# Patient Record
Sex: FEMALE | Race: WHITE | NOT HISPANIC OR LATINO | Employment: UNEMPLOYED | URBAN - METROPOLITAN AREA
[De-identification: names, ages, dates, MRNs, and addresses within clinical notes are randomized per-mention and may not be internally consistent; named-entity substitution may affect disease eponyms.]

---

## 2018-07-03 DIAGNOSIS — Z12.2 SCREENING FOR MALIGNANT NEOPLASM OF RESPIRATORY ORGAN: Primary | ICD-10-CM

## 2021-04-27 ENCOUNTER — TELEPHONE (OUTPATIENT)
Dept: PLASTIC SURGERY | Facility: CLINIC | Age: 56
End: 2021-04-27

## 2021-04-27 DIAGNOSIS — S01.00XD OPEN WOUND OF SCALP, UNSPECIFIED OPEN WOUND TYPE, SUBSEQUENT ENCOUNTER: Primary | ICD-10-CM

## 2021-04-27 DIAGNOSIS — S01.00XS OPEN WOUND OF SCALP, UNSPECIFIED OPEN WOUND TYPE, SEQUELA: ICD-10-CM

## 2021-04-27 PROBLEM — S01.00XA OPEN SCALP WOUND: Status: ACTIVE | Noted: 2021-04-27

## 2021-11-22 ENCOUNTER — TELEPHONE (OUTPATIENT)
Dept: NEUROLOGY | Facility: CLINIC | Age: 56
End: 2021-11-22

## 2021-11-22 NOTE — TELEPHONE ENCOUNTER
----- Message from Ranjit Welch sent at 11/22/2021 10:22 AM CST -----  Regarding: call back  Contact: pt  Pt requesting a call back in regards to referral being sent over.      Pt @[phone]

## 2021-11-22 NOTE — TELEPHONE ENCOUNTER
Called and spoke with pt. Instructed was able to pull her records up under care everywhere. Sent message to [facility] dept and someone should call and schedule. Pt verbalized understanding.

## 2021-12-21 ENCOUNTER — PATIENT MESSAGE (OUTPATIENT)
Dept: NEUROLOGY | Facility: CLINIC | Age: 56
End: 2021-12-21
Payer: MEDICAID

## 2022-03-11 ENCOUNTER — HOSPITAL ENCOUNTER (INPATIENT)
Facility: HOSPITAL | Age: 57
LOS: 3 days | Discharge: HOME HEALTH | DRG: 280 | End: 2022-03-14
Attending: EMERGENCY MEDICINE | Admitting: INTERNAL MEDICINE
Payer: MEDICAID

## 2022-03-11 DIAGNOSIS — I21.3 ST ELEVATION MYOCARDIAL INFARCTION (STEMI), UNSPECIFIED ARTERY: Primary | ICD-10-CM

## 2022-03-11 DIAGNOSIS — R41.82 AMS (ALTERED MENTAL STATUS): ICD-10-CM

## 2022-03-11 DIAGNOSIS — R20.2 PARESTHESIA OF BOTH LOWER EXTREMITIES: Chronic | ICD-10-CM

## 2022-03-11 DIAGNOSIS — I50.21 ACUTE SYSTOLIC HEART FAILURE: ICD-10-CM

## 2022-03-11 DIAGNOSIS — I50.9 CHF (CONGESTIVE HEART FAILURE): ICD-10-CM

## 2022-03-11 DIAGNOSIS — G35 MULTIPLE SCLEROSIS: ICD-10-CM

## 2022-03-11 DIAGNOSIS — I21.4 NSTEMI (NON-ST ELEVATED MYOCARDIAL INFARCTION): ICD-10-CM

## 2022-03-11 PROBLEM — R41.0 CONFUSION: Status: ACTIVE | Noted: 2022-03-11

## 2022-03-11 PROBLEM — I24.89 DEMAND ISCHEMIA: Status: ACTIVE | Noted: 2022-03-11

## 2022-03-11 LAB
ALBUMIN SERPL BCP-MCNC: 3.7 G/DL (ref 3.5–5.2)
ALLENS TEST: ABNORMAL
ALP SERPL-CCNC: 73 U/L (ref 55–135)
ALT SERPL W/O P-5'-P-CCNC: 29 U/L (ref 10–44)
AMPHET+METHAMPHET UR QL: NEGATIVE
ANION GAP SERPL CALC-SCNC: 15 MMOL/L (ref 8–16)
APTT BLDCRRT: 23.2 SEC (ref 21–32)
AST SERPL-CCNC: 59 U/L (ref 10–40)
BACTERIA #/AREA URNS AUTO: ABNORMAL /HPF
BARBITURATES UR QL SCN>200 NG/ML: NEGATIVE
BASOPHILS # BLD AUTO: 0.02 K/UL (ref 0–0.2)
BASOPHILS # BLD AUTO: 0.02 K/UL (ref 0–0.2)
BASOPHILS NFR BLD: 0.1 % (ref 0–1.9)
BASOPHILS NFR BLD: 0.1 % (ref 0–1.9)
BENZODIAZ UR QL SCN>200 NG/ML: NEGATIVE
BILIRUB SERPL-MCNC: 1.6 MG/DL (ref 0.1–1)
BILIRUB UR QL STRIP: NEGATIVE
BNP SERPL-MCNC: 2638 PG/ML (ref 0–99)
BUN SERPL-MCNC: 36 MG/DL (ref 6–20)
BZE UR QL SCN: NEGATIVE
CALCIUM SERPL-MCNC: 9.4 MG/DL (ref 8.7–10.5)
CANNABINOIDS UR QL SCN: ABNORMAL
CHLORIDE SERPL-SCNC: 103 MMOL/L (ref 95–110)
CLARITY UR REFRACT.AUTO: ABNORMAL
CO2 SERPL-SCNC: 24 MMOL/L (ref 23–29)
COLOR UR AUTO: ABNORMAL
CREAT SERPL-MCNC: 0.8 MG/DL (ref 0.5–1.4)
CREAT UR-MCNC: 334 MG/DL (ref 15–325)
CTP QC/QA: YES
DIFFERENTIAL METHOD: ABNORMAL
DIFFERENTIAL METHOD: ABNORMAL
EOSINOPHIL # BLD AUTO: 0 K/UL (ref 0–0.5)
EOSINOPHIL # BLD AUTO: 0 K/UL (ref 0–0.5)
EOSINOPHIL NFR BLD: 0 % (ref 0–8)
EOSINOPHIL NFR BLD: 0 % (ref 0–8)
ERYTHROCYTE [DISTWIDTH] IN BLOOD BY AUTOMATED COUNT: 13.2 % (ref 11.5–14.5)
ERYTHROCYTE [DISTWIDTH] IN BLOOD BY AUTOMATED COUNT: 13.6 % (ref 11.5–14.5)
EST. GFR  (AFRICAN AMERICAN): >60 ML/MIN/1.73 M^2
EST. GFR  (NON AFRICAN AMERICAN): >60 ML/MIN/1.73 M^2
GLUCOSE SERPL-MCNC: 135 MG/DL (ref 70–110)
GLUCOSE UR QL STRIP: ABNORMAL
HCO3 UR-SCNC: 28.6 MMOL/L (ref 24–28)
HCT VFR BLD AUTO: 43 % (ref 37–48.5)
HCT VFR BLD AUTO: 50.5 % (ref 37–48.5)
HGB BLD-MCNC: 15.1 G/DL (ref 12–16)
HGB BLD-MCNC: 17.4 G/DL (ref 12–16)
HGB UR QL STRIP: ABNORMAL
HYALINE CASTS UR QL AUTO: 0 /LPF
IMM GRANULOCYTES # BLD AUTO: 0.07 K/UL (ref 0–0.04)
IMM GRANULOCYTES # BLD AUTO: 0.09 K/UL (ref 0–0.04)
IMM GRANULOCYTES NFR BLD AUTO: 0.5 % (ref 0–0.5)
IMM GRANULOCYTES NFR BLD AUTO: 0.6 % (ref 0–0.5)
INR PPP: 1.1 (ref 0.8–1.2)
KETONES UR QL STRIP: ABNORMAL
LACTATE SERPL-SCNC: 3.5 MMOL/L (ref 0.5–2.2)
LEUKOCYTE ESTERASE UR QL STRIP: NEGATIVE
LYMPHOCYTES # BLD AUTO: 1.2 K/UL (ref 1–4.8)
LYMPHOCYTES # BLD AUTO: 1.4 K/UL (ref 1–4.8)
LYMPHOCYTES NFR BLD: 8 % (ref 18–48)
LYMPHOCYTES NFR BLD: 8.7 % (ref 18–48)
MAGNESIUM SERPL-MCNC: 1.7 MG/DL (ref 1.6–2.6)
MCH RBC QN AUTO: 30.6 PG (ref 27–31)
MCH RBC QN AUTO: 30.7 PG (ref 27–31)
MCHC RBC AUTO-ENTMCNC: 34.5 G/DL (ref 32–36)
MCHC RBC AUTO-ENTMCNC: 35.1 G/DL (ref 32–36)
MCV RBC AUTO: 87 FL (ref 82–98)
MCV RBC AUTO: 89 FL (ref 82–98)
METHADONE UR QL SCN>300 NG/ML: NEGATIVE
MICROSCOPIC COMMENT: ABNORMAL
MONOCYTES # BLD AUTO: 1.1 K/UL (ref 0.3–1)
MONOCYTES # BLD AUTO: 1.3 K/UL (ref 0.3–1)
MONOCYTES NFR BLD: 6.5 % (ref 4–15)
MONOCYTES NFR BLD: 8.8 % (ref 4–15)
NEUTROPHILS # BLD AUTO: 12.4 K/UL (ref 1.8–7.7)
NEUTROPHILS # BLD AUTO: 13.7 K/UL (ref 1.8–7.7)
NEUTROPHILS NFR BLD: 82.6 % (ref 38–73)
NEUTROPHILS NFR BLD: 84.1 % (ref 38–73)
NITRITE UR QL STRIP: NEGATIVE
NRBC BLD-RTO: 0 /100 WBC
NRBC BLD-RTO: 0 /100 WBC
OPIATES UR QL SCN: NEGATIVE
PCO2 BLDA: 32.3 MMHG (ref 35–45)
PCP UR QL SCN>25 NG/ML: NEGATIVE
PH SMN: 7.56 [PH] (ref 7.35–7.45)
PH UR STRIP: 5 [PH] (ref 5–8)
PLATELET # BLD AUTO: 295 K/UL (ref 150–450)
PLATELET # BLD AUTO: 301 K/UL (ref 150–450)
PMV BLD AUTO: 11.5 FL (ref 9.2–12.9)
PMV BLD AUTO: 11.6 FL (ref 9.2–12.9)
PO2 BLDA: 18 MMHG (ref 40–60)
POC BE: 6 MMOL/L
POC SATURATED O2: 34 % (ref 95–100)
POC TCO2: 30 MMOL/L (ref 24–29)
POTASSIUM SERPL-SCNC: 3.4 MMOL/L (ref 3.5–5.1)
PROCALCITONIN SERPL IA-MCNC: 0.03 NG/ML
PROT SERPL-MCNC: 7.8 G/DL (ref 6–8.4)
PROT UR QL STRIP: ABNORMAL
PROTHROMBIN TIME: 11.4 SEC (ref 9–12.5)
RBC # BLD AUTO: 4.94 M/UL (ref 4–5.4)
RBC # BLD AUTO: 5.66 M/UL (ref 4–5.4)
RBC #/AREA URNS AUTO: 7 /HPF (ref 0–4)
SAMPLE: ABNORMAL
SARS-COV-2 RDRP RESP QL NAA+PROBE: NEGATIVE
SITE: ABNORMAL
SODIUM SERPL-SCNC: 142 MMOL/L (ref 136–145)
SP GR UR STRIP: >=1.03 (ref 1–1.03)
SQUAMOUS #/AREA URNS AUTO: 4 /HPF
TOXICOLOGY INFORMATION: ABNORMAL
TROPONIN I SERPL DL<=0.01 NG/ML-MCNC: 4.16 NG/ML (ref 0–0.03)
URN SPEC COLLECT METH UR: ABNORMAL
WBC # BLD AUTO: 14.96 K/UL (ref 3.9–12.7)
WBC # BLD AUTO: 16.27 K/UL (ref 3.9–12.7)
WBC #/AREA URNS AUTO: 5 /HPF (ref 0–5)

## 2022-03-11 PROCEDURE — 84145 PROCALCITONIN (PCT): CPT | Performed by: EMERGENCY MEDICINE

## 2022-03-11 PROCEDURE — 85730 THROMBOPLASTIN TIME PARTIAL: CPT | Performed by: EMERGENCY MEDICINE

## 2022-03-11 PROCEDURE — 87389 HIV-1 AG W/HIV-1&-2 AB AG IA: CPT | Performed by: EMERGENCY MEDICINE

## 2022-03-11 PROCEDURE — 99292 PR CRITICAL CARE, ADDL 30 MIN: ICD-10-PCS | Mod: ,,, | Performed by: EMERGENCY MEDICINE

## 2022-03-11 PROCEDURE — 63600175 PHARM REV CODE 636 W HCPCS: Performed by: EMERGENCY MEDICINE

## 2022-03-11 PROCEDURE — 87040 BLOOD CULTURE FOR BACTERIA: CPT | Performed by: EMERGENCY MEDICINE

## 2022-03-11 PROCEDURE — 93010 EKG 12-LEAD: ICD-10-PCS | Mod: ,,, | Performed by: INTERNAL MEDICINE

## 2022-03-11 PROCEDURE — 99223 1ST HOSP IP/OBS HIGH 75: CPT | Mod: 25,,, | Performed by: INTERNAL MEDICINE

## 2022-03-11 PROCEDURE — 93458 PR CATH PLACE/CORON ANGIO, IMG SUPER/INTERP,W LEFT HEART VENTRICULOGRAPHY: ICD-10-PCS | Mod: 26,,, | Performed by: INTERNAL MEDICINE

## 2022-03-11 PROCEDURE — 20000000 HC ICU ROOM

## 2022-03-11 PROCEDURE — 94761 N-INVAS EAR/PLS OXIMETRY MLT: CPT

## 2022-03-11 PROCEDURE — 86803 HEPATITIS C AB TEST: CPT | Performed by: EMERGENCY MEDICINE

## 2022-03-11 PROCEDURE — U0002 COVID-19 LAB TEST NON-CDC: HCPCS | Performed by: EMERGENCY MEDICINE

## 2022-03-11 PROCEDURE — 63600175 PHARM REV CODE 636 W HCPCS: Performed by: INTERNAL MEDICINE

## 2022-03-11 PROCEDURE — 25000003 PHARM REV CODE 250: Performed by: INTERNAL MEDICINE

## 2022-03-11 PROCEDURE — 93005 ELECTROCARDIOGRAM TRACING: CPT

## 2022-03-11 PROCEDURE — 83605 ASSAY OF LACTIC ACID: CPT | Performed by: EMERGENCY MEDICINE

## 2022-03-11 PROCEDURE — C1769 GUIDE WIRE: HCPCS | Performed by: INTERNAL MEDICINE

## 2022-03-11 PROCEDURE — 84484 ASSAY OF TROPONIN QUANT: CPT | Performed by: EMERGENCY MEDICINE

## 2022-03-11 PROCEDURE — C1894 INTRO/SHEATH, NON-LASER: HCPCS | Performed by: INTERNAL MEDICINE

## 2022-03-11 PROCEDURE — 93458 L HRT ARTERY/VENTRICLE ANGIO: CPT | Mod: GC | Performed by: INTERNAL MEDICINE

## 2022-03-11 PROCEDURE — 81001 URINALYSIS AUTO W/SCOPE: CPT | Performed by: EMERGENCY MEDICINE

## 2022-03-11 PROCEDURE — 25000003 PHARM REV CODE 250: Performed by: EMERGENCY MEDICINE

## 2022-03-11 PROCEDURE — 82803 BLOOD GASES ANY COMBINATION: CPT

## 2022-03-11 PROCEDURE — 85610 PROTHROMBIN TIME: CPT | Performed by: EMERGENCY MEDICINE

## 2022-03-11 PROCEDURE — 96365 THER/PROPH/DIAG IV INF INIT: CPT

## 2022-03-11 PROCEDURE — 99292 CRITICAL CARE ADDL 30 MIN: CPT | Mod: ,,, | Performed by: EMERGENCY MEDICINE

## 2022-03-11 PROCEDURE — 93458 L HRT ARTERY/VENTRICLE ANGIO: CPT | Mod: 26,,, | Performed by: INTERNAL MEDICINE

## 2022-03-11 PROCEDURE — 99152 MOD SED SAME PHYS/QHP 5/>YRS: CPT | Mod: ,,, | Performed by: INTERNAL MEDICINE

## 2022-03-11 PROCEDURE — 25500020 PHARM REV CODE 255: Performed by: INTERNAL MEDICINE

## 2022-03-11 PROCEDURE — 80053 COMPREHEN METABOLIC PANEL: CPT | Performed by: EMERGENCY MEDICINE

## 2022-03-11 PROCEDURE — 83880 ASSAY OF NATRIURETIC PEPTIDE: CPT | Performed by: EMERGENCY MEDICINE

## 2022-03-11 PROCEDURE — 99152 PR MOD CONSCIOUS SEDATION, SAME PHYS, 5+ YRS, FIRST 15 MIN: ICD-10-PCS | Mod: ,,, | Performed by: INTERNAL MEDICINE

## 2022-03-11 PROCEDURE — 93010 ELECTROCARDIOGRAM REPORT: CPT | Mod: ,,, | Performed by: INTERNAL MEDICINE

## 2022-03-11 PROCEDURE — 99152 MOD SED SAME PHYS/QHP 5/>YRS: CPT | Performed by: INTERNAL MEDICINE

## 2022-03-11 PROCEDURE — 99900035 HC TECH TIME PER 15 MIN (STAT)

## 2022-03-11 PROCEDURE — 83735 ASSAY OF MAGNESIUM: CPT | Performed by: EMERGENCY MEDICINE

## 2022-03-11 PROCEDURE — 85025 COMPLETE CBC W/AUTO DIFF WBC: CPT | Mod: 91 | Performed by: EMERGENCY MEDICINE

## 2022-03-11 PROCEDURE — 96361 HYDRATE IV INFUSION ADD-ON: CPT

## 2022-03-11 PROCEDURE — C1887 CATHETER, GUIDING: HCPCS | Performed by: INTERNAL MEDICINE

## 2022-03-11 PROCEDURE — 99291 PR CRITICAL CARE, E/M 30-74 MINUTES: ICD-10-PCS | Mod: CS,,, | Performed by: EMERGENCY MEDICINE

## 2022-03-11 PROCEDURE — S0030 INJECTION, METRONIDAZOLE: HCPCS | Performed by: EMERGENCY MEDICINE

## 2022-03-11 PROCEDURE — 99223 PR INITIAL HOSPITAL CARE,LEVL III: ICD-10-PCS | Mod: 25,,, | Performed by: INTERNAL MEDICINE

## 2022-03-11 PROCEDURE — 80307 DRUG TEST PRSMV CHEM ANLYZR: CPT | Performed by: EMERGENCY MEDICINE

## 2022-03-11 PROCEDURE — 99291 CRITICAL CARE FIRST HOUR: CPT | Mod: CS,,, | Performed by: EMERGENCY MEDICINE

## 2022-03-11 PROCEDURE — 25000242 PHARM REV CODE 250 ALT 637 W/ HCPCS: Performed by: EMERGENCY MEDICINE

## 2022-03-11 PROCEDURE — 99291 CRITICAL CARE FIRST HOUR: CPT | Mod: 25

## 2022-03-11 RX ORDER — ASPIRIN 325 MG
325 TABLET ORAL
Status: COMPLETED | OUTPATIENT
Start: 2022-03-11 | End: 2022-03-11

## 2022-03-11 RX ORDER — FUROSEMIDE 10 MG/ML
40 INJECTION INTRAMUSCULAR; INTRAVENOUS
Status: DISCONTINUED | OUTPATIENT
Start: 2022-03-12 | End: 2022-03-14

## 2022-03-11 RX ORDER — HEPARIN SODIUM 1000 [USP'U]/ML
INJECTION, SOLUTION INTRAVENOUS; SUBCUTANEOUS
Status: DISCONTINUED | OUTPATIENT
Start: 2022-03-11 | End: 2022-03-11

## 2022-03-11 RX ORDER — METRONIDAZOLE 500 MG/100ML
500 INJECTION, SOLUTION INTRAVENOUS
Status: DISCONTINUED | OUTPATIENT
Start: 2022-03-11 | End: 2022-03-12

## 2022-03-11 RX ORDER — CLOPIDOGREL 300 MG/1
300 TABLET, FILM COATED ORAL
Status: DISCONTINUED | OUTPATIENT
Start: 2022-03-11 | End: 2022-03-11

## 2022-03-11 RX ORDER — CLOPIDOGREL 300 MG/1
600 TABLET, FILM COATED ORAL
Status: COMPLETED | OUTPATIENT
Start: 2022-03-11 | End: 2022-03-11

## 2022-03-11 RX ORDER — NITROGLYCERIN 0.4 MG/1
0.4 TABLET SUBLINGUAL
Status: COMPLETED | OUTPATIENT
Start: 2022-03-11 | End: 2022-03-11

## 2022-03-11 RX ORDER — ONDANSETRON 4 MG/1
8 TABLET, ORALLY DISINTEGRATING ORAL EVERY 8 HOURS PRN
Status: DISCONTINUED | OUTPATIENT
Start: 2022-03-12 | End: 2022-03-14 | Stop reason: HOSPADM

## 2022-03-11 RX ORDER — NITROGLYCERIN 0.4 MG/1
0.4 TABLET SUBLINGUAL EVERY 5 MIN PRN
Status: DISCONTINUED | OUTPATIENT
Start: 2022-03-11 | End: 2022-03-14 | Stop reason: HOSPADM

## 2022-03-11 RX ORDER — LIDOCAINE HYDROCHLORIDE 20 MG/ML
INJECTION, SOLUTION INFILTRATION; PERINEURAL
Status: DISCONTINUED | OUTPATIENT
Start: 2022-03-11 | End: 2022-03-11 | Stop reason: HOSPADM

## 2022-03-11 RX ORDER — HEPARIN SOD,PORCINE/0.9 % NACL 1000/500ML
INTRAVENOUS SOLUTION INTRAVENOUS
Status: DISCONTINUED | OUTPATIENT
Start: 2022-03-11 | End: 2022-03-11

## 2022-03-11 RX ORDER — HEPARIN SODIUM,PORCINE/D5W 25000/250
0-40 INTRAVENOUS SOLUTION INTRAVENOUS CONTINUOUS
Status: DISCONTINUED | OUTPATIENT
Start: 2022-03-11 | End: 2022-03-11

## 2022-03-11 RX ORDER — ACETAMINOPHEN 325 MG/1
650 TABLET ORAL EVERY 4 HOURS PRN
Status: DISCONTINUED | OUTPATIENT
Start: 2022-03-12 | End: 2022-03-14 | Stop reason: HOSPADM

## 2022-03-11 RX ADMIN — HEPARIN SODIUM 12 UNITS/KG/HR: 5000 INJECTION INTRAVENOUS; SUBCUTANEOUS at 10:03

## 2022-03-11 RX ADMIN — SODIUM CHLORIDE, SODIUM LACTATE, POTASSIUM CHLORIDE, AND CALCIUM CHLORIDE 2109 ML: .6; .31; .03; .02 INJECTION, SOLUTION INTRAVENOUS at 08:03

## 2022-03-11 RX ADMIN — CLOPIDOGREL BISULFATE 600 MG: 300 TABLET, FILM COATED ORAL at 09:03

## 2022-03-11 RX ADMIN — METRONIDAZOLE 500 MG: 500 INJECTION, SOLUTION INTRAVENOUS at 09:03

## 2022-03-11 RX ADMIN — NITROGLYCERIN 0.4 MG: 0.4 TABLET, ORALLY DISINTEGRATING SUBLINGUAL at 10:03

## 2022-03-11 RX ADMIN — ASPIRIN 325 MG ORAL TABLET 325 MG: 325 PILL ORAL at 09:03

## 2022-03-11 NOTE — Clinical Note
The catheter was inserted into the ostium   left main. An angiography was performed of the left coronary arteries. Multiple views were taken. The angiography was performed via hand injection with .   Catheter removed

## 2022-03-11 NOTE — Clinical Note
The site was marked. The groin and right radial was prepped. The site was prepped with ChloraPrep. The site was clipped. The patient was draped. The patient was positioned supine.

## 2022-03-11 NOTE — Clinical Note
40 ml of contrast were injected throughout the case. 160 mL of contrast was the total wasted during the case. 200 mL was the total amount used during the case.

## 2022-03-11 NOTE — Clinical Note
The catheter was repositioned into the ostium   right coronary artery. An angiography was performed of the right coronary arteries. Multiple views were taken. The angiography was performed via hand injection with .   Catheter removed

## 2022-03-11 NOTE — Clinical Note
The groin and right radial was prepped. The site was prepped with ChloraPrep. The patient was draped. The patient was positioned supine.

## 2022-03-12 LAB
ASCENDING AORTA: 2.65 CM
AV INDEX (PROSTH): 0.92
AV MEAN GRADIENT: 3 MMHG
AV PEAK GRADIENT: 4 MMHG
AV VALVE AREA: 2.86 CM2
AV VELOCITY RATIO: 1.01
BASOPHILS # BLD AUTO: 0.03 K/UL (ref 0–0.2)
BASOPHILS NFR BLD: 0.2 % (ref 0–1.9)
BSA FOR ECHO PROCEDURE: 1.72 M2
CV ECHO LV RWT: 0.29 CM
DIFFERENTIAL METHOD: ABNORMAL
DOP CALC AO PEAK VEL: 0.95 M/S
DOP CALC AO VTI: 13.02 CM
DOP CALC LVOT AREA: 3.1 CM2
DOP CALC LVOT DIAMETER: 1.99 CM
DOP CALC LVOT PEAK VEL: 0.96 M/S
DOP CALC LVOT STROKE VOLUME: 37.27 CM3
DOP CALCLVOT PEAK VEL VTI: 11.99 CM
ECHO LV POSTERIOR WALL: 0.75 CM (ref 0.6–1.1)
EJECTION FRACTION: 25 %
EOSINOPHIL # BLD AUTO: 0 K/UL (ref 0–0.5)
EOSINOPHIL NFR BLD: 0 % (ref 0–8)
ERYTHROCYTE [DISTWIDTH] IN BLOOD BY AUTOMATED COUNT: 13.4 % (ref 11.5–14.5)
FRACTIONAL SHORTENING: 13 % (ref 28–44)
HCT VFR BLD AUTO: 45.2 % (ref 37–48.5)
HGB BLD-MCNC: 15.6 G/DL (ref 12–16)
IMM GRANULOCYTES # BLD AUTO: 0.08 K/UL (ref 0–0.04)
IMM GRANULOCYTES NFR BLD AUTO: 0.5 % (ref 0–0.5)
INTERVENTRICULAR SEPTUM: 0.67 CM (ref 0.6–1.1)
LA MAJOR: 5.1 CM
LA MINOR: 4.3 CM
LA WIDTH: 3.49 CM
LACTATE SERPL-SCNC: 1.8 MMOL/L (ref 0.5–2.2)
LEFT ATRIUM SIZE: 2.98 CM
LEFT ATRIUM VOLUME INDEX: 24 ML/M2
LEFT ATRIUM VOLUME: 41.25 CM3
LEFT INTERNAL DIMENSION IN SYSTOLE: 4.44 CM (ref 2.1–4)
LEFT VENTRICLE DIASTOLIC VOLUME INDEX: 72.37 ML/M2
LEFT VENTRICLE DIASTOLIC VOLUME: 124.47 ML
LEFT VENTRICLE MASS INDEX: 70 G/M2
LEFT VENTRICLE SYSTOLIC VOLUME INDEX: 52.2 ML/M2
LEFT VENTRICLE SYSTOLIC VOLUME: 89.75 ML
LEFT VENTRICULAR INTERNAL DIMENSION IN DIASTOLE: 5.11 CM (ref 3.5–6)
LEFT VENTRICULAR MASS: 121.25 G
LYMPHOCYTES # BLD AUTO: 1.8 K/UL (ref 1–4.8)
LYMPHOCYTES NFR BLD: 10.9 % (ref 18–48)
MCH RBC QN AUTO: 30.2 PG (ref 27–31)
MCHC RBC AUTO-ENTMCNC: 34.5 G/DL (ref 32–36)
MCV RBC AUTO: 88 FL (ref 82–98)
MONOCYTES # BLD AUTO: 1.4 K/UL (ref 0.3–1)
MONOCYTES NFR BLD: 8.6 % (ref 4–15)
NEUTROPHILS # BLD AUTO: 12.8 K/UL (ref 1.8–7.7)
NEUTROPHILS NFR BLD: 79.8 % (ref 38–73)
NRBC BLD-RTO: 0 /100 WBC
PLATELET # BLD AUTO: 301 K/UL (ref 150–450)
PMV BLD AUTO: 11.2 FL (ref 9.2–12.9)
POCT GLUCOSE: 118 MG/DL (ref 70–110)
POCT GLUCOSE: 140 MG/DL (ref 70–110)
QEF: 29 %
RA MAJOR: 3.6 CM
RA PRESSURE: 3 MMHG
RA WIDTH: 3.09 CM
RBC # BLD AUTO: 5.16 M/UL (ref 4–5.4)
RIGHT VENTRICULAR END-DIASTOLIC DIMENSION: 3.05 CM
SINUS: 2.4 CM
STJ: 2.59 CM
TDI LATERAL: 0.06 M/S
TDI SEPTAL: 0.06 M/S
TDI: 0.06 M/S
TRICUSPID ANNULAR PLANE SYSTOLIC EXCURSION: 1.97 CM
TROPONIN I SERPL DL<=0.01 NG/ML-MCNC: 2.98 NG/ML (ref 0–0.03)
WBC # BLD AUTO: 16.08 K/UL (ref 3.9–12.7)

## 2022-03-12 PROCEDURE — 83605 ASSAY OF LACTIC ACID: CPT | Performed by: HOSPITALIST

## 2022-03-12 PROCEDURE — 63600175 PHARM REV CODE 636 W HCPCS: Performed by: HOSPITALIST

## 2022-03-12 PROCEDURE — 99233 PR SUBSEQUENT HOSPITAL CARE,LEVL III: ICD-10-PCS | Mod: 25,,, | Performed by: INTERNAL MEDICINE

## 2022-03-12 PROCEDURE — 25000003 PHARM REV CODE 250: Performed by: EMERGENCY MEDICINE

## 2022-03-12 PROCEDURE — 25000003 PHARM REV CODE 250: Performed by: HOSPITALIST

## 2022-03-12 PROCEDURE — 84484 ASSAY OF TROPONIN QUANT: CPT

## 2022-03-12 PROCEDURE — 94761 N-INVAS EAR/PLS OXIMETRY MLT: CPT

## 2022-03-12 PROCEDURE — 25000003 PHARM REV CODE 250: Performed by: INTERNAL MEDICINE

## 2022-03-12 PROCEDURE — 99233 SBSQ HOSP IP/OBS HIGH 50: CPT | Mod: 25,,, | Performed by: INTERNAL MEDICINE

## 2022-03-12 PROCEDURE — 85025 COMPLETE CBC W/AUTO DIFF WBC: CPT | Performed by: HOSPITALIST

## 2022-03-12 PROCEDURE — 25000003 PHARM REV CODE 250

## 2022-03-12 PROCEDURE — S0030 INJECTION, METRONIDAZOLE: HCPCS | Performed by: EMERGENCY MEDICINE

## 2022-03-12 PROCEDURE — 63600175 PHARM REV CODE 636 W HCPCS: Performed by: INTERNAL MEDICINE

## 2022-03-12 PROCEDURE — 99900035 HC TECH TIME PER 15 MIN (STAT)

## 2022-03-12 PROCEDURE — 20600001 HC STEP DOWN PRIVATE ROOM

## 2022-03-12 RX ORDER — METOPROLOL SUCCINATE 25 MG/1
25 TABLET, EXTENDED RELEASE ORAL DAILY
Status: DISCONTINUED | OUTPATIENT
Start: 2022-03-12 | End: 2022-03-14 | Stop reason: HOSPADM

## 2022-03-12 RX ORDER — VANCOMYCIN HCL IN 5 % DEXTROSE 1G/250ML
1000 PLASTIC BAG, INJECTION (ML) INTRAVENOUS
Status: DISCONTINUED | OUTPATIENT
Start: 2022-03-13 | End: 2022-03-13

## 2022-03-12 RX ORDER — TALC
6 POWDER (GRAM) TOPICAL NIGHTLY PRN
Status: DISCONTINUED | OUTPATIENT
Start: 2022-03-12 | End: 2022-03-14 | Stop reason: HOSPADM

## 2022-03-12 RX ORDER — SODIUM CHLORIDE 9 MG/ML
INJECTION, SOLUTION INTRAVENOUS CONTINUOUS
Status: ACTIVE | OUTPATIENT
Start: 2022-03-12 | End: 2022-03-12

## 2022-03-12 RX ORDER — DIPHENHYDRAMINE HCL 50 MG
50 CAPSULE ORAL ONCE
Status: DISCONTINUED | OUTPATIENT
Start: 2022-03-12 | End: 2022-03-14 | Stop reason: HOSPADM

## 2022-03-12 RX ORDER — METRONIDAZOLE 500 MG/1
500 TABLET ORAL EVERY 8 HOURS
Status: DISCONTINUED | OUTPATIENT
Start: 2022-03-12 | End: 2022-03-13

## 2022-03-12 RX ADMIN — METRONIDAZOLE 500 MG: 500 INJECTION, SOLUTION INTRAVENOUS at 04:03

## 2022-03-12 RX ADMIN — FUROSEMIDE 40 MG: 10 INJECTION, SOLUTION INTRAMUSCULAR; INTRAVENOUS at 02:03

## 2022-03-12 RX ADMIN — FUROSEMIDE 40 MG: 10 INJECTION, SOLUTION INTRAMUSCULAR; INTRAVENOUS at 01:03

## 2022-03-12 RX ADMIN — METRONIDAZOLE 500 MG: 500 TABLET ORAL at 09:03

## 2022-03-12 RX ADMIN — METRONIDAZOLE 500 MG: 500 INJECTION, SOLUTION INTRAVENOUS at 02:03

## 2022-03-12 RX ADMIN — Medication 6 MG: at 10:03

## 2022-03-12 RX ADMIN — ONDANSETRON 8 MG: 8 TABLET, ORALLY DISINTEGRATING ORAL at 09:03

## 2022-03-12 RX ADMIN — VANCOMYCIN HYDROCHLORIDE 1250 MG: 1.25 INJECTION, POWDER, LYOPHILIZED, FOR SOLUTION INTRAVENOUS at 03:03

## 2022-03-12 RX ADMIN — ONDANSETRON 8 MG: 8 TABLET, ORALLY DISINTEGRATING ORAL at 01:03

## 2022-03-12 RX ADMIN — METOPROLOL SUCCINATE 25 MG: 25 TABLET, EXTENDED RELEASE ORAL at 01:03

## 2022-03-12 NOTE — ED NOTES
Received verbal notice that pt will be going to cath lab. Pt ready to go, awaiting notice that cath lab is ready.

## 2022-03-12 NOTE — ED NOTES
Cardiology and ED attending at bedside reviewing pt's multiple recent EKGs. Cath lab activated, still awaiting final decision that pt will be going to cath lab, also awaiting cath lab ready.

## 2022-03-12 NOTE — CONSULTS
Consult received. Neurology to follow up in the morning.    Arianna Aldridge MD  Neurology resident, PGY-4  Department of Neurology  [facility]

## 2022-03-12 NOTE — HPI
56-year-old female with past medical history of also multiple sclerosis presents with confusion for the last 3 days.  The emergency room EKG was done that showed ST segment elevation 1 and aVL but not more than 1 mm. She denied having any chest pain in the emergency room.  However her troponins went up to 4.  She also has elevated BNP up to 2000..  Repeat EKGs were done which showed a similar appearance.  Bedside echo showed wall motion abnormalities involving the anterior and anterior lateral walls.  Chest x-ray did not show any pulmonary edema.  Interventional  staff notified  and plan was made to take patient to the cath lab.

## 2022-03-12 NOTE — ASSESSMENT & PLAN NOTE
Patient presented with altered mental status.  On my 1st interaction she denied having chest pain shortness of breath but complained having chest pain when I went to do a bedside echo.  She is on multiple anxiety/depression medications  She also has elevated white blood cell count.  CT head negative for any acute events.  She does not have neck rigidity  UA negative, chest x-ray negative for any acute infectious process  Urine drug screen positive for marijuana   She has history of opioid/benzodiazepine dependence.    Will check blood cultures and keep her on empiric antibiotics for now.

## 2022-03-12 NOTE — PROGRESS NOTES
Pharmacokinetic Initial Assessment: IV Vancomycin    Assessment/Plan:    Initiate intravenous vancomycin with loading dose of 1250 mg once followed by a maintenance dose of vancomycin 1000mg IV every 12 hours  Desired empiric serum trough concentration is 15 to 20 mcg/mL  Draw vancomycin trough level 6 min prior to fourth dose on 3/14 at approximately 1530  Pharmacy will continue to follow and monitor vancomycin.      Please contact pharmacy at extension [phone] with any questions regarding this assessment.     Thank you for the consult,   Antoni Burnett       Patient brief summary:  Reza Morrow is a 56 y.o. female initiated on antimicrobial therapy with IV Vancomycin for treatment of suspected sepsis    Drug Allergies:   Review of patient's allergies indicates:   Allergen Reactions    Adhesive Hives    Neosporin [neomycin-bacitracin-polymyxin] Hives    Penicillins Other (See Comments)     Unknown  reaction       Actual Body Weight:   64.9 kg    Renal Function:   Estimated Creatinine Clearance: 71.5 mL/min (based on SCr of 0.8 mg/dL).,     Dialysis Method (if applicable):  N/A    CBC (last 72 hours):  Recent Labs   Lab Result Units 03/11/22 2013 03/11/22 2153 03/12/22  0024   WBC K/uL 16.27* 14.96* 16.08*   Hemoglobin g/dL 17.4* 15.1 15.6   Hematocrit % 50.5* 43.0 45.2   Platelets K/uL 295 301 301   Gran % % 84.1* 82.6* 79.8*   Lymph % % 8.7* 8.0* 10.9*   Mono % % 6.5 8.8 8.6   Eosinophil % % 0.0 0.0 0.0   Basophil % % 0.1 0.1 0.2   Differential Method  Automated Automated Automated       Metabolic Panel (last 72 hours):  Recent Labs   Lab Result Units 03/11/22 2048 03/11/22 2049 03/11/22 2153   Sodium mmol/L  --   --  142   Potassium mmol/L  --   --  3.4*   Chloride mmol/L  --   --  103   CO2 mmol/L  --   --  24   Glucose mg/dL  --   --  135*   Glucose, UA   --  1+*  --    BUN mg/dL  --   --  36*   Creatinine mg/dL  --   --  0.8   Creatinine, Urine mg/dL 334.0*  --   --    Albumin g/dL  --   --  3.7    Total Bilirubin mg/dL  --   --  1.6*   Alkaline Phosphatase U/L  --   --  73   AST U/L  --   --  59*   ALT U/L  --   --  29   Magnesium mg/dL  --   --  1.7       Drug levels (last 3 results):  No results for input(s): VANCOMYCINRA, VANCOMYCINPE, VANCOMYCINTR in the last 72 hours.    Microbiologic Results:  Microbiology Results (last 7 days)     Procedure Component Value Units Date/Time    Blood culture x two cultures. Draw prior to antibiotics. [[id number]] Collected: 03/11/22 2058    Order Status: Completed Specimen: Blood from Peripheral, Antecubital, Left Updated: 03/12/22 0345     Blood Culture, Routine No Growth to date    Narrative:      Aerobic and anaerobic    Blood culture x two cultures. Draw prior to antibiotics. [[id number]] Collected: 03/11/22 2014    Order Status: Completed Specimen: Blood from Peripheral, Forearm, Right Updated: 03/12/22 0315     Blood Culture, Routine No Growth to date    Narrative:      Aerobic and anaerobic

## 2022-03-12 NOTE — SUBJECTIVE & OBJECTIVE
Past Medical History:   Diagnosis Date    Behavioral problem     Bulging lumbar disc     Bursitis     bilateral hip    Degenerative cervical disc     Hx of psychiatric care     Hypercholesteremia     Labral tear of hip, degenerative bilateral    MS (multiple sclerosis)     Pneumonia     Spinal cord compression        Past Surgical History:   Procedure Laterality Date    BREAST SURGERY      augmentation     SECTION, CLASSIC      HAND SURGERY      HYSTEROSCOPY      NECK SURGERY      cervical disc removeal    TUBAL LIGATION      X-STOP IMPLANTATION         Review of patient's allergies indicates:   Allergen Reactions    Adhesive Hives    Neosporin [neomycin-bacitracin-polymyxin] Hives    Penicillins Other (See Comments)     Unknown  reaction       No current facility-administered medications on file prior to encounter.     Current Outpatient Medications on File Prior to Encounter   Medication Sig    doxepin (SINEQUAN) 25 MG capsule Take 25 mg by mouth nightly as needed.    gabapentin (NEURONTIN) 300 MG capsule Take 300 mg by mouth 3 (three) times daily as needed.    hydrocodone-acetaminophen 10-325mg (NORCO)  mg Tab Take 1 tablet by mouth every 4 (four) hours as needed.    hydrOXYzine pamoate (VISTARIL) 25 MG Cap Take 25 mg by mouth 3 (three) times daily as needed.    metronidazole 1% (METROGEL) 1 % Gel Apply topically daily as needed.    oxymorphone (OPANA ER) 20 MG 12 hr tablet Take 20 mg by mouth 2 (two) times daily.     TAPENTADOL HCL (NUCYNTA ORAL) Take 1 tablet by mouth 4 (four) times daily.    triamcinolone acetonide 0.1% (KENALOG) 0.1 % cream Apply topically daily as needed.    VOLTAREN 1 % Gel Apply topically once daily.    [DISCONTINUED] atorvastatin (LIPITOR) 20 MG tablet Take 20 mg by mouth once daily.     Family History       Problem Relation (Age of Onset)    Bipolar disorder Brother    COPD Mother    Cancer Father    Diabetes Father, Sister    Drug abuse Mother    Heart disease Maternal  Uncle    Hypothyroidism Maternal Grandmother    Lung cancer Father          Tobacco Use    Smoking status: Former Smoker     Quit date: 2013     Years since quittin.6    Smokeless tobacco: Never Used   Substance and Sexual Activity    Alcohol use: No    Drug use: Yes     Types: Benzodiazepines, Marijuana    Sexual activity: Never     Review of Systems   Constitutional: Negative.   HENT: Negative.     Eyes: Negative.    Cardiovascular:  Positive for chest pain.   Respiratory: Negative.     Endocrine: Negative.    Hematologic/Lymphatic: Negative.    Skin: Negative.    Musculoskeletal: Negative.    Gastrointestinal: Negative.    Genitourinary: Negative.    Neurological: Negative.    Psychiatric/Behavioral: Negative.     Allergic/Immunologic: Negative.    Objective:     Vital Signs (Most Recent):  Temp: 97.9 °F (36.6 °C) (22)  Pulse: (!) 122 (22)  Resp: 20 (22)  BP: 115/72 (22)  SpO2: 97 % (22)   Vital Signs (24h Range):  Temp:  [97.9 °F (36.6 °C)] 97.9 °F (36.6 °C)  Pulse:  [115-138] 122  Resp:  [20] 20  SpO2:  [96 %-100 %] 97 %  BP: (106-178)/() 115/72     Weight: 70.3 kg (155 lb)  Body mass index is 25.79 kg/m².    SpO2: 97 %         Intake/Output Summary (Last 24 hours) at 3/11/2022 2224  Last data filed at 3/11/2022 2208  Gross per 24 hour   Intake 1694.45 ml   Output --   Net 1694.45 ml       Lines/Drains/Airways       Peripheral Intravenous Line  Duration                  Peripheral IV - Single Lumen 16 Right Antecubital 2088 days         Peripheral IV - Single Lumen 22 2058 20 G Left Antecubital <1 day                    Physical Exam  Constitutional:       Appearance: Normal appearance.   HENT:      Head: Normocephalic.   Eyes:      Extraocular Movements: Extraocular movements intact.      Pupils: Pupils are equal, round, and reactive to light.   Cardiovascular:      Rate and Rhythm: Normal rate and regular rhythm.    Pulmonary:      Effort: Pulmonary effort is normal.      Breath sounds: Normal breath sounds.   Abdominal:      General: Abdomen is flat. Bowel sounds are normal.      Palpations: Abdomen is soft.   Musculoskeletal:      Cervical back: Normal range of motion.   Skin:     General: Skin is warm.      Capillary Refill: Capillary refill takes less than 2 seconds.   Neurological:      General: No focal deficit present.      Mental Status: She is alert and oriented to person, place, and time.       Significant Labs: All pertinent lab results from the last 24 hours have been reviewed.    Significant Imaging: EKG:  ST segment elevation in lead 1 and aVL with ST segment depressionin lead 2

## 2022-03-12 NOTE — ASSESSMENT & PLAN NOTE
She has no history of prior heart failure.  He she has depressed EF on echo.  She has elevated LV end-diastolic pressures.  Her BNP on presentation is 2000.  Will get a formal echo in a.m. tomorrow morning.  Will start her on IV Lasix 40 mg b.i.d.

## 2022-03-12 NOTE — OP NOTE
Brief Operative Note:    : Elie Matamoros MD     Referring Physician: Self,Aaareferral     All Operators: Surgeon(s):  MD Mansoor Matos MD Stephanie Maria Madonis, MD     Preoperative Diagnosis: ST elevation myocardial infarction (STEMI), unspecified artery [I21.3]     Postop Diagnosis: ST elevation myocardial infarction (STEMI), unspecified artery [I21.3]    Treatments/Procedures: Procedure(s) (LRB):  Angiogram, Coronary, with Left Heart Cath (Left)    Access: Right radial artery    Findings:Normal Coronaries      See catheterization report for full details.    Intervention: None    See catheterization report for full details.    Closure device: TR band        Plan:  - Post cath protocol   - Bed rest x 2 hours   -  Recommend to do Sepsis work up   - Elevated LVEDP to 30   - Recommend diuresis     Estimated Blood loss: 20 cc    Specimens removed: No    Mansoor Gomez MD  [facility]  Cardiovascular Disease, PGY-IV

## 2022-03-12 NOTE — ASSESSMENT & PLAN NOTE
Patient initially presented for confusion but when she is awake and alert denied having any chest pain.  Her EKG was initially concerning for ST segment elevation in 1 and aVL but not more than 1 mm.  However when I was doing her bedside echo she complained of having chest pain and she did had wall motion abnormalities involving the anterior and anterolateral walls.  Case was discussed with interventional staff and decided to take patient to the cath lab.  Patient is loaded with aspirin and Plavix.  On heparin.  Her chest pain improved after getting the 2nd dose of nitro.  Consent signed for left heart catheterization    --LHC +/- PCI, patient is a MIKIE candidate  - Anti-platelet Therapy: ASA / Ticagrelor  - Access: Right radial  - Catheters: Arnav  - Creatinine/CrCl: 1.2  - Allergies: No shellfish / Iodine allergy  - Pre-Hydration: NS  - Pre-Op Med: Bendaryl 50mg pO   - All patient's questions were answered.  -The risks, benefits and alternatives of the procedure were explained to the patient.   -The risks of coronary angiography include but are not limited to: bleeding, infection, heart rhythm abnormalities, allergic reactions, kidney injury and potential need for dialysis, stroke and death.   - Should stenting be indicated, the patient has agreed to dual anti-platelet therapy for 1-consecutive year with a drug-eluting stent and a minimum of 1-month with the use of a bare metal stent  - Additionally, pt is aware that non-compliance is likely to result in stent clotting with heart attack, heart failure, and/or death  -The risks of moderate sedation include hypotension, respiratory depression, arrhythmias, bronchospasm, and death.   - Informed consent was obtained and the  patient is agreeable to proceed with the procedure.

## 2022-03-12 NOTE — ED PROVIDER NOTES
"Encounter Date: 3/11/2022       History     Chief Complaint   Patient presents with    Altered Mental Status     Per Pt.'s son Pt has been "increasing weak and confused" over the last two days. PMH of MS. States Pt has had "a couple of falls" over the last couple of weeks. Pt is oriented to self in triage.      HPI   Reza Morrow is a 56-year-old female with a history of bursitis, hyperlipidemia, history of multiple sclerosis brought in by her son for increased weakness and confusion over the last 2 days.  Per her history and patient's son does report, she has had a couple falls over the last several weeks.  Patient is oriented to self in triage, and reports generalized weakness, confusion for the last 2 days.  No reported fevers, chills, nausea, vomiting, chest pain, back pain, abdominal pain, leg pain or lightheadedness per the brother.  Patient reports occasional body aches.     Review of patient's allergies indicates:   Allergen Reactions    Adhesive Hives    Neosporin [neomycin-bacitracin-polymyxin] Hives    Penicillins Other (See Comments)     Unknown  reaction     Past Medical History:   Diagnosis Date    Behavioral problem     Bulging lumbar disc     Bursitis     bilateral hip    Degenerative cervical disc     Hx of psychiatric care     Hypercholesteremia     Labral tear of hip, degenerative bilateral    MS (multiple sclerosis)     Pneumonia     Spinal cord compression      Past Surgical History:   Procedure Laterality Date    BREAST SURGERY      augmentation     SECTION, CLASSIC      HAND SURGERY      HYSTEROSCOPY      NECK SURGERY      cervical disc removeal    TUBAL LIGATION      X-STOP IMPLANTATION       Family History   Problem Relation Age of Onset    Cancer Father     Diabetes Father     Lung cancer Father     Diabetes Sister     COPD Mother     Drug abuse Mother     Bipolar disorder Brother     Heart disease Maternal Uncle     Hypothyroidism Maternal " Grandmother      Social History     Tobacco Use    Smoking status: Former Smoker     Quit date: 2013     Years since quittin.6    Smokeless tobacco: Never Used   Substance Use Topics    Alcohol use: No    Drug use: Yes     Types: Benzodiazepines, Marijuana     Review of Systems    Physical Exam     Initial Vitals [22 1901]   BP Pulse Resp Temp SpO2   (!) 178/129 (!) 138 20 97.9 °F (36.6 °C) 100 %      MAP       --         Physical Exam    ED Course   Critical Care    Date/Time: 3/11/2022 11:51 PM  Performed by: Asad Christine DO  Authorized by: Asad Christine DO   Direct patient critical care time: 25 minutes  Additional history critical care time: 30 minutes  Ordering / reviewing critical care time: 20 minutes  Documentation critical care time: 10 minutes  Consulting other physicians critical care time: 15 minutes  Consult with family critical care time: 15 minutes  Total critical care time (exclusive of procedural time) : 115 minutes  Critical care was necessary to treat or prevent imminent or life-threatening deterioration of the following conditions: cardiac failure and CNS failure or compromise.  Critical care was time spent personally by me on the following activities: blood draw for specimens, development of treatment plan with patient or surrogate, discussions with consultants, evaluation of patient's response to treatment, examination of patient, obtaining history from patient or surrogate, ordering and performing treatments and interventions, ordering and review of laboratory studies, ordering and review of radiographic studies, pulse oximetry, re-evaluation of patient's condition and review of old charts.        Labs Reviewed   CBC W/ AUTO DIFFERENTIAL - Abnormal; Notable for the following components:       Result Value    WBC 16.27 (*)     RBC 5.66 (*)     Hemoglobin 17.4 (*)     Hematocrit 50.5 (*)     Immature Granulocytes 0.6 (*)     Gran # (ANC) 13.7 (*)     Immature Grans (Abs)  0.09 (*)     Mono # 1.1 (*)     Gran % 84.1 (*)     Lymph % 8.7 (*)     All other components within normal limits   LACTIC ACID, PLASMA - Abnormal; Notable for the following components:    Lactate (Lactic Acid) 3.5 (*)     All other components within normal limits   URINALYSIS, REFLEX TO URINE CULTURE - Abnormal; Notable for the following components:    Appearance, UA Hazy (*)     Specific Gravity, UA >=1.030 (*)     Protein, UA 3+ (*)     Glucose, UA 1+ (*)     Ketones, UA 1+ (*)     Occult Blood UA 3+ (*)     All other components within normal limits    Narrative:     Specimen Source->Urine   TROPONIN I - Abnormal; Notable for the following components:    Troponin I 4.161 (*)     All other components within normal limits   B-TYPE NATRIURETIC PEPTIDE - Abnormal; Notable for the following components:    BNP 2,638 (*)     All other components within normal limits   DRUG SCREEN PANEL, URINE EMERGENCY - Abnormal; Notable for the following components:    THC Presumptive Positive (*)     Creatinine, Urine 334.0 (*)     All other components within normal limits    Narrative:     Specimen Source->Urine   URINALYSIS MICROSCOPIC - Abnormal; Notable for the following components:    RBC, UA 7 (*)     Bacteria Moderate (*)     All other components within normal limits    Narrative:     Specimen Source->Urine   CBC W/ AUTO DIFFERENTIAL - Abnormal; Notable for the following components:    WBC 14.96 (*)     Gran # (ANC) 12.4 (*)     Immature Grans (Abs) 0.07 (*)     Mono # 1.3 (*)     Gran % 82.6 (*)     Lymph % 8.0 (*)     All other components within normal limits    Narrative:     (if patient is on warfarin prior to heparin therapy)   COMPREHENSIVE METABOLIC PANEL - Abnormal; Notable for the following components:    Potassium 3.4 (*)     Glucose 135 (*)     BUN 36 (*)     Total Bilirubin 1.6 (*)     AST 59 (*)     All other components within normal limits   ISTAT PROCEDURE - Abnormal; Notable for the following components:    POC  PH 7.556 (*)     POC PCO2 32.3 (*)     POC PO2 18 (*)     POC HCO3 28.6 (*)     POC SATURATED O2 34 (*)     POC TCO2 30 (*)     All other components within normal limits   SARS-COV-2 RDRP GENE - Normal   CULTURE, BLOOD   CULTURE, BLOOD   PROCALCITONIN   APTT    Narrative:     (if patient is on warfarin prior to heparin therapy)   PROTIME-INR    Narrative:     (if patient is on warfarin prior to heparin therapy)   MAGNESIUM   HIV 1 / 2 ANTIBODY   HEPATITIS C ANTIBODY   PROTIME-INR   APTT   TROPONIN I   TYPE & SCREEN     EKG Readings: (Independently Interpreted)   Initial Reading: Possible STEMI. Heart Rate: 120.   Elevations in lead V1, aVL with reciprocal changes in lead 3 and AVF.  Concern for lateral wall STEMI.   Other EKG Interpretations: Repeat EC:  Continues to show elevations in lead 1 and aVL but no worsening and no criteria greater than 1 mm to suggest acute STEMI.  Patient without active chest pain.    Repeat EC:  Continues to show elevations in lead 1 and aVL with reciprocal changes.  Some mild improvement and amplitude with no criteria for acute STEMI.  Patient without active chest pain    :  Repeat ECG shows sinus tachycardia with a rate of 125, concern for lateral infarct with elevations in lead 1 and aVL with continued reciprocating shin depressions in lead 3 and mildly in AVF.  Patient reports positive chest pain.       Imaging Results          CT Head Without Contrast (Final result)  Result time 22 21:17:43    Final result by Cheo Weems MD (22 21:17:43)                 Impression:      No acute intracranial process.      Electronically signed by: Cheo Weems  Date:    2022  Time:    21:17             Narrative:    EXAMINATION:  CT HEAD WITHOUT CONTRAST    CLINICAL HISTORY:  Mental status change, unknown cause;    TECHNIQUE:  Low dose axial CT images obtained throughout the head without intravenous contrast. Sagittal and coronal reconstructions were  performed.    COMPARISON:  None.    FINDINGS:  Intracranial compartment:    Ventricles and sulci are normal in size for age without evidence of hydrocephalus. No extra-axial blood or fluid collections.    Mild probable chronic microvascular ischemic changes in the periventricular white matter.  No parenchymal mass, hemorrhage, edema or major vascular distribution infarct.    Skull/extracranial contents (limited evaluation): No fracture. Mastoid air cells and paranasal sinuses are essentially clear.                               X-Ray Chest AP Portable (Final result)  Result time 03/11/22 20:32:24    Final result by Satya Summers MD (03/11/22 20:32:24)                 Impression:      No radiographic acute intrathoracic process seen on this single view.  Specifically, no consolidation.      Electronically signed by: Satya Summers MD  Date:    03/11/2022  Time:    20:32             Narrative:    EXAMINATION:  XR CHEST AP PORTABLE    CLINICAL HISTORY:  Sepsis;    TECHNIQUE:  Single frontal view of the chest was performed.    COMPARISON:  None    FINDINGS:  Patient is slightly rotated.  Resolution is somewhat limited by body habitus with underpenetration.The lungs are well expanded and grossly clear, with normal appearance of pulmonary vasculature and no pleural effusion or pneumothorax.    The cardiac silhouette is normal in size. The hilar and mediastinal contours are within normal limits.    Lower cervical ACDF hardware noted.  No acute osseous process seen.  PA and lateral views can be obtained.                              X-Rays:   Independently Interpreted Readings:   Chest X-Ray: Normal heart size.  No infiltrates.  No acute abnormalities. No pneumothorax or free air   Head CT: No hemorrhage.  No skull fracture.  No acute stroke.     Medications   aztreonam 2 g in dextrose 5 % 100 mL IVPB (ready to mix system) ( Intravenous Stopped 3/11/22 2059)   metronidazole IVPB 500 mg ( Intravenous Stopped 3/11/22 2159)  "  vancomycin 1.75 g in 5 % dextrose 500 mL IVPB (has no administration in time range)   nitroGLYCERIN SL tablet 0.4 mg (has no administration in time range)   acetaminophen tablet 650 mg (has no administration in time range)   ondansetron disintegrating tablet 8 mg (has no administration in time range)   lactated ringers bolus 2,109 mL (0 mL/kg × 70.3 kg Intravenous Stopped 3/11/22 2151)   aspirin tablet 325 mg (325 mg Oral Given 3/11/22 2145)   clopidogreL tablet 600 mg (600 mg Oral Given 3/11/22 2145)   heparin 25,000 units in dextrose 5% (100 units/ml) IV bolus from bag INITIAL BOLUS (max bolus 4000 units) (3,740 Units Intravenous Bolus from Bag 3/11/22 2210)   nitroGLYCERIN SL tablet 0.4 mg (0.4 mg Sublingual Given 3/11/22 2209)     Medical Decision Making:   History:   I obtained history from: someone other than patient.       <> Summary of History: Her son provided much of the history initially however patient was able to provide history.  Old Medical Records: I decided to obtain old medical records.  Initial Assessment:   Reza Morrow is a 56-year-old female with a history of bursitis, hyperlipidemia, history of multiple sclerosis brought in by her son for increased weakness and confusion over the last 2 days.  Differential Diagnosis:   Altered mental status, multiple sclerosis, sepsis, bacteremia, ACS, PE, rhabdomyolysis, acute kidney injury, dehydration  Independently Interpreted Test(s):   I have ordered and independently interpreted X-rays - see prior notes.  I have ordered and independently interpreted EKG Reading(s) - see prior notes  Clinical Tests:   Lab Tests: Ordered and Reviewed  Radiological Study: Ordered and Reviewed  Medical Tests: Ordered and Reviewed  Sepsis Perfusion Assessment: "I attest a sepsis perfusion exam was performed within 6 hours of sepsis, severe sepsis, or septic shock presentation, following fluid resuscitation."  Other:   I have discussed this case with another health care " provider.       <> Summary of the Discussion: Cardiology    Initially afebrile, tachycardic and normotensive.  Physical exam findings initially with mild confusion however back to baseline per her son.  The remainder of physical exam without any focal neurologic deficits, lung sounds clear, cardiac exam unremarkable.  She did have tachycardia at 138 initially.  ECG obtained which showed elevations in lead 1 and aVL with some reciprocal depressions.  Multiple repeat ECG is obtained.  Initially a code STEMI was called however given absence of chest pain, this was discontinued.  Patient was placed on ACS protocol and given heparin drip, loaded with Plavix and full-dose aspirin.  Her initial labs show a troponin of 4 with an elevated BNP.  Chest x-ray with no acute findings on my read.  Remainder lab show dehydration with elevated leukocytosis to 16,000 with an elevated lactate of 3.5.  Blood cultures are obtained, sepsis order was continued with initial antibiotics given confusion and altered mental status over the last 2 days.  She also appears dehydrated with hemoglobin to 17,000/hematocrit greater than 50. Discussed case with Cardiology initially when code STEMI was initiated.  They planned on doing a bedside echocardiogram to evaluate for ejection fraction.  During her repeat evaluation, she began having chest pain.  She was given 2 doses of nitroglycerin with slight improvement.  Bedside echocardiogram shows wall motion abnormalities.  At this time the decision was made by cardiology to take the patient to cardiac cath lab for intervention.  Cath lab was notified.  Patient was consented for cardiac catheterization procedure.  She was loaded with aspirin, Plavix and heparin was initiated.  Please see critical care note for critical care time.    Complexity:  Critical care                     Clinical Impression:   Final diagnoses:  [R41.82] AMS (altered mental status)  [I21.4] NSTEMI (non-ST elevated myocardial  infarction)          ED Disposition Condition    Admit             Asad Christine DO, FAAEM  Emergency Staff Physician   Dept of Emergency Medicine   [facility]  Spectralink: [id number]        Disclaimer: This note has been generated using voice-recognition software. There may be typographical errors that have been missed during proof-reading.       Asad Christine DO  03/11/22 0004

## 2022-03-12 NOTE — ED TRIAGE NOTES
Pt presents to the ED via self due to AMS. Pt son states she has been increasingly more weak and confused for the past couple weeks and sustained a few falls over this time. Is unaware if she hit her head. HE also states she has had fevers and chills. She is oriented to herself, and where she is at, but is confused on the situation and time as of now which is not her baseline.

## 2022-03-12 NOTE — CONSULTS
[facility] - Emergency Dept  Cardiology  Consult Note    Patient Name: Reza Morrow  MRN: [id number]  Admission Date: 3/11/2022  Hospital Length of Stay: 0 days  Code Status: Prior   Attending Provider: Asad Christine DO   Consulting Provider: Mansoor Gomez MD  Primary Care Physician: Kip Jimenez MD  Principal Problem:<principal problem not specified>    Patient information was obtained from patient and ER records.     Inpatient consult to Cardiology  Consult performed by: Mansoor Gomez MD  Consult ordered by: Asad Christine DO        Subjective:     Chief Complaint:  Chest pain     HPI:   56-year-old female with past medical history of also multiple sclerosis presents with confusion for the last 3 days.  The emergency room EKG was done that showed ST segment elevation 1 and aVL but not more than 1 mm. She denied having any chest pain in the emergency room.  However her troponins went up to 4. Chest x-ray did not show any pulmonary edema.  She also has elevated BNP up to 2000. Repeat EKGs were done which showed a similar appearance. After talking to the interventions staff plan was made to treat her with ACS protocol  however When I came to do her bedside echo she started complaining of chest pain and  Bedside echo showed wall motion abnormalities involving the anterior and anterior lateral walls.  nterventional  staff notified  and plan was made to take patient to the cath lab.         Past Medical History:   Diagnosis Date    Behavioral problem     Bulging lumbar disc     Bursitis     bilateral hip    Degenerative cervical disc     Hx of psychiatric care     Hypercholesteremia     Labral tear of hip, degenerative bilateral    MS (multiple sclerosis)     Pneumonia     Spinal cord compression        Past Surgical History:   Procedure Laterality Date    BREAST SURGERY      augmentation     SECTION, CLASSIC      HAND SURGERY      HYSTEROSCOPY      NECK SURGERY      cervical disc  removeal    TUBAL LIGATION      X-STOP IMPLANTATION         Review of patient's allergies indicates:   Allergen Reactions    Adhesive Hives    Neosporin [neomycin-bacitracin-polymyxin] Hives    Penicillins Other (See Comments)     Unknown  reaction       No current facility-administered medications on file prior to encounter.     Current Outpatient Medications on File Prior to Encounter   Medication Sig    doxepin (SINEQUAN) 25 MG capsule Take 25 mg by mouth nightly as needed.    gabapentin (NEURONTIN) 300 MG capsule Take 300 mg by mouth 3 (three) times daily as needed.    hydrocodone-acetaminophen 10-325mg (NORCO)  mg Tab Take 1 tablet by mouth every 4 (four) hours as needed.    hydrOXYzine pamoate (VISTARIL) 25 MG Cap Take 25 mg by mouth 3 (three) times daily as needed.    metronidazole 1% (METROGEL) 1 % Gel Apply topically daily as needed.    oxymorphone (OPANA ER) 20 MG 12 hr tablet Take 20 mg by mouth 2 (two) times daily.     TAPENTADOL HCL (NUCYNTA ORAL) Take 1 tablet by mouth 4 (four) times daily.    triamcinolone acetonide 0.1% (KENALOG) 0.1 % cream Apply topically daily as needed.    VOLTAREN 1 % Gel Apply topically once daily.    [DISCONTINUED] atorvastatin (LIPITOR) 20 MG tablet Take 20 mg by mouth once daily.     Family History       Problem Relation (Age of Onset)    Bipolar disorder Brother    COPD Mother    Cancer Father    Diabetes Father, Sister    Drug abuse Mother    Heart disease Maternal Uncle    Hypothyroidism Maternal Grandmother    Lung cancer Father          Tobacco Use    Smoking status: Former Smoker     Quit date: 2013     Years since quittin.6    Smokeless tobacco: Never Used   Substance and Sexual Activity    Alcohol use: No    Drug use: Yes     Types: Benzodiazepines, Marijuana    Sexual activity: Never     Review of Systems   Constitutional: Negative.   HENT: Negative.     Eyes: Negative.    Cardiovascular:  Positive for chest pain.   Respiratory:  Negative.     Endocrine: Negative.    Hematologic/Lymphatic: Negative.    Skin: Negative.    Musculoskeletal: Negative.    Gastrointestinal: Negative.    Genitourinary: Negative.    Neurological: Negative.    Psychiatric/Behavioral: Negative.     Allergic/Immunologic: Negative.    Objective:     Vital Signs (Most Recent):  Temp: 97.9 °F (36.6 °C) (03/11/22 1901)  Pulse: (!) 122 (03/11/22 2220)  Resp: 20 (03/11/22 1901)  BP: 115/72 (03/11/22 2220)  SpO2: 97 % (03/11/22 2220)   Vital Signs (24h Range):  Temp:  [97.9 °F (36.6 °C)] 97.9 °F (36.6 °C)  Pulse:  [115-138] 122  Resp:  [20] 20  SpO2:  [96 %-100 %] 97 %  BP: (106-178)/() 115/72     Weight: 70.3 kg (155 lb)  Body mass index is 25.79 kg/m².    SpO2: 97 %         Intake/Output Summary (Last 24 hours) at 3/11/2022 2224  Last data filed at 3/11/2022 2208  Gross per 24 hour   Intake 1694.45 ml   Output --   Net 1694.45 ml       Lines/Drains/Airways       Peripheral Intravenous Line  Duration                  Peripheral IV - Single Lumen 06/22/16 2129 Right Antecubital 2088 days         Peripheral IV - Single Lumen 03/11/22 2058 20 G Left Antecubital <1 day                    Physical Exam  Constitutional:       Appearance: Normal appearance.   HENT:      Head: Normocephalic.   Eyes:      Extraocular Movements: Extraocular movements intact.      Pupils: Pupils are equal, round, and reactive to light.   Cardiovascular:      Rate and Rhythm: Normal rate and regular rhythm.   Pulmonary:      Effort: Pulmonary effort is normal.      Breath sounds: Normal breath sounds.   Abdominal:      General: Abdomen is flat. Bowel sounds are normal.      Palpations: Abdomen is soft.   Musculoskeletal:      Cervical back: Normal range of motion.   Skin:     General: Skin is warm.      Capillary Refill: Capillary refill takes less than 2 seconds.   Neurological:      General: No focal deficit present.      Mental Status: She is alert and oriented to person, place, and time.        Significant Labs: All pertinent lab results from the last 24 hours have been reviewed.    Significant Imaging: EKG:  ST segment elevation in lead 1 and aVL with ST segment depressionin lead 2     Assessment and Plan:     NSTEMI (non-ST elevated myocardial infarction)  Patient initially presented for confusion but when she is awake and alert denied having any chest pain.  Her EKG was initially concerning for ST segment elevation in 1 and aVL but not more than 1 mm.  However when I was doing her bedside echo she complained of having chest pain and she did had wall motion abnormalities involving the anterior and anterolateral walls.  Case was discussed with interventional staff and decided to take patient to the cath lab.  Patient is loaded with aspirin and Plavix.  On heparin.  Her chest pain improved after getting the 2nd dose of nitro.  Consent signed for left heart catheterization    --LHC +/- PCI, patient is a MIKIE candidate  - Anti-platelet Therapy: ASA / Ticagrelor  - Access: Right radial  - Catheters: Arnav  - Creatinine/CrCl: 1.2  - Allergies: No shellfish / Iodine allergy  - Pre-Hydration: NS  - Pre-Op Med: Bendaryl 50mg pO   - All patient's questions were answered.  -The risks, benefits and alternatives of the procedure were explained to the patient.   -The risks of coronary angiography include but are not limited to: bleeding, infection, heart rhythm abnormalities, allergic reactions, kidney injury and potential need for dialysis, stroke and death.   - Should stenting be indicated, the patient has agreed to dual anti-platelet therapy for 1-consecutive year with a drug-eluting stent and a minimum of 1-month with the use of a bare metal stent  - Additionally, pt is aware that non-compliance is likely to result in stent clotting with heart attack, heart failure, and/or death  -The risks of moderate sedation include hypotension, respiratory depression, arrhythmias, bronchospasm, and death.   - Informed  consent was obtained and the  patient is agreeable to proceed with the procedure.            VTE Risk Mitigation (From admission, onward)         Ordered     heparin 25,000 units in dextrose 5% (100 units/ml) IV bolus from bag - ADDITIONAL PRN BOLUS - 60 units/kg (max bolus 4000 units)  As needed (PRN)        Question:  Heparin Infusion Adjustment (DO NOT MODIFY ANSWER)  Answer:  [email or web address]\[email or web address]\Images\Pharmacy\HeparinInfusions\heparin LOW INTENSITY nomogram for [facility] [id number]    03/11/22 2146     heparin 25,000 units in dextrose 5% (100 units/ml) IV bolus from bag - ADDITIONAL PRN BOLUS - 30 units/kg (max bolus 4000 units)  As needed (PRN)        Question:  Heparin Infusion Adjustment (DO NOT MODIFY ANSWER)  Answer:  [email or web address]\Images\Pharmacy\HeparinInfusions\heparin LOW INTENSITY nomogram for [facility] [id number]    03/11/22 2146     heparin 25,000 units in dextrose 5% 250 mL (100 units/mL) infusion LOW INTENSITY nomogram - [facility]  Continuous        Question Answer Comment   Heparin Infusion Adjustment (DO NOT MODIFY ANSWER) [email or web address]\epic\Images\Pharmacy\HeparinInfusions\heparin LOW INTENSITY nomogram for [facility] [id number]    Begin at (in units/kg/hr) 12        03/11/22 2138                Thank you for your consult. I will sign off. Please contact us if you have any additional questions.    Mansoor Gomez MD  Cardiology   [facility] - Emergency Dept

## 2022-03-12 NOTE — ASSESSMENT & PLAN NOTE
OUTPATIENT PROGRESS NOTE    CHIEF COMPLAINT  Medication Refill and Back Pain (lumbar with left hip)      SUBJECTIVE    Scheduled pain medications are helping to reduce pain and improve function without side effects.Patient reports compliance with current regimen of medications.  Postnasal drip for a few months.  Patient appeals disability decision.  MEDICATIONS  Medications were reviewed and updated today.    HISTORIES  I have personally reviewed and updated the following EMR sections    REVIEW OF SYSTEMS    HENT: Positive for sinus pressure and sinus pain.    Musculoskeletal: Positive for back pain and myalgias.   All other systems reviewed and are negative.    PHYSICAL EXAM  Visit Vitals  /84   Pulse 80   Ht 5' 4\" (1.626 m)   Wt 99.8 kg (220 lb)   BMI 37.76 kg/m²      Well nourished and well developed in no acute distress. Affect is normal and appropriate.Alert and oriented.  Sclerae is anecteric.Gait is antalgic .  Patient exhibits no signs of opioid intoxication:pupils are not miotic, no drowsiness, no slurred speech.  Patient does not display signs of opioid withdrawal:not tremor, irritabillity, pilloerection/midriasis   Pulmonary:inlabored and quiet breathing.CV:RRR.  There is limted ROM at the lumbar spine on flexion, extension, lateral bending and rotation due to the pain and discomfort.  Pain  on palpation over the left hip and parspinal muscles.Patient uses arms support to rise from the chair. Positive pain with compression over left SI joint.  Sitting in slumped forward position aggravated the pain on the affected side.  Straight leg raising caused aggravation of the pain.    ASSESSMENT/PLAN  I reinforced proper spine mechanics,posture and pacing of physical activities with patient.  RTC 4 weeks.Pt will continue physical activity as tolerates.  Pt will continue scheduled medications as prescribed.  /Pres[place] for [place] /was reviewed prior to the medications  Patient has elevated troponins with ST segment elevations and lateral leads with ST depression.  Was taken to cath lab immediately after found to have wall motion abnormalities on bedside echo.  However angiogram did not show any coronary artery disease.  Bedside echo shows severe drop in her EF.  She has nonischemic cardiomyopathy.  Patient's LVEDP is elevated up to 30.  Will start on Lasix 40 mg b.i.d.    prescription.  ORT<3: low  Medications prescribed above are opioids,must be locked up, may cause dizziness, nausea,no alcohol consumption.Drive with caution.  Compliance discussed at length with patient.  Patient will make an improved effort to keep me informed regarding other medical treatment he is undergoing.  Instructions were given to the patient regarding opioids use, including the risk for addiction , side effects, combination with other medications including but not limited to stimulant, anxiolytics, antidepressants, benzodiazepines, sleeping aids.   Patient voiced understanding.

## 2022-03-12 NOTE — H&P
[facility]  Cardiology  History and Physical     Patient Name: Reza Morrow  MRN: [id number]  Admission Date: 3/11/2022  Code Status: Prior   Attending Provider: Rob Gates MD   Primary Care Physician: Kip Jimenez MD  Principal Problem:<principal problem not specified>    Patient information was obtained from patient and ER records.     Subjective:     Chief Complaint:  Confusion/chest pain      HPI:   56-year-old female with past medical history of also multiple sclerosis presents with confusion for the last 3 days.  Initial EKG in emergency room showed ST segment elevation 1 and aVL but not more than 1 mm. She denied having any chest pain in the emergency room.  However her troponins went up to 4. Chest x-ray did not show any pulmonary edema.  She also has elevated BNP up to 2000. Repeat EKGs were done which showed a similar appearance. After talking to the interventions staff plan was made to treat her with ACS protocol  however When I went to do her bedside echo she started complaining of chest pain and echo showed wall motion abnormalities involving the anterior and anterior lateral walls with depressed EF.   Interventional  staff notified  and plan was made to take patient to the cath lab.   Her angiogram showed normal coronary arteries with increased LVEDP.  She also has elevated white blood cell count up to 16k on presentation with increased Hematocrit                Past Medical History:   Diagnosis Date    Behavioral problem      Bulging lumbar disc      Bursitis       bilateral hip    Degenerative cervical disc      Hx of psychiatric care      Hypercholesteremia      Labral tear of hip, degenerative bilateral    MS (multiple sclerosis)      Pneumonia      Spinal cord compression                 Past Surgical History:   Procedure Laterality Date    BREAST SURGERY         augmentation     SECTION, CLASSIC        HAND SURGERY        HYSTEROSCOPY         NECK SURGERY         cervical disc removeal    TUBAL LIGATION        X-STOP IMPLANTATION                   Review of patient's allergies indicates:   Allergen Reactions    Adhesive Hives    Neosporin [neomycin-bacitracin-polymyxin] Hives    Penicillins Other (See Comments)       Unknown  reaction         No current facility-administered medications on file prior to encounter.           Current Outpatient Medications on File Prior to Encounter   Medication Sig    doxepin (SINEQUAN) 25 MG capsule Take 25 mg by mouth nightly as needed.    gabapentin (NEURONTIN) 300 MG capsule Take 300 mg by mouth 3 (three) times daily as needed.    hydrocodone-acetaminophen 10-325mg (NORCO)  mg Tab Take 1 tablet by mouth every 4 (four) hours as needed.    hydrOXYzine pamoate (VISTARIL) 25 MG Cap Take 25 mg by mouth 3 (three) times daily as needed.    metronidazole 1% (METROGEL) 1 % Gel Apply topically daily as needed.    oxymorphone (OPANA ER) 20 MG 12 hr tablet Take 20 mg by mouth 2 (two) times daily.     TAPENTADOL HCL (NUCYNTA ORAL) Take 1 tablet by mouth 4 (four) times daily.    triamcinolone acetonide 0.1% (KENALOG) 0.1 % cream Apply topically daily as needed.    VOLTAREN 1 % Gel Apply topically once daily.    [DISCONTINUED] atorvastatin (LIPITOR) 20 MG tablet Take 20 mg by mouth once daily.      Family History         Problem Relation (Age of Onset)     Bipolar disorder Brother     COPD Mother     Cancer Father     Diabetes Father, Sister     Drug abuse Mother     Heart disease Maternal Uncle     Hypothyroidism Maternal Grandmother     Lung cancer Father                   Tobacco Use    Smoking status: Former Smoker       Quit date: 2013       Years since quittin.6    Smokeless tobacco: Never Used   Substance and Sexual Activity    Alcohol use: No    Drug use: Yes       Types: Benzodiazepines, Marijuana    Sexual activity: Never      Review of Systems   Constitutional: Negative.   HENT: Negative.      Eyes: Negative.    Cardiovascular:  Positive for chest pain.   Respiratory: Negative.     Endocrine: Negative.    Hematologic/Lymphatic: Negative.    Skin: Negative.    Musculoskeletal: Negative.    Gastrointestinal: Negative.    Genitourinary: Negative.    Neurological: Negative.    Psychiatric/Behavioral: Negative.     Allergic/Immunologic: Negative.    Objective:      Vital Signs (Most Recent):  Temp: 97.9 °F (36.6 °C) (03/11/22 1901)  Pulse: (!) 122 (03/11/22 2220)  Resp: 20 (03/11/22 1901)  BP: 115/72 (03/11/22 2220)  SpO2: 97 % (03/11/22 2220)    Vital Signs (24h Range):  Temp:  [97.9 °F (36.6 °C)] 97.9 °F (36.6 °C)  Pulse:  [115-138] 122  Resp:  [20] 20  SpO2:  [96 %-100 %] 97 %  BP: (106-178)/() 115/72      Weight: 70.3 kg (155 lb)  Body mass index is 25.79 kg/m².     SpO2: 97 %           Intake/Output Summary (Last 24 hours) at 3/11/2022 2224  Last data filed at 3/11/2022 2208      Gross per 24 hour   Intake 1694.45 ml   Output --   Net 1694.45 ml         Lines/Drains/Airways         Peripheral Intravenous Line  Duration                     Peripheral IV - Single Lumen 06/22/16 2129 Right Antecubital 2088 days          Peripheral IV - Single Lumen 03/11/22 2058 20 G Left Antecubital <1 day                          Physical Exam  Constitutional:       Appearance: Normal appearance.   HENT:      Head: Normocephalic.   Eyes:      Extraocular Movements: Extraocular movements intact.      Pupils: Pupils are equal, round, and reactive to light.   Cardiovascular:      Rate and Rhythm: Normal rate and regular rhythm.   Pulmonary:      Effort: Pulmonary effort is normal.      Breath sounds: Normal breath sounds.   Abdominal:      General: Abdomen is flat. Bowel sounds are normal.      Palpations: Abdomen is soft.   Musculoskeletal:      Cervical back: Normal range of motion.   Skin:     General: Skin is warm.      Capillary Refill: Capillary refill takes less than 2 seconds.   Neurological:      General:  No focal deficit present.      Mental Status: She is alert and oriented to person, place, and time.         Significant Labs: All pertinent lab results from the last 24 hours have been reviewed.     Significant Imaging: EKG:  ST segment elevation in lead 1 and aVL with ST segment depressionin lead 2            Assessment and Plan:     Acute systolic heart failure  She has no history of prior heart failure.  He she has depressed EF on echo.  She has elevated LV end-diastolic pressures.  Her BNP on presentation is 2000.  Will get a formal echo in a.m. tomorrow morning.  Will start her on IV Lasix 40 mg b.i.d.      Confusion  Patient presented with altered mental status.  On my 1st interaction she denied having chest pain shortness of breath but complained having chest pain when I went to do a bedside echo.  She is on multiple opiod/depression medications at home. Also has  marijuana  on Urine drug screen  She also has elevated white blood cell count.  CT head negative for any acute events.  She does not have neck rigidity  UA negative, chest x-ray negative for any acute infectious process   She has history of opioid/benzodiazepine dependence.  Patient has elevated lactic acid on presentation and received IV fluids in ER .  DD- Infectious vs drug withdrawal vs Heart failure     Plan     Will check blood cultures and keep her on empiric antibiotics for now.    Demand ischemia  Patient has elevated troponins with ST segment elevations in  lateral leads with ST depression in inferior leads.  Was taken to cath lab immediately after found to have wall motion abnormalities on bedside echo with depressed EF.  However angiogram did not show any coronary artery disease.  She has nonischemic cardiomyopathy and has  LVEDP is elevated up to 30.  Will start on Lasix 40 mg b.i.d. Will hold on starting  GDMT due to suboptimal blood pressures                VTE Risk Mitigation (From admission, onward)    Ivone Max  MD Patricia  Cardiology   [facility]

## 2022-03-13 PROBLEM — R20.2 PARESTHESIA OF BOTH LOWER EXTREMITIES: Chronic | Status: ACTIVE | Noted: 2022-03-13

## 2022-03-13 LAB
ALBUMIN SERPL BCP-MCNC: 3.8 G/DL (ref 3.5–5.2)
ALP SERPL-CCNC: 73 U/L (ref 55–135)
ALT SERPL W/O P-5'-P-CCNC: 31 U/L (ref 10–44)
ANION GAP SERPL CALC-SCNC: 14 MMOL/L (ref 8–16)
AST SERPL-CCNC: 36 U/L (ref 10–40)
BASOPHILS # BLD AUTO: 0.05 K/UL (ref 0–0.2)
BASOPHILS NFR BLD: 0.6 % (ref 0–1.9)
BILIRUB SERPL-MCNC: 1.8 MG/DL (ref 0.1–1)
BUN SERPL-MCNC: 41 MG/DL (ref 6–20)
CALCIUM SERPL-MCNC: 9.6 MG/DL (ref 8.7–10.5)
CHLORIDE SERPL-SCNC: 95 MMOL/L (ref 95–110)
CO2 SERPL-SCNC: 28 MMOL/L (ref 23–29)
CREAT SERPL-MCNC: 1.1 MG/DL (ref 0.5–1.4)
CRP SERPL-MCNC: 5.4 MG/L (ref 0–8.2)
DIFFERENTIAL METHOD: ABNORMAL
EOSINOPHIL # BLD AUTO: 0 K/UL (ref 0–0.5)
EOSINOPHIL NFR BLD: 0.1 % (ref 0–8)
ERYTHROCYTE [DISTWIDTH] IN BLOOD BY AUTOMATED COUNT: 13.3 % (ref 11.5–14.5)
ERYTHROCYTE [SEDIMENTATION RATE] IN BLOOD BY WESTERGREN METHOD: 53 MM/HR (ref 0–36)
EST. GFR  (AFRICAN AMERICAN): >60 ML/MIN/1.73 M^2
EST. GFR  (NON AFRICAN AMERICAN): 56.7 ML/MIN/1.73 M^2
GLUCOSE SERPL-MCNC: 129 MG/DL (ref 70–110)
HCT VFR BLD AUTO: 47.3 % (ref 37–48.5)
HGB BLD-MCNC: 16 G/DL (ref 12–16)
IMM GRANULOCYTES # BLD AUTO: 0.03 K/UL (ref 0–0.04)
IMM GRANULOCYTES NFR BLD AUTO: 0.3 % (ref 0–0.5)
LYMPHOCYTES # BLD AUTO: 1.3 K/UL (ref 1–4.8)
LYMPHOCYTES NFR BLD: 14.2 % (ref 18–48)
MAGNESIUM SERPL-MCNC: 1.7 MG/DL (ref 1.6–2.6)
MCH RBC QN AUTO: 30 PG (ref 27–31)
MCHC RBC AUTO-ENTMCNC: 33.8 G/DL (ref 32–36)
MCV RBC AUTO: 89 FL (ref 82–98)
MONOCYTES # BLD AUTO: 0.7 K/UL (ref 0.3–1)
MONOCYTES NFR BLD: 8.3 % (ref 4–15)
NEUTROPHILS # BLD AUTO: 6.8 K/UL (ref 1.8–7.7)
NEUTROPHILS NFR BLD: 76.5 % (ref 38–73)
NRBC BLD-RTO: 0 /100 WBC
PHOSPHATE SERPL-MCNC: 3.9 MG/DL (ref 2.7–4.5)
PLATELET # BLD AUTO: 300 K/UL (ref 150–450)
PMV BLD AUTO: 10.9 FL (ref 9.2–12.9)
POCT GLUCOSE: 106 MG/DL (ref 70–110)
POCT GLUCOSE: 120 MG/DL (ref 70–110)
POCT GLUCOSE: 135 MG/DL (ref 70–110)
POCT GLUCOSE: 186 MG/DL (ref 70–110)
POTASSIUM SERPL-SCNC: 3 MMOL/L (ref 3.5–5.1)
PROT SERPL-MCNC: 8.3 G/DL (ref 6–8.4)
RBC # BLD AUTO: 5.33 M/UL (ref 4–5.4)
SODIUM SERPL-SCNC: 137 MMOL/L (ref 136–145)
WBC # BLD AUTO: 8.92 K/UL (ref 3.9–12.7)

## 2022-03-13 PROCEDURE — 80053 COMPREHEN METABOLIC PANEL: CPT

## 2022-03-13 PROCEDURE — 25000003 PHARM REV CODE 250: Performed by: STUDENT IN AN ORGANIZED HEALTH CARE EDUCATION/TRAINING PROGRAM

## 2022-03-13 PROCEDURE — 63600175 PHARM REV CODE 636 W HCPCS: Performed by: INTERNAL MEDICINE

## 2022-03-13 PROCEDURE — 99233 SBSQ HOSP IP/OBS HIGH 50: CPT | Mod: ,,, | Performed by: PSYCHIATRY & NEUROLOGY

## 2022-03-13 PROCEDURE — 83735 ASSAY OF MAGNESIUM: CPT

## 2022-03-13 PROCEDURE — 94761 N-INVAS EAR/PLS OXIMETRY MLT: CPT

## 2022-03-13 PROCEDURE — 85025 COMPLETE CBC W/AUTO DIFF WBC: CPT

## 2022-03-13 PROCEDURE — 36415 COLL VENOUS BLD VENIPUNCTURE: CPT | Performed by: STUDENT IN AN ORGANIZED HEALTH CARE EDUCATION/TRAINING PROGRAM

## 2022-03-13 PROCEDURE — 86140 C-REACTIVE PROTEIN: CPT | Performed by: STUDENT IN AN ORGANIZED HEALTH CARE EDUCATION/TRAINING PROGRAM

## 2022-03-13 PROCEDURE — 85652 RBC SED RATE AUTOMATED: CPT | Performed by: STUDENT IN AN ORGANIZED HEALTH CARE EDUCATION/TRAINING PROGRAM

## 2022-03-13 PROCEDURE — 25000003 PHARM REV CODE 250

## 2022-03-13 PROCEDURE — 84100 ASSAY OF PHOSPHORUS: CPT

## 2022-03-13 PROCEDURE — 99233 SBSQ HOSP IP/OBS HIGH 50: CPT | Mod: ,,, | Performed by: INTERNAL MEDICINE

## 2022-03-13 PROCEDURE — 99233 PR SUBSEQUENT HOSPITAL CARE,LEVL III: ICD-10-PCS | Mod: ,,, | Performed by: PSYCHIATRY & NEUROLOGY

## 2022-03-13 PROCEDURE — 63600175 PHARM REV CODE 636 W HCPCS: Performed by: HOSPITALIST

## 2022-03-13 PROCEDURE — 20600001 HC STEP DOWN PRIVATE ROOM

## 2022-03-13 PROCEDURE — A9585 GADOBUTROL INJECTION: HCPCS | Performed by: INTERNAL MEDICINE

## 2022-03-13 PROCEDURE — 25500020 PHARM REV CODE 255: Performed by: INTERNAL MEDICINE

## 2022-03-13 PROCEDURE — 99233 PR SUBSEQUENT HOSPITAL CARE,LEVL III: ICD-10-PCS | Mod: ,,, | Performed by: INTERNAL MEDICINE

## 2022-03-13 RX ORDER — OXYMORPHONE HYDROCHLORIDE 20 MG/1
20 TABLET, FILM COATED, EXTENDED RELEASE ORAL 2 TIMES DAILY
Status: DISCONTINUED | OUTPATIENT
Start: 2022-03-13 | End: 2022-03-13

## 2022-03-13 RX ORDER — HYDROXYZINE PAMOATE 25 MG/1
25 CAPSULE ORAL 3 TIMES DAILY PRN
Status: DISCONTINUED | OUTPATIENT
Start: 2022-03-13 | End: 2022-03-14 | Stop reason: HOSPADM

## 2022-03-13 RX ORDER — GABAPENTIN 300 MG/1
300 CAPSULE ORAL 3 TIMES DAILY PRN
Status: DISCONTINUED | OUTPATIENT
Start: 2022-03-13 | End: 2022-03-14 | Stop reason: HOSPADM

## 2022-03-13 RX ORDER — GADOBUTROL 604.72 MG/ML
7 INJECTION INTRAVENOUS
Status: COMPLETED | OUTPATIENT
Start: 2022-03-13 | End: 2022-03-13

## 2022-03-13 RX ORDER — OXYCODONE HCL 10 MG/1
10 TABLET, FILM COATED, EXTENDED RELEASE ORAL EVERY 12 HOURS
Status: DISCONTINUED | OUTPATIENT
Start: 2022-03-13 | End: 2022-03-14 | Stop reason: HOSPADM

## 2022-03-13 RX ADMIN — GADOBUTROL 7 ML: 604.72 INJECTION INTRAVENOUS at 07:03

## 2022-03-13 RX ADMIN — OXYCODONE HYDROCHLORIDE 10 MG: 10 TABLET, FILM COATED, EXTENDED RELEASE ORAL at 09:03

## 2022-03-13 RX ADMIN — FUROSEMIDE 40 MG: 10 INJECTION, SOLUTION INTRAMUSCULAR; INTRAVENOUS at 02:03

## 2022-03-13 RX ADMIN — HYDROXYZINE PAMOATE 25 MG: 25 CAPSULE ORAL at 09:03

## 2022-03-13 RX ADMIN — METOPROLOL SUCCINATE 25 MG: 25 TABLET, EXTENDED RELEASE ORAL at 08:03

## 2022-03-13 RX ADMIN — FUROSEMIDE 40 MG: 10 INJECTION, SOLUTION INTRAMUSCULAR; INTRAVENOUS at 03:03

## 2022-03-13 RX ADMIN — METRONIDAZOLE 500 MG: 500 TABLET ORAL at 06:03

## 2022-03-13 RX ADMIN — GABAPENTIN 300 MG: 300 CAPSULE ORAL at 09:03

## 2022-03-13 RX ADMIN — HUMAN ALBUMIN MICROSPHERES AND PERFLUTREN 0.66 MG: 10; .22 INJECTION, SOLUTION INTRAVENOUS at 09:03

## 2022-03-13 RX ADMIN — VANCOMYCIN HYDROCHLORIDE 1000 MG: 1 INJECTION, POWDER, LYOPHILIZED, FOR SOLUTION INTRAVENOUS at 04:03

## 2022-03-13 NOTE — ASSESSMENT & PLAN NOTE
56 year old female with a history of RRMS on copaxone following with Dr Vincent in [place], who is admitted for acute onset confusion and weakness started on 3/8.  Neurology is consulted for possible MS flare.     Son found her in bed with residual emesis on and was not able to wake her up. Upon arrival of EMS, patient was awake but unable to ambulate without assistance. She later reported paresthesias in BLEs. She reports having a red eye 2 days ago.   In initial evaluation patient reports medication compliance although per family she has not been taking her Copaxone since few weeks ago due to making her nauseated.  Patient is AAOx4 and able to follow 2 steps commands. Denies confusion or hallucination.  Exam remarkable for sensory change sin BLE below knees as paresthesias. No overt weakness was noted except HF on left side which is chronic and secondary to hip ligament ear.   Reviewing care everywhere, patient had MRI brain and C-T spine in 8/2021 which only showed lesions consistent with MS in brain no cord lesions were reported. No acute lesion were reported.  Per last note of Dr Vincent's office, plan was to repeat MRIs in April 2022.    Multiple T2/FLAIR hyperintense foci in the bilateral white matter, with numerous juxtacortical, periventricular lesions with associated parenchymal T1 hypointensity. There is no evidence of abnormal focal enhancement.     Recommendations:  - Overall presentation less concerning for acute flare, although given medication noncompliance will obtain MRI brain and spine  - Patient to bring in previous MRIs disc for comparison   - resume home medication  - Referral to MS clinic as per patient request to establish care with [facility]  - neurology will follow up

## 2022-03-13 NOTE — SUBJECTIVE & OBJECTIVE
Past Medical History:   Diagnosis Date    Behavioral problem     Bulging lumbar disc     Bursitis     bilateral hip    Degenerative cervical disc     Hx of psychiatric care     Hypercholesteremia     Labral tear of hip, degenerative bilateral    MS (multiple sclerosis)     Pneumonia     Spinal cord compression        Past Surgical History:   Procedure Laterality Date    BREAST SURGERY      augmentation     SECTION, CLASSIC      HAND SURGERY      HYSTEROSCOPY      NECK SURGERY      cervical disc removeal    TUBAL LIGATION      X-STOP IMPLANTATION         Review of patient's allergies indicates:   Allergen Reactions    Adhesive Hives    Neosporin [neomycin-bacitracin-polymyxin] Hives    Penicillins Other (See Comments)     Unknown  reaction       Current Neurological Medications:     No current facility-administered medications on file prior to encounter.     Current Outpatient Medications on File Prior to Encounter   Medication Sig    doxepin (SINEQUAN) 25 MG capsule Take 25 mg by mouth nightly as needed.    gabapentin (NEURONTIN) 300 MG capsule Take 300 mg by mouth 3 (three) times daily as needed.    hydrocodone-acetaminophen 10-325mg (NORCO)  mg Tab Take 1 tablet by mouth every 4 (four) hours as needed.    hydrOXYzine pamoate (VISTARIL) 25 MG Cap Take 25 mg by mouth 3 (three) times daily as needed.    metronidazole 1% (METROGEL) 1 % Gel Apply topically daily as needed.    oxymorphone (OPANA ER) 20 MG 12 hr tablet Take 20 mg by mouth 2 (two) times daily.     TAPENTADOL HCL (NUCYNTA ORAL) Take 1 tablet by mouth 4 (four) times daily.    triamcinolone acetonide 0.1% (KENALOG) 0.1 % cream Apply topically daily as needed.    VOLTAREN 1 % Gel Apply topically once daily.    [DISCONTINUED] atorvastatin (LIPITOR) 20 MG tablet Take 20 mg by mouth once daily.     Family History       Problem Relation (Age of Onset)    Bipolar disorder Brother    COPD Mother    Cancer Father    Diabetes Father, Sister    Drug  abuse Mother    Heart disease Maternal Uncle    Hypothyroidism Maternal Grandmother    Lung cancer Father          Tobacco Use    Smoking status: Former Smoker     Quit date: 2013     Years since quittin.6    Smokeless tobacco: Never Used   Substance and Sexual Activity    Alcohol use: No    Drug use: Yes     Types: Benzodiazepines, Marijuana    Sexual activity: Never     Review of Systems   Constitutional:  Negative for fever.   HENT:  Negative for trouble swallowing and voice change.    Eyes:  Negative for visual disturbance.   Respiratory:  Negative for cough and shortness of breath.    Gastrointestinal:  Negative for nausea.   Musculoskeletal:  Negative for neck pain and neck stiffness.   Skin:  Negative for rash.   Neurological:  Positive for weakness and numbness. Negative for facial asymmetry, speech difficulty and headaches.   Psychiatric/Behavioral:  Negative for confusion.    Objective:     Vital Signs (Most Recent):  Temp: 98.9 °F (37.2 °C) (22 1534)  Pulse: 99 (22 1534)  Resp: 18 (22 1534)  BP: 127/89 (22 1534)  SpO2: (!) 93 % (22 1534)   Vital Signs (24h Range):  Temp:  [97.5 °F (36.4 °C)-98.9 °F (37.2 °C)] 98.9 °F (37.2 °C)  Pulse:  [80-99] 99  Resp:  [17-19] 18  SpO2:  [93 %-99 %] 93 %  BP: (104-127)/(55-89) 127/89     Weight: 135.7 kg (299 lb 2.6 oz)  Body mass index is 49.78 kg/m².    Physical Exam  HENT:      Head: Normocephalic and atraumatic.   Eyes:      Extraocular Movements: Extraocular movements intact and EOM normal.      Pupils: Pupils are equal, round, and reactive to light.   Cardiovascular:      Rate and Rhythm: Normal rate.   Pulmonary:      Effort: Pulmonary effort is normal.   Musculoskeletal:         General: No swelling. Normal range of motion.      Cervical back: Normal range of motion.   Skin:     General: Skin is warm and dry.   Neurological:      Mental Status: She is alert and oriented to person, place, and time.      Coordination:  Finger-Nose-Finger Test normal.      Deep Tendon Reflexes:      Reflex Scores:       Bicep reflexes are 2+ on the right side and 2+ on the left side.       Patellar reflexes are 2+ on the right side and 2+ on the left side.       Achilles reflexes are 1+ on the right side and 1+ on the left side.  Psychiatric:         Speech: Speech normal.       NEUROLOGICAL EXAMINATION:     MENTAL STATUS   Oriented to person, place, and time.   Speech: speech is normal   Level of consciousness: alert    CRANIAL NERVES     CN II   Visual fields full to confrontation.     CN III, IV, VI   Pupils are equal, round, and reactive to light.  Extraocular motions are normal.   Right pupil: Size: 2 mm. Shape: regular.   Left pupil: Size: 2 mm. Shape: regular.   Nystagmus: none   Diplopia: none  Ophthalmoparesis: none    CN V   Facial sensation intact.     CN VII   Facial expression full, symmetric.     CN VIII   Hearing: intact    MOTOR EXAM   Muscle bulk: normal  Overall muscle tone: normal    Strength   Strength 5/5 except as noted.        HF left side     REFLEXES     Reflexes   Right biceps: 2+  Left biceps: 2+  Right patellar: 2+  Left patellar: 2+  Right achilles: 1+  Left achilles: 1+  Right plantar: normal  Left plantar: normal  Right Dalal: absent  Left Dalal: absent  Right ankle clonus: absent  Left ankle clonus: absent    SENSORY EXAM        Paresthesias and analgesias BLE, below knees     GAIT AND COORDINATION      Coordination   Finger to nose coordination: normal    Tremor   Resting tremor: absent  Action tremor: absent    Significant Labs: All pertinent lab results from the past 24 hours have been reviewed.    Significant Imaging: I have reviewed and interpreted all pertinent imaging results/findings within the past 24 hours.

## 2022-03-13 NOTE — PLAN OF CARE
POC reviewed with patient, no questions at this time. No acute events. VSS on room air, afebrile. Electrolytes repleted as needed. Ambulates ind to the bathroom. Calls appropriately for assistance.Will continue to monitor with frequent rounds and clustered care to promote rest. Pt down for MRI of brain and neck. Plan to discharge home tomorrow depending on neuro reccs.

## 2022-03-13 NOTE — PLAN OF CARE
Problem: Infection  Goal: Absence of Infection Signs and Symptoms  Outcome: Ongoing, Progressing     Problem: Adult Inpatient Plan of Care  Goal: Plan of Care Review  Outcome: Ongoing, Progressing  Goal: Patient-Specific Goal (Individualized)  Outcome: Ongoing, Progressing  Goal: Absence of Hospital-Acquired Illness or Injury  Outcome: Ongoing, Progressing  Goal: Optimal Comfort and Wellbeing  Outcome: Ongoing, Progressing  Goal: Readiness for Transition of Care  Outcome: Ongoing, Progressing   Cardiology Step Down. See progress note and flowsheet. Plan: Continue to monitor patient and report any abnormalities in labs, vitals signs, and body system functions to physician as indicated. Patient was given education on their disease process and medications.

## 2022-03-13 NOTE — CARE UPDATE
Patient arrived to MRI in [facility] and placed on a MRI safe monitor. War room notified and exam began.

## 2022-03-13 NOTE — PROGRESS NOTES
[facility]  Cardiology  Progress Note    Patient Name: Reza Morrow  MRN: [id number]  Admission Date: 3/11/2022  Hospital Length of Stay: 2 days  Code Status: Prior   Attending Physician: Rob Gates MD   Primary Care Physician: Kip Jimenez MD  Expected Discharge Date: 3/14/2022  Principal Problem:Acute systolic heart failure    Subjective:     Hospital Course:   56 year old woman with a history of MS, HLD presented with confusion, weakness for two days prior to admission. EKG on admission showed 1mm elevation of ST segments in leads 1 and avL, and patient had elevated troponins and BNP.  During bedside echo, patient had complaints of chest pain and was subsequently taken to cath lab where there was no evidence of coronary artery disease. Patient had elevated WBC and per family reports history of subjective fever in the past day. CXR normal and UA did not show infection. Formal echo was completed post procedure which showed reduced EF 25%, and mid-ventricular hypokinesis suggestive of takutsubo cardiomyopathy. Patient did not show any cardiogenic shock, decompensation, ectopy and was started on toprol-XL 25 mg. Neurology was consulted for workup of MS in the setting of paresthesias, pain and confusion in the two days leading up to admission. Patient stable from cardiovascular perspective, awaiting imaging for neurologic follow up.       Interval History: Patient had no acute events overnight. Formal echo completed showing mid-ventricular hypokinesis, LVH and reduced EF 25% suggestive of takutsubo cardiomyopathy. Will continue to treat patient symptomatically. Patient started on metoprolol-XL 25 mg. Neurology consulted yesterday regarding altered mental status, confusion, paresthesias leading up to hospitalization. MRI imaging ordered.     Review of Systems   Constitutional: Negative for chills and fever.   HENT:  Negative for congestion and sore throat.    Eyes:  Negative for blurred  vision and pain.   Cardiovascular:  Negative for chest pain, dyspnea on exertion and leg swelling.   Respiratory:  Negative for cough and shortness of breath.    Gastrointestinal:  Negative for abdominal pain, nausea and vomiting.   Genitourinary:  Negative for dysuria and hematuria.   Neurological:  Positive for paresthesias. Negative for dizziness and headaches.   Psychiatric/Behavioral:  Negative for altered mental status. The patient is not nervous/anxious.    Objective:     Vital Signs (Most Recent):  Temp: 98.5 °F (36.9 °C) (03/13/22 0717)  Pulse: 81 (03/13/22 0717)  Resp: 18 (03/13/22 0717)  BP: 105/74 (03/13/22 0717)  SpO2: 99 % (03/13/22 0717) Vital Signs (24h Range):  Temp:  [97.5 °F (36.4 °C)-99.9 °F (37.7 °C)] 98.5 °F (36.9 °C)  Pulse:  [] 81  Resp:  [17-19] 18  SpO2:  [95 %-99 %] 99 %  BP: (104-122)/(63-88) 105/74     Weight: 135.7 kg (299 lb 2.6 oz)  Body mass index is 49.78 kg/m².     SpO2: 99 %  O2 Device (Oxygen Therapy): room air      Intake/Output Summary (Last 24 hours) at 3/13/2022 0939  Last data filed at 3/13/2022 0800  Gross per 24 hour   Intake 500 ml   Output 200 ml   Net 300 ml       Lines/Drains/Airways       Peripheral Intravenous Line  Duration                  Peripheral IV - Single Lumen 03/11/22 2129 20 G Right Forearm 1 day                    Physical Exam  Constitutional:       General: She is not in acute distress.  HENT:      Head: Normocephalic and atraumatic.      Mouth/Throat:      Mouth: Mucous membranes are moist.      Pharynx: Oropharynx is clear.   Eyes:      Extraocular Movements: Extraocular movements intact.      Conjunctiva/sclera: Conjunctivae normal.   Cardiovascular:      Rate and Rhythm: Normal rate and regular rhythm.   Pulmonary:      Effort: Pulmonary effort is normal. No respiratory distress.      Breath sounds: Normal breath sounds. No wheezing.   Abdominal:      General: Bowel sounds are normal. There is no distension.      Palpations: Abdomen is soft.       Tenderness: There is no abdominal tenderness.   Musculoskeletal:         General: Normal range of motion.      Right lower leg: No edema.      Left lower leg: No edema.   Skin:     General: Skin is warm and dry.      Findings: No bruising or erythema.   Neurological:      Mental Status: She is alert and oriented to person, place, and time. Mental status is at baseline.       Significant Labs: All pertinent lab results from the last 24 hours have been reviewed.    Significant Imaging: Echocardiogram: Transthoracic echo (TTE) complete (Cupid Only):   Results for orders placed or performed during the hospital encounter of 03/11/22   Echo   Result Value Ref Range    Ascending aorta 2.65 cm    STJ 2.59 cm    AV mean gradient 3 mmHg    Ao peak neri 0.95 m/s    Ao VTI 13.02 cm    IVS 0.67 0.6 - 1.1 cm    LA size 2.98 cm    Left Atrium Major Axis 5.10 cm    Left Atrium Minor Axis 4.30 cm    LVIDd 5.11 3.5 - 6.0 cm    LVIDs 4.44 (A) 2.1 - 4.0 cm    LVOT diameter 1.99 cm    LVOT peak VTI 11.99 cm    Posterior Wall 0.75 0.6 - 1.1 cm    RA Major Axis 3.60 cm    RA Width 3.09 cm    RVDD 3.05 cm    Sinus 2.40 cm    TAPSE 1.97 cm    TDI LATERAL 0.06 m/s    TDI SEPTAL 0.06 m/s    LA WIDTH 3.49 cm    LV Diastolic Volume 124.47 mL    LV Systolic Volume 89.75 mL    LVOT peak neri 0.96 m/s    FS 13 %    LA volume 41.25 cm3    LV mass 121.25 g    Left Ventricle Relative Wall Thickness 0.29 cm    AV valve area 2.86 cm2    AV Velocity Ratio 1.01     AV index (prosthetic) 0.92     Mean e' 0.06 m/s    LVOT area 3.1 cm2    LVOT stroke volume 37.27 cm3    AV peak gradient 4 mmHg    LV Systolic Volume Index 52.2 mL/m2    LV Diastolic Volume Index 72.37 mL/m2    LA Volume Index 24.0 mL/m2    LV Mass Index 70 g/m2    BSA 1.72 m2    Right Atrial Pressure (from IVC) 3 mmHg    QEF 29 %    EF 25 %    Narrative    · The left ventricle is severely enlarged with severely decreased systolic   function.  · The estimated ejection fraction is 25%.  ·  Left ventricular diastolic dysfunction.  · The quantitatively derived ejection fraction is 29%.  · Normal right ventricular size with normal right ventricular systolic   function.  · Normal central venous pressure (3 mmHg).  · There are segmental left ventricular wall motion abnormalities.  · Non-coronary wall motion abnormalities consistent with possible   diagnosis of atypical mid-ventricular takotsubo cardiomyopathy.        Assessment and Plan:         * Acute systolic heart failure  She has no history of prior heart failure.  He she has depressed EF on echo.  She has elevated LV end-diastolic pressures.  Her BNP on presentation is 2000.  Formal echo ordered - evidence of reduced EF 25%, LV hypertrophy, mid-ventricular hypokinesis concerning for takutsubo cardiomyopathy Differentials include myocarditis vs. Vasospasm vs ACS (ruled out) prior to echo. Will continue symptomatic management with cardiology follow up as troponins down trending, patient is not in cardiogenic shock, no evidence of ectopy or cardiac decompensation.     Will start her on IV Lasix 40 mg b.i.d.  -started on metoprolol 25 mg       Confusion  Patient presented with altered mental status.  On my 1st interaction she denied having chest pain shortness of breath but complained having chest pain when I went to do a bedside echo.  She is on multiple anxiety/depression medications  She also has elevated white blood cell count.  CT head negative for any acute events.  She does not have neck rigidity  UA negative, chest x-ray negative for any acute infectious process  Urine drug screen positive for marijuana   She has history of opioid/benzodiazepine dependence.    Started on empiric antibiotics, discontinued as patient has no evidence of sepsis.   f/u blood cultures  -neurology consulted for further workup of altered mental status given prior MS history    Demand ischemia  Patient has elevated troponins with ST segment elevations and lateral leads  with ST depression.  Was taken to cath lab immediately after found to have wall motion abnormalities on bedside echo.  However angiogram did not show any coronary artery disease.  Bedside echo shows severe drop in her EF.  She has nonischemic cardiomyopathy.  Patient's LVEDP is elevated up to 30.  Will start on Lasix 40 mg b.i.d.     NSTEMI (non-ST elevated myocardial infarction)  Patient initially presented for confusion but when she is awake and alert denied having any chest pain.  Her EKG was initially concerning for ST segment elevation in 1 and aVL but not more than 1 mm.  However when I was doing her bedside echo she complained of having chest pain and she did had wall motion abnormalities involving the anterior and anterolateral walls.  Case was discussed with interventional staff and decided to take patient to the cath lab.  Patient is loaded with aspirin and Plavix.  On heparin.  Her chest pain improved after getting the 2nd dose of nitro.  Consent signed for left heart catheterization    --LHC +/- PCI, patient is a MIKIE candidate  -- clean coronary arteries, no stents placed  - Anti-platelet Therapy: ASA / Ticagrelor  - Access: Right radial  - Catheters: Arnav  - Creatinine/CrCl: 1.2  - Allergies: No shellfish / Iodine allergy  - Pre-Hydration: NS  - Pre-Op Med: Bendaryl 50mg pO   - All patient's questions were answered.        - patient started on GDMT with metoprolol-XL 25 mg due to low normal BP          VTE Risk Mitigation (From admission, onward)    None          Brinda Gant MD  Cardiology  [facility]

## 2022-03-13 NOTE — ASSESSMENT & PLAN NOTE
Patient presented with altered mental status.  On my 1st interaction she denied having chest pain shortness of breath but complained having chest pain when I went to do a bedside echo.  She is on multiple anxiety/depression medications  She also has elevated white blood cell count.  CT head negative for any acute events.  She does not have neck rigidity  UA negative, chest x-ray negative for any acute infectious process  Urine drug screen positive for marijuana   She has history of opioid/benzodiazepine dependence.    Started on empiric antibiotics, discontinued as patient has no evidence of sepsis.   f/u blood cultures  -neurology consulted for further workup of altered mental status given prior MS history

## 2022-03-13 NOTE — ASSESSMENT & PLAN NOTE
She has no history of prior heart failure.  He she has depressed EF on echo.  She has elevated LV end-diastolic pressures.  Her BNP on presentation is 2000.  Formal echo ordered - evidence of reduced EF 25%, LV hypertrophy, mid-ventricular hypokinesis concerning for takutsubo cardiomyopathy Differentials include myocarditis vs. Vasospasm vs ACS (ruled out) prior to echo. Will continue symptomatic management with cardiology follow up as troponins down trending, patient is not in cardiogenic shock, no evidence of ectopy or cardiac decompensation.     Will start her on IV Lasix 40 mg b.i.d.  -started on metoprolol 25 mg

## 2022-03-13 NOTE — HPI
Mrs. 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 Patient had elevated troponinand BNP at arrival and later experience chest pain along with wall motion abnormality on TTE. Patient was taken to cath lab with no acute CAD.   Formal echo completed showing mid-ventricular hypokinesis, LVH and reduced EF 25% suggestive of takutsubo cardiomyopathy.   Neurology is consulted for possible MS flare given weakness and paresthesias in BLEs along with confusion.   Patient She reports taking gabapentin 600 mg daily and Lyrica 150 mg BID and also uses prn muscle relaxants for pain in her legs. On the day of arrival to ED patient did not take her gabapentin in order to be able to mow her yard. She later took multiple muscle relaxants and went to bed as she had pain. Later on son found her in bed with residual emesis on and was not able to wake her up. Upon arrival of EMS, patient was awake but unable to ambulate without assistance.   She reports having a red eye 2 days ago.   In initial evaluation patient reports medication compliance although per family she has not been taking her Copaxone since few weeks ago due to making her nauseated.  Patient is AAOx4 and able to follow 2 steps commands. Denies confusion or hallucination.  Exam remarkable for sensory change sin BLE below knees as paresthesias. No overt weakness was noted except HF on left side which is chronic and secondary to hip ligament ear.   Reviewing care everywhere, patient had MRI brain and C-T spine in 8/2021 which only showed lesions consistent with MS in brain no cord lesions were reported. No acute lesion were reported.  Per last note of Dr Vincent's office, plan was to repeat MRIs in April 2022.    Multiple T2/FLAIR hyperintense foci in the bilateral white matter, with numerous juxtacortical,  periventricular lesions with associated parenchymal T1 hypointensity. There is no evidence of abnormal focal enhancement.

## 2022-03-13 NOTE — ASSESSMENT & PLAN NOTE
Patient initially presented for confusion but when she is awake and alert denied having any chest pain.  Her EKG was initially concerning for ST segment elevation in 1 and aVL but not more than 1 mm.  However when I was doing her bedside echo she complained of having chest pain and she did had wall motion abnormalities involving the anterior and anterolateral walls.  Case was discussed with interventional staff and decided to take patient to the cath lab.  Patient is loaded with aspirin and Plavix.  On heparin.  Her chest pain improved after getting the 2nd dose of nitro.  Consent signed for left heart catheterization    --LHC +/- PCI, patient is a MIKIE candidate  -- clean coronary arteries, no stents placed  - Anti-platelet Therapy: ASA / Ticagrelor  - Access: Right radial  - Catheters: Arnav  - Creatinine/CrCl: 1.2  - Allergies: No shellfish / Iodine allergy  - Pre-Hydration: NS  - Pre-Op Med: Bendaryl 50mg pO   - All patient's questions were answered.        - patient started on GDMT with metoprolol-XL 25 mg due to low normal BP

## 2022-03-13 NOTE — HOSPITAL COURSE
"56 year old woman with a history of MS, HLD presented with confusion, weakness for two days prior to admission. EKG on admission showed 1mm elevation of ST segments in leads 1 and avL, and patient had elevated troponins and BNP.  During bedside echo, patient had complaints of chest pain and was subsequently taken to cath lab where there was no evidence of coronary artery disease. Patient had elevated WBC and per family reports history of subjective fever in the past day. CXR normal and UA did not show infection. Formal echo was completed post procedure which showed reduced EF 25%, and mid-ventricular hypokinesis suggestive of takutsubo cardiomyopathy. Patient did not show any cardiogenic shock, decompensation, ectopy and was started on toprol-XL 25 mg. Neurology was consulted for workup of MS in the setting of paresthesias, pain and confusion in the two days leading up to admission. Patient stable from cardiovascular perspective, awaiting imaging for neurologic follow up. Vitals reassuring. MRI "Brain: Numerous nonspecific predominately subcortical white matter lesions may reflect demyelinating disease in light of the history however are nonspecific and not the classic lesions for multiple sclerosis.  No enhancing lesion or advanced edema.  No prior study for comparison.  Cervical spine: Mild cord signal abnormality at C4-5 is nonspecific but may be due to overlying degenerative changes.  No cord expansion or enhancing lesion is identified.  Degenerative changes result in flattening of the ventral and dorsal cord at C3-4.  Thoracic spine: Mild signal abnormality within the cord at T9-10 likely reflects overlying cord impingement from a rather large central disc herniation with superior extrusion." Neurology recs outpatient follow-up with MS clinic. Pt stable for discharge with outpatient NSGY follow-up and MS clinic follow-up.  "

## 2022-03-13 NOTE — SUBJECTIVE & OBJECTIVE
Interval History: Patient had no acute events overnight. Formal echo completed showing mid-ventricular hypokinesis, LVH and reduced EF 25% suggestive of takutsubo cardiomyopathy. Will continue to treat patient symptomatically. Patient started on metoprolol-XL 25 mg. Neurology consulted yesterday regarding altered mental status, confusion, paresthesias leading up to hospitalization. MRI imaging ordered.     Review of Systems   Constitutional: Negative for chills and fever.   HENT:  Negative for congestion and sore throat.    Eyes:  Negative for blurred vision and pain.   Cardiovascular:  Negative for chest pain, dyspnea on exertion and leg swelling.   Respiratory:  Negative for cough and shortness of breath.    Gastrointestinal:  Negative for abdominal pain, nausea and vomiting.   Genitourinary:  Negative for dysuria and hematuria.   Neurological:  Positive for paresthesias. Negative for dizziness and headaches.   Psychiatric/Behavioral:  Negative for altered mental status. The patient is not nervous/anxious.    Objective:     Vital Signs (Most Recent):  Temp: 98.5 °F (36.9 °C) (03/13/22 0717)  Pulse: 81 (03/13/22 0717)  Resp: 18 (03/13/22 0717)  BP: 105/74 (03/13/22 0717)  SpO2: 99 % (03/13/22 0717) Vital Signs (24h Range):  Temp:  [97.5 °F (36.4 °C)-99.9 °F (37.7 °C)] 98.5 °F (36.9 °C)  Pulse:  [] 81  Resp:  [17-19] 18  SpO2:  [95 %-99 %] 99 %  BP: (104-122)/(63-88) 105/74     Weight: 135.7 kg (299 lb 2.6 oz)  Body mass index is 49.78 kg/m².     SpO2: 99 %  O2 Device (Oxygen Therapy): room air      Intake/Output Summary (Last 24 hours) at 3/13/2022 0939  Last data filed at 3/13/2022 0800  Gross per 24 hour   Intake 500 ml   Output 200 ml   Net 300 ml       Lines/Drains/Airways       Peripheral Intravenous Line  Duration                  Peripheral IV - Single Lumen 03/11/22 2129 20 G Right Forearm 1 day                    Physical Exam  Constitutional:       General: She is not in acute distress.  HENT:       Head: Normocephalic and atraumatic.      Mouth/Throat:      Mouth: Mucous membranes are moist.      Pharynx: Oropharynx is clear.   Eyes:      Extraocular Movements: Extraocular movements intact.      Conjunctiva/sclera: Conjunctivae normal.   Cardiovascular:      Rate and Rhythm: Normal rate and regular rhythm.   Pulmonary:      Effort: Pulmonary effort is normal. No respiratory distress.      Breath sounds: Normal breath sounds. No wheezing.   Abdominal:      General: Bowel sounds are normal. There is no distension.      Palpations: Abdomen is soft.      Tenderness: There is no abdominal tenderness.   Musculoskeletal:         General: Normal range of motion.      Right lower leg: No edema.      Left lower leg: No edema.   Skin:     General: Skin is warm and dry.      Findings: No bruising or erythema.   Neurological:      Mental Status: She is alert and oriented to person, place, and time. Mental status is at baseline.       Significant Labs: All pertinent lab results from the last 24 hours have been reviewed.    Significant Imaging: Echocardiogram: Transthoracic echo (TTE) complete (Cupid Only):   Results for orders placed or performed during the hospital encounter of 03/11/22   Echo   Result Value Ref Range    Ascending aorta 2.65 cm    STJ 2.59 cm    AV mean gradient 3 mmHg    Ao peak neri 0.95 m/s    Ao VTI 13.02 cm    IVS 0.67 0.6 - 1.1 cm    LA size 2.98 cm    Left Atrium Major Axis 5.10 cm    Left Atrium Minor Axis 4.30 cm    LVIDd 5.11 3.5 - 6.0 cm    LVIDs 4.44 (A) 2.1 - 4.0 cm    LVOT diameter 1.99 cm    LVOT peak VTI 11.99 cm    Posterior Wall 0.75 0.6 - 1.1 cm    RA Major Axis 3.60 cm    RA Width 3.09 cm    RVDD 3.05 cm    Sinus 2.40 cm    TAPSE 1.97 cm    TDI LATERAL 0.06 m/s    TDI SEPTAL 0.06 m/s    LA WIDTH 3.49 cm    LV Diastolic Volume 124.47 mL    LV Systolic Volume 89.75 mL    LVOT peak neri 0.96 m/s    FS 13 %    LA volume 41.25 cm3    LV mass 121.25 g    Left Ventricle Relative Wall Thickness  0.29 cm    AV valve area 2.86 cm2    AV Velocity Ratio 1.01     AV index (prosthetic) 0.92     Mean e' 0.06 m/s    LVOT area 3.1 cm2    LVOT stroke volume 37.27 cm3    AV peak gradient 4 mmHg    LV Systolic Volume Index 52.2 mL/m2    LV Diastolic Volume Index 72.37 mL/m2    LA Volume Index 24.0 mL/m2    LV Mass Index 70 g/m2    BSA 1.72 m2    Right Atrial Pressure (from IVC) 3 mmHg    QEF 29 %    EF 25 %    Narrative    · The left ventricle is severely enlarged with severely decreased systolic   function.  · The estimated ejection fraction is 25%.  · Left ventricular diastolic dysfunction.  · The quantitatively derived ejection fraction is 29%.  · Normal right ventricular size with normal right ventricular systolic   function.  · Normal central venous pressure (3 mmHg).  · There are segmental left ventricular wall motion abnormalities.  · Non-coronary wall motion abnormalities consistent with possible   diagnosis of atypical mid-ventricular takotsubo cardiomyopathy.

## 2022-03-13 NOTE — CONSULTS
[facility]  Neurology  Consult Note    Patient Name: Reza Morrow  MRN: [id number]  Admission Date: 3/11/2022  Hospital Length of Stay: 2 days  Code Status: Prior   Attending Provider: Rob Gates MD   Consulting Provider: Arianna Aldridge MD  Primary Care Physician: Kip Jimenez MD  Principal Problem:Acute systolic heart failure    Consults   Subjective:     Chief Complaint:  Paresthesia      HPI:   Mrs. Reza Morrow is a 56 year old female with a history of RRMS on copaxone dollowing with Dr Vincent in [place], who is admitted for acute onset confusion and weakness started  two days prior to admission on 3/10. Patient had elevated troponinand BNP at arrival and later experience chest pain along with wall motion abnormality on TTE. Patient was taken to cath lab with no acute CAD.   Formal echo completed showing mid-ventricular hypokinesis, LVH and reduced EF 25% suggestive of takutsubo cardiomyopathy.   Neurology is consulted for possible MS flare given weakness and paresthesias in BLEs along with confusion.   Patient She reports taking gabapentin 600 mg daily and Lyrica 150 mg BID and also uses prn muscle relaxants for pain in her legs. On the day of arrival to ED patient did not take her gabapentin in order to be able to mow her yard. She later took multiple muscle relaxants and went to bed as she had pain. Later on son found her in bed with residual emesis on and was not able to wake her up. Upon arrival of EMS, patient was awake but unable to ambulate without assistance.   She reports having a red eye 2 days ago.   In initial evaluation patient reports medication compliance although per family she has not been taking her Copaxone since few weeks ago due to making her nauseated.  Patient is AAOx4 and able to follow 2 steps commands. Denies confusion or hallucination.  Exam remarkable for sensory change sin BLE below knees as paresthesias. No overt weakness was noted except HF on left  side which is chronic and secondary to hip ligament ear.   Reviewing care everywhere, patient had MRI brain and C-T spine in 2021 which only showed lesions consistent with MS in brain no cord lesions were reported. No acute lesion were reported.  Per last note of Dr Vincent's office, plan was to repeat MRIs in 2022.    Multiple T2/FLAIR hyperintense foci in the bilateral white matter, with numerous juxtacortical, periventricular lesions with associated parenchymal T1 hypointensity. There is no evidence of abnormal focal enhancement.        Past Medical History:   Diagnosis Date    Behavioral problem     Bulging lumbar disc     Bursitis     bilateral hip    Degenerative cervical disc     Hx of psychiatric care     Hypercholesteremia     Labral tear of hip, degenerative bilateral    MS (multiple sclerosis)     Pneumonia     Spinal cord compression        Past Surgical History:   Procedure Laterality Date    BREAST SURGERY      augmentation     SECTION, CLASSIC      HAND SURGERY      HYSTEROSCOPY      NECK SURGERY      cervical disc removeal    TUBAL LIGATION      X-STOP IMPLANTATION         Review of patient's allergies indicates:   Allergen Reactions    Adhesive Hives    Neosporin [neomycin-bacitracin-polymyxin] Hives    Penicillins Other (See Comments)     Unknown  reaction       Current Neurological Medications:     No current facility-administered medications on file prior to encounter.     Current Outpatient Medications on File Prior to Encounter   Medication Sig    doxepin (SINEQUAN) 25 MG capsule Take 25 mg by mouth nightly as needed.    gabapentin (NEURONTIN) 300 MG capsule Take 300 mg by mouth 3 (three) times daily as needed.    hydrocodone-acetaminophen 10-325mg (NORCO)  mg Tab Take 1 tablet by mouth every 4 (four) hours as needed.    hydrOXYzine pamoate (VISTARIL) 25 MG Cap Take 25 mg by mouth 3 (three) times daily as needed.    metronidazole 1% (METROGEL) 1 %  Gel Apply topically daily as needed.    oxymorphone (OPANA ER) 20 MG 12 hr tablet Take 20 mg by mouth 2 (two) times daily.     TAPENTADOL HCL (NUCYNTA ORAL) Take 1 tablet by mouth 4 (four) times daily.    triamcinolone acetonide 0.1% (KENALOG) 0.1 % cream Apply topically daily as needed.    VOLTAREN 1 % Gel Apply topically once daily.    [DISCONTINUED] atorvastatin (LIPITOR) 20 MG tablet Take 20 mg by mouth once daily.     Family History       Problem Relation (Age of Onset)    Bipolar disorder Brother    COPD Mother    Cancer Father    Diabetes Father, Sister    Drug abuse Mother    Heart disease Maternal Uncle    Hypothyroidism Maternal Grandmother    Lung cancer Father          Tobacco Use    Smoking status: Former Smoker     Quit date: 2013     Years since quittin.6    Smokeless tobacco: Never Used   Substance and Sexual Activity    Alcohol use: No    Drug use: Yes     Types: Benzodiazepines, Marijuana    Sexual activity: Never     Review of Systems   Constitutional:  Negative for fever.   HENT:  Negative for trouble swallowing and voice change.    Eyes:  Negative for visual disturbance.   Respiratory:  Negative for cough and shortness of breath.    Gastrointestinal:  Negative for nausea.   Musculoskeletal:  Negative for neck pain and neck stiffness.   Skin:  Negative for rash.   Neurological:  Positive for weakness and numbness. Negative for facial asymmetry, speech difficulty and headaches.   Psychiatric/Behavioral:  Negative for confusion.    Objective:     Vital Signs (Most Recent):  Temp: 98.9 °F (37.2 °C) (22 1534)  Pulse: 99 (22 1534)  Resp: 18 (22 1534)  BP: 127/89 (22 1534)  SpO2: (!) 93 % (22 1534)   Vital Signs (24h Range):  Temp:  [97.5 °F (36.4 °C)-98.9 °F (37.2 °C)] 98.9 °F (37.2 °C)  Pulse:  [80-99] 99  Resp:  [17-19] 18  SpO2:  [93 %-99 %] 93 %  BP: (104-127)/(55-89) 127/89     Weight: 135.7 kg (299 lb 2.6 oz)  Body mass index is 49.78  kg/m².    Physical Exam  HENT:      Head: Normocephalic and atraumatic.   Eyes:      Extraocular Movements: Extraocular movements intact and EOM normal.      Pupils: Pupils are equal, round, and reactive to light.   Cardiovascular:      Rate and Rhythm: Normal rate.   Pulmonary:      Effort: Pulmonary effort is normal.   Musculoskeletal:         General: No swelling. Normal range of motion.      Cervical back: Normal range of motion.   Skin:     General: Skin is warm and dry.   Neurological:      Mental Status: She is alert and oriented to person, place, and time.      Coordination: Finger-Nose-Finger Test normal.      Deep Tendon Reflexes:      Reflex Scores:       Bicep reflexes are 2+ on the right side and 2+ on the left side.       Patellar reflexes are 2+ on the right side and 2+ on the left side.       Achilles reflexes are 1+ on the right side and 1+ on the left side.  Psychiatric:         Speech: Speech normal.       NEUROLOGICAL EXAMINATION:     MENTAL STATUS   Oriented to person, place, and time.   Speech: speech is normal   Level of consciousness: alert    CRANIAL NERVES     CN II   Visual fields full to confrontation.     CN III, IV, VI   Pupils are equal, round, and reactive to light.  Extraocular motions are normal.   Right pupil: Size: 2 mm. Shape: regular.   Left pupil: Size: 2 mm. Shape: regular.   Nystagmus: none   Diplopia: none  Ophthalmoparesis: none    CN V   Facial sensation intact.     CN VII   Facial expression full, symmetric.     CN VIII   Hearing: intact    MOTOR EXAM   Muscle bulk: normal  Overall muscle tone: normal    Strength   Strength 5/5 except as noted.        HF left side     REFLEXES     Reflexes   Right biceps: 2+  Left biceps: 2+  Right patellar: 2+  Left patellar: 2+  Right achilles: 1+  Left achilles: 1+  Right plantar: normal  Left plantar: normal  Right Dalal: absent  Left Dalal: absent  Right ankle clonus: absent  Left ankle clonus: absent    SENSORY EXAM         Paresthesias and analgesias BLE, below knees     GAIT AND COORDINATION      Coordination   Finger to nose coordination: normal    Tremor   Resting tremor: absent  Action tremor: absent    Significant Labs: All pertinent lab results from the past 24 hours have been reviewed.    Significant Imaging: I have reviewed and interpreted all pertinent imaging results/findings within the past 24 hours.    Assessment and Plan:     Paresthesia of both lower extremities  56 year old female with a history of RRMS on copaxone following with Dr Vincent in [place], who is admitted for acute onset confusion and weakness started on 3/8.  Neurology is consulted for possible MS flare.     Son found her in bed with residual emesis on and was not able to wake her up. Upon arrival of EMS, patient was awake but unable to ambulate without assistance. She later reported paresthesias in BLEs. She reports having a red eye 2 days ago.   In initial evaluation patient reports medication compliance although per family she has not been taking her Copaxone since few weeks ago due to making her nauseated.  Patient is AAOx4 and able to follow 2 steps commands. Denies confusion or hallucination.  Exam remarkable for sensory change sin BLE below knees as paresthesias. No overt weakness was noted except HF on left side which is chronic and secondary to hip ligament ear.   Reviewing care everywhere, patient had MRI brain and C-T spine in 8/2021 which only showed lesions consistent with MS in brain no cord lesions were reported. No acute lesion were reported.  Per last note of Dr Vincent's office, plan was to repeat MRIs in April 2022.    Multiple T2/FLAIR hyperintense foci in the bilateral white matter, with numerous juxtacortical, periventricular lesions with associated parenchymal T1 hypointensity. There is no evidence of abnormal focal enhancement.     Recommendations:  - Overall presentation less concerning for acute flare, although given medication  noncompliance will obtain MRI brain and spine  - Patient to bring in previous MRIs disc for comparison   - resume home medication  - Referral to MS clinic as per patient request to establish care with [facility]  - neurology will follow up         VTE Risk Mitigation (From admission, onward)    None          Thank you for your consult. I will follow-up with patient. Please contact us if you have any additional questions.    Arianna Aldridge MD  Neurology  [facility]

## 2022-03-13 NOTE — NURSING
Pt alert and oriented x4. PERRLA intact. Vital signs stable. No report of pain. Pt sitting up in bed on room air with no difficulty. Bed alarm on. Pt educated to press call light to receive assistance when attempting to ambulate to the bathroom. Pt understood teaching. IVs intact and flushed. Tele box on and telemetry has been called to admit pt. Pt has no questions or concerns at this time.

## 2022-03-14 ENCOUNTER — TELEPHONE (OUTPATIENT)
Dept: NEUROLOGY | Facility: CLINIC | Age: 57
End: 2022-03-14
Payer: MEDICAID

## 2022-03-14 VITALS
HEIGHT: 65 IN | SYSTOLIC BLOOD PRESSURE: 99 MMHG | HEART RATE: 84 BPM | TEMPERATURE: 99 F | OXYGEN SATURATION: 97 % | WEIGHT: 293 LBS | BODY MASS INDEX: 48.82 KG/M2 | RESPIRATION RATE: 18 BRPM | DIASTOLIC BLOOD PRESSURE: 62 MMHG

## 2022-03-14 LAB
ANION GAP SERPL CALC-SCNC: 12 MMOL/L (ref 8–16)
ANION GAP SERPL CALC-SCNC: 13 MMOL/L (ref 8–16)
BUN SERPL-MCNC: 35 MG/DL (ref 6–20)
BUN SERPL-MCNC: 38 MG/DL (ref 6–20)
CALCIUM SERPL-MCNC: 9.4 MG/DL (ref 8.7–10.5)
CALCIUM SERPL-MCNC: 9.8 MG/DL (ref 8.7–10.5)
CHLORIDE SERPL-SCNC: 87 MMOL/L (ref 95–110)
CHLORIDE SERPL-SCNC: 89 MMOL/L (ref 95–110)
CO2 SERPL-SCNC: 31 MMOL/L (ref 23–29)
CO2 SERPL-SCNC: 32 MMOL/L (ref 23–29)
CREAT SERPL-MCNC: 1 MG/DL (ref 0.5–1.4)
CREAT SERPL-MCNC: 1.1 MG/DL (ref 0.5–1.4)
EST. GFR  (AFRICAN AMERICAN): >60 ML/MIN/1.73 M^2
EST. GFR  (AFRICAN AMERICAN): >60 ML/MIN/1.73 M^2
EST. GFR  (NON AFRICAN AMERICAN): 56.7 ML/MIN/1.73 M^2
EST. GFR  (NON AFRICAN AMERICAN): >60 ML/MIN/1.73 M^2
GLUCOSE SERPL-MCNC: 185 MG/DL (ref 70–110)
GLUCOSE SERPL-MCNC: 185 MG/DL (ref 70–110)
POC ACTIVATED CLOTTING TIME K: 202 SEC (ref 74–137)
POCT GLUCOSE: 113 MG/DL (ref 70–110)
POTASSIUM SERPL-SCNC: 3.1 MMOL/L (ref 3.5–5.1)
POTASSIUM SERPL-SCNC: 3.3 MMOL/L (ref 3.5–5.1)
SAMPLE: ABNORMAL
SODIUM SERPL-SCNC: 131 MMOL/L (ref 136–145)
SODIUM SERPL-SCNC: 133 MMOL/L (ref 136–145)

## 2022-03-14 PROCEDURE — 99900035 HC TECH TIME PER 15 MIN (STAT)

## 2022-03-14 PROCEDURE — 80048 BASIC METABOLIC PNL TOTAL CA: CPT | Performed by: STUDENT IN AN ORGANIZED HEALTH CARE EDUCATION/TRAINING PROGRAM

## 2022-03-14 PROCEDURE — 25000003 PHARM REV CODE 250: Performed by: HOSPITALIST

## 2022-03-14 PROCEDURE — 99233 PR SUBSEQUENT HOSPITAL CARE,LEVL III: ICD-10-PCS | Mod: ,,, | Performed by: INTERNAL MEDICINE

## 2022-03-14 PROCEDURE — 80048 BASIC METABOLIC PNL TOTAL CA: CPT | Mod: 91 | Performed by: INTERNAL MEDICINE

## 2022-03-14 PROCEDURE — 63600175 PHARM REV CODE 636 W HCPCS: Performed by: HOSPITALIST

## 2022-03-14 PROCEDURE — 25000003 PHARM REV CODE 250: Performed by: STUDENT IN AN ORGANIZED HEALTH CARE EDUCATION/TRAINING PROGRAM

## 2022-03-14 PROCEDURE — 99233 SBSQ HOSP IP/OBS HIGH 50: CPT | Mod: ,,, | Performed by: INTERNAL MEDICINE

## 2022-03-14 PROCEDURE — 25000003 PHARM REV CODE 250

## 2022-03-14 PROCEDURE — 94761 N-INVAS EAR/PLS OXIMETRY MLT: CPT

## 2022-03-14 RX ORDER — METOPROLOL SUCCINATE 25 MG/1
25 TABLET, EXTENDED RELEASE ORAL DAILY
Qty: 30 TABLET | Refills: 11 | Status: ON HOLD | OUTPATIENT
Start: 2022-03-15 | End: 2023-05-30

## 2022-03-14 RX ORDER — FUROSEMIDE 40 MG/1
TABLET ORAL
Qty: 30 TABLET | Refills: 11 | Status: SHIPPED | OUTPATIENT
Start: 2022-03-14 | End: 2023-05-04

## 2022-03-14 RX ORDER — LISINOPRIL 2.5 MG/1
2.5 TABLET ORAL DAILY
Qty: 90 TABLET | Refills: 3 | Status: SHIPPED | OUTPATIENT
Start: 2022-03-15 | End: 2022-12-06 | Stop reason: SDUPTHER

## 2022-03-14 RX ORDER — POTASSIUM CHLORIDE 20 MEQ/1
40 TABLET, EXTENDED RELEASE ORAL ONCE
Status: COMPLETED | OUTPATIENT
Start: 2022-03-14 | End: 2022-03-14

## 2022-03-14 RX ORDER — NITROFURANTOIN 25; 75 MG/1; MG/1
100 CAPSULE ORAL 2 TIMES DAILY
Qty: 10 CAPSULE | Refills: 0 | Status: ON HOLD | OUTPATIENT
Start: 2022-03-14 | End: 2022-04-11

## 2022-03-14 RX ORDER — LISINOPRIL 2.5 MG/1
2.5 TABLET ORAL DAILY
Status: DISCONTINUED | OUTPATIENT
Start: 2022-03-14 | End: 2022-03-14 | Stop reason: HOSPADM

## 2022-03-14 RX ORDER — FUROSEMIDE 40 MG/1
40 TABLET ORAL DAILY
Status: DISCONTINUED | OUTPATIENT
Start: 2022-03-14 | End: 2022-03-14 | Stop reason: HOSPADM

## 2022-03-14 RX ADMIN — FUROSEMIDE 40 MG: 10 INJECTION, SOLUTION INTRAMUSCULAR; INTRAVENOUS at 02:03

## 2022-03-14 RX ADMIN — ACETAMINOPHEN 650 MG: 325 TABLET ORAL at 04:03

## 2022-03-14 RX ADMIN — GABAPENTIN 300 MG: 300 CAPSULE ORAL at 02:03

## 2022-03-14 RX ADMIN — ONDANSETRON 8 MG: 8 TABLET, ORALLY DISINTEGRATING ORAL at 02:03

## 2022-03-14 RX ADMIN — POTASSIUM CHLORIDE 40 MEQ: 1500 TABLET, EXTENDED RELEASE ORAL at 11:03

## 2022-03-14 RX ADMIN — LISINOPRIL 2.5 MG: 2.5 TABLET ORAL at 09:03

## 2022-03-14 RX ADMIN — OXYCODONE HYDROCHLORIDE 10 MG: 10 TABLET, FILM COATED, EXTENDED RELEASE ORAL at 09:03

## 2022-03-14 RX ADMIN — FUROSEMIDE 40 MG: 40 TABLET ORAL at 11:03

## 2022-03-14 RX ADMIN — METOPROLOL SUCCINATE 25 MG: 25 TABLET, EXTENDED RELEASE ORAL at 09:03

## 2022-03-14 NOTE — PROGRESS NOTES
[facility]  Neurology  Progress Note    Patient Name: Reza Morrow  MRN: [id number]  Admission Date: 3/11/2022  Hospital Length of Stay: 3 days  Code Status: Prior   Attending Provider: Rob Gates MD  Primary Care Physician: Kip Jimenez MD   Principal Problem:Acute systolic heart failure    HPI:   Mrs. Reza Morrow is a 56 year old female with a history of RRMS on copaxone dollowing with Dr Vincent in [place], who is admitted for acute onset confusion and weakness started  two days prior to admission on 3/10. Patient had elevated troponinand BNP at arrival and later experience chest pain along with wall motion abnormality on TTE. Patient was taken to cath lab with no acute CAD.   Formal echo completed showing mid-ventricular hypokinesis, LVH and reduced EF 25% suggestive of takutsubo cardiomyopathy.   Neurology is consulted for possible MS flare given weakness and paresthesias in BLEs along with confusion.   Patient She reports taking gabapentin 600 mg daily and Lyrica 150 mg BID and also uses prn muscle relaxants for pain in her legs. On the day of arrival to ED patient did not take her gabapentin in order to be able to mow her yard. She later took multiple muscle relaxants and went to bed as she had pain. Later on son found her in bed with residual emesis on and was not able to wake her up. Upon arrival of EMS, patient was awake but unable to ambulate without assistance.   She reports having a red eye 2 days ago.   In initial evaluation patient reports medication compliance although per family she has not been taking her Copaxone since few weeks ago due to making her nauseated.  Patient is AAOx4 and able to follow 2 steps commands. Denies confusion or hallucination.  Exam remarkable for sensory change sin BLE below knees as paresthesias. No overt weakness was noted except HF on left side which is chronic and secondary to hip ligament ear.   Reviewing care everywhere, patient had  MRI brain and C-T spine in 2021 which only showed lesions consistent with MS in brain no cord lesions were reported. No acute lesion were reported.  Per last note of Dr Vincent's office, plan was to repeat MRIs in 2022.    Multiple T2/FLAIR hyperintense foci in the bilateral white matter, with numerous juxtacortical, periventricular lesions with associated parenchymal T1 hypointensity. There is no evidence of abnormal focal enhancement.       Overview/Hospital Course:  No notes on file      Past Medical History:   Diagnosis Date    Behavioral problem     Bulging lumbar disc     Bursitis     bilateral hip    Degenerative cervical disc     Hx of psychiatric care     Hypercholesteremia     Labral tear of hip, degenerative bilateral    MS (multiple sclerosis)     Paresthesia of both lower extremities 3/13/2022    Pneumonia     Spinal cord compression        Past Surgical History:   Procedure Laterality Date    ANGIOGRAM, CORONARY, WITH LEFT HEART CATHETERIZATION Left 3/11/2022    Procedure: Angiogram, Coronary, with Left Heart Cath;  Surgeon: Elie Matamoros MD;  Location: [facility] CATH LAB;  Service: Cardiology;  Laterality: Left;    BREAST SURGERY      augmentation     SECTION, CLASSIC      HAND SURGERY      HYSTEROSCOPY      NECK SURGERY      cervical disc removeal    TUBAL LIGATION      X-STOP IMPLANTATION         Review of patient's allergies indicates:   Allergen Reactions    Adhesive Hives    Neosporin [neomycin-bacitracin-polymyxin] Hives    Penicillins Other (See Comments)     Unknown  reaction       Current Neurological Medications:     No current facility-administered medications on file prior to encounter.     Current Outpatient Medications on File Prior to Encounter   Medication Sig    doxepin (SINEQUAN) 25 MG capsule Take 25 mg by mouth nightly as needed.    gabapentin (NEURONTIN) 300 MG capsule Take 300 mg by mouth 3 (three) times daily as needed.     hydrocodone-acetaminophen 10-325mg (NORCO)  mg Tab Take 1 tablet by mouth every 4 (four) hours as needed.    hydrOXYzine pamoate (VISTARIL) 25 MG Cap Take 25 mg by mouth 3 (three) times daily as needed.    metronidazole 1% (METROGEL) 1 % Gel Apply topically daily as needed.    oxymorphone (OPANA ER) 20 MG 12 hr tablet Take 20 mg by mouth 2 (two) times daily.     TAPENTADOL HCL (NUCYNTA ORAL) Take 1 tablet by mouth 4 (four) times daily.    triamcinolone acetonide 0.1% (KENALOG) 0.1 % cream Apply topically daily as needed.    VOLTAREN 1 % Gel Apply topically once daily.    [DISCONTINUED] atorvastatin (LIPITOR) 20 MG tablet Take 20 mg by mouth once daily.     Family History       Problem Relation (Age of Onset)    Bipolar disorder Brother    COPD Mother    Cancer Father    Diabetes Father, Sister    Drug abuse Mother    Heart disease Maternal Uncle    Hypothyroidism Maternal Grandmother    Lung cancer Father          Tobacco Use    Smoking status: Former Smoker     Quit date: 2013     Years since quittin.6    Smokeless tobacco: Never Used   Substance and Sexual Activity    Alcohol use: No    Drug use: Yes     Types: Benzodiazepines, Marijuana    Sexual activity: Never     Review of Systems   Constitutional:  Negative for fever.   HENT:  Negative for trouble swallowing and voice change.    Eyes:  Negative for photophobia and visual disturbance.   Respiratory:  Negative for cough and shortness of breath.    Cardiovascular:  Negative for chest pain.   Gastrointestinal:  Negative for nausea and vomiting.   Musculoskeletal:  Negative for neck pain and neck stiffness.   Skin:  Negative for rash.   Neurological:  Positive for weakness and numbness. Negative for dizziness, tremors, seizures, syncope, facial asymmetry, speech difficulty, light-headedness and headaches.   Psychiatric/Behavioral:  Negative for confusion.    Objective:     Vital Signs (Most Recent):  Temp: 98.8 °F (37.1 °C) (22  0910)  Pulse: 94 (03/14/22 0910)  Resp: 16 (03/14/22 0910)  BP: (!) 123/56 (03/14/22 0910)  SpO2: 97 % (03/14/22 0910)   Vital Signs (24h Range):  Temp:  [97.3 °F (36.3 °C)-98.9 °F (37.2 °C)] 98.8 °F (37.1 °C)  Pulse:  [] 94  Resp:  [16-20] 16  SpO2:  [93 %-97 %] 97 %  BP: (105-127)/(55-89) 123/56     Weight: 135.7 kg (299 lb 2.6 oz)  Body mass index is 49.78 kg/m².    Physical Exam  HENT:      Head: Normocephalic and atraumatic.      Nose: Nose normal.      Mouth/Throat:      Mouth: Mucous membranes are moist.      Pharynx: Oropharynx is clear.   Eyes:      Extraocular Movements: Extraocular movements intact and EOM normal.      Pupils: Pupils are equal, round, and reactive to light.   Cardiovascular:      Rate and Rhythm: Normal rate.   Pulmonary:      Effort: Pulmonary effort is normal.   Musculoskeletal:         General: Normal range of motion.      Cervical back: Normal range of motion.   Skin:     General: Skin is warm and dry.   Neurological:      Mental Status: She is alert and oriented to person, place, and time.      Coordination: Finger-Nose-Finger Test normal.      Deep Tendon Reflexes:      Reflex Scores:       Bicep reflexes are 1+ on the right side and 1+ on the left side.       Patellar reflexes are 1+ on the right side and 1+ on the left side.       Achilles reflexes are 1+ on the right side and 1+ on the left side.  Psychiatric:         Speech: Speech normal.       NEUROLOGICAL EXAMINATION:     MENTAL STATUS   Oriented to person, place, and time.   Oriented to person.   Oriented to place.   Oriented to time.   Attention: normal. Concentration: normal.   Speech: speech is normal   Level of consciousness: alert    CRANIAL NERVES     CN II   Visual fields full to confrontation.     CN III, IV, VI   Pupils are equal, round, and reactive to light.  Extraocular motions are normal.     CN V   Facial sensation intact.     CN VII   Facial expression full, symmetric.     CN VIII   Hearing: intact    CN  IX, X   CN IX normal.   CN X normal.     CN XI   CN XI normal.     CN XII   CN XII normal.     MOTOR EXAM   Muscle bulk: normal  Overall muscle tone: normal  Right arm tone: normal  Left arm tone: normal  Right leg tone: normal  Left leg tone: normal    Strength   Strength 5/5 except as noted.   Right deltoid: 5/5  Left deltoid: 4/5  Right biceps: 5/5  Left biceps: 4/5  Right triceps: 5/5  Left triceps: 4/5  Right interossei: 5/5  Left interossei: 5/5       Lifts bilateral legs against gravity.  5/5 motor strength bilaterally with plantar/dorsi flexion.      REFLEXES     Reflexes   Right biceps: 1+  Left biceps: 1+  Right patellar: 1+  Left patellar: 1+  Right achilles: 1+  Left achilles: 1+  Right plantar: normal  Left plantar: normal  Right Dalal: absent  Left Dalal: absent  Right ankle clonus: absent  Left ankle clonus: absent    SENSORY EXAM   Right leg light touch: numbness to left lateral thigh.  Vibration normal.        Paresthesias and analgesias BLE, below knees     GAIT AND COORDINATION      Coordination   Finger to nose coordination: normal    Tremor   Resting tremor: absent  Action tremor: absent    Significant Labs: All pertinent lab results from the past 24 hours have been reviewed.    Significant Imaging: I have reviewed and interpreted all pertinent imaging results/findings within the past 24 hours.    Assessment and Plan:     Paresthesia of both lower extremities  56 year old female with a history of RRMS on copaxone following with Dr Vincent in [place], who is admitted for acute onset confusion and weakness started on 3/8.  Neurology is consulted for possible MS flare.     Son found her in bed with residual emesis on and was not able to wake her up. Upon arrival of EMS, patient was awake but unable to ambulate without assistance. She later reported paresthesias in BLEs. She reports having a red eye 2 days ago.   In initial evaluation patient reports medication compliance although per family  she has not been taking her Copaxone since few weeks ago due to making her nauseated.  Patient is AAOx4 and able to follow 2 steps commands. Denies confusion or hallucination.  Exam remarkable for sensory change sin BLE below knees as paresthesias. No overt weakness was noted except HF on left side which is chronic and secondary to hip ligament ear.   Reviewing care everywhere, patient had MRI brain and C-T spine in 8/2021 which only showed lesions consistent with MS in brain no cord lesions were reported. No acute lesion were reported.  Per last note of Dr Vincent's office, plan was to repeat MRIs in April 2022.    Multiple T2/FLAIR hyperintense foci in the bilateral white matter, with numerous juxtacortical, periventricular lesions with associated parenchymal T1 hypointensity. There is no evidence of abnormal focal enhancement.     Recommendations:  - Overall presentation less concerning for acute flare, although given medication noncompliance will obtain MRI brain and spine    - Patient to bring in previous MRIs disc for comparison   - No acute changes on new MRI compared to old MRI  - resume home medication  - Referral to MS clinic as per patient request to establish care with [facility]  - neurology will sign off.  Please call for any questions or concerns.         VTE Risk Mitigation (From admission, onward)    None          Romelia Perales MD   [facility] PGY1  Neurology  [facility]

## 2022-03-14 NOTE — DISCHARGE SUMMARY
[facility] - Cardiology Stepdown  Cardiology  Discharge Summary      Patient Name: Reza Morrow  MRN: [id number]  Admission Date: 3/11/2022  Hospital Length of Stay: 3 days  Discharge Date and Time:  03/14/2022 4:02 PM  Attending Physician: Rob Gates MD    Discharging Provider: Dg Garcia MD  Primary Care Physician: Kip Jimenez MD    HPI:   56-year-old female with past medical history of also multiple sclerosis presents with confusion for the last 3 days.  The emergency room EKG was done that showed ST segment elevation 1 and aVL but not more than 1 mm. She denied having any chest pain in the emergency room.  However her troponins went up to 4.  She also has elevated BNP up to 2000..  Repeat EKGs were done which showed a similar appearance.  Bedside echo showed wall motion abnormalities involving the anterior and anterior lateral walls.  Chest x-ray did not show any pulmonary edema.  Interventional  staff notified  and plan was made to take patient to the cath lab.          Procedure(s) (LRB):  Angiogram, Coronary, with Left Heart Cath (Left)     Indwelling Lines/Drains at time of discharge:  Lines/Drains/Airways     None                 Hospital Course:  56 year old woman with a history of MS, HLD presented with confusion, weakness for two days prior to admission. EKG on admission showed 1mm elevation of ST segments in leads 1 and avL, and patient had elevated troponins and BNP.  During bedside echo, patient had complaints of chest pain and was subsequently taken to cath lab where there was no evidence of coronary artery disease. Patient had elevated WBC and per family reports history of subjective fever in the past day. CXR normal and UA did not show infection. Formal echo was completed post procedure which showed reduced EF 25%, and mid-ventricular hypokinesis suggestive of takutsubo cardiomyopathy. Patient did not show any cardiogenic shock, decompensation, ectopy and was started on toprol-XL 25 mg.  "Neurology was consulted for workup of MS in the setting of paresthesias, pain and confusion in the two days leading up to admission. Patient stable from cardiovascular perspective, awaiting imaging for neurologic follow up. Vitals reassuring. MRI "Brain: Numerous nonspecific predominately subcortical white matter lesions may reflect demyelinating disease in light of the history however are nonspecific and not the classic lesions for multiple sclerosis.  No enhancing lesion or advanced edema.  No prior study for comparison.  Cervical spine: Mild cord signal abnormality at C4-5 is nonspecific but may be due to overlying degenerative changes.  No cord expansion or enhancing lesion is identified.  Degenerative changes result in flattening of the ventral and dorsal cord at C3-4.  Thoracic spine: Mild signal abnormality within the cord at T9-10 likely reflects overlying cord impingement from a rather large central disc herniation with superior extrusion." Neurology recs outpatient follow-up with MS clinic. Pt stable for discharge with outpatient NSGY follow-up and MS clinic follow-up.      Goals of Care Treatment Preferences:  Code Status: Full Code      Consults:   Consults (From admission, onward)        Status Ordering Provider     Inpatient consult to Neurology  Once        Provider:  (Not yet assigned)    Completed ANA CARLIN     Inpatient consult to Neurology  Once        Provider:  (Not yet assigned)    Completed ANA CARLIN     Inpatient consult to Neurology  Once        Provider:  (Not yet assigned)    Completed AUSTIN GARCIA     Inpatient consult to Cardiology  Once        Provider:  (Not yet assigned)    Completed NATHALY PARMAR        Review of Systems   Constitutional: Negative for chills and fever.   HENT:  Negative for congestion and sore throat.    Eyes:  Negative for blurred vision and pain.   Cardiovascular:  Negative for chest pain, dyspnea on exertion and leg swelling.   Respiratory: "  Negative for cough and shortness of breath.    Gastrointestinal:  Negative for abdominal pain, nausea and vomiting.   Genitourinary:  Negative for dysuria and hematuria.   Neurological:  Positive for paresthesias. Negative for dizziness and headaches.   Psychiatric/Behavioral:  Negative for altered mental status. The patient is not nervous/anxious.    Objective:      Vital Signs (Most Recent):  Temp: 98.9 °F (37.2 °C) (03/14/22 1133)  Pulse: 95 (03/14/22 1200)  Resp: 17 (03/14/22 1133)  BP: 127/66 (03/14/22 1133)  SpO2: 98 % (03/14/22 1133) Vital Signs (24h Range):  Temp:  [97.3 °F (36.3 °C)-98.9 °F (37.2 °C)] 98.9 °F (37.2 °C)  Pulse:  [] 95  Resp:  [16-20] 17  SpO2:  [93 %-98 %] 98 %  BP: (108-127)/(56-89) 127/66      Weight: 135.7 kg (299 lb 2.6 oz)  Body mass index is 49.78 kg/m².     SpO2: 98 %  O2 Device (Oxygen Therapy): room air        Intake/Output Summary (Last 24 hours) at 3/14/2022 1348  Last data filed at 3/14/2022 1343      Gross per 24 hour   Intake 620 ml   Output 1210 ml   Net -590 ml         Lines/Drains/Airways         Peripheral Intravenous Line  Duration                     Peripheral IV - Single Lumen 03/11/22 2129 20 G Right Forearm 2 days                          Physical Exam  Vitals and nursing note reviewed.   Constitutional:       General: She is not in acute distress.     Appearance: She is obese.   HENT:      Head: Normocephalic and atraumatic.      Mouth/Throat:      Mouth: Mucous membranes are moist.      Pharynx: Oropharynx is clear.   Eyes:      Extraocular Movements: Extraocular movements intact.      Conjunctiva/sclera: Conjunctivae normal.   Cardiovascular:      Rate and Rhythm: Normal rate and regular rhythm.      Pulses: Normal pulses.           Radial pulses are 2+ on the right side and 2+ on the left side.      Heart sounds: Normal heart sounds. No murmur heard.    No friction rub. No gallop.   Pulmonary:      Effort: Pulmonary effort is normal. No respiratory  distress.      Breath sounds: Normal breath sounds. No wheezing.   Abdominal:      General: Bowel sounds are normal. There is no distension.      Palpations: Abdomen is soft.      Tenderness: There is no abdominal tenderness.   Musculoskeletal:         General: Normal range of motion.      Right lower leg: No edema.      Left lower leg: No edema.   Skin:     General: Skin is warm and dry.      Findings: No bruising or erythema.   Neurological:      Mental Status: She is alert and oriented to person, place, and time. Mental status is at baseline.   Psychiatric:         Mood and Affect: Mood normal.         Behavior: Behavior normal.     Significant Diagnostic Studies: Labs:   CMP   Recent Labs   Lab 03/13/22  1127 03/14/22  1006    131*  133*   K 3.0* 3.1*  3.3*   CL 95 87*  89*   CO2 28 32*  31*   * 185*  185*   BUN 41* 35*  38*   CREATININE 1.1 1.0  1.1   CALCIUM 9.6 9.4  9.8   PROT 8.3  --    ALBUMIN 3.8  --    BILITOT 1.8*  --    ALKPHOS 73  --    AST 36  --    ALT 31  --    ANIONGAP 14 12  13   ESTGFRAFRICA >60.0 >60.0  >60.0   EGFRNONAA 56.7* >60.0  56.7*   , CBC   Recent Labs   Lab 03/13/22  1127   WBC 8.92   HGB 16.0   HCT 47.3       and All labs within the past 24 hours have been reviewed  Cardiac Graphics: Echocardiogram:   Transthoracic echo (TTE) complete (Cupid Only):   Results for orders placed or performed during the hospital encounter of 03/11/22   Echo   Result Value Ref Range    Ascending aorta 2.65 cm    STJ 2.59 cm    AV mean gradient 3 mmHg    Ao peak neri 0.95 m/s    Ao VTI 13.02 cm    IVS 0.67 0.6 - 1.1 cm    LA size 2.98 cm    Left Atrium Major Axis 5.10 cm    Left Atrium Minor Axis 4.30 cm    LVIDd 5.11 3.5 - 6.0 cm    LVIDs 4.44 (A) 2.1 - 4.0 cm    LVOT diameter 1.99 cm    LVOT peak VTI 11.99 cm    Posterior Wall 0.75 0.6 - 1.1 cm    RA Major Axis 3.60 cm    RA Width 3.09 cm    RVDD 3.05 cm    Sinus 2.40 cm    TAPSE 1.97 cm    TDI LATERAL 0.06 m/s    TDI SEPTAL  0.06 m/s    LA WIDTH 3.49 cm    LV Diastolic Volume 124.47 mL    LV Systolic Volume 89.75 mL    LVOT peak neri 0.96 m/s    FS 13 %    LA volume 41.25 cm3    LV mass 121.25 g    Left Ventricle Relative Wall Thickness 0.29 cm    AV valve area 2.86 cm2    AV Velocity Ratio 1.01     AV index (prosthetic) 0.92     Mean e' 0.06 m/s    LVOT area 3.1 cm2    LVOT stroke volume 37.27 cm3    AV peak gradient 4 mmHg    LV Systolic Volume Index 52.2 mL/m2    LV Diastolic Volume Index 72.37 mL/m2    LA Volume Index 24.0 mL/m2    LV Mass Index 70 g/m2    BSA 1.72 m2    Right Atrial Pressure (from IVC) 3 mmHg    QEF 29 %    EF 25 %    Narrative    · The left ventricle is severely enlarged with severely decreased systolic   function.  · The estimated ejection fraction is 25%.  · Left ventricular diastolic dysfunction.  · The quantitatively derived ejection fraction is 29%.  · Normal right ventricular size with normal right ventricular systolic   function.  · Normal central venous pressure (3 mmHg).  · There are segmental left ventricular wall motion abnormalities.  · Non-coronary wall motion abnormalities consistent with possible   diagnosis of atypical mid-ventricular takotsubo cardiomyopathy.          Pending Diagnostic Studies:     Procedure Component Value Units Date/Time    APTT [[id number]]     Order Status: Sent Lab Status: No result     Specimen: Blood     HIV 1/2 Ag/Ab (4th Gen) [[id number]] Collected: 03/11/22 2013    Order Status: Sent Lab Status: In process Updated: 03/11/22 2023    Specimen: Blood     Hepatitis C Antibody [[id number]] Collected: 03/11/22 2013    Order Status: Sent Lab Status: In process Updated: 03/11/22 2023    Specimen: Blood     Protime-INR [[id number]]     Order Status: Sent Lab Status: No result     Specimen: Blood     Troponin I [[id number]]     Order Status: Sent Lab Status: No result     Specimen: Blood           Final Active Diagnoses:    Diagnosis Date Noted POA    PRINCIPAL PROBLEM:  Acute  systolic heart failure [I50.21] 03/11/2022 Unknown    Paresthesia of both lower extremities [R20.2] 03/13/2022 Yes     Chronic    NSTEMI (non-ST elevated myocardial infarction) [I21.4] 03/11/2022 Unknown    Demand ischemia [I24.8] 03/11/2022 Unknown    Confusion [R41.0] 03/11/2022 Unknown      Problems Resolved During this Admission:     No new Assessment & Plan notes have been filed under this hospital service since the last note was generated.  Service: Cardiology      Discharged Condition: fair    Disposition: Home or Self Care    Follow Up:   Follow-up Information     Kip Jimenez MD Follow up on 3/23/2022.    Specialty: Family Medicine  Why: hospital follow up 8:45 am 3/23 with Dr Jimenez  Contact information:  [address] [facility] [address]  [phone]                       Patient Instructions:      Ambulatory referral/consult to Neurosurgery   Standing Status: Future   Referral Priority: Routine Referral Type: Consultation   Referral Reason: Specialty Services Required   Requested Specialty: Neurosurgery   Number of Visits Requested: 1     Ambulatory referral/consult to Cardiology   Standing Status: Future   Referral Priority: Routine Referral Type: Consultation   Referral Reason: Specialty Services Required   Requested Specialty: Cardiology   Number of Visits Requested: 1     Ambulatory referral/consult to Neurology   Standing Status: Future   Referral Priority: Routine Referral Type: Consultation   Referral Reason: Specialty Services Required   Requested Specialty: Neurology   Number of Visits Requested: 1     Medications:  Reconciled Home Medications:      Medication List      START taking these medications    furosemide 40 MG tablet  Commonly known as: LASIX  Weigh yourself daily, take 1 tab if weight increases by 3 lbs in 24 hours or if weight increases by 5 lbs in one week.     lisinopriL 2.5 MG tablet  Commonly known as: PRINIVIL,ZESTRIL  Take 1 tablet (2.5 mg total) by mouth  once daily.  Start taking on: March 15, 2022     metoprolol succinate 25 MG 24 hr tablet  Commonly known as: TOPROL-XL  Take 1 tablet (25 mg total) by mouth once daily.  Start taking on: March 15, 2022     nitrofurantoin (macrocrystal-monohydrate) 100 MG capsule  Commonly known as: MACROBID  Take 1 capsule (100 mg total) by mouth 2 (two) times daily.        CONTINUE taking these medications    doxepin 25 MG capsule  Commonly known as: SINEQUAN  Take 25 mg by mouth nightly as needed.     gabapentin 300 MG capsule  Commonly known as: NEURONTIN  Take 300 mg by mouth 3 (three) times daily as needed.     HYDROcodone-acetaminophen  mg per tablet  Commonly known as: NORCO  Take 1 tablet by mouth every 4 (four) hours as needed.     hydrOXYzine pamoate 25 MG Cap  Commonly known as: VISTARIL  Take 25 mg by mouth 3 (three) times daily as needed.     metronidazole 1% 1 % Gel  Commonly known as: METROGEL  Apply topically daily as needed.     NUCYNTA ORAL  Take 1 tablet by mouth 4 (four) times daily.     oxyMORphone 20 MG 12 hr tablet  Commonly known as: OPANA ER  Take 20 mg by mouth 2 (two) times daily.     triamcinolone acetonide 0.1% 0.1 % cream  Commonly known as: KENALOG  Apply topically daily as needed.     VOLTAREN 1 % Gel  Generic drug: diclofenac sodium  Apply topically once daily.            Time spent on the discharge of patient: 40 minutes    Dg Garcia MD  Cardiology  [facility] - Cardiology Stepdown

## 2022-03-14 NOTE — ASSESSMENT & PLAN NOTE
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  Neurology is consulted for possible MS flare.     Son found her in bed with residual emesis on and was not able to wake her up. Upon arrival of EMS, patient was awake but unable to ambulate without assistance. She later reported paresthesias in BLEs. She reports having a red eye 2 days ago.   In initial evaluation patient reports medication compliance although per family she has not been taking her Copaxone since few weeks ago due to making her nauseated.  Patient is AAOx4 and able to follow 2 steps commands. Denies confusion or hallucination.  Exam remarkable for sensory change sin BLE below knees as paresthesias. No overt weakness was noted except HF on left side which is chronic and secondary to hip ligament ear.   Reviewing care everywhere, patient had MRI brain and C-T spine in 8/2021 which only showed lesions consistent with MS in brain no cord lesions were reported. No acute lesion were reported.  Per last note of Dr Vincent's office, plan was to repeat MRIs in April 2022.    Multiple T2/FLAIR hyperintense foci in the bilateral white matter, with numerous juxtacortical, periventricular lesions with associated parenchymal T1 hypointensity. There is no evidence of abnormal focal enhancement.     Recommendations:  - Overall presentation less concerning for acute flare, although given medication noncompliance will obtain MRI brain and spine    - Patient to bring in previous MRIs disc for comparison   - No acute changes on new MRI compared to old MRI  - resume home medication  - Referral to MS clinic as per patient request to establish care with [facility]  - neurology will sign off.  Please call for any questions or concerns.

## 2022-03-14 NOTE — CARE UPDATE
MRI complete and patient placed back on tele. War room notified and patient transported back to room.

## 2022-03-14 NOTE — CARE UPDATE
57 y/o woman with a history of MS, HLD who presented with complaints of confusion, weakness for two days prior to admission. EKG on admission showed 1mm elevation of ST segments in leads 1 and avL, and patient had elevated troponin and BNP.  During bedside echo, patient had complaints of chest pain and was subsequently taken to cath lab where there was no evidence of coronary artery disease. Patient had elevated WBC and per family report had subjective fever in the past day. Septic workup with CXR normal and UA did not show infection. Blood cultures show NGTD. Patient was evaluated for myocarditis vs. vasospasm with formal echo which showed reduced EF 25%, LVH and mid-ventricular hypokinesis suggestive of Takutsubo cardiomyopathy. Patient does not have signs of cardiogenic shock, decompensation, ectopy and was started on toprol-XL 25 mg for GDMT. Neurology was consulted for workup of MS in the setting of paresthesias, pain and confusion in the two days leading up to admission. Patient is stable from cardiovascular perspective.    Update:   MRI revealed:  Cervical spine:  Mild cord signal abnormality at C4-5 is nonspecific but may be due to overlying degenerative changes.  No cord expansion or enhancing lesion is identified.  Degenerative changes result in flattening of the ventral and dorsal cord at C3-4.     Thoracic spine:  Mild signal abnormality within the cord at T9-10 likely reflects overlying cord impingement from a rather large central disc herniation with superior extrusion.    Plan:  ·  pt stable for discharge today  ·  discussed scheduling appointment with neurosurgery as recommended  By neurology to address degenerative changes in cervical spine.   · Follow up with cardiology outpatient. Plans to repeat TTE in 3 months since initiation of GDMT:  · GDMT : Toprol 25 daily, Lisinopril 2.5mg daily (uptitrating limited by low blood pressure). Lasix PRN (weight based)    Erica Beck MD PGY  V  Cardiology  Pager: [phone]  [facility]

## 2022-03-14 NOTE — ASSESSMENT & PLAN NOTE
She has no history of prior heart failure.  He she has depressed EF on echo.  She has elevated LV end-diastolic pressures.  Her BNP on presentation is 2000.  Formal echo ordered - evidence of reduced EF 25%, LV hypertrophy, mid-ventricular hypokinesis concerning for takutsubo cardiomyopathy Differentials include myocarditis vs. Vasospasm vs ACS (ruled out) prior to echo. Will continue symptomatic management with cardiology follow up as troponins down trending, patient is not in cardiogenic shock, no evidence of ectopy or cardiac decompensation.     Plan:  - Lasix 40 PO BID  - Metoprolol 25 daily  - Lisinopril 2.5 daily

## 2022-03-14 NOTE — PROGRESS NOTES
Notified MD Kody and team of pt stating her stomach is hurting and she cant remember when she had a BM last. Last BM recorded in chart is 3/12/22, pt states she doesn't remember. MD ordered for pt to follow up outpatient and take OTC laxative. MD ordered still okay to d/c pt. HENRIETTA

## 2022-03-14 NOTE — TELEPHONE ENCOUNTER
----- Message from Arianna Aldridge MD sent at 3/13/2022  5:21 PM CDT -----  Regarding: Clinic fu  Hello,    This is a MS patient following with Dr Vincent, was admitted to [facility]. She requests to establish care with one of our MS physicians.   Appreciate your help.    Arianna Aldridge MD  Neurology resident, PGY-4  Department of Neurology  [facility]

## 2022-03-14 NOTE — PLAN OF CARE
Problem: Infection  Goal: Absence of Infection Signs and Symptoms  Outcome: Ongoing, Progressing     Problem: Adult Inpatient Plan of Care  Goal: Plan of Care Review  Outcome: Ongoing, Progressing  Goal: Patient-Specific Goal (Individualized)  Outcome: Ongoing, Progressing  Goal: Absence of Hospital-Acquired Illness or Injury  Outcome: Ongoing, Progressing  Goal: Optimal Comfort and Wellbeing  Outcome: Ongoing, Progressing  Goal: Readiness for Transition of Care  Outcome: Ongoing, Progressing     Problem: Fall Injury Risk  Goal: Absence of Fall and Fall-Related Injury  Outcome: Ongoing, Progressing   Cardiology Step Down. See progress note and flowsheet. Plan: Continue to monitor patient and report any abnormalities in labs, vitals signs, and body system functions to physician as indicated. Patient was given education on their disease process and medications.

## 2022-03-14 NOTE — SUBJECTIVE & OBJECTIVE
Past Medical History:   Diagnosis Date    Behavioral problem     Bulging lumbar disc     Bursitis     bilateral hip    Degenerative cervical disc     Hx of psychiatric care     Hypercholesteremia     Labral tear of hip, degenerative bilateral    MS (multiple sclerosis)     Paresthesia of both lower extremities 3/13/2022    Pneumonia     Spinal cord compression        Past Surgical History:   Procedure Laterality Date    ANGIOGRAM, CORONARY, WITH LEFT HEART CATHETERIZATION Left 3/11/2022    Procedure: Angiogram, Coronary, with Left Heart Cath;  Surgeon: Elie Matamoros MD;  Location: [facility] CATH LAB;  Service: Cardiology;  Laterality: Left;    BREAST SURGERY      augmentation     SECTION, CLASSIC      HAND SURGERY      HYSTEROSCOPY      NECK SURGERY      cervical disc removeal    TUBAL LIGATION      X-STOP IMPLANTATION         Review of patient's allergies indicates:   Allergen Reactions    Adhesive Hives    Neosporin [neomycin-bacitracin-polymyxin] Hives    Penicillins Other (See Comments)     Unknown  reaction       Current Neurological Medications:     No current facility-administered medications on file prior to encounter.     Current Outpatient Medications on File Prior to Encounter   Medication Sig    doxepin (SINEQUAN) 25 MG capsule Take 25 mg by mouth nightly as needed.    gabapentin (NEURONTIN) 300 MG capsule Take 300 mg by mouth 3 (three) times daily as needed.    hydrocodone-acetaminophen 10-325mg (NORCO)  mg Tab Take 1 tablet by mouth every 4 (four) hours as needed.    hydrOXYzine pamoate (VISTARIL) 25 MG Cap Take 25 mg by mouth 3 (three) times daily as needed.    metronidazole 1% (METROGEL) 1 % Gel Apply topically daily as needed.    oxymorphone (OPANA ER) 20 MG 12 hr tablet Take 20 mg by mouth 2 (two) times daily.     TAPENTADOL HCL (NUCYNTA ORAL) Take 1 tablet by mouth 4 (four) times daily.    triamcinolone acetonide 0.1% (KENALOG) 0.1 % cream Apply topically daily as needed.    VOLTAREN 1 %  Gel Apply topically once daily.    [DISCONTINUED] atorvastatin (LIPITOR) 20 MG tablet Take 20 mg by mouth once daily.     Family History       Problem Relation (Age of Onset)    Bipolar disorder Brother    COPD Mother    Cancer Father    Diabetes Father, Sister    Drug abuse Mother    Heart disease Maternal Uncle    Hypothyroidism Maternal Grandmother    Lung cancer Father          Tobacco Use    Smoking status: Former Smoker     Quit date: 2013     Years since quittin.6    Smokeless tobacco: Never Used   Substance and Sexual Activity    Alcohol use: No    Drug use: Yes     Types: Benzodiazepines, Marijuana    Sexual activity: Never     Review of Systems   Constitutional:  Negative for fever.   HENT:  Negative for trouble swallowing and voice change.    Eyes:  Negative for photophobia and visual disturbance.   Respiratory:  Negative for cough and shortness of breath.    Cardiovascular:  Negative for chest pain.   Gastrointestinal:  Negative for nausea and vomiting.   Musculoskeletal:  Negative for neck pain and neck stiffness.   Skin:  Negative for rash.   Neurological:  Positive for weakness and numbness. Negative for dizziness, tremors, seizures, syncope, facial asymmetry, speech difficulty, light-headedness and headaches.   Psychiatric/Behavioral:  Negative for confusion.    Objective:     Vital Signs (Most Recent):  Temp: 98.8 °F (37.1 °C) (22 0910)  Pulse: 94 (22 0910)  Resp: 16 (22 0910)  BP: (!) 123/56 (22 0910)  SpO2: 97 % (22)   Vital Signs (24h Range):  Temp:  [97.3 °F (36.3 °C)-98.9 °F (37.2 °C)] 98.8 °F (37.1 °C)  Pulse:  [] 94  Resp:  [16-20] 16  SpO2:  [93 %-97 %] 97 %  BP: (105-127)/(55-89) 123/56     Weight: 135.7 kg (299 lb 2.6 oz)  Body mass index is 49.78 kg/m².    Physical Exam  HENT:      Head: Normocephalic and atraumatic.      Nose: Nose normal.      Mouth/Throat:      Mouth: Mucous membranes are moist.      Pharynx: Oropharynx is clear.   Eyes:       Extraocular Movements: Extraocular movements intact and EOM normal.      Pupils: Pupils are equal, round, and reactive to light.   Cardiovascular:      Rate and Rhythm: Normal rate.   Pulmonary:      Effort: Pulmonary effort is normal.   Musculoskeletal:         General: Normal range of motion.      Cervical back: Normal range of motion.   Skin:     General: Skin is warm and dry.   Neurological:      Mental Status: She is alert and oriented to person, place, and time.      Coordination: Finger-Nose-Finger Test normal.      Deep Tendon Reflexes:      Reflex Scores:       Bicep reflexes are 1+ on the right side and 1+ on the left side.       Patellar reflexes are 1+ on the right side and 1+ on the left side.       Achilles reflexes are 1+ on the right side and 1+ on the left side.  Psychiatric:         Speech: Speech normal.       NEUROLOGICAL EXAMINATION:     MENTAL STATUS   Oriented to person, place, and time.   Oriented to person.   Oriented to place.   Oriented to time.   Attention: normal. Concentration: normal.   Speech: speech is normal   Level of consciousness: alert    CRANIAL NERVES     CN II   Visual fields full to confrontation.     CN III, IV, VI   Pupils are equal, round, and reactive to light.  Extraocular motions are normal.     CN V   Facial sensation intact.     CN VII   Facial expression full, symmetric.     CN VIII   Hearing: intact    CN IX, X   CN IX normal.   CN X normal.     CN XI   CN XI normal.     CN XII   CN XII normal.     MOTOR EXAM   Muscle bulk: normal  Overall muscle tone: normal  Right arm tone: normal  Left arm tone: normal  Right leg tone: normal  Left leg tone: normal    Strength   Strength 5/5 except as noted.   Right deltoid: 5/5  Left deltoid: 4/5  Right biceps: 5/5  Left biceps: 4/5  Right triceps: 5/5  Left triceps: 4/5  Right interossei: 5/5  Left interossei: 5/5       Lifts bilateral legs against gravity.  5/5 motor strength bilaterally with plantar/dorsi flexion.       REFLEXES     Reflexes   Right biceps: 1+  Left biceps: 1+  Right patellar: 1+  Left patellar: 1+  Right achilles: 1+  Left achilles: 1+  Right plantar: normal  Left plantar: normal  Right Dalal: absent  Left Dalal: absent  Right ankle clonus: absent  Left ankle clonus: absent    SENSORY EXAM   Right leg light touch: numbness to left lateral thigh.  Vibration normal.        Paresthesias and analgesias BLE, below knees     GAIT AND COORDINATION      Coordination   Finger to nose coordination: normal    Tremor   Resting tremor: absent  Action tremor: absent    Significant Labs: All pertinent lab results from the past 24 hours have been reviewed.    Significant Imaging: I have reviewed and interpreted all pertinent imaging results/findings within the past 24 hours.

## 2022-03-14 NOTE — PLAN OF CARE
Plan of care discussed with patient. Patient is free of fall/trauma/injury. Denies CP, SOB. Pain addressed with medications. Ambulated in room today. Potassium replaced per orders. All questions addressed. Will continue to monitor

## 2022-03-14 NOTE — PROGRESS NOTES
"[facility]  Cardiology  Progress Note    Patient Name: Reza Morrow  MRN: [id number]  Admission Date: 3/11/2022  Hospital Length of Stay: 3 days  Code Status: Prior   Attending Physician: Rob Gates MD   Primary Care Physician: Kip Jimenez MD  Expected Discharge Date: 3/14/2022  Principal Problem:Acute systolic heart failure    Subjective:     Hospital Course:   56 year old woman with a history of MS, HLD presented with confusion, weakness for two days prior to admission. EKG on admission showed 1mm elevation of ST segments in leads 1 and avL, and patient had elevated troponins and BNP.  During bedside echo, patient had complaints of chest pain and was subsequently taken to cath lab where there was no evidence of coronary artery disease. Patient had elevated WBC and per family reports history of subjective fever in the past day. CXR normal and UA did not show infection. Formal echo was completed post procedure which showed reduced EF 25%, and mid-ventricular hypokinesis suggestive of takutsubo cardiomyopathy. Patient did not show any cardiogenic shock, decompensation, ectopy and was started on toprol-XL 25 mg. Neurology was consulted for workup of MS in the setting of paresthesias, pain and confusion in the two days leading up to admission. Patient stable from cardiovascular perspective, awaiting imaging for neurologic follow up. Vitals reassuring. MRI "Brain: Numerous nonspecific predominately subcortical white matter lesions may reflect demyelinating disease in light of the history however are nonspecific and not the classic lesions for multiple sclerosis.  No enhancing lesion or advanced edema.  No prior study for comparison.  Cervical spine: Mild cord signal abnormality at C4-5 is nonspecific but may be due to overlying degenerative changes.  No cord expansion or enhancing lesion is identified.  Degenerative changes result in flattening of the ventral and dorsal cord at C3-4.  " "Thoracic spine: Mild signal abnormality within the cord at T9-10 likely reflects overlying cord impingement from a rather large central disc herniation with superior extrusion." Neurology recs outpatient follow-up with MS clinic. Pt stable for discharge with outpatient NSGY follow-up and MS clinic follow-up.      Interval History: NAEO. VSS, afebrile. Pt without complaints.    Review of Systems   Constitutional: Negative for chills and fever.   HENT:  Negative for congestion and sore throat.    Eyes:  Negative for blurred vision and pain.   Cardiovascular:  Negative for chest pain, dyspnea on exertion and leg swelling.   Respiratory:  Negative for cough and shortness of breath.    Gastrointestinal:  Negative for abdominal pain, nausea and vomiting.   Genitourinary:  Negative for dysuria and hematuria.   Neurological:  Positive for paresthesias. Negative for dizziness and headaches.   Psychiatric/Behavioral:  Negative for altered mental status. The patient is not nervous/anxious.    Objective:     Vital Signs (Most Recent):  Temp: 98.9 °F (37.2 °C) (03/14/22 1133)  Pulse: 95 (03/14/22 1200)  Resp: 17 (03/14/22 1133)  BP: 127/66 (03/14/22 1133)  SpO2: 98 % (03/14/22 1133) Vital Signs (24h Range):  Temp:  [97.3 °F (36.3 °C)-98.9 °F (37.2 °C)] 98.9 °F (37.2 °C)  Pulse:  [] 95  Resp:  [16-20] 17  SpO2:  [93 %-98 %] 98 %  BP: (108-127)/(56-89) 127/66     Weight: 135.7 kg (299 lb 2.6 oz)  Body mass index is 49.78 kg/m².     SpO2: 98 %  O2 Device (Oxygen Therapy): room air      Intake/Output Summary (Last 24 hours) at 3/14/2022 1348  Last data filed at 3/14/2022 1343  Gross per 24 hour   Intake 620 ml   Output 1210 ml   Net -590 ml       Lines/Drains/Airways       Peripheral Intravenous Line  Duration                  Peripheral IV - Single Lumen 03/11/22 2129 20 G Right Forearm 2 days                    Physical Exam  Vitals and nursing note reviewed.   Constitutional:       General: She is not in acute distress.     " Appearance: She is obese.   HENT:      Head: Normocephalic and atraumatic.      Mouth/Throat:      Mouth: Mucous membranes are moist.      Pharynx: Oropharynx is clear.   Eyes:      Extraocular Movements: Extraocular movements intact.      Conjunctiva/sclera: Conjunctivae normal.   Cardiovascular:      Rate and Rhythm: Normal rate and regular rhythm.      Pulses: Normal pulses.           Radial pulses are 2+ on the right side and 2+ on the left side.      Heart sounds: Normal heart sounds. No murmur heard.    No friction rub. No gallop.   Pulmonary:      Effort: Pulmonary effort is normal. No respiratory distress.      Breath sounds: Normal breath sounds. No wheezing.   Abdominal:      General: Bowel sounds are normal. There is no distension.      Palpations: Abdomen is soft.      Tenderness: There is no abdominal tenderness.   Musculoskeletal:         General: Normal range of motion.      Right lower leg: No edema.      Left lower leg: No edema.   Skin:     General: Skin is warm and dry.      Findings: No bruising or erythema.   Neurological:      Mental Status: She is alert and oriented to person, place, and time. Mental status is at baseline.   Psychiatric:         Mood and Affect: Mood normal.         Behavior: Behavior normal.       Significant Labs: CMP   Recent Labs   Lab 03/13/22  1127 03/14/22  1006    131*  133*   K 3.0* 3.1*  3.3*   CL 95 87*  89*   CO2 28 32*  31*   * 185*  185*   BUN 41* 35*  38*   CREATININE 1.1 1.0  1.1   CALCIUM 9.6 9.4  9.8   PROT 8.3  --    ALBUMIN 3.8  --    BILITOT 1.8*  --    ALKPHOS 73  --    AST 36  --    ALT 31  --    ANIONGAP 14 12  13   ESTGFRAFRICA >60.0 >60.0  >60.0   EGFRNONAA 56.7* >60.0  56.7*   , CBC   Recent Labs   Lab 03/13/22  1127   WBC 8.92   HGB 16.0   HCT 47.3      , and All pertinent lab results from the last 24 hours have been reviewed.    Significant Imaging: Echocardiogram: Transthoracic echo (TTE) complete (Cupid Only):    Results for orders placed or performed during the hospital encounter of 03/11/22   Echo   Result Value Ref Range    Ascending aorta 2.65 cm    STJ 2.59 cm    AV mean gradient 3 mmHg    Ao peak neri 0.95 m/s    Ao VTI 13.02 cm    IVS 0.67 0.6 - 1.1 cm    LA size 2.98 cm    Left Atrium Major Axis 5.10 cm    Left Atrium Minor Axis 4.30 cm    LVIDd 5.11 3.5 - 6.0 cm    LVIDs 4.44 (A) 2.1 - 4.0 cm    LVOT diameter 1.99 cm    LVOT peak VTI 11.99 cm    Posterior Wall 0.75 0.6 - 1.1 cm    RA Major Axis 3.60 cm    RA Width 3.09 cm    RVDD 3.05 cm    Sinus 2.40 cm    TAPSE 1.97 cm    TDI LATERAL 0.06 m/s    TDI SEPTAL 0.06 m/s    LA WIDTH 3.49 cm    LV Diastolic Volume 124.47 mL    LV Systolic Volume 89.75 mL    LVOT peak neri 0.96 m/s    FS 13 %    LA volume 41.25 cm3    LV mass 121.25 g    Left Ventricle Relative Wall Thickness 0.29 cm    AV valve area 2.86 cm2    AV Velocity Ratio 1.01     AV index (prosthetic) 0.92     Mean e' 0.06 m/s    LVOT area 3.1 cm2    LVOT stroke volume 37.27 cm3    AV peak gradient 4 mmHg    LV Systolic Volume Index 52.2 mL/m2    LV Diastolic Volume Index 72.37 mL/m2    LA Volume Index 24.0 mL/m2    LV Mass Index 70 g/m2    BSA 1.72 m2    Right Atrial Pressure (from IVC) 3 mmHg    QEF 29 %    EF 25 %    Narrative    · The left ventricle is severely enlarged with severely decreased systolic   function.  · The estimated ejection fraction is 25%.  · Left ventricular diastolic dysfunction.  · The quantitatively derived ejection fraction is 29%.  · Normal right ventricular size with normal right ventricular systolic   function.  · Normal central venous pressure (3 mmHg).  · There are segmental left ventricular wall motion abnormalities.  · Non-coronary wall motion abnormalities consistent with possible   diagnosis of atypical mid-ventricular takotsubo cardiomyopathy.        Assessment and Plan:       * Acute systolic heart failure  She has no history of prior heart failure.  He she has depressed EF on  echo.  She has elevated LV end-diastolic pressures.  Her BNP on presentation is 2000.  Formal echo ordered - evidence of reduced EF 25%, LV hypertrophy, mid-ventricular hypokinesis concerning for takutsubo cardiomyopathy Differentials include myocarditis vs. Vasospasm vs ACS (ruled out) prior to echo. Will continue symptomatic management with cardiology follow up as troponins down trending, patient is not in cardiogenic shock, no evidence of ectopy or cardiac decompensation.     Plan:  - Lasix 40 PO BID  - Metoprolol 25 daily  - Lisinopril 2.5 daily      Confusion  Patient presented with altered mental status.  On my 1st interaction she denied having chest pain shortness of breath but complained having chest pain when I went to do a bedside echo.  She is on multiple anxiety/depression medications  She also has elevated white blood cell count.  CT head negative for any acute events.  She does not have neck rigidity  UA negative, chest x-ray negative for any acute infectious process  Urine drug screen positive for marijuana   She has history of opioid/benzodiazepine dependence.    Started on empiric antibiotics, discontinued as patient has no evidence of sepsis.   f/u blood cultures  -neurology consulted for further workup of altered mental status given prior MS history    Demand ischemia  Patient has elevated troponins with ST segment elevations and lateral leads with ST depression.  Was taken to cath lab immediately after found to have wall motion abnormalities on bedside echo.  However angiogram did not show any coronary artery disease.  Bedside echo shows severe drop in her EF.  She has nonischemic cardiomyopathy.  Patient's LVEDP is elevated up to 30.  Will start on Lasix 40 mg b.i.d.     NSTEMI (non-ST elevated myocardial infarction)  Patient initially presented for confusion but when she is awake and alert denied having any chest pain.  Her EKG was initially concerning for ST segment elevation in 1 and aVL but  not more than 1 mm.  However when I was doing her bedside echo she complained of having chest pain and she did had wall motion abnormalities involving the anterior and anterolateral walls.  Case was discussed with interventional staff and decided to take patient to the cath lab.  Patient is loaded with aspirin and Plavix.  On heparin.  Her chest pain improved after getting the 2nd dose of nitro.  Consent signed for left heart catheterization    --LHC +/- PCI, patient is a MIKIE candidate  -- clean coronary arteries, no stents placed  - Anti-platelet Therapy: ASA / Ticagrelor  - Access: Right radial  - Catheters: Arnav  - Creatinine/CrCl: 1.2  - Allergies: No shellfish / Iodine allergy  - Pre-Hydration: NS  - Pre-Op Med: Bendaryl 50mg pO   - All patient's questions were answered.    Plan:      - patient started on GDMT with metoprolol-XL 25 mg due to low normal BP          VTE Risk Mitigation (From admission, onward)    None          Dg Garcia MD  Cardiology  [facility]

## 2022-03-14 NOTE — PROGRESS NOTES
Patient is ready for discharge. Patient stable alert and oriented. IVs removed. No complaints of pain. Discussed discharge plan. Reviewed medications and side effects, appointments, and answered questions with patient. AVS given to pt. Cardiac monitoring removed. RX given to pt per bedside pharmacy. Pt took all belongings and left via wheelchair with transport. Educated pt on taking OTC laxative for BM relief.

## 2022-03-14 NOTE — SUBJECTIVE & OBJECTIVE
Interval History: NAEO. VSS, afebrile. Pt without complaints.    Review of Systems   Constitutional: Negative for chills and fever.   HENT:  Negative for congestion and sore throat.    Eyes:  Negative for blurred vision and pain.   Cardiovascular:  Negative for chest pain, dyspnea on exertion and leg swelling.   Respiratory:  Negative for cough and shortness of breath.    Gastrointestinal:  Negative for abdominal pain, nausea and vomiting.   Genitourinary:  Negative for dysuria and hematuria.   Neurological:  Positive for paresthesias. Negative for dizziness and headaches.   Psychiatric/Behavioral:  Negative for altered mental status. The patient is not nervous/anxious.    Objective:     Vital Signs (Most Recent):  Temp: 98.9 °F (37.2 °C) (03/14/22 1133)  Pulse: 95 (03/14/22 1200)  Resp: 17 (03/14/22 1133)  BP: 127/66 (03/14/22 1133)  SpO2: 98 % (03/14/22 1133) Vital Signs (24h Range):  Temp:  [97.3 °F (36.3 °C)-98.9 °F (37.2 °C)] 98.9 °F (37.2 °C)  Pulse:  [] 95  Resp:  [16-20] 17  SpO2:  [93 %-98 %] 98 %  BP: (108-127)/(56-89) 127/66     Weight: 135.7 kg (299 lb 2.6 oz)  Body mass index is 49.78 kg/m².     SpO2: 98 %  O2 Device (Oxygen Therapy): room air      Intake/Output Summary (Last 24 hours) at 3/14/2022 1348  Last data filed at 3/14/2022 1343  Gross per 24 hour   Intake 620 ml   Output 1210 ml   Net -590 ml       Lines/Drains/Airways       Peripheral Intravenous Line  Duration                  Peripheral IV - Single Lumen 03/11/22 2129 20 G Right Forearm 2 days                    Physical Exam  Vitals and nursing note reviewed.   Constitutional:       General: She is not in acute distress.     Appearance: She is obese.   HENT:      Head: Normocephalic and atraumatic.      Mouth/Throat:      Mouth: Mucous membranes are moist.      Pharynx: Oropharynx is clear.   Eyes:      Extraocular Movements: Extraocular movements intact.      Conjunctiva/sclera: Conjunctivae normal.   Cardiovascular:      Rate and  Rhythm: Normal rate and regular rhythm.      Pulses: Normal pulses.           Radial pulses are 2+ on the right side and 2+ on the left side.      Heart sounds: Normal heart sounds. No murmur heard.    No friction rub. No gallop.   Pulmonary:      Effort: Pulmonary effort is normal. No respiratory distress.      Breath sounds: Normal breath sounds. No wheezing.   Abdominal:      General: Bowel sounds are normal. There is no distension.      Palpations: Abdomen is soft.      Tenderness: There is no abdominal tenderness.   Musculoskeletal:         General: Normal range of motion.      Right lower leg: No edema.      Left lower leg: No edema.   Skin:     General: Skin is warm and dry.      Findings: No bruising or erythema.   Neurological:      Mental Status: She is alert and oriented to person, place, and time. Mental status is at baseline.   Psychiatric:         Mood and Affect: Mood normal.         Behavior: Behavior normal.       Significant Labs: CMP   Recent Labs   Lab 03/13/22  1127 03/14/22  1006    131*  133*   K 3.0* 3.1*  3.3*   CL 95 87*  89*   CO2 28 32*  31*   * 185*  185*   BUN 41* 35*  38*   CREATININE 1.1 1.0  1.1   CALCIUM 9.6 9.4  9.8   PROT 8.3  --    ALBUMIN 3.8  --    BILITOT 1.8*  --    ALKPHOS 73  --    AST 36  --    ALT 31  --    ANIONGAP 14 12  13   ESTGFRAFRICA >60.0 >60.0  >60.0   EGFRNONAA 56.7* >60.0  56.7*   , CBC   Recent Labs   Lab 03/13/22  1127   WBC 8.92   HGB 16.0   HCT 47.3      , and All pertinent lab results from the last 24 hours have been reviewed.    Significant Imaging: Echocardiogram: Transthoracic echo (TTE) complete (Cupid Only):   Results for orders placed or performed during the hospital encounter of 03/11/22   Echo   Result Value Ref Range    Ascending aorta 2.65 cm    STJ 2.59 cm    AV mean gradient 3 mmHg    Ao peak neri 0.95 m/s    Ao VTI 13.02 cm    IVS 0.67 0.6 - 1.1 cm    LA size 2.98 cm    Left Atrium Major Axis 5.10 cm    Left  Atrium Minor Axis 4.30 cm    LVIDd 5.11 3.5 - 6.0 cm    LVIDs 4.44 (A) 2.1 - 4.0 cm    LVOT diameter 1.99 cm    LVOT peak VTI 11.99 cm    Posterior Wall 0.75 0.6 - 1.1 cm    RA Major Axis 3.60 cm    RA Width 3.09 cm    RVDD 3.05 cm    Sinus 2.40 cm    TAPSE 1.97 cm    TDI LATERAL 0.06 m/s    TDI SEPTAL 0.06 m/s    LA WIDTH 3.49 cm    LV Diastolic Volume 124.47 mL    LV Systolic Volume 89.75 mL    LVOT peak neri 0.96 m/s    FS 13 %    LA volume 41.25 cm3    LV mass 121.25 g    Left Ventricle Relative Wall Thickness 0.29 cm    AV valve area 2.86 cm2    AV Velocity Ratio 1.01     AV index (prosthetic) 0.92     Mean e' 0.06 m/s    LVOT area 3.1 cm2    LVOT stroke volume 37.27 cm3    AV peak gradient 4 mmHg    LV Systolic Volume Index 52.2 mL/m2    LV Diastolic Volume Index 72.37 mL/m2    LA Volume Index 24.0 mL/m2    LV Mass Index 70 g/m2    BSA 1.72 m2    Right Atrial Pressure (from IVC) 3 mmHg    QEF 29 %    EF 25 %    Narrative    · The left ventricle is severely enlarged with severely decreased systolic   function.  · The estimated ejection fraction is 25%.  · Left ventricular diastolic dysfunction.  · The quantitatively derived ejection fraction is 29%.  · Normal right ventricular size with normal right ventricular systolic   function.  · Normal central venous pressure (3 mmHg).  · There are segmental left ventricular wall motion abnormalities.  · Non-coronary wall motion abnormalities consistent with possible   diagnosis of atypical mid-ventricular takotsubo cardiomyopathy.

## 2022-03-14 NOTE — PLAN OF CARE
[facility] - Cardiology Stepdown  Discharge Final Note    Primary Care Provider: Kip Jimenez MD    Expected Discharge Date: 3/14/2022    Final Discharge Note (most recent)     Final Note - 03/14/22 1254        Final Note    Assessment Type Final Discharge Note     Anticipated Discharge Disposition Home or Self Care     Hospital Resources/Appts/Education Provided Appointments scheduled and added to AVS                 Contact Info     Kip Jimenez MD   Specialty: Family Medicine   Relationship: PCP - General    [address] [place] [address]   Phone: [phone]       Next Steps: Follow up on 3/23/2022    Instructions: hospital follow up 8:45 am 3/23 with Dr Tony Chaudhry, LMSW [facility]  [id number]

## 2022-03-14 NOTE — PROGRESS NOTES
Notified MD team of pt stating her ride wont be here to pick her up until around 7 pm tonight, team aware. HENRIETTA

## 2022-03-14 NOTE — PLAN OF CARE
[facility] - Cardiology Stepdown  Initial Discharge Assessment       Primary Care Provider: Kip Jimenez MD    Admission Diagnosis: NSTEMI (non-ST elevated myocardial infarction) [I21.4]  ST elevation myocardial infarction (STEMI), unspecified artery [I21.3]  AMS (altered mental status) [R41.82]    Admission Date: 3/11/2022  Expected Discharge Date: 3/14/2022    Discharge Barriers Identified: (P) None    Payor: MEDICAID / Plan: [facility] / Product Type: Managed Medicaid /     Extended Emergency Contact Information  Primary Emergency Contact: Celeste Olivarez   [facility]  Home Phone: [phone]  Relation: Sister  Secondary Emergency Contact: Jono Morrow  Mobile Phone: [phone]  Relation: Son   needed? No    Discharge Plan A: (P) Home Health ([facility])  Discharge Plan B: (P) Home      [facility]  [address]  [facility] [address]  Phone: [phone] Fax: [phone]      Initial Assessment (most recent)       Adult Discharge Assessment - 03/14/22 1527          Discharge Assessment    Assessment Type Discharge Planning Assessment (P)      Confirmed/corrected address, phone number and insurance Yes (P)      Confirmed Demographics Correct on Facesheet (P)      Source of Information patient (P)    completed at bs w/ pt    Does patient/caregiver understand observation status Yes (P)      Communicated JOANNE with patient/caregiver Yes (P)      Reason For Admission home (P)      Lives With alone (P)      Facility Arrived From: home (P)      Do you expect to return to your current living situation? Yes (P)      Do you have help at home or someone to help you manage your care at home? Yes (P)      Who are your caregiver(s) and their phone number(s)? Missael Morrow  [phone] (P)      Prior to hospitilization cognitive status: Alert/Oriented (P)      Current cognitive status: Alert/Oriented (P)      Walking or Climbing Stairs Difficulty none (P)       Dressing/Bathing Difficulty none (P)      Home Accessibility wheelchair accessible (P)      Home Layout Able to live on 1st floor (P)      Equipment Currently Used at Home cane, straight (P)      Readmission within 30 days? No (P)      Patient currently being followed by outpatient case management? No (P)      Do you currently have service(s) that help you manage your care at home? No (P)      Do you take prescription medications? Yes (P)      Do you have prescription coverage? Yes (P)      Do you have any problems affording any of your prescribed medications? No (P)      Is the patient taking medications as prescribed? yes (P)      How do you get to doctors appointments? family or friend will provide (P)      Are you on dialysis? No (P)      Do you take coumadin? No (P)      Discharge Plan A Home Health (P)    [facility]    Discharge Plan B Home (P)      DME Needed Upon Discharge  other (see comments) (P)    TBD; pt may need walker, reports instability upon standing    Discharge Plan discussed with: Patient (P)      Discharge Barriers Identified None (P)                         Louise Liao LMSW  Case Management Social Worker   [facility] [address]

## 2022-03-14 NOTE — DISCHARGE INSTRUCTIONS
Please make sure to make an appointment with neurosurgery to address some changes seen in your MRI of the back.   Follow up with cardiology outpatient. Plans to repeat echo  in 3 months  New medications include Toprol 25 daily, Lisinopril 2.5mg daily   Only take Lasix as needed if notice a 3lb increase in weight in 1 day or 5lbs increase over a week.

## 2022-03-15 ENCOUNTER — TELEPHONE (OUTPATIENT)
Dept: NEUROLOGY | Facility: CLINIC | Age: 57
End: 2022-03-15
Payer: MEDICAID

## 2022-03-15 LAB — HIV 1+2 AB+HIV1 P24 AG SERPL QL IA: NEGATIVE

## 2022-03-16 LAB
BACTERIA BLD CULT: NORMAL
BACTERIA BLD CULT: NORMAL
HCV AB SERPL QL IA: NEGATIVE

## 2022-03-16 NOTE — PHYSICIAN QUERY
PT Name: Reza Morrow  MR #: [id number]     DOCUMENTATION CLARIFICATION      CDS/: Kitty Wells RN CDIS               Contact information: [email or web address]    This form is a permanent document in the medical record.    Query Date: March 16, 2022    By submitting this query, we are merely seeking further clarification of documentation to reflect the severity of illness of your patient. Please utilize your independent clinical judgment when addressing the question(s) below.     The Medical Record contains the following:   Indicators   Supporting Clinical Findings Location in Medical Record    Chest Pain, Angina      Coronary Artery Disease      EKG      Troponin      Echo Results      Angiography     x Documentation of acute cardiac condition Demand ischemia  Patient has elevated troponins with ST segment elevations and lateral leads with ST depression.  Was taken to cath lab immediately after found to have wall motion abnormalities on bedside echo.  However angiogram did not show any coronary artery disease.  Bedside echo shows severe drop in her EF.  She has nonischemic cardiomyopathy.  Patient's LVEDP is elevated up to 30.  Will start on Lasix 40 mg b.i.d.     NSTEMI (non-ST elevated myocardial infarction)  Patient initially presented for confusion but when she is awake and alert denied having any chest pain.  Her EKG was initially concerning for ST segment elevation in 1 and aVL but not more than 1 mm.  However when I was doing her bedside echo she complained of having chest pain and she did had wall motion abnormalities involving the anterior and anterolateral walls.  Case was discussed with interventional staff and decided to take patient to the cath lab.  Patient is loaded with aspirin and Plavix.  On heparin.  Her chest pain improved after getting the 2nd dose of nitro.  Consent signed for left heart catheterization     --LHC +/- PCI, patient is a MIKIE candidate  -- clean coronary arteries, no stents  placed  - Anti-platelet Therapy: ASA / Ticagrelor  - Access: Right radial  - Catheters: Arnav  - Creatinine/CrCl: 1.2  - Allergies: No shellfish / Iodine allergy  - Pre-Hydration: NS  - Pre-Op Med: Bendaryl 50mg pO   - All patient's questions were answered.     Plan:      - patient started on GDMT with metoprolol-XL 25 mg due to low normal BP Cards note 3/14                           Cards note 3/14                   Medication/Treatment      Other        Provider, please clarify the diagnosis related to the above documentation:  [   ] NSTEMI   [X] NSTEMI/Myocardial Infarction Type 2 due to (please specify):  Takotsubo Cardiomyopathy/hormonal dysregulation at the cellular level__________   [   ] Demand Ischemia   [   ] Other Cardiac Diagnosis (please specify): _______________   [   ] Clinically Undetermined         Please document in your progress notes daily for the duration of treatment until resolved, and include in your discharge summary.  Form No. [id number]

## 2022-03-23 ENCOUNTER — LAB VISIT (OUTPATIENT)
Dept: LAB | Facility: HOSPITAL | Age: 57
End: 2022-03-23
Attending: STUDENT IN AN ORGANIZED HEALTH CARE EDUCATION/TRAINING PROGRAM
Payer: MEDICAID

## 2022-03-23 ENCOUNTER — TELEPHONE (OUTPATIENT)
Dept: OPHTHALMOLOGY | Facility: CLINIC | Age: 57
End: 2022-03-23
Payer: MEDICAID

## 2022-03-23 ENCOUNTER — OFFICE VISIT (OUTPATIENT)
Dept: NEUROLOGY | Facility: CLINIC | Age: 57
End: 2022-03-23
Payer: MEDICAID

## 2022-03-23 VITALS
HEIGHT: 65 IN | WEIGHT: 138.44 LBS | BODY MASS INDEX: 23.07 KG/M2 | SYSTOLIC BLOOD PRESSURE: 99 MMHG | DIASTOLIC BLOOD PRESSURE: 62 MMHG | HEART RATE: 81 BPM

## 2022-03-23 DIAGNOSIS — R41.0 CONFUSION: Primary | ICD-10-CM

## 2022-03-23 DIAGNOSIS — M25.559 HIP PAIN: ICD-10-CM

## 2022-03-23 DIAGNOSIS — Z86.69: ICD-10-CM

## 2022-03-23 DIAGNOSIS — G62.9 PERIPHERAL POLYNEUROPATHY: ICD-10-CM

## 2022-03-23 DIAGNOSIS — R41.89 COGNITIVE CHANGES: ICD-10-CM

## 2022-03-23 DIAGNOSIS — R41.0 CONFUSION: ICD-10-CM

## 2022-03-23 LAB
CERULOPLASMIN SERPL-MCNC: 35 MG/DL (ref 15–45)
ESTIMATED AVG GLUCOSE: 108 MG/DL (ref 68–131)
FERRITIN SERPL-MCNC: 72 NG/ML (ref 20–300)
FOLATE SERPL-MCNC: 7.6 NG/ML (ref 4–24)
HBA1C MFR BLD: 5.4 % (ref 4–5.6)
IRON SERPL-MCNC: 55 UG/DL (ref 30–160)
SATURATED IRON: 14 % (ref 20–50)
T4 FREE SERPL-MCNC: 1.12 NG/DL (ref 0.71–1.51)
TOTAL IRON BINDING CAPACITY: 395 UG/DL (ref 250–450)
TRANSFERRIN SERPL-MCNC: 267 MG/DL (ref 200–375)
TSH SERPL DL<=0.005 MIU/L-ACNC: 0.21 UIU/ML (ref 0.4–4)

## 2022-03-23 PROCEDURE — 82941 ASSAY OF GASTRIN: CPT | Performed by: STUDENT IN AN ORGANIZED HEALTH CARE EDUCATION/TRAINING PROGRAM

## 2022-03-23 PROCEDURE — 82390 ASSAY OF CERULOPLASMIN: CPT | Performed by: STUDENT IN AN ORGANIZED HEALTH CARE EDUCATION/TRAINING PROGRAM

## 2022-03-23 PROCEDURE — 1159F PR MEDICATION LIST DOCUMENTED IN MEDICAL RECORD: ICD-10-PCS | Mod: CPTII,,, | Performed by: STUDENT IN AN ORGANIZED HEALTH CARE EDUCATION/TRAINING PROGRAM

## 2022-03-23 PROCEDURE — 86255 FLUORESCENT ANTIBODY SCREEN: CPT | Performed by: STUDENT IN AN ORGANIZED HEALTH CARE EDUCATION/TRAINING PROGRAM

## 2022-03-23 PROCEDURE — 4010F PR ACE/ARB THEARPY RXD/TAKEN: ICD-10-PCS | Mod: CPTII,,, | Performed by: STUDENT IN AN ORGANIZED HEALTH CARE EDUCATION/TRAINING PROGRAM

## 2022-03-23 PROCEDURE — 82525 ASSAY OF COPPER: CPT | Performed by: STUDENT IN AN ORGANIZED HEALTH CARE EDUCATION/TRAINING PROGRAM

## 2022-03-23 PROCEDURE — 3044F HG A1C LEVEL LT 7.0%: CPT | Mod: CPTII,,, | Performed by: STUDENT IN AN ORGANIZED HEALTH CARE EDUCATION/TRAINING PROGRAM

## 2022-03-23 PROCEDURE — 84443 ASSAY THYROID STIM HORMONE: CPT | Performed by: STUDENT IN AN ORGANIZED HEALTH CARE EDUCATION/TRAINING PROGRAM

## 2022-03-23 PROCEDURE — 86039 ANTINUCLEAR ANTIBODIES (ANA): CPT | Performed by: STUDENT IN AN ORGANIZED HEALTH CARE EDUCATION/TRAINING PROGRAM

## 2022-03-23 PROCEDURE — 99417 PR PROLONGED SVC, OUTPT, W/WO DIRECT PT CONTACT,  EA ADDTL 15 MIN: ICD-10-PCS | Mod: S$PBB,,, | Performed by: STUDENT IN AN ORGANIZED HEALTH CARE EDUCATION/TRAINING PROGRAM

## 2022-03-23 PROCEDURE — 83036 HEMOGLOBIN GLYCOSYLATED A1C: CPT | Performed by: STUDENT IN AN ORGANIZED HEALTH CARE EDUCATION/TRAINING PROGRAM

## 2022-03-23 PROCEDURE — 3078F DIAST BP <80 MM HG: CPT | Mod: CPTII,,, | Performed by: STUDENT IN AN ORGANIZED HEALTH CARE EDUCATION/TRAINING PROGRAM

## 2022-03-23 PROCEDURE — 3008F BODY MASS INDEX DOCD: CPT | Mod: CPTII,,, | Performed by: STUDENT IN AN ORGANIZED HEALTH CARE EDUCATION/TRAINING PROGRAM

## 2022-03-23 PROCEDURE — 86038 ANTINUCLEAR ANTIBODIES: CPT | Performed by: STUDENT IN AN ORGANIZED HEALTH CARE EDUCATION/TRAINING PROGRAM

## 2022-03-23 PROCEDURE — 3074F SYST BP LT 130 MM HG: CPT | Mod: CPTII,,, | Performed by: STUDENT IN AN ORGANIZED HEALTH CARE EDUCATION/TRAINING PROGRAM

## 2022-03-23 PROCEDURE — 83921 ORGANIC ACID SINGLE QUANT: CPT | Performed by: STUDENT IN AN ORGANIZED HEALTH CARE EDUCATION/TRAINING PROGRAM

## 2022-03-23 PROCEDURE — 99215 OFFICE O/P EST HI 40 MIN: CPT | Mod: S$PBB,,, | Performed by: STUDENT IN AN ORGANIZED HEALTH CARE EDUCATION/TRAINING PROGRAM

## 2022-03-23 PROCEDURE — 1160F RVW MEDS BY RX/DR IN RCRD: CPT | Mod: CPTII,,, | Performed by: STUDENT IN AN ORGANIZED HEALTH CARE EDUCATION/TRAINING PROGRAM

## 2022-03-23 PROCEDURE — 84425 ASSAY OF VITAMIN B-1: CPT | Performed by: STUDENT IN AN ORGANIZED HEALTH CARE EDUCATION/TRAINING PROGRAM

## 2022-03-23 PROCEDURE — 1111F DSCHRG MED/CURRENT MED MERGE: CPT | Mod: CPTII,,, | Performed by: STUDENT IN AN ORGANIZED HEALTH CARE EDUCATION/TRAINING PROGRAM

## 2022-03-23 PROCEDURE — 82746 ASSAY OF FOLIC ACID SERUM: CPT | Performed by: STUDENT IN AN ORGANIZED HEALTH CARE EDUCATION/TRAINING PROGRAM

## 2022-03-23 PROCEDURE — 99999 PR PBB SHADOW E&M-EST. PATIENT-LVL V: ICD-10-PCS | Mod: PBBFAC,,, | Performed by: STUDENT IN AN ORGANIZED HEALTH CARE EDUCATION/TRAINING PROGRAM

## 2022-03-23 PROCEDURE — 84439 ASSAY OF FREE THYROXINE: CPT | Performed by: STUDENT IN AN ORGANIZED HEALTH CARE EDUCATION/TRAINING PROGRAM

## 2022-03-23 PROCEDURE — 82728 ASSAY OF FERRITIN: CPT | Performed by: STUDENT IN AN ORGANIZED HEALTH CARE EDUCATION/TRAINING PROGRAM

## 2022-03-23 PROCEDURE — 84466 ASSAY OF TRANSFERRIN: CPT | Performed by: STUDENT IN AN ORGANIZED HEALTH CARE EDUCATION/TRAINING PROGRAM

## 2022-03-23 PROCEDURE — 1111F PR DISCHARGE MEDS RECONCILED W/ CURRENT OUTPATIENT MED LIST: ICD-10-PCS | Mod: CPTII,,, | Performed by: STUDENT IN AN ORGANIZED HEALTH CARE EDUCATION/TRAINING PROGRAM

## 2022-03-23 PROCEDURE — 99417 PROLNG OP E/M EACH 15 MIN: CPT | Mod: S$PBB,,, | Performed by: STUDENT IN AN ORGANIZED HEALTH CARE EDUCATION/TRAINING PROGRAM

## 2022-03-23 PROCEDURE — 82300 ASSAY OF CADMIUM: CPT | Performed by: STUDENT IN AN ORGANIZED HEALTH CARE EDUCATION/TRAINING PROGRAM

## 2022-03-23 PROCEDURE — 36415 COLL VENOUS BLD VENIPUNCTURE: CPT | Performed by: STUDENT IN AN ORGANIZED HEALTH CARE EDUCATION/TRAINING PROGRAM

## 2022-03-23 PROCEDURE — 99215 OFFICE O/P EST HI 40 MIN: CPT | Mod: PBBFAC | Performed by: STUDENT IN AN ORGANIZED HEALTH CARE EDUCATION/TRAINING PROGRAM

## 2022-03-23 PROCEDURE — 3074F PR MOST RECENT SYSTOLIC BLOOD PRESSURE < 130 MM HG: ICD-10-PCS | Mod: CPTII,,, | Performed by: STUDENT IN AN ORGANIZED HEALTH CARE EDUCATION/TRAINING PROGRAM

## 2022-03-23 PROCEDURE — 82607 VITAMIN B-12: CPT | Performed by: STUDENT IN AN ORGANIZED HEALTH CARE EDUCATION/TRAINING PROGRAM

## 2022-03-23 PROCEDURE — 1159F MED LIST DOCD IN RCRD: CPT | Mod: CPTII,,, | Performed by: STUDENT IN AN ORGANIZED HEALTH CARE EDUCATION/TRAINING PROGRAM

## 2022-03-23 PROCEDURE — 86340 INTRINSIC FACTOR ANTIBODY: CPT | Performed by: STUDENT IN AN ORGANIZED HEALTH CARE EDUCATION/TRAINING PROGRAM

## 2022-03-23 PROCEDURE — 3078F PR MOST RECENT DIASTOLIC BLOOD PRESSURE < 80 MM HG: ICD-10-PCS | Mod: CPTII,,, | Performed by: STUDENT IN AN ORGANIZED HEALTH CARE EDUCATION/TRAINING PROGRAM

## 2022-03-23 PROCEDURE — 99999 PR PBB SHADOW E&M-EST. PATIENT-LVL V: CPT | Mod: PBBFAC,,, | Performed by: STUDENT IN AN ORGANIZED HEALTH CARE EDUCATION/TRAINING PROGRAM

## 2022-03-23 PROCEDURE — 3044F PR MOST RECENT HEMOGLOBIN A1C LEVEL <7.0%: ICD-10-PCS | Mod: CPTII,,, | Performed by: STUDENT IN AN ORGANIZED HEALTH CARE EDUCATION/TRAINING PROGRAM

## 2022-03-23 PROCEDURE — 3008F PR BODY MASS INDEX (BMI) DOCUMENTED: ICD-10-PCS | Mod: CPTII,,, | Performed by: STUDENT IN AN ORGANIZED HEALTH CARE EDUCATION/TRAINING PROGRAM

## 2022-03-23 PROCEDURE — 99215 PR OFFICE/OUTPT VISIT, EST, LEVL V, 40-54 MIN: ICD-10-PCS | Mod: S$PBB,,, | Performed by: STUDENT IN AN ORGANIZED HEALTH CARE EDUCATION/TRAINING PROGRAM

## 2022-03-23 PROCEDURE — 4010F ACE/ARB THERAPY RXD/TAKEN: CPT | Mod: CPTII,,, | Performed by: STUDENT IN AN ORGANIZED HEALTH CARE EDUCATION/TRAINING PROGRAM

## 2022-03-23 PROCEDURE — 1160F PR REVIEW ALL MEDS BY PRESCRIBER/CLIN PHARMACIST DOCUMENTED: ICD-10-PCS | Mod: CPTII,,, | Performed by: STUDENT IN AN ORGANIZED HEALTH CARE EDUCATION/TRAINING PROGRAM

## 2022-03-23 PROCEDURE — 84207 ASSAY OF VITAMIN B-6: CPT | Performed by: STUDENT IN AN ORGANIZED HEALTH CARE EDUCATION/TRAINING PROGRAM

## 2022-03-23 NOTE — TELEPHONE ENCOUNTER
----- Message from Krystyna Stearns MA sent at 3/23/2022 12:37 PM CDT -----  Regarding: Scheduling  Please assist patient with scheduling a New Patient appointment. Inform her with the time,date, and place.

## 2022-03-23 NOTE — PROGRESS NOTES
"[facility]  New Patient Visit      MS Disease Summary     Principle neurological diagnosis: Questionable MS    Date of symptom onset: 2  Date of diagnosis: 2015  Disease type at diagnosis: RR   Disease type currently: RR  Previous therapy: Copaxone 5096-1462, Rebif 2016 (for a few doses)  Current therapy: Generic glatiramer acetate 2016 - present   Last MRI Brain: 3/22/2022  Last MRI C-spine: 3/22/2022  LP 10/13/15: W2, R3, G63, P33, IgG index 0.4, 0 OCB  JCV status: NA  Other relevant labs and tests: NA    History:     She had vision symptoms (L eye irritation, blindness, eye redness, eye pain) that started right before Bonnie. She was told that she had possibly optic nerve swelling. It resolved after 2 weeks, after she took Medrol dose pack.   Since then she has had intermittent symptoms: low back pain, falls, L face numbness/tingling, L arm and leg sensory deficits .     It wasn't until many years later when she was formally diagnosed with MS in 2015. Followed with Dr. Vincent.    LP 10/13/15: W2, R3, G63, P33, IgG index 0.4, 0 OCB.  Ophthalmology note from 2015 showed normal VEP, normal Ishihara plates  "-OCT ONH  -(11/2015): 77 with borderline thinning superiorly // 80 with thinning superiorly and borderline thinning temporally  -HVF 24-2  -(11/2015): OD) 1/12 FL, 0% FN, 13% FP. Scattered defects mostly in inferior field. OS) 1/12 FL, 0% FN, 13% FP. Dense infero nasal defect with scattered points.  -BCVA 20/20 OU"     She was initially started on Copaxone in 2015 but discharged due to insurance and tolerance issues. She was then switched to Rebif 10/10/16 briefly, and she was back on generic glatiramer acetate from 2016 to now. She has issues with injections and would like to switch.    She hasn't had a MS flare until recently when she started experiencing whole body burning sensation, confusion, and frequent falls.   She was taken to the ER, but was found to have STEMI instead and " "underwent cardiac cath.      Current symptoms per patient:  "Eyeball shaking" when she's asleep   UE and LE spasms   Bladder incontinence    Fatigue  Confusion    She is on flexeril, baclofen, tizanidine, robaxin, tramadol and she uses them intermittently for body spasms.     She has known degenerative spine disease. She had C6-7 surgery . She also has other orthopedic issues including hip dislocation. She's pending to start PT soon. She also has a history of migraines.       She has a lot of psychosocial stressors (recent family deaths, friend deaths)  Her grandma had RA, her sister has juvenile diabetes.         ROS:     A complete 9 system ROS was reviewed by me and otherwise negative .     Past Medical History:   Diagnosis Date    Behavioral problem     Bulging lumbar disc     Bursitis     bilateral hip    Degenerative cervical disc     Hx of psychiatric care     Hypercholesteremia     Labral tear of hip, degenerative bilateral    MS (multiple sclerosis)     Paresthesia of both lower extremities 3/13/2022    Pneumonia     Spinal cord compression        Past Surgical History:   Procedure Laterality Date    ANGIOGRAM, CORONARY, WITH LEFT HEART CATHETERIZATION Left 3/11/2022    Procedure: Angiogram, Coronary, with Left Heart Cath;  Surgeon: Elie Matamoros MD;  Location: [facility] CATH LAB;  Service: Cardiology;  Laterality: Left;    BREAST SURGERY      augmentation     SECTION, CLASSIC      HAND SURGERY      HYSTEROSCOPY      NECK SURGERY      cervical disc removeal    TUBAL LIGATION      X-STOP IMPLANTATION         Family History   Problem Relation Age of Onset    Cancer Father     Diabetes Father     Lung cancer Father     Diabetes Sister     COPD Mother     Drug abuse Mother     Bipolar disorder Brother     Heart disease Maternal Uncle     Hypothyroidism Maternal Grandmother        Social History     Socioeconomic History    Marital status: Single   Tobacco Use    Smoking " "status: Former Smoker     Quit date: 2013     Years since quittin.6    Smokeless tobacco: Never Used   Substance and Sexual Activity    Alcohol use: No    Drug use: Yes     Types: Benzodiazepines, Marijuana    Sexual activity: Never       Review of patient's allergies indicates:   Allergen Reactions    Adhesive Hives    Neosporin [neomycin-bacitracin-polymyxin] Hives    Penicillins Other (See Comments)     Unknown  reaction       Living arrangements - the patient lives with their family.        Exam:     Vitals:    22 1101   BP: 99/62   Pulse: 81   Weight: 62.8 kg (138 lb 7.2 oz)   Height: 5' 5" (1.651 m)          In general, the patient is well nourished and appears to be in no acute distress.    Fundi are normal bilaterally.    MENTAL STATUS: language is fluent, normal verbal comprehension, short-term and remote memory is intact, attention is normal, patient is alert and oriented x 3, fund of knowlege is appropriate by vocabulary.     CRANIAL NERVE EXAM:  There is no intrernuclear ophthalmoplegia.  Extraocular muscles are intact. Pupils are equal, round, and reactive to light. No facial asymmetry. Decreased LT in L face. There is no dysarthria. Uvula is midline, and palate moves symmetrically. Shoulder shrug intact bilaterlly. Tongue protrusion is midline. Hearing is intact to finger rub bilaterally. Neck with limited ROM.     MOTOR EXAM: Normal bulk and tone throughout UE and LE bilaterally.   No pronator drift; rapid sequential movements are normal; Strength is  5/5 in all groups in the lower extremities and upper extremities except L hip flexor 4/5 due to pain-limiting factors.     REFLEXES: 2+ and symmetric throughout in all four extremeties; toes are down bilaterally;    SENSORY EXAM: Decreased LT, vibration, and PP in entire L side up to cervical regions (described as 2/10 compared to right sensation).    COORDINATION: Normal finger-to-nose exam. Normal heel-to-shin exam.    GAIT:  " Antalgic gait pattern       Imaging (personally reviewed):   MRI Brain, C and T spine 3/13/22        MRI B rain 8/12/21: Numerous juxtacortical, periventricular, T2 FLAIR hyperintense plaques as can be seen in demyelinating disorders, appear stable, with no distinct new plaque or activity.     MRI C and T-spine 8/12/21: 1.   C5-C7 anterior cervical fusion without evidence of hardware failure.   2.   No cord signal abnormality or abnormal contrast enhancement.   3.   Stable multilevel degenerative changes of the cervical spine, unchanged compared to prior examination.   4.   T11-12 Modic type I endplate changes not seen on prior examination, representing marrow edema and inflammation. Otherwise stable appearance of multilevel degenerative changes of the thoracic spine.        MRI B/C/T spine 3/2022:  Brain:  Numerous nonspecific predominately subcortical white matter lesions may reflect demyelinating disease in light of the history however are nonspecific and not the classic lesions for multiple sclerosis.  No enhancing lesion or advanced edema.  No prior study for comparison.     Cervical spine:  Mild cord signal abnormality at C4-5 is nonspecific but may be due to overlying degenerative changes.  No cord expansion or enhancing lesion is identified.  Degenerative changes result in flattening of the ventral and dorsal cord at C3-4.     Thoracic spine:  Mild signal abnormality within the cord at T9-10 likely reflects overlying cord impingement from a rather large central disc herniation with superior extrusion.  Additional degenerative changes as detailed above       Labs:       Lab Results   Component Value Date    WBC 8.92 03/13/2022    RBC 5.33 03/13/2022    HGB 16.0 03/13/2022    HCT 47.3 03/13/2022    MCV 89 03/13/2022    MCH 30.0 03/13/2022    MCHC 33.8 03/13/2022    RDW 13.3 03/13/2022     03/13/2022    MPV 10.9 03/13/2022    GRAN 6.8 03/13/2022    GRAN 76.5 (H) 03/13/2022    LYMPH 1.3 03/13/2022     LYMPH 14.2 (L) 03/13/2022    MONO 0.7 03/13/2022    MONO 8.3 03/13/2022    EOS 0.0 03/13/2022    BASO 0.05 03/13/2022    EOSINOPHIL 0.1 03/13/2022    BASOPHIL 0.6 03/13/2022     Sodium   Date Value Ref Range Status   03/14/2022 131 (L) 136 - 145 mmol/L Final   03/14/2022 133 (L) 136 - 145 mmol/L Final     Potassium   Date Value Ref Range Status   03/14/2022 3.1 (L) 3.5 - 5.1 mmol/L Final   03/14/2022 3.3 (L) 3.5 - 5.1 mmol/L Final     Chloride   Date Value Ref Range Status   03/14/2022 87 (L) 95 - 110 mmol/L Final   03/14/2022 89 (L) 95 - 110 mmol/L Final     CO2   Date Value Ref Range Status   03/14/2022 32 (H) 23 - 29 mmol/L Final   03/14/2022 31 (H) 23 - 29 mmol/L Final     Glucose   Date Value Ref Range Status   03/14/2022 185 (H) 70 - 110 mg/dL Final   03/14/2022 185 (H) 70 - 110 mg/dL Final     BUN   Date Value Ref Range Status   03/14/2022 35 (H) 6 - 20 mg/dL Final   03/14/2022 38 (H) 6 - 20 mg/dL Final     Creatinine   Date Value Ref Range Status   03/14/2022 1.0 0.5 - 1.4 mg/dL Final   03/14/2022 1.1 0.5 - 1.4 mg/dL Final     Calcium   Date Value Ref Range Status   03/14/2022 9.4 8.7 - 10.5 mg/dL Final   03/14/2022 9.8 8.7 - 10.5 mg/dL Final     Total Protein   Date Value Ref Range Status   03/13/2022 8.3 6.0 - 8.4 g/dL Final     Albumin   Date Value Ref Range Status   03/13/2022 3.8 3.5 - 5.2 g/dL Final     Total Bilirubin   Date Value Ref Range Status   03/13/2022 1.8 (H) 0.1 - 1.0 mg/dL Final     Comment:     For infants and newborns, interpretation of results should be based  on gestational age, weight and in agreement with clinical  observations.    Premature Infant recommended reference ranges:  Up to 24 hours.............<8.0 mg/dL  Up to 48 hours............<12.0 mg/dL  3-5 days..................<15.0 mg/dL  6-29 days.................<15.0 mg/dL       Alkaline Phosphatase   Date Value Ref Range Status   03/13/2022 73 55 - 135 U/L Final     AST   Date Value Ref Range Status   03/13/2022 36 10 - 40  U/L Final     ALT   Date Value Ref Range Status   03/13/2022 31 10 - 44 U/L Final     Anion Gap   Date Value Ref Range Status   03/14/2022 12 8 - 16 mmol/L Final   03/14/2022 13 8 - 16 mmol/L Final     eGFR if    Date Value Ref Range Status   03/14/2022 >60.0 >60 mL/min/1.73 m^2 Final   03/14/2022 >60.0 >60 mL/min/1.73 m^2 Final     eGFR if non    Date Value Ref Range Status   03/14/2022 >60.0 >60 mL/min/1.73 m^2 Final     Comment:     Calculation used to obtain the estimated glomerular filtration  rate (eGFR) is the CKD-EPI equation.      03/14/2022 56.7 (A) >60 mL/min/1.73 m^2 Final     Comment:     Calculation used to obtain the estimated glomerular filtration  rate (eGFR) is the CKD-EPI equation.                   Diagnosis/Assessment/Plan:     Despite a previous diagnosis of MS, I do not see any typical demyelinating lesions in her brain, c or t spine. She does have chronic microvascular disease, which isn't unexpected given cerebrovascular risk factors and history of migraines.The lack of CSF OCBs also makes MS diagnosis less likely. There are no UMN signs on exam either. Suspect that she doesn't have MS but rather mimics for MS symptoms, that require further melanie  Management.  Okay to stop glatiramer acetate.   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  Return to clinic in 4-6 weeks to discuss results.            Total time spent with the patient: 75 minutes, 60 minutes of face to face consultation and 15 minutes of chart review and coordination of care, on the day of the visit. This includes face to face time and non-face to face time preparing to see the patient (eg, review of tests), obtaining and/or reviewing separately obtained history, documenting  clinical information in the electronic or other health record, independently interpreting resultsand communicating results to the patient/family/caregiver, or care coordination.         Kayleen Frey MD, MSc  Attending neurologist

## 2022-03-24 LAB
ANA PATTERN 1: NORMAL
ANA SER-ACNC: POSITIVE
ANA TITR SER IF: NORMAL {TITER}
ARSENIC BLD-MCNC: <1 NG/ML
CADMIUM BLD-MCNC: 0.5 NG/ML
CITY: NORMAL
COUNTY: NORMAL
GUARDIAN FIRST NAME: NORMAL
GUARDIAN LAST NAME: NORMAL
HOME PHONE: NORMAL
LEAD BLD-MCNC: 1.1 MCG/DL
MERCURY BLD-MCNC: <1 NG/ML
RACE: NORMAL
STATE: NORMAL
STREET ADDRESS: NORMAL
VENOUS/CAPILLARY: NORMAL
VIT B12 SERPL-MCNC: 191 NG/L (ref 180–914)
ZIP: NORMAL

## 2022-03-27 LAB — METHYLMALONATE SERPL-SCNC: 1.55 NMOL/ML

## 2022-03-28 LAB
ANNOTATION COMMENT IMP: NORMAL
COPPER SERPL-MCNC: 1295 UG/L (ref 810–1990)
IF BLOCK AB SER QL: NEGATIVE

## 2022-03-29 LAB
CNS DEMYELINATING DISEASE EVAL: NORMAL
GASTRIN P FAST SERPL-MCNC: 49 PG/ML
MOG-IGG1: NEGATIVE
NMO/AQP4 FACS,S: NEGATIVE
PYRIDOXAL SERPL-MCNC: 7 UG/L (ref 5–50)

## 2022-03-30 DIAGNOSIS — H53.40 VISUAL FIELD DEFECT: Primary | ICD-10-CM

## 2022-03-30 LAB — VIT B1 BLD-MCNC: 52 UG/L (ref 38–122)

## 2022-03-31 ENCOUNTER — PATIENT MESSAGE (OUTPATIENT)
Dept: NEUROLOGY | Facility: CLINIC | Age: 57
End: 2022-03-31
Payer: MEDICAID

## 2022-04-10 ENCOUNTER — HOSPITAL ENCOUNTER (INPATIENT)
Facility: HOSPITAL | Age: 57
LOS: 3 days | Discharge: PSYCHIATRIC OR SUBSTANCE USE FACILITY | DRG: 896 | End: 2022-04-13
Attending: EMERGENCY MEDICINE | Admitting: HOSPITALIST
Payer: MEDICAID

## 2022-04-10 DIAGNOSIS — R41.82 AMS (ALTERED MENTAL STATUS): ICD-10-CM

## 2022-04-10 DIAGNOSIS — G35 MS (MULTIPLE SCLEROSIS): Chronic | ICD-10-CM

## 2022-04-10 DIAGNOSIS — R41.0 DELIRIUM: ICD-10-CM

## 2022-04-10 DIAGNOSIS — R41.0 CONFUSION: Primary | ICD-10-CM

## 2022-04-10 DIAGNOSIS — R07.9 CHEST PAIN: ICD-10-CM

## 2022-04-10 DIAGNOSIS — R94.31 QT PROLONGATION: ICD-10-CM

## 2022-04-10 DIAGNOSIS — F29 PSYCHOSIS, UNSPECIFIED PSYCHOSIS TYPE: ICD-10-CM

## 2022-04-10 DIAGNOSIS — M46.98 UNSPECIFIED INFLAMMATORY SPONDYLOPATHY, SACRAL AND SACROCOCCYGEAL REGION: ICD-10-CM

## 2022-04-10 DIAGNOSIS — R20.2 PARESTHESIA OF BOTH LOWER EXTREMITIES: Chronic | ICD-10-CM

## 2022-04-10 DIAGNOSIS — R41.82 ALTERED MENTAL STATE: ICD-10-CM

## 2022-04-10 DIAGNOSIS — R44.3 HALLUCINATION: ICD-10-CM

## 2022-04-10 DIAGNOSIS — F06.1 CATATONIA: ICD-10-CM

## 2022-04-10 PROBLEM — I10 HYPERTENSION: Status: ACTIVE | Noted: 2022-04-10

## 2022-04-10 PROBLEM — I50.21 ACUTE SYSTOLIC HEART FAILURE: Chronic | Status: ACTIVE | Noted: 2022-03-11

## 2022-04-10 PROBLEM — I21.4 NSTEMI (NON-ST ELEVATED MYOCARDIAL INFARCTION): Chronic | Status: ACTIVE | Noted: 2022-03-11

## 2022-04-10 LAB
ALBUMIN SERPL BCP-MCNC: 4 G/DL (ref 3.5–5.2)
ALP SERPL-CCNC: 68 U/L (ref 55–135)
ALT SERPL W/O P-5'-P-CCNC: 38 U/L (ref 10–44)
AMPHET+METHAMPHET UR QL: NEGATIVE
ANION GAP SERPL CALC-SCNC: 15 MMOL/L (ref 8–16)
APAP SERPL-MCNC: <3 UG/ML (ref 10–20)
AST SERPL-CCNC: 49 U/L (ref 10–40)
BACTERIA #/AREA URNS AUTO: NORMAL /HPF
BARBITURATES UR QL SCN>200 NG/ML: NEGATIVE
BASOPHILS # BLD AUTO: 0.02 K/UL (ref 0–0.2)
BASOPHILS NFR BLD: 0.3 % (ref 0–1.9)
BENZODIAZ UR QL SCN>200 NG/ML: ABNORMAL
BILIRUB SERPL-MCNC: 1.4 MG/DL (ref 0.1–1)
BILIRUB UR QL STRIP: NEGATIVE
BNP SERPL-MCNC: 379 PG/ML (ref 0–99)
BUN SERPL-MCNC: 16 MG/DL (ref 6–20)
BZE UR QL SCN: NEGATIVE
CALCIUM SERPL-MCNC: 9.5 MG/DL (ref 8.7–10.5)
CANNABINOIDS UR QL SCN: ABNORMAL
CHLORIDE SERPL-SCNC: 106 MMOL/L (ref 95–110)
CK SERPL-CCNC: 1514 U/L (ref 20–180)
CLARITY UR REFRACT.AUTO: CLEAR
CO2 SERPL-SCNC: 21 MMOL/L (ref 23–29)
COLOR UR AUTO: YELLOW
CREAT SERPL-MCNC: 0.8 MG/DL (ref 0.5–1.4)
CREAT UR-MCNC: 267 MG/DL (ref 15–325)
CTP QC/QA: YES
DIFFERENTIAL METHOD: ABNORMAL
EOSINOPHIL # BLD AUTO: 0 K/UL (ref 0–0.5)
EOSINOPHIL NFR BLD: 0.3 % (ref 0–8)
ERYTHROCYTE [DISTWIDTH] IN BLOOD BY AUTOMATED COUNT: 12.9 % (ref 11.5–14.5)
EST. GFR  (AFRICAN AMERICAN): >60 ML/MIN/1.73 M^2
EST. GFR  (NON AFRICAN AMERICAN): >60 ML/MIN/1.73 M^2
ETHANOL SERPL-MCNC: <10 MG/DL
GLUCOSE SERPL-MCNC: 113 MG/DL (ref 70–110)
GLUCOSE UR QL STRIP: NEGATIVE
HCT VFR BLD AUTO: 37.8 % (ref 37–48.5)
HGB BLD-MCNC: 12.7 G/DL (ref 12–16)
HGB UR QL STRIP: NEGATIVE
HYALINE CASTS UR QL AUTO: 0 /LPF
IMM GRANULOCYTES # BLD AUTO: 0.01 K/UL (ref 0–0.04)
IMM GRANULOCYTES NFR BLD AUTO: 0.2 % (ref 0–0.5)
KETONES UR QL STRIP: ABNORMAL
LEUKOCYTE ESTERASE UR QL STRIP: NEGATIVE
LYMPHOCYTES # BLD AUTO: 0.9 K/UL (ref 1–4.8)
LYMPHOCYTES NFR BLD: 15.6 % (ref 18–48)
MAGNESIUM SERPL-MCNC: 1.7 MG/DL (ref 1.6–2.6)
MCH RBC QN AUTO: 30.1 PG (ref 27–31)
MCHC RBC AUTO-ENTMCNC: 33.6 G/DL (ref 32–36)
MCV RBC AUTO: 90 FL (ref 82–98)
METHADONE UR QL SCN>300 NG/ML: NEGATIVE
MICROSCOPIC COMMENT: NORMAL
MONOCYTES # BLD AUTO: 0.5 K/UL (ref 0.3–1)
MONOCYTES NFR BLD: 8.5 % (ref 4–15)
NEUTROPHILS # BLD AUTO: 4.4 K/UL (ref 1.8–7.7)
NEUTROPHILS NFR BLD: 75.1 % (ref 38–73)
NITRITE UR QL STRIP: NEGATIVE
NRBC BLD-RTO: 0 /100 WBC
OPIATES UR QL SCN: NEGATIVE
PCP UR QL SCN>25 NG/ML: NEGATIVE
PH UR STRIP: 5 [PH] (ref 5–8)
PLATELET # BLD AUTO: 232 K/UL (ref 150–450)
PMV BLD AUTO: 10.6 FL (ref 9.2–12.9)
POTASSIUM SERPL-SCNC: 3.6 MMOL/L (ref 3.5–5.1)
PROT SERPL-MCNC: 7.9 G/DL (ref 6–8.4)
PROT UR QL STRIP: ABNORMAL
RBC # BLD AUTO: 4.22 M/UL (ref 4–5.4)
RBC #/AREA URNS AUTO: 3 /HPF (ref 0–4)
SALICYLATES SERPL-MCNC: <5 MG/DL (ref 15–30)
SARS-COV-2 RDRP RESP QL NAA+PROBE: NEGATIVE
SODIUM SERPL-SCNC: 142 MMOL/L (ref 136–145)
SP GR UR STRIP: 1.02 (ref 1–1.03)
T4 FREE SERPL-MCNC: 1.25 NG/DL (ref 0.71–1.51)
TOXICOLOGY INFORMATION: ABNORMAL
TROPONIN I SERPL DL<=0.01 NG/ML-MCNC: 0.01 NG/ML (ref 0–0.03)
TSH SERPL DL<=0.005 MIU/L-ACNC: 0.11 UIU/ML (ref 0.4–4)
URN SPEC COLLECT METH UR: ABNORMAL
WBC # BLD AUTO: 5.91 K/UL (ref 3.9–12.7)
WBC #/AREA URNS AUTO: 2 /HPF (ref 0–5)

## 2022-04-10 PROCEDURE — 99285 EMERGENCY DEPT VISIT HI MDM: CPT | Mod: CS,,, | Performed by: EMERGENCY MEDICINE

## 2022-04-10 PROCEDURE — 99285 PR EMERGENCY DEPT VISIT,LEVEL V: ICD-10-PCS | Mod: CS,,, | Performed by: EMERGENCY MEDICINE

## 2022-04-10 PROCEDURE — G0378 HOSPITAL OBSERVATION PER HR: HCPCS

## 2022-04-10 PROCEDURE — 63600175 PHARM REV CODE 636 W HCPCS

## 2022-04-10 PROCEDURE — 80179 DRUG ASSAY SALICYLATE: CPT | Performed by: EMERGENCY MEDICINE

## 2022-04-10 PROCEDURE — 11000001 HC ACUTE MED/SURG PRIVATE ROOM

## 2022-04-10 PROCEDURE — 93010 ELECTROCARDIOGRAM REPORT: CPT | Mod: ,,, | Performed by: INTERNAL MEDICINE

## 2022-04-10 PROCEDURE — 25000003 PHARM REV CODE 250: Performed by: EMERGENCY MEDICINE

## 2022-04-10 PROCEDURE — 80307 DRUG TEST PRSMV CHEM ANLYZR: CPT | Performed by: EMERGENCY MEDICINE

## 2022-04-10 PROCEDURE — 82550 ASSAY OF CK (CPK): CPT | Performed by: EMERGENCY MEDICINE

## 2022-04-10 PROCEDURE — 87040 BLOOD CULTURE FOR BACTERIA: CPT | Performed by: EMERGENCY MEDICINE

## 2022-04-10 PROCEDURE — 99220 PR INITIAL OBSERVATION CARE,LEVL III: CPT | Mod: ,,, | Performed by: HOSPITALIST

## 2022-04-10 PROCEDURE — 83735 ASSAY OF MAGNESIUM: CPT | Performed by: EMERGENCY MEDICINE

## 2022-04-10 PROCEDURE — 96372 THER/PROPH/DIAG INJ SC/IM: CPT

## 2022-04-10 PROCEDURE — 80053 COMPREHEN METABOLIC PANEL: CPT | Performed by: EMERGENCY MEDICINE

## 2022-04-10 PROCEDURE — 93005 ELECTROCARDIOGRAM TRACING: CPT

## 2022-04-10 PROCEDURE — 99220 PR INITIAL OBSERVATION CARE,LEVL III: ICD-10-PCS | Mod: ,,, | Performed by: HOSPITALIST

## 2022-04-10 PROCEDURE — 96360 HYDRATION IV INFUSION INIT: CPT

## 2022-04-10 PROCEDURE — 82077 ASSAY SPEC XCP UR&BREATH IA: CPT | Performed by: EMERGENCY MEDICINE

## 2022-04-10 PROCEDURE — 99285 EMERGENCY DEPT VISIT HI MDM: CPT | Mod: 25

## 2022-04-10 PROCEDURE — 25000003 PHARM REV CODE 250

## 2022-04-10 PROCEDURE — 81001 URINALYSIS AUTO W/SCOPE: CPT | Performed by: EMERGENCY MEDICINE

## 2022-04-10 PROCEDURE — 93010 EKG 12-LEAD: ICD-10-PCS | Mod: ,,, | Performed by: INTERNAL MEDICINE

## 2022-04-10 PROCEDURE — 84439 ASSAY OF FREE THYROXINE: CPT | Performed by: EMERGENCY MEDICINE

## 2022-04-10 PROCEDURE — 85025 COMPLETE CBC W/AUTO DIFF WBC: CPT | Performed by: EMERGENCY MEDICINE

## 2022-04-10 PROCEDURE — 83880 ASSAY OF NATRIURETIC PEPTIDE: CPT | Performed by: EMERGENCY MEDICINE

## 2022-04-10 PROCEDURE — U0002 COVID-19 LAB TEST NON-CDC: HCPCS | Performed by: EMERGENCY MEDICINE

## 2022-04-10 PROCEDURE — 84443 ASSAY THYROID STIM HORMONE: CPT | Performed by: EMERGENCY MEDICINE

## 2022-04-10 PROCEDURE — 84484 ASSAY OF TROPONIN QUANT: CPT | Performed by: EMERGENCY MEDICINE

## 2022-04-10 PROCEDURE — 80143 DRUG ASSAY ACETAMINOPHEN: CPT | Performed by: EMERGENCY MEDICINE

## 2022-04-10 RX ORDER — BACLOFEN 10 MG/1
20 TABLET ORAL 2 TIMES DAILY PRN
Status: ON HOLD | COMMUNITY
Start: 2021-10-25 | End: 2022-04-11

## 2022-04-10 RX ORDER — LISINOPRIL 10 MG/1
10 TABLET ORAL DAILY
Status: DISCONTINUED | OUTPATIENT
Start: 2022-04-11 | End: 2022-04-10

## 2022-04-10 RX ORDER — GLUCAGON 1 MG
1 KIT INJECTION
Status: DISCONTINUED | OUTPATIENT
Start: 2022-04-10 | End: 2022-04-13 | Stop reason: HOSPADM

## 2022-04-10 RX ORDER — MEDICAL SUPPLY, MISCELLANEOUS
MISCELLANEOUS MISCELLANEOUS
Status: ON HOLD | COMMUNITY
Start: 2021-07-26 | End: 2022-04-13 | Stop reason: HOSPADM

## 2022-04-10 RX ORDER — VALSARTAN 40 MG/1
40 TABLET ORAL 2 TIMES DAILY
Status: DISCONTINUED | OUTPATIENT
Start: 2022-04-10 | End: 2022-04-13 | Stop reason: HOSPADM

## 2022-04-10 RX ORDER — IBUPROFEN 200 MG
16 TABLET ORAL
Status: DISCONTINUED | OUTPATIENT
Start: 2022-04-10 | End: 2022-04-13 | Stop reason: HOSPADM

## 2022-04-10 RX ORDER — ERGOCALCIFEROL 1.25 MG/1
50000 CAPSULE ORAL
COMMUNITY
Start: 2021-11-23

## 2022-04-10 RX ORDER — HYDRALAZINE HYDROCHLORIDE 20 MG/ML
10 INJECTION INTRAMUSCULAR; INTRAVENOUS EVERY 6 HOURS PRN
Status: DISCONTINUED | OUTPATIENT
Start: 2022-04-10 | End: 2022-04-13 | Stop reason: HOSPADM

## 2022-04-10 RX ORDER — BUPROPION HYDROCHLORIDE 150 MG/1
150 TABLET ORAL 2 TIMES DAILY
Status: ON HOLD | COMMUNITY
Start: 2021-10-21 | End: 2022-04-11

## 2022-04-10 RX ORDER — ATORVASTATIN CALCIUM 20 MG/1
40 TABLET, FILM COATED ORAL DAILY
Status: DISCONTINUED | OUTPATIENT
Start: 2022-04-11 | End: 2022-04-13 | Stop reason: HOSPADM

## 2022-04-10 RX ORDER — NALOXONE HCL 0.4 MG/ML
0.02 VIAL (ML) INJECTION
Status: DISCONTINUED | OUTPATIENT
Start: 2022-04-10 | End: 2022-04-13 | Stop reason: HOSPADM

## 2022-04-10 RX ORDER — CYCLOBENZAPRINE HCL 10 MG
10 TABLET ORAL 3 TIMES DAILY PRN
Status: ON HOLD | COMMUNITY
Start: 2022-02-15 | End: 2022-04-13 | Stop reason: HOSPADM

## 2022-04-10 RX ORDER — LISINOPRIL 10 MG/1
10 TABLET ORAL DAILY
Status: DISCONTINUED | OUTPATIENT
Start: 2022-04-10 | End: 2022-04-10

## 2022-04-10 RX ORDER — OMEPRAZOLE 20 MG/1
20 CAPSULE, DELAYED RELEASE ORAL DAILY PRN
COMMUNITY
Start: 2022-02-15

## 2022-04-10 RX ORDER — MELOXICAM 7.5 MG/1
7.5 TABLET ORAL 2 TIMES DAILY
Status: ON HOLD | COMMUNITY
Start: 2022-02-15 | End: 2022-04-11

## 2022-04-10 RX ORDER — ROSUVASTATIN CALCIUM 20 MG/1
20 TABLET, COATED ORAL
Status: ON HOLD | COMMUNITY
End: 2022-04-11

## 2022-04-10 RX ORDER — TALC
6 POWDER (GRAM) TOPICAL NIGHTLY PRN
Status: DISCONTINUED | OUTPATIENT
Start: 2022-04-10 | End: 2022-04-13 | Stop reason: HOSPADM

## 2022-04-10 RX ORDER — METOPROLOL SUCCINATE 25 MG/1
25 TABLET, EXTENDED RELEASE ORAL DAILY
Status: DISCONTINUED | OUTPATIENT
Start: 2022-04-10 | End: 2022-04-13 | Stop reason: HOSPADM

## 2022-04-10 RX ORDER — ENOXAPARIN SODIUM 100 MG/ML
40 INJECTION SUBCUTANEOUS EVERY 24 HOURS
Status: DISCONTINUED | OUTPATIENT
Start: 2022-04-10 | End: 2022-04-13 | Stop reason: HOSPADM

## 2022-04-10 RX ORDER — IBUPROFEN 200 MG
24 TABLET ORAL
Status: DISCONTINUED | OUTPATIENT
Start: 2022-04-10 | End: 2022-04-13 | Stop reason: HOSPADM

## 2022-04-10 RX ORDER — TIZANIDINE 2 MG/1
4 TABLET ORAL 3 TIMES DAILY
Status: ON HOLD | COMMUNITY
Start: 2021-11-23 | End: 2022-04-11

## 2022-04-10 RX ORDER — LISINOPRIL 2.5 MG/1
2.5 TABLET ORAL DAILY
Status: DISCONTINUED | OUTPATIENT
Start: 2022-04-11 | End: 2022-04-10

## 2022-04-10 RX ORDER — METHOCARBAMOL 500 MG/1
1000 TABLET, FILM COATED ORAL 2 TIMES DAILY PRN
Status: ON HOLD | COMMUNITY
Start: 2021-06-14 | End: 2022-04-13 | Stop reason: HOSPADM

## 2022-04-10 RX ORDER — GLATIRAMER 40 MG/ML
40 INJECTION, SOLUTION SUBCUTANEOUS
Status: ON HOLD | COMMUNITY
Start: 2021-08-23 | End: 2022-04-11

## 2022-04-10 RX ORDER — WATER 1 ML/ML
IRRIGANT IRRIGATION
Status: ON HOLD | COMMUNITY
Start: 2022-04-05 | End: 2022-04-13 | Stop reason: HOSPADM

## 2022-04-10 RX ORDER — SODIUM CHLORIDE 0.9 % (FLUSH) 0.9 %
10 SYRINGE (ML) INJECTION EVERY 12 HOURS PRN
Status: DISCONTINUED | OUTPATIENT
Start: 2022-04-10 | End: 2022-04-13 | Stop reason: HOSPADM

## 2022-04-10 RX ORDER — LAMOTRIGINE 200 MG/1
TABLET ORAL
Status: ON HOLD | COMMUNITY
Start: 2022-02-17 | End: 2022-04-11

## 2022-04-10 RX ORDER — ACETAMINOPHEN 325 MG/1
650 TABLET ORAL EVERY 6 HOURS PRN
Status: DISCONTINUED | OUTPATIENT
Start: 2022-04-10 | End: 2022-04-13 | Stop reason: HOSPADM

## 2022-04-10 RX ORDER — PREGABALIN 150 MG/1
150 CAPSULE ORAL 2 TIMES DAILY
COMMUNITY
Start: 2022-02-15 | End: 2022-04-13

## 2022-04-10 RX ORDER — ESCITALOPRAM OXALATE 10 MG/1
10 TABLET ORAL DAILY
COMMUNITY
Start: 2022-02-15 | End: 2022-04-13

## 2022-04-10 RX ORDER — NALOXONE HYDROCHLORIDE 4 MG/.1ML
SPRAY NASAL
Status: ON HOLD | COMMUNITY
Start: 2022-02-15 | End: 2022-04-11

## 2022-04-10 RX ADMIN — METOPROLOL SUCCINATE 25 MG: 25 TABLET, EXTENDED RELEASE ORAL at 09:04

## 2022-04-10 RX ADMIN — ENOXAPARIN SODIUM 40 MG: 100 INJECTION SUBCUTANEOUS at 09:04

## 2022-04-10 RX ADMIN — VALSARTAN 40 MG: 40 TABLET, FILM COATED ORAL at 09:04

## 2022-04-10 RX ADMIN — Medication 6 MG: at 09:04

## 2022-04-10 RX ADMIN — SODIUM CHLORIDE 1000 ML: 0.9 INJECTION, SOLUTION INTRAVENOUS at 04:04

## 2022-04-10 NOTE — ED PROVIDER NOTES
Encounter Date: 4/10/2022       History     Chief Complaint   Patient presents with    Altered Mental Status     Pt's son called EMS for altered mental status since yesterday, no alcohol use, upon arrival patient was naked and confused.      The history is provided by the patient, the EMS personnel and a relative. The history is limited by the condition of the patient.      Reza Morrow is a 56-year-old female with a history of multiple sclerosis presenting with altered mental status, confusion and behavioral disturbance.  She presents via EMS and her son who states that her symptoms began yesterday and have been worsening over the last 24 hours.  She has had paranoid delusional behavior and hallucination that have been persistent and ongoing and have worsened.  I attempted to discuss history and physical with the patient however she is unable to provide any useful information secondary to altered mental status, delirium, hallucinations and confused behavior.  Per son, no evidence of trauma, falls, injury or substance abuse.  However he does not witness her taking her home medications.  She was recently seen and admitted and March 14th for NSTEMI/STEMI and received angiogram.    Review of patient's allergies indicates:   Allergen Reactions    Adhesive Hives    Neosporin [neomycin-bacitracin-polymyxin] Hives    Neomycin-polymyxin Hives    Penicillins Other (See Comments)     Unknown  reaction    Latex Rash     Past Medical History:   Diagnosis Date    Behavioral problem     Bulging lumbar disc     Bursitis     bilateral hip    Degenerative cervical disc     Hx of psychiatric care     Hypercholesteremia     Labral tear of hip, degenerative bilateral    MS (multiple sclerosis)     Paresthesia of both lower extremities 3/13/2022    Pneumonia     Spinal cord compression      Past Surgical History:   Procedure Laterality Date    ANGIOGRAM, CORONARY, WITH LEFT HEART CATHETERIZATION Left 3/11/2022     Procedure: Angiogram, Coronary, with Left Heart Cath;  Surgeon: Elie Matamoros MD;  Location: [facility] CATH LAB;  Service: Cardiology;  Laterality: Left;    BREAST SURGERY      augmentation     SECTION, CLASSIC      HAND SURGERY      HYSTEROSCOPY      NECK SURGERY      cervical disc removeal    TUBAL LIGATION      X-STOP IMPLANTATION       Family History   Problem Relation Age of Onset    Cancer Father     Diabetes Father     Lung cancer Father     Diabetes Sister     COPD Mother     Drug abuse Mother     Bipolar disorder Brother     Heart disease Maternal Uncle     Hypothyroidism Maternal Grandmother      Social History     Tobacco Use    Smoking status: Former Smoker     Quit date: 2013     Years since quittin.6    Smokeless tobacco: Never Used   Substance Use Topics    Alcohol use: No    Drug use: Yes     Types: Benzodiazepines, Marijuana     Review of Systems   Unable to perform ROS: Mental status change       Physical Exam     Initial Vitals [04/10/22 1507]   BP Pulse Resp Temp SpO2   (!) 156/82 (!) 118 16 98.4 °F (36.9 °C) 99 %      MAP       --         Physical Exam    Nursing note and vitals reviewed.      Gen/Constitutional:  Able to converse, speech, nontoxic appearing however she is confused, disoriented and appears altered with hallucinations.  GCS 14.   Head: Normocephalic, Atraumatic  Neck: supple, no masses or LAD, no JVD; no nuchal rigidity, negative Brudzinski  Eyes: PERRLA, conjunctiva clear  Ears, Nose and Throat: No rhinorrhea or stridor.  Cardiac:  Tachycardic heart rate, Reg Rhythm, No murmur  Pulmonary: CTA Bilat, no wheezes, rhonchi, rales.  No increased work of breathing.  GI: Abdomen soft, non-tender, non-distended; no rebound or guarding  : No CVA tenderness.  Musculoskeletal: Extremities warm, well perfused, no erythema, no edema  Skin: No rashes, cyanosis or jaundice.  Neuro:  Alert and conversive, not oriented to person, place or time, appears confused,  disoriented with hallucinations and altered mental status.; No focal motor or sensory deficits.  No truncal ataxia, negative Romberg, no adventitious trunk movement, there is no dysmetria, pupils 3-4 mm briskly reactive; there is no facial droop or slurred speech  Psych:  Confused, disoriented, altered mental status with hallucinations and paranoid behavior      ED Course   Procedures  Labs Reviewed   CBC W/ AUTO DIFFERENTIAL - Abnormal; Notable for the following components:       Result Value    Lymph # 0.9 (*)     Gran % 75.1 (*)     Lymph % 15.6 (*)     All other components within normal limits   COMPREHENSIVE METABOLIC PANEL - Abnormal; Notable for the following components:    CO2 21 (*)     Glucose 113 (*)     Total Bilirubin 1.4 (*)     AST 49 (*)     All other components within normal limits   TSH - Abnormal; Notable for the following components:    TSH 0.109 (*)     All other components within normal limits   URINALYSIS, REFLEX TO URINE CULTURE - Abnormal; Notable for the following components:    Protein, UA 1+ (*)     Ketones, UA 1+ (*)     All other components within normal limits    Narrative:     Specimen Source->Urine   DRUG SCREEN PANEL, URINE EMERGENCY - Abnormal; Notable for the following components:    Benzodiazepines Presumptive Positive (*)     THC Presumptive Positive (*)     All other components within normal limits    Narrative:     Specimen Source->Urine   ACETAMINOPHEN LEVEL - Abnormal; Notable for the following components:    Acetaminophen (Tylenol), Serum <3.0 (*)     All other components within normal limits   SALICYLATE LEVEL - Abnormal; Notable for the following components:    Salicylate Lvl <5.0 (*)     All other components within normal limits   B-TYPE NATRIURETIC PEPTIDE - Abnormal; Notable for the following components:     (*)     All other components within normal limits   CK - Abnormal; Notable for the following components:    CPK 1514 (*)     All other components within  normal limits    Narrative:     ADD ON CPK PER DR NATHALY PARMAR/ORDER# [id number] @ 17:35 4/10/2022   CULTURE, BLOOD   CULTURE, BLOOD   ALCOHOL,MEDICAL (ETHANOL)   MAGNESIUM   TROPONIN I   URINALYSIS MICROSCOPIC    Narrative:     Specimen Source->Urine   T4, FREE    Narrative:     ADD ON CPK PER DR NATHALY PARMAR/ORDER# [id number] @ 17:35 4/10/2022   CK   SARS-COV-2 RDRP GENE     EKG Readings: (Independently Interpreted)   Initial Reading: No STEMI. Previous EKG: Compared with most recent EKG Rhythm: Sinus Tachycardia. Heart Rate: 115. ST Segments: Non-Specific ST Segment Depression.     ECG Results          EKG 12-lead (In process)  Result time 04/10/22 18:53:45    In process by Interface, Lab In [facility] (04/10/22 18:53:45)                 Narrative:    Test Reason : R41.82,    Vent. Rate : 115 BPM     Atrial Rate : 115 BPM     P-R Int : 142 ms          QRS Dur : 076 ms      QT Int : 348 ms       P-R-T Axes : 062 075 049 degrees     QTc Int : 481 ms    Sinus tachycardia  Nonspecific T wave abnormality  Abnormal ECG  When compared with ECG of 11-MAR-2022 22:03,  Criteria for Lateral infarct are no longer Present  Nonspecific T wave abnormality has replaced inverted T waves in Inferior  leads  Inverted T waves have replaced nonspecific T wave abnormality in Anterior  leads  Nonspecific T wave abnormality no longer evident in Lateral leads    Referred By: [email or web address]   SELF           Confirmed By:                             Imaging Results          X-Ray Chest AP Portable (Final result)  Result time 04/10/22 17:46:04    Final result by Satya Summers MD (04/10/22 17:46:04)                 Impression:      No evidence of acute cardiopulmonary disease.    Electronically signed by resident: Fan Hitchcock  Date:    04/10/2022  Time:    17:43    Electronically signed by: Satya Summers MD  Date:    04/10/2022  Time:    17:46             Narrative:    EXAMINATION:  XR CHEST AP PORTABLE    CLINICAL  HISTORY:  ams;    TECHNIQUE:  Single frontal view of the chest was performed.    COMPARISON:  Chest radiograph 03/11/2022    FINDINGS:  Partially visualized cervical spinal hardware.  Overlying monitoring leads.    Lungs are symmetrically expanded.  No focal consolidation.  No pleural effusion or pneumothorax.    Trachea and mediastinal structures are midline.  Cardiomediastinal silhouette is unremarkable.    No acute osseous process.                               CT Head Without Contrast (Final result)  Result time 04/10/22 17:45:37    Final result by Satya Summers MD (04/10/22 17:45:37)                 Impression:      No evidence of acute intracranial pathology.    Scattered foci of supratentorial white matter hypoattenuation in keeping with known history of multiple sclerosis.    Stable small focus of left occipital scalp thickening, may represent a complex sebaceous cyst.    Electronically signed by resident: Ron Mckenzie  Date:    04/10/2022  Time:    17:32    Electronically signed by: Satya Summers MD  Date:    04/10/2022  Time:    17:45             Narrative:    EXAMINATION:  CT HEAD WITHOUT CONTRAST    CLINICAL HISTORY:  Mental status change, unknown cause;    TECHNIQUE:  Low dose axial CT images obtained throughout the head without the use of intravenous contrast.  Axial, sagittal and coronal reconstructions were performed.    COMPARISON:  MRI brain demyelinating 03/13/2022, 06/04/2018; CT head 03/11/2022.    FINDINGS:  Intracranial compartment:    Ventricles and sulci are normal in size for age without evidence of hydrocephalus.    Scattered punctate foci hypoattenuation throughout the supratentorial white matter in keeping with known history of multiple sclerosis, better evaluated on prior brain MRI.  No obvious new focal intraparenchymal lesions.    No parenchymal hemorrhage, edema, mass effect, or major vascular distribution infarct.    No extra-axial blood or fluid collections.    Skull/extracranial  contents (limited evaluation):    Mastoid air cells and visualized paranasal sinuses are essentially clear.  1.0 cm rounded focus of hyperdense scalp thickening overlying the left occipital calvarium.  The bony calvarium is intact without evidence of acute displaced.                              X-Rays:   Independently Interpreted Readings:   Chest X-Ray: Normal heart size.  No infiltrates.  No acute abnormalities. No pneumothorax or free air     Medications   sodium chloride 0.9% flush 10 mL (has no administration in time range)   naloxone 0.4 mg/mL injection 0.02 mg (has no administration in time range)   glucose chewable tablet 16 g (has no administration in time range)   glucose chewable tablet 24 g (has no administration in time range)   glucagon (human recombinant) injection 1 mg (has no administration in time range)   dextrose 10% bolus 125 mL (has no administration in time range)   dextrose 10% bolus 250 mL (has no administration in time range)   enoxaparin injection 40 mg (40 mg Subcutaneous Given 4/10/22 2125)   melatonin tablet 6 mg (6 mg Oral Given 4/10/22 2125)   atorvastatin tablet 40 mg (40 mg Oral Not Given 4/11/22 0853)   metoprolol succinate (TOPROL-XL) 24 hr tablet 25 mg (25 mg Oral Not Given 4/11/22 0853)   valsartan tablet 40 mg (40 mg Oral Not Given 4/11/22 0853)   hydrALAZINE injection 10 mg (has no administration in time range)   acetaminophen tablet 650 mg (has no administration in time range)   sodium chloride 0.9% bolus 1,000 mL (0 mLs Intravenous Stopped 4/10/22 1707)     Medical Decision Making:   History:   I obtained history from: EMS provider.       <> Summary of History: Brought in via EMS with altered mental status, confusion/hallucinations including delusional behavior  Old Medical Records: I decided to obtain old medical records.  Old Records Summarized: records from previous admission(s).       <> Summary of Records: Previously admitted for chest pain with left heart  catheterization obtained  Initial Assessment:   Reza Morrow is a 56-year-old female with a history of multiple sclerosis presenting with altered mental status, confusion and behavioral disturbance.  Differential Diagnosis:   Acute psychosis, delirium, dementia, intracranial hemorrhage, stroke, metabolic encephalopathy, infectious etiology, uremia  Independently Interpreted Test(s):   I have ordered and independently interpreted X-rays - see prior notes.  I have ordered and independently interpreted EKG Reading(s) - see prior notes  Clinical Tests:   Lab Tests: Ordered and Reviewed  Radiological Study: Ordered and Reviewed  Medical Tests: Ordered and Reviewed  Other:   I have discussed this case with another health care provider.       <> Summary of the Discussion: Hospital medicine    Emergent evaluation of patient presenting with altered mental status.  She is currently tachycardic, without fevers, hypoxia or hypotension.  Physical exam findings remarkable for confused, disoriented and unable to follow simple commands.  She has a GCS of 14 and she is responding to internal stimuli and will not cooperate with any exam.  No obvious clonus on my exam.  She is on polypharmacy including multiple psychoactive medications including pregabalin, gabapentin, oxymorphone, doxepin, baclofen, methocarbamol, tizanidine and Flexeril.  She also has a history of MS, but had a recent workup 1 month ago that was negative for acute MS flare.  She was recently discharged for stress cardiomyopathy with a negative left heart catheterization but and EF of 25%.  ECG without ischemia or STEMI on my read.  Placed on cardiac and telemetry monitoring, given IV fluids, broad evaluation with labs and chest x-ray and head CT.  Chest x-ray negative for acute findings, head CT without obvious intracranial findings.  Given altered mental status, confusion with concern for delirium versus polypharmacy discussed case with hospital medicine will  admit for ongoing management evaluation.  Consult ordered with Psychiatry will plan for psychiatric consult and evaluation; although suspect of symptoms may be related to her multiple psychoactive medications.  Given grave disability, will obtain PEC for now with sitter.    Complexity:  High risk-level 5                      Clinical Impression:   Final diagnoses:  [R41.82] AMS (altered mental status)  [R41.0] Confusion (Primary)  [R41.0] Delirium  [R44.3] Hallucination          ED Disposition Condition    Observation             Asad Christine DO, [facility]  Emergency Staff Physician   Dept of Emergency Medicine   [facility]  Spectralink: [id number]        Disclaimer: This note has been generated using voice-recognition software. There may be typographical errors that have been missed during proof-reading.       Asad Christine DO  04/11/22 3067

## 2022-04-10 NOTE — ED NOTES
"Patient arrived to ED RM 7 by EMS, per EMS they were called to the residence by her son who is here with her and he had told them that she has been altered since yesterday.  When they got there she was naked in the house and confused.  She keeps calling me Jennifer, appears to be seeing and hearing things that are not there. She also keeps referring to people as Samuel.  Per the son who was here Samuel is her dead son.  The patient does not appear to be oriented, when asked who she is she states "I don't know".  When I asked her what she had done that got her to be brought to the hospital she stated "everything."  She told me that the metal on my stethoscope was going to hear everything and that she can already hear it.  She states that there are the dots and sounds everywhere.  Asking me what my intentions are and why are we trying to see into her.  She was placed in the paper hospital scrubs with the assistance of Hunter rubi charge RN and patient was strait cathed at this time as well per verbal order of the MD.  Patient tolerated well, but had pulled the catheter out before the bladder was empty.  70 cc CY return.     Patient identifiers verified and correct for this patient per the son and ID bracelet is in place on the R wrist.      LOC: The patient is awake, alert and appears to be having visual and auditory hallucinations.  She is referring to me as Jennifer and at times states that Samuel has my hair and eyes down and she thinks that he is trying to become me.  She does not appear to be oriented at this time and is yelling at the other patients across the [place] as well.  Though she is having flight of ideas and keeps talking about things that are not related to the situation.    APPEARANCE: Patient appears comfortable and in no acute distress, EMS had assisted the patient to get dressed and the clothes that she had on were clean.  She does however state that the bed and the paper scrubs that we had placed " "on her are "all wet."  They are not.    SKIN: The skin is warm and dry, color consistent with ethnicity, patient has normal skin turgor and moist mucus membranes, skin intact, no breakdown or bruising noted.   MUSCULOSKELETAL: Patient moving all extremities spontaneously and strongly, no swelling noted.  RESPIRATORY: Airway is open and patent, respirations are spontaneous, patient has a normal effort and rate, no accessory muscle use noted, pt placed on continuous pulse ox with O2 sats noted at 100% on room air at this time.  CARDIAC: Pt placed on cardiac monitor. Patient has a normal rate and regular rhythm, currently a ST EKG was attempted but there was a lot of artifact because the patient is throwing her arms around and will not hold still.  Will attempt again a little later once the patient is more calm. No edema noted, capillary refill < 3 seconds.   GASTRO: Soft and non tender to palpation, no distention noted, normoactive bowel sounds present in all four quadrants. When asked patient stated that she is "pooping all the time."  : Pt denies any pain or frequency with urination, she states that we are trying to get her pee here and there is "pee everywhere, all over".  NEURO: Pt opens eyes spontaneously, anxious, she intermittently follows commands for brief moments in time, facial expression symmetrical, bilateral hand grasp equal and even, purposeful motor response noted.      "

## 2022-04-10 NOTE — ED NOTES
1600 - 20G HL to L hand and 1 set of cultures and labs were obtained at this time as ordered.  ED Tech had assisted to keep the patient from pulling the catheter out.  NSS then hung as ordered.     1615 - Patient straight stuck with butterfly in the RAC at this time, 2nd set of cultures were obtained as ordered.  Gauze dressing applied.  Patient tolerated well.     1617 - EKG obtained.     1620 - MD shown EKG, and Dr. Christine is aware that the patient has pulled off the pulse ox and will not keep it on.  She states that we are trying to get to her through the light.

## 2022-04-10 NOTE — ED NOTES
CT was completed as well as Xray.  Patient just refused her tray.  And told the dietary worker that she was just there to get the picture.

## 2022-04-10 NOTE — ED NOTES
Patient was stuck in LAC with 20G PIV she had moved as I was attempting to secure it and it came out.  Gauze dressing applied.

## 2022-04-10 NOTE — HPI
57 yo female with a PMHx of mutliple sclerosis, HFrEF (25% on echo from 3/12/22), stress cardiomyopathy, HLD, chronic pain, benzo dependence, opioid dependence, hypertension presenting for acute change in mental status. History obtained from chart review and collateral as patient unable to participate in history taking. She presented via EMS due to her son noticing erratic behavior, worsening since day before admission. Patient had a recent admission for confusion, weakness, chest pain. She was taken to cath lab, found to have no CAD. Echo showed reduced EF of 25%, suggestive of Tak[place]subo cardiomyopathy. Neurology consulted during that admission, set up outpatient follow up with MS clinic. She was DC on Lasix, metoprolol, and lisinopril.     Collateral obtained from son, Jono Ceballos. According to her son, she has not been completely back to the same since being discharged from [facility] on 3/14. She has been more aggravated, angry, and screaming at him since yesterday. She has been especially distressed, perseverating on her neurologist Dr. Frey telling her that she probably does not have MS. Patient saw Dr. Frey on March 23, where it was felt that her lesions and presentation do not resemble typical MS. Reportedly, patient has been distressed with being told this after believing that she has had MS for about 10 years. Her son states that he found her naked in his bed prior to her previous admission, and that presentation today was similar. She had been having visual and auditory hallucinations, talking to people who were not present, including her other son who passed away in 2019. Her significant other passed away in 2017. Son denies any other recent family/friend deaths, but feels she has never fully coped well with her grief. Her denies finding any pill bottles or empty bottles at home. He verifies that she does take Flexeril, baclofen, tizanidine, robaxin, hydroxyzine, gabapentin, oxycontin for her chronic  "pain and anxiety. She has chronic back, hip, hand, foot pain. Son reports he is not aware of any etoh or illicit drug use. She has a prescription for medical marijuana. Former smoker. Son reports that she has had a previous psych inpatient stay, but denies any diagnosed primary psych disorder such as bipolar, schizophrenia.     Per chart review: In June 2016, she had made threats to commit suicide out of anger with not receiving certain medications, and later denied SI at discharge. Per psych notes patient was quoted saying "with my pain I could eat through that steel," "I have pain all day, I hold it in so noone sees it, I let it out at night." Pt then changes subject, reports the "scarlett I was with for 25 yrs just passed away, my kids been in and out of [facility], but I'm fine, I don't want to kill myself." She had consistently asserted since admission that threats of self-harm were made in anger while frustrated by her insurer.  She had been calm and cooperative with no evidence of psychosis or and attempt to harm herself or others and no longer met commitment criteria.    Recent message on 3/31 on [employer] from patient states, "Since leaving the hospital over two weeks ago I have not felt well.  I have electrical shock going up and down both legs.  They are weak, numb, and I have tingling off and on.  I have fallen twice, I am dizzy and my balance is off.  I have restless legs at night and I have been having trouble sleeping.  I am nauseated and not eating well.  My vision is blurry and gets worse at times.  I have numbness and tingling in both arms and hands.  I'm anxious, depressed and I feel like i'm losing my mind.  How can I not have MS after being diagnosed and had MRI's every 6 months since being diagnosed?  I really need to be seen."        In the ED, she continues to have AMS, AH, VH. Afebrile, hypertensive, tachycardic, on room air. Labs significant for , CPK 1514, TSH 0.109, T4 wnl. Electrolytes wnl, " CBC unremarkable. UA 1+ protein, 1+ ketones. Received 1 L NS.

## 2022-04-10 NOTE — ED NOTES
Patient continues to be anxious, continuing to talk about multiple things at once.  She continues to say that we are all watching her and that everyone around her knows what is going on .  She keeps asking me if I see them, and states that they are right there on the wall and on the other side of the door.

## 2022-04-11 LAB
ALBUMIN SERPL BCP-MCNC: 3.6 G/DL (ref 3.5–5.2)
ALP SERPL-CCNC: 67 U/L (ref 55–135)
ALT SERPL W/O P-5'-P-CCNC: 28 U/L (ref 10–44)
ANION GAP SERPL CALC-SCNC: 14 MMOL/L (ref 8–16)
AST SERPL-CCNC: 33 U/L (ref 10–40)
BASOPHILS # BLD AUTO: 0.03 K/UL (ref 0–0.2)
BASOPHILS NFR BLD: 0.4 % (ref 0–1.9)
BILIRUB SERPL-MCNC: 1.4 MG/DL (ref 0.1–1)
BUN SERPL-MCNC: 14 MG/DL (ref 6–20)
CALCIUM SERPL-MCNC: 9.3 MG/DL (ref 8.7–10.5)
CHLORIDE SERPL-SCNC: 107 MMOL/L (ref 95–110)
CO2 SERPL-SCNC: 20 MMOL/L (ref 23–29)
CREAT SERPL-MCNC: 0.7 MG/DL (ref 0.5–1.4)
DIFFERENTIAL METHOD: ABNORMAL
EOSINOPHIL # BLD AUTO: 0 K/UL (ref 0–0.5)
EOSINOPHIL NFR BLD: 0.4 % (ref 0–8)
ERYTHROCYTE [DISTWIDTH] IN BLOOD BY AUTOMATED COUNT: 12.9 % (ref 11.5–14.5)
EST. GFR  (AFRICAN AMERICAN): >60 ML/MIN/1.73 M^2
EST. GFR  (NON AFRICAN AMERICAN): >60 ML/MIN/1.73 M^2
GLUCOSE SERPL-MCNC: 83 MG/DL (ref 70–110)
HCT VFR BLD AUTO: 37.7 % (ref 37–48.5)
HGB BLD-MCNC: 12.6 G/DL (ref 12–16)
IMM GRANULOCYTES # BLD AUTO: 0.02 K/UL (ref 0–0.04)
IMM GRANULOCYTES NFR BLD AUTO: 0.3 % (ref 0–0.5)
LYMPHOCYTES # BLD AUTO: 1.2 K/UL (ref 1–4.8)
LYMPHOCYTES NFR BLD: 17.1 % (ref 18–48)
MAGNESIUM SERPL-MCNC: 1.7 MG/DL (ref 1.6–2.6)
MCH RBC QN AUTO: 29.9 PG (ref 27–31)
MCHC RBC AUTO-ENTMCNC: 33.4 G/DL (ref 32–36)
MCV RBC AUTO: 90 FL (ref 82–98)
MONOCYTES # BLD AUTO: 0.6 K/UL (ref 0.3–1)
MONOCYTES NFR BLD: 8.5 % (ref 4–15)
NEUTROPHILS # BLD AUTO: 5.1 K/UL (ref 1.8–7.7)
NEUTROPHILS NFR BLD: 73.3 % (ref 38–73)
NRBC BLD-RTO: 0 /100 WBC
PHOSPHATE SERPL-MCNC: 2.8 MG/DL (ref 2.7–4.5)
PLATELET # BLD AUTO: 219 K/UL (ref 150–450)
PMV BLD AUTO: 10.5 FL (ref 9.2–12.9)
POTASSIUM SERPL-SCNC: 4.1 MMOL/L (ref 3.5–5.1)
PROT SERPL-MCNC: 7.3 G/DL (ref 6–8.4)
RBC # BLD AUTO: 4.21 M/UL (ref 4–5.4)
SODIUM SERPL-SCNC: 141 MMOL/L (ref 136–145)
WBC # BLD AUTO: 6.91 K/UL (ref 3.9–12.7)

## 2022-04-11 PROCEDURE — 83735 ASSAY OF MAGNESIUM: CPT

## 2022-04-11 PROCEDURE — G0378 HOSPITAL OBSERVATION PER HR: HCPCS

## 2022-04-11 PROCEDURE — 85025 COMPLETE CBC W/AUTO DIFF WBC: CPT

## 2022-04-11 PROCEDURE — 99226 PR SUBSEQUENT OBSERVATION CARE,LEVEL III: CPT | Mod: ,,, | Performed by: HOSPITALIST

## 2022-04-11 PROCEDURE — 99220 PR INITIAL OBSERVATION CARE,LEVL III: ICD-10-PCS | Mod: ,,, | Performed by: PSYCHIATRY & NEUROLOGY

## 2022-04-11 PROCEDURE — 36415 COLL VENOUS BLD VENIPUNCTURE: CPT

## 2022-04-11 PROCEDURE — 63600175 PHARM REV CODE 636 W HCPCS

## 2022-04-11 PROCEDURE — 80053 COMPREHEN METABOLIC PANEL: CPT

## 2022-04-11 PROCEDURE — 11000001 HC ACUTE MED/SURG PRIVATE ROOM

## 2022-04-11 PROCEDURE — 99226 PR SUBSEQUENT OBSERVATION CARE,LEVEL III: ICD-10-PCS | Mod: ,,, | Performed by: HOSPITALIST

## 2022-04-11 PROCEDURE — 99220 PR INITIAL OBSERVATION CARE,LEVL III: CPT | Mod: ,,, | Performed by: PSYCHIATRY & NEUROLOGY

## 2022-04-11 PROCEDURE — 96372 THER/PROPH/DIAG INJ SC/IM: CPT

## 2022-04-11 PROCEDURE — 84100 ASSAY OF PHOSPHORUS: CPT

## 2022-04-11 PROCEDURE — 25000003 PHARM REV CODE 250

## 2022-04-11 RX ORDER — LORAZEPAM 2 MG/ML
2 INJECTION INTRAMUSCULAR EVERY 4 HOURS PRN
Status: DISCONTINUED | OUTPATIENT
Start: 2022-04-11 | End: 2022-04-13

## 2022-04-11 RX ADMIN — VALSARTAN 40 MG: 40 TABLET, FILM COATED ORAL at 08:04

## 2022-04-11 RX ADMIN — ENOXAPARIN SODIUM 40 MG: 100 INJECTION SUBCUTANEOUS at 05:04

## 2022-04-11 NOTE — ASSESSMENT & PLAN NOTE
Long hx of MS for which she has been on Copaxone. Recently  Saw Dr. Frey who was skeptical that MS was her true diagnosis vs MS mimic.     - Consult neuro if suspicion for MS flare

## 2022-04-11 NOTE — ED NOTES
Confirmed with the unit secretary and charge RN at this time that there was a sitter for this patient.  Security called for transport of this patient to [facility] at this time.

## 2022-04-11 NOTE — ASSESSMENT & PLAN NOTE
History of chronic back, hip, hand, foot pain. Oxymorphone, mobic, voltaren, Nucynta on med history. Unclear which/how much she takes regularly. Not c/o of any pain on admission. UA negative for opiates.     - Hold opioids

## 2022-04-11 NOTE — HOSPITAL COURSE
Pt admitted for AMS, possibly due to polypharmacy or overdose from home medications. Pt does have suicidal thoughts and suicide history in the past. Psychiatry service consulted. Neurology consulted, no concern for MS flair, recommended EEG. Pt showing much clinical improvement today with regards to her mental status. More alert and aware of herself and time. Showing signs of catatonia so started on scheduled ativan per psych recs. Pt medically ready for discharge so placement for IP psych facility initiated, SW/CM consulted.

## 2022-04-11 NOTE — H&P
[facility] Medicine  History & Physical    Patient Name: Reza Morrow  MRN: [id number]  Patient Class: OP- Observation  Admission Date: 4/10/2022  Attending Physician: Madison Ring MD   Primary Care Provider: Kip Jimenez MD         Patient information was obtained from patient, relative(s), past medical records and ER records.     Subjective:     Principal Problem:Altered mental state    Chief Complaint:   Chief Complaint   Patient presents with    Altered Mental Status     Pt's son called EMS for altered mental status since yesterday, no alcohol use, upon arrival patient was naked and confused.         HPI: 55 yo female with a PMHx of mutliple sclerosis, HFrEF (25% on echo from 3/12/22), stress cardiomyopathy, HLD, chronic pain, benzo dependence, opioid dependence, hypertension presenting for acute change in mental status. History obtained from chart review and collateral as patient unable to participate in history taking. She presented via EMS due to her son noticing erratic behavior, worsening since day before admission. Patient had a recent admission for confusion, weakness, chest pain. She was taken to cath lab, found to have no CAD. Echo showed reduced EF of 25%, suggestive of Takutsubo cardiomyopathy. Neurology consulted during that admission, set up outpatient follow up with MS clinic. She was DC on Lasix, metoprolol, and lisinopril.     Collateral obtained from son, Jono Ceballos. According to her son, she has not been completely back to the same since being discharged from [facility] on 3/14. She has been especially distressed, perseverating on her neurologist Dr. Frey telling her that she probably does not have MS. Patient saw Dr. Frey on March 23, where it was felt that her lesions and presentation do not resemble typical MS. Reportedly, patient has been distressed with being told this after believing that she has had MS for about 10 years. Her son states that he found her naked in  "his bed prior to her previous admission, and that presentation today was similar. She had been having visual and auditory hallucinations, talking to people who were not present, including her other son who passed away in 2019. Her significant other passed away in 2017. Son denies any other recent family/friend deaths, but feels she has never fully coped well with her grief. Her denies finding any pill bottles or empty bottles at home. He verifies that she does take Flexeril, baclofen, tizanidine, robaxin, hydroxyzine, gabapentin, oxycontin for her chronic pain and anxiety. She has chronic back, hip, hand, foot pain. Son reports he is not aware of any etoh or illicit drug use. She has a prescription for medical marijuana. Former smoker. Son reports that she has had a previous psych inpatient stay, but denies any diagnosed primary psych disorder such as bipolar, schizophrenia.     Per chart review: In June 2016, she had made threats to commit suicide out of anger with not receiving certain medications, and later denied SI at discharge.    Recent message on 3/31 on [employer] from patient states, "Since leaving the hospital over two weeks ago I have not felt well.  I have electrical shock going up and down both legs.  They are weak, numb, and I have tingling off and on.  I have fallen twice, I am dizzy and my balance is off.  I have restless legs at night and I have been having trouble sleeping.  I am nauseated and not eating well.  My vision is blurry and gets worse at times.  I have numbness and tingling in both arms and hands.  I'm anxious, depressed and I feel like i'm losing my mind.  How can I not have MS after being diagnosed and had MRI's every 6 months since being diagnosed?  I really need to be seen."        In the ED, she continues to have AMS, AH, VH. Labs significant for , CPK 1514, TSH 0.109. Electrolytes wnl, CBC unremarkable. UA 1+ protein, 1+ ketones. Received 1 L NS.       Past Medical History: "   Diagnosis Date    Behavioral problem     Bulging lumbar disc     Bursitis     bilateral hip    Degenerative cervical disc     Hx of psychiatric care     Hypercholesteremia     Labral tear of hip, degenerative bilateral    MS (multiple sclerosis)     Paresthesia of both lower extremities 3/13/2022    Pneumonia     Spinal cord compression        Past Surgical History:   Procedure Laterality Date    ANGIOGRAM, CORONARY, WITH LEFT HEART CATHETERIZATION Left 3/11/2022    Procedure: Angiogram, Coronary, with Left Heart Cath;  Surgeon: Elie Matamoros MD;  Location: [facility] CATH LAB;  Service: Cardiology;  Laterality: Left;    BREAST SURGERY      augmentation     SECTION, CLASSIC      HAND SURGERY      HYSTEROSCOPY      NECK SURGERY      cervical disc removeal    TUBAL LIGATION      X-STOP IMPLANTATION         Review of patient's allergies indicates:   Allergen Reactions    Adhesive Hives    Neosporin [neomycin-bacitracin-polymyxin] Hives    Neomycin-polymyxin Hives    Penicillins Other (See Comments)     Unknown  reaction    Latex Rash       No current facility-administered medications on file prior to encounter.     Current Outpatient Medications on File Prior to Encounter   Medication Sig    glatiramer (COPAXONE, GLATOPA) 40 mg/mL injection Inject 40 mg into the skin.    methocarbamoL (ROBAXIN) 500 MG Tab Take 750 mg by mouth.    miscellaneous medical supply (C-TUB) Misc Lidocaine 5%, Prilocaine 2%, Meloxicam 0.9%, Lamotrigine 2.5%, Gabapentin 6% cream apply up 3 gms 5 times a day to affected area    baclofen (LIORESAL) 10 MG tablet Take 20 mg by mouth 2 (two) times daily.    buPROPion (WELLBUTRIN XL) 150 MG TB24 tablet Take 150 mg by mouth 2 (two) times daily.    cyclobenzaprine (FLEXERIL) 10 MG tablet Take 10 mg by mouth 3 (three) times daily as needed.    doxepin (SINEQUAN) 25 MG capsule Take 25 mg by mouth nightly as needed.    ergocalciferol (ERGOCALCIFEROL) 50,000 unit Cap  Take 50,000 Units by mouth every 7 days.    EScitalopram oxalate (LEXAPRO) 10 MG tablet Take 10 mg by mouth once daily.    furosemide (LASIX) 40 MG tablet Weigh yourself daily, take 1 tab if weight increases by 3 lbs in 24 hours or if weight increases by 5 lbs in one week.    gabapentin (NEURONTIN) 300 MG capsule Take 300 mg by mouth 3 (three) times daily as needed.    hydrocodone-acetaminophen 10-325mg (NORCO)  mg Tab Take 1 tablet by mouth every 4 (four) hours as needed.    hydrOXYzine pamoate (VISTARIL) 25 MG Cap Take 25 mg by mouth 3 (three) times daily as needed.    lamoTRIgine (LAMICTAL) 200 MG tablet Take by mouth.    lisinopriL (PRINIVIL,ZESTRIL) 2.5 MG tablet Take 1 tablet (2.5 mg total) by mouth once daily.    meloxicam (MOBIC) 7.5 MG tablet Take 7.5 mg by mouth 2 (two) times daily.    metoprolol succinate (TOPROL-XL) 25 MG 24 hr tablet Take 1 tablet (25 mg total) by mouth once daily.    metronidazole 1% (METROGEL) 1 % Gel Apply topically daily as needed.    NARCAN 4 mg/actuation Spry SMARTSIG:Both Nares    nitrofurantoin, macrocrystal-monohydrate, (MACROBID) 100 MG capsule Take 1 capsule (100 mg total) by mouth 2 (two) times daily.    omeprazole (PRILOSEC) 20 MG capsule Take 20 mg by mouth once daily.    oxymorphone (OPANA ER) 20 MG 12 hr tablet Take 20 mg by mouth 2 (two) times daily.     pregabalin (LYRICA) 150 MG capsule Take 150 mg by mouth 2 (two) times daily.    rosuvastatin (CRESTOR) 20 MG tablet Take 20 mg by mouth.    sterile water, bottle, irrigation     TAPENTADOL HCL (NUCYNTA ORAL) Take 1 tablet by mouth 4 (four) times daily.    tiZANidine (ZANAFLEX) 2 MG tablet Take 4 mg by mouth 3 (three) times daily.    triamcinolone acetonide 0.1% (KENALOG) 0.1 % cream Apply topically daily as needed.    VOLTAREN 1 % Gel Apply topically once daily.    [DISCONTINUED] atorvastatin (LIPITOR) 20 MG tablet Take 20 mg by mouth once daily.     Family History       Problem Relation (Age  of Onset)    Bipolar disorder Brother    COPD Mother    Cancer Father    Diabetes Father, Sister    Drug abuse Mother    Heart disease Maternal Uncle    Hypothyroidism Maternal Grandmother    Lung cancer Father          Tobacco Use    Smoking status: Former Smoker     Quit date: 2013     Years since quittin.6    Smokeless tobacco: Never Used   Substance and Sexual Activity    Alcohol use: No    Drug use: Yes     Types: Benzodiazepines, Marijuana    Sexual activity: Never     Review of Systems   Unable to perform ROS: Mental status change   Objective:     Vital Signs (Most Recent):  Temp: 96.5 °F (35.8 °C) (04/10/22 1946)  Pulse: 107 (04/10/22 1946)  Resp: 17 (04/10/22 1946)  BP: (!) 168/93 (04/10/22 1946)  SpO2: 100 % (04/10/22 1946)   Vital Signs (24h Range):  Temp:  [96.5 °F (35.8 °C)-98.4 °F (36.9 °C)] 96.5 °F (35.8 °C)  Pulse:  [107-120] 107  Resp:  [16-17] 17  SpO2:  [99 %-100 %] 100 %  BP: (156-182)/() 168/93     Weight: 59 kg (130 lb)  Body mass index is 21.63 kg/m².    Physical Exam  Constitutional:       General: She is not in acute distress.     Appearance: She is normal weight. She is not ill-appearing.   HENT:      Head: Normocephalic and atraumatic.      Nose: Nose normal.      Mouth/Throat:      Mouth: Mucous membranes are moist.   Eyes:      General: No scleral icterus.     Conjunctiva/sclera: Conjunctivae normal.   Cardiovascular:      Rate and Rhythm: Normal rate and regular rhythm.      Pulses: Normal pulses.      Heart sounds: No murmur heard.  Pulmonary:      Effort: Pulmonary effort is normal. No respiratory distress.      Breath sounds: Normal breath sounds. No wheezing or rales.   Abdominal:      General: Abdomen is flat. Bowel sounds are normal. There is no distension.      Palpations: Abdomen is soft.      Tenderness: There is no abdominal tenderness. There is no guarding.   Musculoskeletal:         General: No swelling or tenderness. Normal range of motion.      Cervical  "back: Normal range of motion.      Right lower leg: No edema.      Left lower leg: No edema.   Skin:     General: Skin is warm and dry.      Capillary Refill: Capillary refill takes less than 2 seconds.      Coloration: Skin is not jaundiced.   Neurological:      Mental Status: She is alert.   Psychiatric:         Attention and Perception: She perceives auditory and visual hallucinations.         Mood and Affect: Affect is labile.         Speech: Speech is rapid and pressured and tangential.         Behavior: Behavior is agitated.         Cognition and Memory: Cognition is impaired.      Comments: Tangential, word salad. Rarely responds appropriately to questions. Cooperative, but keeps talking to "Samuel" outside the room. Visual hallucinations of hairs and dots.     AAOx0           Significant Labs: All pertinent labs within the past 24 hours have been reviewed.  CBC:   Recent Labs   Lab 04/10/22  1606   WBC 5.91   HGB 12.7   HCT 37.8        CMP:   Recent Labs   Lab 04/10/22  1606      K 3.6      CO2 21*   *   BUN 16   CREATININE 0.8   CALCIUM 9.5   PROT 7.9   ALBUMIN 4.0   BILITOT 1.4*   ALKPHOS 68   AST 49*   ALT 38   ANIONGAP 15   EGFRNONAA >60.0     Cardiac Markers:   Recent Labs   Lab 04/10/22  1606   *     TSH:   Recent Labs   Lab 04/10/22  1606   TSH 0.109*     Urine Studies:   Recent Labs   Lab 04/10/22  1540   COLORU Yellow   APPEARANCEUA Clear   PHUR 5.0   SPECGRAV 1.025   PROTEINUA 1+*   GLUCUA Negative   KETONESU 1+*   BILIRUBINUA Negative   OCCULTUA Negative   NITRITE Negative   LEUKOCYTESUR Negative   RBCUA 3   WBCUA 2   BACTERIA Rare   HYALINECASTS 0       Significant Imaging: I have reviewed all pertinent imaging results/findings within the past 24 hours.    Assessment/Plan:     * Altered mental state  57 yo female with a PMHx of mutliple sclerosis, HFrEF (25% on echo from 3/12/22), stress cardiomyopathy, HLD, chronic pain, benzo dependence, opioid dependence, " hypertension presenting for acute change in mental status. Patient actively hallucinating in ED with VH and AH. Does not respond appropriately, mumbling to herself. DDx includes benzo overuse or withdrawal, marijuana induced psychosis vs polypharmacy vs MS flare. Less likely septic encephalopathy or electrolyte derangements. CT head unremarkable. Labs unremarkable other than elevated BNP (significantly lower than previous during HF exacerbation) and low TSH.     - Hold all opioids and benzos  - psych consulted, appreciate recs  - Consider neuro consult if focal neuro deficits.   - Will await improvement as medications are metabolized  - Ask her son to bring in medications to verify what she has at home.       Psychosis  55 yo F presenting with AH and VH.     See AMS      MS (multiple sclerosis)  Long hx of MS for which she has been on Copaxone. Recently  Saw Dr. Frey who was skeptical that MS was her true diagnosis vs MS mimic.     - Consult neuro if suspicion for MS flare      Chronic pain syndrome  History of chronic back, hip, hand, foot pain. Oxymorphone, mobic, voltaren, Nucynta on med history. Unclear which/how much she takes regularly. Not c/o of any pain on admission. UA negative for opiates.     - Hold opioids      Acute systolic heart failure  Diagnosed with HFrEF of 25% last admission and started on lasix, metoprolol, lisinopril for GDMT.     - Will start valsartan while admitted.   - consider entresto at discharge with f/u at HF transitional clinic      Benzodiazepine dependence  UA positive for benzos.     - Hold while patient is acutely altered/psychotic.       Opioid dependence  Has history of chronic pain, multiple opiates on med rec.     - Hold opiates while she's altered        VTE Risk Mitigation (From admission, onward)         Ordered     enoxaparin injection 40 mg  Daily         04/10/22 2001     IP VTE HIGH RISK PATIENT  Once         04/10/22 2001     Place sequential compression device  Until  discontinued         04/10/22 2001                   Js Reid MD  Department of Hospital Medicine   [facility]

## 2022-04-11 NOTE — CONSULTS
[facility] - [facility]  Psychiatry  Consult Note    Patient Name: Reza Morrow  MRN: [id number]   Code Status: Full Code  Admission Date: 4/10/2022  Hospital Length of Stay: 0 days  Attending Physician: Madison Ring MD  Primary Care Provider: Kip Jimenez MD    Current Legal Status: 's Emergency Certificate (CEC)    Patient information was obtained from patient, relative(s) and ER records.   Inpatient consult to Psychiatry  Consult performed by: Ronda Scott MD  Consult ordered by: Asad Christine DO        Subjective:     Principal Problem:Altered mental state    Chief Complaint:  AMS     HPI:   Consultation-Liaison Psychiatry Consult Note    4/11/2022 10:35 AM  Reza Morrow  MRN: [id number]    Chief Complaint / Reason for Consult: AMS     SUBJECTIVE     History of Present Illness:   Reza Morrow is a 56 y.o. female with a past psychiatric history of unknown, currently presenting with Altered mental state.  Psychiatry was originally consulted to address the patient's symptoms of AMS.    Per Primary MD:  55 yo female with a PMHx of mutliple sclerosis, HFrEF (25% on echo from 3/12/22), stress cardiomyopathy, HLD, chronic pain, benzo dependence, opioid dependence, hypertension presenting for acute change in mental status. History obtained from chart review and collateral as patient unable to participate in history taking. She presented via EMS due to her son noticing erratic behavior, worsening since day before admission. Patient had a recent admission for confusion, weakness, chest pain. She was taken to cath lab, found to have no CAD. Echo showed reduced EF of 25%, suggestive of Takutsubo cardiomyopathy. Neurology consulted during that admission, set up outpatient follow up with MS clinic. She was DC on Lasix, metoprolol, and lisinopril.      Collateral obtained from son, Jono Ceballos. According to her son, she has not been completely back to the same since being discharged from [facility] on 3/14.  "She has been especially distressed, perseverating on her neurologist Dr. Frey telling her that she probably does not have MS. Patient saw Dr. Frey on March 23, where it was felt that her lesions and presentation do not resemble typical MS. Reportedly, patient has been distressed with being told this after believing that she has had MS for about 10 years. Her son states that he found her naked in his bed prior to her previous admission, and that presentation today was similar. She had been having visual and auditory hallucinations, talking to people who were not present, including her other son who passed away in 2019. Her significant other passed away in 2017. Son denies any other recent family/friend deaths, but feels she has never fully coped well with her grief. Her denies finding any pill bottles or empty bottles at home. He verifies that she does take Flexeril, baclofen, tizanidine, robaxin, hydroxyzine, gabapentin, oxycontin for her chronic pain and anxiety. She has chronic back, hip, hand, foot pain. Son reports he is not aware of any etoh or illicit drug use. She has a prescription for medical marijuana. Former smoker. Son reports that she has had a previous psych inpatient stay, but denies any diagnosed primary psych disorder such as bipolar, schizophrenia.      Per chart review: In June 2016, she had made threats to commit suicide out of anger with not receiving certain medications, and later denied SI at discharge.     Recent message on 3/31 on [employer] from patient states, "Since leaving the hospital over two weeks ago I have not felt well.  I have electrical shock going up and down both legs.  They are weak, numb, and I have tingling off and on.  I have fallen twice, I am dizzy and my balance is off.  I have restless legs at night and I have been having trouble sleeping.  I am nauseated and not eating well.  My vision is blurry and gets worse at times.  I have numbness and tingling in both arms and " "hands.  I'm anxious, depressed and I feel like i'm losing my mind.  How can I not have MS after being diagnosed and had MRI's every 6 months since being diagnosed?  I really need to be seen."         In the ED, she continues to have AMS, AH, VH. Labs significant for , CPK 1514, TSH 0.109. Electrolytes wnl, CBC unremarkable. UA 1+ protein, 1+ ketones. Received 1 L NS    Per C-L Psych MD:  Ms. Morrow is sitting in bed, just urinated on the floor. Her speech is nonsensical and illogical. Disoriented to person, place, situation, time. Actively hallucination.    Collateral:  Son: Jono Ceballos   Notices change in behavior over last year. One week ago, working outside for 3 days and when saw her next, she was in room, naked saying that clothes burning her skin. States that she was not taking home medications. Diagnosed with broken heart syndrome. After discharged, pt followed up with neurologist one week ago who told her that she does not have MS, which greatly upset pt. Friday, pt started not feeling well, "lost  on reality on Saturday," stating he was seeing dead people. On Sunday, she was doing worse, disoriented to self and her son.    Psychiatric Review Of Systems - Is patient experiencing or having changes in (obtained by son Mr. Ceballos):  sleep: yes, decrease  appetite: yes, decrease  weight: no  energy/anergy: yes  interest/pleasure/anhedonia: yes  somatic symptoms: yes  guilty/hopelessness: no  concentration: yes  S.I.B.s/risky behavior: no  SI/SA:  no    anxiety/panic: yes  Agoraphobia:  no  Social phobia:  no  Recurrent nightmares:  no  hyper startle response:  no  Avoidance: no  Recurrent thoughts:  yes  Recurrent behaviors:  yes    Irritability: yes  Racing thoughts: yes  Impulsive behaviors: yes  Pressured speech:  no    Paranoia:no  Delusions: yes  AVH:yes    Psychiatric History:  Diagnose(s): {unknown  Previous Medication Trials: Yes - doxipin, possibly others  Previous Psychiatric " Hospitalizations: unknown  Previous Suicide Attempts: unknown  History of Violence: unknown  Outpatient Psychiatrist: No    Social History:  Marital Status: not   Children: son, one son    Employment Status:  not working  Education:  unable to assess  Special Ed: unknown   History: no  Housing Status: alone, son visits almost daily  Developmental History: no  History of Abuse: denies  Access to Gun: denies    Substance Abuse History:  Recreational Drugs: medical THC  Use of Alcohol: occasional, social use  Rehab History: No  Tobacco Use: No  Use of Caffeine: unknown  Use of OTC: No  Legal consequences of chemical use: No  Is the patient aware of the biomedical complications associated with substance abuse and mental illness? No    Legal History:  Past Charges/Incarcerations: No  Pending Charges: No    Family Psychiatric History:   Denies    Psychosocial Factors:  Psychosocial Stressors: family and health.   Functioning Relationships: good support system  Maladaptive or problem behaviors: No  Peer group, social, ethic, cultural, emotional, and health factors: Yes - MS vs other diagnosis  Living situation, family constellation, family circumstances/home: No  Recovery environment: No  Community resources used by patient: No  Treatment acceptance/motivation for change: unable to determine at this time    Medical Review Of Systems:  Complete review of systems performed covering Constitutional, Eyes, ENT/Mouth, Cardiovascular, Respiratory, Gastrointestinal, Genitourinary, Musculoskeletal, Skin, Neurologic, Endocrine, Heme/Lymph, and Allergy/Immune.     Complete review of systems was negative with the exception of the following positive symptoms: pt unable to provide meaningful responses at this time    Scheduled Meds:   atorvastatin  40 mg Oral Daily    enoxaparin  40 mg Subcutaneous Daily    metoprolol succinate  25 mg Oral Daily    valsartan  40 mg Oral BID     acetaminophen, dextrose 10%,  dextrose 10%, glucagon (human recombinant), glucose, glucose, hydrALAZINE, melatonin, naloxone, sodium chloride 0.9%  Psychotherapeutics (From admission, onward)                None          PRN Meds:  acetaminophen, dextrose 10%, dextrose 10%, glucagon (human recombinant), glucose, glucose, hydrALAZINE, melatonin, naloxone, sodium chloride 0.9%  Home Meds:  Prior to Admission medications    Medication Sig Start Date End Date Taking? Authorizing Provider   glatiramer (COPAXONE, GLATOPA) 40 mg/mL injection Inject 40 mg into the skin. 8/23/21  Yes Historical Provider   methocarbamoL (ROBAXIN) 500 MG Tab Take 750 mg by mouth. 6/14/21  Yes Historical Provider   miscellaneous medical supply (C-TUB) Misc Lidocaine 5%, Prilocaine 2%, Meloxicam 0.9%, Lamotrigine 2.5%, Gabapentin 6% cream apply up 3 gms 5 times a day to affected area 7/26/21  Yes Historical Provider   baclofen (LIORESAL) 10 MG tablet Take 20 mg by mouth 2 (two) times daily. 10/25/21   Historical Provider   buPROPion (WELLBUTRIN XL) 150 MG TB24 tablet Take 150 mg by mouth 2 (two) times daily. 10/21/21   Historical Provider   cyclobenzaprine (FLEXERIL) 10 MG tablet Take 10 mg by mouth 3 (three) times daily as needed. 2/15/22   Historical Provider   doxepin (SINEQUAN) 25 MG capsule Take 25 mg by mouth nightly as needed. 7/7/15   Historical Provider   ergocalciferol (ERGOCALCIFEROL) 50,000 unit Cap Take 50,000 Units by mouth every 7 days. 11/23/21   Historical Provider   EScitalopram oxalate (LEXAPRO) 10 MG tablet Take 10 mg by mouth once daily. 2/15/22   Historical Provider   furosemide (LASIX) 40 MG tablet Weigh yourself daily, take 1 tab if weight increases by 3 lbs in 24 hours or if weight increases by 5 lbs in one week. 3/14/22   Rob Gates MD   gabapentin (NEURONTIN) 300 MG capsule Take 300 mg by mouth 3 (three) times daily as needed. 7/24/15   Historical Provider   hydrocodone-acetaminophen 10-325mg (NORCO)  mg Tab Take 1 tablet by mouth  every 4 (four) hours as needed.    Historical Provider   hydrOXYzine pamoate (VISTARIL) 25 MG Cap Take 25 mg by mouth 3 (three) times daily as needed. 7/24/15   Historical Provider   lamoTRIgine (LAMICTAL) 200 MG tablet Take by mouth. 2/17/22   Historical Provider   lisinopriL (PRINIVIL,ZESTRIL) 2.5 MG tablet Take 1 tablet (2.5 mg total) by mouth once daily. 3/15/22 3/15/23  Rob Gates MD   meloxicam (MOBIC) 7.5 MG tablet Take 7.5 mg by mouth 2 (two) times daily. 2/15/22   Historical Provider   metoprolol succinate (TOPROL-XL) 25 MG 24 hr tablet Take 1 tablet (25 mg total) by mouth once daily. 3/15/22 3/15/23  Rob Gates MD   metronidazole 1% (METROGEL) 1 % Gel Apply topically daily as needed. 7/20/15   Historical Provider   NARCAN 4 mg/actuation Spry SMARTSIG:Both Nares 2/15/22   Historical Provider   nitrofurantoin, macrocrystal-monohydrate, (MACROBID) 100 MG capsule Take 1 capsule (100 mg total) by mouth 2 (two) times daily. 3/14/22   Rob Gates MD   omeprazole (PRILOSEC) 20 MG capsule Take 20 mg by mouth once daily. 2/15/22   Historical Provider   oxymorphone (OPANA ER) 20 MG 12 hr tablet Take 20 mg by mouth 2 (two) times daily.     Historical Provider   pregabalin (LYRICA) 150 MG capsule Take 150 mg by mouth 2 (two) times daily. 2/15/22   Historical Provider   rosuvastatin (CRESTOR) 20 MG tablet Take 20 mg by mouth.    Historical Provider   sterile water, bottle, irrigation  4/5/22   Historical Provider   TAPENTADOL HCL (NUCYNTA ORAL) Take 1 tablet by mouth 4 (four) times daily.    Historical Provider   tiZANidine (ZANAFLEX) 2 MG tablet Take 4 mg by mouth 3 (three) times daily. 11/23/21   Historical Provider   triamcinolone acetonide 0.1% (KENALOG) 0.1 % cream Apply topically daily as needed. 7/24/15   Historical Provider   VOLTAREN 1 % Gel Apply topically once daily. 7/24/15   Historical Provider   atorvastatin (LIPITOR) 20 MG tablet Take 20 mg by mouth once daily.  1/15/16   "Historical Provider     Allergies:  Adhesive, Neosporin [neomycin-bacitracin-polymyxin], Neomycin-polymyxin, Penicillins, and Latex  Past Medical/Surgical History:  Past Medical History:   Diagnosis Date    Behavioral problem     Bulging lumbar disc     Bursitis     bilateral hip    Degenerative cervical disc     Hx of psychiatric care     Hypercholesteremia     Labral tear of hip, degenerative bilateral    MS (multiple sclerosis)     Paresthesia of both lower extremities 3/13/2022    Pneumonia     Spinal cord compression      Past Surgical History:   Procedure Laterality Date    ANGIOGRAM, CORONARY, WITH LEFT HEART CATHETERIZATION Left 3/11/2022    Procedure: Angiogram, Coronary, with Left Heart Cath;  Surgeon: Elie Matamoros MD;  Location[facility];  Service: Cardiology;  Laterality: Left;    BREAST SURGERY      augmentation     SECTION, CLASSIC      HAND SURGERY      HYSTEROSCOPY      NECK SURGERY      cervical disc removeal    TUBAL LIGATION      X-STOP IMPLANTATION       OBJECTIVE     Vital Signs:  Temp:  [96.5 °F (35.8 °C)-98.4 °F (36.9 °C)]   Pulse:  [107-120]   Resp:  [16-18]   BP: (156-182)/()   SpO2:  [98 %-100 %]       Mental Status Exam:  Appearance: unremarkable, age appropriate  MSK: no abnormal involuntary movements  Level of Consciousness: awake  Behavior/Cooperation: eye contact minimal  Psychomotor: unremarkable   Speech: normal rate, normal pitch, normal volume  Language: english  Orientation:  disoriented to place, date, situation  Attention Span/Concentration:  impaired  Memory: impaired  Mood: "unable to ilicit"  Affect: labile  Thought Process: illogical  Associations: illogical  Thought Content: hallucinations: (auditory: yes, visual: yes)  Fund of Knowledge: Impaired  Abstraction: impaired  Insight: poor  Judgment: poor    Laboratory Data:  Recent Results (from the past 48 hour(s))   Urinalysis, Reflex to Urine Culture Urine, Clean Catch    Collection " Time: 04/10/22  3:40 PM    Specimen: Urine   Result Value Ref Range    Specimen UA Urine, Catheterized     Color, UA Yellow Yellow, Straw, [place]    Appearance, UA Clear Clear    pH, UA 5.0 5.0 - 8.0    Specific Gravity, UA 1.025 1.005 - 1.030    Protein, UA 1+ (A) Negative    Glucose, UA Negative Negative    Ketones, UA 1+ (A) Negative    Bilirubin (UA) Negative Negative    Occult Blood UA Negative Negative    Nitrite, UA Negative Negative    Leukocytes, UA Negative Negative   Drug screen panel, emergency    Collection Time: 04/10/22  3:40 PM   Result Value Ref Range    Benzodiazepines Presumptive Positive (A) Negative    Methadone metabolites Negative Negative    Cocaine (Metab.) Negative Negative    Opiate Scrn, Ur Negative Negative    Barbiturate Screen, Ur Negative Negative    Amphetamine Screen, Ur Negative Negative    THC Presumptive Positive (A) Negative    Phencyclidine Negative Negative    Creatinine, Urine 267.0 15.0 - 325.0 mg/dL    Toxicology Information SEE COMMENT    Urinalysis Microscopic    Collection Time: 04/10/22  3:40 PM   Result Value Ref Range    RBC, UA 3 0 - 4 /hpf    WBC, UA 2 0 - 5 /hpf    Bacteria Rare None-Occ /hpf    Hyaline Casts, UA 0 0-1/lpf /lpf    Microscopic Comment SEE COMMENT    CBC auto differential    Collection Time: 04/10/22  4:06 PM   Result Value Ref Range    WBC 5.91 3.90 - 12.70 K/uL    RBC 4.22 4.00 - 5.40 M/uL    Hemoglobin 12.7 12.0 - 16.0 g/dL    Hematocrit 37.8 37.0 - 48.5 %    MCV 90 82 - 98 fL    MCH 30.1 27.0 - 31.0 pg    MCHC 33.6 32.0 - 36.0 g/dL    RDW 12.9 11.5 - 14.5 %    Platelets 232 150 - 450 K/uL    MPV 10.6 9.2 - 12.9 fL    Immature Granulocytes 0.2 0.0 - 0.5 %    Gran # (ANC) 4.4 1.8 - 7.7 K/uL    Immature Grans (Abs) 0.01 0.00 - 0.04 K/uL    Lymph # 0.9 (L) 1.0 - 4.8 K/uL    Mono # 0.5 0.3 - 1.0 K/uL    Eos # 0.0 0.0 - 0.5 K/uL    Baso # 0.02 0.00 - 0.20 K/uL    nRBC 0 0 /100 WBC    Gran % 75.1 (H) 38.0 - 73.0 %    Lymph % 15.6 (L) 18.0 - 48.0 %     Mono % 8.5 4.0 - 15.0 %    Eosinophil % 0.3 0.0 - 8.0 %    Basophil % 0.3 0.0 - 1.9 %    Differential Method Automated    Comprehensive metabolic panel    Collection Time: 04/10/22  4:06 PM   Result Value Ref Range    Sodium 142 136 - 145 mmol/L    Potassium 3.6 3.5 - 5.1 mmol/L    Chloride 106 95 - 110 mmol/L    CO2 21 (L) 23 - 29 mmol/L    Glucose 113 (H) 70 - 110 mg/dL    BUN 16 6 - 20 mg/dL    Creatinine 0.8 0.5 - 1.4 mg/dL    Calcium 9.5 8.7 - 10.5 mg/dL    Total Protein 7.9 6.0 - 8.4 g/dL    Albumin 4.0 3.5 - 5.2 g/dL    Total Bilirubin 1.4 (H) 0.1 - 1.0 mg/dL    Alkaline Phosphatase 68 55 - 135 U/L    AST 49 (H) 10 - 40 U/L    ALT 38 10 - 44 U/L    Anion Gap 15 8 - 16 mmol/L    eGFR if African American >60.0 >60 mL/min/1.73 m^2    eGFR if non African American >60.0 >60 mL/min/1.73 m^2   TSH    Collection Time: 04/10/22  4:06 PM   Result Value Ref Range    TSH 0.109 (L) 0.400 - 4.000 uIU/mL   Ethanol    Collection Time: 04/10/22  4:06 PM   Result Value Ref Range    Alcohol, Serum <10 <10 mg/dL   Acetaminophen level    Collection Time: 04/10/22  4:06 PM   Result Value Ref Range    Acetaminophen (Tylenol), Serum <3.0 (L) 10.0 - 20.0 ug/mL   Salicylate level    Collection Time: 04/10/22  4:06 PM   Result Value Ref Range    Salicylate Lvl <5.0 (L) 15.0 - 30.0 mg/dL   Magnesium    Collection Time: 04/10/22  4:06 PM   Result Value Ref Range    Magnesium 1.7 1.6 - 2.6 mg/dL   Troponin I    Collection Time: 04/10/22  4:06 PM   Result Value Ref Range    Troponin I 0.007 0.000 - 0.026 ng/mL   Brain natriuretic peptide    Collection Time: 04/10/22  4:06 PM   Result Value Ref Range     (H) 0 - 99 pg/mL   T4, Free    Collection Time: 04/10/22  4:06 PM   Result Value Ref Range    Free T4 1.25 0.71 - 1.51 ng/dL   CK    Collection Time: 04/10/22  4:06 PM   Result Value Ref Range    CPK 1514 (H) 20 - 180 U/L   POCT COVID-19 Rapid Screening    Collection Time: 04/10/22  5:46 PM   Result Value Ref Range    POC Rapid COVID  Negative Negative     Acceptable Yes    Comprehensive Metabolic Panel (CMP)    Collection Time: 04/11/22  2:26 AM   Result Value Ref Range    Sodium 141 136 - 145 mmol/L    Potassium 4.1 3.5 - 5.1 mmol/L    Chloride 107 95 - 110 mmol/L    CO2 20 (L) 23 - 29 mmol/L    Glucose 83 70 - 110 mg/dL    BUN 14 6 - 20 mg/dL    Creatinine 0.7 0.5 - 1.4 mg/dL    Calcium 9.3 8.7 - 10.5 mg/dL    Total Protein 7.3 6.0 - 8.4 g/dL    Albumin 3.6 3.5 - 5.2 g/dL    Total Bilirubin 1.4 (H) 0.1 - 1.0 mg/dL    Alkaline Phosphatase 67 55 - 135 U/L    AST 33 10 - 40 U/L    ALT 28 10 - 44 U/L    Anion Gap 14 8 - 16 mmol/L    eGFR if African American >60.0 >60 mL/min/1.73 m^2    eGFR if non African American >60.0 >60 mL/min/1.73 m^2   Magnesium    Collection Time: 04/11/22  2:26 AM   Result Value Ref Range    Magnesium 1.7 1.6 - 2.6 mg/dL   Phosphorus    Collection Time: 04/11/22  2:26 AM   Result Value Ref Range    Phosphorus 2.8 2.7 - 4.5 mg/dL   CBC with Automated Differential    Collection Time: 04/11/22  2:26 AM   Result Value Ref Range    WBC 6.91 3.90 - 12.70 K/uL    RBC 4.21 4.00 - 5.40 M/uL    Hemoglobin 12.6 12.0 - 16.0 g/dL    Hematocrit 37.7 37.0 - 48.5 %    MCV 90 82 - 98 fL    MCH 29.9 27.0 - 31.0 pg    MCHC 33.4 32.0 - 36.0 g/dL    RDW 12.9 11.5 - 14.5 %    Platelets 219 150 - 450 K/uL    MPV 10.5 9.2 - 12.9 fL    Immature Granulocytes 0.3 0.0 - 0.5 %    Gran # (ANC) 5.1 1.8 - 7.7 K/uL    Immature Grans (Abs) 0.02 0.00 - 0.04 K/uL    Lymph # 1.2 1.0 - 4.8 K/uL    Mono # 0.6 0.3 - 1.0 K/uL    Eos # 0.0 0.0 - 0.5 K/uL    Baso # 0.03 0.00 - 0.20 K/uL    nRBC 0 0 /100 WBC    Gran % 73.3 (H) 38.0 - 73.0 %    Lymph % 17.1 (L) 18.0 - 48.0 %    Mono % 8.5 4.0 - 15.0 %    Eosinophil % 0.4 0.0 - 8.0 %    Basophil % 0.4 0.0 - 1.9 %    Differential Method Automated       No results found for: PHENYTOIN, PHENOBARB, VALPROATE, CBMZ  Imaging:  Imaging Results              X-Ray Chest AP Portable (Final result)  Result time  04/10/22 17:46:04      Final result by Satya Summers MD (04/10/22 17:46:04)                   Impression:      No evidence of acute cardiopulmonary disease.    Electronically signed by resident: Fan Hitchcock  Date:    04/10/2022  Time:    17:43    Electronically signed by: Satya Summers MD  Date:    04/10/2022  Time:    17:46               Narrative:    EXAMINATION:  XR CHEST AP PORTABLE    CLINICAL HISTORY:  ams;    TECHNIQUE:  Single frontal view of the chest was performed.    COMPARISON:  Chest radiograph 03/11/2022    FINDINGS:  Partially visualized cervical spinal hardware.  Overlying monitoring leads.    Lungs are symmetrically expanded.  No focal consolidation.  No pleural effusion or pneumothorax.    Trachea and mediastinal structures are midline.  Cardiomediastinal silhouette is unremarkable.    No acute osseous process.                                       CT Head Without Contrast (Final result)  Result time 04/10/22 17:45:37      Final result by Satya Summers MD (04/10/22 17:45:37)                   Impression:      No evidence of acute intracranial pathology.    Scattered foci of supratentorial white matter hypoattenuation in keeping with known history of multiple sclerosis.    Stable small focus of left occipital scalp thickening, may represent a complex sebaceous cyst.    Electronically signed by resident: Ron Mckenzie  Date:    04/10/2022  Time:    17:32    Electronically signed by: Satya Summers MD  Date:    04/10/2022  Time:    17:45               Narrative:    EXAMINATION:  CT HEAD WITHOUT CONTRAST    CLINICAL HISTORY:  Mental status change, unknown cause;    TECHNIQUE:  Low dose axial CT images obtained throughout the head without the use of intravenous contrast.  Axial, sagittal and coronal reconstructions were performed.    COMPARISON:  MRI brain demyelinating 03/13/2022, 06/04/2018; CT head 03/11/2022.    FINDINGS:  Intracranial compartment:    Ventricles and sulci are normal in size for  age without evidence of hydrocephalus.    Scattered punctate foci hypoattenuation throughout the supratentorial white matter in keeping with known history of multiple sclerosis, better evaluated on prior brain MRI.  No obvious new focal intraparenchymal lesions.    No parenchymal hemorrhage, edema, mass effect, or major vascular distribution infarct.    No extra-axial blood or fluid collections.    Skull/extracranial contents (limited evaluation):    Mastoid air cells and visualized paranasal sinuses are essentially clear.  1.0 cm rounded focus of hyperdense scalp thickening overlying the left occipital calvarium.  The bony calvarium is intact without evidence of acute displaced.                                         Hospital Course: No notes on file    No new subjective & objective note has been filed under this hospital service since the last note was generated.    Assessment/Plan:     * Altered mental state    ASSESSMENT     Ms. Morrow is a 56 year old female with a past psychiatric history of ?, currently presenting with Altered mental state.  Psychiatry was originally consulted to address the patient's symptoms of altered mental status.    IMPRESSION  Delirium    RECOMMENDATION(S)      1. Scheduled Medication(s):  None at this time.  Please hold psychotropic and sedating medications  Recommend consulting neurology    2. PRN Medication(s):  Haldol 2 mg q8 hrs PO/IM for non redirectable agitation    Monitor for possible benzo/opiate withdrawal     3.  Monitor:  Please obtain daily EKG to monitor QTc    4. Legal Status/Precaution(s):  Continue PEC/CEC at this time.    5. Other:  DELIRIUM BEHAVIOR MANAGEMENT   PLEASE utilize CHEMICAL restraints with PRN meds first for agitation. Minimize use of PHYSICAL restraints   Keep window shades open and room lit during day and room dim at night in order to promote normal sleep-wake cycles   Encourage family at bedside. [place] patient often to situation, location,  date.   Continue to Limit or Discontinue use of Narcotics, Benzos and Anti-cholinergic medications as they may worsen delirium.   Continue medical workup for causative etiology of Delirium.     Psychiatry will continue to follow.             Total Time:  60 minutes      Ronda Scott MD   Psychiatry  [facility]

## 2022-04-11 NOTE — CARE UPDATE
RAPID RESPONSE NURSE ROUND       Rounding completed with charge RN, Flavia. No concerns verbalized at this time. Instructed to call [phone] for further concerns or assistance.

## 2022-04-11 NOTE — PROGRESS NOTES
[facility]  Progress Note    Patient Name: Reza Morrow  MRN: [id number]  Patient Class: OP- Observation   Admission Date: 4/10/2022  Length of Stay: 0 days  Attending Physician: Madison Ring MD  Primary Care Provider: Kip Jimenez MD        Subjective:     Principal Problem:Altered mental state        HPI:  57 yo female with a PMHx of mutliple sclerosis, HFrEF (25% on echo from 3/12/22), stress cardiomyopathy, HLD, chronic pain, benzo dependence, opioid dependence, hypertension presenting for acute change in mental status. History obtained from chart review and collateral as patient unable to participate in history taking. She presented via EMS due to her son noticing erratic behavior, worsening since day before admission. Patient had a recent admission for confusion, weakness, chest pain. She was taken to cath lab, found to have no CAD. Echo showed reduced EF of 25%, suggestive of Takutsubo cardiomyopathy. Neurology consulted during that admission, set up outpatient follow up with MS clinic. She was DC on Lasix, metoprolol, and lisinopril.     Collateral obtained from son, Jono Ceballos. According to her son, she has not been completely back to the same since being discharged from [facility] on 3/14. She has been more aggravated, angry, and screaming at him since yesterday. She has been especially distressed, perseverating on her neurologist Dr. Frey telling her that she probably does not have MS. Patient saw Dr. Frey on March 23, where it was felt that her lesions and presentation do not resemble typical MS. Reportedly, patient has been distressed with being told this after believing that she has had MS for about 10 years. Her son states that he found her naked in his bed prior to her previous admission, and that presentation today was similar. She had been having visual and auditory hallucinations, talking to people who were not present, including her other son who passed away in  "2019. Her significant other passed away in 2017. Son denies any other recent family/friend deaths, but feels she has never fully coped well with her grief. Her denies finding any pill bottles or empty bottles at home. He verifies that she does take Flexeril, baclofen, tizanidine, robaxin, hydroxyzine, gabapentin, oxycontin for her chronic pain and anxiety. She has chronic back, hip, hand, foot pain. Son reports he is not aware of any etoh or illicit drug use. She has a prescription for medical marijuana. Former smoker. Son reports that she has had a previous psych inpatient stay, but denies any diagnosed primary psych disorder such as bipolar, schizophrenia.     Per chart review: In June 2016, she had made threats to commit suicide out of anger with not receiving certain medications, and later denied SI at discharge. Per psych notes patient was quoted saying "with my pain I could eat through that steel," "I have pain all day, I hold it in so noone sees it, I let it out at night." Pt then changes subject, reports the "scarlett I was with for 25 yrs just passed away, my kids been in and out of prison, but I'm fine, I don't want to kill myself." She had consistently asserted since admission that threats of self-harm were made in anger while frustrated by her insurer.  She had been calm and cooperative with no evidence of psychosis or and attempt to harm herself or others and no longer met commitment criteria.    Recent message on 3/31 on [employer] from patient states, "Since leaving the hospital over two weeks ago I have not felt well.  I have electrical shock going up and down both legs.  They are weak, numb, and I have tingling off and on.  I have fallen twice, I am dizzy and my balance is off.  I have restless legs at night and I have been having trouble sleeping.  I am nauseated and not eating well.  My vision is blurry and gets worse at times.  I have numbness and tingling in both arms and hands.  I'm anxious, depressed " "and I feel like i'm losing my mind.  How can I not have MS after being diagnosed and had MRI's every 6 months since being diagnosed?  I really need to be seen."        In the ED, she continues to have AMS, AH, VH. Afebrile, hypertensive, tachycardic, on room air. Labs significant for , CPK 1514, TSH 0.109, T4 wnl. Electrolytes wnl, CBC unremarkable. UA 1+ protein, 1+ ketones. Received 1 L NS.       Overview/Hospital Course:  Pt admitted for AMS, possibly due to polypharmacy or overdose from home medications. Pt does have suicidal thoughts and suicide history in the past. Psychiatry service consulted.       Interval History: Pt refused her medications overnight. Per sitter, pt did not sleep at all and was awake the entire night. Attempting to obtain collateral from patients son today     Review of Systems   Unable to perform ROS: Mental status change   Objective:     Vital Signs (Most Recent):  Temp: 96.5 °F (35.8 °C) (04/10/22 1946)  Pulse: (!) 114 (04/11/22 0746)  Resp: 18 (04/11/22 0746)  BP: (!) 166/98 (04/11/22 0746)  SpO2: 99 % (04/11/22 0746)   Vital Signs (24h Range):  Temp:  [96.5 °F (35.8 °C)-98.4 °F (36.9 °C)] 96.5 °F (35.8 °C)  Pulse:  [107-120] 114  Resp:  [16-18] 18  SpO2:  [98 %-100 %] 99 %  BP: (156-182)/() 166/98     Weight: 59 kg (130 lb)  Body mass index is 21.63 kg/m².  No intake or output data in the 24 hours ending 04/11/22 0858   Physical Exam  Constitutional:       General: She is not in acute distress.     Appearance: She is normal weight. She is not ill-appearing.   HENT:      Head: Normocephalic and atraumatic.      Nose: Nose normal.      Mouth/Throat:      Mouth: Mucous membranes are moist.   Eyes:      General: No scleral icterus.     Conjunctiva/sclera: Conjunctivae normal.   Cardiovascular:      Rate and Rhythm: Normal rate and regular rhythm.      Pulses: Normal pulses.      Heart sounds: No murmur heard.  Pulmonary:      Effort: Pulmonary effort is normal. No respiratory " distress.      Breath sounds: Normal breath sounds. No wheezing or rales.   Abdominal:      General: Abdomen is flat. Bowel sounds are normal. There is no distension.      Palpations: Abdomen is soft.      Tenderness: There is no abdominal tenderness. There is no guarding.   Musculoskeletal:         General: No swelling or tenderness. Normal range of motion.      Cervical back: Normal range of motion.      Right lower leg: No edema.      Left lower leg: No edema.   Skin:     General: Skin is warm and dry.      Capillary Refill: Capillary refill takes less than 2 seconds.      Coloration: Skin is not jaundiced.   Neurological:      Mental Status: She is alert.      Comments: Unable to assess 2/2 to patients AMS   Psychiatric:         Attention and Perception: She perceives auditory and visual hallucinations.         Mood and Affect: Affect is labile.         Speech: Speech is rapid and pressured and tangential.         Behavior: Behavior is agitated.         Cognition and Memory: Cognition is impaired.      Comments: Tangential, word salad. Does not respond appropriately to questions. Cooperative. P staring up into corner of room    AAOx0       Significant Labs: All pertinent labs within the past 24 hours have been reviewed.    Significant Imaging: I have reviewed all pertinent imaging results/findings within the past 24 hours.      Assessment/Plan:      * Altered mental state  55 yo female with a PMHx of mutliple sclerosis, HFrEF (25% on echo from 3/12/22), stress cardiomyopathy, HLD, chronic pain, benzo dependence, opioid dependence, hypertension presenting for acute change in mental status. Patient actively hallucinating in ED with VH and AH. Does not respond appropriately, mumbling to herself. DDx includes benzo overuse or withdrawal, marijuana induced psychosis vs polypharmacy vs depression with psychotic features vs MS flare. Less likely septic encephalopathy or electrolyte derangements. CT head unremarkable.  Labs unremarkable other than elevated BNP (significantly lower than previous during HF exacerbation) and low TSH.     - Hold all opioids and benzos  - psych consulted, appreciate recs  - Consider neuro consult if focal neuro deficits.   - Will await improvement as medications are metabolized  - Ask her son to bring in medications to verify what she has at home, attempting collateral today       Hypertension  Hypertensive to 180s/110 on day of admission. Has been normotensive on previous admission. Home meds: lisinopril 2.5 mg daily, metoprolol 25mg daily.     - Started valsartan 40 BID, metoprolol 25 daily.   - prn IV hydralazine      Psychosis  55 yo F presenting with AH and VH.     See AMS      MS (multiple sclerosis)  Long hx of MS for which she has been on Copaxone. Recently  Saw Dr. Frey who was skeptical that MS was her true diagnosis vs MS mimic.     - Consult neuro if suspicion for MS flare      Chronic pain syndrome  History of chronic back, hip, hand, foot pain. Oxymorphone, mobic, voltaren, Nucynta on med history. Unclear which/how much she takes regularly. Not c/o of any pain on admission. UA negative for opiates.     - Hold opioids      Acute systolic heart failure  Diagnosed with HFrEF of 25% last admission and started on lasix, metoprolol, lisinopril for GDMT.     - Will start valsartan while admitted.   - consider entresto at discharge or start while admitted with f/u at HF transitional clinic      Benzodiazepine dependence  UA positive for benzos.     - Hold while patient is acutely altered/psychotic.   - Will consider restarting if signs of withdrawal become apparent.  - Pt does not have benzos on home med list, but per son she has been dependent of them for many years. Contacting son today for collateral    Opioid dependence  Has history of chronic pain, multiple opiates on med rec.     - Hold opiates while she's altered        VTE Risk Mitigation (From admission, onward)         Ordered      enoxaparin injection 40 mg  Daily         04/10/22 2001     IP VTE HIGH RISK PATIENT  Once         04/10/22 2001     Place sequential compression device  Until discontinued         04/10/22 2001                Discharge Planning   JOANNE: 4/12/2022     Code Status: Full Code   Is the patient medically ready for discharge?: No    Reason for patient still in hospital (select all that apply): Treatment  Discharge Plan A: [facility]            Rika Alfaro MD  Department of Hospital Medicine   [facility]

## 2022-04-11 NOTE — NURSING
Patient refused food/medications this morning. Patient is responding to internal stimuli and hallucinating family and friends in the room. Patient is highly suspicious of staff and believes we are trying to poison her food and steal her body parts when taking vitals. Patient this morning is not aggressive or combative, just simply refusing interventions. MD notified.

## 2022-04-11 NOTE — ASSESSMENT & PLAN NOTE
Diagnosed with HFrEF of 25% last admission and started on lasix, metoprolol, lisinopril for GDMT.     - Will start valsartan while admitted.   - consider entresto at discharge with f/u at HF transitional clinic

## 2022-04-11 NOTE — PROGRESS NOTES
04/10/22 1946   Vital Signs   Temp 96.5 °F (35.8 °C)   Pulse 107   Resp 17   SpO2 100 %   BP (!) 168/93   MAP (mmHg) 125       Pt transferred to unit from ed via stretcher. Pt alert but confused and hallucinating. Pt thinks she is dead . Pt reoriented to hospital. Pt doesn't answer any questions appropriately. Pt rambles on from one thing to next. Pt is mildly agitated and anxious  right now.  Sitter at bedside. I will continue to monitor.

## 2022-04-11 NOTE — NURSING
Pt refused all meds at this time . Pt is hallucinating about dogs being in room and seeing red everywhere in room . Pt is mildly agitated . IM1 will be notified and updated .

## 2022-04-11 NOTE — ASSESSMENT & PLAN NOTE
Hypertensive to 180s/110 on day of admission. Has been normotensive on previous admission. Home meds: lisinopril 2.5 mg daily, metoprolol 25mg daily.     - Started valsartan 40 BID, metoprolol 25 daily.   - prn IV hydralazine

## 2022-04-11 NOTE — ASSESSMENT & PLAN NOTE
ASSESSMENT     Ms. Morrow is a 56 year old female with a past psychiatric history of ?, currently presenting with Altered mental state.  Psychiatry was originally consulted to address the patient's symptoms of altered mental status.    IMPRESSION  Delirium    RECOMMENDATION(S)      1. Scheduled Medication(s):  None at this time.  Please hold psychotropic and sedating medications  Recommend consulting neurology    2. PRN Medication(s):  Haldol 2 mg q8 hrs PO/IM for non redirectable agitation    Monitor for possible benzo/opiate withdrawal     3.  Monitor:  Please obtain daily EKG to monitor QTc    4. Legal Status/Precaution(s):  Continue PEC/CEC at this time.    5. Other:  DELIRIUM BEHAVIOR MANAGEMENT   PLEASE utilize CHEMICAL restraints with PRN meds first for agitation. Minimize use of PHYSICAL restraints   Keep window shades open and room lit during day and room dim at night in order to promote normal sleep-wake cycles   Encourage family at bedside. [place] patient often to situation, location, date.   Continue to Limit or Discontinue use of Narcotics, Benzos and Anti-cholinergic medications as they may worsen delirium.   Continue medical workup for causative etiology of Delirium.     Psychiatry will continue to follow.

## 2022-04-11 NOTE — HPI
Consultation-Liaison Psychiatry Consult Note    4/11/2022 10:35 AM  Reza Morrow  MRN: [id number]    Chief Complaint / Reason for Consult: AMS     SUBJECTIVE     History of Present Illness:   Reza Morrow is a 56 y.o. female with a past psychiatric history of unknown, currently presenting with Altered mental state.  Psychiatry was originally consulted to address the patient's symptoms of AMS.    Per Primary MD:  55 yo female with a PMHx of mutliple sclerosis, HFrEF (25% on echo from 3/12/22), stress cardiomyopathy, HLD, chronic pain, benzo dependence, opioid dependence, hypertension presenting for acute change in mental status. History obtained from chart review and collateral as patient unable to participate in history taking. She presented via EMS due to her son noticing erratic behavior, worsening since day before admission. Patient had a recent admission for confusion, weakness, chest pain. She was taken to cath lab, found to have no CAD. Echo showed reduced EF of 25%, suggestive of Takutsubo cardiomyopathy. Neurology consulted during that admission, set up outpatient follow up with MS clinic. She was DC on Lasix, metoprolol, and lisinopril.      Collateral obtained from son, Jono Ceballos. According to her son, she has not been completely back to the same since being discharged from [facility] on 3/14. She has been especially distressed, perseverating on her neurologist Dr. Frey telling her that she probably does not have MS. Patient saw Dr. Frey on March 23, where it was felt that her lesions and presentation do not resemble typical MS. Reportedly, patient has been distressed with being told this after believing that she has had MS for about 10 years. Her son states that he found her naked in his bed prior to her previous admission, and that presentation today was similar. She had been having visual and auditory hallucinations, talking to people who were not present, including her other son who passed away in  "2019. Her significant other passed away in 2017. Son denies any other recent family/friend deaths, but feels she has never fully coped well with her grief. Her denies finding any pill bottles or empty bottles at home. He verifies that she does take Flexeril, baclofen, tizanidine, robaxin, hydroxyzine, gabapentin, oxycontin for her chronic pain and anxiety. She has chronic back, hip, hand, foot pain. Son reports he is not aware of any etoh or illicit drug use. She has a prescription for medical marijuana. Former smoker. Son reports that she has had a previous psych inpatient stay, but denies any diagnosed primary psych disorder such as bipolar, schizophrenia.      Per chart review: In June 2016, she had made threats to commit suicide out of anger with not receiving certain medications, and later denied SI at discharge.     Recent message on 3/31 on [employer] from patient states, "Since leaving the hospital over two weeks ago I have not felt well.  I have electrical shock going up and down both legs.  They are weak, numb, and I have tingling off and on.  I have fallen twice, I am dizzy and my balance is off.  I have restless legs at night and I have been having trouble sleeping.  I am nauseated and not eating well.  My vision is blurry and gets worse at times.  I have numbness and tingling in both arms and hands.  I'm anxious, depressed and I feel like i'm losing my mind.  How can I not have MS after being diagnosed and had MRI's every 6 months since being diagnosed?  I really need to be seen."         In the ED, she continues to have AMS, AH, VH. Labs significant for , CPK 1514, TSH 0.109. Electrolytes wnl, CBC unremarkable. UA 1+ protein, 1+ ketones. Received 1 L NS    Per C-L Psych MD:  Ms. Morrow is sitting in bed, just urinated on the floor. Her speech is nonsensical and illogical. Disoriented to person, place, situation, time. Actively hallucination.    Collateral:  Son: Jono Ceballos   Notices change in " "behavior over last year. One week ago, working outside for 3 days and when saw her next, she was in room, naked saying that clothes burning her skin. States that she was not taking home medications. Diagnosed with broken heart syndrome. After discharged, pt followed up with neurologist one week ago who told her that she does not have MS, which greatly upset pt. Friday, pt started not feeling well, "lost  on reality on Saturday," stating he was seeing dead people. On , she was doing worse, disoriented to self and her son.    Psychiatric Review Of Systems - Is patient experiencing or having changes in (obtained by son Mr. Ceballos):  sleep: yes, decrease  appetite: yes, decrease  weight: no  energy/anergy: yes  interest/pleasure/anhedonia: yes  somatic symptoms: yes  guilty/hopelessness: no  concentration: yes  S.I.B.s/risky behavior: no  SI/SA:  no    anxiety/panic: yes  Agoraphobia:  no  Social phobia:  no  Recurrent nightmares:  no  hyper startle response:  no  Avoidance: no  Recurrent thoughts:  yes  Recurrent behaviors:  yes    Irritability: yes  Racing thoughts: yes  Impulsive behaviors: yes  Pressured speech:  no    Paranoia:no  Delusions: yes  AVH:yes    Psychiatric History:  Diagnose(s): {unknown  Previous Medication Trials: Yes - doxipin, possibly others  Previous Psychiatric Hospitalizations: unknown  Previous Suicide Attempts: unknown  History of Violence: unknown  Outpatient Psychiatrist: No    Social History:  Marital Status: not   Children: son, one son    Employment Status:  not working  Education:  unable to assess  Special Ed: unknown   History: no  Housing Status: alone, son visits almost daily  Developmental History: no  History of Abuse: denies  Access to Gun: denies    Substance Abuse History:  Recreational Drugs: medical THC  Use of Alcohol: occasional, social use  Rehab History: No  Tobacco Use: No  Use of Caffeine: unknown  Use of OTC: No  Legal consequences " of chemical use: No  Is the patient aware of the biomedical complications associated with substance abuse and mental illness? No    Legal History:  Past Charges/Incarcerations: No  Pending Charges: No    Family Psychiatric History:   Denies    Psychosocial Factors:  Psychosocial Stressors: family and health.   Functioning Relationships: good support system  Maladaptive or problem behaviors: No  Peer group, social, ethic, cultural, emotional, and health factors: Yes - MS vs other diagnosis  Living situation, family constellation, family circumstances/home: No  Recovery environment: No  Community resources used by patient: No  Treatment acceptance/motivation for change: unable to determine at this time    Medical Review Of Systems:  Complete review of systems performed covering Constitutional, Eyes, ENT/Mouth, Cardiovascular, Respiratory, Gastrointestinal, Genitourinary, Musculoskeletal, Skin, Neurologic, Endocrine, Heme/Lymph, and Allergy/Immune.     Complete review of systems was negative with the exception of the following positive symptoms: pt unable to provide meaningful responses at this time    Scheduled Meds:   atorvastatin  40 mg Oral Daily    enoxaparin  40 mg Subcutaneous Daily    metoprolol succinate  25 mg Oral Daily    valsartan  40 mg Oral BID     acetaminophen, dextrose 10%, dextrose 10%, glucagon (human recombinant), glucose, glucose, hydrALAZINE, melatonin, naloxone, sodium chloride 0.9%  Psychotherapeutics (From admission, onward)                None          PRN Meds:  acetaminophen, dextrose 10%, dextrose 10%, glucagon (human recombinant), glucose, glucose, hydrALAZINE, melatonin, naloxone, sodium chloride 0.9%  Home Meds:  Prior to Admission medications    Medication Sig Start Date End Date Taking? Authorizing Provider   glatiramer (COPAXONE, GLATOPA) 40 mg/mL injection Inject 40 mg into the skin. 8/23/21  Yes Historical Provider   methocarbamoL (ROBAXIN) 500 MG Tab Take 750 mg by mouth. 6/14/21   Yes Historical Provider   miscellaneous medical supply (C-TUB) Misc Lidocaine 5%, Prilocaine 2%, Meloxicam 0.9%, Lamotrigine 2.5%, Gabapentin 6% cream apply up 3 gms 5 times a day to affected area 7/26/21  Yes Historical Provider   baclofen (LIORESAL) 10 MG tablet Take 20 mg by mouth 2 (two) times daily. 10/25/21   Historical Provider   buPROPion (WELLBUTRIN XL) 150 MG TB24 tablet Take 150 mg by mouth 2 (two) times daily. 10/21/21   Historical Provider   cyclobenzaprine (FLEXERIL) 10 MG tablet Take 10 mg by mouth 3 (three) times daily as needed. 2/15/22   Historical Provider   doxepin (SINEQUAN) 25 MG capsule Take 25 mg by mouth nightly as needed. 7/7/15   Historical Provider   ergocalciferol (ERGOCALCIFEROL) 50,000 unit Cap Take 50,000 Units by mouth every 7 days. 11/23/21   Historical Provider   EScitalopram oxalate (LEXAPRO) 10 MG tablet Take 10 mg by mouth once daily. 2/15/22   Historical Provider   furosemide (LASIX) 40 MG tablet Weigh yourself daily, take 1 tab if weight increases by 3 lbs in 24 hours or if weight increases by 5 lbs in one week. 3/14/22   Rob Gates MD   gabapentin (NEURONTIN) 300 MG capsule Take 300 mg by mouth 3 (three) times daily as needed. 7/24/15   Historical Provider   hydrocodone-acetaminophen 10-325mg (NORCO)  mg Tab Take 1 tablet by mouth every 4 (four) hours as needed.    Historical Provider   hydrOXYzine pamoate (VISTARIL) 25 MG Cap Take 25 mg by mouth 3 (three) times daily as needed. 7/24/15   Historical Provider   lamoTRIgine (LAMICTAL) 200 MG tablet Take by mouth. 2/17/22   Historical Provider   lisinopriL (PRINIVIL,ZESTRIL) 2.5 MG tablet Take 1 tablet (2.5 mg total) by mouth once daily. 3/15/22 3/15/23  Rob Gates MD   meloxicam (MOBIC) 7.5 MG tablet Take 7.5 mg by mouth 2 (two) times daily. 2/15/22   Historical Provider   metoprolol succinate (TOPROL-XL) 25 MG 24 hr tablet Take 1 tablet (25 mg total) by mouth once daily. 3/15/22 3/15/23  Rob SANTOYO  MD [place]   metronidazole 1% (METROGEL) 1 % Gel Apply topically daily as needed. 7/20/15   Historical Provider   NARCAN 4 mg/actuation Spry SMARTSIG:Both Nares 2/15/22   Historical Provider   nitrofurantoin, macrocrystal-monohydrate, (MACROBID) 100 MG capsule Take 1 capsule (100 mg total) by mouth 2 (two) times daily. 3/14/22   Rob Gates MD   omeprazole (PRILOSEC) 20 MG capsule Take 20 mg by mouth once daily. 2/15/22   Historical Provider   oxymorphone (OPANA ER) 20 MG 12 hr tablet Take 20 mg by mouth 2 (two) times daily.     Historical Provider   pregabalin (LYRICA) 150 MG capsule Take 150 mg by mouth 2 (two) times daily. 2/15/22   Historical Provider   rosuvastatin (CRESTOR) 20 MG tablet Take 20 mg by mouth.    Historical Provider   sterile water, bottle, irrigation  4/5/22   Historical Provider   TAPENTADOL HCL (NUCYNTA ORAL) Take 1 tablet by mouth 4 (four) times daily.    Historical Provider   tiZANidine (ZANAFLEX) 2 MG tablet Take 4 mg by mouth 3 (three) times daily. 11/23/21   Historical Provider   triamcinolone acetonide 0.1% (KENALOG) 0.1 % cream Apply topically daily as needed. 7/24/15   Historical Provider   VOLTAREN 1 % Gel Apply topically once daily. 7/24/15   Historical Provider   atorvastatin (LIPITOR) 20 MG tablet Take 20 mg by mouth once daily.  1/15/16  Historical Provider     Allergies:  Adhesive, Neosporin [neomycin-bacitracin-polymyxin], Neomycin-polymyxin, Penicillins, and Latex  Past Medical/Surgical History:  Past Medical History:   Diagnosis Date    Behavioral problem     Bulging lumbar disc     Bursitis     bilateral hip    Degenerative cervical disc     Hx of psychiatric care     Hypercholesteremia     Labral tear of hip, degenerative bilateral    MS (multiple sclerosis)     Paresthesia of both lower extremities 3/13/2022    Pneumonia     Spinal cord compression      Past Surgical History:   Procedure Laterality Date    ANGIOGRAM, CORONARY, WITH LEFT HEART CATHETERIZATION Left  "3/11/2022    Procedure: Angiogram, Coronary, with Left Heart Cath;  Surgeon: Elie Matamoros MD;  Location: [facility] CATH LAB;  Service: Cardiology;  Laterality: Left;    BREAST SURGERY      augmentation     SECTION, CLASSIC      HAND SURGERY      HYSTEROSCOPY      NECK SURGERY      cervical disc removeal    TUBAL LIGATION      X-STOP IMPLANTATION       OBJECTIVE     Vital Signs:  Temp:  [96.5 °F (35.8 °C)-98.4 °F (36.9 °C)]   Pulse:  [107-120]   Resp:  [16-18]   BP: (156-182)/()   SpO2:  [98 %-100 %]       Mental Status Exam:  Appearance: unremarkable, age appropriate  MSK: no abnormal involuntary movements  Level of Consciousness: awake  Behavior/Cooperation: eye contact minimal  Psychomotor: unremarkable   Speech: normal rate, normal pitch, normal volume  Language: english  Orientation:  disoriented to place, date, situation  Attention Span/Concentration:  impaired  Memory: impaired  Mood: "unable to ilicit"  Affect: labile  Thought Process: illogical  Associations: illogical  Thought Content: hallucinations: (auditory: yes, visual: yes)  Fund of Knowledge: Impaired  Abstraction: impaired  Insight: poor  Judgment: poor    Laboratory Data:  Recent Results (from the past 48 hour(s))   Urinalysis, Reflex to Urine Culture Urine, Clean Catch    Collection Time: 04/10/22  3:40 PM    Specimen: Urine   Result Value Ref Range    Specimen UA Urine, Catheterized     Color, UA Yellow Yellow, Straw, [place]    Appearance, UA Clear Clear    pH, UA 5.0 5.0 - 8.0    Specific Gravity, UA 1.025 1.005 - 1.030    Protein, UA 1+ (A) Negative    Glucose, UA Negative Negative    Ketones, UA 1+ (A) Negative    Bilirubin (UA) Negative Negative    Occult Blood UA Negative Negative    Nitrite, UA Negative Negative    Leukocytes, UA Negative Negative   Drug screen panel, emergency    Collection Time: 04/10/22  3:40 PM   Result Value Ref Range    Benzodiazepines Presumptive Positive (A) Negative    Methadone metabolites Negative " Negative    Cocaine (Metab.) Negative Negative    Opiate Scrn, Ur Negative Negative    Barbiturate Screen, Ur Negative Negative    Amphetamine Screen, Ur Negative Negative    THC Presumptive Positive (A) Negative    Phencyclidine Negative Negative    Creatinine, Urine 267.0 15.0 - 325.0 mg/dL    Toxicology Information SEE COMMENT    Urinalysis Microscopic    Collection Time: 04/10/22  3:40 PM   Result Value Ref Range    RBC, UA 3 0 - 4 /hpf    WBC, UA 2 0 - 5 /hpf    Bacteria Rare None-Occ /hpf    Hyaline Casts, UA 0 0-1/lpf /lpf    Microscopic Comment SEE COMMENT    CBC auto differential    Collection Time: 04/10/22  4:06 PM   Result Value Ref Range    WBC 5.91 3.90 - 12.70 K/uL    RBC 4.22 4.00 - 5.40 M/uL    Hemoglobin 12.7 12.0 - 16.0 g/dL    Hematocrit 37.8 37.0 - 48.5 %    MCV 90 82 - 98 fL    MCH 30.1 27.0 - 31.0 pg    MCHC 33.6 32.0 - 36.0 g/dL    RDW 12.9 11.5 - 14.5 %    Platelets 232 150 - 450 K/uL    MPV 10.6 9.2 - 12.9 fL    Immature Granulocytes 0.2 0.0 - 0.5 %    Gran # (ANC) 4.4 1.8 - 7.7 K/uL    Immature Grans (Abs) 0.01 0.00 - 0.04 K/uL    Lymph # 0.9 (L) 1.0 - 4.8 K/uL    Mono # 0.5 0.3 - 1.0 K/uL    Eos # 0.0 0.0 - 0.5 K/uL    Baso # 0.02 0.00 - 0.20 K/uL    nRBC 0 0 /100 WBC    Gran % 75.1 (H) 38.0 - 73.0 %    Lymph % 15.6 (L) 18.0 - 48.0 %    Mono % 8.5 4.0 - 15.0 %    Eosinophil % 0.3 0.0 - 8.0 %    Basophil % 0.3 0.0 - 1.9 %    Differential Method Automated    Comprehensive metabolic panel    Collection Time: 04/10/22  4:06 PM   Result Value Ref Range    Sodium 142 136 - 145 mmol/L    Potassium 3.6 3.5 - 5.1 mmol/L    Chloride 106 95 - 110 mmol/L    CO2 21 (L) 23 - 29 mmol/L    Glucose 113 (H) 70 - 110 mg/dL    BUN 16 6 - 20 mg/dL    Creatinine 0.8 0.5 - 1.4 mg/dL    Calcium 9.5 8.7 - 10.5 mg/dL    Total Protein 7.9 6.0 - 8.4 g/dL    Albumin 4.0 3.5 - 5.2 g/dL    Total Bilirubin 1.4 (H) 0.1 - 1.0 mg/dL    Alkaline Phosphatase 68 55 - 135 U/L    AST 49 (H) 10 - 40 U/L    ALT 38 10 - 44 U/L     Anion Gap 15 8 - 16 mmol/L    eGFR if African American >60.0 >60 mL/min/1.73 m^2    eGFR if non African American >60.0 >60 mL/min/1.73 m^2   TSH    Collection Time: 04/10/22  4:06 PM   Result Value Ref Range    TSH 0.109 (L) 0.400 - 4.000 uIU/mL   Ethanol    Collection Time: 04/10/22  4:06 PM   Result Value Ref Range    Alcohol, Serum <10 <10 mg/dL   Acetaminophen level    Collection Time: 04/10/22  4:06 PM   Result Value Ref Range    Acetaminophen (Tylenol), Serum <3.0 (L) 10.0 - 20.0 ug/mL   Salicylate level    Collection Time: 04/10/22  4:06 PM   Result Value Ref Range    Salicylate Lvl <5.0 (L) 15.0 - 30.0 mg/dL   Magnesium    Collection Time: 04/10/22  4:06 PM   Result Value Ref Range    Magnesium 1.7 1.6 - 2.6 mg/dL   Troponin I    Collection Time: 04/10/22  4:06 PM   Result Value Ref Range    Troponin I 0.007 0.000 - 0.026 ng/mL   Brain natriuretic peptide    Collection Time: 04/10/22  4:06 PM   Result Value Ref Range     (H) 0 - 99 pg/mL   T4, Free    Collection Time: 04/10/22  4:06 PM   Result Value Ref Range    Free T4 1.25 0.71 - 1.51 ng/dL   CK    Collection Time: 04/10/22  4:06 PM   Result Value Ref Range    CPK 1514 (H) 20 - 180 U/L   POCT COVID-19 Rapid Screening    Collection Time: 04/10/22  5:46 PM   Result Value Ref Range    POC Rapid COVID Negative Negative     Acceptable Yes    Comprehensive Metabolic Panel (CMP)    Collection Time: 04/11/22  2:26 AM   Result Value Ref Range    Sodium 141 136 - 145 mmol/L    Potassium 4.1 3.5 - 5.1 mmol/L    Chloride 107 95 - 110 mmol/L    CO2 20 (L) 23 - 29 mmol/L    Glucose 83 70 - 110 mg/dL    BUN 14 6 - 20 mg/dL    Creatinine 0.7 0.5 - 1.4 mg/dL    Calcium 9.3 8.7 - 10.5 mg/dL    Total Protein 7.3 6.0 - 8.4 g/dL    Albumin 3.6 3.5 - 5.2 g/dL    Total Bilirubin 1.4 (H) 0.1 - 1.0 mg/dL    Alkaline Phosphatase 67 55 - 135 U/L    AST 33 10 - 40 U/L    ALT 28 10 - 44 U/L    Anion Gap 14 8 - 16 mmol/L    eGFR if African American >60.0 >60  mL/min/1.73 m^2    eGFR if non African American >60.0 >60 mL/min/1.73 m^2   Magnesium    Collection Time: 04/11/22  2:26 AM   Result Value Ref Range    Magnesium 1.7 1.6 - 2.6 mg/dL   Phosphorus    Collection Time: 04/11/22  2:26 AM   Result Value Ref Range    Phosphorus 2.8 2.7 - 4.5 mg/dL   CBC with Automated Differential    Collection Time: 04/11/22  2:26 AM   Result Value Ref Range    WBC 6.91 3.90 - 12.70 K/uL    RBC 4.21 4.00 - 5.40 M/uL    Hemoglobin 12.6 12.0 - 16.0 g/dL    Hematocrit 37.7 37.0 - 48.5 %    MCV 90 82 - 98 fL    MCH 29.9 27.0 - 31.0 pg    MCHC 33.4 32.0 - 36.0 g/dL    RDW 12.9 11.5 - 14.5 %    Platelets 219 150 - 450 K/uL    MPV 10.5 9.2 - 12.9 fL    Immature Granulocytes 0.3 0.0 - 0.5 %    Gran # (ANC) 5.1 1.8 - 7.7 K/uL    Immature Grans (Abs) 0.02 0.00 - 0.04 K/uL    Lymph # 1.2 1.0 - 4.8 K/uL    Mono # 0.6 0.3 - 1.0 K/uL    Eos # 0.0 0.0 - 0.5 K/uL    Baso # 0.03 0.00 - 0.20 K/uL    nRBC 0 0 /100 WBC    Gran % 73.3 (H) 38.0 - 73.0 %    Lymph % 17.1 (L) 18.0 - 48.0 %    Mono % 8.5 4.0 - 15.0 %    Eosinophil % 0.4 0.0 - 8.0 %    Basophil % 0.4 0.0 - 1.9 %    Differential Method Automated       No results found for: PHENYTOIN, PHENOBARB, VALPROATE, CBMZ  Imaging:  Imaging Results              X-Ray Chest AP Portable (Final result)  Result time 04/10/22 17:46:04      Final result by Satya Summers MD (04/10/22 17:46:04)                   Impression:      No evidence of acute cardiopulmonary disease.    Electronically signed by resident: Fan Hitchcock  Date:    04/10/2022  Time:    17:43    Electronically signed by: Satya Summers MD  Date:    04/10/2022  Time:    17:46               Narrative:    EXAMINATION:  XR CHEST AP PORTABLE    CLINICAL HISTORY:  ams;    TECHNIQUE:  Single frontal view of the chest was performed.    COMPARISON:  Chest radiograph 03/11/2022    FINDINGS:  Partially visualized cervical spinal hardware.  Overlying monitoring leads.    Lungs are symmetrically expanded.  No  focal consolidation.  No pleural effusion or pneumothorax.    Trachea and mediastinal structures are midline.  Cardiomediastinal silhouette is unremarkable.    No acute osseous process.                                       CT Head Without Contrast (Final result)  Result time 04/10/22 17:45:37      Final result by Satya Summers MD (04/10/22 17:45:37)                   Impression:      No evidence of acute intracranial pathology.    Scattered foci of supratentorial white matter hypoattenuation in keeping with known history of multiple sclerosis.    Stable small focus of left occipital scalp thickening, may represent a complex sebaceous cyst.    Electronically signed by resident: Ron Mckenzie  Date:    04/10/2022  Time:    17:32    Electronically signed by: Satya Summers MD  Date:    04/10/2022  Time:    17:45               Narrative:    EXAMINATION:  CT HEAD WITHOUT CONTRAST    CLINICAL HISTORY:  Mental status change, unknown cause;    TECHNIQUE:  Low dose axial CT images obtained throughout the head without the use of intravenous contrast.  Axial, sagittal and coronal reconstructions were performed.    COMPARISON:  MRI brain demyelinating 03/13/2022, 06/04/2018; CT head 03/11/2022.    FINDINGS:  Intracranial compartment:    Ventricles and sulci are normal in size for age without evidence of hydrocephalus.    Scattered punctate foci hypoattenuation throughout the supratentorial white matter in keeping with known history of multiple sclerosis, better evaluated on prior brain MRI.  No obvious new focal intraparenchymal lesions.    No parenchymal hemorrhage, edema, mass effect, or major vascular distribution infarct.    No extra-axial blood or fluid collections.    Skull/extracranial contents (limited evaluation):    Mastoid air cells and visualized paranasal sinuses are essentially clear.  1.0 cm rounded focus of hyperdense scalp thickening overlying the left occipital calvarium.  The bony calvarium is intact without  evidence of acute displaced.

## 2022-04-11 NOTE — PHARMACY MED REC
"Admission Medication History     The home medication history was taken by Carmenza Salvador.    You may go to "Admission" then "Reconcile Home Medications" tabs to review and/or act upon these items.      The home medication list has been updated by the Pharmacy department.    Please read ALL comments highlighted in yellow.    Please address this information as you see fit.     Feel free to contact us if you have any questions or require assistance.      The medications listed below were removed from the home medication list. Please reorder if appropriate:  Patient reports no longer taking the following medication(s):   ROSUVASTATIN 20 MG   BACLOFEN 10 MG   BUPROPION  MG   DOXEPIN 25 MG   GLATIRAMER 40 MG/ML INJ   METRONIDAZOLE 1 % GEL   NARCAN SPRY   MACROBID 100 MG   TIZANIDINE 2 MG    KENALOG 0.1 % CRM    Medications listed below were obtained from: Family member, [facility] Patient's pharmacy    Current Outpatient Medications on File Prior to Encounter   Medication Sig    cyclobenzaprine (FLEXERIL) 10 MG tablet   Take 10 mg by mouth 3 (three) times daily as needed.    ergocalciferol (ERGOCALCIFEROL) 50,000 unit Cap   Take 50,000 Units by mouth every 7 days.    EScitalopram oxalate (LEXAPRO) 10 MG tablet   Take 10 mg by mouth once daily.    furosemide (LASIX) 40 MG tablet   Weigh yourself daily, take 1 tab if weight increases by 3 lbs in 24 hours or if weight increases by 5 lbs in one week.    lisinopriL (PRINIVIL,ZESTRIL) 2.5 MG tablet   Take 1 tablet (2.5 mg total) by mouth once daily.    methocarbamoL (ROBAXIN) 500 MG Tab   Take 1,000 mg by mouth 2 (two) times daily as needed (Muscle spasms).    metoprolol succinate (TOPROL-XL) 25 MG 24 hr tablet   Take 1 tablet (25 mg total) by mouth once daily.    miscellaneous medical supply (C-TUB) Misc Lidocaine 5%, Prilocaine 2%, Meloxicam 0.9%, Lamotrigine 2.5%, Gabapentin 6% cream apply up 3 gms 5 times a day to affected area      naproxen sodium " (ALEVE ORAL)   Take 1 tablet by mouth daily as needed (Pain).    omeprazole (PRILOSEC) 20 MG capsule   Take 20 mg by mouth daily as needed (Acid reflux).    pregabalin (LYRICA) 150 MG capsule   Take 150 mg by mouth 2 (two) times daily.    hydrOXYzine pamoate (VISTARIL) 25 MG Cap   Take 25 mg by mouth 3 (three) times daily as needed.    sterile water, bottle, irrigation       UNABLE TO FIND Diclofenac Na/Lidocaine HCL 0.925/0.9% Gel (w/w) - Apply 2 gm to the affected area 3 to 4 times daily as needed for pain.      UNABLE TO FIND Gabapentin 4% Lipomax Cream- apply 1 gm to the affected area 3 to 4 times daily as needed for pain.      UNABLE TO FIND Ketamine HCL 10 %/Lipomax Crm - Apply 1 gm to the affected area 3 to 4 times daily for pain.      VOLTAREN 1 % Gel   Apply topically once daily.       Potential issues to be addressed PRIOR TO DISCHARGE  Patient reported not taking the following medications: (OPANA, NORCO). These medications remain on the home medication list. Please address accordingly.     Please discuss with the patient barriers to adherence with medication treatment plans    Patient requires education regarding drug therapies     Carmenza Salvador CPhT  EXT [id number]                      .

## 2022-04-11 NOTE — PLAN OF CARE
[facility] - Med Surg  Initial Discharge Assessment       Primary Care Provider: Kip Jimenez MD    Admission Diagnosis: Confusion [R41.0]  Delirium [R41.0]  Hallucination [R44.3]  AMS (altered mental status) [R41.82]    Admission Date: 4/10/2022  Expected Discharge Date:          Payor: MEDICAID / Plan: [place] [employer] / Product Type: Managed Medicaid /     Extended Emergency Contact Information  Primary Emergency Contact: Jono Ceballos   [place]  Mobile Phone: [phone]  Relation: Son   needed? No  Secondary Emergency Contact: Celeste Olivarez   [facility]  Home Phone: [phone]  Relation: Sister    Discharge Plan A: Psychiatric hospital  Discharge Plan B: Home      [facility] [place] [id number]  [facility] [address]  Phone: [phone] Fax: [phone]      Initial Assessment (most recent)     Adult Discharge Assessment - 04/11/22 1155        Discharge Assessment    Assessment Type Discharge Planning Assessment     Source of Information health record     Reason For Admission AMS     Lives With alone     Do you have help at home or someone to help you manage your care at home? Yes     Who are your caregiver(s) and their phone number(s)? Jono Ceballos (son) [phone]     Prior to hospitilization cognitive status: Alert/Oriented     Current cognitive status: Unable to Assess     Walking or Climbing Stairs Difficulty none     Dressing/Bathing Difficulty none     Equipment Currently Used at Home none     Readmission within 30 days? No     Patient currently being followed by outpatient case management? No     Do you take prescription medications? Yes     Do you have prescription coverage? Yes     Are you on dialysis? No     Do you take coumadin? No     Discharge Plan A Psychiatric hospital     Discharge Plan B Home     DME Needed Upon Discharge  other (see comments)   TBD                 Alecia Drake, LETTY  Ext [phone]

## 2022-04-11 NOTE — ED NOTES
Patient's acknowledgement of rights placed in the scan bin for medical records/registration at this time.  Patient had refused to sign was signed by 2 ED RNs.  Belongings and list of belongings, original PEC and stickers were provided to the ED Tech who will be transporting the patient to [facility] once security arrives for assistance.

## 2022-04-11 NOTE — ASSESSMENT & PLAN NOTE
UA positive for benzos.     - Hold while patient is acutely altered/psychotic.   - Will consider restarting if signs of withdrawal become apparent.  - Pt does not have benzos on home med list, but per son she has been dependent of them for many years. Contacting son today for collateral

## 2022-04-11 NOTE — ED NOTES
Telemetry box applied to this patient at this time and confirmed that the monitoring room was getting a reading.  Patient is  currently.     Patient continues to yell at me and other staff members about being around the dead people and stating that the dots are everywhere, and she sees everything.

## 2022-04-11 NOTE — ASSESSMENT & PLAN NOTE
Has history of chronic pain, multiple opiates on med rec.     - Hold opiates while she's altered

## 2022-04-11 NOTE — SUBJECTIVE & OBJECTIVE
Interval History: Pt refused her medications overnight. Per sitter, pt did not sleep at all and was awake the entire night. Attempting to obtain collateral from patients son today     Review of Systems   Unable to perform ROS: Mental status change   Objective:     Vital Signs (Most Recent):  Temp: 96.5 °F (35.8 °C) (04/10/22 1946)  Pulse: (!) 114 (04/11/22 0746)  Resp: 18 (04/11/22 0746)  BP: (!) 166/98 (04/11/22 0746)  SpO2: 99 % (04/11/22 0746)   Vital Signs (24h Range):  Temp:  [96.5 °F (35.8 °C)-98.4 °F (36.9 °C)] 96.5 °F (35.8 °C)  Pulse:  [107-120] 114  Resp:  [16-18] 18  SpO2:  [98 %-100 %] 99 %  BP: (156-182)/() 166/98     Weight: 59 kg (130 lb)  Body mass index is 21.63 kg/m².  No intake or output data in the 24 hours ending 04/11/22 0858   Physical Exam  Constitutional:       General: She is not in acute distress.     Appearance: She is normal weight. She is not ill-appearing.   HENT:      Head: Normocephalic and atraumatic.      Nose: Nose normal.      Mouth/Throat:      Mouth: Mucous membranes are moist.   Eyes:      General: No scleral icterus.     Conjunctiva/sclera: Conjunctivae normal.   Cardiovascular:      Rate and Rhythm: Normal rate and regular rhythm.      Pulses: Normal pulses.      Heart sounds: No murmur heard.  Pulmonary:      Effort: Pulmonary effort is normal. No respiratory distress.      Breath sounds: Normal breath sounds. No wheezing or rales.   Abdominal:      General: Abdomen is flat. Bowel sounds are normal. There is no distension.      Palpations: Abdomen is soft.      Tenderness: There is no abdominal tenderness. There is no guarding.   Musculoskeletal:         General: No swelling or tenderness. Normal range of motion.      Cervical back: Normal range of motion.      Right lower leg: No edema.      Left lower leg: No edema.   Skin:     General: Skin is warm and dry.      Capillary Refill: Capillary refill takes less than 2 seconds.      Coloration: Skin is not jaundiced.    Neurological:      Mental Status: She is alert.      Comments: Unable to assess 2/2 to patients AMS   Psychiatric:         Attention and Perception: She perceives auditory and visual hallucinations.         Mood and Affect: Affect is labile.         Speech: Speech is rapid and pressured and tangential.         Behavior: Behavior is agitated.         Cognition and Memory: Cognition is impaired.      Comments: Tangential, word salad. Does not respond appropriately to questions. Cooperative. P staring up into corner of room    AAOx0       Significant Labs: All pertinent labs within the past 24 hours have been reviewed.    Significant Imaging: I have reviewed all pertinent imaging results/findings within the past 24 hours.

## 2022-04-11 NOTE — PLAN OF CARE
Problem: Adult Inpatient Plan of Care  Goal: Plan of Care Review  Outcome: Ongoing, Progressing  Goal: Patient-Specific Goal (Individualized)  Outcome: Ongoing, Progressing  Goal: Absence of Hospital-Acquired Illness or Injury  Outcome: Ongoing, Progressing  Goal: Optimal Comfort and Wellbeing  Outcome: Ongoing, Progressing  Goal: Readiness for Transition of Care  Outcome: Ongoing, Progressing      Plan of care reviewed with pt . VSS. Pt been up all night and haven't slept. Pt has been mildly agitated all night . Pt refused pt care and medications. Pt only allowed vs to be taken .  Pt is free of falls and injuries. Sitter remains at bedside. Bed in lowest position and call light within reach. I will continue to monitor.

## 2022-04-11 NOTE — ASSESSMENT & PLAN NOTE
Diagnosed with HFrEF of 25% last admission and started on lasix, metoprolol, lisinopril for GDMT.     - Will start valsartan while admitted.   - consider entresto at discharge or start while admitted with f/u at HF transitional clinic

## 2022-04-11 NOTE — SUBJECTIVE & OBJECTIVE
Past Medical History:   Diagnosis Date    Behavioral problem     Bulging lumbar disc     Bursitis     bilateral hip    Degenerative cervical disc     Hx of psychiatric care     Hypercholesteremia     Labral tear of hip, degenerative bilateral    MS (multiple sclerosis)     Paresthesia of both lower extremities 3/13/2022    Pneumonia     Spinal cord compression        Past Surgical History:   Procedure Laterality Date    ANGIOGRAM, CORONARY, WITH LEFT HEART CATHETERIZATION Left 3/11/2022    Procedure: Angiogram, Coronary, with Left Heart Cath;  Surgeon: Elie Matamoros MD;  Location: [facility] CATH LAB;  Service: Cardiology;  Laterality: Left;    BREAST SURGERY      augmentation     SECTION, CLASSIC      HAND SURGERY      HYSTEROSCOPY      NECK SURGERY      cervical disc removeal    TUBAL LIGATION      X-STOP IMPLANTATION         Review of patient's allergies indicates:   Allergen Reactions    Adhesive Hives    Neosporin [neomycin-bacitracin-polymyxin] Hives    Neomycin-polymyxin Hives    Penicillins Other (See Comments)     Unknown  reaction    Latex Rash       No current facility-administered medications on file prior to encounter.     Current Outpatient Medications on File Prior to Encounter   Medication Sig    glatiramer (COPAXONE, GLATOPA) 40 mg/mL injection Inject 40 mg into the skin.    methocarbamoL (ROBAXIN) 500 MG Tab Take 750 mg by mouth.    miscellaneous medical supply (C-TUB) Misc Lidocaine 5%, Prilocaine 2%, Meloxicam 0.9%, Lamotrigine 2.5%, Gabapentin 6% cream apply up 3 gms 5 times a day to affected area    baclofen (LIORESAL) 10 MG tablet Take 20 mg by mouth 2 (two) times daily.    buPROPion (WELLBUTRIN XL) 150 MG TB24 tablet Take 150 mg by mouth 2 (two) times daily.    cyclobenzaprine (FLEXERIL) 10 MG tablet Take 10 mg by mouth 3 (three) times daily as needed.    doxepin (SINEQUAN) 25 MG capsule Take 25 mg by mouth nightly as needed.    ergocalciferol (ERGOCALCIFEROL) 50,000 unit Cap Take 50,000  Units by mouth every 7 days.    EScitalopram oxalate (LEXAPRO) 10 MG tablet Take 10 mg by mouth once daily.    furosemide (LASIX) 40 MG tablet Weigh yourself daily, take 1 tab if weight increases by 3 lbs in 24 hours or if weight increases by 5 lbs in one week.    gabapentin (NEURONTIN) 300 MG capsule Take 300 mg by mouth 3 (three) times daily as needed.    hydrocodone-acetaminophen 10-325mg (NORCO)  mg Tab Take 1 tablet by mouth every 4 (four) hours as needed.    hydrOXYzine pamoate (VISTARIL) 25 MG Cap Take 25 mg by mouth 3 (three) times daily as needed.    lamoTRIgine (LAMICTAL) 200 MG tablet Take by mouth.    lisinopriL (PRINIVIL,ZESTRIL) 2.5 MG tablet Take 1 tablet (2.5 mg total) by mouth once daily.    meloxicam (MOBIC) 7.5 MG tablet Take 7.5 mg by mouth 2 (two) times daily.    metoprolol succinate (TOPROL-XL) 25 MG 24 hr tablet Take 1 tablet (25 mg total) by mouth once daily.    metronidazole 1% (METROGEL) 1 % Gel Apply topically daily as needed.    NARCAN 4 mg/actuation Spry SMARTSIG:Both Nares    nitrofurantoin, macrocrystal-monohydrate, (MACROBID) 100 MG capsule Take 1 capsule (100 mg total) by mouth 2 (two) times daily.    omeprazole (PRILOSEC) 20 MG capsule Take 20 mg by mouth once daily.    oxymorphone (OPANA ER) 20 MG 12 hr tablet Take 20 mg by mouth 2 (two) times daily.     pregabalin (LYRICA) 150 MG capsule Take 150 mg by mouth 2 (two) times daily.    rosuvastatin (CRESTOR) 20 MG tablet Take 20 mg by mouth.    sterile water, bottle, irrigation     TAPENTADOL HCL (NUCYNTA ORAL) Take 1 tablet by mouth 4 (four) times daily.    tiZANidine (ZANAFLEX) 2 MG tablet Take 4 mg by mouth 3 (three) times daily.    triamcinolone acetonide 0.1% (KENALOG) 0.1 % cream Apply topically daily as needed.    VOLTAREN 1 % Gel Apply topically once daily.    [DISCONTINUED] atorvastatin (LIPITOR) 20 MG tablet Take 20 mg by mouth once daily.     Family History       Problem Relation (Age of Onset)    Bipolar disorder  Brother    COPD Mother    Cancer Father    Diabetes Father, Sister    Drug abuse Mother    Heart disease Maternal Uncle    Hypothyroidism Maternal Grandmother    Lung cancer Father          Tobacco Use    Smoking status: Former Smoker     Quit date: 2013     Years since quittin.6    Smokeless tobacco: Never Used   Substance and Sexual Activity    Alcohol use: No    Drug use: Yes     Types: Benzodiazepines, Marijuana    Sexual activity: Never     Review of Systems   Unable to perform ROS: Mental status change   Objective:     Vital Signs (Most Recent):  Temp: 96.5 °F (35.8 °C) (04/10/22 1946)  Pulse: 107 (04/10/22 1946)  Resp: 17 (04/10/22 1946)  BP: (!) 168/93 (04/10/22 1946)  SpO2: 100 % (04/10/22 1946)   Vital Signs (24h Range):  Temp:  [96.5 °F (35.8 °C)-98.4 °F (36.9 °C)] 96.5 °F (35.8 °C)  Pulse:  [107-120] 107  Resp:  [16-17] 17  SpO2:  [99 %-100 %] 100 %  BP: (156-182)/() 168/93     Weight: 59 kg (130 lb)  Body mass index is 21.63 kg/m².    Physical Exam  Constitutional:       General: She is not in acute distress.     Appearance: She is normal weight. She is not ill-appearing.   HENT:      Head: Normocephalic and atraumatic.      Nose: Nose normal.      Mouth/Throat:      Mouth: Mucous membranes are moist.   Eyes:      General: No scleral icterus.     Conjunctiva/sclera: Conjunctivae normal.   Cardiovascular:      Rate and Rhythm: Normal rate and regular rhythm.      Pulses: Normal pulses.      Heart sounds: No murmur heard.  Pulmonary:      Effort: Pulmonary effort is normal. No respiratory distress.      Breath sounds: Normal breath sounds. No wheezing or rales.   Abdominal:      General: Abdomen is flat. Bowel sounds are normal. There is no distension.      Palpations: Abdomen is soft.      Tenderness: There is no abdominal tenderness. There is no guarding.   Musculoskeletal:         General: No swelling or tenderness. Normal range of motion.      Cervical back: Normal range of motion.       "Right lower leg: No edema.      Left lower leg: No edema.   Skin:     General: Skin is warm and dry.      Capillary Refill: Capillary refill takes less than 2 seconds.      Coloration: Skin is not jaundiced.   Neurological:      Mental Status: She is alert.   Psychiatric:         Attention and Perception: She perceives auditory and visual hallucinations.         Mood and Affect: Affect is labile.         Speech: Speech is rapid and pressured and tangential.         Behavior: Behavior is agitated.         Cognition and Memory: Cognition is impaired.      Comments: Tangential, word salad. Rarely responds appropriately to questions. Cooperative, but keeps talking to "Samuel" outside the room. Visual hallucinations of hairs and dots.     AAOx0           Significant Labs: All pertinent labs within the past 24 hours have been reviewed.  CBC:   Recent Labs   Lab 04/10/22  1606   WBC 5.91   HGB 12.7   HCT 37.8        CMP:   Recent Labs   Lab 04/10/22  1606      K 3.6      CO2 21*   *   BUN 16   CREATININE 0.8   CALCIUM 9.5   PROT 7.9   ALBUMIN 4.0   BILITOT 1.4*   ALKPHOS 68   AST 49*   ALT 38   ANIONGAP 15   EGFRNONAA >60.0     Cardiac Markers:   Recent Labs   Lab 04/10/22  1606   *     TSH:   Recent Labs   Lab 04/10/22  1606   TSH 0.109*     Urine Studies:   Recent Labs   Lab 04/10/22  1540   COLORU Yellow   APPEARANCEUA Clear   PHUR 5.0   SPECGRAV 1.025   PROTEINUA 1+*   GLUCUA Negative   KETONESU 1+*   BILIRUBINUA Negative   OCCULTUA Negative   NITRITE Negative   LEUKOCYTESUR Negative   RBCUA 3   WBCUA 2   BACTERIA Rare   HYALINECASTS 0       Significant Imaging: I have reviewed all pertinent imaging results/findings within the past 24 hours.  "

## 2022-04-11 NOTE — ASSESSMENT & PLAN NOTE
55 yo female with a PMHx of mutliple sclerosis, HFrEF (25% on echo from 3/12/22), stress cardiomyopathy, HLD, chronic pain, benzo dependence, opioid dependence, hypertension presenting for acute change in mental status. Patient actively hallucinating in ED with VH and AH. Does not respond appropriately, mumbling to herself. DDx includes benzo overuse or withdrawal, marijuana induced psychosis vs polypharmacy vs MS flare. Less likely septic encephalopathy or electrolyte derangements. CT head unremarkable. Labs unremarkable other than elevated BNP (significantly lower than previous during HF exacerbation) and low TSH.     - Hold all opioids and benzos  - psych consulted, appreciate recs  - Consider neuro consult if focal neuro deficits.   - Will await improvement as medications are metabolized  - Ask her son to bring in medications to verify what she has at home.

## 2022-04-11 NOTE — ASSESSMENT & PLAN NOTE
History of chronic back, hip, hand, foot pain. Oxymorphone, mobic, voltaren, Nucynta on med history. Unclear which/how much she takes regularly. Not c/o of any pain on admission. UA negative for opiates.     - Hold opioids     PTA: PT     PTA Visit Number: 0     Irlanda Evangelista, PT  11/15/2018

## 2022-04-11 NOTE — ASSESSMENT & PLAN NOTE
57 yo female with a PMHx of mutliple sclerosis, HFrEF (25% on echo from 3/12/22), stress cardiomyopathy, HLD, chronic pain, benzo dependence, opioid dependence, hypertension presenting for acute change in mental status. Patient actively hallucinating in ED with VH and AH. Does not respond appropriately, mumbling to herself. DDx includes benzo overuse or withdrawal, marijuana induced psychosis vs polypharmacy vs depression with psychotic features vs MS flare. Less likely septic encephalopathy or electrolyte derangements. CT head unremarkable. Labs unremarkable other than elevated BNP (significantly lower than previous during HF exacerbation) and low TSH.     - Hold all opioids and benzos  - psych consulted, appreciate recs  - Consider neuro consult if focal neuro deficits.   - Will await improvement as medications are metabolized  - Ask her son to bring in medications to verify what she has at home, attempting collateral today

## 2022-04-12 LAB
ALBUMIN SERPL BCP-MCNC: 3.7 G/DL (ref 3.5–5.2)
ALP SERPL-CCNC: 66 U/L (ref 55–135)
ALT SERPL W/O P-5'-P-CCNC: 25 U/L (ref 10–44)
ANION GAP SERPL CALC-SCNC: 14 MMOL/L (ref 8–16)
AST SERPL-CCNC: 23 U/L (ref 10–40)
BASOPHILS # BLD AUTO: 0.03 K/UL (ref 0–0.2)
BASOPHILS NFR BLD: 0.5 % (ref 0–1.9)
BILIRUB SERPL-MCNC: 1.3 MG/DL (ref 0.1–1)
BUN SERPL-MCNC: 16 MG/DL (ref 6–20)
CALCIUM SERPL-MCNC: 10 MG/DL (ref 8.7–10.5)
CHLORIDE SERPL-SCNC: 106 MMOL/L (ref 95–110)
CO2 SERPL-SCNC: 21 MMOL/L (ref 23–29)
CREAT SERPL-MCNC: 0.7 MG/DL (ref 0.5–1.4)
DIFFERENTIAL METHOD: NORMAL
EOSINOPHIL # BLD AUTO: 0 K/UL (ref 0–0.5)
EOSINOPHIL NFR BLD: 0.7 % (ref 0–8)
ERYTHROCYTE [DISTWIDTH] IN BLOOD BY AUTOMATED COUNT: 12.9 % (ref 11.5–14.5)
EST. GFR  (AFRICAN AMERICAN): >60 ML/MIN/1.73 M^2
EST. GFR  (NON AFRICAN AMERICAN): >60 ML/MIN/1.73 M^2
GLUCOSE SERPL-MCNC: 90 MG/DL (ref 70–110)
HCT VFR BLD AUTO: 41.3 % (ref 37–48.5)
HGB BLD-MCNC: 14 G/DL (ref 12–16)
IMM GRANULOCYTES # BLD AUTO: 0.01 K/UL (ref 0–0.04)
IMM GRANULOCYTES NFR BLD AUTO: 0.2 % (ref 0–0.5)
LYMPHOCYTES # BLD AUTO: 1.1 K/UL (ref 1–4.8)
LYMPHOCYTES NFR BLD: 18.8 % (ref 18–48)
MAGNESIUM SERPL-MCNC: 1.9 MG/DL (ref 1.6–2.6)
MCH RBC QN AUTO: 29.5 PG (ref 27–31)
MCHC RBC AUTO-ENTMCNC: 33.9 G/DL (ref 32–36)
MCV RBC AUTO: 87 FL (ref 82–98)
MONOCYTES # BLD AUTO: 0.6 K/UL (ref 0.3–1)
MONOCYTES NFR BLD: 9.2 % (ref 4–15)
NEUTROPHILS # BLD AUTO: 4.3 K/UL (ref 1.8–7.7)
NEUTROPHILS NFR BLD: 70.6 % (ref 38–73)
NRBC BLD-RTO: 0 /100 WBC
PHOSPHATE SERPL-MCNC: 2.4 MG/DL (ref 2.7–4.5)
PLATELET # BLD AUTO: 254 K/UL (ref 150–450)
PMV BLD AUTO: 10.4 FL (ref 9.2–12.9)
POTASSIUM SERPL-SCNC: 3.7 MMOL/L (ref 3.5–5.1)
PROT SERPL-MCNC: 7.7 G/DL (ref 6–8.4)
RBC # BLD AUTO: 4.74 M/UL (ref 4–5.4)
SODIUM SERPL-SCNC: 141 MMOL/L (ref 136–145)
WBC # BLD AUTO: 6.06 K/UL (ref 3.9–12.7)

## 2022-04-12 PROCEDURE — 11000001 HC ACUTE MED/SURG PRIVATE ROOM

## 2022-04-12 PROCEDURE — 99232 PR SUBSEQUENT HOSPITAL CARE,LEVL II: ICD-10-PCS | Mod: ,,, | Performed by: HOSPITALIST

## 2022-04-12 PROCEDURE — 83735 ASSAY OF MAGNESIUM: CPT

## 2022-04-12 PROCEDURE — 99231 SBSQ HOSP IP/OBS SF/LOW 25: CPT | Mod: ,,, | Performed by: PSYCHIATRY & NEUROLOGY

## 2022-04-12 PROCEDURE — 63600175 PHARM REV CODE 636 W HCPCS

## 2022-04-12 PROCEDURE — 99232 SBSQ HOSP IP/OBS MODERATE 35: CPT | Mod: ,,, | Performed by: HOSPITALIST

## 2022-04-12 PROCEDURE — 36415 COLL VENOUS BLD VENIPUNCTURE: CPT

## 2022-04-12 PROCEDURE — 85025 COMPLETE CBC W/AUTO DIFF WBC: CPT

## 2022-04-12 PROCEDURE — 25000003 PHARM REV CODE 250

## 2022-04-12 PROCEDURE — 80053 COMPREHEN METABOLIC PANEL: CPT

## 2022-04-12 PROCEDURE — 99231 PR SUBSEQUENT HOSPITAL CARE,LEVL I: ICD-10-PCS | Mod: ,,, | Performed by: PSYCHIATRY & NEUROLOGY

## 2022-04-12 PROCEDURE — 84100 ASSAY OF PHOSPHORUS: CPT

## 2022-04-12 PROCEDURE — 95816 EEG AWAKE AND DROWSY: CPT

## 2022-04-12 RX ADMIN — VALSARTAN 40 MG: 40 TABLET, FILM COATED ORAL at 09:04

## 2022-04-12 RX ADMIN — Medication 6 MG: at 09:04

## 2022-04-12 RX ADMIN — METOPROLOL SUCCINATE 25 MG: 25 TABLET, EXTENDED RELEASE ORAL at 09:04

## 2022-04-12 RX ADMIN — LORAZEPAM 2 MG: 2 INJECTION INTRAMUSCULAR; INTRAVENOUS at 03:04

## 2022-04-12 RX ADMIN — ATORVASTATIN CALCIUM 40 MG: 20 TABLET, FILM COATED ORAL at 09:04

## 2022-04-12 RX ADMIN — ENOXAPARIN SODIUM 40 MG: 100 INJECTION SUBCUTANEOUS at 04:04

## 2022-04-12 NOTE — PROGRESS NOTES
[facility]  Progress Note    Patient Name: Reza Morrow  MRN: [id number]  Patient Class: IP- Inpatient   Admission Date: 4/10/2022  Length of Stay: 0 days  Attending Physician: Madisno Ring MD  Primary Care Provider: Kip Jimenez MD        Subjective:     Principal Problem:Altered mental state        HPI:  55 yo female with a PMHx of mutliple sclerosis, HFrEF (25% on echo from 3/12/22), stress cardiomyopathy, HLD, chronic pain, benzo dependence, opioid dependence, hypertension presenting for acute change in mental status. History obtained from chart review and collateral as patient unable to participate in history taking. She presented via EMS due to her son noticing erratic behavior, worsening since day before admission. Patient had a recent admission for confusion, weakness, chest pain. She was taken to cath lab, found to have no CAD. Echo showed reduced EF of 25%, suggestive of Takutsubo cardiomyopathy. Neurology consulted during that admission, set up outpatient follow up with MS clinic. She was DC on Lasix, metoprolol, and lisinopril.     Collateral obtained from son, Jono Ceballos. According to her son, she has not been completely back to the same since being discharged from [facility] on 3/14. She has been more aggravated, angry, and screaming at him since yesterday. She has been especially distressed, perseverating on her neurologist Dr. Frey telling her that she probably does not have MS. Patient saw Dr. Frey on March 23, where it was felt that her lesions and presentation do not resemble typical MS. Reportedly, patient has been distressed with being told this after believing that she has had MS for about 10 years. Her son states that he found her naked in his bed prior to her previous admission, and that presentation today was similar. She had been having visual and auditory hallucinations, talking to people who were not present, including her other son who passed away in  "2019. Her significant other passed away in 2017. Son denies any other recent family/friend deaths, but feels she has never fully coped well with her grief. Her denies finding any pill bottles or empty bottles at home. He verifies that she does take Flexeril, baclofen, tizanidine, robaxin, hydroxyzine, gabapentin, oxycontin for her chronic pain and anxiety. She has chronic back, hip, hand, foot pain. Son reports he is not aware of any etoh or illicit drug use. She has a prescription for medical marijuana. Former smoker. Son reports that she has had a previous psych inpatient stay, but denies any diagnosed primary psych disorder such as bipolar, schizophrenia.     Per chart review: In June 2016, she had made threats to commit suicide out of anger with not receiving certain medications, and later denied SI at discharge. Per psych notes patient was quoted saying "with my pain I could eat through that steel," "I have pain all day, I hold it in so noone sees it, I let it out at night." Pt then changes subject, reports the "scarlett I was with for 25 yrs just passed away, my kids been in and out of snf, but I'm fine, I don't want to kill myself." She had consistently asserted since admission that threats of self-harm were made in anger while frustrated by her insurer.  She had been calm and cooperative with no evidence of psychosis or and attempt to harm herself or others and no longer met commitment criteria.    Recent message on 3/31 on [employer] from patient states, "Since leaving the hospital over two weeks ago I have not felt well.  I have electrical shock going up and down both legs.  They are weak, numb, and I have tingling off and on.  I have fallen twice, I am dizzy and my balance is off.  I have restless legs at night and I have been having trouble sleeping.  I am nauseated and not eating well.  My vision is blurry and gets worse at times.  I have numbness and tingling in both arms and hands.  I'm anxious, depressed " "and I feel like i'm losing my mind.  How can I not have MS after being diagnosed and had MRI's every 6 months since being diagnosed?  I really need to be seen."        In the ED, she continues to have AMS, AH, VH. Afebrile, hypertensive, tachycardic, on room air. Labs significant for , CPK 1514, TSH 0.109, T4 wnl. Electrolytes wnl, CBC unremarkable. UA 1+ protein, 1+ ketones. Received 1 L NS.       Overview/Hospital Course:  Pt admitted for AMS, possibly due to polypharmacy or overdose from home medications. Pt does have suicidal thoughts and suicide history in the past. Psychiatry service consulted. Neurology consulted, no concern for MS flair, recommended EEG      Interval History: Still not following commends , however she is alert and talking.     Review of Systems   Unable to perform ROS: Mental status change   Objective:     Vital Signs (Most Recent):  Temp: 97.4 °F (36.3 °C) (04/12/22 1144)  Pulse: 97 (04/12/22 1144)  Resp: 20 (04/12/22 1144)  BP: (!) 141/88 (04/12/22 1144)  SpO2: 97 % (04/12/22 1144)   Vital Signs (24h Range):  Temp:  [97.3 °F (36.3 °C)-99 °F (37.2 °C)] 97.4 °F (36.3 °C)  Pulse:  [] 97  Resp:  [17-20] 20  SpO2:  [97 %-99 %] 97 %  BP: (141-171)/(88-96) 141/88     Weight: 59 kg (130 lb)  Body mass index is 21.63 kg/m².    Intake/Output Summary (Last 24 hours) at 4/12/2022 1707  Last data filed at 4/11/2022 2100  Gross per 24 hour   Intake --   Output 1 ml   Net -1 ml      Physical Exam  Constitutional:       General: She is not in acute distress.     Appearance: She is normal weight. She is not ill-appearing.   HENT:      Head: Normocephalic and atraumatic.      Nose: Nose normal.      Mouth/Throat:      Mouth: Mucous membranes are moist.   Eyes:      General: No scleral icterus.     Conjunctiva/sclera: Conjunctivae normal.   Cardiovascular:      Rate and Rhythm: Normal rate and regular rhythm.      Pulses: Normal pulses.      Heart sounds: No murmur heard.  Pulmonary:      " Effort: Pulmonary effort is normal. No respiratory distress.      Breath sounds: Normal breath sounds. No wheezing or rales.   Abdominal:      General: Abdomen is flat. Bowel sounds are normal. There is no distension.      Palpations: Abdomen is soft.      Tenderness: There is no abdominal tenderness. There is no guarding.   Musculoskeletal:         General: No swelling or tenderness. Normal range of motion.      Cervical back: Normal range of motion.      Right lower leg: No edema.      Left lower leg: No edema.   Skin:     General: Skin is warm and dry.      Capillary Refill: Capillary refill takes less than 2 seconds.      Coloration: Skin is not jaundiced.   Neurological:      Mental Status: She is alert.      Comments: Unable to assess 2/2 to patients AMS   Psychiatric:         Attention and Perception: She perceives auditory and visual hallucinations.         Mood and Affect: Affect is labile.         Speech: Speech is rapid and pressured and tangential.         Behavior: Behavior is agitated.         Cognition and Memory: Cognition is impaired.      Comments: Tangential, word salad. Does not respond appropriately to questions. Cooperative. P staring up into corner of room    AAOx0       Significant Labs: All pertinent labs within the past 24 hours have been reviewed.    Significant Imaging: I have reviewed all pertinent imaging results/findings within the past 24 hours.      Assessment/Plan:      * Altered mental state  55 yo female with a PMHx of mutliple sclerosis, HFrEF (25% on echo from 3/12/22), stress cardiomyopathy, HLD, chronic pain, benzo dependence, opioid dependence, hypertension presenting for acute change in mental status. Patient actively hallucinating in ED with VH and AH. Does not respond appropriately, mumbling to herself. DDx includes benzo overuse or withdrawal, marijuana induced psychosis vs polypharmacy vs depression with psychotic features vs MS flare. Less likely septic encephalopathy  or electrolyte derangements. CT head unremarkable. Labs unremarkable other than elevated BNP (significantly lower than previous during HF exacerbation) and low TSH.     - Hold all opioids and benzos  - psych consulted, appreciate recs  - Consider neuro consult if focal neuro deficits.   - Will await improvement as medications are metabolized  - Ask her son to bring in medications to verify what she has at home, attempting collateral today       Hypertension  Hypertensive to 180s/110 on day of admission. Has been normotensive on previous admission. Home meds: lisinopril 2.5 mg daily, metoprolol 25mg daily.     - Started valsartan 40 BID, metoprolol 25 daily.   - prn IV hydralazine      Psychosis  55 yo F presenting with AH and VH.     See AMS      MS (multiple sclerosis)  Long hx of MS for which she has been on Copaxone. Recently  Saw Dr. Frey who was skeptical that MS was her true diagnosis vs MS mimic.     - Consult neuro if suspicion for MS flare      Chronic pain syndrome  History of chronic back, hip, hand, foot pain. Oxymorphone, mobic, voltaren, Nucynta on med history. Unclear which/how much she takes regularly. Not c/o of any pain on admission. UA negative for opiates.     - Hold opioids      Acute systolic heart failure  Diagnosed with HFrEF of 25% last admission and started on lasix, metoprolol, lisinopril for GDMT.     - Will start valsartan while admitted.   - consider entresto at discharge or start while admitted with f/u at HF transitional clinic      Benzodiazepine dependence  UA positive for benzos.     - Hold while patient is acutely altered/psychotic.   - Will consider restarting if signs of withdrawal become apparent.  - Pt does not have benzos on home med list, but per son she has been dependent of them for many years. Contacting son today for collateral    Opioid dependence  Has history of chronic pain, multiple opiates on med rec.     - Hold opiates while she's altered        VTE Risk Mitigation  (From admission, onward)         Ordered     enoxaparin injection 40 mg  Daily         04/10/22 2001     IP VTE HIGH RISK PATIENT  Once         04/10/22 2001     Place sequential compression device  Until discontinued         04/10/22 2001                Discharge Planning   JOANNE: 4/12/2022     Code Status: Full Code   Is the patient medically ready for discharge?: No    Reason for patient still in hospital (select all that apply): Treatment  Discharge Plan A: [facility]                  Jacky Vigil MD  Department of Hospital Medicine   [facility]

## 2022-04-12 NOTE — SUBJECTIVE & OBJECTIVE
Interval History: Still not following commends , however she is alert and talking.     Review of Systems   Unable to perform ROS: Mental status change   Objective:     Vital Signs (Most Recent):  Temp: 97.4 °F (36.3 °C) (04/12/22 1144)  Pulse: 97 (04/12/22 1144)  Resp: 20 (04/12/22 1144)  BP: (!) 141/88 (04/12/22 1144)  SpO2: 97 % (04/12/22 1144)   Vital Signs (24h Range):  Temp:  [97.3 °F (36.3 °C)-99 °F (37.2 °C)] 97.4 °F (36.3 °C)  Pulse:  [] 97  Resp:  [17-20] 20  SpO2:  [97 %-99 %] 97 %  BP: (141-171)/(88-96) 141/88     Weight: 59 kg (130 lb)  Body mass index is 21.63 kg/m².    Intake/Output Summary (Last 24 hours) at 4/12/2022 1707  Last data filed at 4/11/2022 2100  Gross per 24 hour   Intake --   Output 1 ml   Net -1 ml      Physical Exam  Constitutional:       General: She is not in acute distress.     Appearance: She is normal weight. She is not ill-appearing.   HENT:      Head: Normocephalic and atraumatic.      Nose: Nose normal.      Mouth/Throat:      Mouth: Mucous membranes are moist.   Eyes:      General: No scleral icterus.     Conjunctiva/sclera: Conjunctivae normal.   Cardiovascular:      Rate and Rhythm: Normal rate and regular rhythm.      Pulses: Normal pulses.      Heart sounds: No murmur heard.  Pulmonary:      Effort: Pulmonary effort is normal. No respiratory distress.      Breath sounds: Normal breath sounds. No wheezing or rales.   Abdominal:      General: Abdomen is flat. Bowel sounds are normal. There is no distension.      Palpations: Abdomen is soft.      Tenderness: There is no abdominal tenderness. There is no guarding.   Musculoskeletal:         General: No swelling or tenderness. Normal range of motion.      Cervical back: Normal range of motion.      Right lower leg: No edema.      Left lower leg: No edema.   Skin:     General: Skin is warm and dry.      Capillary Refill: Capillary refill takes less than 2 seconds.      Coloration: Skin is not jaundiced.   Neurological:       Mental Status: She is alert.      Comments: Unable to assess 2/2 to patients AMS   Psychiatric:         Attention and Perception: She perceives auditory and visual hallucinations.         Mood and Affect: Affect is labile.         Speech: Speech is rapid and pressured and tangential.         Behavior: Behavior is agitated.         Cognition and Memory: Cognition is impaired.      Comments: Tangential, word salad. Does not respond appropriately to questions. Cooperative. P staring up into corner of room    AAOx0       Significant Labs: All pertinent labs within the past 24 hours have been reviewed.    Significant Imaging: I have reviewed all pertinent imaging results/findings within the past 24 hours.

## 2022-04-12 NOTE — HOSPITAL COURSE
"4/12/22  Ms. Morrow is sitting in bed. She does not make eye contact and does not respond to this interviewer. Per carlos, pt has not interacted with any provider at this point today.  On assessment later today, Ms. Morrow is sitting in bed. She repeats the phrase "It's too late" several times, closes her eyes, is tearful, and does not answer other questions at this time.    4/13/22  Ms. Morrow is sitting in bed. She is oriented to person, place, month, year, disoriented to situation, memory impaired.  She is states that she does not recall events leading to this admission nor events over the last several days. She is tangential at times, though is able to explain that Jennifer is a neighbor. With regards to benzo use, she states that she does not have a prescription, used benzos previously, and that neighbor gave her unknown benzo several days ago to help with nausea.  She later states that she hid her home mediation but is unable to elaborate further as to which medications were hidden or why. States that she was a provider via telehealth that told her she had covid, unsure of when this occurred, states that she has not have positive covid test.  "

## 2022-04-12 NOTE — SUBJECTIVE & OBJECTIVE
"    Family History       Problem Relation (Age of Onset)    Bipolar disorder Brother    COPD Mother    Cancer Father    Diabetes Father, Sister    Drug abuse Mother    Heart disease Maternal Uncle    Hypothyroidism Maternal Grandmother    Lung cancer Father          Tobacco Use    Smoking status: Former Smoker     Quit date: 2013     Years since quittin.7    Smokeless tobacco: Never Used   Substance and Sexual Activity    Alcohol use: No    Drug use: Yes     Types: Benzodiazepines, Marijuana    Sexual activity: Never     Psychotherapeutics (From admission, onward)                Start     Stop Route Frequency Ordered    22 1723  lorazepam injection 2 mg         -- IV Every 4 hours PRN 22 1623             Objective:     Vital Signs (Most Recent):  Temp: 98.5 °F (36.9 °C) (22)  Pulse: 106 (22)  Resp: 18 (22)  BP: (!) 155/91 (22)  SpO2: 99 % (22)   Vital Signs (24h Range):  Temp:  [97.3 °F (36.3 °C)-99.1 °F (37.3 °C)] 98.5 °F (36.9 °C)  Pulse:  [100-126] 106  Resp:  [16-18] 18  SpO2:  [97 %-99 %] 99 %  BP: (140-171)/(89-96) 155/91     Height: 5' 5" (165.1 cm)  Weight: 59 kg (130 lb)  Body mass index is 21.63 kg/m².      Intake/Output Summary (Last 24 hours) at 2022 0924  Last data filed at 2022 2100  Gross per 24 hour   Intake --   Output 1 ml   Net -1 ml       Unable to assess mental status exam as pt not participating at this time.     Significant Labs: Last 24 Hours:   Recent Lab Results         22  0408        Albumin 3.7       Alkaline Phosphatase 66       ALT 25       Anion Gap 14       AST 23       Baso # 0.03       Basophil % 0.5       BILIRUBIN TOTAL 1.3  Comment: For infants and newborns, interpretation of results should be based  on gestational age, weight and in agreement with clinical  observations.    Premature Infant recommended reference ranges:  Up to 24 hours.............<8.0 mg/dL  Up to 48 " hours............<12.0 mg/dL  3-5 days..................<15.0 mg/dL  6-29 days.................<15.0 mg/dL         BUN 16       Calcium 10.0       Chloride 106       CO2 21       Creatinine 0.7       Differential Method Automated       eGFR if  >60.0       eGFR if non  >60.0  Comment: Calculation used to obtain the estimated glomerular filtration  rate (eGFR) is the CKD-EPI equation.          Eos # 0.0       Eosinophil % 0.7       Glucose 90       Gran # (ANC) 4.3       Gran % 70.6       Hematocrit 41.3       Hemoglobin 14.0       Immature Grans (Abs) 0.01  Comment: Mild elevation in immature granulocytes is non specific and   can be seen in a variety of conditions including stress response,   acute inflammation, trauma and pregnancy. Correlation with other   laboratory and clinical findings is essential.         Immature Granulocytes 0.2       Lymph # 1.1       Lymph % 18.8       Magnesium 1.9       MCH 29.5       MCHC 33.9       MCV 87       Mono # 0.6       Mono % 9.2       MPV 10.4       nRBC 0       Phosphorus 2.4       Platelets 254       Potassium 3.7       PROTEIN TOTAL 7.7       RBC 4.74       RDW 12.9       Sodium 141       WBC 6.06               Significant Imaging:  no new

## 2022-04-12 NOTE — NURSING
Patient received PRN ativan on 1 occasion for increasing anxiety and restlessness. On reassessment pt mental status improved she became more oriented. She was able to answer questions appropriately, she realized she is in the hospital and was inquiring on how she got here as she did not recall how she got here, how long she has been here, or the situation leading up to her hospitalization. Vital signs remained WDL and she remained free of falls/injury on shift. Sitter is bedside.

## 2022-04-12 NOTE — PLAN OF CARE
"56 year old with a medical history of HFrEF (25% on echo from 3/12/22), stress cardiomyopathy, HLD, chronic pain, benzo dependence, opioid dependence, hypertension presenting for acute change in mental status. Per primary team encephalopathy most likely toxic with potential etiologies: serotonin syndrome, medication use or withdrawal versus primary psychiatric disorder. Psychiatry wanted neurology recommendation for "possible LP for AMS and MS Flare".     MS History  Patient saw Dr. Frey as an outpatient. Despite a previous diagnosis of MS, she not see any typical demyelinating lesions in her brain, c or t spine. She does have chronic microvascular disease, which isn't unexpected given cerebrovascular risk factors and history of migraines.The lack of CSF OCBs also makes MS diagnosis less likely. There are no UMN signs on exam either.     Unlikely her change in mental status is due to a possible "MS flare". Does not need repeat LP as she had one done outpatient. Psychiatry wanted to obtain LP however, change in mental status is most likely due to acute intoxication. Per chart review there is no indication of infection, making a LP not necessary. Primary team can obtain EEG and if there is anything abnormal please consult us.     Vasile Guardado MD  Neurology Resident PGY IV  [facility]     "

## 2022-04-12 NOTE — ASSESSMENT & PLAN NOTE
ASSESSMENT     Ms. Morrow is a 56 year old female with a past psychiatric history of ?, currently presenting with Altered mental state.  Psychiatry was originally consulted to address the patient's symptoms of altered mental status.    IMPRESSION  Delirium likely 2/2 to polypharmacy (medical marijuana, opiates , muscle relaxer's & benzos)   H/o Opiate and benzo dependence in the past    H/o Unspecified mood d/o vs. Personality d/o     RECOMMENDATION(S)      1. Scheduled Medication(s):  None at this time.  Please hold psychotropic and sedating medications  Recommend consulting neurology    2. PRN Medication(s):  Haldol 2 mg q8 hrs PO/IM for non redirectable agitation    Monitor for possible benzo/opiate withdrawal     3.  Monitor:  Please obtain daily EKG to monitor QTc    4. Legal Status/Precaution(s):  Continue PEC/CEC at this time.    5. Other:  DELIRIUM BEHAVIOR MANAGEMENT   PLEASE utilize CHEMICAL restraints with PRN meds first for agitation. Minimize use of PHYSICAL restraints   Keep window shades open and room lit during day and room dim at night in order to promote normal sleep-wake cycles   Encourage family at bedside. [address] patient often to situation, location, date.   Continue to Limit or Discontinue use of Narcotics, Benzos and Anti-cholinergic medications as they may worsen delirium.   Continue medical workup for causative etiology of Delirium.     Psychiatry will continue to follow.

## 2022-04-12 NOTE — PROGRESS NOTES
[facility]equiel - [facility] S[facility]  Psychiatry  Progress Note    Patient Name: Reza Morrow  MRN: [id number]   Code Status: Full Code  Admission Date: 4/10/2022  Hospital Length of Stay: 0 days  Expected Discharge Date: 4/12/2022  Attending Physician: Madison Ring MD  Primary Care Provider: Kip Jimenez MD    Current Legal Status: 's Emergency Certificate (CEC)    Patient information was obtained from patient, relative(s), ER records and primary team.       Subjective:     Patient is a 56 y.o., female, presents with:    Principal Problem:Altered mental state    Chief Complaint: AMS    HPI:   Consultation-Liaison Psychiatry Consult Note    4/11/2022 10:35 AM  Reza Morrow  MRN: [id number]    Chief Complaint / Reason for Consult: AMS     SUBJECTIVE     History of Present Illness:   Reza Morrow is a 56 y.o. female with a past psychiatric history of unknown, currently presenting with Altered mental state.  Psychiatry was originally consulted to address the patient's symptoms of AMS.    Per Primary MD:  57 yo female with a PMHx of mutliple sclerosis, HFrEF (25% on echo from 3/12/22), stress cardiomyopathy, HLD, chronic pain, benzo dependence, opioid dependence, hypertension presenting for acute change in mental status. History obtained from chart review and collateral as patient unable to participate in history taking. She presented via EMS due to her son noticing erratic behavior, worsening since day before admission. Patient had a recent admission for confusion, weakness, chest pain. She was taken to cath lab, found to have no CAD. Echo showed reduced EF of 25%, suggestive of Takutsubo cardiomyopathy. Neurology consulted during that admission, set up outpatient follow up with MS clinic. She was DC on Lasix, metoprolol, and lisinopril.      Collateral obtained from son, Jono Ceballos. According to her son, she has not been completely back to the same since being discharged from [facility] on 3/14. She has been especially  "distressed, perseverating on her neurologist Dr. Frey telling her that she probably does not have MS. Patient saw Dr. Frey on March 23, where it was felt that her lesions and presentation do not resemble typical MS. Reportedly, patient has been distressed with being told this after believing that she has had MS for about 10 years. Her son states that he found her naked in his bed prior to her previous admission, and that presentation today was similar. She had been having visual and auditory hallucinations, talking to people who were not present, including her other son who passed away in 2019. Her significant other passed away in 2017. Son denies any other recent family/friend deaths, but feels she has never fully coped well with her grief. Her denies finding any pill bottles or empty bottles at home. He verifies that she does take Flexeril, baclofen, tizanidine, robaxin, hydroxyzine, gabapentin, oxycontin for her chronic pain and anxiety. She has chronic back, hip, hand, foot pain. Son reports he is not aware of any etoh or illicit drug use. She has a prescription for medical marijuana. Former smoker. Son reports that she has had a previous psych inpatient stay, but denies any diagnosed primary psych disorder such as bipolar, schizophrenia.      Per chart review: In June 2016, she had made threats to commit suicide out of anger with not receiving certain medications, and later denied SI at discharge.     Recent message on 3/31 on [employer] from patient states, "Since leaving the hospital over two weeks ago I have not felt well.  I have electrical shock going up and down both legs.  They are weak, numb, and I have tingling off and on.  I have fallen twice, I am dizzy and my balance is off.  I have restless legs at night and I have been having trouble sleeping.  I am nauseated and not eating well.  My vision is blurry and gets worse at times.  I have numbness and tingling in both arms and hands.  I'm anxious, " "depressed and I feel like i'm losing my mind.  How can I not have MS after being diagnosed and had MRI's every 6 months since being diagnosed?  I really need to be seen."         In the ED, she continues to have AMS, AH, VH. Labs significant for , CPK 1514, TSH 0.109. Electrolytes wnl, CBC unremarkable. UA 1+ protein, 1+ ketones. Received 1 L NS    Per C-L Psych MD:  Ms. Morrow is sitting in bed, just urinated on the floor. Her speech is nonsensical and illogical. Disoriented to person, place, situation, time. Actively hallucination.    Collateral:  Son: Jono Ceballos   Notices change in behavior over last year. One week ago, working outside for 3 days and when saw her next, she was in room, naked saying that clothes burning her skin. States that she was not taking home medications. Diagnosed with broken heart syndrome. After discharged, pt followed up with neurologist one week ago who told her that she does not have MS, which greatly upset pt. Friday, pt started not feeling well, "lost  on reality on Saturday," stating he was seeing dead people. On Sunday, she was doing worse, disoriented to self and her son.    Psychiatric Review Of Systems - Is patient experiencing or having changes in (obtained by son Mr. Ceballos):  sleep: yes, decrease  appetite: yes, decrease  weight: no  energy/anergy: yes  interest/pleasure/anhedonia: yes  somatic symptoms: yes  guilty/hopelessness: no  concentration: yes  S.I.B.s/risky behavior: no  SI/SA:  no    anxiety/panic: yes  Agoraphobia:  no  Social phobia:  no  Recurrent nightmares:  no  hyper startle response:  no  Avoidance: no  Recurrent thoughts:  yes  Recurrent behaviors:  yes    Irritability: yes  Racing thoughts: yes  Impulsive behaviors: yes  Pressured speech:  no    Paranoia:no  Delusions: yes  AVH:yes    Psychiatric History:  Diagnose(s): {unknown  Previous Medication Trials: Yes - doxipin, possibly others  Previous Psychiatric Hospitalizations: " unknown  Previous Suicide Attempts: unknown  History of Violence: unknown  Outpatient Psychiatrist: No    Social History:  Marital Status: not   Children: son, one son    Employment Status:  not working  Education:  unable to assess  Special Ed: unknown   History: no  Housing Status: alone, son visits almost daily  Developmental History: no  History of Abuse: denies  Access to Gun: denies    Substance Abuse History:  Recreational Drugs: medical THC  Use of Alcohol: occasional, social use  Rehab History: No  Tobacco Use: No  Use of Caffeine: unknown  Use of OTC: No  Legal consequences of chemical use: No  Is the patient aware of the biomedical complications associated with substance abuse and mental illness? No    Legal History:  Past Charges/Incarcerations: No  Pending Charges: No    Family Psychiatric History:   Denies    Psychosocial Factors:  Psychosocial Stressors: family and health.   Functioning Relationships: good support system  Maladaptive or problem behaviors: No  Peer group, social, ethic, cultural, emotional, and health factors: Yes - MS vs other diagnosis  Living situation, family constellation, family circumstances/home: No  Recovery environment: No  Community resources used by patient: No  Treatment acceptance/motivation for change: unable to determine at this time    Medical Review Of Systems:  Complete review of systems performed covering Constitutional, Eyes, ENT/Mouth, Cardiovascular, Respiratory, Gastrointestinal, Genitourinary, Musculoskeletal, Skin, Neurologic, Endocrine, Heme/Lymph, and Allergy/Immune.     Complete review of systems was negative with the exception of the following positive symptoms: pt unable to provide meaningful responses at this time    Scheduled Meds:   atorvastatin  40 mg Oral Daily    enoxaparin  40 mg Subcutaneous Daily    metoprolol succinate  25 mg Oral Daily    valsartan  40 mg Oral BID     acetaminophen, dextrose 10%, dextrose 10%,  glucagon (human recombinant), glucose, glucose, hydrALAZINE, melatonin, naloxone, sodium chloride 0.9%  Psychotherapeutics (From admission, onward)                None          PRN Meds:  acetaminophen, dextrose 10%, dextrose 10%, glucagon (human recombinant), glucose, glucose, hydrALAZINE, melatonin, naloxone, sodium chloride 0.9%  Home Meds:  Prior to Admission medications    Medication Sig Start Date End Date Taking? Authorizing Provider   glatiramer (COPAXONE, GLATOPA) 40 mg/mL injection Inject 40 mg into the skin. 8/23/21  Yes Historical Provider   methocarbamoL (ROBAXIN) 500 MG Tab Take 750 mg by mouth. 6/14/21  Yes Historical Provider   miscellaneous medical supply (C-TUB) Misc Lidocaine 5%, Prilocaine 2%, Meloxicam 0.9%, Lamotrigine 2.5%, Gabapentin 6% cream apply up 3 gms 5 times a day to affected area 7/26/21  Yes Historical Provider   baclofen (LIORESAL) 10 MG tablet Take 20 mg by mouth 2 (two) times daily. 10/25/21   Historical Provider   buPROPion (WELLBUTRIN XL) 150 MG TB24 tablet Take 150 mg by mouth 2 (two) times daily. 10/21/21   Historical Provider   cyclobenzaprine (FLEXERIL) 10 MG tablet Take 10 mg by mouth 3 (three) times daily as needed. 2/15/22   Historical Provider   doxepin (SINEQUAN) 25 MG capsule Take 25 mg by mouth nightly as needed. 7/7/15   Historical Provider   ergocalciferol (ERGOCALCIFEROL) 50,000 unit Cap Take 50,000 Units by mouth every 7 days. 11/23/21   Historical Provider   EScitalopram oxalate (LEXAPRO) 10 MG tablet Take 10 mg by mouth once daily. 2/15/22   Historical Provider   furosemide (LASIX) 40 MG tablet Weigh yourself daily, take 1 tab if weight increases by 3 lbs in 24 hours or if weight increases by 5 lbs in one week. 3/14/22   Rob Gates MD   gabapentin (NEURONTIN) 300 MG capsule Take 300 mg by mouth 3 (three) times daily as needed. 7/24/15   Historical Provider   hydrocodone-acetaminophen 10-325mg (NORCO)  mg Tab Take 1 tablet by mouth every 4 (four)  hours as needed.    Historical Provider   hydrOXYzine pamoate (VISTARIL) 25 MG Cap Take 25 mg by mouth 3 (three) times daily as needed. 7/24/15   Historical Provider   lamoTRIgine (LAMICTAL) 200 MG tablet Take by mouth. 2/17/22   Historical Provider   lisinopriL (PRINIVIL,ZESTRIL) 2.5 MG tablet Take 1 tablet (2.5 mg total) by mouth once daily. 3/15/22 3/15/23  Rob Gates MD   meloxicam (MOBIC) 7.5 MG tablet Take 7.5 mg by mouth 2 (two) times daily. 2/15/22   Historical Provider   metoprolol succinate (TOPROL-XL) 25 MG 24 hr tablet Take 1 tablet (25 mg total) by mouth once daily. 3/15/22 3/15/23  Rob Gates MD   metronidazole 1% (METROGEL) 1 % Gel Apply topically daily as needed. 7/20/15   Historical Provider   NARCAN 4 mg/actuation Spry SMARTSIG:Both Nares 2/15/22   Historical Provider   nitrofurantoin, macrocrystal-monohydrate, (MACROBID) 100 MG capsule Take 1 capsule (100 mg total) by mouth 2 (two) times daily. 3/14/22   Rob Gates MD   omeprazole (PRILOSEC) 20 MG capsule Take 20 mg by mouth once daily. 2/15/22   Historical Provider   oxymorphone (OPANA ER) 20 MG 12 hr tablet Take 20 mg by mouth 2 (two) times daily.     Historical Provider   pregabalin (LYRICA) 150 MG capsule Take 150 mg by mouth 2 (two) times daily. 2/15/22   Historical Provider   rosuvastatin (CRESTOR) 20 MG tablet Take 20 mg by mouth.    Historical Provider   sterile water, bottle, irrigation  4/5/22   Historical Provider   TAPENTADOL HCL (NUCYNTA ORAL) Take 1 tablet by mouth 4 (four) times daily.    Historical Provider   tiZANidine (ZANAFLEX) 2 MG tablet Take 4 mg by mouth 3 (three) times daily. 11/23/21   Historical Provider   triamcinolone acetonide 0.1% (KENALOG) 0.1 % cream Apply topically daily as needed. 7/24/15   Historical Provider   VOLTAREN 1 % Gel Apply topically once daily. 7/24/15   Historical Provider   atorvastatin (LIPITOR) 20 MG tablet Take 20 mg by mouth once daily.  1/15/16  Historical Provider  "    Allergies:  Adhesive, Neosporin [neomycin-bacitracin-polymyxin], Neomycin-polymyxin, Penicillins, and Latex  Past Medical/Surgical History:  Past Medical History:   Diagnosis Date    Behavioral problem     Bulging lumbar disc     Bursitis     bilateral hip    Degenerative cervical disc     Hx of psychiatric care     Hypercholesteremia     Labral tear of hip, degenerative bilateral    MS (multiple sclerosis)     Paresthesia of both lower extremities 3/13/2022    Pneumonia     Spinal cord compression      Past Surgical History:   Procedure Laterality Date    ANGIOGRAM, CORONARY, WITH LEFT HEART CATHETERIZATION Left 3/11/2022    Procedure: Angiogram, Coronary, with Left Heart Cath;  Surgeon: Elie Matamoros MD;  Location: [facility] CATH LAB;  Service: Cardiology;  Laterality: Left;    BREAST SURGERY      augmentation     SECTION, CLASSIC      HAND SURGERY      HYSTEROSCOPY      NECK SURGERY      cervical disc removeal    TUBAL LIGATION      X-STOP IMPLANTATION       OBJECTIVE     Vital Signs:  Temp:  [96.5 °F (35.8 °C)-98.4 °F (36.9 °C)]   Pulse:  [107-120]   Resp:  [16-18]   BP: (156-182)/()   SpO2:  [98 %-100 %]       Mental Status Exam:  Appearance: unremarkable, age appropriate  MSK: no abnormal involuntary movements  Level of Consciousness: awake  Behavior/Cooperation: eye contact minimal  Psychomotor: unremarkable   Speech: normal rate, normal pitch, normal volume  Language: english  Orientation:  disoriented to place, date, situation  Attention Span/Concentration:  impaired  Memory: impaired  Mood: "unable to ilicit"  Affect: labile  Thought Process: illogical  Associations: illogical  Thought Content: hallucinations: (auditory: yes, visual: yes)  Fund of Knowledge: Impaired  Abstraction: impaired  Insight: poor  Judgment: poor    Laboratory Data:  Recent Results (from the past 48 hour(s))   Urinalysis, Reflex to Urine Culture Urine, Clean Catch    Collection Time: 04/10/22  3:40 " PM    Specimen: Urine   Result Value Ref Range    Specimen UA Urine, Catheterized     Color, UA Yellow Yellow, Straw, [place]    Appearance, UA Clear Clear    pH, UA 5.0 5.0 - 8.0    Specific Gravity, UA 1.025 1.005 - 1.030    Protein, UA 1+ (A) Negative    Glucose, UA Negative Negative    Ketones, UA 1+ (A) Negative    Bilirubin (UA) Negative Negative    Occult Blood UA Negative Negative    Nitrite, UA Negative Negative    Leukocytes, UA Negative Negative   Drug screen panel, emergency    Collection Time: 04/10/22  3:40 PM   Result Value Ref Range    Benzodiazepines Presumptive Positive (A) Negative    Methadone metabolites Negative Negative    Cocaine (Metab.) Negative Negative    Opiate Scrn, Ur Negative Negative    Barbiturate Screen, Ur Negative Negative    Amphetamine Screen, Ur Negative Negative    THC Presumptive Positive (A) Negative    Phencyclidine Negative Negative    Creatinine, Urine 267.0 15.0 - 325.0 mg/dL    Toxicology Information SEE COMMENT    Urinalysis Microscopic    Collection Time: 04/10/22  3:40 PM   Result Value Ref Range    RBC, UA 3 0 - 4 /hpf    WBC, UA 2 0 - 5 /hpf    Bacteria Rare None-Occ /hpf    Hyaline Casts, UA 0 0-1/lpf /lpf    Microscopic Comment SEE COMMENT    CBC auto differential    Collection Time: 04/10/22  4:06 PM   Result Value Ref Range    WBC 5.91 3.90 - 12.70 K/uL    RBC 4.22 4.00 - 5.40 M/uL    Hemoglobin 12.7 12.0 - 16.0 g/dL    Hematocrit 37.8 37.0 - 48.5 %    MCV 90 82 - 98 fL    MCH 30.1 27.0 - 31.0 pg    MCHC 33.6 32.0 - 36.0 g/dL    RDW 12.9 11.5 - 14.5 %    Platelets 232 150 - 450 K/uL    MPV 10.6 9.2 - 12.9 fL    Immature Granulocytes 0.2 0.0 - 0.5 %    Gran # (ANC) 4.4 1.8 - 7.7 K/uL    Immature Grans (Abs) 0.01 0.00 - 0.04 K/uL    Lymph # 0.9 (L) 1.0 - 4.8 K/uL    Mono # 0.5 0.3 - 1.0 K/uL    Eos # 0.0 0.0 - 0.5 K/uL    Baso # 0.02 0.00 - 0.20 K/uL    nRBC 0 0 /100 WBC    Gran % 75.1 (H) 38.0 - 73.0 %    Lymph % 15.6 (L) 18.0 - 48.0 %    Mono % 8.5 4.0 - 15.0 %     Eosinophil % 0.3 0.0 - 8.0 %    Basophil % 0.3 0.0 - 1.9 %    Differential Method Automated    Comprehensive metabolic panel    Collection Time: 04/10/22  4:06 PM   Result Value Ref Range    Sodium 142 136 - 145 mmol/L    Potassium 3.6 3.5 - 5.1 mmol/L    Chloride 106 95 - 110 mmol/L    CO2 21 (L) 23 - 29 mmol/L    Glucose 113 (H) 70 - 110 mg/dL    BUN 16 6 - 20 mg/dL    Creatinine 0.8 0.5 - 1.4 mg/dL    Calcium 9.5 8.7 - 10.5 mg/dL    Total Protein 7.9 6.0 - 8.4 g/dL    Albumin 4.0 3.5 - 5.2 g/dL    Total Bilirubin 1.4 (H) 0.1 - 1.0 mg/dL    Alkaline Phosphatase 68 55 - 135 U/L    AST 49 (H) 10 - 40 U/L    ALT 38 10 - 44 U/L    Anion Gap 15 8 - 16 mmol/L    eGFR if African American >60.0 >60 mL/min/1.73 m^2    eGFR if non African American >60.0 >60 mL/min/1.73 m^2   TSH    Collection Time: 04/10/22  4:06 PM   Result Value Ref Range    TSH 0.109 (L) 0.400 - 4.000 uIU/mL   Ethanol    Collection Time: 04/10/22  4:06 PM   Result Value Ref Range    Alcohol, Serum <10 <10 mg/dL   Acetaminophen level    Collection Time: 04/10/22  4:06 PM   Result Value Ref Range    Acetaminophen (Tylenol), Serum <3.0 (L) 10.0 - 20.0 ug/mL   Salicylate level    Collection Time: 04/10/22  4:06 PM   Result Value Ref Range    Salicylate Lvl <5.0 (L) 15.0 - 30.0 mg/dL   Magnesium    Collection Time: 04/10/22  4:06 PM   Result Value Ref Range    Magnesium 1.7 1.6 - 2.6 mg/dL   Troponin I    Collection Time: 04/10/22  4:06 PM   Result Value Ref Range    Troponin I 0.007 0.000 - 0.026 ng/mL   Brain natriuretic peptide    Collection Time: 04/10/22  4:06 PM   Result Value Ref Range     (H) 0 - 99 pg/mL   T4, Free    Collection Time: 04/10/22  4:06 PM   Result Value Ref Range    Free T4 1.25 0.71 - 1.51 ng/dL   CK    Collection Time: 04/10/22  4:06 PM   Result Value Ref Range    CPK 1514 (H) 20 - 180 U/L   POCT COVID-19 Rapid Screening    Collection Time: 04/10/22  5:46 PM   Result Value Ref Range    POC Rapid COVID Negative Negative      Acceptable Yes    Comprehensive Metabolic Panel (CMP)    Collection Time: 04/11/22  2:26 AM   Result Value Ref Range    Sodium 141 136 - 145 mmol/L    Potassium 4.1 3.5 - 5.1 mmol/L    Chloride 107 95 - 110 mmol/L    CO2 20 (L) 23 - 29 mmol/L    Glucose 83 70 - 110 mg/dL    BUN 14 6 - 20 mg/dL    Creatinine 0.7 0.5 - 1.4 mg/dL    Calcium 9.3 8.7 - 10.5 mg/dL    Total Protein 7.3 6.0 - 8.4 g/dL    Albumin 3.6 3.5 - 5.2 g/dL    Total Bilirubin 1.4 (H) 0.1 - 1.0 mg/dL    Alkaline Phosphatase 67 55 - 135 U/L    AST 33 10 - 40 U/L    ALT 28 10 - 44 U/L    Anion Gap 14 8 - 16 mmol/L    eGFR if African American >60.0 >60 mL/min/1.73 m^2    eGFR if non African American >60.0 >60 mL/min/1.73 m^2   Magnesium    Collection Time: 04/11/22  2:26 AM   Result Value Ref Range    Magnesium 1.7 1.6 - 2.6 mg/dL   Phosphorus    Collection Time: 04/11/22  2:26 AM   Result Value Ref Range    Phosphorus 2.8 2.7 - 4.5 mg/dL   CBC with Automated Differential    Collection Time: 04/11/22  2:26 AM   Result Value Ref Range    WBC 6.91 3.90 - 12.70 K/uL    RBC 4.21 4.00 - 5.40 M/uL    Hemoglobin 12.6 12.0 - 16.0 g/dL    Hematocrit 37.7 37.0 - 48.5 %    MCV 90 82 - 98 fL    MCH 29.9 27.0 - 31.0 pg    MCHC 33.4 32.0 - 36.0 g/dL    RDW 12.9 11.5 - 14.5 %    Platelets 219 150 - 450 K/uL    MPV 10.5 9.2 - 12.9 fL    Immature Granulocytes 0.3 0.0 - 0.5 %    Gran # (ANC) 5.1 1.8 - 7.7 K/uL    Immature Grans (Abs) 0.02 0.00 - 0.04 K/uL    Lymph # 1.2 1.0 - 4.8 K/uL    Mono # 0.6 0.3 - 1.0 K/uL    Eos # 0.0 0.0 - 0.5 K/uL    Baso # 0.03 0.00 - 0.20 K/uL    nRBC 0 0 /100 WBC    Gran % 73.3 (H) 38.0 - 73.0 %    Lymph % 17.1 (L) 18.0 - 48.0 %    Mono % 8.5 4.0 - 15.0 %    Eosinophil % 0.4 0.0 - 8.0 %    Basophil % 0.4 0.0 - 1.9 %    Differential Method Automated       No results found for: PHENYTOIN, PHENOBARB, VALPROATE, CBMZ  Imaging:  Imaging Results              X-Ray Chest AP Portable (Final result)  Result time 04/10/22 17:46:04       Final result by Satya Summers MD (04/10/22 17:46:04)                   Impression:      No evidence of acute cardiopulmonary disease.    Electronically signed by resident: Fan Hitchcock  Date:    04/10/2022  Time:    17:43    Electronically signed by: Satya Summers MD  Date:    04/10/2022  Time:    17:46               Narrative:    EXAMINATION:  XR CHEST AP PORTABLE    CLINICAL HISTORY:  ams;    TECHNIQUE:  Single frontal view of the chest was performed.    COMPARISON:  Chest radiograph 03/11/2022    FINDINGS:  Partially visualized cervical spinal hardware.  Overlying monitoring leads.    Lungs are symmetrically expanded.  No focal consolidation.  No pleural effusion or pneumothorax.    Trachea and mediastinal structures are midline.  Cardiomediastinal silhouette is unremarkable.    No acute osseous process.                                       CT Head Without Contrast (Final result)  Result time 04/10/22 17:45:37      Final result by Satya Summers MD (04/10/22 17:45:37)                   Impression:      No evidence of acute intracranial pathology.    Scattered foci of supratentorial white matter hypoattenuation in keeping with known history of multiple sclerosis.    Stable small focus of left occipital scalp thickening, may represent a complex sebaceous cyst.    Electronically signed by resident: Ron Mckenzie  Date:    04/10/2022  Time:    17:32    Electronically signed by: Satya Summers MD  Date:    04/10/2022  Time:    17:45               Narrative:    EXAMINATION:  CT HEAD WITHOUT CONTRAST    CLINICAL HISTORY:  Mental status change, unknown cause;    TECHNIQUE:  Low dose axial CT images obtained throughout the head without the use of intravenous contrast.  Axial, sagittal and coronal reconstructions were performed.    COMPARISON:  MRI brain demyelinating 03/13/2022, 06/04/2018; CT head 03/11/2022.    FINDINGS:  Intracranial compartment:    Ventricles and sulci are normal in size for age without evidence of  "hydrocephalus.    Scattered punctate foci hypoattenuation throughout the supratentorial white matter in keeping with known history of multiple sclerosis, better evaluated on prior brain MRI.  No obvious new focal intraparenchymal lesions.    No parenchymal hemorrhage, edema, mass effect, or major vascular distribution infarct.    No extra-axial blood or fluid collections.    Skull/extracranial contents (limited evaluation):    Mastoid air cells and visualized paranasal sinuses are essentially clear.  1.0 cm rounded focus of hyperdense scalp thickening overlying the left occipital calvarium.  The bony calvarium is intact without evidence of acute displaced.                                         Hospital Course: 22  Mr. Morrow is sitting in bed. She does not make eye contact and does not respond to this interviewer. Per sitter, pt has not interacted with any provider at this point today.  On assessment later today, Ms. Morrow is sitting in bed. She repeats the phrase "It's too late" several times, closes her eyes, is tearful, and does not answer other questions at this time.          Family History       Problem Relation (Age of Onset)    Bipolar disorder Brother    COPD Mother    Cancer Father    Diabetes Father, Sister    Drug abuse Mother    Heart disease Maternal Uncle    Hypothyroidism Maternal Grandmother    Lung cancer Father          Tobacco Use    Smoking status: Former Smoker     Quit date: 2013     Years since quittin.7    Smokeless tobacco: Never Used   Substance and Sexual Activity    Alcohol use: No    Drug use: Yes     Types: Benzodiazepines, Marijuana    Sexual activity: Never     Psychotherapeutics (From admission, onward)                Start     Stop Route Frequency Ordered    22 1723  lorazepam injection 2 mg         -- IV Every 4 hours PRN 22 1623             Objective:     Vital Signs (Most Recent):  Temp: 98.5 °F (36.9 °C) (22 0811)  Pulse: 106 (22 " "0811)  Resp: 18 (04/12/22 0811)  BP: (!) 155/91 (04/12/22 0811)  SpO2: 99 % (04/12/22 0811)   Vital Signs (24h Range):  Temp:  [97.3 °F (36.3 °C)-99.1 °F (37.3 °C)] 98.5 °F (36.9 °C)  Pulse:  [100-126] 106  Resp:  [16-18] 18  SpO2:  [97 %-99 %] 99 %  BP: (140-171)/(89-96) 155/91     Height: 5' 5" (165.1 cm)  Weight: 59 kg (130 lb)  Body mass index is 21.63 kg/m².      Intake/Output Summary (Last 24 hours) at 4/12/2022 0924  Last data filed at 4/11/2022 2100  Gross per 24 hour   Intake --   Output 1 ml   Net -1 ml       Unable to assess mental status exam as pt not participating at this time.     Significant Labs: Last 24 Hours:   Recent Lab Results         04/12/22  0408        Albumin 3.7       Alkaline Phosphatase 66       ALT 25       Anion Gap 14       AST 23       Baso # 0.03       Basophil % 0.5       BILIRUBIN TOTAL 1.3  Comment: For infants and newborns, interpretation of results should be based  on gestational age, weight and in agreement with clinical  observations.    Premature Infant recommended reference ranges:  Up to 24 hours.............<8.0 mg/dL  Up to 48 hours............<12.0 mg/dL  3-5 days..................<15.0 mg/dL  6-29 days.................<15.0 mg/dL         BUN 16       Calcium 10.0       Chloride 106       CO2 21       Creatinine 0.7       Differential Method Automated       eGFR if  >60.0       eGFR if non  >60.0  Comment: Calculation used to obtain the estimated glomerular filtration  rate (eGFR) is the CKD-EPI equation.          Eos # 0.0       Eosinophil % 0.7       Glucose 90       Gran # (ANC) 4.3       Gran % 70.6       Hematocrit 41.3       Hemoglobin 14.0       Immature Grans (Abs) 0.01  Comment: Mild elevation in immature granulocytes is non specific and   can be seen in a variety of conditions including stress response,   acute inflammation, trauma and pregnancy. Correlation with other   laboratory and clinical findings is essential.         " Immature Granulocytes 0.2       Lymph # 1.1       Lymph % 18.8       Magnesium 1.9       MCH 29.5       MCHC 33.9       MCV 87       Mono # 0.6       Mono % 9.2       MPV 10.4       nRBC 0       Phosphorus 2.4       Platelets 254       Potassium 3.7       PROTEIN TOTAL 7.7       RBC 4.74       RDW 12.9       Sodium 141       WBC 6.06               Significant Imaging:  no new       Scheduled Medications:   atorvastatin  40 mg Oral Daily    enoxaparin  40 mg Subcutaneous Daily    metoprolol succinate  25 mg Oral Daily    valsartan  40 mg Oral BID       PRN Medications:  acetaminophen, dextrose 10%, dextrose 10%, glucagon (human recombinant), glucose, glucose, hydrALAZINE, lorazepam, melatonin, naloxone, sodium chloride 0.9%    Review of patient's allergies indicates:   Allergen Reactions    Adhesive Hives    Neosporin [neomycin-bacitracin-polymyxin] Hives    Neomycin-polymyxin Hives    Penicillins Other (See Comments)     Unknown  reaction    Latex Rash       Assessment/Plan:     * Altered mental state    ASSESSMENT     Ms. Morrow is a 56 year old female with a past psychiatric history of ?, currently presenting with Altered mental state.  Psychiatry was originally consulted to address the patient's symptoms of altered mental status.    IMPRESSION  Delirium likely 2/2 to polypharmacy (medical marijuana, opiates , muscle relaxer's & benzos)   H/o Opiate and benzo dependence in the past    H/o Unspecified mood d/o vs. Personality d/o     RECOMMENDATION(S)      1. Scheduled Medication(s):  None at this time.  Please hold psychotropic and sedating medications  Recommend consulting neurology    2. PRN Medication(s):  Haldol 2 mg q8 hrs PO/IM for non redirectable agitation    Monitor for possible benzo/opiate withdrawal     3.  Monitor:  Please obtain daily EKG to monitor QTc    4. Legal Status/Precaution(s):  Continue PEC/CEC at this time.    5. Other:  DELIRIUM BEHAVIOR MANAGEMENT   PLEASE utilize CHEMICAL  restraints with PRN meds first for agitation. Minimize use of PHYSICAL restraints   Keep window shades open and room lit during day and room dim at night in order to promote normal sleep-wake cycles   Encourage family at bedside. [place] patient often to situation, location, date.   Continue to Limit or Discontinue use of Narcotics, Benzos and Anti-cholinergic medications as they may worsen delirium.   Continue medical workup for causative etiology of Delirium.     Psychiatry will continue to follow.             Need for Continued Hospitalization:  Per primary    Anticipated Disposition:  Pending    Total time:  25 with greater than 50% of this time spent in counseling and/or coordination of care.       Ronda Scott MD   Psychiatry  [facility]

## 2022-04-12 NOTE — PLAN OF CARE
Pt stable overnight without acute changes. Continues to be refractory to interaction and often ignores care team presence. Unpredictable swings in mood ranging from unexplained uncontrolled laughter to kicking and cursing at staff without apparent reason. Unable to discern orientation accurately due to pt noncompliance with questions. VSS. Sitter at bedside for safety. Pt cooperative with PM med administration.     Problem: Adult Inpatient Plan of Care  Goal: Plan of Care Review  Outcome: Ongoing, Progressing  Goal: Patient-Specific Goal (Individualized)  Outcome: Ongoing, Progressing  Goal: Absence of Hospital-Acquired Illness or Injury  Outcome: Ongoing, Progressing  Goal: Optimal Comfort and Wellbeing  Outcome: Ongoing, Progressing  Goal: Readiness for Transition of Care  Outcome: Ongoing, Progressing

## 2022-04-13 VITALS
HEART RATE: 104 BPM | WEIGHT: 130 LBS | TEMPERATURE: 98 F | OXYGEN SATURATION: 96 % | DIASTOLIC BLOOD PRESSURE: 78 MMHG | RESPIRATION RATE: 18 BRPM | HEIGHT: 65 IN | SYSTOLIC BLOOD PRESSURE: 136 MMHG | BODY MASS INDEX: 21.66 KG/M2

## 2022-04-13 PROBLEM — F06.1 CATATONIA: Status: ACTIVE | Noted: 2022-04-13

## 2022-04-13 LAB
ALBUMIN SERPL BCP-MCNC: 3.3 G/DL (ref 3.5–5.2)
ALP SERPL-CCNC: 59 U/L (ref 55–135)
ALT SERPL W/O P-5'-P-CCNC: 19 U/L (ref 10–44)
ANION GAP SERPL CALC-SCNC: 12 MMOL/L (ref 8–16)
AST SERPL-CCNC: 16 U/L (ref 10–40)
BASOPHILS # BLD AUTO: 0.02 K/UL (ref 0–0.2)
BASOPHILS NFR BLD: 0.5 % (ref 0–1.9)
BILIRUB SERPL-MCNC: 1.4 MG/DL (ref 0.1–1)
BUN SERPL-MCNC: 21 MG/DL (ref 6–20)
CALCIUM SERPL-MCNC: 9.3 MG/DL (ref 8.7–10.5)
CHLORIDE SERPL-SCNC: 104 MMOL/L (ref 95–110)
CO2 SERPL-SCNC: 22 MMOL/L (ref 23–29)
CREAT SERPL-MCNC: 0.7 MG/DL (ref 0.5–1.4)
DIFFERENTIAL METHOD: NORMAL
EOSINOPHIL # BLD AUTO: 0.1 K/UL (ref 0–0.5)
EOSINOPHIL NFR BLD: 1.2 % (ref 0–8)
ERYTHROCYTE [DISTWIDTH] IN BLOOD BY AUTOMATED COUNT: 12.8 % (ref 11.5–14.5)
EST. GFR  (AFRICAN AMERICAN): >60 ML/MIN/1.73 M^2
EST. GFR  (NON AFRICAN AMERICAN): >60 ML/MIN/1.73 M^2
GLUCOSE SERPL-MCNC: 103 MG/DL (ref 70–110)
HCT VFR BLD AUTO: 38.5 % (ref 37–48.5)
HGB BLD-MCNC: 12.8 G/DL (ref 12–16)
IMM GRANULOCYTES # BLD AUTO: 0.01 K/UL (ref 0–0.04)
IMM GRANULOCYTES NFR BLD AUTO: 0.2 % (ref 0–0.5)
LYMPHOCYTES # BLD AUTO: 1.1 K/UL (ref 1–4.8)
LYMPHOCYTES NFR BLD: 27.5 % (ref 18–48)
MAGNESIUM SERPL-MCNC: 1.8 MG/DL (ref 1.6–2.6)
MCH RBC QN AUTO: 30 PG (ref 27–31)
MCHC RBC AUTO-ENTMCNC: 33.2 G/DL (ref 32–36)
MCV RBC AUTO: 90 FL (ref 82–98)
MONOCYTES # BLD AUTO: 0.4 K/UL (ref 0.3–1)
MONOCYTES NFR BLD: 8.7 % (ref 4–15)
NEUTROPHILS # BLD AUTO: 2.6 K/UL (ref 1.8–7.7)
NEUTROPHILS NFR BLD: 61.9 % (ref 38–73)
NRBC BLD-RTO: 0 /100 WBC
PHOSPHATE SERPL-MCNC: 2.7 MG/DL (ref 2.7–4.5)
PLATELET # BLD AUTO: 215 K/UL (ref 150–450)
PMV BLD AUTO: 10.4 FL (ref 9.2–12.9)
POTASSIUM SERPL-SCNC: 3.4 MMOL/L (ref 3.5–5.1)
PROT SERPL-MCNC: 6.9 G/DL (ref 6–8.4)
RBC # BLD AUTO: 4.27 M/UL (ref 4–5.4)
SODIUM SERPL-SCNC: 138 MMOL/L (ref 136–145)
WBC # BLD AUTO: 4.14 K/UL (ref 3.9–12.7)

## 2022-04-13 PROCEDURE — 99232 PR SUBSEQUENT HOSPITAL CARE,LEVL II: ICD-10-PCS | Mod: ,,, | Performed by: PSYCHIATRY & NEUROLOGY

## 2022-04-13 PROCEDURE — 99238 PR HOSPITAL DISCHARGE DAY,<30 MIN: ICD-10-PCS | Mod: ,,, | Performed by: HOSPITALIST

## 2022-04-13 PROCEDURE — 84100 ASSAY OF PHOSPHORUS: CPT

## 2022-04-13 PROCEDURE — 99232 SBSQ HOSP IP/OBS MODERATE 35: CPT | Mod: ,,, | Performed by: PSYCHIATRY & NEUROLOGY

## 2022-04-13 PROCEDURE — 85025 COMPLETE CBC W/AUTO DIFF WBC: CPT

## 2022-04-13 PROCEDURE — 99238 HOSP IP/OBS DSCHRG MGMT 30/<: CPT | Mod: ,,, | Performed by: HOSPITALIST

## 2022-04-13 PROCEDURE — 25000003 PHARM REV CODE 250

## 2022-04-13 PROCEDURE — 36415 COLL VENOUS BLD VENIPUNCTURE: CPT

## 2022-04-13 PROCEDURE — 94761 N-INVAS EAR/PLS OXIMETRY MLT: CPT

## 2022-04-13 PROCEDURE — 83735 ASSAY OF MAGNESIUM: CPT

## 2022-04-13 PROCEDURE — 25000003 PHARM REV CODE 250: Performed by: STUDENT IN AN ORGANIZED HEALTH CARE EDUCATION/TRAINING PROGRAM

## 2022-04-13 PROCEDURE — 80053 COMPREHEN METABOLIC PANEL: CPT

## 2022-04-13 RX ORDER — POTASSIUM CHLORIDE 20 MEQ/1
40 TABLET, EXTENDED RELEASE ORAL ONCE
Status: COMPLETED | OUTPATIENT
Start: 2022-04-13 | End: 2022-04-13

## 2022-04-13 RX ORDER — VALSARTAN 40 MG/1
40 TABLET ORAL 2 TIMES DAILY
Qty: 180 TABLET | Refills: 3 | Status: CANCELLED | OUTPATIENT
Start: 2022-04-13 | End: 2023-04-13

## 2022-04-13 RX ORDER — LORAZEPAM 1 MG/1
2 TABLET ORAL 2 TIMES DAILY
Status: DISCONTINUED | OUTPATIENT
Start: 2022-04-13 | End: 2022-04-13 | Stop reason: HOSPADM

## 2022-04-13 RX ORDER — LORAZEPAM 2 MG/1
2 TABLET ORAL 2 TIMES DAILY
Qty: 60 TABLET | Refills: 0 | Status: ON HOLD | OUTPATIENT
Start: 2022-04-13 | End: 2023-06-13 | Stop reason: HOSPADM

## 2022-04-13 RX ORDER — ROSUVASTATIN CALCIUM 20 MG/1
20 TABLET, COATED ORAL DAILY
Qty: 30 TABLET | Refills: 11 | Status: SHIPPED | OUTPATIENT
Start: 2022-04-13 | End: 2023-06-30 | Stop reason: SDUPTHER

## 2022-04-13 RX ORDER — HALOPERIDOL 0.5 MG/1
2 TABLET ORAL EVERY 8 HOURS PRN
Status: DISCONTINUED | OUTPATIENT
Start: 2022-04-13 | End: 2022-04-13 | Stop reason: HOSPADM

## 2022-04-13 RX ADMIN — VALSARTAN 40 MG: 40 TABLET, FILM COATED ORAL at 08:04

## 2022-04-13 RX ADMIN — ATORVASTATIN CALCIUM 40 MG: 20 TABLET, FILM COATED ORAL at 08:04

## 2022-04-13 RX ADMIN — ACETAMINOPHEN 650 MG: 325 TABLET ORAL at 03:04

## 2022-04-13 RX ADMIN — LORAZEPAM 2 MG: 1 TABLET ORAL at 02:04

## 2022-04-13 RX ADMIN — POTASSIUM CHLORIDE 40 MEQ: 20 TABLET, EXTENDED RELEASE ORAL at 08:04

## 2022-04-13 RX ADMIN — METOPROLOL SUCCINATE 25 MG: 25 TABLET, EXTENDED RELEASE ORAL at 08:04

## 2022-04-13 NOTE — SUBJECTIVE & OBJECTIVE
Interval History: NAEO. Pt much improved this morning. AAOx3 and now aware of her surroundings. Catatonic on exam.     Review of Systems   Constitutional:  Positive for appetite change (decreased). Negative for activity change, chills, fatigue and fever.   HENT: Negative.     Eyes: Negative.    Respiratory: Negative.  Negative for chest tightness and shortness of breath.    Cardiovascular:  Negative for chest pain.   Gastrointestinal: Negative.    Genitourinary: Negative.    Musculoskeletal: Negative.  Negative for arthralgias and joint swelling.   Skin: Negative.    Neurological: Negative.    Psychiatric/Behavioral:  Positive for sleep disturbance. Negative for behavioral problems, hallucinations, self-injury and suicidal ideas.    Objective:     Vital Signs (Most Recent):  Temp: 98.1 °F (36.7 °C) (04/13/22 1127)  Pulse: 90 (04/13/22 1520)  Resp: 18 (04/13/22 1130)  BP: 122/65 (04/13/22 1127)  SpO2: 96 % (04/13/22 1130) Vital Signs (24h Range):  Temp:  [96.7 °F (35.9 °C)-98.1 °F (36.7 °C)] 98.1 °F (36.7 °C)  Pulse:  [75-99] 90  Resp:  [18] 18  SpO2:  [96 %-100 %] 96 %  BP: (109-148)/(65-77) 122/65     Weight: 59 kg (130 lb)  Body mass index is 21.63 kg/m².  No intake or output data in the 24 hours ending 04/13/22 1522   Physical Exam  Constitutional:       General: She is not in acute distress.     Appearance: She is normal weight. She is not ill-appearing.   HENT:      Head: Normocephalic and atraumatic.      Nose: Nose normal.      Mouth/Throat:      Mouth: Mucous membranes are moist.   Eyes:      General: No scleral icterus.     Conjunctiva/sclera: Conjunctivae normal.   Cardiovascular:      Rate and Rhythm: Normal rate and regular rhythm.      Pulses: Normal pulses.      Heart sounds: No murmur heard.  Pulmonary:      Effort: Pulmonary effort is normal. No respiratory distress.      Breath sounds: Normal breath sounds. No wheezing or rales.   Abdominal:      General: Abdomen is flat. Bowel sounds are normal.  There is no distension.      Palpations: Abdomen is soft.      Tenderness: There is no abdominal tenderness. There is no guarding.   Musculoskeletal:         General: No swelling or tenderness. Normal range of motion.      Cervical back: Normal range of motion.      Right lower leg: No edema.      Left lower leg: No edema.   Skin:     General: Skin is warm and dry.      Capillary Refill: Capillary refill takes less than 2 seconds.      Coloration: Skin is not jaundiced.   Neurological:      General: No focal deficit present.      Mental Status: She is alert.      Cranial Nerves: No cranial nerve deficit.      Sensory: No sensory deficit.      Comments: Oriented to person and time   Psychiatric:         Attention and Perception: She does not perceive auditory or visual hallucinations.         Mood and Affect: Affect is flat.         Speech: Speech is delayed. Speech is not rapid and pressured or tangential.         Behavior: Behavior is slowed.         Cognition and Memory: Memory is impaired. She exhibits impaired recent memory.       Significant Labs: All pertinent labs within the past 24 hours have been reviewed.    Significant Imaging: I have reviewed all pertinent imaging results/findings within the past 24 hours.

## 2022-04-13 NOTE — ASSESSMENT & PLAN NOTE
UA positive for benzos.     - Pt does not have benzos on home med list, but per son she has been dependent of them for many years.   - Ativan 2mg PO BID on discharge. Pt being transferred to  psych facility

## 2022-04-13 NOTE — ASSESSMENT & PLAN NOTE
Long hx of MS for which she has been on Copaxone. Recently  Saw Dr. Frey who was skeptical that MS was her true diagnosis vs MS mimic.

## 2022-04-13 NOTE — NURSING
Report given to RN at [facility]. IV removed, catheter tip intact. Patient went down with security guards, Nila (PCT), and transportation.

## 2022-04-13 NOTE — ASSESSMENT & PLAN NOTE
57 yo female with a PMHx of mutliple sclerosis, HFrEF (25% on echo from 3/12/22), stress cardiomyopathy, HLD, chronic pain, benzo dependence, opioid dependence, hypertension presenting for acute change in mental status. Patient actively hallucinating in ED with VH and AH. Does not respond appropriately, mumbling to herself. DDx includes benzo overuse or withdrawal, marijuana induced psychosis vs polypharmacy vs depression with psychotic features vs MS flare. Less likely septic encephalopathy or electrolyte derangements. CT head unremarkable. Labs unremarkable other than elevated BNP (significantly lower than previous during HF exacerbation) and low TSH.     - Hold all opioids and benzos  - psych consulted, appreciate recs. Starting pt on ativan 2mg PO BID  - Will await improvement as medications are metabolized  - Pt medically ready for discharge and psych in agreement with transfer to IP psych facility. Transfer process initiated with CM/CHEN, appreciate assistance.  - Pt accepted to IP psych facility transport being arranged and pt to be discharged today   - remainder of psych medications to be deferred to IP psych doctors, per psych

## 2022-04-13 NOTE — ASSESSMENT & PLAN NOTE
Hypertensive to 180s/110 on day of admission. Has been normotensive on previous admission. Home meds: lisinopril 2.5 mg daily, metoprolol 25mg daily.   Continue home meds on discharge

## 2022-04-13 NOTE — ASSESSMENT & PLAN NOTE
History of chronic back, hip, hand, foot pain. Oxymorphone, mobic, voltaren, Nucynta on med history. Unclear which/how much she takes regularly. Not c/o of any pain on admission. UA negative for opiates.     - Continue to hold opioids

## 2022-04-13 NOTE — ASSESSMENT & PLAN NOTE
ASSESSMENT     Ms. Morrow is a 56 year old female with a past psychiatric history of ?, currently presenting with Altered mental state.  Psychiatry was originally consulted to address the patient's symptoms of altered mental status.    IMPRESSION  Catatonia  Delirium likely 2/2 to polypharmacy (medical marijuana, opiates , muscle relaxer's & benzos)   H/o Opiate and benzo dependence in the past    H/o Unspecified mood d/o vs. Personality d/o     RECOMMENDATION(S)      1. Scheduled Medication(s):  None at this time.  Please hold psychotropic and sedating medications    Ativan 2mg BID for catatonia    2. PRN Medication(s):  Haldol 2 mg q8 hrs PO/IM for non redirectable agitation    Monitor for possible benzo/opiate withdrawal     3.  Monitor:  Please obtain daily EKG to monitor QTc    4. Legal Status/Precaution(s):  Continue PEC/CEC at this time.    5. Other:  DELIRIUM BEHAVIOR MANAGEMENT   PLEASE utilize CHEMICAL restraints with PRN meds first for agitation. Minimize use of PHYSICAL restraints   Keep window shades open and room lit during day and room dim at night in order to promote normal sleep-wake cycles   Encourage family at bedside. [place] patient often to situation, location, date.   Continue to Limit or Discontinue use of Narcotics, Benzos and Anti-cholinergic medications as they may worsen delirium.   Continue medical workup for causative etiology of Delirium.     Psychiatry will continue to follow.

## 2022-04-13 NOTE — PROGRESS NOTES
[facility] Medicine  Progress Note    Patient Name: Reza Morrow  MRN: [id number]  Patient Class: IP- Inpatient   Admission Date: 4/10/2022  Length of Stay: 1 days  Attending Physician: Madison Ring MD  Primary Care Provider: Kip Jimenez MD        Subjective:     Principal Problem:Altered mental state        HPI:  55 yo female with a PMHx of mutliple sclerosis, HFrEF (25% on echo from 3/12/22), stress cardiomyopathy, HLD, chronic pain, benzo dependence, opioid dependence, hypertension presenting for acute change in mental status. History obtained from chart review and collateral as patient unable to participate in history taking. She presented via EMS due to her son noticing erratic behavior, worsening since day before admission. Patient had a recent admission for confusion, weakness, chest pain. She was taken to cath lab, found to have no CAD. Echo showed reduced EF of 25%, suggestive of Takutsubo cardiomyopathy. Neurology consulted during that admission, set up outpatient follow up with MS clinic. She was DC on Lasix, metoprolol, and lisinopril.     Collateral obtained from son, Jono Ceballos. According to her son, she has not been completely back to the same since being discharged from [facility] on 3/14. She has been more aggravated, angry, and screaming at him since yesterday. She has been especially distressed, perseverating on her neurologist Dr. Frey telling her that she probably does not have MS. Patient saw Dr. Frey on March 23, where it was felt that her lesions and presentation do not resemble typical MS. Reportedly, patient has been distressed with being told this after believing that she has had MS for about 10 years. Her son states that he found her naked in his bed prior to her previous admission, and that presentation today was similar. She had been having visual and auditory hallucinations, talking to people who were not present, including her other son who passed away in  "2019. Her significant other passed away in 2017. Son denies any other recent family/friend deaths, but feels she has never fully coped well with her grief. Her denies finding any pill bottles or empty bottles at home. He verifies that she does take Flexeril, baclofen, tizanidine, robaxin, hydroxyzine, gabapentin, oxycontin for her chronic pain and anxiety. She has chronic back, hip, hand, foot pain. Son reports he is not aware of any etoh or illicit drug use. She has a prescription for medical marijuana. Former smoker. Son reports that she has had a previous psych inpatient stay, but denies any diagnosed primary psych disorder such as bipolar, schizophrenia.     Per chart review: In June 2016, she had made threats to commit suicide out of anger with not receiving certain medications, and later denied SI at discharge. Per psych notes patient was quoted saying "with my pain I could eat through that steel," "I have pain all day, I hold it in so noone sees it, I let it out at night." Pt then changes subject, reports the "scarlett I was with for 25 yrs just passed away, my kids been in and out of senior care, but I'm fine, I don't want to kill myself." She had consistently asserted since admission that threats of self-harm were made in anger while frustrated by her insurer.  She had been calm and cooperative with no evidence of psychosis or and attempt to harm herself or others and no longer met commitment criteria.    Recent message on 3/31 on [employer] from patient states, "Since leaving the hospital over two weeks ago I have not felt well.  I have electrical shock going up and down both legs.  They are weak, numb, and I have tingling off and on.  I have fallen twice, I am dizzy and my balance is off.  I have restless legs at night and I have been having trouble sleeping.  I am nauseated and not eating well.  My vision is blurry and gets worse at times.  I have numbness and tingling in both arms and hands.  I'm anxious, depressed " "and I feel like i'm losing my mind.  How can I not have MS after being diagnosed and had MRI's every 6 months since being diagnosed?  I really need to be seen."        In the ED, she continues to have AMS, AH, VH. Afebrile, hypertensive, tachycardic, on room air. Labs significant for , CPK 1514, TSH 0.109, T4 wnl. Electrolytes wnl, CBC unremarkable. UA 1+ protein, 1+ ketones. Received 1 L NS.       Overview/Hospital Course:  Pt admitted for AMS, possibly due to polypharmacy or overdose from home medications. Pt does have suicidal thoughts and suicide history in the past. Psychiatry service consulted. Neurology consulted, no concern for MS flair, recommended EEG. Pt showing much clinical improvement today with regards to her mental status. More alert and aware of herself and time. Showing signs of catatonia so started on scheduled ativan per psych recs. Pt medically ready for discharge so placement for IP psych facility initiated, SW/CM consulted.       Interval History: NAEO. Pt much improved this morning. AAOx3 and now aware of her surroundings. Catatonic on exam.     Review of Systems   Constitutional:  Positive for appetite change (decreased). Negative for activity change, chills, fatigue and fever.   HENT: Negative.     Eyes: Negative.    Respiratory: Negative.  Negative for chest tightness and shortness of breath.    Cardiovascular:  Negative for chest pain.   Gastrointestinal: Negative.    Genitourinary: Negative.    Musculoskeletal: Negative.  Negative for arthralgias and joint swelling.   Skin: Negative.    Neurological: Negative.    Psychiatric/Behavioral:  Positive for sleep disturbance. Negative for behavioral problems, hallucinations, self-injury and suicidal ideas.    Objective:     Vital Signs (Most Recent):  Temp: 98.1 °F (36.7 °C) (04/13/22 1127)  Pulse: 90 (04/13/22 1520)  Resp: 18 (04/13/22 1130)  BP: 122/65 (04/13/22 1127)  SpO2: 96 % (04/13/22 1130) Vital Signs (24h Range):  Temp:  [96.7 " °F (35.9 °C)-98.1 °F (36.7 °C)] 98.1 °F (36.7 °C)  Pulse:  [75-99] 90  Resp:  [18] 18  SpO2:  [96 %-100 %] 96 %  BP: (109-148)/(65-77) 122/65     Weight: 59 kg (130 lb)  Body mass index is 21.63 kg/m².  No intake or output data in the 24 hours ending 04/13/22 1522   Physical Exam  Constitutional:       General: She is not in acute distress.     Appearance: She is normal weight. She is not ill-appearing.   HENT:      Head: Normocephalic and atraumatic.      Nose: Nose normal.      Mouth/Throat:      Mouth: Mucous membranes are moist.   Eyes:      General: No scleral icterus.     Conjunctiva/sclera: Conjunctivae normal.   Cardiovascular:      Rate and Rhythm: Normal rate and regular rhythm.      Pulses: Normal pulses.      Heart sounds: No murmur heard.  Pulmonary:      Effort: Pulmonary effort is normal. No respiratory distress.      Breath sounds: Normal breath sounds. No wheezing or rales.   Abdominal:      General: Abdomen is flat. Bowel sounds are normal. There is no distension.      Palpations: Abdomen is soft.      Tenderness: There is no abdominal tenderness. There is no guarding.   Musculoskeletal:         General: No swelling or tenderness. Normal range of motion.      Cervical back: Normal range of motion.      Right lower leg: No edema.      Left lower leg: No edema.   Skin:     General: Skin is warm and dry.      Capillary Refill: Capillary refill takes less than 2 seconds.      Coloration: Skin is not jaundiced.   Neurological:      General: No focal deficit present.      Mental Status: She is alert.      Cranial Nerves: No cranial nerve deficit.      Sensory: No sensory deficit.      Comments: Oriented to person and time   Psychiatric:         Attention and Perception: She does not perceive auditory or visual hallucinations.         Mood and Affect: Affect is flat.         Speech: Speech is delayed. Speech is not rapid and pressured or tangential.         Behavior: Behavior is slowed.         Cognition  and Memory: Memory is impaired. She exhibits impaired recent memory.       Significant Labs: All pertinent labs within the past 24 hours have been reviewed.    Significant Imaging: I have reviewed all pertinent imaging results/findings within the past 24 hours.      Assessment/Plan:      * Altered mental state  55 yo female with a PMHx of mutliple sclerosis, HFrEF (25% on echo from 3/12/22), stress cardiomyopathy, HLD, chronic pain, benzo dependence, opioid dependence, hypertension presenting for acute change in mental status. Patient actively hallucinating in ED with VH and AH. Does not respond appropriately, mumbling to herself. DDx includes benzo overuse or withdrawal, marijuana induced psychosis vs polypharmacy vs depression with psychotic features vs MS flare. Less likely septic encephalopathy or electrolyte derangements. CT head unremarkable. Labs unremarkable other than elevated BNP (significantly lower than previous during HF exacerbation) and low TSH.     - Hold all opioids and benzos  - psych consulted, appreciate recs. Starting pt on ativan 2mg PO BID  - Will await improvement as medications are metabolized  - Pt medically ready for discharge and psych in agreement with transfer to IP psych facility. Transfer process initiated with CM/SW, appreciate assistance. Pending placement      Catatonia  Pt started on ativan PO 2mg BID per psych recs       Hypertension  Hypertensive to 180s/110 on day of admission. Has been normotensive on previous admission. Home meds: lisinopril 2.5 mg daily, metoprolol 25mg daily.     - Started valsartan 40 BID, metoprolol 25 daily.   - prn IV hydralazine      Psychosis  55 yo F presenting with AH and VH.     See AMS      MS (multiple sclerosis)  Long hx of MS for which she has been on Copaxone. Recently  Saw Dr. Frey who was skeptical that MS was her true diagnosis vs MS mimic.     - Consult neuro if suspicion for MS flare      Chronic pain syndrome  History of chronic back,  hip, hand, foot pain. Oxymorphone, mobic, voltaren, Nucynta on med history. Unclear which/how much she takes regularly. Not c/o of any pain on admission. UA negative for opiates.     - Continue to hold opioids      Acute systolic heart failure  Diagnosed with HFrEF of 25% last admission and started on lasix, metoprolol, lisinopril for GDMT.     - Continue valsartan   - consider entresto at discharge or start while admitted with f/u at HF transitional clinic      Benzodiazepine dependence  UA positive for benzos.     - Hold while patient is acutely altered/psychotic.   - Will consider restarting if signs of withdrawal become apparent.  - Pt does not have benzos on home med list, but per son she has been dependent of them for many years.     Opioid dependence  Has history of chronic pain, multiple opiates on med rec.     - Held opiates while she's altered, will continue to hold per psych recs         VTE Risk Mitigation (From admission, onward)         Ordered     enoxaparin injection 40 mg  Daily         04/10/22 2001     IP VTE HIGH RISK PATIENT  Once         04/10/22 2001     Place sequential compression device  Until discontinued         04/10/22 2001                Discharge Planning   JOANNE: 4/13/2022     Code Status: Full Code   Is the patient medically ready for discharge?: No    Reason for patient still in hospital (select all that apply): Treatment and Pending disposition  Discharge Plan A: [facility]            Rika Alfaro MD  Department of Hospital Medicine   [facility]

## 2022-04-13 NOTE — ASSESSMENT & PLAN NOTE
Diagnosed with HFrEF of 25% last admission and started on lasix, metoprolol, lisinopril for GDMT.     - Continue valsartan   - consider entresto at discharge or start while admitted with f/u at HF transitional clinic

## 2022-04-13 NOTE — SUBJECTIVE & OBJECTIVE
"    Family History       Problem Relation (Age of Onset)    Bipolar disorder Brother    COPD Mother    Cancer Father    Diabetes Father, Sister    Drug abuse Mother    Heart disease Maternal Uncle    Hypothyroidism Maternal Grandmother    Lung cancer Father          Tobacco Use    Smoking status: Former Smoker     Quit date: 2013     Years since quittin.7    Smokeless tobacco: Never Used   Substance and Sexual Activity    Alcohol use: No    Drug use: Yes     Types: Benzodiazepines, Marijuana    Sexual activity: Never     Psychotherapeutics (From admission, onward)                Start     Stop Route Frequency Ordered    22 0744  haloperidoL tablet 2 mg         -- Oral Every 8 hours PRN 22 0644             Objective:     Vital Signs (Most Recent):  Temp: 98.1 °F (36.7 °C) (22 1127)  Pulse: 88 (22 1137)  Resp: 18 (22 1130)  BP: 122/65 (22 1127)  SpO2: 96 % (22 1130) Vital Signs (24h Range):  Temp:  [96.7 °F (35.9 °C)-98.1 °F (36.7 °C)] 98.1 °F (36.7 °C)  Pulse:  [75-99] 88  Resp:  [18] 18  SpO2:  [96 %-100 %] 96 %  BP: (109-148)/(65-77) 122/65     Height: 5' 5" (165.1 cm)  Weight: 59 kg (130 lb)  Body mass index is 21.63 kg/m².    No intake or output data in the 24 hours ending 22 1224    Mental Status Exam:  Appearance: unremarkable, age appropriate  Level of Consciousness:  Awake, alert  Behavior/Cooperation: cooperative  Psychomotor: unremarkable   Speech: normal tone, normal rate, normal pitch, normal volume  Language: english, fluid  Orientation: person, month of year, year  Attention Span/Concentration:  impaired to some degree  Memory: Impaired to some degree  Mood: "ok"  Affect: mood congruent  Thought Process: tangential  Associations: tangential  Thought Content:  no SI/HI/AVH at this time  Fund of Knowledge: Aware of current events  Insight:  improving   Judgment:  improving       Significant Labs: Last 24 Hours:   Recent Lab Results         " 04/13/22  0428        Albumin 3.3       Alkaline Phosphatase 59       ALT 19       Anion Gap 12       AST 16       Baso # 0.02       Basophil % 0.5       BILIRUBIN TOTAL 1.4  Comment: For infants and newborns, interpretation of results should be based  on gestational age, weight and in agreement with clinical  observations.    Premature Infant recommended reference ranges:  Up to 24 hours.............<8.0 mg/dL  Up to 48 hours............<12.0 mg/dL  3-5 days..................<15.0 mg/dL  6-29 days.................<15.0 mg/dL         BUN 21       Calcium 9.3       Chloride 104       CO2 22       Creatinine 0.7       Differential Method Automated       eGFR if  >60.0       eGFR if non  >60.0  Comment: Calculation used to obtain the estimated glomerular filtration  rate (eGFR) is the CKD-EPI equation.          Eos # 0.1       Eosinophil % 1.2       Glucose 103       Gran # (ANC) 2.6       Gran % 61.9       Hematocrit 38.5       Hemoglobin 12.8       Immature Grans (Abs) 0.01  Comment: Mild elevation in immature granulocytes is non specific and   can be seen in a variety of conditions including stress response,   acute inflammation, trauma and pregnancy. Correlation with other   laboratory and clinical findings is essential.         Immature Granulocytes 0.2       Lymph # 1.1       Lymph % 27.5       Magnesium 1.8       MCH 30.0       MCHC 33.2       MCV 90       Mono # 0.4       Mono % 8.7       MPV 10.4       nRBC 0       Phosphorus 2.7       Platelets 215       Potassium 3.4       PROTEIN TOTAL 6.9       RBC 4.27       RDW 12.8       Sodium 138       WBC 4.14               Significant Imaging:  no new

## 2022-04-13 NOTE — PROGRESS NOTES
[facility] - [facility] Surg  Psychiatry  Progress Note    Patient Name: Reza Morrow  MRN: [id number]   Code Status: Full Code  Admission Date: 4/10/2022  Hospital Length of Stay: 1 days  Expected Discharge Date: 4/12/2022  Attending Physician: Madison Ring MD  Primary Care Provider: Kip Jimenez MD    Current Legal Status: 's Emergency Certificate (CEC)    Patient information was obtained from patient, relative(s), ER records and primary team.       Subjective:     Patient is a 56 y.o., female, presents with:    Principal Problem:Altered mental state    Chief Complaint: AMS    HPI:   Consultation-Liaison Psychiatry Consult Note    4/11/2022 10:35 AM  Reza Morrow  MRN: [id number]    Chief Complaint / Reason for Consult: AMS     SUBJECTIVE     History of Present Illness:   Reza Morrow is a 56 y.o. female with a past psychiatric history of unknown, currently presenting with Altered mental state.  Psychiatry was originally consulted to address the patient's symptoms of AMS.    Per Primary MD:  57 yo female with a PMHx of mutliple sclerosis, HFrEF (25% on echo from 3/12/22), stress cardiomyopathy, HLD, chronic pain, benzo dependence, opioid dependence, hypertension presenting for acute change in mental status. History obtained from chart review and collateral as patient unable to participate in history taking. She presented via EMS due to her son noticing erratic behavior, worsening since day before admission. Patient had a recent admission for confusion, weakness, chest pain. She was taken to cath lab, found to have no CAD. Echo showed reduced EF of 25%, suggestive of Takutsubo cardiomyopathy. Neurology consulted during that admission, set up outpatient follow up with MS clinic. She was DC on Lasix, metoprolol, and lisinopril.      Collateral obtained from son, Jono Ceballos. According to her son, she has not been completely back to the same since being discharged from [facility] on 3/14. She has been especially  "distressed, perseverating on her neurologist Dr. Frey telling her that she probably does not have MS. Patient saw Dr. Frey on March 23, where it was felt that her lesions and presentation do not resemble typical MS. Reportedly, patient has been distressed with being told this after believing that she has had MS for about 10 years. Her son states that he found her naked in his bed prior to her previous admission, and that presentation today was similar. She had been having visual and auditory hallucinations, talking to people who were not present, including her other son who passed away in 2019. Her significant other passed away in 2017. Son denies any other recent family/friend deaths, but feels she has never fully coped well with her grief. Her denies finding any pill bottles or empty bottles at home. He verifies that she does take Flexeril, baclofen, tizanidine, robaxin, hydroxyzine, gabapentin, oxycontin for her chronic pain and anxiety. She has chronic back, hip, hand, foot pain. Son reports he is not aware of any etoh or illicit drug use. She has a prescription for medical marijuana. Former smoker. Son reports that she has had a previous psych inpatient stay, but denies any diagnosed primary psych disorder such as bipolar, schizophrenia.      Per chart review: In June 2016, she had made threats to commit suicide out of anger with not receiving certain medications, and later denied SI at discharge.     Recent message on 3/31 on Me[employer] from patient states, "Since leaving the hospital over two weeks ago I have not felt well.  I have electrical shock going up and down both legs.  They are weak, numb, and I have tingling off and on.  I have fallen twice, I am dizzy and my balance is off.  I have restless legs at night and I have been having trouble sleeping.  I am nauseated and not eating well.  My vision is blurry and gets worse at times.  I have numbness and tingling in both arms and hands.  I'm anxious, " "depressed and I feel like i'm losing my mind.  How can I not have MS after being diagnosed and had MRI's every 6 months since being diagnosed?  I really need to be seen."         In the ED, she continues to have AMS, AH, VH. Labs significant for , CPK 1514, TSH 0.109. Electrolytes wnl, CBC unremarkable. UA 1+ protein, 1+ ketones. Received 1 L NS    Per C-L Psych MD:  Ms. Morrow is sitting in bed, just urinated on the floor. Her speech is nonsensical and illogical. Disoriented to person, place, situation, time. Actively hallucination.    Collateral:  Son: Jono Ceballos   Notices change in behavior over last year. One week ago, working outside for 3 days and when saw her next, she was in room, naked saying that clothes burning her skin. States that she was not taking home medications. Diagnosed with broken heart syndrome. After discharged, pt followed up with neurologist one week ago who told her that she does not have MS, which greatly upset pt. Friday, pt started not feeling well, "lost  on reality on Saturday," stating he was seeing dead people. On Sunday, she was doing worse, disoriented to self and her son.    Psychiatric Review Of Systems - Is patient experiencing or having changes in (obtained by son Mr. Ceballos):  sleep: yes, decrease  appetite: yes, decrease  weight: no  energy/anergy: yes  interest/pleasure/anhedonia: yes  somatic symptoms: yes  guilty/hopelessness: no  concentration: yes  S.I.B.s/risky behavior: no  SI/SA:  no    anxiety/panic: yes  Agoraphobia:  no  Social phobia:  no  Recurrent nightmares:  no  hyper startle response:  no  Avoidance: no  Recurrent thoughts:  yes  Recurrent behaviors:  yes    Irritability: yes  Racing thoughts: yes  Impulsive behaviors: yes  Pressured speech:  no    Paranoia:no  Delusions: yes  AVH:yes    Psychiatric History:  Diagnose(s): {unknown  Previous Medication Trials: Yes - doxipin, possibly others  Previous Psychiatric Hospitalizations: " unknown  Previous Suicide Attempts: unknown  History of Violence: unknown  Outpatient Psychiatrist: No    Social History:  Marital Status: not   Children: son, one son    Employment Status:  not working  Education:  unable to assess  Special Ed: unknown   History: no  Housing Status: alone, son visits almost daily  Developmental History: no  History of Abuse: denies  Access to Gun: denies    Substance Abuse History:  Recreational Drugs: medical THC  Use of Alcohol: occasional, social use  Rehab History: No  Tobacco Use: No  Use of Caffeine: unknown  Use of OTC: No  Legal consequences of chemical use: No  Is the patient aware of the biomedical complications associated with substance abuse and mental illness? No    Legal History:  Past Charges/Incarcerations: No  Pending Charges: No    Family Psychiatric History:   Denies    Psychosocial Factors:  Psychosocial Stressors: family and health.   Functioning Relationships: good support system  Maladaptive or problem behaviors: No  Peer group, social, ethic, cultural, emotional, and health factors: Yes - MS vs other diagnosis  Living situation, family constellation, family circumstances/home: No  Recovery environment: No  Community resources used by patient: No  Treatment acceptance/motivation for change: unable to determine at this time    Medical Review Of Systems:  Complete review of systems performed covering Constitutional, Eyes, ENT/Mouth, Cardiovascular, Respiratory, Gastrointestinal, Genitourinary, Musculoskeletal, Skin, Neurologic, Endocrine, Heme/Lymph, and Allergy/Immune.     Complete review of systems was negative with the exception of the following positive symptoms: pt unable to provide meaningful responses at this time    Scheduled Meds:   atorvastatin  40 mg Oral Daily    enoxaparin  40 mg Subcutaneous Daily    metoprolol succinate  25 mg Oral Daily    valsartan  40 mg Oral BID     acetaminophen, dextrose 10%, dextrose 10%,  glucagon (human recombinant), glucose, glucose, hydrALAZINE, melatonin, naloxone, sodium chloride 0.9%  Psychotherapeutics (From admission, onward)                None          PRN Meds:  acetaminophen, dextrose 10%, dextrose 10%, glucagon (human recombinant), glucose, glucose, hydrALAZINE, melatonin, naloxone, sodium chloride 0.9%  Home Meds:  Prior to Admission medications    Medication Sig Start Date End Date Taking? Authorizing Provider   glatiramer (COPAXONE, GLATOPA) 40 mg/mL injection Inject 40 mg into the skin. 8/23/21  Yes Historical Provider   methocarbamoL (ROBAXIN) 500 MG Tab Take 750 mg by mouth. 6/14/21  Yes Historical Provider   miscellaneous medical supply (C-TUB) Misc Lidocaine 5%, Prilocaine 2%, Meloxicam 0.9%, Lamotrigine 2.5%, Gabapentin 6% cream apply up 3 gms 5 times a day to affected area 7/26/21  Yes Historical Provider   baclofen (LIORESAL) 10 MG tablet Take 20 mg by mouth 2 (two) times daily. 10/25/21   Historical Provider   buPROPion (WELLBUTRIN XL) 150 MG TB24 tablet Take 150 mg by mouth 2 (two) times daily. 10/21/21   Historical Provider   cyclobenzaprine (FLEXERIL) 10 MG tablet Take 10 mg by mouth 3 (three) times daily as needed. 2/15/22   Historical Provider   doxepin (SINEQUAN) 25 MG capsule Take 25 mg by mouth nightly as needed. 7/7/15   Historical Provider   ergocalciferol (ERGOCALCIFEROL) 50,000 unit Cap Take 50,000 Units by mouth every 7 days. 11/23/21   Historical Provider   EScitalopram oxalate (LEXAPRO) 10 MG tablet Take 10 mg by mouth once daily. 2/15/22   Historical Provider   furosemide (LASIX) 40 MG tablet Weigh yourself daily, take 1 tab if weight increases by 3 lbs in 24 hours or if weight increases by 5 lbs in one week. 3/14/22   Rob Gates MD   gabapentin (NEURONTIN) 300 MG capsule Take 300 mg by mouth 3 (three) times daily as needed. 7/24/15   Historical Provider   hydrocodone-acetaminophen 10-325mg (NORCO)  mg Tab Take 1 tablet by mouth every 4 (four)  hours as needed.    Historical Provider   hydrOXYzine pamoate (VISTARIL) 25 MG Cap Take 25 mg by mouth 3 (three) times daily as needed. 7/24/15   Historical Provider   lamoTRIgine (LAMICTAL) 200 MG tablet Take by mouth. 2/17/22   Historical Provider   lisinopriL (PRINIVIL,ZESTRIL) 2.5 MG tablet Take 1 tablet (2.5 mg total) by mouth once daily. 3/15/22 3/15/23  Rob Gates MD   meloxicam (MOBIC) 7.5 MG tablet Take 7.5 mg by mouth 2 (two) times daily. 2/15/22   Historical Provider   metoprolol succinate (TOPROL-XL) 25 MG 24 hr tablet Take 1 tablet (25 mg total) by mouth once daily. 3/15/22 3/15/23  Rob Gates MD   metronidazole 1% (METROGEL) 1 % Gel Apply topically daily as needed. 7/20/15   Historical Provider   NARCAN 4 mg/actuation Spry SMARTSIG:Both Nares 2/15/22   Historical Provider   nitrofurantoin, macrocrystal-monohydrate, (MACROBID) 100 MG capsule Take 1 capsule (100 mg total) by mouth 2 (two) times daily. 3/14/22   Rob Gates MD   omeprazole (PRILOSEC) 20 MG capsule Take 20 mg by mouth once daily. 2/15/22   Historical Provider   oxymorphone (OPANA ER) 20 MG 12 hr tablet Take 20 mg by mouth 2 (two) times daily.     Historical Provider   pregabalin (LYRICA) 150 MG capsule Take 150 mg by mouth 2 (two) times daily. 2/15/22   Historical Provider   rosuvastatin (CRESTOR) 20 MG tablet Take 20 mg by mouth.    Historical Provider   sterile water, bottle, irrigation  4/5/22   Historical Provider   TAPENTADOL HCL (NUCYNTA ORAL) Take 1 tablet by mouth 4 (four) times daily.    Historical Provider   tiZANidine (ZANAFLEX) 2 MG tablet Take 4 mg by mouth 3 (three) times daily. 11/23/21   Historical Provider   triamcinolone acetonide 0.1% (KENALOG) 0.1 % cream Apply topically daily as needed. 7/24/15   Historical Provider   VOLTAREN 1 % Gel Apply topically once daily. 7/24/15   Historical Provider   atorvastatin (LIPITOR) 20 MG tablet Take 20 mg by mouth once daily.  1/15/16  Historical Provider  "    Allergies:  Adhesive, Neosporin [neomycin-bacitracin-polymyxin], Neomycin-polymyxin, Penicillins, and Latex  Past Medical/Surgical History:  Past Medical History:   Diagnosis Date    Behavioral problem     Bulging lumbar disc     Bursitis     bilateral hip    Degenerative cervical disc     Hx of psychiatric care     Hypercholesteremia     Labral tear of hip, degenerative bilateral    MS (multiple sclerosis)     Paresthesia of both lower extremities 3/13/2022    Pneumonia     Spinal cord compression      Past Surgical History:   Procedure Laterality Date    ANGIOGRAM, CORONARY, WITH LEFT HEART CATHETERIZATION Left 3/11/2022    Procedure: Angiogram, Coronary, with Left Heart Cath;  Surgeon: Elie Matamoros MD;  Location[facility] CATH LAB;  Service: Cardiology;  Laterality: Left;    BREAST SURGERY      augmentation     SECTION, CLASSIC      HAND SURGERY      HYSTEROSCOPY      NECK SURGERY      cervical disc removeal    TUBAL LIGATION      X-STOP IMPLANTATION       OBJECTIVE     Vital Signs:  Temp:  [96.5 °F (35.8 °C)-98.4 °F (36.9 °C)]   Pulse:  [107-120]   Resp:  [16-18]   BP: (156-182)/()   SpO2:  [98 %-100 %]       Mental Status Exam:  Appearance: unremarkable, age appropriate  MSK: no abnormal involuntary movements  Level of Consciousness: awake  Behavior/Cooperation: eye contact minimal  Psychomotor: unremarkable   Speech: normal rate, normal pitch, normal volume  Language: english  Orientation:  disoriented to place, date, situation  Attention Span/Concentration:  impaired  Memory: impaired  Mood: "unable to ilicit"  Affect: labile  Thought Process: illogical  Associations: illogical  Thought Content: hallucinations: (auditory: yes, visual: yes)  Fund of Knowledge: Impaired  Abstraction: impaired  Insight: poor  Judgment: poor    Laboratory Data:  Recent Results (from the past 48 hour(s))   Urinalysis, Reflex to Urine Culture Urine, Clean Catch    Collection Time: 04/10/22  3:40 " PM    Specimen: Urine   Result Value Ref Range    Specimen UA Urine, Catheterized     Color, UA Yellow Yellow, Straw, [place]    Appearance, UA Clear Clear    pH, UA 5.0 5.0 - 8.0    Specific Gravity, UA 1.025 1.005 - 1.030    Protein, UA 1+ (A) Negative    Glucose, UA Negative Negative    Ketones, UA 1+ (A) Negative    Bilirubin (UA) Negative Negative    Occult Blood UA Negative Negative    Nitrite, UA Negative Negative    Leukocytes, UA Negative Negative   Drug screen panel, emergency    Collection Time: 04/10/22  3:40 PM   Result Value Ref Range    Benzodiazepines Presumptive Positive (A) Negative    Methadone metabolites Negative Negative    Cocaine (Metab.) Negative Negative    Opiate Scrn, Ur Negative Negative    Barbiturate Screen, Ur Negative Negative    Amphetamine Screen, Ur Negative Negative    THC Presumptive Positive (A) Negative    Phencyclidine Negative Negative    Creatinine, Urine 267.0 15.0 - 325.0 mg/dL    Toxicology Information SEE COMMENT    Urinalysis Microscopic    Collection Time: 04/10/22  3:40 PM   Result Value Ref Range    RBC, UA 3 0 - 4 /hpf    WBC, UA 2 0 - 5 /hpf    Bacteria Rare None-Occ /hpf    Hyaline Casts, UA 0 0-1/lpf /lpf    Microscopic Comment SEE COMMENT    CBC auto differential    Collection Time: 04/10/22  4:06 PM   Result Value Ref Range    WBC 5.91 3.90 - 12.70 K/uL    RBC 4.22 4.00 - 5.40 M/uL    Hemoglobin 12.7 12.0 - 16.0 g/dL    Hematocrit 37.8 37.0 - 48.5 %    MCV 90 82 - 98 fL    MCH 30.1 27.0 - 31.0 pg    MCHC 33.6 32.0 - 36.0 g/dL    RDW 12.9 11.5 - 14.5 %    Platelets 232 150 - 450 K/uL    MPV 10.6 9.2 - 12.9 fL    Immature Granulocytes 0.2 0.0 - 0.5 %    Gran # (ANC) 4.4 1.8 - 7.7 K/uL    Immature Grans (Abs) 0.01 0.00 - 0.04 K/uL    Lymph # 0.9 (L) 1.0 - 4.8 K/uL    Mono # 0.5 0.3 - 1.0 K/uL    Eos # 0.0 0.0 - 0.5 K/uL    Baso # 0.02 0.00 - 0.20 K/uL    nRBC 0 0 /100 WBC    Gran % 75.1 (H) 38.0 - 73.0 %    Lymph % 15.6 (L) 18.0 - 48.0 %    Mono % 8.5 4.0 - 15.0 %     Eosinophil % 0.3 0.0 - 8.0 %    Basophil % 0.3 0.0 - 1.9 %    Differential Method Automated    Comprehensive metabolic panel    Collection Time: 04/10/22  4:06 PM   Result Value Ref Range    Sodium 142 136 - 145 mmol/L    Potassium 3.6 3.5 - 5.1 mmol/L    Chloride 106 95 - 110 mmol/L    CO2 21 (L) 23 - 29 mmol/L    Glucose 113 (H) 70 - 110 mg/dL    BUN 16 6 - 20 mg/dL    Creatinine 0.8 0.5 - 1.4 mg/dL    Calcium 9.5 8.7 - 10.5 mg/dL    Total Protein 7.9 6.0 - 8.4 g/dL    Albumin 4.0 3.5 - 5.2 g/dL    Total Bilirubin 1.4 (H) 0.1 - 1.0 mg/dL    Alkaline Phosphatase 68 55 - 135 U/L    AST 49 (H) 10 - 40 U/L    ALT 38 10 - 44 U/L    Anion Gap 15 8 - 16 mmol/L    eGFR if African American >60.0 >60 mL/min/1.73 m^2    eGFR if non African American >60.0 >60 mL/min/1.73 m^2   TSH    Collection Time: 04/10/22  4:06 PM   Result Value Ref Range    TSH 0.109 (L) 0.400 - 4.000 uIU/mL   Ethanol    Collection Time: 04/10/22  4:06 PM   Result Value Ref Range    Alcohol, Serum <10 <10 mg/dL   Acetaminophen level    Collection Time: 04/10/22  4:06 PM   Result Value Ref Range    Acetaminophen (Tylenol), Serum <3.0 (L) 10.0 - 20.0 ug/mL   Salicylate level    Collection Time: 04/10/22  4:06 PM   Result Value Ref Range    Salicylate Lvl <5.0 (L) 15.0 - 30.0 mg/dL   Magnesium    Collection Time: 04/10/22  4:06 PM   Result Value Ref Range    Magnesium 1.7 1.6 - 2.6 mg/dL   Troponin I    Collection Time: 04/10/22  4:06 PM   Result Value Ref Range    Troponin I 0.007 0.000 - 0.026 ng/mL   Brain natriuretic peptide    Collection Time: 04/10/22  4:06 PM   Result Value Ref Range     (H) 0 - 99 pg/mL   T4, Free    Collection Time: 04/10/22  4:06 PM   Result Value Ref Range    Free T4 1.25 0.71 - 1.51 ng/dL   CK    Collection Time: 04/10/22  4:06 PM   Result Value Ref Range    CPK 1514 (H) 20 - 180 U/L   POCT COVID-19 Rapid Screening    Collection Time: 04/10/22  5:46 PM   Result Value Ref Range    POC Rapid COVID Negative Negative      Acceptable Yes    Comprehensive Metabolic Panel (CMP)    Collection Time: 04/11/22  2:26 AM   Result Value Ref Range    Sodium 141 136 - 145 mmol/L    Potassium 4.1 3.5 - 5.1 mmol/L    Chloride 107 95 - 110 mmol/L    CO2 20 (L) 23 - 29 mmol/L    Glucose 83 70 - 110 mg/dL    BUN 14 6 - 20 mg/dL    Creatinine 0.7 0.5 - 1.4 mg/dL    Calcium 9.3 8.7 - 10.5 mg/dL    Total Protein 7.3 6.0 - 8.4 g/dL    Albumin 3.6 3.5 - 5.2 g/dL    Total Bilirubin 1.4 (H) 0.1 - 1.0 mg/dL    Alkaline Phosphatase 67 55 - 135 U/L    AST 33 10 - 40 U/L    ALT 28 10 - 44 U/L    Anion Gap 14 8 - 16 mmol/L    eGFR if African American >60.0 >60 mL/min/1.73 m^2    eGFR if non African American >60.0 >60 mL/min/1.73 m^2   Magnesium    Collection Time: 04/11/22  2:26 AM   Result Value Ref Range    Magnesium 1.7 1.6 - 2.6 mg/dL   Phosphorus    Collection Time: 04/11/22  2:26 AM   Result Value Ref Range    Phosphorus 2.8 2.7 - 4.5 mg/dL   CBC with Automated Differential    Collection Time: 04/11/22  2:26 AM   Result Value Ref Range    WBC 6.91 3.90 - 12.70 K/uL    RBC 4.21 4.00 - 5.40 M/uL    Hemoglobin 12.6 12.0 - 16.0 g/dL    Hematocrit 37.7 37.0 - 48.5 %    MCV 90 82 - 98 fL    MCH 29.9 27.0 - 31.0 pg    MCHC 33.4 32.0 - 36.0 g/dL    RDW 12.9 11.5 - 14.5 %    Platelets 219 150 - 450 K/uL    MPV 10.5 9.2 - 12.9 fL    Immature Granulocytes 0.3 0.0 - 0.5 %    Gran # (ANC) 5.1 1.8 - 7.7 K/uL    Immature Grans (Abs) 0.02 0.00 - 0.04 K/uL    Lymph # 1.2 1.0 - 4.8 K/uL    Mono # 0.6 0.3 - 1.0 K/uL    Eos # 0.0 0.0 - 0.5 K/uL    Baso # 0.03 0.00 - 0.20 K/uL    nRBC 0 0 /100 WBC    Gran % 73.3 (H) 38.0 - 73.0 %    Lymph % 17.1 (L) 18.0 - 48.0 %    Mono % 8.5 4.0 - 15.0 %    Eosinophil % 0.4 0.0 - 8.0 %    Basophil % 0.4 0.0 - 1.9 %    Differential Method Automated       No results found for: PHENYTOIN, PHENOBARB, VALPROATE, CBMZ  Imaging:  Imaging Results              X-Ray Chest AP Portable (Final result)  Result time 04/10/22 17:46:04       Final result by Satya Summers MD (04/10/22 17:46:04)                   Impression:      No evidence of acute cardiopulmonary disease.    Electronically signed by resident: Fan Hitchcock  Date:    04/10/2022  Time:    17:43    Electronically signed by: Satya Summers MD  Date:    04/10/2022  Time:    17:46               Narrative:    EXAMINATION:  XR CHEST AP PORTABLE    CLINICAL HISTORY:  ams;    TECHNIQUE:  Single frontal view of the chest was performed.    COMPARISON:  Chest radiograph 03/11/2022    FINDINGS:  Partially visualized cervical spinal hardware.  Overlying monitoring leads.    Lungs are symmetrically expanded.  No focal consolidation.  No pleural effusion or pneumothorax.    Trachea and mediastinal structures are midline.  Cardiomediastinal silhouette is unremarkable.    No acute osseous process.                                       CT Head Without Contrast (Final result)  Result time 04/10/22 17:45:37      Final result by Satya Summers MD (04/10/22 17:45:37)                   Impression:      No evidence of acute intracranial pathology.    Scattered foci of supratentorial white matter hypoattenuation in keeping with known history of multiple sclerosis.    Stable small focus of left occipital scalp thickening, may represent a complex sebaceous cyst.    Electronically signed by resident: Ron Mckenzie  Date:    04/10/2022  Time:    17:32    Electronically signed by: Satya Summers MD  Date:    04/10/2022  Time:    17:45               Narrative:    EXAMINATION:  CT HEAD WITHOUT CONTRAST    CLINICAL HISTORY:  Mental status change, unknown cause;    TECHNIQUE:  Low dose axial CT images obtained throughout the head without the use of intravenous contrast.  Axial, sagittal and coronal reconstructions were performed.    COMPARISON:  MRI brain demyelinating 03/13/2022, 06/04/2018; CT head 03/11/2022.    FINDINGS:  Intracranial compartment:    Ventricles and sulci are normal in size for age without evidence of  "hydrocephalus.    Scattered punctate foci hypoattenuation throughout the supratentorial white matter in keeping with known history of multiple sclerosis, better evaluated on prior brain MRI.  No obvious new focal intraparenchymal lesions.    No parenchymal hemorrhage, edema, mass effect, or major vascular distribution infarct.    No extra-axial blood or fluid collections.    Skull/extracranial contents (limited evaluation):    Mastoid air cells and visualized paranasal sinuses are essentially clear.  1.0 cm rounded focus of hyperdense scalp thickening overlying the left occipital calvarium.  The bony calvarium is intact without evidence of acute displaced.                                         Hospital Course: 4/12/22  Ms. Morrow is sitting in bed. She does not make eye contact and does not respond to this interviewer. Per sitter, pt has not interacted with any provider at this point today.  On assessment later today, Ms. Morrow is sitting in bed. She repeats the phrase "It's too late" several times, closes her eyes, is tearful, and does not answer other questions at this time.    4/13/22  Ms. Morrow is sitting in bed. She is oriented to person, place, month, year, disoriented to situation, memory impaired.  She is states that she does not recall events leading to this admission nor events over the last several days. She is tangential at times, though is able to explain that Jennifer is a neighbor. With regards to benzo use, she states that she does not have a prescription, used benzos previously, and that neighbor gave her unknown benzo several days ago to help with nausea.  She later states that she hid her home mediation but is unable to elaborate further as to which medications were hidden or why. States that she was a provider via telehealth that told her she had covid, unsure of when this occurred, states that she has not have positive covid test.          Family History       Problem Relation (Age of " "Onset)    Bipolar disorder Brother    COPD Mother    Cancer Father    Diabetes Father, Sister    Drug abuse Mother    Heart disease Maternal Uncle    Hypothyroidism Maternal Grandmother    Lung cancer Father          Tobacco Use    Smoking status: Former Smoker     Quit date: 2013     Years since quittin.7    Smokeless tobacco: Never Used   Substance and Sexual Activity    Alcohol use: No    Drug use: Yes     Types: Benzodiazepines, Marijuana    Sexual activity: Never     Psychotherapeutics (From admission, onward)                Start     Stop Route Frequency Ordered    22 0744  haloperidoL tablet 2 mg         -- Oral Every 8 hours PRN 22 0644             Objective:     Vital Signs (Most Recent):  Temp: 98.1 °F (36.7 °C) (22 1127)  Pulse: 88 (22 1137)  Resp: 18 (22 1130)  BP: 122/65 (22 1127)  SpO2: 96 % (22 1130) Vital Signs (24h Range):  Temp:  [96.7 °F (35.9 °C)-98.1 °F (36.7 °C)] 98.1 °F (36.7 °C)  Pulse:  [75-99] 88  Resp:  [18] 18  SpO2:  [96 %-100 %] 96 %  BP: (109-148)/(65-77) 122/65     Height: 5' 5" (165.1 cm)  Weight: 59 kg (130 lb)  Body mass index is 21.63 kg/m².    No intake or output data in the 24 hours ending 22 1224    Mental Status Exam:  Appearance: unremarkable, age appropriate  Level of Consciousness:  Awake, alert  Behavior/Cooperation: cooperative  Psychomotor: unremarkable   Speech: normal tone, normal rate, normal pitch, normal volume  Language: english, fluid  Orientation: person, month of year, year  Attention Span/Concentration:  impaired to some degree  Memory: Impaired to some degree  Mood: "ok"  Affect: mood congruent  Thought Process: tangential  Associations: tangential  Thought Content:  no SI/HI/AVH at this time  Fund of Knowledge: Aware of current events  Insight:  improving   Judgment:  improving       Significant Labs: Last 24 Hours:   Recent Lab Results         22  0428        Albumin 3.3       Alkaline " Phosphatase 59       ALT 19       Anion Gap 12       AST 16       Baso # 0.02       Basophil % 0.5       BILIRUBIN TOTAL 1.4  Comment: For infants and newborns, interpretation of results should be based  on gestational age, weight and in agreement with clinical  observations.    Premature Infant recommended reference ranges:  Up to 24 hours.............<8.0 mg/dL  Up to 48 hours............<12.0 mg/dL  3-5 days..................<15.0 mg/dL  6-29 days.................<15.0 mg/dL         BUN 21       Calcium 9.3       Chloride 104       CO2 22       Creatinine 0.7       Differential Method Automated       eGFR if  >60.0       eGFR if non  >60.0  Comment: Calculation used to obtain the estimated glomerular filtration  rate (eGFR) is the CKD-EPI equation.          Eos # 0.1       Eosinophil % 1.2       Glucose 103       Gran # (ANC) 2.6       Gran % 61.9       Hematocrit 38.5       Hemoglobin 12.8       Immature Grans (Abs) 0.01  Comment: Mild elevation in immature granulocytes is non specific and   can be seen in a variety of conditions including stress response,   acute inflammation, trauma and pregnancy. Correlation with other   laboratory and clinical findings is essential.         Immature Granulocytes 0.2       Lymph # 1.1       Lymph % 27.5       Magnesium 1.8       MCH 30.0       MCHC 33.2       MCV 90       Mono # 0.4       Mono % 8.7       MPV 10.4       nRBC 0       Phosphorus 2.7       Platelets 215       Potassium 3.4       PROTEIN TOTAL 6.9       RBC 4.27       RDW 12.8       Sodium 138       WBC 4.14               Significant Imaging:  no new       Scheduled Medications:   atorvastatin  40 mg Oral Daily    enoxaparin  40 mg Subcutaneous Daily    LORazepam  2 mg Oral BID    metoprolol succinate  25 mg Oral Daily    valsartan  40 mg Oral BID       PRN Medications:  acetaminophen, dextrose 10%, dextrose 10%, glucagon (human recombinant), glucose, glucose, haloperidoL,  hydrALAZINE, melatonin, naloxone, sodium chloride 0.9%    Review of patient's allergies indicates:   Allergen Reactions    Adhesive Hives    Neosporin [neomycin-bacitracin-polymyxin] Hives    Neomycin-polymyxin Hives    Penicillins Other (See Comments)     Unknown  reaction    Latex Rash       Assessment/Plan:     * Altered mental state    ASSESSMENT     Ms. Morrow is a 56 year old female with a past psychiatric history of ?, currently presenting with Altered mental state.  Psychiatry was originally consulted to address the patient's symptoms of altered mental status.    IMPRESSION  Catatonia  Delirium likely 2/2 to polypharmacy (medical marijuana, opiates , muscle relaxer's & benzos)   H/o Opiate and benzo dependence in the past    H/o Unspecified mood d/o vs. Personality d/o     RECOMMENDATION(S)      1. Scheduled Medication(s):  None at this time.  Please hold psychotropic and sedating medications    Ativan 2mg BID for catatonia    2. PRN Medication(s):  Haldol 2 mg q8 hrs PO/IM for non redirectable agitation    Monitor for possible benzo/opiate withdrawal     3.  Monitor:  Please obtain daily EKG to monitor QTc    4. Legal Status/Precaution(s):  Continue PEC/CEC at this time.    5. Other:  DELIRIUM BEHAVIOR MANAGEMENT   PLEASE utilize CHEMICAL restraints with PRN meds first for agitation. Minimize use of PHYSICAL restraints   Keep window shades open and room lit during day and room dim at night in order to promote normal sleep-wake cycles   Encourage family at bedside. [place] patient often to situation, location, date.   Continue to Limit or Discontinue use of Narcotics, Benzos and Anti-cholinergic medications as they may worsen delirium.   Continue medical workup for causative etiology of Delirium.     Psychiatry will continue to follow.        Catatonia  Start ativan 2 mg BID         Need for Continued Hospitalization:  Per primary    Anticipated Disposition:  Psychiatric Hospital    Total time:   25 with greater than 50% of this time spent in counseling and/or coordination of care.       Ronda Scott MD   Psychiatry  [facility] S[facility]

## 2022-04-13 NOTE — DISCHARGE SUMMARY
[facility] Medicine  Discharge Summary      Patient Name: Reza Morrow  MRN: [id number]  Patient Class: IP- Inpatient  Admission Date: 4/10/2022  Hospital Length of Stay: 1 days  Discharge Date and Time:  04/13/2022 4:01 PM  Attending Physician: Madison Ring MD   Discharging Provider: Rika Alfaro MD  Primary Care Provider: Kip Jimenez MD  [facility] Medicine Team: [facility] MED 1 Rika Alfaro MD    HPI:   55 yo female with a PMHx of mutliple sclerosis, HFrEF (25% on echo from 3/12/22), stress cardiomyopathy, HLD, chronic pain, benzo dependence, opioid dependence, hypertension presenting for acute change in mental status. History obtained from chart review and collateral as patient unable to participate in history taking. She presented via EMS due to her son noticing erratic behavior, worsening since day before admission. Patient had a recent admission for confusion, weakness, chest pain. She was taken to cath lab, found to have no CAD. Echo showed reduced EF of 25%, suggestive of Takutsubo cardiomyopathy. Neurology consulted during that admission, set up outpatient follow up with MS clinic. She was DC on Lasix, metoprolol, and lisinopril.     Collateral obtained from son, Jono Ceballos. According to her son, she has not been completely back to the same since being discharged from [facility] on 3/14. She has been more aggravated, angry, and screaming at him since yesterday. She has been especially distressed, perseverating on her neurologist Dr. Frey telling her that she probably does not have MS. Patient saw Dr. Frey on March 23, where it was felt that her lesions and presentation do not resemble typical MS. Reportedly, patient has been distressed with being told this after believing that she has had MS for about 10 years. Her son states that he found her naked in his bed prior to her previous admission, and that presentation today was similar. She had been having visual and auditory  "hallucinations, talking to people who were not present, including her other son who passed away in 2019. Her significant other passed away in 2017. Son denies any other recent family/friend deaths, but feels she has never fully coped well with her grief. Her denies finding any pill bottles or empty bottles at home. He verifies that she does take Flexeril, baclofen, tizanidine, robaxin, hydroxyzine, gabapentin, oxycontin for her chronic pain and anxiety. She has chronic back, hip, hand, foot pain. Son reports he is not aware of any etoh or illicit drug use. She has a prescription for medical marijuana. Former smoker. Son reports that she has had a previous psych inpatient stay, but denies any diagnosed primary psych disorder such as bipolar, schizophrenia.     Per chart review: In June 2016, she had made threats to commit suicide out of anger with not receiving certain medications, and later denied SI at discharge. Per psych notes patient was quoted saying "with my pain I could eat through that steel," "I have pain all day, I hold it in so noone sees it, I let it out at night." Pt then changes subject, reports the "scarlett I was with for 25 yrs just passed away, my kids been in and out of custodial, but I'm fine, I don't want to kill myself." She had consistently asserted since admission that threats of self-harm were made in anger while frustrated by her insurer.  She had been calm and cooperative with no evidence of psychosis or and attempt to harm herself or others and no longer met commitment criteria.    Recent message on 3/31 on [employer] from patient states, "Since leaving the hospital over two weeks ago I have not felt well.  I have electrical shock going up and down both legs.  They are weak, numb, and I have tingling off and on.  I have fallen twice, I am dizzy and my balance is off.  I have restless legs at night and I have been having trouble sleeping.  I am nauseated and not eating well.  My vision is blurry and " "gets worse at times.  I have numbness and tingling in both arms and hands.  I'm anxious, depressed and I feel like i'm losing my mind.  How can I not have MS after being diagnosed and had MRI's every 6 months since being diagnosed?  I really need to be seen."        In the ED, she continues to have AMS, AH, VH. Afebrile, hypertensive, tachycardic, on room air. Labs significant for , CPK 1514, TSH 0.109, T4 wnl. Electrolytes wnl, CBC unremarkable. UA 1+ protein, 1+ ketones. Received 1 L NS.       * No surgery found *      Hospital Course:   Pt admitted for AMS, possibly due to polypharmacy or overdose from home medications. Pt does have suicidal thoughts and suicide history in the past. Psychiatry service consulted. Neurology consulted, no concern for MS flair, recommended EEG. Pt showing much clinical improvement today with regards to her mental status. More alert and aware of herself and time. Showing signs of catatonia so started on scheduled ativan per psych recs. Pt medically ready for discharge so placement for IP psych facility initiated, SW/CM consulted.        Goals of Care Treatment Preferences:  Code Status: Full Code      Consults:   Consults (From admission, onward)        Status Ordering Provider     Inpatient consult to Neurology  Once        Provider:  (Not yet assigned)    Completed RAJEEV MUKHERJEE     Inpatient consult to Psychiatry  Once        Provider:  (Not yet assigned)    Completed NATHALY PARMAR          No new Assessment & Plan notes have been filed under this hospital service since the last note was generated.  Service: Hospital Medicine    Final Active Diagnoses:    Diagnosis Date Noted POA    PRINCIPAL PROBLEM:  Altered mental state [R41.82] 04/10/2022 Yes    Catatonia [F06.1] 04/13/2022 Yes    Psychosis [F29] 04/10/2022 Yes    Hypertension [I10] 04/10/2022 Yes    Acute systolic heart failure [I50.21] 03/11/2022 Yes     Chronic    Opioid dependence [F11.20] 06/23/2016 " Yes     Chronic    Benzodiazepine dependence [F13.20] 06/23/2016 Yes     Chronic    Chronic pain syndrome [G89.4] 06/23/2016 Yes     Chronic    MS (multiple sclerosis) [G35] 12/07/2015 Yes     Chronic      Problems Resolved During this Admission:       Discharged Condition: stable    Disposition: Psychiatric Hospital    Follow Up:   Follow-up Information     Kip Jimenez MD Follow up in 2 week(s).    Specialty: Family Medicine  Contact information:  [address] [facility] [address]  [phone]             [facility] - [facility] - 3rd Fl Follow up in 2 week(s).    Specialty: Cardiology  Contact information:  [address]  [facility] [address] [phone]  Additional information:  Cardiology Services Clinics - 3rd floor                     Patient Instructions:      Ambulatory referral/consult to Adult Neuropsychology   Standing Status: Future   Referral Priority: Routine Referral Type: Psychiatric   Referral Reason: Specialty Services Required   Requested Specialty: Psychology   Number of Visits Requested: 1       Significant Diagnostic Studies: Labs:   CMP   Recent Labs   Lab 04/12/22  0408 04/13/22  0428    138   K 3.7 3.4*    104   CO2 21* 22*   GLU 90 103   BUN 16 21*   CREATININE 0.7 0.7   CALCIUM 10.0 9.3   PROT 7.7 6.9   ALBUMIN 3.7 3.3*   BILITOT 1.3* 1.4*   ALKPHOS 66 59   AST 23 16   ALT 25 19   ANIONGAP 14 12   ESTGFRAFRICA >60.0 >60.0   EGFRNONAA >60.0 >60.0    and CBC   Recent Labs   Lab 04/12/22  0408 04/13/22  0428   WBC 6.06 4.14   HGB 14.0 12.8   HCT 41.3 38.5    215       Pending Diagnostic Studies:     None         Medications:  Reconciled Home Medications:      Medication List      START taking these medications    LORazepam 2 MG Tab  Commonly known as: ATIVAN  Take 1 tablet (2 mg total) by mouth 2 (two) times daily.        CHANGE how you take these medications    rosuvastatin 20 MG tablet  Commonly known as: CRESTOR  Take 1 tablet (20 mg  total) by mouth once daily.  What changed: when to take this        CONTINUE taking these medications    ALEVE ORAL  Take 1 tablet by mouth daily as needed (Pain).     ergocalciferol 50,000 unit Cap  Commonly known as: ERGOCALCIFEROL  Take 50,000 Units by mouth every 7 days.     furosemide 40 MG tablet  Commonly known as: LASIX  Weigh yourself daily, take 1 tab if weight increases by 3 lbs in 24 hours or if weight increases by 5 lbs in one week.     lisinopriL 2.5 MG tablet  Commonly known as: PRINIVIL,ZESTRIL  Take 1 tablet (2.5 mg total) by mouth once daily.     metoprolol succinate 25 MG 24 hr tablet  Commonly known as: TOPROL-XL  Take 1 tablet (25 mg total) by mouth once daily.     omeprazole 20 MG capsule  Commonly known as: PRILOSEC  Take 20 mg by mouth daily as needed (Acid reflux).     VOLTAREN 1 % Gel  Generic drug: diclofenac sodium  Apply topically once daily.        STOP taking these medications    C-TUB Misc  Generic drug: miscellaneous medical supply     cyclobenzaprine 10 MG tablet  Commonly known as: FLEXERIL     EScitalopram oxalate 10 MG tablet  Commonly known as: LEXAPRO     HYDROcodone-acetaminophen  mg per tablet  Commonly known as: NORCO     hydrOXYzine pamoate 25 MG Cap  Commonly known as: VISTARIL     methocarbamoL 500 MG Tab  Commonly known as: ROBAXIN     oxyMORphone 20 MG 12 hr tablet  Commonly known as: OPANA ER     pregabalin 150 MG capsule  Commonly known as: LYRICA     sterile water (bottle) irrigation     UNABLE TO FIND     UNABLE TO FIND     UNABLE TO FIND            Indwelling Lines/Drains at time of discharge:   Lines/Drains/Airways     None                 Time spent on the discharge of patient: 35 minutes         Rika Alfaro MD  Department of Hospital Medicine  [facility] Surg

## 2022-04-13 NOTE — PLAN OF CARE
ADT 32 order entered to initiate transfer to an IP psychiatric facility thru the [facility].      Alecia Drake RN  Ext [phone]

## 2022-04-13 NOTE — ASSESSMENT & PLAN NOTE
Has history of chronic pain, multiple opiates on med rec.     - Held opiates while she's altered, will continue to hold per psych recs

## 2022-04-13 NOTE — ASSESSMENT & PLAN NOTE
55 yo female with a PMHx of mutliple sclerosis, HFrEF (25% on echo from 3/12/22), stress cardiomyopathy, HLD, chronic pain, benzo dependence, opioid dependence, hypertension presenting for acute change in mental status. Patient actively hallucinating in ED with VH and AH. Does not respond appropriately, mumbling to herself. DDx includes benzo overuse or withdrawal, marijuana induced psychosis vs polypharmacy vs depression with psychotic features vs MS flare. Less likely septic encephalopathy or electrolyte derangements. CT head unremarkable. Labs unremarkable other than elevated BNP (significantly lower than previous during HF exacerbation) and low TSH.     - Hold all opioids and benzos  - psych consulted, appreciate recs. Starting pt on ativan 2mg PO BID  - Will await improvement as medications are metabolized  - Pt medically ready for discharge and psych in agreement with transfer to IP psych facility. Transfer process initiated with CM/SW, appreciate assistance. Pending placement

## 2022-04-13 NOTE — ASSESSMENT & PLAN NOTE
Diagnosed with HFrEF of 25% last admission and started on lasix, metoprolol, lisinopril for GDMT.     - restart home medications on discharge

## 2022-04-13 NOTE — PROCEDURES
EEG    Date/Time: 4/10/2022 3:16 PM  Performed by: Reza Vang MD  Authorized by: Jacky Vigil MD       ELECTROENCEPHALOGRAM REPORT    DATE OF SERVICE: 4/12/22  EEG NUMBER: FH   REQUESTED BY: Taj  LOCATION OF SERVICE [facility]     METHODOLOGY   Electroencephalographic (EEG) recording is with electrodes placed according to the International 10-20 placement system.  Thirty two (32) channels of digital signal (sampling rate of 512/sec) including T1 and T2 was simultaneously recorded from the scalp and may include  EKG, EMG, and/or eye monitors.  Recording band pass was 0.1 to 512 hz.  Digital video recording of the patient is simultaneously recorded with the EEG.  The patient is instructed report clinical symptoms which may occur during the recording session.  EEG and video recording is stored and archived in digital format. Activation procedures which include photic stimulation, hyperventilation and instructing patients to perform simple task are done in selected patients.    The EEG is displayed on a monitor screen and can be reviewed using different montages.  Computer assisted analysis is employed to detect spike and electrographic seizure activity.   The entire record is submitted for computer analysis.  The entire recording is visually reviewed and the times identified by computer analysis as being spikes or seizures are reviewed again.  Compresses spectral analysis (CSA) is also performed on the activity recorded from each individual channel.  This is displayed as a power display of frequencies from 0 to 30 Hz over time.   The CSA is reviewed looking for asymmetries in power between homologous areas of the scalp and then compared with the original EEG recording.     [employer] software was also utilized in the review of this study.  This software suite analyzes the EEG recording in multiple domains.  Coherence and rhythmicity is computed to identify EEG sections which may contain organized seizures.   Each channel undergoes analysis to detect presence of spike and sharp waves which have special and morphological characteristic of epileptic activity.  The routine EEG recording is converted from spacial into frequency domain.  This is then displayed comparing homologous areas to identify areas of significant asymmetry.  Algorithm to identify non-cortically generated artifact is used to separate eye movement, EMG and other artifact from the EEG    EEG FINDINGS  The record shows a good  organization at rest, consisting of a 11Hz posterior dominant rhythm with good  reactivity. There is marked bilateral beta activity.    Drowsiness is characterized by attenuation of the background, vertex waves, and bilateral theta slowing.     Provocative maneuvers including hyperventilation and photic stimulation were performed.     EKG recording shows a sinus rhythm.    There is no push button or clinical event.    IMPRESSION:  Abnormal study due to the presence of excessive beta activity such as may be seen in the setting of benzodiazepine or barbiturate use.    Reza Vang MD

## 2022-04-13 NOTE — PLAN OF CARE
Pt stable overnight. Mentation notably improved with increasing appropriate interactions with staff and involvement with care. No use of PRN ativan. Pt able to express desires. Needs and questions addressed. Reported hip pain treated with PRN tylenol.     Problem: Adult Inpatient Plan of Care  Goal: Plan of Care Review  Outcome: Ongoing, Progressing  Goal: Patient-Specific Goal (Individualized)  Outcome: Ongoing, Progressing  Goal: Absence of Hospital-Acquired Illness or Injury  Outcome: Ongoing, Progressing  Goal: Optimal Comfort and Wellbeing  Outcome: Ongoing, Progressing  Goal: Readiness for Transition of Care  Outcome: Ongoing, Progressing

## 2022-04-13 NOTE — ASSESSMENT & PLAN NOTE
UA positive for benzos.     - Hold while patient is acutely altered/psychotic.   - Will consider restarting if signs of withdrawal become apparent.  - Pt does not have benzos on home med list, but per son she has been dependent of them for many years.

## 2022-04-14 NOTE — PLAN OF CARE
[facility] Med Surg  Discharge Final Note    Primary Care Provider: Kip Jimenez MD    Expected Discharge Date: 4/13/2022         Final Discharge Note (most recent)     Final Note - 04/14/22 0754        Final Note    Assessment Type Final Discharge Note     Anticipated Discharge Disposition Psychiatric Hospital        Post-Acute Status    Post-Acute Authorization Placement     Post-Acute Placement Status Set-up Complete/Auth obtained   [facility]                Future Appointments   Date Time Provider Department Center   5/4/2022  1:15 PM Rola Cooley PA-C [facility]   5/9/2022  1:50 PM Kayleen Frey MD [facility]         Important Message from Medicare             Contact Info     Kip Jimenez MD   Specialty: Family Medicine   Relationship: PCP - General    [address] [facility] [address]   Phone: [phone]       Next Steps: Follow up in 2 week(s)    [facility] - Cardiology - 3rd Fl   Specialty: Cardiology    [address]  [facility] [address]   Phone: [phone]       Next Steps: Follow up in 2 week(s)          Alecia Drake RN  Ext [phone]

## 2022-04-15 LAB
BACTERIA BLD CULT: NORMAL
BACTERIA BLD CULT: NORMAL

## 2022-06-24 ENCOUNTER — PATIENT MESSAGE (OUTPATIENT)
Dept: PSYCHIATRY | Facility: CLINIC | Age: 57
End: 2022-06-24
Payer: MEDICAID

## 2022-07-07 NOTE — Clinical Note
Phone report was given to rn    Constitutional: NAD, well appearing  HEENT: no rhinorrhea, PERRL, no oropharyngeal erythema or exudates, midline uvula.  TMs clear.  CVS:  RRR, no m/r/g  Resp:  CTAB  GI: soft, ntnd  MSK:  no restriction to rom, full ROM to all extremities  Neuro:  A&Ox3, 5/5 strength to all extremities,  SILT to all extremities  Skin: no rash  psych: clear thought content  Heme/lymph:  No LAD

## 2022-07-11 DIAGNOSIS — M25.559 HIP PAIN: Primary | ICD-10-CM

## 2022-08-05 RX ORDER — LORAZEPAM 2 MG/1
2 TABLET ORAL 2 TIMES DAILY
Qty: 60 TABLET | Refills: 0 | Status: CANCELLED | OUTPATIENT
Start: 2022-08-05 | End: 2022-09-04

## 2022-08-10 RX ORDER — LORAZEPAM 2 MG/1
2 TABLET ORAL 2 TIMES DAILY
Qty: 60 TABLET | Refills: 0 | Status: CANCELLED | OUTPATIENT
Start: 2022-08-05 | End: 2022-09-04

## 2022-08-17 RX ORDER — LORAZEPAM 2 MG/1
2 TABLET ORAL 2 TIMES DAILY
Qty: 60 TABLET | Refills: 0 | Status: CANCELLED | OUTPATIENT
Start: 2022-08-05 | End: 2022-09-04

## 2022-08-28 ENCOUNTER — PATIENT MESSAGE (OUTPATIENT)
Dept: NEUROSURGERY | Facility: CLINIC | Age: 57
End: 2022-08-28
Payer: MEDICAID

## 2022-09-21 RX ORDER — LORAZEPAM 2 MG/1
2 TABLET ORAL 2 TIMES DAILY
Qty: 60 TABLET | Refills: 0 | OUTPATIENT
Start: 2022-09-21 | End: 2022-10-21

## 2022-12-01 ENCOUNTER — PATIENT MESSAGE (OUTPATIENT)
Dept: PSYCHIATRY | Facility: CLINIC | Age: 57
End: 2022-12-01
Payer: MEDICAID

## 2023-02-20 ENCOUNTER — PATIENT MESSAGE (OUTPATIENT)
Dept: PSYCHIATRY | Facility: CLINIC | Age: 58
End: 2023-02-20
Payer: MEDICAID

## 2023-04-11 RX ORDER — METOPROLOL SUCCINATE 25 MG/1
TABLET, EXTENDED RELEASE ORAL
Qty: 90 TABLET | Refills: 3 | OUTPATIENT
Start: 2023-04-11

## 2023-04-11 RX ORDER — FUROSEMIDE 40 MG/1
TABLET ORAL
Qty: 90 TABLET | Refills: 3 | OUTPATIENT
Start: 2023-04-11

## 2023-04-11 NOTE — TELEPHONE ENCOUNTER
Not a patient. I updated her on need to contact dr Jimenez for refills and scheduled her for f/u w. cards in [place]. She agreed to date/time of appointment(s). Appt reminder mailed to pt.

## 2023-05-04 RX ORDER — FUROSEMIDE 40 MG/1
TABLET ORAL
Qty: 30 TABLET | Refills: 0 | Status: ON HOLD | OUTPATIENT
Start: 2023-05-04 | End: 2023-06-13 | Stop reason: HOSPADM

## 2023-05-23 ENCOUNTER — HOSPITAL ENCOUNTER (INPATIENT)
Facility: HOSPITAL | Age: 58
LOS: 7 days | Discharge: HOME | DRG: 291 | End: 2023-05-30
Attending: EMERGENCY MEDICINE | Admitting: INTERNAL MEDICINE
Payer: MEDICAID

## 2023-05-23 DIAGNOSIS — I51.81 TAKOTSUBO CARDIOMYOPATHY: Primary | ICD-10-CM

## 2023-05-23 DIAGNOSIS — Z78.9 INTUBATION OF AIRWAY PERFORMED WITHOUT DIFFICULTY: ICD-10-CM

## 2023-05-23 DIAGNOSIS — I50.20 HFREF (HEART FAILURE WITH REDUCED EJECTION FRACTION): ICD-10-CM

## 2023-05-23 DIAGNOSIS — J81.0 ACUTE PULMONARY EDEMA: ICD-10-CM

## 2023-05-23 DIAGNOSIS — I21.4 NSTEMI (NON-ST ELEVATED MYOCARDIAL INFARCTION): ICD-10-CM

## 2023-05-23 DIAGNOSIS — Z97.8 ENDOTRACHEALLY INTUBATED: ICD-10-CM

## 2023-05-23 DIAGNOSIS — I49.5 SINUS BRADY-TACHY SYNDROME: ICD-10-CM

## 2023-05-23 DIAGNOSIS — R00.0 TACHYCARDIA: ICD-10-CM

## 2023-05-23 DIAGNOSIS — J80 ARDS (ADULT RESPIRATORY DISTRESS SYNDROME): ICD-10-CM

## 2023-05-23 DIAGNOSIS — I51.81 STRESS-INDUCED CARDIOMYOPATHY: ICD-10-CM

## 2023-05-23 DIAGNOSIS — I50.9 DECOMPENSATED HEART FAILURE: ICD-10-CM

## 2023-05-23 PROBLEM — J96.01 ACUTE HYPOXEMIC RESPIRATORY FAILURE: Status: ACTIVE | Noted: 2023-05-23

## 2023-05-23 LAB
ALBUMIN SERPL BCP-MCNC: 4.1 G/DL (ref 3.5–5.2)
ALLENS TEST: ABNORMAL
ALP SERPL-CCNC: 88 U/L (ref 55–135)
ALT SERPL W/O P-5'-P-CCNC: 18 U/L (ref 10–44)
ANION GAP SERPL CALC-SCNC: 19 MMOL/L (ref 8–16)
APAP SERPL-MCNC: <3 UG/ML (ref 10–20)
APTT PPP: 24.2 SEC (ref 21–32)
AST SERPL-CCNC: 34 U/L (ref 10–40)
BASOPHILS # BLD AUTO: 0.02 K/UL (ref 0–0.2)
BASOPHILS NFR BLD: 0.1 % (ref 0–1.9)
BILIRUB SERPL-MCNC: 1.7 MG/DL (ref 0.1–1)
BNP SERPL-MCNC: 3970 PG/ML (ref 0–99)
BUN SERPL-MCNC: 31 MG/DL (ref 6–20)
CALCIUM SERPL-MCNC: 9.9 MG/DL (ref 8.7–10.5)
CHLORIDE SERPL-SCNC: 107 MMOL/L (ref 95–110)
CK SERPL-CCNC: 347 U/L (ref 20–180)
CO2 SERPL-SCNC: 21 MMOL/L (ref 23–29)
CREAT SERPL-MCNC: 0.9 MG/DL (ref 0.5–1.4)
D DIMER PPP IA.FEU-MCNC: 1.99 MG/L FEU
DELSYS: ABNORMAL
DIFFERENTIAL METHOD: ABNORMAL
EOSINOPHIL # BLD AUTO: 0 K/UL (ref 0–0.5)
EOSINOPHIL NFR BLD: 0 % (ref 0–8)
ERYTHROCYTE [DISTWIDTH] IN BLOOD BY AUTOMATED COUNT: 13.7 % (ref 11.5–14.5)
ERYTHROCYTE [SEDIMENTATION RATE] IN BLOOD BY WESTERGREN METHOD: 20 MM/H
EST. GFR  (NO RACE VARIABLE): >60 ML/MIN/1.73 M^2
ETHANOL SERPL-MCNC: <10 MG/DL
FIO2: 100
GLUCOSE SERPL-MCNC: 148 MG/DL (ref 70–110)
HCO3 UR-SCNC: 25 MMOL/L (ref 24–28)
HCO3 UR-SCNC: 25.1 MMOL/L (ref 24–28)
HCT VFR BLD AUTO: 47.5 % (ref 37–48.5)
HGB BLD-MCNC: 16 G/DL (ref 12–16)
IMM GRANULOCYTES # BLD AUTO: 0.07 K/UL (ref 0–0.04)
IMM GRANULOCYTES NFR BLD AUTO: 0.5 % (ref 0–0.5)
INR PPP: 1 (ref 0.8–1.2)
LACTATE SERPL-SCNC: 2.7 MMOL/L (ref 0.5–2.2)
LDH SERPL L TO P-CCNC: 2.33 MMOL/L (ref 0.5–2.2)
LIPASE SERPL-CCNC: 34 U/L (ref 4–60)
LYMPHOCYTES # BLD AUTO: 0.9 K/UL (ref 1–4.8)
LYMPHOCYTES NFR BLD: 5.8 % (ref 18–48)
MAGNESIUM SERPL-MCNC: 1.9 MG/DL (ref 1.6–2.6)
MCH RBC QN AUTO: 29.7 PG (ref 27–31)
MCHC RBC AUTO-ENTMCNC: 33.7 G/DL (ref 32–36)
MCV RBC AUTO: 88 FL (ref 82–98)
MIN VOL: 9.13
MODE: ABNORMAL
MONOCYTES # BLD AUTO: 0.9 K/UL (ref 0.3–1)
MONOCYTES NFR BLD: 5.9 % (ref 4–15)
NEUTROPHILS # BLD AUTO: 13.4 K/UL (ref 1.8–7.7)
NEUTROPHILS NFR BLD: 87.7 % (ref 38–73)
NRBC BLD-RTO: 0 /100 WBC
PCO2 BLDA: 39 MMHG (ref 35–45)
PCO2 BLDA: 52.9 MMHG (ref 35–45)
PEEP: 5
PH SMN: 7.28 [PH] (ref 7.35–7.45)
PH SMN: 7.41 [PH] (ref 7.35–7.45)
PIP: 28
PLATELET # BLD AUTO: 284 K/UL (ref 150–450)
PMV BLD AUTO: 11.2 FL (ref 9.2–12.9)
PO2 BLDA: 28 MMHG (ref 40–60)
PO2 BLDA: 442 MMHG (ref 80–100)
POC BE: -2 MMOL/L
POC BE: 0 MMOL/L
POC SATURATED O2: 100 % (ref 95–100)
POC SATURATED O2: 45 % (ref 95–100)
POC TCO2: 26 MMOL/L (ref 23–27)
POC TCO2: 27 MMOL/L (ref 24–29)
POTASSIUM SERPL-SCNC: 3.8 MMOL/L (ref 3.5–5.1)
PROT SERPL-MCNC: 8.8 G/DL (ref 6–8.4)
PROTHROMBIN TIME: 10.9 SEC (ref 9–12.5)
RBC # BLD AUTO: 5.38 M/UL (ref 4–5.4)
SALICYLATES SERPL-MCNC: <5 MG/DL (ref 15–30)
SAMPLE: ABNORMAL
SITE: ABNORMAL
SODIUM SERPL-SCNC: 147 MMOL/L (ref 136–145)
SP02: 98
T4 FREE SERPL-MCNC: 1.1 NG/DL (ref 0.71–1.51)
TROPONIN I SERPL DL<=0.01 NG/ML-MCNC: 2.12 NG/ML (ref 0–0.03)
TSH SERPL DL<=0.005 MIU/L-ACNC: 0.1 UIU/ML (ref 0.4–4)
VT: 450
WBC # BLD AUTO: 15.25 K/UL (ref 3.9–12.7)

## 2023-05-23 PROCEDURE — 63600175 PHARM REV CODE 636 W HCPCS: Performed by: PHYSICIAN ASSISTANT

## 2023-05-23 PROCEDURE — 99900035 HC TECH TIME PER 15 MIN (STAT)

## 2023-05-23 PROCEDURE — 94761 N-INVAS EAR/PLS OXIMETRY MLT: CPT

## 2023-05-23 PROCEDURE — 83605 ASSAY OF LACTIC ACID: CPT

## 2023-05-23 PROCEDURE — 63600175 PHARM REV CODE 636 W HCPCS: Performed by: EMERGENCY MEDICINE

## 2023-05-23 PROCEDURE — 83605 ASSAY OF LACTIC ACID: CPT | Performed by: PHYSICIAN ASSISTANT

## 2023-05-23 PROCEDURE — 25000003 PHARM REV CODE 250

## 2023-05-23 PROCEDURE — 85379 FIBRIN DEGRADATION QUANT: CPT | Performed by: PHYSICIAN ASSISTANT

## 2023-05-23 PROCEDURE — 81001 URINALYSIS AUTO W/SCOPE: CPT | Performed by: PHYSICIAN ASSISTANT

## 2023-05-23 PROCEDURE — 87086 URINE CULTURE/COLONY COUNT: CPT | Performed by: PHYSICIAN ASSISTANT

## 2023-05-23 PROCEDURE — 99291 CRITICAL CARE FIRST HOUR: CPT | Mod: 25,,, | Performed by: EMERGENCY MEDICINE

## 2023-05-23 PROCEDURE — 85730 THROMBOPLASTIN TIME PARTIAL: CPT | Performed by: PHYSICIAN ASSISTANT

## 2023-05-23 PROCEDURE — 85025 COMPLETE CBC W/AUTO DIFF WBC: CPT | Performed by: PHYSICIAN ASSISTANT

## 2023-05-23 PROCEDURE — 80143 DRUG ASSAY ACETAMINOPHEN: CPT | Performed by: PHYSICIAN ASSISTANT

## 2023-05-23 PROCEDURE — 87040 BLOOD CULTURE FOR BACTERIA: CPT | Mod: 59 | Performed by: PHYSICIAN ASSISTANT

## 2023-05-23 PROCEDURE — 84439 ASSAY OF FREE THYROXINE: CPT | Performed by: PHYSICIAN ASSISTANT

## 2023-05-23 PROCEDURE — 31500 INSERT EMERGENCY AIRWAY: CPT

## 2023-05-23 PROCEDURE — 63600175 PHARM REV CODE 636 W HCPCS

## 2023-05-23 PROCEDURE — 82550 ASSAY OF CK (CPK): CPT | Performed by: PHYSICIAN ASSISTANT

## 2023-05-23 PROCEDURE — 83690 ASSAY OF LIPASE: CPT | Performed by: PHYSICIAN ASSISTANT

## 2023-05-23 PROCEDURE — 83735 ASSAY OF MAGNESIUM: CPT | Performed by: PHYSICIAN ASSISTANT

## 2023-05-23 PROCEDURE — 80179 DRUG ASSAY SALICYLATE: CPT | Performed by: PHYSICIAN ASSISTANT

## 2023-05-23 PROCEDURE — 25000003 PHARM REV CODE 250: Performed by: PHYSICIAN ASSISTANT

## 2023-05-23 PROCEDURE — 82803 BLOOD GASES ANY COMBINATION: CPT

## 2023-05-23 PROCEDURE — 99291 CRITICAL CARE FIRST HOUR: CPT

## 2023-05-23 PROCEDURE — 27000190 HC CPAP FULL FACE MASK W/VALVE

## 2023-05-23 PROCEDURE — 31500 PR INSERT, EMERGENCY ENDOTRACH AIRWAY: ICD-10-PCS | Mod: ,,, | Performed by: EMERGENCY MEDICINE

## 2023-05-23 PROCEDURE — 94660 CPAP INITIATION&MGMT: CPT

## 2023-05-23 PROCEDURE — 27000221 HC OXYGEN, UP TO 24 HOURS

## 2023-05-23 PROCEDURE — 99291 PR CRITICAL CARE, E/M 30-74 MINUTES: ICD-10-PCS | Mod: 25,,, | Performed by: EMERGENCY MEDICINE

## 2023-05-23 PROCEDURE — 36600 WITHDRAWAL OF ARTERIAL BLOOD: CPT

## 2023-05-23 PROCEDURE — 12000002 HC ACUTE/MED SURGE SEMI-PRIVATE ROOM

## 2023-05-23 PROCEDURE — 84443 ASSAY THYROID STIM HORMONE: CPT | Performed by: PHYSICIAN ASSISTANT

## 2023-05-23 PROCEDURE — 80307 DRUG TEST PRSMV CHEM ANLYZR: CPT | Performed by: PHYSICIAN ASSISTANT

## 2023-05-23 PROCEDURE — 82077 ASSAY SPEC XCP UR&BREATH IA: CPT | Performed by: PHYSICIAN ASSISTANT

## 2023-05-23 PROCEDURE — 83880 ASSAY OF NATRIURETIC PEPTIDE: CPT | Performed by: PHYSICIAN ASSISTANT

## 2023-05-23 PROCEDURE — 84484 ASSAY OF TROPONIN QUANT: CPT | Performed by: PHYSICIAN ASSISTANT

## 2023-05-23 PROCEDURE — 31500 INSERT EMERGENCY AIRWAY: CPT | Mod: ,,, | Performed by: EMERGENCY MEDICINE

## 2023-05-23 PROCEDURE — 85610 PROTHROMBIN TIME: CPT | Performed by: PHYSICIAN ASSISTANT

## 2023-05-23 PROCEDURE — 94002 VENT MGMT INPAT INIT DAY: CPT

## 2023-05-23 PROCEDURE — 80053 COMPREHEN METABOLIC PANEL: CPT | Performed by: PHYSICIAN ASSISTANT

## 2023-05-23 RX ORDER — ETOMIDATE 2 MG/ML
INJECTION INTRAVENOUS
Status: COMPLETED
Start: 2023-05-23 | End: 2023-05-23

## 2023-05-23 RX ORDER — ONDANSETRON 2 MG/ML
4 INJECTION INTRAMUSCULAR; INTRAVENOUS
Status: COMPLETED | OUTPATIENT
Start: 2023-05-23 | End: 2023-05-23

## 2023-05-23 RX ORDER — ROCURONIUM BROMIDE 10 MG/ML
INJECTION, SOLUTION INTRAVENOUS
Status: COMPLETED
Start: 2023-05-23 | End: 2023-05-23

## 2023-05-23 RX ORDER — ETOMIDATE 2 MG/ML
20 INJECTION INTRAVENOUS
Status: COMPLETED | OUTPATIENT
Start: 2023-05-23 | End: 2023-05-23

## 2023-05-23 RX ORDER — FENTANYL CITRATE 50 UG/ML
INJECTION, SOLUTION INTRAMUSCULAR; INTRAVENOUS
Status: COMPLETED
Start: 2023-05-23 | End: 2023-05-23

## 2023-05-23 RX ORDER — HEPARIN SODIUM,PORCINE/D5W 25000/250
0-40 INTRAVENOUS SOLUTION INTRAVENOUS CONTINUOUS
Status: DISCONTINUED | OUTPATIENT
Start: 2023-05-23 | End: 2023-05-24

## 2023-05-23 RX ORDER — ONDANSETRON 2 MG/ML
INJECTION INTRAMUSCULAR; INTRAVENOUS
Status: COMPLETED
Start: 2023-05-23 | End: 2023-05-23

## 2023-05-23 RX ORDER — LORAZEPAM 2 MG/ML
2 INJECTION INTRAMUSCULAR
Status: DISCONTINUED | OUTPATIENT
Start: 2023-05-23 | End: 2023-05-24

## 2023-05-23 RX ORDER — SUCCINYLCHOLINE CHLORIDE 20 MG/ML
INJECTION INTRAMUSCULAR; INTRAVENOUS
Status: COMPLETED
Start: 2023-05-23 | End: 2023-05-23

## 2023-05-23 RX ORDER — NOREPINEPHRINE BITARTRATE/D5W 4MG/250ML
0-3 PLASTIC BAG, INJECTION (ML) INTRAVENOUS CONTINUOUS
Status: DISCONTINUED | OUTPATIENT
Start: 2023-05-23 | End: 2023-05-24

## 2023-05-23 RX ORDER — SUCCINYLCHOLINE CHLORIDE 20 MG/ML
100 INJECTION INTRAMUSCULAR; INTRAVENOUS
Status: COMPLETED | OUTPATIENT
Start: 2023-05-23 | End: 2023-05-23

## 2023-05-23 RX ORDER — LORAZEPAM 2 MG/ML
1 INJECTION INTRAMUSCULAR
Status: COMPLETED | OUTPATIENT
Start: 2023-05-23 | End: 2023-05-23

## 2023-05-23 RX ORDER — FUROSEMIDE 10 MG/ML
80 INJECTION INTRAMUSCULAR; INTRAVENOUS
Status: COMPLETED | OUTPATIENT
Start: 2023-05-23 | End: 2023-05-23

## 2023-05-23 RX ORDER — PROPOFOL 10 MG/ML
0-50 INJECTION, EMULSION INTRAVENOUS CONTINUOUS
Status: DISCONTINUED | OUTPATIENT
Start: 2023-05-23 | End: 2023-05-29

## 2023-05-23 RX ADMIN — LORAZEPAM 1 MG: 2 INJECTION INTRAMUSCULAR; INTRAVENOUS at 08:05

## 2023-05-23 RX ADMIN — ETOMIDATE 20 MG: 2 INJECTION INTRAVENOUS at 11:05

## 2023-05-23 RX ADMIN — CEFEPIME 2 G: 2 INJECTION, POWDER, FOR SOLUTION INTRAVENOUS at 09:05

## 2023-05-23 RX ADMIN — FENTANYL CITRATE 100 MCG: 50 INJECTION, SOLUTION INTRAMUSCULAR; INTRAVENOUS at 11:05

## 2023-05-23 RX ADMIN — ONDANSETRON 4 MG: 2 INJECTION INTRAMUSCULAR; INTRAVENOUS at 08:05

## 2023-05-23 RX ADMIN — SUCCINYLCHOLINE CHLORIDE 100 MG: 20 INJECTION INTRAMUSCULAR; INTRAVENOUS at 11:05

## 2023-05-23 RX ADMIN — FUROSEMIDE 80 MG: 10 INJECTION, SOLUTION INTRAMUSCULAR; INTRAVENOUS at 09:05

## 2023-05-23 RX ADMIN — ONDANSETRON 4 MG: 2 INJECTION INTRAMUSCULAR; INTRAVENOUS at 10:05

## 2023-05-23 RX ADMIN — PROPOFOL 50 MCG/KG/MIN: 10 INJECTION, EMULSION INTRAVENOUS at 11:05

## 2023-05-23 RX ADMIN — SUCCINYLCHOLINE CHLORIDE 100 MG: 20 INJECTION, SOLUTION INTRAMUSCULAR; INTRAVENOUS; PARENTERAL at 11:05

## 2023-05-23 RX ADMIN — SODIUM CHLORIDE 1000 ML: 9 INJECTION, SOLUTION INTRAVENOUS at 08:05

## 2023-05-24 PROBLEM — G93.40 ACUTE ENCEPHALOPATHY: Status: ACTIVE | Noted: 2023-05-24

## 2023-05-24 LAB
ALBUMIN SERPL BCP-MCNC: 3.9 G/DL (ref 3.5–5.2)
ALLENS TEST: ABNORMAL
ALLENS TEST: NORMAL
ALLENS TEST: NORMAL
ALP SERPL-CCNC: 79 U/L (ref 55–135)
ALT SERPL W/O P-5'-P-CCNC: 18 U/L (ref 10–44)
AMPHET+METHAMPHET UR QL: NEGATIVE
ANION GAP SERPL CALC-SCNC: 15 MMOL/L (ref 8–16)
ANION GAP SERPL CALC-SCNC: 18 MMOL/L (ref 8–16)
AST SERPL-CCNC: 32 U/L (ref 10–40)
AV INDEX (PROSTH): 0.45
AV MEAN GRADIENT: 2 MMHG
AV PEAK GRADIENT: 3 MMHG
AV VALVE AREA: 1.29 CM2
AV VELOCITY RATIO: 0.46
BACTERIA #/AREA URNS AUTO: ABNORMAL /HPF
BARBITURATES UR QL SCN>200 NG/ML: NEGATIVE
BASOPHILS # BLD AUTO: 0.02 K/UL (ref 0–0.2)
BASOPHILS NFR BLD: 0.1 % (ref 0–1.9)
BENZODIAZ UR QL SCN>200 NG/ML: NEGATIVE
BILIRUB SERPL-MCNC: 1.9 MG/DL (ref 0.1–1)
BILIRUB UR QL STRIP: NEGATIVE
BSA FOR ECHO PROCEDURE: 1.64 M2
BUN SERPL-MCNC: 29 MG/DL (ref 6–20)
BUN SERPL-MCNC: 33 MG/DL (ref 6–20)
BZE UR QL SCN: NEGATIVE
CALCIUM SERPL-MCNC: 10.2 MG/DL (ref 8.7–10.5)
CALCIUM SERPL-MCNC: 9.6 MG/DL (ref 8.7–10.5)
CANNABINOIDS UR QL SCN: ABNORMAL
CHLORIDE SERPL-SCNC: 102 MMOL/L (ref 95–110)
CHLORIDE SERPL-SCNC: 103 MMOL/L (ref 95–110)
CLARITY UR REFRACT.AUTO: ABNORMAL
CO2 SERPL-SCNC: 23 MMOL/L (ref 23–29)
CO2 SERPL-SCNC: 25 MMOL/L (ref 23–29)
COLOR UR AUTO: YELLOW
CREAT SERPL-MCNC: 1 MG/DL (ref 0.5–1.4)
CREAT SERPL-MCNC: 1.3 MG/DL (ref 0.5–1.4)
CREAT UR-MCNC: 111 MG/DL (ref 15–325)
CV ECHO LV RWT: 0.25 CM
DELSYS: ABNORMAL
DELSYS: ABNORMAL
DIFFERENTIAL METHOD: ABNORMAL
DOP CALC AO PEAK VEL: 0.84 M/S
DOP CALC AO VTI: 10.66 CM
DOP CALC LVOT AREA: 2.8 CM2
DOP CALC LVOT DIAMETER: 1.9 CM
DOP CALC LVOT PEAK VEL: 0.39 M/S
DOP CALC LVOT STROKE VOLUME: 13.74 CM3
DOP CALCLVOT PEAK VEL VTI: 4.85 CM
ECHO LV POSTERIOR WALL: 0.68 CM (ref 0.6–1.1)
EJECTION FRACTION: 20 %
EOSINOPHIL # BLD AUTO: 0 K/UL (ref 0–0.5)
EOSINOPHIL NFR BLD: 0 % (ref 0–8)
ERYTHROCYTE [DISTWIDTH] IN BLOOD BY AUTOMATED COUNT: 13.8 % (ref 11.5–14.5)
ERYTHROCYTE [SEDIMENTATION RATE] IN BLOOD BY WESTERGREN METHOD: 20 MM/H
ERYTHROCYTE [SEDIMENTATION RATE] IN BLOOD BY WESTERGREN METHOD: 20 MM/H
EST. GFR  (NO RACE VARIABLE): 48 ML/MIN/1.73 M^2
EST. GFR  (NO RACE VARIABLE): >60 ML/MIN/1.73 M^2
FIO2: 50
FIO2: 50
FRACTIONAL SHORTENING: 12 % (ref 28–44)
GLUCOSE SERPL-MCNC: 139 MG/DL (ref 70–110)
GLUCOSE SERPL-MCNC: 142 MG/DL (ref 70–110)
GLUCOSE UR QL STRIP: NEGATIVE
HCO3 UR-SCNC: 23.7 MMOL/L (ref 24–28)
HCO3 UR-SCNC: 26.1 MMOL/L (ref 24–28)
HCO3 UR-SCNC: 26.2 MMOL/L (ref 24–28)
HCO3 UR-SCNC: 26.7 MMOL/L (ref 24–28)
HCO3 UR-SCNC: 27.1 MMOL/L (ref 24–28)
HCT VFR BLD AUTO: 47.3 % (ref 37–48.5)
HGB BLD-MCNC: 15.7 G/DL (ref 12–16)
HGB UR QL STRIP: ABNORMAL
HYALINE CASTS UR QL AUTO: 0 /LPF
IMM GRANULOCYTES # BLD AUTO: 0.07 K/UL (ref 0–0.04)
IMM GRANULOCYTES NFR BLD AUTO: 0.4 % (ref 0–0.5)
INTERVENTRICULAR SEPTUM: 0.8 CM (ref 0.6–1.1)
KETONES UR QL STRIP: ABNORMAL
LA MAJOR: 5.24 CM
LA MINOR: 5.02 CM
LA WIDTH: 3.89 CM
LACTATE SERPL-SCNC: 1.4 MMOL/L (ref 0.5–2.2)
LACTATE SERPL-SCNC: 1.5 MMOL/L (ref 0.5–2.2)
LDH SERPL L TO P-CCNC: 1.64 MMOL/L (ref 0.5–2.2)
LDH SERPL L TO P-CCNC: 1.77 MMOL/L (ref 0.5–2.2)
LEFT ATRIUM SIZE: 2.71 CM
LEFT ATRIUM VOLUME INDEX MOD: 31.5 ML/M2
LEFT ATRIUM VOLUME INDEX: 27.8 ML/M2
LEFT ATRIUM VOLUME MOD: 52 CM3
LEFT ATRIUM VOLUME: 45.95 CM3
LEFT INTERNAL DIMENSION IN SYSTOLE: 4.85 CM (ref 2.1–4)
LEFT VENTRICLE DIASTOLIC VOLUME INDEX: 74.66 ML/M2
LEFT VENTRICLE DIASTOLIC VOLUME: 123.19 ML
LEFT VENTRICLE MASS INDEX: 88 G/M2
LEFT VENTRICLE SYSTOLIC VOLUME INDEX: 66.9 ML/M2
LEFT VENTRICLE SYSTOLIC VOLUME: 110.4 ML
LEFT VENTRICULAR INTERNAL DIMENSION IN DIASTOLE: 5.5 CM (ref 3.5–6)
LEFT VENTRICULAR MASS: 145.11 G
LEUKOCYTE ESTERASE UR QL STRIP: ABNORMAL
LYMPHOCYTES # BLD AUTO: 2.2 K/UL (ref 1–4.8)
LYMPHOCYTES NFR BLD: 13.6 % (ref 18–48)
MAGNESIUM SERPL-MCNC: 1.9 MG/DL (ref 1.6–2.6)
MCH RBC QN AUTO: 29.2 PG (ref 27–31)
MCHC RBC AUTO-ENTMCNC: 33.2 G/DL (ref 32–36)
MCV RBC AUTO: 88 FL (ref 82–98)
METHADONE UR QL SCN>300 NG/ML: NEGATIVE
MICROSCOPIC COMMENT: ABNORMAL
MODE: ABNORMAL
MODE: ABNORMAL
MONOCYTES # BLD AUTO: 1.5 K/UL (ref 0.3–1)
MONOCYTES NFR BLD: 9.4 % (ref 4–15)
NEUTROPHILS # BLD AUTO: 12.1 K/UL (ref 1.8–7.7)
NEUTROPHILS NFR BLD: 76.5 % (ref 38–73)
NITRITE UR QL STRIP: NEGATIVE
NRBC BLD-RTO: 0 /100 WBC
OPIATES UR QL SCN: NEGATIVE
PCO2 BLDA: 36 MMHG (ref 35–45)
PCO2 BLDA: 36.6 MMHG (ref 35–45)
PCO2 BLDA: 37.5 MMHG (ref 35–45)
PCO2 BLDA: 37.6 MMHG (ref 35–45)
PCO2 BLDA: 37.8 MMHG (ref 35–45)
PCP UR QL SCN>25 NG/ML: NEGATIVE
PEEP: 5
PEEP: 5
PH SMN: 7.41 [PH] (ref 7.35–7.45)
PH SMN: 7.45 [PH] (ref 7.35–7.45)
PH SMN: 7.46 [PH] (ref 7.35–7.45)
PH SMN: 7.47 [PH] (ref 7.35–7.45)
PH SMN: 7.48 [PH] (ref 7.35–7.45)
PH UR STRIP: 5 [PH] (ref 5–8)
PHOSPHATE SERPL-MCNC: 3 MG/DL (ref 2.7–4.5)
PLATELET # BLD AUTO: 274 K/UL (ref 150–450)
PMV BLD AUTO: 11 FL (ref 9.2–12.9)
PO2 BLDA: 32 MMHG (ref 40–60)
PO2 BLDA: 33 MMHG (ref 40–60)
PO2 BLDA: 33 MMHG (ref 40–60)
PO2 BLDA: 34 MMHG (ref 40–60)
PO2 BLDA: 36 MMHG (ref 40–60)
POC BE: -1 MMOL/L
POC BE: 2 MMOL/L
POC BE: 2 MMOL/L
POC BE: 3 MMOL/L
POC BE: 3 MMOL/L
POC SATURATED O2: 65 % (ref 95–100)
POC SATURATED O2: 68 % (ref 95–100)
POC SATURATED O2: 69 % (ref 95–100)
POC SATURATED O2: 69 % (ref 95–100)
POC SATURATED O2: 70 % (ref 95–100)
POC TCO2: 25 MMOL/L (ref 24–29)
POC TCO2: 27 MMOL/L (ref 24–29)
POC TCO2: 27 MMOL/L (ref 24–29)
POC TCO2: 28 MMOL/L (ref 24–29)
POC TCO2: 28 MMOL/L (ref 24–29)
POTASSIUM SERPL-SCNC: 2.9 MMOL/L (ref 3.5–5.1)
POTASSIUM SERPL-SCNC: 4.5 MMOL/L (ref 3.5–5.1)
PROT SERPL-MCNC: 8.6 G/DL (ref 6–8.4)
PROT UR QL STRIP: ABNORMAL
RA MAJOR: 2.95 CM
RA WIDTH: 2.85 CM
RBC # BLD AUTO: 5.37 M/UL (ref 4–5.4)
RBC #/AREA URNS AUTO: 39 /HPF (ref 0–4)
RIGHT VENTRICULAR END-DIASTOLIC DIMENSION: 2.47 CM
SAMPLE: ABNORMAL
SAMPLE: NORMAL
SAMPLE: NORMAL
SINUS: 2.9 CM
SITE: ABNORMAL
SITE: NORMAL
SITE: NORMAL
SODIUM SERPL-SCNC: 142 MMOL/L (ref 136–145)
SODIUM SERPL-SCNC: 144 MMOL/L (ref 136–145)
SP GR UR STRIP: 1.02 (ref 1–1.03)
SQUAMOUS #/AREA URNS AUTO: 4 /HPF
STJ: 2.4 CM
TDI LATERAL: 0.05 M/S
TOXICOLOGY INFORMATION: ABNORMAL
TRICUSPID ANNULAR PLANE SYSTOLIC EXCURSION: 1.59 CM
URN SPEC COLLECT METH UR: ABNORMAL
VT: 450
VT: 450
WBC # BLD AUTO: 15.79 K/UL (ref 3.9–12.7)
WBC #/AREA URNS AUTO: 85 /HPF (ref 0–5)
YEAST UR QL AUTO: ABNORMAL

## 2023-05-24 PROCEDURE — 25000003 PHARM REV CODE 250: Performed by: INTERNAL MEDICINE

## 2023-05-24 PROCEDURE — 63600175 PHARM REV CODE 636 W HCPCS: Performed by: PHYSICIAN ASSISTANT

## 2023-05-24 PROCEDURE — 94761 N-INVAS EAR/PLS OXIMETRY MLT: CPT

## 2023-05-24 PROCEDURE — 99223 PR INITIAL HOSPITAL CARE,LEVL III: ICD-10-PCS | Mod: ,,, | Performed by: INTERNAL MEDICINE

## 2023-05-24 PROCEDURE — 83605 ASSAY OF LACTIC ACID: CPT | Mod: 91 | Performed by: INTERNAL MEDICINE

## 2023-05-24 PROCEDURE — 83605 ASSAY OF LACTIC ACID: CPT | Performed by: STUDENT IN AN ORGANIZED HEALTH CARE EDUCATION/TRAINING PROGRAM

## 2023-05-24 PROCEDURE — 25000003 PHARM REV CODE 250: Performed by: PHYSICIAN ASSISTANT

## 2023-05-24 PROCEDURE — 83735 ASSAY OF MAGNESIUM: CPT | Performed by: INTERNAL MEDICINE

## 2023-05-24 PROCEDURE — 80048 BASIC METABOLIC PNL TOTAL CA: CPT | Mod: XB

## 2023-05-24 PROCEDURE — 25000003 PHARM REV CODE 250: Performed by: STUDENT IN AN ORGANIZED HEALTH CARE EDUCATION/TRAINING PROGRAM

## 2023-05-24 PROCEDURE — 83605 ASSAY OF LACTIC ACID: CPT

## 2023-05-24 PROCEDURE — 93010 ELECTROCARDIOGRAM REPORT: CPT | Mod: ,,, | Performed by: INTERNAL MEDICINE

## 2023-05-24 PROCEDURE — 63600175 PHARM REV CODE 636 W HCPCS: Performed by: STUDENT IN AN ORGANIZED HEALTH CARE EDUCATION/TRAINING PROGRAM

## 2023-05-24 PROCEDURE — 85025 COMPLETE CBC W/AUTO DIFF WBC: CPT | Performed by: INTERNAL MEDICINE

## 2023-05-24 PROCEDURE — 25000003 PHARM REV CODE 250

## 2023-05-24 PROCEDURE — 63600175 PHARM REV CODE 636 W HCPCS: Performed by: EMERGENCY MEDICINE

## 2023-05-24 PROCEDURE — 63600175 PHARM REV CODE 636 W HCPCS: Performed by: INTERNAL MEDICINE

## 2023-05-24 PROCEDURE — 82803 BLOOD GASES ANY COMBINATION: CPT

## 2023-05-24 PROCEDURE — 80053 COMPREHEN METABOLIC PANEL: CPT | Performed by: INTERNAL MEDICINE

## 2023-05-24 PROCEDURE — 27200966 HC CLOSED SUCTION SYSTEM

## 2023-05-24 PROCEDURE — 99900026 HC AIRWAY MAINTENANCE (STAT)

## 2023-05-24 PROCEDURE — 25500020 PHARM REV CODE 255: Performed by: EMERGENCY MEDICINE

## 2023-05-24 PROCEDURE — 93005 ELECTROCARDIOGRAM TRACING: CPT

## 2023-05-24 PROCEDURE — 93010 EKG 12-LEAD: ICD-10-PCS | Mod: ,,, | Performed by: INTERNAL MEDICINE

## 2023-05-24 PROCEDURE — 27000221 HC OXYGEN, UP TO 24 HOURS

## 2023-05-24 PROCEDURE — C9113 INJ PANTOPRAZOLE SODIUM, VIA: HCPCS | Performed by: INTERNAL MEDICINE

## 2023-05-24 PROCEDURE — 99900035 HC TECH TIME PER 15 MIN (STAT)

## 2023-05-24 PROCEDURE — 94003 VENT MGMT INPAT SUBQ DAY: CPT

## 2023-05-24 PROCEDURE — 99223 1ST HOSP IP/OBS HIGH 75: CPT | Mod: ,,, | Performed by: INTERNAL MEDICINE

## 2023-05-24 PROCEDURE — 84100 ASSAY OF PHOSPHORUS: CPT | Performed by: INTERNAL MEDICINE

## 2023-05-24 PROCEDURE — 20000000 HC ICU ROOM

## 2023-05-24 RX ORDER — POTASSIUM CHLORIDE 20 MEQ/1
40 TABLET, EXTENDED RELEASE ORAL ONCE
Status: DISCONTINUED | OUTPATIENT
Start: 2023-05-24 | End: 2023-05-24

## 2023-05-24 RX ORDER — ENOXAPARIN SODIUM 100 MG/ML
40 INJECTION SUBCUTANEOUS EVERY 24 HOURS
Status: DISCONTINUED | OUTPATIENT
Start: 2023-05-24 | End: 2023-05-29

## 2023-05-24 RX ORDER — FENTANYL CITRATE 50 UG/ML
50 INJECTION, SOLUTION INTRAMUSCULAR; INTRAVENOUS
Status: COMPLETED | OUTPATIENT
Start: 2023-05-24 | End: 2023-05-23

## 2023-05-24 RX ORDER — POTASSIUM CHLORIDE 750 MG/1
30 CAPSULE, EXTENDED RELEASE ORAL ONCE
Status: COMPLETED | OUTPATIENT
Start: 2023-05-24 | End: 2023-05-24

## 2023-05-24 RX ORDER — PANTOPRAZOLE SODIUM 40 MG/10ML
40 INJECTION, POWDER, LYOPHILIZED, FOR SOLUTION INTRAVENOUS DAILY
Status: DISCONTINUED | OUTPATIENT
Start: 2023-05-24 | End: 2023-05-30

## 2023-05-24 RX ORDER — FENTANYL CITRATE 50 UG/ML
50 INJECTION, SOLUTION INTRAMUSCULAR; INTRAVENOUS
Status: COMPLETED | OUTPATIENT
Start: 2023-05-24 | End: 2023-05-24

## 2023-05-24 RX ORDER — ACETAMINOPHEN 650 MG/20.3ML
650 LIQUID ORAL EVERY 6 HOURS PRN
Status: DISCONTINUED | OUTPATIENT
Start: 2023-05-24 | End: 2023-05-29

## 2023-05-24 RX ORDER — FUROSEMIDE 10 MG/ML
40 INJECTION INTRAMUSCULAR; INTRAVENOUS
Status: DISCONTINUED | OUTPATIENT
Start: 2023-05-24 | End: 2023-05-24

## 2023-05-24 RX ORDER — POTASSIUM CHLORIDE 29.8 MG/ML
40 INJECTION INTRAVENOUS ONCE
Status: COMPLETED | OUTPATIENT
Start: 2023-05-24 | End: 2023-05-24

## 2023-05-24 RX ORDER — POTASSIUM CHLORIDE 750 MG/1
10 CAPSULE, EXTENDED RELEASE ORAL ONCE
Status: COMPLETED | OUTPATIENT
Start: 2023-05-24 | End: 2023-05-24

## 2023-05-24 RX ORDER — MUPIROCIN 20 MG/G
OINTMENT TOPICAL 2 TIMES DAILY
Status: COMPLETED | OUTPATIENT
Start: 2023-05-24 | End: 2023-05-28

## 2023-05-24 RX ORDER — ATORVASTATIN CALCIUM 20 MG/1
20 TABLET, FILM COATED ORAL NIGHTLY
Status: DISCONTINUED | OUTPATIENT
Start: 2023-05-24 | End: 2023-05-30 | Stop reason: HOSPADM

## 2023-05-24 RX ORDER — DEXMEDETOMIDINE HYDROCHLORIDE 4 UG/ML
0-1.4 INJECTION, SOLUTION INTRAVENOUS CONTINUOUS
Status: DISCONTINUED | OUTPATIENT
Start: 2023-05-24 | End: 2023-05-29

## 2023-05-24 RX ORDER — PANTOPRAZOLE SODIUM 40 MG/1
40 TABLET, DELAYED RELEASE ORAL DAILY
Status: DISCONTINUED | OUTPATIENT
Start: 2023-05-24 | End: 2023-05-24

## 2023-05-24 RX ORDER — MAGNESIUM SULFATE HEPTAHYDRATE 40 MG/ML
2 INJECTION, SOLUTION INTRAVENOUS ONCE
Status: COMPLETED | OUTPATIENT
Start: 2023-05-24 | End: 2023-05-24

## 2023-05-24 RX ORDER — FUROSEMIDE 10 MG/ML
40 INJECTION INTRAMUSCULAR; INTRAVENOUS ONCE
Status: COMPLETED | OUTPATIENT
Start: 2023-05-24 | End: 2023-05-24

## 2023-05-24 RX ADMIN — FENTANYL CITRATE 50 MCG: 50 INJECTION, SOLUTION INTRAMUSCULAR; INTRAVENOUS at 12:05

## 2023-05-24 RX ADMIN — MUPIROCIN: 20 OINTMENT TOPICAL at 08:05

## 2023-05-24 RX ADMIN — ENOXAPARIN SODIUM 40 MG: 40 INJECTION SUBCUTANEOUS at 04:05

## 2023-05-24 RX ADMIN — POTASSIUM CHLORIDE 40 MEQ: 29.8 INJECTION, SOLUTION INTRAVENOUS at 05:05

## 2023-05-24 RX ADMIN — MAGNESIUM SULFATE 2 G: 2 INJECTION INTRAVENOUS at 05:05

## 2023-05-24 RX ADMIN — VANCOMYCIN HYDROCHLORIDE 1250 MG: 1.25 INJECTION, POWDER, LYOPHILIZED, FOR SOLUTION INTRAVENOUS at 11:05

## 2023-05-24 RX ADMIN — PROPOFOL 30 MCG/KG/MIN: 10 INJECTION, EMULSION INTRAVENOUS at 11:05

## 2023-05-24 RX ADMIN — DEXMEDETOMIDINE HYDROCHLORIDE 0.2 MCG/KG/HR: 4 INJECTION INTRAVENOUS at 09:05

## 2023-05-24 RX ADMIN — VANCOMYCIN HYDROCHLORIDE 1250 MG: 1.25 INJECTION, POWDER, LYOPHILIZED, FOR SOLUTION INTRAVENOUS at 12:05

## 2023-05-24 RX ADMIN — CEFEPIME 2 G: 2 INJECTION, POWDER, FOR SOLUTION INTRAVENOUS at 05:05

## 2023-05-24 RX ADMIN — ACETAMINOPHEN 650 MG: 650 SOLUTION ORAL at 04:05

## 2023-05-24 RX ADMIN — PANTOPRAZOLE SODIUM 40 MG: 40 INJECTION, POWDER, FOR SOLUTION INTRAVENOUS at 10:05

## 2023-05-24 RX ADMIN — IOHEXOL 75 ML: 350 INJECTION, SOLUTION INTRAVENOUS at 02:05

## 2023-05-24 RX ADMIN — PROPOFOL 50 MCG/KG/MIN: 10 INJECTION, EMULSION INTRAVENOUS at 08:05

## 2023-05-24 RX ADMIN — PROPOFOL 40 MCG/KG/MIN: 10 INJECTION, EMULSION INTRAVENOUS at 05:05

## 2023-05-24 RX ADMIN — ATORVASTATIN CALCIUM 20 MG: 20 TABLET, FILM COATED ORAL at 08:05

## 2023-05-24 RX ADMIN — ATORVASTATIN CALCIUM 20 MG: 20 TABLET, FILM COATED ORAL at 04:05

## 2023-05-24 RX ADMIN — CEFEPIME 2 G: 2 INJECTION, POWDER, FOR SOLUTION INTRAVENOUS at 02:05

## 2023-05-24 RX ADMIN — POTASSIUM CHLORIDE 10 MEQ: 10 CAPSULE, COATED, EXTENDED RELEASE ORAL at 05:05

## 2023-05-24 RX ADMIN — FUROSEMIDE 40 MG: 10 INJECTION, SOLUTION INTRAMUSCULAR; INTRAVENOUS at 04:05

## 2023-05-24 RX ADMIN — FUROSEMIDE 40 MG: 10 INJECTION, SOLUTION INTRAMUSCULAR; INTRAVENOUS at 12:05

## 2023-05-24 RX ADMIN — HEPARIN SODIUM AND DEXTROSE 12 UNITS/KG/HR: 10000; 5 INJECTION INTRAVENOUS at 01:05

## 2023-05-24 RX ADMIN — MUPIROCIN: 20 OINTMENT TOPICAL at 09:05

## 2023-05-24 RX ADMIN — CEFEPIME 2 G: 2 INJECTION, POWDER, FOR SOLUTION INTRAVENOUS at 09:05

## 2023-05-24 RX ADMIN — POTASSIUM CHLORIDE 30 MEQ: 10 CAPSULE, COATED, EXTENDED RELEASE ORAL at 05:05

## 2023-05-24 RX ADMIN — PROPOFOL 25 MCG/KG/MIN: 10 INJECTION, EMULSION INTRAVENOUS at 11:05

## 2023-05-24 NOTE — PLAN OF CARE
Cardiac ICU Care Plan    POC reviewed with Reza Morrow and family. Questions and concerns addressed. Pt progressing toward goals. Will continue to monitor. See below and flowsheets for full assessment and VS info.       Neuro:  Garrick Coma Scale  Best Eye Response: 2-->(E2) to pain  Best Motor Response: 4-->(M4) withdraws from pain  Best Verbal Response: 1-->(V1) none  Garrick Coma Scale Score: 7  Assessment Qualifiers: patient chemically sedated or paralyzed  Pupil PERRLA: yes    24 hr Temp:  [97.6 °F (36.4 °C)-100.2 °F (37.9 °C)]      CV:  Rhythm: sinus tachycardia   DVT prophylaxis: VTE Required Core Measure: Pharmacological prophylaxis initiated/maintained    CVP (mean): (S) 5 mmHg (05/24/23 0305)                       Pulses  Right Radial Pulse: 1+ (weak)  Left Radial Pulse: 1+ (weak)  Right Dorsalis Pedis Pulse: 1+ (weak)  Left Dorsalis Pedis Pulse: 1+ (weak)  Right Posterior Tibial Pulse: 1+ (weak)  Left Posterior Tibial Pulse: 1+ (weak)    Resp:     Vent Mode: A/C  Set Rate: 20 BPM  Oxygen Concentration (%): 50  Vt Set: 450 mL  PEEP/CPAP: 5 cmH20    GI/:  GI prophylaxis: yes     Last Bowel Movement:  (luz elena)       Urethral Catheter 05/23/23 2306 Temperature probe 16 Fr.-Reason for Continuing Urinary Catheterization: Critically ill in ICU and requiring hourly monitoring of intake/output   Intake/Output Summary (Last 24 hours) at 5/24/2023 0646  Last data filed at 5/24/2023 0605  Gross per 24 hour   Intake 1471.71 ml   Output 1950 ml   Net -478.29 ml        Nutritional Supplement Intake: Quantity none, Type: Boost    Labs/Accuchecks:  Recent Labs   Lab 05/23/23 2015 05/24/23  0430   WBC 15.25* 15.79*   RBC 5.38 5.37   HGB 16.0 15.7   HCT 47.5 47.3    274      Recent Labs   Lab 05/23/23 2207   INR 1.0   APTT 24.2      Recent Labs     05/24/23  0430      K 2.9*   CO2 23      BUN 29*   CREATININE 1.0   ALKPHOS 79   ALT 18   AST 32   BILITOT 1.9*       Recent Labs   Lab 05/23/23 2015    *   TROPONINI 2.123*      Recent Labs     05/23/23  2310 05/24/23  0021 05/24/23  0430   PH 7.414 7.408 7.448   PCO2 39.0 37.6 37.8   PO2 442* 36* 34*   HCO3 25.0 23.7* 26.1   POCSATURATED 100 70* 69*   BE 0 -1 2       Electrolytes: Electrolytes replaced  Accuchecks: none    Gtts/LDAs:   dexmedeTOMIDine (Precedex) infusion (titrating)      propofoL 50 mcg/kg/min (05/24/23 0605)       Lines/Drains/Airways       Central Venous Catheter Line  Duration             Percutaneous Central Line Insertion/Assessment - Triple Lumen  05/24/23 0539 Internal Jugular Right <1 day              Drain  Duration                  NG/OG Tube 05/23/23 2308 orogastric Center mouth <1 day         Urethral Catheter 05/23/23 2306 Temperature probe 16 Fr. <1 day              Airway  Duration                  Airway - Non-Surgical 05/23/23 2300 Endotracheal Tube <1 day              Peripheral Intravenous Line  Duration                  Peripheral IV - Single Lumen 05/23/23 2219 20 G Left Antecubital <1 day         Peripheral IV - Single Lumen 05/23/23 2219 20 G Right Antecubital <1 day                    Skin/Wounds  Bathing/Skin Care: bath, complete;dressed/undressed;linen changed (05/24/23 0302)  Wounds: No  Wound care consulted: No

## 2023-05-24 NOTE — ASSESSMENT & PLAN NOTE
Patient with Hypoxic Respiratory failure which is Acute.  she is not on home oxygen.   Supplemental oxygen was provided and noted- Vent Mode: A/C  Oxygen Concentration (%):  [50] 50  Resp Rate Total:  [20 br/min] 20 br/min  Vt Set:  [450 mL] 450 mL  PEEP/CPAP:  [5 cmH20] 5 cmH20  Mean Airway Pressure:  [11 cmH20] 11 cmH20    Signs/symptoms of respiratory failure include- tachypnea, increased work of breathing and respiratory distress. Contributing diagnoses includes - CHF, Pneumonia and Pulmonary Embolus Labs and images were reviewed. Patient Has recent ABG, which has been reviewed. Will treat underlying causes and adjust management of respiratory failure as follows-     Acute SOB/CP a/w hypoxia and tachycardia w history of takosubo-- last known EF 25%, bedside TTE with severely reduced EF, elevated trop and BNP concerning for PE, started on empiric heparin drip, cardiology consulted in ED.   S/p IVF and lasix in ED.   Intubated in ED for increasing hypoxia/respiratory distress on BiPAP.    AHRF ddx: PE vs ACS vs pneumonitis     - f/u CTA  - f/u formal TTE  - agree with lasix and empiric heparin drip  - strict I/Os f/u UOP  - cardiology consulted  - daily SAT/SBT  - famotidine 20 qd for GI ppx while intubated  - prn ABGs  - cardiology to admit to CCU, MICU will sign off.

## 2023-05-24 NOTE — HPI
Mrs. Morrow is a 58-year-old-woman with MS, HTN, benzo and opiate use, hx of takosubo cardiomyopathy EF 25% 3/2022, who presents to ED with AMS, SOB, and CP for 2 days.     She has been having mylagias and body pains for 2 days and has not been taking her medications. LKN 5/21. Reports she takes opiates and benzos daily as well as lasix. Endorses SOB and CP that started day prior to admission. Lives with son. Smokes cigarettes and vapes.     In ED, , /96, RR 35, started on BiPAP for hypoxia and tachypnea, developed orthopnea, started diuresis, Bedside US concerning for severely depressed EF, cardiology consulted. Due to progressive encephalopathy, tachypnea and respiratory distress despite tachypnea and ativan, she was intubated in the ED. Trop 2.123, BNP 3970, lactate 2.7, d-dimer 1.99. EKG sinus tach w/o ST elevations. WBC 15. Hg 16. AG 19. Bicab 21, Cr 0.9, TSH 0.102, T4 wnl. Tylenol and salicylate levels wnl. EtOH <10, UDS presumed + THC. UA 85 WBC, rare bacteria. ABG 7.4/39/442/26. CXR CHF vs pneumonitis. CTH and CTA chest pending. Started on empiric abx and heparin drip for concern of PE. MICU consulted for AMS.

## 2023-05-24 NOTE — ASSESSMENT & PLAN NOTE
Pt is a 56 y.o female with h/o MS, Takotsubo CM, hld, opioid and benzo abuse presents to the hospital with AMS. On arrival to the hospital she was noted to be hypoxic briefly on bipap and later requiring intubation. BP stable. Afebrile. Labs with leukocytosis with wbc 15. renal function and LFTs stable. BNP 3970 , Trop 2.1, and lactate 2.7.      Bedside TTE with EF approximately 10-15%, no evidence of LVOT obstruction, no RV dilitation.    RIJ central line placed and hemodynamics obtained and were as follow:  CVP 6, svo2 70 CO 3.2 CI 1.9 SVR 2093 Lactate 1.5    shock call activated with conclusion to cont with medical therapy.    Plan:  -- will admit to CCU service  -- formal TTE in AM  -- will cont lasix 40 mg bid  -- monitor I&O closely  -- BSA for presumed superimposed pna  -- lactate qAM  -- GDMT when stable

## 2023-05-24 NOTE — SUBJECTIVE & OBJECTIVE
Past Medical History:   Diagnosis Date    Behavioral problem     Bulging lumbar disc     Bursitis     bilateral hip    Degenerative cervical disc     Hx of psychiatric care     Hypercholesteremia     Labral tear of hip, degenerative bilateral    MS (multiple sclerosis)     Paresthesia of both lower extremities 3/13/2022    Pneumonia     Spinal cord compression        Past Surgical History:   Procedure Laterality Date    ANGIOGRAM, CORONARY, WITH LEFT HEART CATHETERIZATION Left 3/11/2022    Procedure: Angiogram, Coronary, with Left Heart Cath;  Surgeon: Elie Matamoros MD;  Location: [facility] CATH LAB;  Service: Cardiology;  Laterality: Left;    BREAST SURGERY      augmentation     SECTION, CLASSIC      HAND SURGERY      HYSTEROSCOPY      NECK SURGERY      cervical disc removeal    TUBAL LIGATION      X-STOP IMPLANTATION         Review of patient's allergies indicates:   Allergen Reactions    Adhesive Hives    Neosporin [neomycin-bacitracin-polymyxin] Hives    Neomycin-polymyxin Hives    Penicillins Other (See Comments)     Unknown  reaction    Latex Rash       Family History       Problem Relation (Age of Onset)    Bipolar disorder Brother    COPD Mother    Cancer Father    Diabetes Father, Sister    Drug abuse Mother    Heart disease Maternal Uncle    Hypothyroidism Maternal Grandmother    Lung cancer Father          Tobacco Use    Smoking status: Former     Types: Cigarettes     Quit date: 2013     Years since quittin.8    Smokeless tobacco: Never   Substance and Sexual Activity    Alcohol use: No    Drug use: Yes     Types: Benzodiazepines, Marijuana    Sexual activity: Never      Review of Systems   Unable to perform ROS: Mental status change   Constitutional:  Negative for chills and fever.   HENT:  Negative for congestion and rhinorrhea.    Eyes:  Negative for discharge and redness.   Respiratory:  Positive for shortness of breath. Negative for cough.    Cardiovascular:  Positive for chest pain.  Negative for palpitations and leg swelling.   Gastrointestinal:  Negative for abdominal distention, abdominal pain, constipation, nausea and vomiting.   Genitourinary:  Negative for dysuria and frequency.   Neurological:  Negative for facial asymmetry and headaches.   Psychiatric/Behavioral:  Positive for agitation and confusion.    Objective:     Vital Signs (Most Recent):  Temp: 97.6 °F (36.4 °C) (05/23/23 1738)  Pulse: (!) 155 (05/23/23 2314)  Resp: 20 (05/23/23 2054)  BP: (!) 141/96 (05/23/23 2202)  SpO2: 98 % (05/23/23 2314) Vital Signs (24h Range):  Temp:  [97.6 °F (36.4 °C)] 97.6 °F (36.4 °C)  Pulse:  [130-168] 155  Resp:  [18-35] 20  SpO2:  [85 %-99 %] 98 %  BP: (139-144)/() 141/96   Weight: 59 kg (130 lb)  Body mass index is 21.63 kg/m².    No intake or output data in the 24 hours ending 05/23/23 2330       Physical Exam  Constitutional:       General: She is not in acute distress.     Appearance: Normal appearance. She is not toxic-appearing.   HENT:      Head: Normocephalic and atraumatic.      Nose: Nose normal. No congestion or rhinorrhea.      Mouth/Throat:      Pharynx: Oropharynx is clear. No oropharyngeal exudate or posterior oropharyngeal erythema.   Eyes:      General: No scleral icterus.     Conjunctiva/sclera: Conjunctivae normal.   Cardiovascular:      Rate and Rhythm: Regular rhythm. Tachycardia present.      Pulses: Normal pulses.      Heart sounds: Normal heart sounds. No murmur heard.  Pulmonary:      Effort: Pulmonary effort is normal. No respiratory distress.      Breath sounds: Wheezing present. No rales.      Comments: BiPAP  Abdominal:      General: Abdomen is flat. Bowel sounds are normal. There is no distension.      Tenderness: There is no abdominal tenderness.   Musculoskeletal:      Cervical back: Normal range of motion and neck supple.      Right lower leg: No edema.      Left lower leg: No edema.   Skin:     General: Skin is warm and dry.   Neurological:      General: No  focal deficit present.      Mental Status: She is alert. She is disoriented.      Comments: Responds yes/no, one-two word sentences prior to intubation          Vents:  Vent Mode: A/C (05/23/23 2314)  Set Rate: 20 BPM (05/23/23 2314)  Vt Set: 450 mL (05/23/23 2314)  PEEP/CPAP: 5 cmH20 (05/23/23 2314)  Oxygen Concentration (%): 50 (05/23/23 2314)  Peak Airway Pressure: 27 cmH20 (05/23/23 2314)  Plateau Pressure: 0 cmH20 (05/23/23 2314)  Total Ve: 9.17 L/m (05/23/23 2314)  Negative Inspiratory Force (cm H2O): 0 (05/23/23 2314)  Lines/Drains/Airways       Peripheral Intravenous Line  Duration                  Peripheral IV - Single Lumen 05/23/23 2219 20 G Left Antecubital <1 day         Peripheral IV - Single Lumen 05/23/23 2219 20 G Right Antecubital <1 day                  Significant Labs:    CBC/Anemia Profile:  Recent Labs   Lab 05/23/23 2015   WBC 15.25*   HGB 16.0   HCT 47.5      MCV 88   RDW 13.7        Chemistries:  Recent Labs   Lab 05/23/23 2015   *   K 3.8      CO2 21*   BUN 31*   CREATININE 0.9   CALCIUM 9.9   ALBUMIN 4.1   PROT 8.8*   BILITOT 1.7*   ALKPHOS 88   ALT 18   AST 34   MG 1.9       All pertinent labs within the past 24 hours have been reviewed.    Significant Imaging: I have reviewed all pertinent imaging results/findings within the past 24 hours.

## 2023-05-24 NOTE — RESPIRATORY THERAPY
Reason intubated: Respiratory Distress  Patient intubated by: MD David  Airway placed: 2300            Airway Secured at: 25 cm Teeth    Level of Consciousness:Level of Consciousness (AVPU): unresponsive (Medically sedated/paralyzed.)  Respiratory Efforts:Respiratory Effort: Unlabored  Breath Sounds:All Lung Fields Breath Sounds: Anterior:, Lateral:, bronchial, clear, equal bilaterally  Respiratory Pattern:Rhythm/Pattern, Respiratory: pattern regular, depth regular, unlabored    Vitals:  Sp02:SpO2: 100 %  Pulse:Pulse: (!) 142  Respirations:Resp: 20  Blood pressure:BP: 119/69    Vent settings:  Oxygen Device: Ventilator  Oxygen Concentration:Oxygen Concentration (%): 50  Rate:Set Rate: 20 BPM  Tidal Volume:Vt Set: 450 mL  PEEP:PEEP/CPAP: 5 cmH20      Patient was intubated with positive color change and Bilateral breath sounds.    Patient is on ventilator, tube is secure and patient is stable at this time.

## 2023-05-24 NOTE — CONSULTS
[facility] - Emergency Dept  Critical Care Medicine  Consult Note    Patient Name: Reza Morrow  MRN: [id number]  Admission Date: 5/23/2023  Hospital Length of Stay: 0 days  Code Status: Prior  Attending Physician: Chely Gilbert MD   Primary Care Provider: Kip Jimenez MD   Principal Problem: Acute hypoxemic respiratory failure    Inpatient consult to Critical Care Medicine  Consult performed by: Liv Sal MD  Consult ordered by: Lizzette Guzman PA-C        Subjective:     HPI:  Mrs. Morrow is a 58-year-old-woman with MS, HTN, benzo and opiate use, hx of takosubo cardiomyopathy EF 25% 3/2022, who presents to ED with AMS, SOB, and CP for 2 days.     She has been having mylagias and body pains for 2 days and has not been taking her medications. LKN 5/21. Reports she takes opiates and benzos daily as well as lasix. Endorses SOB and CP that started day prior to admission. Lives with son. Smokes cigarettes and vapes.     In ED, , /96, RR 35, started on BiPAP for hypoxia and tachypnea, developed orthopnea, started diuresis, Bedside US concerning for severely depressed EF, cardiology consulted. Due to progressive encephalopathy, tachypnea and respiratory distress despite tachypnea and ativan, she was intubated in the ED. Trop 2.123, BNP 3970, lactate 2.7, d-dimer 1.99. EKG sinus tach w/o ST elevations. WBC 15. Hg 16. AG 19. Bicab 21, Cr 0.9, TSH 0.102, T4 wnl. Tylenol and salicylate levels wnl. EtOH <10, UDS presumed + THC. UA 85 WBC, rare bacteria. ABG 7.4/39/442/26. CXR CHF vs pneumonitis. CTH and CTA chest pending. Started on empiric abx and heparin drip for concern of PE. MICU consulted for AMS.       Hospital/ICU Course:  No notes on file    Past Medical History:   Diagnosis Date    Behavioral problem     Bulging lumbar disc     Bursitis     bilateral hip    Degenerative cervical disc     Hx of psychiatric care     Hypercholesteremia     Labral tear of hip, degenerative bilateral     MS (multiple sclerosis)     Paresthesia of both lower extremities 3/13/2022    Pneumonia     Spinal cord compression        Past Surgical History:   Procedure Laterality Date    ANGIOGRAM, CORONARY, WITH LEFT HEART CATHETERIZATION Left 3/11/2022    Procedure: Angiogram, Coronary, with Left Heart Cath;  Surgeon: Elie Matamoros MD;  Location: [facility] CATH LAB;  Service: Cardiology;  Laterality: Left;    BREAST SURGERY      augmentation     SECTION, CLASSIC      HAND SURGERY      HYSTEROSCOPY      NECK SURGERY      cervical disc removeal    TUBAL LIGATION      X-STOP IMPLANTATION         Review of patient's allergies indicates:   Allergen Reactions    Adhesive Hives    Neosporin [neomycin-bacitracin-polymyxin] Hives    Neomycin-polymyxin Hives    Penicillins Other (See Comments)     Unknown  reaction    Latex Rash       Family History       Problem Relation (Age of Onset)    Bipolar disorder Brother    COPD Mother    Cancer Father    Diabetes Father, Sister    Drug abuse Mother    Heart disease Maternal Uncle    Hypothyroidism Maternal Grandmother    Lung cancer Father          Tobacco Use    Smoking status: Former     Types: Cigarettes     Quit date: 2013     Years since quittin.8    Smokeless tobacco: Never   Substance and Sexual Activity    Alcohol use: No    Drug use: Yes     Types: Benzodiazepines, Marijuana    Sexual activity: Never      Review of Systems   Unable to perform ROS: Mental status change   Constitutional:  Negative for chills and fever.   HENT:  Negative for congestion and rhinorrhea.    Eyes:  Negative for discharge and redness.   Respiratory:  Positive for shortness of breath. Negative for cough.    Cardiovascular:  Positive for chest pain. Negative for palpitations and leg swelling.   Gastrointestinal:  Negative for abdominal distention, abdominal pain, constipation, nausea and vomiting.   Genitourinary:  Negative for dysuria and frequency.   Neurological:   Negative for facial asymmetry and headaches.   Psychiatric/Behavioral:  Positive for agitation and confusion.    Objective:     Vital Signs (Most Recent):  Temp: 97.6 °F (36.4 °C) (05/23/23 1738)  Pulse: (!) 155 (05/23/23 2314)  Resp: 20 (05/23/23 2054)  BP: (!) 141/96 (05/23/23 2202)  SpO2: 98 % (05/23/23 2314) Vital Signs (24h Range):  Temp:  [97.6 °F (36.4 °C)] 97.6 °F (36.4 °C)  Pulse:  [130-168] 155  Resp:  [18-35] 20  SpO2:  [85 %-99 %] 98 %  BP: (139-144)/() 141/96   Weight: 59 kg (130 lb)  Body mass index is 21.63 kg/m².    No intake or output data in the 24 hours ending 05/23/23 2330       Physical Exam  Constitutional:       General: She is not in acute distress.     Appearance: Normal appearance. She is not toxic-appearing.   HENT:      Head: Normocephalic and atraumatic.      Nose: Nose normal. No congestion or rhinorrhea.      Mouth/Throat:      Pharynx: Oropharynx is clear. No oropharyngeal exudate or posterior oropharyngeal erythema.   Eyes:      General: No scleral icterus.     Conjunctiva/sclera: Conjunctivae normal.   Cardiovascular:      Rate and Rhythm: Regular rhythm. Tachycardia present.      Pulses: Normal pulses.      Heart sounds: Normal heart sounds. No murmur heard.  Pulmonary:      Effort: Pulmonary effort is normal. No respiratory distress.      Breath sounds: Wheezing present. No rales.      Comments: BiPAP  Abdominal:      General: Abdomen is flat. Bowel sounds are normal. There is no distension.      Tenderness: There is no abdominal tenderness.   Musculoskeletal:      Cervical back: Normal range of motion and neck supple.      Right lower leg: No edema.      Left lower leg: No edema.   Skin:     General: Skin is warm and dry.   Neurological:      General: No focal deficit present.      Mental Status: She is alert. She is disoriented.      Comments: Responds yes/no, one-two word sentences prior to intubation          Vents:  Vent Mode: A/C (05/23/23 2314)  Set Rate: 20 BPM  (05/23/23 2314)  Vt Set: 450 mL (05/23/23 2314)  PEEP/CPAP: 5 cmH20 (05/23/23 2314)  Oxygen Concentration (%): 50 (05/23/23 2314)  Peak Airway Pressure: 27 cmH20 (05/23/23 2314)  Plateau Pressure: 0 cmH20 (05/23/23 2314)  Total Ve: 9.17 L/m (05/23/23 2314)  Negative Inspiratory Force (cm H2O): 0 (05/23/23 2314)  Lines/Drains/Airways       Peripheral Intravenous Line  Duration                  Peripheral IV - Single Lumen 05/23/23 2219 20 G Left Antecubital <1 day         Peripheral IV - Single Lumen 05/23/23 2219 20 G Right Antecubital <1 day                  Significant Labs:    CBC/Anemia Profile:  Recent Labs   Lab 05/23/23 2015   WBC 15.25*   HGB 16.0   HCT 47.5      MCV 88   RDW 13.7        Chemistries:  Recent Labs   Lab 05/23/23 2015   *   K 3.8      CO2 21*   BUN 31*   CREATININE 0.9   CALCIUM 9.9   ALBUMIN 4.1   PROT 8.8*   BILITOT 1.7*   ALKPHOS 88   ALT 18   AST 34   MG 1.9       All pertinent labs within the past 24 hours have been reviewed.    Significant Imaging: I have reviewed all pertinent imaging results/findings within the past 24 hours.      ABG  Recent Labs   Lab 05/24/23  0021   PH 7.408   PO2 36*   PCO2 37.6   HCO3 23.7*   BE -1     Assessment/Plan:     Neuro  Acute encephalopathy  AMS for 2 days, LKN 5/21.   Elevated AG, suspect 2/2 elevated lactate. UDS + THC.   TSH suppressed, however T4 wnl.   DDx for acute encephalopathy: UTI vs PE vs hypoxia vs MS?    - see AHRF management  - agree with empiric abx vanc/cefepime  - f/u blood and urine cx  - trend lactate to normal  - trend CMP  - f/u CTH, CTA chest    MS (multiple sclerosis)  Dx 2015. Prior to intubation patient reported she does not follow with neuro but is on MS medications-- though not listed on active meds-- AMS may be confounding this information.     - f/u with pharm    Pulmonary  * Acute hypoxemic respiratory failure  Patient with Hypoxic Respiratory failure which is Acute.  she is not on home oxygen.    Supplemental oxygen was provided and noted- Vent Mode: A/C  Oxygen Concentration (%):  [50] 50  Resp Rate Total:  [20 br/min] 20 br/min  Vt Set:  [450 mL] 450 mL  PEEP/CPAP:  [5 cmH20] 5 cmH20  Mean Airway Pressure:  [11 cmH20] 11 cmH20    Signs/symptoms of respiratory failure include- tachypnea, increased work of breathing and respiratory distress. Contributing diagnoses includes - CHF, Pneumonia and Pulmonary Embolus Labs and images were reviewed. Patient Has recent ABG, which has been reviewed. Will treat underlying causes and adjust management of respiratory failure as follows-     Acute SOB/CP a/w hypoxia and tachycardia w history of takosubo-- last known EF 25%, bedside TTE with severely reduced EF, elevated trop and BNP concerning for PE, started on empiric heparin drip, cardiology consulted in ED.   S/p IVF and lasix in ED.   Intubated in ED for increasing hypoxia/respiratory distress on BiPAP.    AHRF ddx: PE vs ACS vs pneumonitis     - f/u CTA  - f/u formal TTE  - agree with lasix and empiric heparin drip  - strict I/Os f/u UOP  - cardiology consulted  - daily SAT/SBT  - famotidine 20 qd for GI ppx while intubated  - prn ABGs  - cardiology to admit to CCU, MICU will sign off.         Critical secondary to Patient has a condition that poses threat to life and bodily function: Severe Respiratory Distress     Critical care was time spent personally by me on the following activities: development of treatment plan with patient or surrogate and bedside caregivers, discussions with consultants, evaluation of patient's response to treatment, examination of patient, ordering and performing treatments and interventions, ordering and review of laboratory studies, ordering and review of radiographic studies, pulse oximetry, re-evaluation of patient's condition. This critical care time did not overlap with that of any other provider or involve time for any procedures.    Thank you for your consult. I will sign off.  Please contact us if you have any additional questions.     Liv Sal MD  Critical Care Medicine  [facility] - Emergency Dept

## 2023-05-24 NOTE — PROGRESS NOTES
Attempted SAT/SBT.    Propofol weaned then turned off for 1 hour.  Patient extremely combative with RASS of 4, unable to follow commands of any sort, would not squeeze hand, move head, or open eyes on request.  Was thrashing  self, biting on ET tube, opening eyes un purposefully, attempting to pull at ET tube,  no commands were followed.      Propofol started back at previous rate and patient calm with rass of -1

## 2023-05-24 NOTE — ASSESSMENT & PLAN NOTE
Dx 2015. Prior to intubation patient reported she does not follow with neuro but is on MS medications-- though not listed on active meds-- AMS may be confounding this information.     - f/u with pharm

## 2023-05-24 NOTE — HOSPITAL COURSE
Patient remained intubated overnight, not requiring any vasopressors, maintaining MAPs on her own of 90s. After IV pushes of Lasix, pt diuresed well, putting out ~2L urine. CT chest showed bilateral diffuse ground glass opacities suggestive of interstitial edema associated with inflammation vs infection. She remains on broad spec antibiotics with vanc and cefepime. Hemodynamics overnight on 5/24 worsened slightly and pt developed hypotension, so dobutamine was started. Trialed SAT/SBT on 5/25 with sedation weaned and patient showing more signs of wakefulness, however pt not following commands and still failing SBT. Pulmonology consulted for assistance in extubation given continued difficulty in liberating patient from the vent; pt was successfully extubated on 5/27. Patient stable, however exhibited significant AMS and required precedex for agitation. Mentation markedly improved on 5/28 and pt continues to do well. Will wean precedex and look to step down.

## 2023-05-24 NOTE — HPI
56 year old female with a history of MS, Takotsubo CM, hld, opioid and benzo abuse for whom cardiology was initially consulted on for concern for cardiogenic shock.  She presented to the hospital by her niece and son for AMS and feeling unwell. per son she had an episode of emesis at home this afernoon and was also c/o burning pain in her legs and arms. per family 2 weeks ago she was c/o fever ( unkn duration), and 2 days ago she appeared to have dec po intake.  on arrival to the hospital she was altered. was afebrile with sinus tachycardia with rate 130s, /90 and in respiratory distress requiring bipap. Labs with leukocytosis with wbc 15. renal function and LFTs stable. BNP 3970 , Trop 2.1, and lactate 2.7    cardiology consulted. on my evaluation at bedside she appered to be in significant respiratory distress. She was immediately intubated. Bedside TTE with EF approximately 10-15%, no evidence of LVOT obstruction, no RV dilitation.    RIJ central line placed and hemodynamics obtained and were as follow:  CVP 6, svo2 70 CO 3.2 CI 1.9 SVR 2093 Lactate 1.5    shock call activated with conclusion to cont with medical therapy.    of note patient had a recent hosptialization with discharge on 3/14 where she presented with confusion, weakness for two days . EKG on admission showed 1mm elevation of ST segments in leads 1 and avL, and patient had elevated troponins and BNP.  During bedside echo, patient had complaints of chest pain and was subsequently taken to cath lab where there was no evidence of coronary artery disease.   Formal echo was completed post procedure which showed reduced EF 25%, and mid-ventricular hypokinesis suggestive of takutsubo cardiomyopathy. placed on GDMT

## 2023-05-24 NOTE — CONSULTS
Consult acknowledged.  Admitted to CCU  Please refer to my H&P note for A&P    Grao Siu MD  Cardiovascular medicine; PGY4  [facility]  [address] [place] [address]

## 2023-05-24 NOTE — ED PROVIDER NOTES
Encounter Date: 5/23/2023       History     Chief Complaint   Patient presents with    Body Pain     Arrived via ems from home with c/o all over body pain, GCS 14 for unknown amount of time, LSN Sunday per son, hx of MS     58-year-old female with past medical history of MS presents to the emergency department with chief complaint of altered mental status.  Patient is currently oriented to self only.  She can not provide history.  Her son is at bedside to provide history.  He states that his mother began feeling generally unwell yesterday.  He stayed with her today.  He had to leave at around 4:00 p.m. to go get his children.  A family friend checked in on her at that time.  She made a requests to go to the hospital therefore EMS was called.  The son does note that he saw vomit on the ground at her house.  She was also complaining of burning pain in her legs and arms.  He states that she is not been eating or drinking or taking her home medications for the last 2 days due to her illness.  She smokes marijuana.  He denies other drug use or alcohol use.  Denies any known recent trauma or injury.  He does report that she had a similar episode last year.  She was admitted to the hospital but they were never told the exact cause of her confusion.  He denies any new medication changes.  Denies other worsening or alleviating factors.    Review of patient's allergies indicates:   Allergen Reactions    Adhesive Hives    Neosporin [neomycin-bacitracin-polymyxin] Hives    Neomycin-polymyxin Hives    Penicillins Other (See Comments)     Unknown  reaction    Latex Rash     Past Medical History:   Diagnosis Date    Behavioral problem     Bulging lumbar disc     Bursitis     bilateral hip    Degenerative cervical disc     Hx of psychiatric care     Hypercholesteremia     Labral tear of hip, degenerative bilateral    MS (multiple sclerosis)     Paresthesia of both lower extremities 3/13/2022    Pneumonia     Spinal cord compression       Past Surgical History:   Procedure Laterality Date    ANGIOGRAM, CORONARY, WITH LEFT HEART CATHETERIZATION Left 3/11/2022    Procedure: Angiogram, Coronary, with Left Heart Cath;  Surgeon: Elie Matamoros MD;  Location: [facility] CATH LAB;  Service: Cardiology;  Laterality: Left;    BREAST SURGERY      augmentation     SECTION, CLASSIC      HAND SURGERY      HYSTEROSCOPY      NECK SURGERY      cervical disc removeal    TUBAL LIGATION      X-STOP IMPLANTATION       Family History   Problem Relation Age of Onset    Cancer Father     Diabetes Father     Lung cancer Father     Diabetes Sister     COPD Mother     Drug abuse Mother     Bipolar disorder Brother     Heart disease Maternal Uncle     Hypothyroidism Maternal Grandmother      Social History     Tobacco Use    Smoking status: Former     Types: Cigarettes     Quit date: 2013     Years since quittin.8    Smokeless tobacco: Never   Substance Use Topics    Alcohol use: No    Drug use: Yes     Types: Benzodiazepines, Marijuana     Review of Systems   Unable to perform ROS: Mental status change     Physical Exam     Initial Vitals [23 1738]   BP Pulse Resp Temp SpO2   (!) 144/90 (!) 130 18 97.6 °F (36.4 °C) 97 %      MAP       --         Physical Exam    Nursing note and vitals reviewed.  Constitutional: She is not diaphoretic. No distress.   HENT:   Head: Normocephalic and atraumatic.   Dry mucous membranes    Eyes: Conjunctivae and EOM are normal. Pupils are equal, round, and reactive to light.   Dilated pupils bilaterally   Neck: Neck supple. No JVD present.   Normal range of motion.  Cardiovascular:  Regular rhythm, normal heart sounds and intact distal pulses.   Tachycardia present.   Exam reveals no gallop and no friction rub.       No murmur heard.  Pulmonary/Chest: She has no wheezes. She has no rhonchi. She has rales.   Faint crackles bilateral bases    Abdominal: Abdomen is soft. Bowel sounds are normal. There is no abdominal tenderness.  There is no rebound and no guarding.   Musculoskeletal:         General: No edema. Normal range of motion.      Cervical back: Normal range of motion and neck supple.     Neurological: She is alert. No cranial nerve deficit or sensory deficit. GCS eye subscore is 4. GCS verbal subscore is 5. GCS motor subscore is 6.   Oriented to self. Encephalopathic. Trashing around on the bed.   Non compliant with neuro exam however no focal deficits. Moving all extremities. No clonus.    Skin: Skin is warm and dry. Capillary refill takes less than 2 seconds.       ED Course   Intubation    Date/Time: 5/23/2023 10:49 PM  Location procedure was performed: [facility] EMERGENCY DEPARTMENT  Performed by: Oswaldo Hernandez MD  Authorized by: Oswaldo Hernandez MD   Pre-operative diagnosis: takutsubo cardiomyopathy  Post-operative diagnosis: takutsubo cardiomyopathy  Consent Done: Emergent Situation  Indications: respiratory distress, respiratory failure and hypoxemia  Intubation method: video-assisted  Patient status: paralyzed (RSI)  Preoxygenation: bag valve mask, nasal cannula and nonrebreather mask  Pretreatment meds: lorazepam.  Sedatives: etomidate  Paralytic: succinylcholine  Laryngoscope size: Glide 3  Tube size: 7.0 mm  Tube type: cuffed  Number of attempts: 1  Cords visualized: yes  Post-procedure assessment: chest rise, ETCO2 monitor and CO2 detector  Breath sounds: rales/crackles  Cuff inflated: yes  ETT to lip: 25 cm  ETT to teeth: 23 cm  Tube secured with: ETT crane  Chest x-ray interpreted by other physician.  Patient tolerance: Patient tolerated the procedure well with no immediate complications  Complications: No  Estimated blood loss (mL): 0  Specimens: No  Implants: No      Labs Reviewed   CBC W/ AUTO DIFFERENTIAL - Abnormal; Notable for the following components:       Result Value    WBC 15.25 (*)     Gran # (ANC) 13.4 (*)     Immature Grans (Abs) 0.07 (*)     Lymph # 0.9 (*)     Gran % 87.7 (*)     Lymph % 5.8 (*)      All other components within normal limits   COMPREHENSIVE METABOLIC PANEL - Abnormal; Notable for the following components:    Sodium 147 (*)     CO2 21 (*)     Glucose 148 (*)     BUN 31 (*)     Total Protein 8.8 (*)     Total Bilirubin 1.7 (*)     Anion Gap 19 (*)     All other components within normal limits   B-TYPE NATRIURETIC PEPTIDE - Abnormal; Notable for the following components:    BNP 3,970 (*)     All other components within normal limits   LACTIC ACID, PLASMA - Abnormal; Notable for the following components:    Lactate (Lactic Acid) 2.7 (*)     All other components within normal limits   TROPONIN I - Abnormal; Notable for the following components:    Troponin I 2.123 (*)     All other components within normal limits   TSH - Abnormal; Notable for the following components:    TSH 0.102 (*)     All other components within normal limits   SALICYLATE LEVEL - Abnormal; Notable for the following components:    Salicylate Lvl <5.0 (*)     All other components within normal limits   ACETAMINOPHEN LEVEL - Abnormal; Notable for the following components:    Acetaminophen (Tylenol), Serum <3.0 (*)     All other components within normal limits   CK - Abnormal; Notable for the following components:     (*)     All other components within normal limits   D DIMER, QUANTITATIVE - Abnormal; Notable for the following components:    D-Dimer 1.99 (*)     All other components within normal limits   ISTAT PROCEDURE - Abnormal; Notable for the following components:    POC PH 7.283 (*)     POC PCO2 52.9 (*)     POC PO2 28 (*)     POC SATURATED O2 45 (*)     All other components within normal limits   ISTAT LACTATE - Abnormal; Notable for the following components:    POC Lactate 2.33 (*)     All other components within normal limits   CULTURE, BLOOD   CULTURE, BLOOD   LIPASE   MAGNESIUM   ALCOHOL,MEDICAL (ETHANOL)   T4, FREE   DRUG SCREEN PANEL, URINE EMERGENCY   URINALYSIS, REFLEX TO URINE CULTURE   APTT   PROTIME-INR      EKG Readings: (Independently Interpreted)   Initial Reading: No STEMI. Previous EKG: Compared with most recent EKG Previous EKG Date: 4/10/22. Rhythm: Normal Sinus Rhythm. Heart Rate: 152.     Imaging Results              X-Ray Chest AP Portable (Final result)  Result time 05/23/23 20:47:37      Final result by Steph Walden MD (05/23/23 20:47:37)                   Impression:      Acute interstitial opacities and hilar vascular prominence suggesting CHF.  Correlate for the possibility of pneumonitis.      Electronically signed by: Steph Walden  Date:    05/23/2023  Time:    20:47               Narrative:    EXAMINATION:  XR CHEST AP PORTABLE    CLINICAL HISTORY:  Tachycardia, unspecified    TECHNIQUE:  Single frontal view of the chest was performed.    COMPARISON:  04/10/2022 chest x-ray    FINDINGS:  Cardiac silhouette is not enlarged.  Acute bilateral perihilar interstitial prominence is and vascular congestive changes suggests CHF.  There is no consolidation pleural effusion or evidence of pneumothorax.  Imaged osseous structures appear stable.                                    X-Rays:   Independently Interpreted Readings:   Chest X-Ray: Cardiomegaly present.  Increased vascular markings consistent with CHF are present. No pneumothorax. Pulmonary edema   Medications   heparin 25,000 units in dextrose 5% (100 units/ml) IV bolus from bag INITIAL BOLUS (max bolus 4000 units) (has no administration in time range)   heparin 25,000 units in dextrose 5% 250 mL (100 units/mL) infusion LOW INTENSITY nomogram - [facility] (has no administration in time range)   heparin 25,000 units in dextrose 5% (100 units/ml) IV bolus from bag - ADDITIONAL PRN BOLUS - 60 units/kg (max bolus 4000 units) (has no administration in time range)   heparin 25,000 units in dextrose 5% (100 units/ml) IV bolus from bag - ADDITIONAL PRN BOLUS - 30 units/kg (max bolus 4000 units) (has no administration in time range)   vancomycin 1,250 mg in  dextrose 5 % (D5W) 250 mL IVPB (Vial-Mate) (has no administration in time range)   LORazepam injection 2 mg (has no administration in time range)   sodium chloride 0.9% bolus 1,000 mL 1,000 mL (0 mLs Intravenous Stopped 5/23/23 2149)   ondansetron injection 4 mg (4 mg Intravenous Given 5/23/23 2027)   LORazepam injection 1 mg (1 mg Intravenous Given 5/23/23 2026)   furosemide injection 80 mg (80 mg Intravenous Given 5/23/23 2154)   ceFEPIme (MAXIPIME) 2 g in dextrose 5 % in water (D5W) 5 % 50 mL IVPB (MB+) (2 g Intravenous New Bag 5/23/23 2159)     Medical Decision Making:   History:   I obtained history from: someone other than patient.       <> Summary of History: Family provides most of history as noted in HPI  Old Medical Records: I decided to obtain old medical records.  Initial Assessment:   Emergent evaluation of a 58 y.o. female presenting to the emergency department complaining of AMS.  History provided by the patient's son.  He reports that she is been feeling unwell for the last couple of days.  She has been less talkative and has not been answering questions appropriately.  He did noticed vomit on her floor.  She has been complaining of body pain.  Patient is afebrile, tachycardic, and non toxic appearing.   Will order labs, imaging, EKG, continue to monitor.  Differential Diagnosis:   Differential diagnosis includes but isn't limited to polypharmacy, anticholinergic syndrome, opioid withdrawal, sepsis, bacteremia.   Independently Interpreted Test(s):   I have ordered and independently interpreted X-rays - see prior notes.  I have ordered and independently interpreted EKG Reading(s) - see prior notes  Clinical Tests:   Lab Tests: Ordered and Reviewed  Radiological Study: Ordered and Reviewed  Medical Tests: Ordered and Reviewed  ED Management:  Patient presenting for altered mental status.  History provided by the son.  Patient is oriented to person, however can not meaningfully answer any other  questions.  She is grunting and moaning and thrashing about on the exam table.  Her son reports that she has been feeling unwell for the last 2 days.  She is not been eating or drinking or taking her medications.  He noticed that she vomited on the floor.  She has seemed more confused.  She has not been answering questions appropriately.  She is tachycardic to 130s upon arrival.  She appears dehydrated with dry mucous membranes.  She has no lower extremity edema.  She does have faint crackles at the bilateral bases.  Will initiate labs, imaging, fluids.    She received about 500 cc of fluids.  She also received Ativan, which briefly calmed her down, however she returned to thrashing about rather quickly.  She is become more tachypneic.  Nursing was unable to obtain a good waveform on pulse oximetry, but her oxygen saturation did drop to the low 90s/upper 80s.  She was placed on supplemental oxygen.  Her fluids were discontinued.  At this time, her chest x-ray was done.  It reveals interstitial opacities bilaterally.  She also has a leukocytosis of 15.  Her son does report that she vomited today.  She may have aspirated.  Will start on broad-spectrum antibiotics.    Her D-dimer is elevated to 1.99.  Her BNP is elevated to 3970.  Her troponin is 2.123. CTA PE study ordered and pending.   Will start on heparin drip. Will start on bipap. Will consult cardiology and MICU.     Patient is becoming increasingly more agitated. She is becoming combative and attempting to remove bipap. When she removes it, her O2 drops to 70s. Will administer more ativan.     Due to shift change, will sign out to night team who will continue to monitor until final disposition is reached. Discussed with patient and family. All questions answered.    The patient's history, physical exam, and plan of care was discussed with and agreed upon with my supervising physician.           Attending Attestation:         Attending Critical Care:   Critical  Care Times:   Direct Patient Care (initial evaluation, reassessments, and time considering the case)................................................................20 minutes.   Additional History from reviewing old medical records or taking additional history from the family, EMS, PCP, etc.......................10 minutes.   Ordering, Reviewing, and Interpreting Diagnostic Studies...............................................................................................................5 minutes.   Documentation..................................................................................................................................................................................5 minutes.   Consultation with other Physicians. .................................................................................................................................................10 minutes.   Consultation with the patient's family directly relating to the patient's condition, care, and DNR status (when patient unable)......10 minutes.   ==============================================================  Total Critical Care Time - exclusive of procedural time: 60 minutes.  ==============================================================  Critical care was necessary to treat or prevent imminent or life-threatening deterioration of the following conditions: congestive heart failure and respiratory failure.   The following critical care procedures were done by me (see procedure notes): airway management and ultrasound evaluations.   Critical care was time spent personally by me on the following activities: obtaining history from patient or relative, examination of patient, review of old charts, ordering lab, x-rays, and/or EKG, development of treatment plan with patient or relative, ordering and performing treatments and interventions, evaluation of patient's response to treatment, discussions with primary provider,  discussion with consultants, interpretation of cardiac measurements, re-evaluation of patient's conition and ventilator management.   Critical Care Condition: critical       Attending ED Notes:       See ED course for additional HPI, ROS, PE, lab, imaging, consultant discussion, procedure interpretation, and Medical Decision Making.      ED Course as of 05/23/23 2345   Tue May 23, 2023   2100 WBC(!): 15.25 [JM]   2101 Hemoglobin: 16.0 [JM]   2101 Hematocrit: 47.5 [JM]   2101 Platelets: 284 [JM]   2107 Concern for sympathomimetic toxidrome versus anticholinergic toxidrome versus withdrawal.  Patients son provides bulk of history.  He reports the patient began having nausea and myalgias for last several days in his not been able to take her medications.  These include oxycodone, tapentadol, pregabalin, gabapentin.  EKG shows sinus tachycardia without ST elevation myocardial infarction.  Dose of benzodiazepine provided.  D-dimer noted to be elevated.  Will obtain [DS]   2134 Troponin I(!): 2.123 [JM]   2134 BNP(!): 3,970 [JM]   2134 Lactate, Alberto(!): 2.7 [JM]   2134 D-Dimer(!): 1.99 [JM]   2215 Further history obtained.  Patient became progressively hypoxic and tachypneic during her stay.  She developed orthopnea.  Chart review shows the patient has history of takotsubo cardiomyopathy.  I discussed the patient with Cardiology.  I placed her on BiPAP and provided diuresis.  Her heart rate improved from the 170s to 150s.  Bedside ultrasound concerning for severely depressed ejection fraction.  Discussed with cardiology for admission to CCU. [DS]   2343 Progressive encephalopathy, tachycardia, and respiratory distress necessitating intubation and mechanical ventilation. [DS]      ED Course User Index  [DS] Oswaldo Hernandez MD  [JM] Lizzette Guzman PA-C                 Clinical Impression:   Final diagnoses:  [R00.0] Tachycardia  [I21.4] NSTEMI (non-ST elevated myocardial infarction) (Primary)  [J81.0] Acute  pulmonary edema  [J80] ARDS (adult respiratory distress syndrome)        ED Disposition Condition    Admit Stable                Lizzetet Guzman PA-C  05/23/23 2231       Oswaldo Hernandez MD  05/23/23 8999       Oswaldo Hernandez MD  05/23/23 8870

## 2023-05-24 NOTE — PROGRESS NOTES
Pharmacokinetic Initial Assessment: IV Vancomycin    Assessment/Plan:    Initiate intravenous vancomycin with loading dose of 1250 mg once, done in ED, followed by a maintenance dose of vancomycin 1250 mg IV every 24 hours.  Desired empiric serum trough concentration is 15 to 20 mcg/mL.  Draw vancomycin trough level 60 min prior to third dose on 05/25/2023 at 2330.  Pharmacy will continue to follow and monitor vancomycin.      Please contact pharmacy at extension [phone] with any questions regarding this assessment.     Thank you for the consult,   Amilcar Grubbs       Patient brief summary:  Reza Morrow is a 58 y.o. female initiated on antimicrobial therapy with IV Vancomycin for treatment of suspected bacteremia.    Drug Allergies:   Review of patient's allergies indicates:   Allergen Reactions    Adhesive Hives    Neosporin [neomycin-bacitracin-polymyxin] Hives    Neomycin-polymyxin Hives    Penicillins Other (See Comments)     Unknown  reaction    Latex Rash       Actual Body Weight:   59 kg    Renal Function:   Estimated Creatinine Clearance: 62.1 mL/min (based on SCr of 0.9 mg/dL).    CBC (last 72 hours):  Recent Labs   Lab Result Units 05/23/23 2015   WBC K/uL 15.25*   Hemoglobin g/dL 16.0   Hematocrit % 47.5   Platelets K/uL 284   Gran % % 87.7*   Lymph % % 5.8*   Mono % % 5.9   Eosinophil % % 0.0   Basophil % % 0.1   Differential Method  Automated       Metabolic Panel (last 72 hours):  Recent Labs   Lab Result Units 05/23/23 2015 05/23/23  2246   Sodium mmol/L 147*  --    Potassium mmol/L 3.8  --    Chloride mmol/L 107  --    CO2 mmol/L 21*  --    Glucose mg/dL 148*  --    Glucose, UA   --  Negative   BUN mg/dL 31*  --    Creatinine mg/dL 0.9  --    Creatinine, Urine mg/dL  --  111.0   Albumin g/dL 4.1  --    Total Bilirubin mg/dL 1.7*  --    Alkaline Phosphatase U/L 88  --    AST U/L 34  --    ALT U/L 18  --    Magnesium mg/dL 1.9  --        Drug levels (last 3 results):  No results for input(s):  VANCOMYCINRA, VANCORANDOM, VANCOMYCINPE, VANCOPEAK, VANCOMYCINTR, VANCOTROUGH in the last 72 hours.    Microbiologic Results:  Microbiology Results (last 7 days)       Procedure Component Value Units Date/Time    Urine culture [[id number]] Collected: 05/23/23 2246    Order Status: No result Specimen: Urine Updated: 05/24/23 0006    Blood culture #1 **CANNOT BE ORDERED STAT** [[id number]] Collected: 05/23/23 2016    Order Status: Sent Specimen: Blood from Peripheral, Forearm, Right Updated: 05/23/23 2052    Blood culture #2 **CANNOT BE ORDERED STAT** [[id number]] Collected: 05/23/23 2016    Order Status: Sent Specimen: Blood from Peripheral, Antecubital, Right Updated: 05/23/23 2052

## 2023-05-24 NOTE — ASSESSMENT & PLAN NOTE
Dx 2015. Prior to intubation patient reported she does not follow with neuro but is on MS medications-- though not listed on active meds  Neurology consult outpatient

## 2023-05-24 NOTE — SUBJECTIVE & OBJECTIVE
Past Medical History:   Diagnosis Date    Behavioral problem     Bulging lumbar disc     Bursitis     bilateral hip    Degenerative cervical disc     Hx of psychiatric care     Hypercholesteremia     Labral tear of hip, degenerative bilateral    MS (multiple sclerosis)     Paresthesia of both lower extremities 3/13/2022    Pneumonia     Spinal cord compression        Past Surgical History:   Procedure Laterality Date    ANGIOGRAM, CORONARY, WITH LEFT HEART CATHETERIZATION Left 3/11/2022    Procedure: Angiogram, Coronary, with Left Heart Cath;  Surgeon: Elie Matamoros MD;  Location: [facility] CATH LAB;  Service: Cardiology;  Laterality: Left;    BREAST SURGERY      augmentation     SECTION, CLASSIC      HAND SURGERY      HYSTEROSCOPY      NECK SURGERY      cervical disc removeal    TUBAL LIGATION      X-STOP IMPLANTATION         Review of patient's allergies indicates:   Allergen Reactions    Adhesive Hives    Neosporin [neomycin-bacitracin-polymyxin] Hives    Neomycin-polymyxin Hives    Penicillins Other (See Comments)     Unknown  reaction    Latex Rash       No current facility-administered medications on file prior to encounter.     Current Outpatient Medications on File Prior to Encounter   Medication Sig    ergocalciferol (ERGOCALCIFEROL) 50,000 unit Cap Take 50,000 Units by mouth every 7 days.    furosemide (LASIX) 40 MG tablet WEIGH YOURSELF DAILY AND TAKE 1 TABLET IF WEIGHT INCREASE BY 3 POUNDS IN 24 HOURS OR IF WEIGHT INCREASE BY 5 POUNDS IN ONE WEEK    lisinopriL (PRINIVIL,ZESTRIL) 2.5 MG tablet TAKE 1 TABLET BY MOUTH ONCE DAILY    LORazepam (ATIVAN) 2 MG Tab Take 1 tablet (2 mg total) by mouth 2 (two) times daily.    metoprolol succinate (TOPROL-XL) 25 MG 24 hr tablet Take 1 tablet (25 mg total) by mouth once daily.    naproxen sodium (ALEVE ORAL) Take 1 tablet by mouth daily as needed (Pain).    omeprazole (PRILOSEC) 20 MG capsule Take 20 mg by mouth daily as needed (Acid reflux).    rosuvastatin  (CRESTOR) 20 MG tablet Take 1 tablet (20 mg total) by mouth once daily.    VOLTAREN 1 % Gel Apply topically once daily.    [DISCONTINUED] atorvastatin (LIPITOR) 20 MG tablet Take 20 mg by mouth once daily.    [DISCONTINUED] EScitalopram oxalate (LEXAPRO) 10 MG tablet Take 10 mg by mouth once daily.    [DISCONTINUED] pregabalin (LYRICA) 150 MG capsule Take 150 mg by mouth 2 (two) times daily.     Family History       Problem Relation (Age of Onset)    Bipolar disorder Brother    COPD Mother    Cancer Father    Diabetes Father, Sister    Drug abuse Mother    Heart disease Maternal Uncle    Hypothyroidism Maternal Grandmother    Lung cancer Father          Tobacco Use    Smoking status: Former     Types: Cigarettes     Quit date: 2013     Years since quittin.8    Smokeless tobacco: Never   Substance and Sexual Activity    Alcohol use: No    Drug use: Yes     Types: Benzodiazepines, Marijuana    Sexual activity: Never     Review of Systems   Unable to perform ROS: Intubated   Objective:     Vital Signs (Most Recent):  Temp: 97.6 °F (36.4 °C) (23 1738)  Pulse: (!) 137 (23 0100)  Resp: 20 (23 0100)  BP: 127/85 (23 0100)  SpO2: 99 % (23 0100) Vital Signs (24h Range):  Temp:  [97.6 °F (36.4 °C)] 97.6 °F (36.4 °C)  Pulse:  [130-168] 137  Resp:  [18-35] 20  SpO2:  [85 %-100 %] 99 %  BP: (117-144)/() 127/85     Weight: 59 kg (130 lb)  Body mass index is 21.63 kg/m².    SpO2: 99 %       No intake or output data in the 24 hours ending 23 0205    Lines/Drains/Airways       Drain  Duration                  NG/OG Tube 23 230 orogastric Center mouth <1 day         Urethral Catheter 23 Temperature probe 16 Fr. <1 day              Airway  Duration                  Airway - Non-Surgical 23 230 Endotracheal Tube <1 day              Peripheral Intravenous Line  Duration                  Peripheral IV - Single Lumen 23 2219 20 G Left Antecubital <1 day          Peripheral IV - Single Lumen 05/23/23 2219 20 G Right Antecubital <1 day                     Physical Exam  Vitals reviewed.   Constitutional:       Appearance: She is ill-appearing.   HENT:      Head: Normocephalic and atraumatic.   Eyes:      General: No scleral icterus.     Conjunctiva/sclera: Conjunctivae normal.   Cardiovascular:      Rate and Rhythm: Normal rate and regular rhythm.   Pulmonary:      Comments: Intubated and mechanically ventialted  Abdominal:      General: There is no distension.   Musculoskeletal:         General: No swelling.      Right lower leg: No edema.      Left lower leg: No edema.   Skin:     General: Skin is warm and dry.   Neurological:      Mental Status: She is alert.      Comments: Sedated   Psychiatric:         Mood and Affect: Mood normal.        Significant Labs: All pertinent lab results from the last 24 hours have been reviewed.    Significant Imaging: Echocardiogram: Transthoracic echo (TTE) complete (Cupid Only):   Results for orders placed or performed during the hospital encounter of 03/11/22   Echo   Result Value Ref Range    Ascending aorta 2.65 cm    STJ 2.59 cm    AV mean gradient 3 mmHg    Ao peak neri 0.95 m/s    Ao VTI 13.02 cm    IVS 0.67 0.6 - 1.1 cm    LA size 2.98 cm    Left Atrium Major Axis 5.10 cm    Left Atrium Minor Axis 4.30 cm    LVIDd 5.11 3.5 - 6.0 cm    LVIDs 4.44 (A) 2.1 - 4.0 cm    LVOT diameter 1.99 cm    LVOT peak VTI 11.99 cm    Posterior Wall 0.75 0.6 - 1.1 cm    RA Major Axis 3.60 cm    RA Width 3.09 cm    RVDD 3.05 cm    Sinus 2.40 cm    TAPSE 1.97 cm    TDI LATERAL 0.06 m/s    TDI SEPTAL 0.06 m/s    LA WIDTH 3.49 cm    LV Diastolic Volume 124.47 mL    LV Systolic Volume 89.75 mL    LVOT peak neri 0.96 m/s    FS 13 %    LA volume 41.25 cm3    LV mass 121.25 g    Left Ventricle Relative Wall Thickness 0.29 cm    AV valve area 2.86 cm2    AV Velocity Ratio 1.01     AV index (prosthetic) 0.92     Mean e' 0.06 m/s    LVOT area 3.1 cm2    LVOT  stroke volume 37.27 cm3    AV peak gradient 4 mmHg    LV Systolic Volume Index 52.2 mL/m2    LV Diastolic Volume Index 72.37 mL/m2    LA Volume Index 24.0 mL/m2    LV Mass Index 70 g/m2    BSA 1.72 m2    Right Atrial Pressure (from IVC) 3 mmHg    QEF 29 %    EF 25 %    Narrative    · The left ventricle is severely enlarged with severely decreased systolic   function.  · The estimated ejection fraction is 25%.  · Left ventricular diastolic dysfunction.  · The quantitatively derived ejection fraction is 29%.  · Normal right ventricular size with normal right ventricular systolic   function.  · Normal central venous pressure (3 mmHg).  · There are segmental left ventricular wall motion abnormalities.  · Non-coronary wall motion abnormalities consistent with possible   diagnosis of atypical mid-ventricular takotsubo cardiomyopathy.

## 2023-05-24 NOTE — PLAN OF CARE
[facility] - Cardiac Int[facility] Care  Initial Discharge Assessment       Primary Care Provider: Kip Jimenez MD    Admission Diagnosis: ARDS (adult respiratory distress syndrome) [J80]  Acute pulmonary edema [J81.0]  Tachycardia [R00.0]  Takotsubo cardiomyopathy [I51.81]  NSTEMI (non-ST elevated myocardial infarction) [I21.4]  Endotracheally intubated [Z97.8]  Intubation of airway performed without difficulty [Z78.9]  Decompensated heart failure [I50.9]    Admission Date: 5/23/2023  Expected Discharge Date: 5/29/2023    Transition of Care Barriers: None    Payor: MEDICAID / Plan: [place] [employer] / Product Type: Managed Medicaid /     Extended Emergency Contact Information  Primary Emergency Contact: Jono Ceballos   [place]  Mobile Phone: [phone]  Relation: Son   needed? No  Secondary Emergency Contact: Celeste Olivarez   [facility]  Home Phone: [phone]  Relation: Sister    Discharge Plan A: Home  Discharge Plan B: Long-term acute care facility (LTAC)      [facility]  [address]  [facility] [address]  Phone: [phone] Fax: [phone]    [facility] [address] [facility] [place], [place] - [address] AT [facility] [address]  [address]  [address]  [facility] [address]  Phone: [phone] Fax: [phone]    [id number] - [place] - [address] AT [facility] [address]  [facility] [place] [address]  Phone: [phone] Fax: [phone]      Initial Assessment (most recent)       Adult Discharge Assessment - 05/24/23 1539          Discharge Assessment    Assessment Type Discharge Planning Assessment     Confirmed/corrected address, phone number and insurance Yes     Confirmed Demographics Correct on Facesheet     Source of Information family     If unable to respond/provide information was family/caregiver contacted? Yes     Contact Name/Number Jono Ceballos  (son) [phone]     Communicated JOANNE with patient/caregiver Date not available/Unable to determine     Reason For Admission Acute hypoxemic respiratory failure     People in Home alone     Facility Arrived From: home     Do you expect to return to your current living situation? Yes     Do you have help at home or someone to help you manage your care at home? Yes     Who are your caregiver(s) and their phone number(s)? Jono Ceballos (son) [phone]     Prior to hospitilization cognitive status: Unable to Assess     Current cognitive status: Coma/Sedated/Intubated     Home Layout Able to live on 1st floor     Readmission within 30 days? No     Patient currently being followed by outpatient case management? No     Do you currently have service(s) that help you manage your care at home? No     Do you take prescription medications? Yes     Do you have prescription coverage? Yes     Do you have any problems affording any of your prescribed medications? No     Is the patient taking medications as prescribed? yes     Who is going to help you get home at discharge? Jono Ceballos (son) [phone]     How do you get to doctors appointments? car, drives self     Are you on dialysis? No     Do you take coumadin? No     Discharge Plan A Home     Discharge Plan B Long-term acute care facility (LTAC)     DME Needed Upon Discharge  none     Discharge Plan discussed with: Adult children     Transition of Care Barriers None        Physical Activity    On average, how many days per week do you engage in moderate to strenuous exercise (like a brisk walk)? --   pt intubated    On average, how many minutes do you engage in exercise at this level? --   pt intubated       Financial Resource Strain    How hard is it for you to pay for the very basics like food, housing, medical care, and heating? --   pt intubated       Housing Stability    In the last 12 months, was there a time when you were not able to pay the mortgage or rent  on time? --   pt intubated    In the last 12 months, was there a time when you did not have a steady place to sleep or slept in a shelter (including now)? --   pt intubated       Transportation Needs    In the past 12 months, has lack of transportation kept you from medical appointments or from getting medications? --   pt intubated    In the past 12 months, has lack of transportation kept you from meetings, work, or from getting things needed for daily living? --   pt intubated       Food Insecurity    Within the past 12 months, you worried that your food would run out before you got the money to buy more. --   pt intubated    Within the past 12 months, the food you bought just didn't last and you didn't have money to get more. --   pt intubated       Stress    Do you feel stress - tense, restless, nervous, or anxious, or unable to sleep at night because your mind is troubled all the time - these days? --   pt intubated       Social Connections    In a typical week, how many times do you talk on the phone with family, friends, or neighbors? --   pt intubated    How often do you get together with friends or relatives? --   pt intubated    How often do you attend Restorationist or Christian services? --   pt intubated    Do you belong to any clubs or organizations such as Restorationist groups, unions, fraternal or athletic groups, or school groups? --   pt intubated    How often do you attend meetings of the clubs or organizations you belong to? --   pt intubated    Are you , , , , never , or living with a partner? --   pt intubated       Alcohol Use    Q1: How often do you have a drink containing alcohol? --   pt intubated    Q2: How many drinks containing alcohol do you have on a typical day when you are drinking? --   pt intubated    Q3: How often do you have six or more drinks on one occasion? --   pt intubated                CHEN met with pt's son Jono at bedside to discuss discharge  planning.  Pt lives alone in a one-story house and was independent with ambulation and ADLs.  Pt will have transportation and assistance from family at discharge.  Discharge disposition pending medical clearance.  SW name and ext on whiteboard; discharge planning booklet provided.  Will continue to follow.      Sayra Chaudhry LMSW  [facility]  [id number]

## 2023-05-24 NOTE — ED TRIAGE NOTES
"Reza Morrow, a 58 y.o. female presents to the ED w/ complaint of body pain. Arrived via EMS from home with "body" pain all over. LSN Sunday per son. Hx os MS    Adult Physical Assessment  LOC: Reza Morrow, 58 y.o. female verified via two identifiers.  The patient is awake, alert  APPEARANCE: Patient is restless at this time. Patient is clean and well groomed, patient's clothing is properly fastened.  SKIN:The skin is warm and dry, color consistent with ethnicity, patient has normal skin turgor and moist mucus membranes, skin intact, no breakdown or brusing noted.  MUSCULOSKELETAL: Patient moving all extremities well, no obvious swelling or deformities noted.  RESPIRATORY: Airway is open and patent, respirations are spontaneous, patient has a normal effort and rate, no accessory muscle use noted.  CARDIAC: Patient has a normal rate and rhythm, no periphreal edema noted in any extremity, capillary refill < 3 seconds in all extremities  ABDOMEN: Soft and non tender to palpation, no abdominal distention noted. Bowel sounds present in all four quadrants.  NEUROLOGIC: Eyes open spontaneously, follows commands, facial expression symmetrical, bilateral hand grasp equal and even, purposeful motor response noted, normal sensation in all extremities when touched with a finger.      Triage note:  Chief Complaint   Patient presents with    Body Pain     Arrived via ems from home with c/o all over body pain, GCS 14 for unknown amount of time, LSN Sunday per son, hx of MS     Review of patient's allergies indicates:   Allergen Reactions    Adhesive Hives    Neosporin [neomycin-bacitracin-polymyxin] Hives    Neomycin-polymyxin Hives    Penicillins Other (See Comments)     Unknown  reaction    Latex Rash     Past Medical History:   Diagnosis Date    Behavioral problem     Bulging lumbar disc     Bursitis     bilateral hip    Degenerative cervical disc     Hx of psychiatric care     Hypercholesteremia     Labral tear of hip, " degenerative bilateral    MS (multiple sclerosis)     Paresthesia of both lower extremities 3/13/2022    Pneumonia     Spinal cord compression

## 2023-05-24 NOTE — H&P
[facility] - Cardiac Intensive Care  Cardiology  History and Physical     Patient Name: Reza Morrow  MRN: [id number]  Admission Date: 5/23/2023  Code Status: Prior   Attending Provider: Chely Gilbert MD   Primary Care Physician: Kip Jimenez MD  Principal Problem:Acute hypoxemic respiratory failure    Patient information was obtained from patient, past medical records and ER records.     Subjective:     Chief Complaint:      HPI:  56 year old female with a history of MS, Takotsubo CM, hld, opioid and benzo abuse for whom cardiology was initially consulted on for concern for cardiogenic shock.  She presented to the hospital by her niece and son for AMS and feeling unwell. per son she had an episode of emesis at home this afernoon and was also c/o burning pain in her legs and arms. per family 2 weeks ago she was c/o fever ( unkn duration), and 2 days ago she appeared to have dec po intake.  on arrival to the hospital she was altered. was afebrile with sinus tachycardia with rate 130s, /90 and in respiratory distress requiring bipap. Labs with leukocytosis with wbc 15. renal function and LFTs stable. BNP 3970 , Trop 2.1, and lactate 2.7    cardiology consulted. on my evaluation at bedside she appered to be in significant respiratory distress. She was immediately intubated. Bedside TTE with EF approximately 10-15%, no evidence of LVOT obstruction, no RV dilitation.    RIJ central line placed and hemodynamics obtained and were as follow:  CVP 6, svo2 70 CO 3.2 CI 1.9 SVR 2093 Lactate 1.5    shock call activated with conclusion to cont with medical therapy.    of note patient had a recent hosptialization with discharge on 3/14 where she presented with confusion, weakness for two days . EKG on admission showed 1mm elevation of ST segments in leads 1 and avL, and patient had elevated troponins and BNP.  During bedside echo, patient had complaints of chest pain and was subsequently taken to cath lab where there  was no evidence of coronary artery disease.   Formal echo was completed post procedure which showed reduced EF 25%, and mid-ventricular hypokinesis suggestive of takutsubo cardiomyopathy. placed on GDMT      Past Medical History:   Diagnosis Date    Behavioral problem     Bulging lumbar disc     Bursitis     bilateral hip    Degenerative cervical disc     Hx of psychiatric care     Hypercholesteremia     Labral tear of hip, degenerative bilateral    MS (multiple sclerosis)     Paresthesia of both lower extremities 3/13/2022    Pneumonia     Spinal cord compression        Past Surgical History:   Procedure Laterality Date    ANGIOGRAM, CORONARY, WITH LEFT HEART CATHETERIZATION Left 3/11/2022    Procedure: Angiogram, Coronary, with Left Heart Cath;  Surgeon: Elie Matamoros MD;  Location: [facility] CATH LAB;  Service: Cardiology;  Laterality: Left;    BREAST SURGERY      augmentation     SECTION, CLASSIC      HAND SURGERY      HYSTEROSCOPY      NECK SURGERY      cervical disc removeal    TUBAL LIGATION      X-STOP IMPLANTATION         Review of patient's allergies indicates:   Allergen Reactions    Adhesive Hives    Neosporin [neomycin-bacitracin-polymyxin] Hives    Neomycin-polymyxin Hives    Penicillins Other (See Comments)     Unknown  reaction    Latex Rash       No current facility-administered medications on file prior to encounter.     Current Outpatient Medications on File Prior to Encounter   Medication Sig    ergocalciferol (ERGOCALCIFEROL) 50,000 unit Cap Take 50,000 Units by mouth every 7 days.    furosemide (LASIX) 40 MG tablet WEIGH YOURSELF DAILY AND TAKE 1 TABLET IF WEIGHT INCREASE BY 3 POUNDS IN 24 HOURS OR IF WEIGHT INCREASE BY 5 POUNDS IN ONE WEEK    lisinopriL (PRINIVIL,ZESTRIL) 2.5 MG tablet TAKE 1 TABLET BY MOUTH ONCE DAILY    LORazepam (ATIVAN) 2 MG Tab Take 1 tablet (2 mg total) by mouth 2 (two) times daily.    metoprolol succinate (TOPROL-XL) 25 MG 24 hr tablet  Take 1 tablet (25 mg total) by mouth once daily.    naproxen sodium (ALEVE ORAL) Take 1 tablet by mouth daily as needed (Pain).    omeprazole (PRILOSEC) 20 MG capsule Take 20 mg by mouth daily as needed (Acid reflux).    rosuvastatin (CRESTOR) 20 MG tablet Take 1 tablet (20 mg total) by mouth once daily.    VOLTAREN 1 % Gel Apply topically once daily.    [DISCONTINUED] atorvastatin (LIPITOR) 20 MG tablet Take 20 mg by mouth once daily.    [DISCONTINUED] EScitalopram oxalate (LEXAPRO) 10 MG tablet Take 10 mg by mouth once daily.    [DISCONTINUED] pregabalin (LYRICA) 150 MG capsule Take 150 mg by mouth 2 (two) times daily.     Family History       Problem Relation (Age of Onset)    Bipolar disorder Brother    COPD Mother    Cancer Father    Diabetes Father, Sister    Drug abuse Mother    Heart disease Maternal Uncle    Hypothyroidism Maternal Grandmother    Lung cancer Father          Tobacco Use    Smoking status: Former     Types: Cigarettes     Quit date: 2013     Years since quittin.8    Smokeless tobacco: Never   Substance and Sexual Activity    Alcohol use: No    Drug use: Yes     Types: Benzodiazepines, Marijuana    Sexual activity: Never     Review of Systems   Unable to perform ROS: Intubated   Objective:     Vital Signs (Most Recent):  Temp: 97.6 °F (36.4 °C) (23 1738)  Pulse: (!) 137 (23 0100)  Resp: 20 (23 0100)  BP: 127/85 (23 0100)  SpO2: 99 % (23 0100) Vital Signs (24h Range):  Temp:  [97.6 °F (36.4 °C)] 97.6 °F (36.4 °C)  Pulse:  [130-168] 137  Resp:  [18-35] 20  SpO2:  [85 %-100 %] 99 %  BP: (117-144)/() 127/85     Weight: 59 kg (130 lb)  Body mass index is 21.63 kg/m².    SpO2: 99 %       No intake or output data in the 24 hours ending 23 0205    Lines/Drains/Airways       Drain  Duration                  NG/OG Tube 23 2308 orogastric Center mouth <1 day         Urethral Catheter 23 230 Temperature probe 16 Fr. <1 day               Airway  Duration                  Airway - Non-Surgical 05/23/23 2300 Endotracheal Tube <1 day              Peripheral Intravenous Line  Duration                  Peripheral IV - Single Lumen 05/23/23 2219 20 G Left Antecubital <1 day         Peripheral IV - Single Lumen 05/23/23 2219 20 G Right Antecubital <1 day                     Physical Exam  Vitals reviewed.   Constitutional:       Appearance: She is ill-appearing.   HENT:      Head: Normocephalic and atraumatic.   Eyes:      General: No scleral icterus.     Conjunctiva/sclera: Conjunctivae normal.   Cardiovascular:      Rate and Rhythm: Normal rate and regular rhythm.   Pulmonary:      Comments: Intubated and mechanically ventialted  Abdominal:      General: There is no distension.   Musculoskeletal:         General: No swelling.      Right lower leg: No edema.      Left lower leg: No edema.   Skin:     General: Skin is warm and dry.   Neurological:      Mental Status: She is alert.      Comments: Sedated   Psychiatric:         Mood and Affect: Mood normal.        Significant Labs: All pertinent lab results from the last 24 hours have been reviewed.    Significant Imaging: Echocardiogram: Transthoracic echo (TTE) complete (Cupid Only):   Results for orders placed or performed during the hospital encounter of 03/11/22   Echo   Result Value Ref Range    Ascending aorta 2.65 cm    STJ 2.59 cm    AV mean gradient 3 mmHg    Ao peak neri 0.95 m/s    Ao VTI 13.02 cm    IVS 0.67 0.6 - 1.1 cm    LA size 2.98 cm    Left Atrium Major Axis 5.10 cm    Left Atrium Minor Axis 4.30 cm    LVIDd 5.11 3.5 - 6.0 cm    LVIDs 4.44 (A) 2.1 - 4.0 cm    LVOT diameter 1.99 cm    LVOT peak VTI 11.99 cm    Posterior Wall 0.75 0.6 - 1.1 cm    RA Major Axis 3.60 cm    RA Width 3.09 cm    RVDD 3.05 cm    Sinus 2.40 cm    TAPSE 1.97 cm    TDI LATERAL 0.06 m/s    TDI SEPTAL 0.06 m/s    LA WIDTH 3.49 cm    LV Diastolic Volume 124.47 mL    LV Systolic Volume 89.75 mL    LVOT peak neri 0.96  m/s    FS 13 %    LA volume 41.25 cm3    LV mass 121.25 g    Left Ventricle Relative Wall Thickness 0.29 cm    AV valve area 2.86 cm2    AV Velocity Ratio 1.01     AV index (prosthetic) 0.92     Mean e' 0.06 m/s    LVOT area 3.1 cm2    LVOT stroke volume 37.27 cm3    AV peak gradient 4 mmHg    LV Systolic Volume Index 52.2 mL/m2    LV Diastolic Volume Index 72.37 mL/m2    LA Volume Index 24.0 mL/m2    LV Mass Index 70 g/m2    BSA 1.72 m2    Right Atrial Pressure (from IVC) 3 mmHg    QEF 29 %    EF 25 %    Narrative    · The left ventricle is severely enlarged with severely decreased systolic   function.  · The estimated ejection fraction is 25%.  · Left ventricular diastolic dysfunction.  · The quantitatively derived ejection fraction is 29%.  · Normal right ventricular size with normal right ventricular systolic   function.  · Normal central venous pressure (3 mmHg).  · There are segmental left ventricular wall motion abnormalities.  · Non-coronary wall motion abnormalities consistent with possible   diagnosis of atypical mid-ventricular takotsubo cardiomyopathy.        Assessment and Plan:     * Acute hypoxemic respiratory failure  #stress induced cardiomyopathy  Pt is a 56 y.o female with h/o MS, Takotsubo CM, hld, opioid and benzo abuse presents to the hospital with AMS. On arrival to the hospital she was noted to be hypoxic briefly on bipap and later requiring intubation. BP stable. Afebrile. Labs with leukocytosis with wbc 15. renal function and LFTs stable. BNP 3970 , Trop 2.1, and lactate 2.7.      Bedside TTE with EF approximately 10-15%, no evidence of LVOT obstruction, no RV dilitation.    [facility] central line placed and hemodynamics obtained and were as follow:  CVP 6, svo2 70 CO 3.2 CI 1.9 SVR 2093 Lactate 1.5    shock call activated with conclusion to cont with medical therapy.    Plan:  -- will admit to CCU service  -- formal TTE in AM  -- will cont lasix 40 mg bid  -- monitor I&O closely  -- BSA for  presumed superimposed pna  -- lactate qAM  -- GDMT when stable      MS (multiple sclerosis)  Dx 2015. Prior to intubation patient reported she does not follow with neuro but is on MS medications-- though not listed on active meds  Neurology outpatient        VTE Risk Mitigation (From admission, onward)         Ordered     heparin 25,000 units in dextrose 5% (100 units/ml) IV bolus from bag - ADDITIONAL PRN BOLUS - 60 units/kg (max bolus 4000 units)  As needed (PRN)        Question:  Heparin Infusion Adjustment (DO NOT MODIFY ANSWER)  Answer:  [email or web address]\epic\Images\Pharmacy\HeparinInfusions\heparin LOW INTENSITY nomogram for [facility] [id number]    05/23/23 2135     heparin 25,000 units in dextrose 5% (100 units/ml) IV bolus from bag - ADDITIONAL PRN BOLUS - 30 units/kg (max bolus 4000 units)  As needed (PRN)        Question:  Heparin Infusion Adjustment (DO NOT MODIFY ANSWER)  Answer:  [email or web address]\epic\Images\Pharmacy\HeparinInfusions\heparin LOW INTENSITY nomogram for [facility] [id number].pdf    05/23/23 2135     heparin 25,000 units in dextrose 5% 250 mL (100 units/mL) infusion LOW INTENSITY nomogram - [facility]  Continuous        Question Answer Comment   Heparin Infusion Adjustment (DO NOT MODIFY ANSWER) \\[email or web address]\epic\Images\Pharmacy\HeparinInfusions\heparin LOW INTENSITY nomogram for [facility] [id number]    Begin at (in units/kg/hr) 12        05/23/23 2135                Garo Siu MD  Cardiology   [facility] - Cardiac Intensive Care

## 2023-05-24 NOTE — CARE UPDATE
Hemodynamics trend    On 5/24 at 2AM:  CVP 6   SvO2 70   CO 3.2   CI 1.9   SVR 2093   Lactate 1.5    On 5/24 at 9AM:  CVP 4  SvO2 68  CO 3.04  CI 1.86  SVR 2600  Lactate 1.7    On 5/24 at 6PM:  CVP 5  SvO2 69  CO 3.1  CI 1.9  SVR 2200  Lactate 1.5

## 2023-05-24 NOTE — ASSESSMENT & PLAN NOTE
AMS for 2 days, LKN 5/21.   Elevated AG, suspect 2/2 elevated lactate. UDS + THC.   TSH suppressed, however T4 wnl.   DDx for acute encephalopathy: UTI vs PE vs hypoxia vs MS?    - see AHRF management  - agree with empiric abx vanc/cefepime  - f/u blood and urine cx  - trend lactate to normal  - trend CMP  - f/u CTH, CTA chest

## 2023-05-24 NOTE — PROCEDURES
"Reza Morrow is a 58 y.o. female patient.    Temp: 99.9 °F (37.7 °C) (05/24/23 0427)  Pulse: (!) 143 (05/24/23 0427)  Resp: 20 (05/24/23 0100)  BP: (!) 131/93 (05/24/23 0219)  SpO2: 100 % (05/24/23 0427)  Weight: 59 kg (130 lb) (05/23/23 1738)  Height: 5' 5" (165.1 cm) (05/23/23 1738)       Central Line    Date/Time: 5/24/2023 4:43 AM  Performed by: Garo Siu MD  Authorized by: Garo Siu MD     Location procedure was performed:  [facility] EMERGENCY DEPARTMENT  Consent Done ?:  Emergent Situation  Indications:  Hemodynamic monitoring  Anesthesia:  Local infiltration  Preparation:  Skin prepped with ChloraPrep  Location:  Right internal jugular  Catheter type:  Triple lumen  Catheter size:  7 Fr  Ultrasound guidance: Yes    Vessel Caliber:  Medium  Comprressibility:  Normal  Manometry: Yes    Number of attempts:  1  Securement:  Line sutured, chlorhexidine patch, sterile dressing applied and blood return through all ports  XRay:  Placement verified by x-ray, successful placement and tip termination    5/24/2023    "

## 2023-05-25 LAB
ALBUMIN SERPL BCP-MCNC: 3.3 G/DL (ref 3.5–5.2)
ALLENS TEST: ABNORMAL
ALLENS TEST: NORMAL
ALP SERPL-CCNC: 67 U/L (ref 55–135)
ALT SERPL W/O P-5'-P-CCNC: 14 U/L (ref 10–44)
ANION GAP SERPL CALC-SCNC: 11 MMOL/L (ref 8–16)
AST SERPL-CCNC: 19 U/L (ref 10–40)
BACTERIA UR CULT: NORMAL
BACTERIA UR CULT: NORMAL
BASOPHILS # BLD AUTO: 0.04 K/UL (ref 0–0.2)
BASOPHILS NFR BLD: 0.4 % (ref 0–1.9)
BILIRUB SERPL-MCNC: 1.7 MG/DL (ref 0.1–1)
BUN SERPL-MCNC: 41 MG/DL (ref 6–20)
CALCIUM SERPL-MCNC: 9.2 MG/DL (ref 8.7–10.5)
CHLORIDE SERPL-SCNC: 106 MMOL/L (ref 95–110)
CO2 SERPL-SCNC: 24 MMOL/L (ref 23–29)
CREAT SERPL-MCNC: 1.4 MG/DL (ref 0.5–1.4)
DELSYS: ABNORMAL
DELSYS: NORMAL
DELSYS: NORMAL
DIFFERENTIAL METHOD: ABNORMAL
EOSINOPHIL # BLD AUTO: 0 K/UL (ref 0–0.5)
EOSINOPHIL NFR BLD: 0.1 % (ref 0–8)
ERYTHROCYTE [DISTWIDTH] IN BLOOD BY AUTOMATED COUNT: 14 % (ref 11.5–14.5)
ERYTHROCYTE [SEDIMENTATION RATE] IN BLOOD BY WESTERGREN METHOD: 20 MM/H
EST. GFR  (NO RACE VARIABLE): 43.9 ML/MIN/1.73 M^2
FIO2: 35
FIO2: 50
GLUCOSE SERPL-MCNC: 112 MG/DL (ref 70–110)
HCO3 UR-SCNC: 21.4 MMOL/L (ref 24–28)
HCO3 UR-SCNC: 21.4 MMOL/L (ref 24–28)
HCO3 UR-SCNC: 21.7 MMOL/L (ref 24–28)
HCO3 UR-SCNC: 25.1 MMOL/L (ref 24–28)
HCO3 UR-SCNC: 26.6 MMOL/L (ref 24–28)
HCT VFR BLD AUTO: 43.1 % (ref 37–48.5)
HGB BLD-MCNC: 14.4 G/DL (ref 12–16)
IMM GRANULOCYTES # BLD AUTO: 0.04 K/UL (ref 0–0.04)
IMM GRANULOCYTES NFR BLD AUTO: 0.4 % (ref 0–0.5)
LDH SERPL L TO P-CCNC: 0.94 MMOL/L (ref 0.5–2.2)
LDH SERPL L TO P-CCNC: 0.97 MMOL/L (ref 0.5–2.2)
LDH SERPL L TO P-CCNC: 1.05 MMOL/L (ref 0.5–2.2)
LDH SERPL L TO P-CCNC: 1.29 MMOL/L (ref 0.5–2.2)
LYMPHOCYTES # BLD AUTO: 1.6 K/UL (ref 1–4.8)
LYMPHOCYTES NFR BLD: 17.2 % (ref 18–48)
MAGNESIUM SERPL-MCNC: 2.5 MG/DL (ref 1.6–2.6)
MCH RBC QN AUTO: 29.9 PG (ref 27–31)
MCHC RBC AUTO-ENTMCNC: 33.4 G/DL (ref 32–36)
MCV RBC AUTO: 89 FL (ref 82–98)
MODE: ABNORMAL
MODE: NORMAL
MODE: NORMAL
MONOCYTES # BLD AUTO: 0.8 K/UL (ref 0.3–1)
MONOCYTES NFR BLD: 8.5 % (ref 4–15)
NEUTROPHILS # BLD AUTO: 6.9 K/UL (ref 1.8–7.7)
NEUTROPHILS NFR BLD: 73.4 % (ref 38–73)
NRBC BLD-RTO: 0 /100 WBC
PCO2 BLDA: 29.5 MMHG (ref 35–45)
PCO2 BLDA: 29.8 MMHG (ref 35–45)
PCO2 BLDA: 30.3 MMHG (ref 35–45)
PCO2 BLDA: 34.7 MMHG (ref 35–45)
PCO2 BLDA: 38.2 MMHG (ref 35–45)
PEEP: 5
PH SMN: 7.45 [PH] (ref 7.35–7.45)
PH SMN: 7.46 [PH] (ref 7.35–7.45)
PH SMN: 7.46 [PH] (ref 7.35–7.45)
PH SMN: 7.47 [PH] (ref 7.35–7.45)
PH SMN: 7.47 [PH] (ref 7.35–7.45)
PHOSPHATE SERPL-MCNC: 2.9 MG/DL (ref 2.7–4.5)
PLATELET # BLD AUTO: 203 K/UL (ref 150–450)
PMV BLD AUTO: 11.1 FL (ref 9.2–12.9)
PO2 BLDA: 25 MMHG (ref 40–60)
PO2 BLDA: 34 MMHG (ref 40–60)
PO2 BLDA: 35 MMHG (ref 40–60)
PO2 BLDA: 35 MMHG (ref 40–60)
PO2 BLDA: 46 MMHG (ref 40–60)
POC BE: -2 MMOL/L
POC BE: 1 MMOL/L
POC BE: 3 MMOL/L
POC SATURATED O2: 49 % (ref 95–100)
POC SATURATED O2: 70 % (ref 95–100)
POC SATURATED O2: 71 % (ref 95–100)
POC SATURATED O2: 73 % (ref 95–100)
POC SATURATED O2: 85 % (ref 95–100)
POC TCO2: 22 MMOL/L (ref 24–29)
POC TCO2: 22 MMOL/L (ref 24–29)
POC TCO2: 23 MMOL/L (ref 24–29)
POC TCO2: 26 MMOL/L (ref 24–29)
POC TCO2: 28 MMOL/L (ref 24–29)
POTASSIUM SERPL-SCNC: 3.7 MMOL/L (ref 3.5–5.1)
PROT SERPL-MCNC: 7.7 G/DL (ref 6–8.4)
RBC # BLD AUTO: 4.82 M/UL (ref 4–5.4)
SAMPLE: ABNORMAL
SAMPLE: NORMAL
SITE: ABNORMAL
SITE: NORMAL
SODIUM SERPL-SCNC: 141 MMOL/L (ref 136–145)
VT: 450
WBC # BLD AUTO: 9.46 K/UL (ref 3.9–12.7)

## 2023-05-25 PROCEDURE — C9113 INJ PANTOPRAZOLE SODIUM, VIA: HCPCS | Performed by: INTERNAL MEDICINE

## 2023-05-25 PROCEDURE — 84100 ASSAY OF PHOSPHORUS: CPT

## 2023-05-25 PROCEDURE — 25000003 PHARM REV CODE 250: Performed by: INTERNAL MEDICINE

## 2023-05-25 PROCEDURE — 99232 SBSQ HOSP IP/OBS MODERATE 35: CPT | Mod: ,,, | Performed by: INTERNAL MEDICINE

## 2023-05-25 PROCEDURE — 80053 COMPREHEN METABOLIC PANEL: CPT

## 2023-05-25 PROCEDURE — 25000003 PHARM REV CODE 250

## 2023-05-25 PROCEDURE — 25000003 PHARM REV CODE 250: Performed by: STUDENT IN AN ORGANIZED HEALTH CARE EDUCATION/TRAINING PROGRAM

## 2023-05-25 PROCEDURE — 99900026 HC AIRWAY MAINTENANCE (STAT)

## 2023-05-25 PROCEDURE — S0030 INJECTION, METRONIDAZOLE: HCPCS | Performed by: INTERNAL MEDICINE

## 2023-05-25 PROCEDURE — 63600175 PHARM REV CODE 636 W HCPCS: Performed by: STUDENT IN AN ORGANIZED HEALTH CARE EDUCATION/TRAINING PROGRAM

## 2023-05-25 PROCEDURE — 99232 PR SUBSEQUENT HOSPITAL CARE,LEVL II: ICD-10-PCS | Mod: ,,, | Performed by: INTERNAL MEDICINE

## 2023-05-25 PROCEDURE — 27000221 HC OXYGEN, UP TO 24 HOURS

## 2023-05-25 PROCEDURE — 20000000 HC ICU ROOM

## 2023-05-25 PROCEDURE — 63600175 PHARM REV CODE 636 W HCPCS: Performed by: EMERGENCY MEDICINE

## 2023-05-25 PROCEDURE — 99900035 HC TECH TIME PER 15 MIN (STAT)

## 2023-05-25 PROCEDURE — 63600175 PHARM REV CODE 636 W HCPCS: Performed by: INTERNAL MEDICINE

## 2023-05-25 PROCEDURE — 85025 COMPLETE CBC W/AUTO DIFF WBC: CPT

## 2023-05-25 PROCEDURE — 94761 N-INVAS EAR/PLS OXIMETRY MLT: CPT

## 2023-05-25 PROCEDURE — 83735 ASSAY OF MAGNESIUM: CPT

## 2023-05-25 PROCEDURE — 83605 ASSAY OF LACTIC ACID: CPT

## 2023-05-25 PROCEDURE — 82803 BLOOD GASES ANY COMBINATION: CPT

## 2023-05-25 PROCEDURE — 27200966 HC CLOSED SUCTION SYSTEM

## 2023-05-25 PROCEDURE — 25000003 PHARM REV CODE 250: Performed by: EMERGENCY MEDICINE

## 2023-05-25 PROCEDURE — 94003 VENT MGMT INPAT SUBQ DAY: CPT

## 2023-05-25 RX ORDER — POTASSIUM CHLORIDE 29.8 MG/ML
40 INJECTION INTRAVENOUS ONCE
Status: COMPLETED | OUTPATIENT
Start: 2023-05-25 | End: 2023-05-25

## 2023-05-25 RX ORDER — METRONIDAZOLE 500 MG/100ML
500 INJECTION, SOLUTION INTRAVENOUS
Status: DISCONTINUED | OUTPATIENT
Start: 2023-05-25 | End: 2023-05-26

## 2023-05-25 RX ORDER — DOBUTAMINE HYDROCHLORIDE 400 MG/100ML
2.5 INJECTION INTRAVENOUS CONTINUOUS
Status: DISCONTINUED | OUTPATIENT
Start: 2023-05-25 | End: 2023-05-26

## 2023-05-25 RX ORDER — NOREPINEPHRINE BITARTRATE/D5W 4MG/250ML
PLASTIC BAG, INJECTION (ML) INTRAVENOUS
Status: DISPENSED
Start: 2023-05-25 | End: 2023-05-25

## 2023-05-25 RX ORDER — NOREPINEPHRINE BITARTRATE/D5W 4MG/250ML
0-3 PLASTIC BAG, INJECTION (ML) INTRAVENOUS CONTINUOUS
Status: DISCONTINUED | OUTPATIENT
Start: 2023-05-25 | End: 2023-05-26

## 2023-05-25 RX ADMIN — DEXMEDETOMIDINE HYDROCHLORIDE 1.4 MCG/KG/HR: 4 INJECTION INTRAVENOUS at 08:05

## 2023-05-25 RX ADMIN — METRONIDAZOLE 500 MG: 5 INJECTION, SOLUTION INTRAVENOUS at 06:05

## 2023-05-25 RX ADMIN — CEFEPIME 2 G: 2 INJECTION, POWDER, FOR SOLUTION INTRAVENOUS at 09:05

## 2023-05-25 RX ADMIN — DOBUTAMINE HYDROCHLORIDE 5 MCG/KG/MIN: 400 INJECTION INTRAVENOUS at 02:05

## 2023-05-25 RX ADMIN — ATORVASTATIN CALCIUM 20 MG: 20 TABLET, FILM COATED ORAL at 11:05

## 2023-05-25 RX ADMIN — METRONIDAZOLE 500 MG: 5 INJECTION, SOLUTION INTRAVENOUS at 02:05

## 2023-05-25 RX ADMIN — MUPIROCIN: 20 OINTMENT TOPICAL at 08:05

## 2023-05-25 RX ADMIN — CEFEPIME 2 G: 2 INJECTION, POWDER, FOR SOLUTION INTRAVENOUS at 04:05

## 2023-05-25 RX ADMIN — METRONIDAZOLE 500 MG: 5 INJECTION, SOLUTION INTRAVENOUS at 10:05

## 2023-05-25 RX ADMIN — NOREPINEPHRINE BITARTRATE 0.02 MCG/KG/MIN: 4 INJECTION, SOLUTION INTRAVENOUS at 03:05

## 2023-05-25 RX ADMIN — SODIUM CHLORIDE 250 ML: 0.9 INJECTION, SOLUTION INTRAVENOUS at 03:05

## 2023-05-25 RX ADMIN — DEXMEDETOMIDINE HYDROCHLORIDE 1.4 MCG/KG/HR: 4 INJECTION INTRAVENOUS at 07:05

## 2023-05-25 RX ADMIN — ENOXAPARIN SODIUM 40 MG: 40 INJECTION SUBCUTANEOUS at 04:05

## 2023-05-25 RX ADMIN — PROPOFOL 15 MCG/KG/MIN: 10 INJECTION, EMULSION INTRAVENOUS at 03:05

## 2023-05-25 RX ADMIN — PROPOFOL 40 MCG/KG/MIN: 10 INJECTION, EMULSION INTRAVENOUS at 10:05

## 2023-05-25 RX ADMIN — PANTOPRAZOLE SODIUM 40 MG: 40 INJECTION, POWDER, FOR SOLUTION INTRAVENOUS at 08:05

## 2023-05-25 RX ADMIN — MUPIROCIN: 20 OINTMENT TOPICAL at 09:05

## 2023-05-25 RX ADMIN — POTASSIUM CHLORIDE 40 MEQ: 29.8 INJECTION, SOLUTION INTRAVENOUS at 04:05

## 2023-05-25 RX ADMIN — DEXMEDETOMIDINE HYDROCHLORIDE 1.4 MCG/KG/HR: 4 INJECTION INTRAVENOUS at 02:05

## 2023-05-25 RX ADMIN — SODIUM CHLORIDE 250 ML: 9 INJECTION, SOLUTION INTRAVENOUS at 01:05

## 2023-05-25 RX ADMIN — DEXMEDETOMIDINE HYDROCHLORIDE 1.4 MCG/KG/HR: 4 INJECTION INTRAVENOUS at 04:05

## 2023-05-25 NOTE — PLAN OF CARE
Cardiac ICU Care Plan    POC reviewed with Reza Morrow and family. Questions and concerns addressed. Pt progressing toward goals. Will continue to monitor. See below and flowsheets for full assessment and VS info.       Neuro:  Garrick Coma Scale  Best Eye Response: 2-->(E2) to pain  Best Motor Response: 4-->(M4) withdraws from pain  Best Verbal Response: 1-->(V1) none  Garrick Coma Scale Score: 7  Assessment Qualifiers: patient chemically sedated or paralyzed  Pupil PERRLA: yes    24 hr Temp:  [97.3 °F (36.3 °C)-100.8 °F (38.2 °C)]      CV:  Rhythm: sinus tachycardia   DVT prophylaxis: VTE Required Core Measure: Pharmacological prophylaxis initiated/maintained    CVP (mean): (S) 5 mmHg (05/24/23 1905)                       Pulses  Right Radial Pulse: 1+ (weak)  Left Radial Pulse: 1+ (weak)  Right Dorsalis Pedis Pulse: 1+ (weak)  Left Dorsalis Pedis Pulse: 1+ (weak)  Right Posterior Tibial Pulse: 1+ (weak)  Left Posterior Tibial Pulse: 1+ (weak)    Resp:     Vent Mode: A/C  Set Rate: 20 BPM  Oxygen Concentration (%): 50  Vt Set: 450 mL  PEEP/CPAP: 5 cmH20    GI/:  GI prophylaxis: yes     Last Bowel Movement:  (luz elena)  Voiding Characteristics: urethral catheter (bladder)       Urethral Catheter 05/23/23 2306 Temperature probe 16 Fr.-Reason for Continuing Urinary Catheterization: Critically ill in ICU and requiring hourly monitoring of intake/output   Intake/Output Summary (Last 24 hours) at 5/25/2023 0556  Last data filed at 5/25/2023 0505  Gross per 24 hour   Intake 889.86 ml   Output 1535 ml   Net -645.14 ml        Nutritional Supplement Intake: Quantity none Type: Boost    Labs/Accuchecks:  Recent Labs   Lab 05/23/23 2015 05/24/23  0430 05/25/23  0315   WBC 15.25* 15.79* 9.46   RBC 5.38 5.37 4.82   HGB 16.0 15.7 14.4   HCT 47.5 47.3 43.1    274 203      Recent Labs   Lab 05/23/23 2207   INR 1.0   APTT 24.2      Recent Labs     05/25/23  0315      K 3.7   CO2 24      BUN 41*   CREATININE 1.4    ALKPHOS 67   ALT 14   AST 19   BILITOT 1.7*       Recent Labs   Lab 05/23/23 2015   *   TROPONINI 2.123*      Recent Labs     05/24/23  1936 05/25/23  0218 05/25/23  0438   PH 7.477* 7.452* 7.468*   PCO2 36.0 38.2 34.7*   PO2 33* 25* 35*   HCO3 26.7 26.6 25.1   POCSATURATED 69* 49* 71*   BE 3 3 1       Electrolytes: Electrolytes replaced  Accuchecks: none    Gtts/LDAs:   dexmedeTOMIDine (Precedex) infusion (titrating) 1.4 mcg/kg/hr (05/25/23 0505)    DOBUTamine IV infusion (non-titrating) 5 mcg/kg/min (05/25/23 0505)    NORepinephrine bitartrate-D5W 0.04 mcg/kg/min (05/25/23 0505)    propofoL 20 mcg/kg/min (05/25/23 0505)       Lines/Drains/Airways       Central Venous Catheter Line  Duration             Percutaneous Central Line Insertion/Assessment - Triple Lumen  05/24/23 0539 Internal Jugular Right 1 day              Drain  Duration                  NG/OG Tube 05/23/23 2308 orogastric Center mouth 1 day         Urethral Catheter 05/23/23 2306 Temperature probe 16 Fr. 1 day              Airway  Duration                  Airway - Non-Surgical 05/23/23 2300 Endotracheal Tube 1 day              Peripheral Intravenous Line  Duration                  Peripheral IV - Single Lumen 05/23/23 2219 20 G Left Antecubital 1 day         Peripheral IV - Single Lumen 05/23/23 2219 20 G Right Antecubital 1 day                    Skin/Wounds  Bathing/Skin Care: bath, complete (05/25/23 0305)  Wounds: No  Wound care consulted: No

## 2023-05-25 NOTE — CARE UPDATE
Day & Time CVP SVO2 CO CI SVR   5/24 2AM 6 70 3.2 1.9 2093   5/24 9AM 4 68 3.0 1.86 2600   5/24 6PM 5 69 3.1 1.9 2200   5/25 2AM 6 49 1.9 1.2 2785   5/25 4AM 6 71 3.4 2.1 1584   5/25 8AM 5 85 7.1 4.3 935   5/25 4PM 2 73 3.9 2.4 1867

## 2023-05-25 NOTE — SUBJECTIVE & OBJECTIVE
Interval History: Overnight, pt's hemodynamics worsened somewhat with lower CO/CI, so was started on  and levophed briefly. Levophed was weaned off. SVR dropped to 600s so  was cut in half. Trialed SAT/SBT today however pt still not following commands and apneic on SBT. Will continue trialing today.    Review of Systems   Unable to perform ROS: Intubated   Objective:     Vital Signs (Most Recent):  Temp: 98.2 °F (36.8 °C) (05/25/23 1155)  Pulse: 84 (05/25/23 1155)  Resp: 20 (05/25/23 1155)  BP: (!) 91/54 (05/25/23 1100)  SpO2: 100 % (05/25/23 1155) Vital Signs (24h Range):  Temp:  [96.4 °F (35.8 °C)-100.8 °F (38.2 °C)] 98.2 °F (36.8 °C)  Pulse:  [] 84  Resp:  [9-42] 20  SpO2:  [80 %-100 %] 100 %  BP: ()/(49-88) 91/54     Weight: 59 kg (130 lb)  Body mass index is 21.63 kg/m².     SpO2: 100 %         Intake/Output Summary (Last 24 hours) at 5/25/2023 1236  Last data filed at 5/25/2023 1100  Gross per 24 hour   Intake 978.3 ml   Output 530 ml   Net 448.3 ml       Lines/Drains/Airways       Central Venous Catheter Line  Duration             Percutaneous Central Line Insertion/Assessment - Triple Lumen  05/24/23 0539 Internal Jugular Right 1 day              Drain  Duration                  NG/OG Tube 05/23/23 2308 orogastric Center mouth 1 day         Urethral Catheter 05/23/23 2306 Temperature probe 16 Fr. 1 day              Airway  Duration                  Airway - Non-Surgical 05/23/23 2300 Endotracheal Tube 1 day              Peripheral Intravenous Line  Duration                  Peripheral IV - Single Lumen 05/23/23 2219 20 G Left Antecubital 1 day         Peripheral IV - Single Lumen 05/23/23 2219 20 G Right Antecubital 1 day                       Physical Exam  Vitals reviewed.   Constitutional:       Appearance: She is ill-appearing.   HENT:      Head: Normocephalic and atraumatic.   Eyes:      General: No scleral icterus.     Conjunctiva/sclera: Conjunctivae normal.   Cardiovascular:       Rate and Rhythm: Normal rate and regular rhythm.   Pulmonary:      Comments: Intubated and mechanically ventialted  Abdominal:      General: There is no distension.   Musculoskeletal:         General: No swelling.      Right lower leg: No edema.      Left lower leg: No edema.   Skin:     General: Skin is warm and dry.   Neurological:      Mental Status: She is alert.      Comments: Sedated   Psychiatric:         Mood and Affect: Mood normal.          Significant Labs: All pertinent lab results from the last 24 hours have been reviewed.    Significant Imaging:  Relevant imaging reviewed

## 2023-05-25 NOTE — ASSESSMENT & PLAN NOTE
Patient with chronic pain and significant pain regimen in the form of primarily opioid medications. UDS was negative for opioids however    - Monitor for opioid withdrawal

## 2023-05-25 NOTE — PROGRESS NOTES
[facility]  Cardiology  Progress Note    Patient Name: Reza Morrow  MRN: [id number]  Admission Date: 5/23/2023  Hospital Length of Stay: 1 days  Code Status: Full Code   Attending Physician: Chely Gilbert MD   Primary Care Physician: Kip Jimenez MD  Expected Discharge Date: 5/29/2023  Principal Problem:Acute hypoxemic respiratory failure    Subjective:     Hospital Course:   Patient remained intubated overnight, not requiring any vasopressors, maintaining MAPs on her own of 90s. After IV pushes of Lasix, pt diuresed well, putting out ~2L urine. CT chest showed bilateral diffuse ground glass opacities suggestive of interstitial edema associated with inflammation vs infection. She remains on broad spec antibiotics with vanc and cefepime. Hemodynamics overnight on 5/24 worsened slightly and pt developed hypotension, so dobutamine was started. Trialed SAT/SBT on 5/25 with sedation weaned and patient showing more signs of wakefulness, however pt not following commands and still failing SBT. Will continue to trial SBT and assess.       Interval History: Overnight, pt's hemodynamics worsened somewhat with lower CO/CI, so was started on  and levophed briefly. Levophed was weaned off. SVR dropped to 600s so  was cut in half. Trialed SAT/SBT today however pt still not following commands and apneic on SBT. Will continue trialing today.    Review of Systems   Unable to perform ROS: Intubated   Objective:     Vital Signs (Most Recent):  Temp: 98.2 °F (36.8 °C) (05/25/23 1155)  Pulse: 84 (05/25/23 1155)  Resp: 20 (05/25/23 1155)  BP: (!) 91/54 (05/25/23 1100)  SpO2: 100 % (05/25/23 1155) Vital Signs (24h Range):  Temp:  [96.4 °F (35.8 °C)-100.8 °F (38.2 °C)] 98.2 °F (36.8 °C)  Pulse:  [] 84  Resp:  [9-42] 20  SpO2:  [80 %-100 %] 100 %  BP: ()/(49-88) 91/54     Weight: 59 kg (130 lb)  Body mass index is 21.63 kg/m².     SpO2: 100 %         Intake/Output Summary (Last 24 hours) at  5/25/2023 1236  Last data filed at 5/25/2023 1100  Gross per 24 hour   Intake 978.3 ml   Output 530 ml   Net 448.3 ml       Lines/Drains/Airways       Central Venous Catheter Line  Duration             Percutaneous Central Line Insertion/Assessment - Triple Lumen  05/24/23 0539 Internal Jugular Right 1 day              Drain  Duration                  NG/OG Tube 05/23/23 2308 orogastric Center mouth 1 day         Urethral Catheter 05/23/23 2306 Temperature probe 16 Fr. 1 day              Airway  Duration                  Airway - Non-Surgical 05/23/23 2300 Endotracheal Tube 1 day              Peripheral Intravenous Line  Duration                  Peripheral IV - Single Lumen 05/23/23 2219 20 G Left Antecubital 1 day         Peripheral IV - Single Lumen 05/23/23 2219 20 G Right Antecubital 1 day                       Physical Exam  Vitals reviewed.   Constitutional:       Appearance: She is ill-appearing.   HENT:      Head: Normocephalic and atraumatic.   Eyes:      General: No scleral icterus.     Conjunctiva/sclera: Conjunctivae normal.   Cardiovascular:      Rate and Rhythm: Normal rate and regular rhythm.   Pulmonary:      Comments: Intubated and mechanically ventialted  Abdominal:      General: There is no distension.   Musculoskeletal:         General: No swelling.      Right lower leg: No edema.      Left lower leg: No edema.   Skin:     General: Skin is warm and dry.   Neurological:      Mental Status: She is alert.      Comments: Sedated   Psychiatric:         Mood and Affect: Mood normal.          Significant Labs: All pertinent lab results from the last 24 hours have been reviewed.    Significant Imaging:  Relevant imaging reviewed    Assessment and Plan:       * Acute hypoxemic respiratory failure  Pt is a 56 y.o female with h/o MS, Takotsubo CM, hld, opioid and benzo abuse presents to the hospital with AMS. On arrival to the hospital she was noted to be hypoxic briefly on bipap and later requiring  intubation. BP stable. Afebrile. Labs with leukocytosis with wbc 15. renal function and LFTs stable. BNP 3970 , Trop 2.1, and lactate 2.7.      Bedside TTE with EF approximately 10-15%, no evidence of LVOT obstruction, no RV dilitation.    RIJ central line placed and hemodynamics obtained and were as follow:  CVP 6, svo2 70 CO 3.2 CI 1.9 SVR 2093 Lactate 1.5    shock call activated with conclusion to cont with medical therapy.    Plan:  -- Most recent hemodynamics: CVP 5, SvO2 85, CO 7.1, CI 4.3, SVR 700s   -- Continue to trend hemodynamics  -- Holding lasix in setting of low CVP and contraction alkalosis  -- monitor I&O's closely  -- Broad spec antibiotics with vanc and cefepime for presumed superimposed pna  -- lactate qAM  -- GDMT when stable  -- Daily SAT/SBT for liberation from the vent    MS (multiple sclerosis)  Dx 2015. Prior to intubation patient reported she does not follow with neuro but is on MS medications-- though not listed on active meds  Neurology consult outpatient    Opioid dependence  Patient with chronic pain and significant pain regimen in the form of primarily opioid medications. UDS was negative for opioids however    - Monitor for opioid withdrawal        VTE Risk Mitigation (From admission, onward)         Ordered     enoxaparin injection 40 mg  Every 24 hours         05/24/23 0902                Manuel Meza MD  Cardiology  [facility]

## 2023-05-25 NOTE — ASSESSMENT & PLAN NOTE
Pt is a 56 y.o female with h/o MS, Takotsubo CM, hld, opioid and benzo abuse presents to the hospital with AMS. On arrival to the hospital she was noted to be hypoxic briefly on bipap and later requiring intubation. BP stable. Afebrile. Labs with leukocytosis with wbc 15. renal function and LFTs stable. BNP 3970 , Trop 2.1, and lactate 2.7.      Bedside TTE with EF approximately 10-15%, no evidence of LVOT obstruction, no RV dilitation.    RIJ central line placed and hemodynamics obtained and were as follow:  CVP 6, svo2 70 CO 3.2 CI 1.9 SVR 2093 Lactate 1.5    shock call activated with conclusion to cont with medical therapy.    Plan:  -- Most recent hemodynamics: CVP 5, SvO2 85, CO 7.1, CI 4.3, SVR 700s   -- Continue to trend hemodynamics  -- Holding lasix in setting of low CVP and contraction alkalosis  -- monitor I&O's closely  -- Broad spec antibiotics with vanc and cefepime for presumed superimposed pna  -- lactate qAM  -- GDMT when stable  -- Daily SAT/SBT for liberation from the vent

## 2023-05-25 NOTE — CARE UPDATE
Hemodynamics     CVP: 6  SCVO2: 49  Cardiac Output: 1.9  Cardiac Index: 1.2  SVR: 2785    Started on  5  CVP: 6  SCVO2: 71  Cardiac Output: 3.4  Cardiac Index: 2.1  SVR: 1584      Continuous Infusions:   dexmedeTOMIDine (Precedex) infusion (titrating) 1.4 mcg/kg/hr (05/25/23 0505)    DOBUTamine IV infusion (non-titrating) 5 mcg/kg/min (05/25/23 0505)    NORepinephrine bitartrate-D5W 0.04 mcg/kg/min (05/25/23 0505)    propofoL 20 mcg/kg/min (05/25/23 0505)       Intake/Output Summary (Last 24 hours) at 5/25/2023 0628  Last data filed at 5/25/2023 0505  Gross per 24 hour   Intake 806.69 ml   Output 1285 ml   Net -478.31 ml         Plan: Worsening cardiogenic shock overnight. Started on  5 with improvement. Associated hypotension, now requiring low dose levo. LA WNL and trending down     Case discussed with on call attending.    Rafa Schulteor, MD  Cardiology Fellow, PGY4

## 2023-05-25 NOTE — PROGRESS NOTES
Patients temp 96.6 f. HR 56 /69  (88) Geoffrey hays applied     Attempting SAT again     Patient failed SAT/SBT, became tachypnic and apneic, did not follow commands.  Was moving all extremities on her own but not follow commands of squeezing her hands, blinking, or       Christopher Amelia and Manuel Meza consistantly notified about patients low urinary output. at 1617 ordered a 250 cc of NS over 4 hours

## 2023-05-26 LAB
ALBUMIN SERPL BCP-MCNC: 3.1 G/DL (ref 3.5–5.2)
ALLENS TEST: ABNORMAL
ALP SERPL-CCNC: 64 U/L (ref 55–135)
ALT SERPL W/O P-5'-P-CCNC: 13 U/L (ref 10–44)
ANION GAP SERPL CALC-SCNC: 9 MMOL/L (ref 8–16)
AST SERPL-CCNC: 16 U/L (ref 10–40)
BASOPHILS # BLD AUTO: 0.04 K/UL (ref 0–0.2)
BASOPHILS NFR BLD: 0.7 % (ref 0–1.9)
BILIRUB SERPL-MCNC: 1.1 MG/DL (ref 0.1–1)
BUN SERPL-MCNC: 47 MG/DL (ref 6–20)
CALCIUM SERPL-MCNC: 8.9 MG/DL (ref 8.7–10.5)
CHLORIDE SERPL-SCNC: 112 MMOL/L (ref 95–110)
CO2 SERPL-SCNC: 20 MMOL/L (ref 23–29)
CREAT SERPL-MCNC: 0.9 MG/DL (ref 0.5–1.4)
DELSYS: ABNORMAL
DELSYS: ABNORMAL
DIFFERENTIAL METHOD: ABNORMAL
EOSINOPHIL # BLD AUTO: 0 K/UL (ref 0–0.5)
EOSINOPHIL NFR BLD: 0.5 % (ref 0–8)
ERYTHROCYTE [DISTWIDTH] IN BLOOD BY AUTOMATED COUNT: 13.3 % (ref 11.5–14.5)
ERYTHROCYTE [SEDIMENTATION RATE] IN BLOOD BY WESTERGREN METHOD: 16 MM/H
ERYTHROCYTE [SEDIMENTATION RATE] IN BLOOD BY WESTERGREN METHOD: 16 MM/H
EST. GFR  (NO RACE VARIABLE): >60 ML/MIN/1.73 M^2
FIO2: 21
FIO2: 21
GLUCOSE SERPL-MCNC: 131 MG/DL (ref 70–110)
HCO3 UR-SCNC: 20.6 MMOL/L (ref 24–28)
HCO3 UR-SCNC: 21.8 MMOL/L (ref 24–28)
HCO3 UR-SCNC: 22.7 MMOL/L (ref 24–28)
HCO3 UR-SCNC: 22.9 MMOL/L (ref 24–28)
HCO3 UR-SCNC: 26.3 MMOL/L (ref 24–28)
HCT VFR BLD AUTO: 37.8 % (ref 37–48.5)
HGB BLD-MCNC: 12.8 G/DL (ref 12–16)
IMM GRANULOCYTES # BLD AUTO: 0.02 K/UL (ref 0–0.04)
IMM GRANULOCYTES NFR BLD AUTO: 0.4 % (ref 0–0.5)
LACTATE SERPL-SCNC: 0.8 MMOL/L (ref 0.5–2.2)
LYMPHOCYTES # BLD AUTO: 0.9 K/UL (ref 1–4.8)
LYMPHOCYTES NFR BLD: 16.5 % (ref 18–48)
MAGNESIUM SERPL-MCNC: 2.2 MG/DL (ref 1.6–2.6)
MCH RBC QN AUTO: 29.5 PG (ref 27–31)
MCHC RBC AUTO-ENTMCNC: 33.9 G/DL (ref 32–36)
MCV RBC AUTO: 87 FL (ref 82–98)
MODE: ABNORMAL
MODE: ABNORMAL
MONOCYTES # BLD AUTO: 0.4 K/UL (ref 0.3–1)
MONOCYTES NFR BLD: 6.5 % (ref 4–15)
NEUTROPHILS # BLD AUTO: 4.2 K/UL (ref 1.8–7.7)
NEUTROPHILS NFR BLD: 75.4 % (ref 38–73)
NRBC BLD-RTO: 0 /100 WBC
PCO2 BLDA: 28 MMHG (ref 35–45)
PCO2 BLDA: 28.8 MMHG (ref 35–45)
PCO2 BLDA: 36.4 MMHG (ref 35–45)
PCO2 BLDA: 38.2 MMHG (ref 35–45)
PCO2 BLDA: 45.3 MMHG (ref 35–45)
PEEP: 5
PEEP: 5
PH SMN: 7.37 [PH] (ref 7.35–7.45)
PH SMN: 7.39 [PH] (ref 7.35–7.45)
PH SMN: 7.4 [PH] (ref 7.35–7.45)
PH SMN: 7.47 [PH] (ref 7.35–7.45)
PH SMN: 7.49 [PH] (ref 7.35–7.45)
PHOSPHATE SERPL-MCNC: 2.9 MG/DL (ref 2.7–4.5)
PLATELET # BLD AUTO: 143 K/UL (ref 150–450)
PMV BLD AUTO: 11.4 FL (ref 9.2–12.9)
PO2 BLDA: 26 MMHG (ref 40–60)
PO2 BLDA: 34 MMHG (ref 40–60)
PO2 BLDA: 34 MMHG (ref 40–60)
PO2 BLDA: 37 MMHG (ref 40–60)
PO2 BLDA: 39 MMHG (ref 40–60)
POC BE: -2 MMOL/L
POC BE: -3 MMOL/L
POC BE: 1 MMOL/L
POC SATURATED O2: 46 % (ref 95–100)
POC SATURATED O2: 65 % (ref 95–100)
POC SATURATED O2: 66 % (ref 95–100)
POC SATURATED O2: 76 % (ref 95–100)
POC SATURATED O2: 79 % (ref 95–100)
POC TCO2: 21 MMOL/L (ref 24–29)
POC TCO2: 23 MMOL/L (ref 24–29)
POC TCO2: 24 MMOL/L (ref 24–29)
POC TCO2: 24 MMOL/L (ref 24–29)
POC TCO2: 28 MMOL/L (ref 24–29)
POTASSIUM SERPL-SCNC: 3.6 MMOL/L (ref 3.5–5.1)
POTASSIUM SERPL-SCNC: 4.3 MMOL/L (ref 3.5–5.1)
PROT SERPL-MCNC: 7.1 G/DL (ref 6–8.4)
RBC # BLD AUTO: 4.34 M/UL (ref 4–5.4)
SAMPLE: ABNORMAL
SITE: ABNORMAL
SODIUM SERPL-SCNC: 141 MMOL/L (ref 136–145)
VANCOMYCIN SERPL-MCNC: 13.1 UG/ML
VT: 390
VT: 390
WBC # BLD AUTO: 5.56 K/UL (ref 3.9–12.7)

## 2023-05-26 PROCEDURE — 20000000 HC ICU ROOM

## 2023-05-26 PROCEDURE — 63600175 PHARM REV CODE 636 W HCPCS: Performed by: INTERNAL MEDICINE

## 2023-05-26 PROCEDURE — 27200966 HC CLOSED SUCTION SYSTEM

## 2023-05-26 PROCEDURE — 63600175 PHARM REV CODE 636 W HCPCS

## 2023-05-26 PROCEDURE — 25000003 PHARM REV CODE 250: Performed by: STUDENT IN AN ORGANIZED HEALTH CARE EDUCATION/TRAINING PROGRAM

## 2023-05-26 PROCEDURE — 27000221 HC OXYGEN, UP TO 24 HOURS

## 2023-05-26 PROCEDURE — 99233 PR SUBSEQUENT HOSPITAL CARE,LEVL III: ICD-10-PCS | Mod: ,,, | Performed by: INTERNAL MEDICINE

## 2023-05-26 PROCEDURE — 83735 ASSAY OF MAGNESIUM: CPT

## 2023-05-26 PROCEDURE — 84132 ASSAY OF SERUM POTASSIUM: CPT | Performed by: INTERNAL MEDICINE

## 2023-05-26 PROCEDURE — 63600175 PHARM REV CODE 636 W HCPCS: Performed by: STUDENT IN AN ORGANIZED HEALTH CARE EDUCATION/TRAINING PROGRAM

## 2023-05-26 PROCEDURE — 99233 SBSQ HOSP IP/OBS HIGH 50: CPT | Mod: ,,, | Performed by: INTERNAL MEDICINE

## 2023-05-26 PROCEDURE — 84100 ASSAY OF PHOSPHORUS: CPT

## 2023-05-26 PROCEDURE — 83605 ASSAY OF LACTIC ACID: CPT

## 2023-05-26 PROCEDURE — S0030 INJECTION, METRONIDAZOLE: HCPCS | Performed by: INTERNAL MEDICINE

## 2023-05-26 PROCEDURE — 80202 ASSAY OF VANCOMYCIN: CPT | Performed by: INTERNAL MEDICINE

## 2023-05-26 PROCEDURE — 82803 BLOOD GASES ANY COMBINATION: CPT

## 2023-05-26 PROCEDURE — 87040 BLOOD CULTURE FOR BACTERIA: CPT | Mod: 59 | Performed by: INTERNAL MEDICINE

## 2023-05-26 PROCEDURE — 82800 BLOOD PH: CPT

## 2023-05-26 PROCEDURE — 94003 VENT MGMT INPAT SUBQ DAY: CPT

## 2023-05-26 PROCEDURE — 25000003 PHARM REV CODE 250: Performed by: INTERNAL MEDICINE

## 2023-05-26 PROCEDURE — 99900035 HC TECH TIME PER 15 MIN (STAT)

## 2023-05-26 PROCEDURE — 63600175 PHARM REV CODE 636 W HCPCS: Performed by: EMERGENCY MEDICINE

## 2023-05-26 PROCEDURE — C9113 INJ PANTOPRAZOLE SODIUM, VIA: HCPCS | Performed by: INTERNAL MEDICINE

## 2023-05-26 PROCEDURE — 94761 N-INVAS EAR/PLS OXIMETRY MLT: CPT

## 2023-05-26 PROCEDURE — 85025 COMPLETE CBC W/AUTO DIFF WBC: CPT

## 2023-05-26 PROCEDURE — 99900026 HC AIRWAY MAINTENANCE (STAT)

## 2023-05-26 PROCEDURE — 25000003 PHARM REV CODE 250

## 2023-05-26 PROCEDURE — 80053 COMPREHEN METABOLIC PANEL: CPT

## 2023-05-26 RX ORDER — FUROSEMIDE 10 MG/ML
20 INJECTION INTRAMUSCULAR; INTRAVENOUS NIGHTLY
Status: DISCONTINUED | OUTPATIENT
Start: 2023-05-26 | End: 2023-05-27

## 2023-05-26 RX ORDER — HYDRALAZINE HYDROCHLORIDE 20 MG/ML
10 INJECTION INTRAMUSCULAR; INTRAVENOUS ONCE
Status: COMPLETED | OUTPATIENT
Start: 2023-05-26 | End: 2023-05-26

## 2023-05-26 RX ORDER — FENTANYL CITRATE 50 UG/ML
50 INJECTION, SOLUTION INTRAMUSCULAR; INTRAVENOUS
Status: DISCONTINUED | OUTPATIENT
Start: 2023-05-26 | End: 2023-05-26

## 2023-05-26 RX ORDER — FENTANYL CITRATE 50 UG/ML
50 INJECTION, SOLUTION INTRAMUSCULAR; INTRAVENOUS
Status: DISCONTINUED | OUTPATIENT
Start: 2023-05-26 | End: 2023-05-27

## 2023-05-26 RX ADMIN — VANCOMYCIN HYDROCHLORIDE 1000 MG: 1 INJECTION, POWDER, LYOPHILIZED, FOR SOLUTION INTRAVENOUS at 09:05

## 2023-05-26 RX ADMIN — CEFEPIME 2 G: 2 INJECTION, POWDER, FOR SOLUTION INTRAVENOUS at 11:05

## 2023-05-26 RX ADMIN — ATORVASTATIN CALCIUM 20 MG: 20 TABLET, FILM COATED ORAL at 08:05

## 2023-05-26 RX ADMIN — METRONIDAZOLE 500 MG: 5 INJECTION, SOLUTION INTRAVENOUS at 10:05

## 2023-05-26 RX ADMIN — DEXMEDETOMIDINE HYDROCHLORIDE 1.4 MCG/KG/HR: 4 INJECTION INTRAVENOUS at 03:05

## 2023-05-26 RX ADMIN — PROPOFOL 45 MCG/KG/MIN: 10 INJECTION, EMULSION INTRAVENOUS at 03:05

## 2023-05-26 RX ADMIN — SODIUM CHLORIDE 250 ML: 9 INJECTION, SOLUTION INTRAVENOUS at 10:05

## 2023-05-26 RX ADMIN — CEFEPIME 2 G: 2 INJECTION, POWDER, FOR SOLUTION INTRAVENOUS at 02:05

## 2023-05-26 RX ADMIN — DEXMEDETOMIDINE HYDROCHLORIDE 1.4 MCG/KG/HR: 4 INJECTION INTRAVENOUS at 05:05

## 2023-05-26 RX ADMIN — DEXMEDETOMIDINE HYDROCHLORIDE 1.4 MCG/KG/HR: 4 INJECTION INTRAVENOUS at 07:05

## 2023-05-26 RX ADMIN — MUPIROCIN: 20 OINTMENT TOPICAL at 09:05

## 2023-05-26 RX ADMIN — FUROSEMIDE 20 MG: 10 INJECTION, SOLUTION INTRAMUSCULAR; INTRAVENOUS at 08:05

## 2023-05-26 RX ADMIN — PROPOFOL 50 MCG/KG/MIN: 10 INJECTION, EMULSION INTRAVENOUS at 09:05

## 2023-05-26 RX ADMIN — METRONIDAZOLE 500 MG: 5 INJECTION, SOLUTION INTRAVENOUS at 02:05

## 2023-05-26 RX ADMIN — HYDRALAZINE HYDROCHLORIDE 10 MG: 20 INJECTION, SOLUTION INTRAMUSCULAR; INTRAVENOUS at 02:05

## 2023-05-26 RX ADMIN — POTASSIUM BICARBONATE 40 MEQ: 391 TABLET, EFFERVESCENT ORAL at 04:05

## 2023-05-26 RX ADMIN — PROPOFOL 50 MCG/KG/MIN: 10 INJECTION, EMULSION INTRAVENOUS at 01:05

## 2023-05-26 RX ADMIN — DEXMEDETOMIDINE HYDROCHLORIDE 1.4 MCG/KG/HR: 4 INJECTION INTRAVENOUS at 10:05

## 2023-05-26 RX ADMIN — PANTOPRAZOLE SODIUM 40 MG: 40 INJECTION, POWDER, FOR SOLUTION INTRAVENOUS at 09:05

## 2023-05-26 RX ADMIN — ENOXAPARIN SODIUM 40 MG: 40 INJECTION SUBCUTANEOUS at 05:05

## 2023-05-26 RX ADMIN — DEXMEDETOMIDINE HYDROCHLORIDE 1.4 MCG/KG/HR: 4 INJECTION INTRAVENOUS at 12:05

## 2023-05-26 RX ADMIN — PROPOFOL 40 MCG/KG/MIN: 10 INJECTION, EMULSION INTRAVENOUS at 06:05

## 2023-05-26 NOTE — SUBJECTIVE & OBJECTIVE
Interval History: Patient continuing to not follow commands when sedation weaned. Failed SBT multiple times yesterday. Consulted Pulmonology for assistance in extubation. Pt no acute events overnight, remains with minimal UOP. Will give 250 cc bolus.    Review of Systems   Unable to perform ROS: Intubated   Objective:     Vital Signs (Most Recent):  Temp: 99 °F (37.2 °C) (05/26/23 1128)  Pulse: 84 (05/26/23 1128)  Resp: (!) 31 (05/26/23 1128)  BP: 124/68 (05/26/23 1101)  SpO2: 100 % (05/26/23 1128) Vital Signs (24h Range):  Temp:  [95.7 °F (35.4 °C)-99.1 °F (37.3 °C)] 99 °F (37.2 °C)  Pulse:  [50-88] 84  Resp:  [20-32] 31  SpO2:  [92 %-100 %] 100 %  BP: (104-153)/(62-96) 124/68     Weight: 59 kg (130 lb)  Body mass index is 21.63 kg/m².     SpO2: 100 %         Intake/Output Summary (Last 24 hours) at 5/26/2023 1204  Last data filed at 5/26/2023 1101  Gross per 24 hour   Intake 1957.27 ml   Output 513 ml   Net 1444.27 ml       Lines/Drains/Airways       Central Venous Catheter Line  Duration             Percutaneous Central Line Insertion/Assessment - Triple Lumen  05/24/23 0539 Internal Jugular Right 2 days              Drain  Duration                  Urethral Catheter 05/23/23 2306 Temperature probe 16 Fr. 2 days         NG/OG Tube 05/25/23 1501 14 Fr. Right nostril <1 day              Airway  Duration                  Airway - Non-Surgical 05/23/23 2300 Endotracheal Tube 2 days              Peripheral Intravenous Line  Duration                  Peripheral IV - Single Lumen 05/23/23 2219 20 G Right Antecubital 2 days                       Physical Exam  Vitals reviewed.   Constitutional:       Appearance: She is ill-appearing.   HENT:      Head: Normocephalic and atraumatic.   Eyes:      General: No scleral icterus.     Conjunctiva/sclera: Conjunctivae normal.   Cardiovascular:      Rate and Rhythm: Normal rate and regular rhythm.   Pulmonary:      Comments: Intubated and mechanically ventialted  Abdominal:       General: There is no distension.   Musculoskeletal:         General: No swelling.      Right lower leg: No edema.      Left lower leg: No edema.   Skin:     General: Skin is warm and dry.   Neurological:      Mental Status: She is alert.      Comments: Sedated   Psychiatric:         Mood and Affect: Mood normal.          Significant Labs: All pertinent lab results from the last 24 hours have been reviewed.    Significant Imaging:  All imaging reviewed

## 2023-05-26 NOTE — ASSESSMENT & PLAN NOTE
Pt is a 56 y.o female with h/o MS, Takotsubo CM, hld, opioid and benzo abuse presents to the hospital with AMS. On arrival to the hospital she was noted to be hypoxic briefly on bipap and later requiring intubation. BP stable. Afebrile. Labs with leukocytosis with wbc 15. renal function and LFTs stable. BNP 3970 , Trop 2.1, and lactate 2.7.      Bedside TTE with EF approximately 10-15%, no evidence of LVOT obstruction, no RV dilitation.    RIJ central line placed and hemodynamics obtained and were as follow:  CVP 6, svo2 70 CO 3.2 CI 1.9 SVR 2093 Lactate 1.5    shock call activated with conclusion to cont with medical therapy.    Plan:  -- Most recent hemodynamics: CVP 4, SvO2 79, CO 5.7, CI 3.48, SVR 1081   -- Continue to trend hemodynamics  -- Holding lasix in setting of low CVP and contraction alkalosis  -- monitor I&O's closely  -- Broad spec antibiotics with vanc and cefepime for presumed superimposed pna  -- lactate qAM  -- GDMT when stable  -- Daily SAT/SBT for liberation from the vent  -- Pulm consulted for assistance in extubating patient given multiple SBT failures yesterday

## 2023-05-26 NOTE — NURSING
Cardiac ICU Care Plan    POC reviewed with Reza Morrow and family. Questions and concerns addressed. CVP: 4, 5, 5, 5. SVO2: 79,76,66. Pt progressing toward goals. Will continue to monitor. See below and flowsheets for full assessment and VS info.       Neuro:  [place] Coma Scale  Best Eye Response: 2-->(E2) to pain  Best Motor Response: 4-->(M4) withdraws from pain  Best Verbal Response: 1-->(V1) none  Garrick Coma Scale Score: 7  Assessment Qualifiers: patient intubated, patient chemically sedated or paralyzed  Pupil PERRLA: yes    24 hr Temp:  [95.7 °F (35.4 °C)-99.1 °F (37.3 °C)]      CV:  Rhythm: sinus tachycardia   DVT prophylaxis: VTE Required Core Measure: Pharmacological prophylaxis initiated/maintained    CVP (mean): 5 mmHg (05/26/23 1501)       SVO2 (%): 70 % (05/25/23 1915)               Pulses  Right Radial Pulse: 1+ (weak)  Left Radial Pulse: 1+ (weak)  Right Dorsalis Pedis Pulse: 1+ (weak)  Left Dorsalis Pedis Pulse: 1+ (weak)  Right Posterior Tibial Pulse: 1+ (weak)  Left Posterior Tibial Pulse: 1+ (weak)    Resp:     Vent Mode: A/C  Set Rate: 16 BPM  Oxygen Concentration (%): 21  Vt Set: 390 mL  PEEP/CPAP: 5 cmH20  Pressure Support: 10 cmH20    GI/:  GI prophylaxis: yes  Diet/Nutrition Received: NPO  Last Bowel Movement:  (ROBIN intubated/sedated)  Voiding Characteristics: urethral catheter (bladder)       Urethral Catheter 05/23/23 2306 Temperature probe 16 Fr.-Reason for Continuing Urinary Catheterization: Critically ill in ICU and requiring hourly monitoring of intake/output   Intake/Output Summary (Last 24 hours) at 5/26/2023 1738  Last data filed at 5/26/2023 1501  Gross per 24 hour   Intake 2175.55 ml   Output 710 ml   Net 1465.55 ml          Labs/Accuchecks:  Recent Labs   Lab 05/24/23  0430 05/25/23  0315 05/26/23  0302   WBC 15.79* 9.46 5.56   RBC 5.37 4.82 4.34   HGB 15.7 14.4 12.8   HCT 47.3 43.1 37.8    203 143*      Recent Labs   Lab 05/23/23  2207   INR 1.0   APTT 24.2       Recent Labs     05/26/23  0302 05/26/23  0753     --    K 3.6 4.3   CO2 20*  --    *  --    BUN 47*  --    CREATININE 0.9  --    ALKPHOS 64  --    ALT 13  --    AST 16  --    BILITOT 1.1*  --        Recent Labs   Lab 05/23/23 2015   *   TROPONINI 2.123*      Recent Labs     05/26/23  0757 05/26/23  1349 05/26/23  1650   PH 7.486* 7.475* 7.402   PCO2 28.8* 28.0* 36.4   PO2 39* 37* 34*   HCO3 21.8* 20.6* 22.7*   POCSATURATED 79* 76* 66*   BE -2 -3 -2       Electrolytes: No replacement orders  Accuchecks: none    Gtts/LDAs:   dexmedeTOMIDine (Precedex) infusion (titrating) 1.4 mcg/kg/hr (05/26/23 1512)    propofoL 50 mcg/kg/min (05/26/23 1501)       Lines/Drains/Airways       Central Venous Catheter Line  Duration             Percutaneous Central Line Insertion/Assessment - Triple Lumen  05/24/23 0539 Internal Jugular Right 2 days              Drain  Duration                  Urethral Catheter 05/23/23 2306 Temperature probe 16 Fr. 2 days         NG/OG Tube 05/25/23 1501 14 Fr. Right nostril 1 day              Airway  Duration                  Airway - Non-Surgical 05/23/23 2300 Endotracheal Tube 2 days              Peripheral Intravenous Line  Duration                  Peripheral IV - Single Lumen 05/23/23 2219 20 G Right Antecubital 2 days                    Skin/Wounds  Bathing/Skin Care: bath, complete;back care;dressed/undressed;linen changed (05/25/23 2130)  Wounds: No  Wound care consulted: No

## 2023-05-26 NOTE — ASSESSMENT & PLAN NOTE
Likely secondary to CHF exacerbation and new onset decrease in EF with elevated BNP and imaging findings consistent with this. We are consulted because the patient has had problems with extubation from what is reportedly apnea and tachypnea at times and agitation. I performed a bedside SBT/SAT with the patient's bedside nurse on 5/26 and the patient has adequate respiratory mechanics for extubation. She was not able to follow commands but did eventually nod to question by her sister at bedside. She is able to sit completely up in bed and has a strong cough on suctioning with minimal secretions and remained hemodynamically stable with good O2 saturations. The patient has been alkalotic on blood gases so I reduced her Vt closer to lung protective ventilation - 390cc. Patient is breathing over the vent so best intervention is to decrease Vt as opposed to RR adjustments.     Plan  - SAT/SBT in the AM with plan to extubate to BiPAP given hx of HF & emphysema  - repeat blood gas - aim for normal acid/base as alkalosis will promote apnea given (loss of hypercapneic respiratory drive) on SBT   - CXR repeated today to assess endotracheal tube because on initial CXR and CT scan, it is close to being in the R mainstem. Recommend withdrawal 2-3cm if it is in the same position  - recommend IV lasix dose as patient is getting steadily net positive given her drips

## 2023-05-26 NOTE — PROGRESS NOTES
[facility]  Cardiology  Progress Note    Patient Name: Reza Morrow  MRN: [id number]  Admission Date: 5/23/2023  Hospital Length of Stay: 2 days  Code Status: Full Code   Attending Physician: Chely Gilbert MD   Primary Care Physician: Kip Jimenez MD  Expected Discharge Date: 5/29/2023  Principal Problem:Acute hypoxemic respiratory failure    Subjective:     Hospital Course:   Patient remained intubated overnight, not requiring any vasopressors, maintaining MAPs on her own of 90s. After IV pushes of Lasix, pt diuresed well, putting out ~2L urine. CT chest showed bilateral diffuse ground glass opacities suggestive of interstitial edema associated with inflammation vs infection. She remains on broad spec antibiotics with vanc and cefepime. Hemodynamics overnight on 5/24 worsened slightly and pt developed hypotension, so dobutamine was started. Trialed SAT/SBT on 5/25 with sedation weaned and patient showing more signs of wakefulness, however pt not following commands and still failing SBT. Will continue to trial SBT and assess. Pulmonology consulted for assistance in extubation given continued difficulty in liberating patient from the vent.      Interval History: Patient continuing to not follow commands when sedation weaned. Failed SBT multiple times yesterday. Consulted Pulmonology for assistance in extubation. Pt no acute events overnight, remains with minimal UOP. Will give 250 cc bolus.    Review of Systems   Unable to perform ROS: Intubated   Objective:     Vital Signs (Most Recent):  Temp: 99 °F (37.2 °C) (05/26/23 1128)  Pulse: 84 (05/26/23 1128)  Resp: (!) 31 (05/26/23 1128)  BP: 124/68 (05/26/23 1101)  SpO2: 100 % (05/26/23 1128) Vital Signs (24h Range):  Temp:  [95.7 °F (35.4 °C)-99.1 °F (37.3 °C)] 99 °F (37.2 °C)  Pulse:  [50-88] 84  Resp:  [20-32] 31  SpO2:  [92 %-100 %] 100 %  BP: (104-153)/(62-96) 124/68     Weight: 59 kg (130 lb)  Body mass index is 21.63 kg/m².     SpO2:  100 %         Intake/Output Summary (Last 24 hours) at 5/26/2023 1204  Last data filed at 5/26/2023 1101  Gross per 24 hour   Intake 1957.27 ml   Output 513 ml   Net 1444.27 ml       Lines/Drains/Airways       Central Venous Catheter Line  Duration             Percutaneous Central Line Insertion/Assessment - Triple Lumen  05/24/23 0539 Internal Jugular Right 2 days              Drain  Duration                  Urethral Catheter 05/23/23 2306 Temperature probe 16 Fr. 2 days         NG/OG Tube 05/25/23 1501 14 Fr. Right nostril <1 day              Airway  Duration                  Airway - Non-Surgical 05/23/23 2300 Endotracheal Tube 2 days              Peripheral Intravenous Line  Duration                  Peripheral IV - Single Lumen 05/23/23 2219 20 G Right Antecubital 2 days                       Physical Exam  Vitals reviewed.   Constitutional:       Appearance: She is ill-appearing.   HENT:      Head: Normocephalic and atraumatic.   Eyes:      General: No scleral icterus.     Conjunctiva/sclera: Conjunctivae normal.   Cardiovascular:      Rate and Rhythm: Normal rate and regular rhythm.   Pulmonary:      Comments: Intubated and mechanically ventialted  Abdominal:      General: There is no distension.   Musculoskeletal:         General: No swelling.      Right lower leg: No edema.      Left lower leg: No edema.   Skin:     General: Skin is warm and dry.   Neurological:      Mental Status: She is alert.      Comments: Sedated   Psychiatric:         Mood and Affect: Mood normal.          Significant Labs: All pertinent lab results from the last 24 hours have been reviewed.    Significant Imaging:  All imaging reviewed    Assessment and Plan:       * Acute hypoxemic respiratory failure  Pt is a 56 y.o female with h/o MS, Takotsubo CM, hld, opioid and benzo abuse presents to the hospital with AMS. On arrival to the hospital she was noted to be hypoxic briefly on bipap and later requiring intubation. BP stable.  Afebrile. Labs with leukocytosis with wbc 15. renal function and LFTs stable. BNP 3970 , Trop 2.1, and lactate 2.7.      Bedside TTE with EF approximately 10-15%, no evidence of LVOT obstruction, no RV dilitation.    RIJ central line placed and hemodynamics obtained and were as follow:  CVP 6, svo2 70 CO 3.2 CI 1.9 SVR 2093 Lactate 1.5    shock call activated with conclusion to cont with medical therapy.    Plan:  -- Most recent hemodynamics: CVP 4, SvO2 79, CO 5.7, CI 3.48, SVR 1081   -- Continue to trend hemodynamics  -- Holding lasix in setting of low CVP and contraction alkalosis  -- monitor I&O's closely  -- Broad spec antibiotics with vanc and cefepime for presumed superimposed pna  -- lactate qAM  -- GDMT when stable  -- Daily SAT/SBT for liberation from the vent  -- Pulm consulted for assistance in extubating patient given multiple SBT failures yesterday    MS (multiple sclerosis)  Dx 2015. Prior to intubation patient reported she does not follow with neuro but is on MS medications-- though not listed on active meds  Neurology consult outpatient    Opioid dependence  Patient with chronic pain and significant pain regimen in the form of primarily opioid medications. UDS was negative for opioids however    - Monitor for opioid withdrawal        VTE Risk Mitigation (From admission, onward)         Ordered     enoxaparin injection 40 mg  Every 24 hours         05/24/23 0902                Manuel Meza MD  Cardiology  [facility]

## 2023-05-26 NOTE — PROGRESS NOTES
Therapy with vancomycin complete and/or consult discontinued by provider.  Pharmacy will sign off, please re-consult as needed.      Thank you for the consult,   Ashley Vasquez, PharmD, [facility]  Cardiology Clinical Pharmacy Specialist  Ext. [phone]

## 2023-05-26 NOTE — CONSULTS
[facility] - Cardiac Intensive Care  Pulmonology  Consult Note    Patient Name: Reza Morrow  MRN: [id number]  Admission Date: 5/23/2023  Hospital Length of Stay: 2 days  Code Status: Full Code  Attending Physician: Chely Gilbert MD  Primary Care Provider: Kip Jimenez MD   Principal Problem: Acute hypoxemic respiratory failure    Inpatient consult to Pulmonology  Consult performed by: Marla Prado MD  Consult ordered by: Manuel Meza MD        Subjective:     HPI:  Reza Morrow is a 57 yo with a PMHx of Takotsubo cardiomyopathy (prior EF 20%), MS, opioid and benzo use, HLD who was admitted on 5/23/2023 for new onset acute heart failure. She was brought in by family after generalized feeling of unwellness and emesis at home with burning in her arms and legs, decreased PO intake and subjective fevers starting two weeks prior. On presentation, she was altered, afebrile, tachycardic, but not hypotensive. She was notably in respiratory distress and was placed on BiPAP. Lab data showed leukocytosis, metabolic acidosis with lactic acidosis, and hypernatremia. BNP was 3900 with elevated troponin at 2.1. Bedside ultrasound revealed HF with EF 10-15%. The patient could not tolerate BiPAP and was taking it off so was intubated later on 5/23. On the ventilator she was on 50% FiO2 and PEEP of 5 and has had steadily decreasing oxygen requirements.     Initial CXR showed concern for pulmonary edema. CT chest showed pulmonary edema on background of emphysema. The patient has a long-standing history of smoking and quit several years ago, but has been using the vape pens regularly. Her sister who provides this history says the patient is not very communicative about her health problems other than her MS which she has struggled with. She has a prior echo with low EF, but cardiac cath shows normal coronary arteries. Per sister, she is not aware of any primary pulmonary diagnoses and is uncertain if she takes inhalers at home.          Past Medical History:   Diagnosis Date    Behavioral problem     Bulging lumbar disc     Bursitis     bilateral hip    Degenerative cervical disc     Hx of psychiatric care     Hypercholesteremia     Labral tear of hip, degenerative bilateral    MS (multiple sclerosis)     Paresthesia of both lower extremities 3/13/2022    Pneumonia     Spinal cord compression        Past Surgical History:   Procedure Laterality Date    ANGIOGRAM, CORONARY, WITH LEFT HEART CATHETERIZATION Left 3/11/2022    Procedure: Angiogram, Coronary, with Left Heart Cath;  Surgeon: Elie Matamoros MD;  Location: [facility] CATH LAB;  Service: Cardiology;  Laterality: Left;    BREAST SURGERY      augmentation     SECTION, CLASSIC      HAND SURGERY      HYSTEROSCOPY      NECK SURGERY      cervical disc removeal    TUBAL LIGATION      X-STOP IMPLANTATION         Review of patient's allergies indicates:   Allergen Reactions    Adhesive Hives    Neosporin [neomycin-bacitracin-polymyxin] Hives    Neomycin-polymyxin Hives    Penicillins Other (See Comments)     Unknown  reaction    Latex Rash       Family History       Problem Relation (Age of Onset)    Bipolar disorder Brother    COPD Mother    Cancer Father    Diabetes Father, Sister    Drug abuse Mother    Heart disease Maternal Uncle    Hypothyroidism Maternal Grandmother    Lung cancer Father          Tobacco Use    Smoking status: Former     Types: Cigarettes     Quit date: 2013     Years since quittin.8    Smokeless tobacco: Never   Substance and Sexual Activity    Alcohol use: No    Drug use: Yes     Types: Benzodiazepines, Marijuana    Sexual activity: Never         Review of Systems   Unable to perform ROS: Intubated   Objective:     Vital Signs (Most Recent):  Temp: 98.1 °F (36.7 °C) (23 1554)  Pulse: 77 (23 1554)  Resp: (!) 22 (23 1554)  BP: 137/83 (23 1501)  SpO2: 100 % (23 1554) Vital Signs (24h Range):  Temp:   [95.7 °F (35.4 °C)-99.1 °F (37.3 °C)] 98.1 °F (36.7 °C)  Pulse:  [50-89] 77  Resp:  [20-40] 22  SpO2:  [93 %-100 %] 100 %  BP: (104-153)/(62-96) 137/83     Weight: 59 kg (130 lb)  Body mass index is 21.63 kg/m².      Intake/Output Summary (Last 24 hours) at 5/26/2023 1635  Last data filed at 5/26/2023 1501  Gross per 24 hour   Intake 2175.55 ml   Output 710 ml   Net 1465.55 ml        Physical Exam  Vitals and nursing note reviewed.   Constitutional:       General: She is not in acute distress.     Appearance: Normal appearance. She is not ill-appearing.   HENT:      Head: Normocephalic and atraumatic.      Nose: Nose normal.      Mouth/Throat:      Mouth: Mucous membranes are moist.   Eyes:      Conjunctiva/sclera: Conjunctivae normal.      Pupils: Pupils are equal, round, and reactive to light.   Cardiovascular:      Rate and Rhythm: Normal rate and regular rhythm.   Pulmonary:      Effort: No respiratory distress.      Breath sounds: No stridor. No wheezing or rhonchi.      Comments: Mechanically ventilated  Abdominal:      General: Abdomen is flat.      Palpations: Abdomen is soft. There is no mass.      Tenderness: There is no abdominal tenderness. There is no guarding.   Musculoskeletal:      Right lower leg: Edema present.      Left lower leg: Edema present.   Skin:     General: Skin is warm and dry.   Neurological:      Comments: Nodding to questions, not following commands, moving all four extremities         Vents:  Vent Mode: A/C (05/26/23 1554)  Ventilator Initiated: Yes (05/23/23 2314)  Set Rate: 16 BPM (05/26/23 1554)  Vt Set: 390 mL (05/26/23 1554)  Pressure Support: 10 cmH20 (05/25/23 0943)  PEEP/CPAP: 5 cmH20 (05/26/23 1554)  Oxygen Concentration (%): 21 (05/26/23 1554)  Peak Airway Pressure: 16 cmH20 (05/26/23 1554)  Plateau Pressure: 0 cmH20 (05/26/23 1554)  Total Ve: 6.28 L/m (05/26/23 1554)  Negative Inspiratory Force (cm H2O): 0 (05/26/23 1554)  F/VT Ratio<105 (RSBI): (!) 56.12 (05/26/23  1554)    Lines/Drains/Airways       Central Venous Catheter Line  Duration             Percutaneous Central Line Insertion/Assessment - Triple Lumen  05/24/23 0539 Internal Jugular Right 2 days              Drain  Duration                  Urethral Catheter 05/23/23 2306 Temperature probe 16 Fr. 2 days         NG/OG Tube 05/25/23 1501 14 Fr. Right nostril 1 day              Airway  Duration                  Airway - Non-Surgical 05/23/23 2300 Endotracheal Tube 2 days              Peripheral Intravenous Line  Duration                  Peripheral IV - Single Lumen 05/23/23 2219 20 G Right Antecubital 2 days                    Significant Labs:    CBC/Anemia Profile:  Recent Labs   Lab 05/25/23 0315 05/26/23  0302   WBC 9.46 5.56   HGB 14.4 12.8   HCT 43.1 37.8    143*   MCV 89 87   RDW 14.0 13.3        Chemistries:  Recent Labs   Lab 05/25/23 0315 05/26/23  0302 05/26/23  0753    141  --    K 3.7 3.6 4.3    112*  --    CO2 24 20*  --    BUN 41* 47*  --    CREATININE 1.4 0.9  --    CALCIUM 9.2 8.9  --    ALBUMIN 3.3* 3.1*  --    PROT 7.7 7.1  --    BILITOT 1.7* 1.1*  --    ALKPHOS 67 64  --    ALT 14 13  --    AST 19 16  --    MG 2.5 2.2  --    PHOS 2.9 2.9  --        All pertinent labs within the past 24 hours have been reviewed.    Significant Imaging:   I have reviewed all pertinent imaging results/findings within the past 24 hours.    Assessment/Plan:     Pulmonary  * Acute hypoxemic respiratory failure  Likely secondary to CHF exacerbation and new onset decrease in EF with elevated BNP and imaging findings consistent with this. We are consulted because the patient has had problems with extubation from what is reportedly apnea and tachypnea at times and agitation. I performed a bedside SBT/SAT with the patient's bedside nurse on 5/26 and the patient has adequate respiratory mechanics for extubation. She was not able to follow commands but did eventually nod to question by her sister at bedside.  She is able to sit completely up in bed and has a strong cough on suctioning with minimal secretions and remained hemodynamically stable with good O2 saturations. The patient has been alkalotic on blood gases so I reduced her Vt closer to lung protective ventilation - 390cc. Patient is breathing over the vent so best intervention is to decrease Vt as opposed to RR adjustments.     Plan  - SAT/SBT in the AM with plan to extubate to BiPAP given hx of HF & emphysema  - repeat blood gas - aim for normal acid/base as alkalosis will promote apnea given (loss of hypercapneic respiratory drive) on SBT   - CXR repeated today to assess endotracheal tube because on initial CXR and CT scan, it is close to being in the R mainstem. Recommend withdrawal 2-3cm if it is in the same position  - recommend IV lasix dose as patient is getting steadily net positive given her drips            Marla Prado MD  Pulmonology  [facility] - Cardiac Int[facility]

## 2023-05-26 NOTE — PLAN OF CARE
Cardiac ICU Care Plan    POC reviewed with Reza Morrow and Celeste (sister). Questions and concerns addressed. SBP remained 140s-150s, spoke with Joann SPANGLER. MD ordered Hydralazine 10 mg IV push. Pt UOP 10cc an hour x3 hours. MD aware. See below and flowsheets for full assessment and VS info.       Neuro:  [place] Coma Scale  Best Eye Response: 2-->(E2) to pain  Best Motor Response: 4-->(M4) withdraws from pain  Best Verbal Response: 1-->(V1) none  [place] Coma Scale Score: 7  Assessment Qualifiers: patient chemically sedated or paralyzed, patient intubated  Pupil PERRLA: yes    24 hr Temp:  [95.7 °F (35.4 °C)-98.6 °F (37 °C)]      CV:  Rhythm: normal sinus rhythm   DVT prophylaxis: VTE Required Core Measure: Pharmacological prophylaxis initiated/maintained    CVP (mean): 3 mmHg (05/26/23 0307)       SVO2 (%): 70 % (05/25/23 1915)               Pulses  Right Radial Pulse: 1+ (weak)  Left Radial Pulse: 1+ (weak)  Right Dorsalis Pedis Pulse: 1+ (weak)  Left Dorsalis Pedis Pulse: 1+ (weak)  Right Posterior Tibial Pulse: 1+ (weak)  Left Posterior Tibial Pulse: 1+ (weak)    Resp:     Vent Mode: A/C  Set Rate: 20 BPM  Oxygen Concentration (%): 35  Vt Set: 450 mL  PEEP/CPAP: 5 cmH20  Pressure Support: 10 cmH20    GI/:  GI prophylaxis: yes  Diet/Nutrition Received: NPO  Last Bowel Movement:  (ROBIN intubated sedated)  Voiding Characteristics: external catheter       Urethral Catheter 05/23/23 2306 Temperature probe 16 Fr.-Reason for Continuing Urinary Catheterization: Critically ill in ICU and requiring hourly monitoring of intake/output   Intake/Output Summary (Last 24 hours) at 5/26/2023 0619  Last data filed at 5/26/2023 0604  Gross per 24 hour   Intake 1282.1 ml   Output 415 ml   Net 867.1 ml            Labs/Accuchecks:  Recent Labs   Lab 05/24/23  0430 05/25/23  0315 05/26/23  0302   WBC 15.79* 9.46 5.56   RBC 5.37 4.82 4.34   HGB 15.7 14.4 12.8   HCT 47.3 43.1 37.8    203 143*      Recent Labs   Lab  05/23/23  2207   INR 1.0   APTT 24.2      Recent Labs     05/26/23  0302      K 3.6   CO2 20*   *   BUN 47*   CREATININE 0.9   ALKPHOS 64   ALT 13   AST 16   BILITOT 1.1*       Recent Labs   Lab 05/23/23 2015   *   TROPONINI 2.123*      Recent Labs     05/25/23  0808 05/25/23  1546 05/25/23  1947   PH 7.465* 7.469* 7.463*   PCO2 29.8* 29.5* 30.3*   PO2 46 35* 34*   HCO3 21.4* 21.4* 21.7*   POCSATURATED 85* 73* 70*   BE -2 -2 -2       Electrolytes: Electrolytes replaced  Accuchecks: none    Gtts/LDAs:   dexmedeTOMIDine (Precedex) infusion (titrating) 1.4 mcg/kg/hr (05/26/23 0604)    DOBUTamine IV infusion (non-titrating) 2.5 mcg/kg/min (05/26/23 0604)    NORepinephrine bitartrate-D5W Stopped (05/25/23 0855)    propofoL 40 mcg/kg/min (05/26/23 0604)       Lines/Drains/Airways       Central Venous Catheter Line  Duration             Percutaneous Central Line Insertion/Assessment - Triple Lumen  05/24/23 0539 Internal Jugular Right 2 days              Drain  Duration                  Urethral Catheter 05/23/23 2306 Temperature probe 16 Fr. 2 days         NG/OG Tube 05/25/23 1501 14 Fr. Right nostril <1 day              Airway  Duration                  Airway - Non-Surgical 05/23/23 2300 Endotracheal Tube 2 days              Peripheral Intravenous Line  Duration                  Peripheral IV - Single Lumen 05/23/23 2219 20 G Right Antecubital 2 days                    Skin/Wounds  Bathing/Skin Care: bath, complete;back care;dressed/undressed;linen changed (05/25/23 2130)  Wounds: No  Wound care consulted: No

## 2023-05-26 NOTE — SIGNIFICANT EVENT
Hemodynamics:   Parameter  5/25/253 8:25 pm   CVP 5   SvO2 70   CI 2.0   CO 3.5   SVR 1816       UOP:  75 cc/hr over the last 3 hr.    Intake/Output Summary (Last 24 hours) at 5/25/2023 2027  Last data filed at 5/25/2023 1915  Gross per 24 hour   Intake 1220.83 ml   Output 515 ml   Net 705.83 ml       ON  2.5    Plan:  Continue the same treatment plan     Pt s discussed with the attending physician    Oswaldo David MD    Cardiology Fellow PGY IV [facility]

## 2023-05-26 NOTE — HPI
Reza Morrow is a 59 yo with a PMHx of Takotsubo cardiomyopathy (prior EF 20%), MS, opioid and benzo use, HLD who was admitted on 5/23/2023 for new onset acute heart failure. She was brought in by family after generalized feeling of unwellness and emesis at home with burning in her arms and legs, decreased PO intake and subjective fevers starting two weeks prior. On presentation, she was altered, afebrile, tachycardic, but not hypotensive. She was notably in respiratory distress and was placed on BiPAP. Lab data showed leukocytosis, metabolic acidosis with lactic acidosis, and hypernatremia. BNP was 3900 with elevated troponin at 2.1. Bedside ultrasound revealed HF with EF 10-15%. The patient could not tolerate BiPAP and was taking it off so was intubated later on 5/23. On the ventilator she was on 50% FiO2 and PEEP of 5 and has had steadily decreasing oxygen requirements.     Initial CXR showed concern for pulmonary edema. CT chest showed pulmonary edema on background of emphysema. The patient has a long-standing history of smoking and quit several years ago, but has been using the vape pens regularly. Her sister who provides this history says the patient is not very communicative about her health problems other than her MS which she has struggled with. She has a prior echo with low EF, but cardiac cath shows normal coronary arteries. Per sister, she is not aware of any primary pulmonary diagnoses and is uncertain if she takes inhalers at home.

## 2023-05-26 NOTE — SUBJECTIVE & OBJECTIVE
Past Medical History:   Diagnosis Date    Behavioral problem     Bulging lumbar disc     Bursitis     bilateral hip    Degenerative cervical disc     Hx of psychiatric care     Hypercholesteremia     Labral tear of hip, degenerative bilateral    MS (multiple sclerosis)     Paresthesia of both lower extremities 3/13/2022    Pneumonia     Spinal cord compression        Past Surgical History:   Procedure Laterality Date    ANGIOGRAM, CORONARY, WITH LEFT HEART CATHETERIZATION Left 3/11/2022    Procedure: Angiogram, Coronary, with Left Heart Cath;  Surgeon: Elie Matamoros MD;  Location: [facility] CATH LAB;  Service: Cardiology;  Laterality: Left;    BREAST SURGERY      augmentation     SECTION, CLASSIC      HAND SURGERY      HYSTEROSCOPY      NECK SURGERY      cervical disc removeal    TUBAL LIGATION      X-STOP IMPLANTATION         Review of patient's allergies indicates:   Allergen Reactions    Adhesive Hives    Neosporin [neomycin-bacitracin-polymyxin] Hives    Neomycin-polymyxin Hives    Penicillins Other (See Comments)     Unknown  reaction    Latex Rash       Family History       Problem Relation (Age of Onset)    Bipolar disorder Brother    COPD Mother    Cancer Father    Diabetes Father, Sister    Drug abuse Mother    Heart disease Maternal Uncle    Hypothyroidism Maternal Grandmother    Lung cancer Father          Tobacco Use    Smoking status: Former     Types: Cigarettes     Quit date: 2013     Years since quittin.8    Smokeless tobacco: Never   Substance and Sexual Activity    Alcohol use: No    Drug use: Yes     Types: Benzodiazepines, Marijuana    Sexual activity: Never         Review of Systems   Unable to perform ROS: Intubated   Objective:     Vital Signs (Most Recent):  Temp: 98.1 °F (36.7 °C) (23 1554)  Pulse: 77 (23 1554)  Resp: (!) 22 (23 1554)  BP: 137/83 (23 1501)  SpO2: 100 % (23 1554) Vital Signs (24h Range):  Temp:  [95.7 °F (35.4 °C)-99.1 °F (37.3 °C)]  98.1 °F (36.7 °C)  Pulse:  [50-89] 77  Resp:  [20-40] 22  SpO2:  [93 %-100 %] 100 %  BP: (104-153)/(62-96) 137/83     Weight: 59 kg (130 lb)  Body mass index is 21.63 kg/m².      Intake/Output Summary (Last 24 hours) at 5/26/2023 1635  Last data filed at 5/26/2023 1501  Gross per 24 hour   Intake 2175.55 ml   Output 710 ml   Net 1465.55 ml        Physical Exam  Vitals and nursing note reviewed.   Constitutional:       General: She is not in acute distress.     Appearance: Normal appearance. She is not ill-appearing.   HENT:      Head: Normocephalic and atraumatic.      Nose: Nose normal.      Mouth/Throat:      Mouth: Mucous membranes are moist.   Eyes:      Conjunctiva/sclera: Conjunctivae normal.      Pupils: Pupils are equal, round, and reactive to light.   Cardiovascular:      Rate and Rhythm: Normal rate and regular rhythm.   Pulmonary:      Effort: No respiratory distress.      Breath sounds: No stridor. No wheezing or rhonchi.      Comments: Mechanically ventilated  Abdominal:      General: Abdomen is flat.      Palpations: Abdomen is soft. There is no mass.      Tenderness: There is no abdominal tenderness. There is no guarding.   Musculoskeletal:      Right lower leg: Edema present.      Left lower leg: Edema present.   Skin:     General: Skin is warm and dry.   Neurological:      Comments: Nodding to questions, not following commands, moving all four extremities         Vents:  Vent Mode: A/C (05/26/23 1554)  Ventilator Initiated: Yes (05/23/23 2314)  Set Rate: 16 BPM (05/26/23 1554)  Vt Set: 390 mL (05/26/23 1554)  Pressure Support: 10 cmH20 (05/25/23 0943)  PEEP/CPAP: 5 cmH20 (05/26/23 1554)  Oxygen Concentration (%): 21 (05/26/23 1554)  Peak Airway Pressure: 16 cmH20 (05/26/23 1554)  Plateau Pressure: 0 cmH20 (05/26/23 1554)  Total Ve: 6.28 L/m (05/26/23 1554)  Negative Inspiratory Force (cm H2O): 0 (05/26/23 1554)  F/VT Ratio<105 (RSBI): (!) 56.12 (05/26/23 1554)    Lines/Drains/Airways       Central  Venous Catheter Line  Duration             Percutaneous Central Line Insertion/Assessment - Triple Lumen  05/24/23 0539 Internal Jugular Right 2 days              Drain  Duration                  Urethral Catheter 05/23/23 2306 Temperature probe 16 Fr. 2 days         NG/OG Tube 05/25/23 1501 14 Fr. Right nostril 1 day              Airway  Duration                  Airway - Non-Surgical 05/23/23 2300 Endotracheal Tube 2 days              Peripheral Intravenous Line  Duration                  Peripheral IV - Single Lumen 05/23/23 2219 20 G Right Antecubital 2 days                    Significant Labs:    CBC/Anemia Profile:  Recent Labs   Lab 05/25/23 0315 05/26/23  0302   WBC 9.46 5.56   HGB 14.4 12.8   HCT 43.1 37.8    143*   MCV 89 87   RDW 14.0 13.3        Chemistries:  Recent Labs   Lab 05/25/23 0315 05/26/23  0302 05/26/23  0753    141  --    K 3.7 3.6 4.3    112*  --    CO2 24 20*  --    BUN 41* 47*  --    CREATININE 1.4 0.9  --    CALCIUM 9.2 8.9  --    ALBUMIN 3.3* 3.1*  --    PROT 7.7 7.1  --    BILITOT 1.7* 1.1*  --    ALKPHOS 67 64  --    ALT 14 13  --    AST 19 16  --    MG 2.5 2.2  --    PHOS 2.9 2.9  --        All pertinent labs within the past 24 hours have been reviewed.    Significant Imaging:   I have reviewed all pertinent imaging results/findings within the past 24 hours.

## 2023-05-27 PROBLEM — I50.20 HFREF (HEART FAILURE WITH REDUCED EJECTION FRACTION): Status: ACTIVE | Noted: 2023-05-27

## 2023-05-27 LAB
ALBUMIN SERPL BCP-MCNC: 2.9 G/DL (ref 3.5–5.2)
ALLENS TEST: ABNORMAL
ALP SERPL-CCNC: 62 U/L (ref 55–135)
ALT SERPL W/O P-5'-P-CCNC: 11 U/L (ref 10–44)
ANION GAP SERPL CALC-SCNC: 14 MMOL/L (ref 8–16)
AST SERPL-CCNC: 23 U/L (ref 10–40)
BASOPHILS # BLD AUTO: 0.03 K/UL (ref 0–0.2)
BASOPHILS NFR BLD: 0.6 % (ref 0–1.9)
BILIRUB SERPL-MCNC: 0.7 MG/DL (ref 0.1–1)
BUN SERPL-MCNC: 28 MG/DL (ref 6–20)
CALCIUM SERPL-MCNC: 8.7 MG/DL (ref 8.7–10.5)
CHLORIDE SERPL-SCNC: 107 MMOL/L (ref 95–110)
CO2 SERPL-SCNC: 21 MMOL/L (ref 23–29)
CREAT SERPL-MCNC: 0.8 MG/DL (ref 0.5–1.4)
DELSYS: ABNORMAL
DIFFERENTIAL METHOD: ABNORMAL
EOSINOPHIL # BLD AUTO: 0.1 K/UL (ref 0–0.5)
EOSINOPHIL NFR BLD: 1.3 % (ref 0–8)
ERYTHROCYTE [DISTWIDTH] IN BLOOD BY AUTOMATED COUNT: 13.5 % (ref 11.5–14.5)
ERYTHROCYTE [SEDIMENTATION RATE] IN BLOOD BY WESTERGREN METHOD: 16 MM/H
ERYTHROCYTE [SEDIMENTATION RATE] IN BLOOD BY WESTERGREN METHOD: 16 MM/H
EST. GFR  (NO RACE VARIABLE): >60 ML/MIN/1.73 M^2
FIO2: 21
FIO2: 21
GLUCOSE SERPL-MCNC: 113 MG/DL (ref 70–110)
HCO3 UR-SCNC: 21.2 MMOL/L (ref 24–28)
HCO3 UR-SCNC: 21.5 MMOL/L (ref 24–28)
HCO3 UR-SCNC: 22.4 MMOL/L (ref 24–28)
HCO3 UR-SCNC: 23.5 MMOL/L (ref 24–28)
HCT VFR BLD AUTO: 37 % (ref 37–48.5)
HGB BLD-MCNC: 12 G/DL (ref 12–16)
IMM GRANULOCYTES # BLD AUTO: 0.02 K/UL (ref 0–0.04)
IMM GRANULOCYTES NFR BLD AUTO: 0.4 % (ref 0–0.5)
LACTATE SERPL-SCNC: 1.1 MMOL/L (ref 0.5–2.2)
LYMPHOCYTES # BLD AUTO: 0.8 K/UL (ref 1–4.8)
LYMPHOCYTES NFR BLD: 17.7 % (ref 18–48)
MAGNESIUM SERPL-MCNC: 1.9 MG/DL (ref 1.6–2.6)
MCH RBC QN AUTO: 29.4 PG (ref 27–31)
MCHC RBC AUTO-ENTMCNC: 32.4 G/DL (ref 32–36)
MCV RBC AUTO: 91 FL (ref 82–98)
MODE: ABNORMAL
MODE: ABNORMAL
MONOCYTES # BLD AUTO: 0.4 K/UL (ref 0.3–1)
MONOCYTES NFR BLD: 8 % (ref 4–15)
NEUTROPHILS # BLD AUTO: 3.4 K/UL (ref 1.8–7.7)
NEUTROPHILS NFR BLD: 72 % (ref 38–73)
NRBC BLD-RTO: 0 /100 WBC
PCO2 BLDA: 32.4 MMHG (ref 35–45)
PCO2 BLDA: 33.9 MMHG (ref 35–45)
PCO2 BLDA: 38.4 MMHG (ref 35–45)
PCO2 BLDA: 39.9 MMHG (ref 35–45)
PEEP: 5
PEEP: 5
PH SMN: 7.36 [PH] (ref 7.35–7.45)
PH SMN: 7.39 [PH] (ref 7.35–7.45)
PH SMN: 7.41 [PH] (ref 7.35–7.45)
PH SMN: 7.42 [PH] (ref 7.35–7.45)
PHOSPHATE SERPL-MCNC: 4.7 MG/DL (ref 2.7–4.5)
PLATELET # BLD AUTO: 159 K/UL (ref 150–450)
PMV BLD AUTO: 12 FL (ref 9.2–12.9)
PO2 BLDA: 32 MMHG (ref 40–60)
PO2 BLDA: 36 MMHG (ref 40–60)
PO2 BLDA: 47 MMHG (ref 40–60)
PO2 BLDA: 81 MMHG (ref 80–100)
POC BE: -1 MMOL/L
POC BE: -3 MMOL/L
POC SATURATED O2: 64 % (ref 95–100)
POC SATURATED O2: 69 % (ref 95–100)
POC SATURATED O2: 81 % (ref 95–100)
POC SATURATED O2: 96 % (ref 95–100)
POC SVO2: 81 %
POC TCO2: 22 MMOL/L (ref 23–27)
POC TCO2: 23 MMOL/L (ref 24–29)
POC TCO2: 24 MMOL/L (ref 24–29)
POC TCO2: 25 MMOL/L (ref 24–29)
POTASSIUM SERPL-SCNC: 3.6 MMOL/L (ref 3.5–5.1)
PROT SERPL-MCNC: 6.8 G/DL (ref 6–8.4)
RBC # BLD AUTO: 4.08 M/UL (ref 4–5.4)
SAMPLE: ABNORMAL
SITE: ABNORMAL
SODIUM SERPL-SCNC: 142 MMOL/L (ref 136–145)
VT: 390
VT: 390
WBC # BLD AUTO: 4.74 K/UL (ref 3.9–12.7)

## 2023-05-27 PROCEDURE — 80053 COMPREHEN METABOLIC PANEL: CPT

## 2023-05-27 PROCEDURE — 82800 BLOOD PH: CPT

## 2023-05-27 PROCEDURE — C9113 INJ PANTOPRAZOLE SODIUM, VIA: HCPCS | Performed by: INTERNAL MEDICINE

## 2023-05-27 PROCEDURE — 99233 PR SUBSEQUENT HOSPITAL CARE,LEVL III: ICD-10-PCS | Mod: ,,, | Performed by: INTERNAL MEDICINE

## 2023-05-27 PROCEDURE — 20000000 HC ICU ROOM

## 2023-05-27 PROCEDURE — 83605 ASSAY OF LACTIC ACID: CPT | Performed by: STUDENT IN AN ORGANIZED HEALTH CARE EDUCATION/TRAINING PROGRAM

## 2023-05-27 PROCEDURE — 36600 WITHDRAWAL OF ARTERIAL BLOOD: CPT

## 2023-05-27 PROCEDURE — 99223 PR INITIAL HOSPITAL CARE,LEVL III: ICD-10-PCS | Mod: ,,, | Performed by: INTERNAL MEDICINE

## 2023-05-27 PROCEDURE — 27000221 HC OXYGEN, UP TO 24 HOURS

## 2023-05-27 PROCEDURE — 82803 BLOOD GASES ANY COMBINATION: CPT

## 2023-05-27 PROCEDURE — 99900026 HC AIRWAY MAINTENANCE (STAT)

## 2023-05-27 PROCEDURE — 63600175 PHARM REV CODE 636 W HCPCS: Performed by: STUDENT IN AN ORGANIZED HEALTH CARE EDUCATION/TRAINING PROGRAM

## 2023-05-27 PROCEDURE — 25000003 PHARM REV CODE 250

## 2023-05-27 PROCEDURE — 25000003 PHARM REV CODE 250: Performed by: STUDENT IN AN ORGANIZED HEALTH CARE EDUCATION/TRAINING PROGRAM

## 2023-05-27 PROCEDURE — 83735 ASSAY OF MAGNESIUM: CPT

## 2023-05-27 PROCEDURE — 63600175 PHARM REV CODE 636 W HCPCS

## 2023-05-27 PROCEDURE — 63600175 PHARM REV CODE 636 W HCPCS: Performed by: EMERGENCY MEDICINE

## 2023-05-27 PROCEDURE — 94761 N-INVAS EAR/PLS OXIMETRY MLT: CPT

## 2023-05-27 PROCEDURE — 63600175 PHARM REV CODE 636 W HCPCS: Performed by: INTERNAL MEDICINE

## 2023-05-27 PROCEDURE — 99233 SBSQ HOSP IP/OBS HIGH 50: CPT | Mod: ,,, | Performed by: INTERNAL MEDICINE

## 2023-05-27 PROCEDURE — 99900035 HC TECH TIME PER 15 MIN (STAT)

## 2023-05-27 PROCEDURE — 84100 ASSAY OF PHOSPHORUS: CPT

## 2023-05-27 PROCEDURE — 99223 1ST HOSP IP/OBS HIGH 75: CPT | Mod: ,,, | Performed by: INTERNAL MEDICINE

## 2023-05-27 PROCEDURE — 94003 VENT MGMT INPAT SUBQ DAY: CPT

## 2023-05-27 PROCEDURE — 85025 COMPLETE CBC W/AUTO DIFF WBC: CPT

## 2023-05-27 RX ORDER — MAGNESIUM SULFATE HEPTAHYDRATE 40 MG/ML
2 INJECTION, SOLUTION INTRAVENOUS ONCE
Status: COMPLETED | OUTPATIENT
Start: 2023-05-27 | End: 2023-05-27

## 2023-05-27 RX ORDER — LORAZEPAM 2 MG/ML
1 INJECTION INTRAMUSCULAR ONCE
Status: COMPLETED | OUTPATIENT
Start: 2023-05-27 | End: 2023-05-27

## 2023-05-27 RX ORDER — HALOPERIDOL 5 MG/ML
5 INJECTION INTRAMUSCULAR EVERY 6 HOURS PRN
Status: DISCONTINUED | OUTPATIENT
Start: 2023-05-27 | End: 2023-05-27

## 2023-05-27 RX ORDER — LORAZEPAM 2 MG/ML
1 INJECTION INTRAMUSCULAR ONCE AS NEEDED
Status: DISCONTINUED | OUTPATIENT
Start: 2023-05-27 | End: 2023-05-27

## 2023-05-27 RX ORDER — HALOPERIDOL 5 MG/ML
1 INJECTION INTRAMUSCULAR EVERY 6 HOURS PRN
Status: DISCONTINUED | OUTPATIENT
Start: 2023-05-27 | End: 2023-05-30

## 2023-05-27 RX ORDER — POTASSIUM CHLORIDE 29.8 MG/ML
40 INJECTION INTRAVENOUS ONCE
Status: COMPLETED | OUTPATIENT
Start: 2023-05-27 | End: 2023-05-27

## 2023-05-27 RX ORDER — METHOCARBAMOL 750 MG/1
500 TABLET, FILM COATED ORAL 4 TIMES DAILY
Status: ON HOLD | COMMUNITY
End: 2023-06-13 | Stop reason: SDUPTHER

## 2023-05-27 RX ORDER — HYDROXYZINE PAMOATE 25 MG/1
50 CAPSULE ORAL EVERY 6 HOURS PRN
Status: DISCONTINUED | OUTPATIENT
Start: 2023-05-27 | End: 2023-05-29

## 2023-05-27 RX ORDER — LORAZEPAM 2 MG/ML
1 INJECTION INTRAMUSCULAR ONCE AS NEEDED
Status: COMPLETED | OUTPATIENT
Start: 2023-05-27 | End: 2023-05-27

## 2023-05-27 RX ORDER — MORPHINE SULFATE 2 MG/ML
2 INJECTION, SOLUTION INTRAMUSCULAR; INTRAVENOUS EVERY 6 HOURS PRN
Status: DISCONTINUED | OUTPATIENT
Start: 2023-05-27 | End: 2023-05-27

## 2023-05-27 RX ORDER — MORPHINE SULFATE 2 MG/ML
2 INJECTION, SOLUTION INTRAMUSCULAR; INTRAVENOUS EVERY 4 HOURS
Status: DISCONTINUED | OUTPATIENT
Start: 2023-05-27 | End: 2023-05-27

## 2023-05-27 RX ORDER — MORPHINE SULFATE 2 MG/ML
4 INJECTION, SOLUTION INTRAMUSCULAR; INTRAVENOUS EVERY 6 HOURS PRN
Status: DISCONTINUED | OUTPATIENT
Start: 2023-05-27 | End: 2023-05-29

## 2023-05-27 RX ORDER — BACLOFEN 10 MG/1
10 TABLET ORAL 2 TIMES DAILY
Status: ON HOLD | COMMUNITY
End: 2023-06-13 | Stop reason: SDUPTHER

## 2023-05-27 RX ORDER — OXYCODONE HCL 10 MG/1
10 TABLET, FILM COATED, EXTENDED RELEASE ORAL EVERY 12 HOURS
Status: DISCONTINUED | OUTPATIENT
Start: 2023-05-27 | End: 2023-05-29

## 2023-05-27 RX ORDER — PREGABALIN 75 MG/1
150 CAPSULE ORAL 2 TIMES DAILY
Status: DISCONTINUED | OUTPATIENT
Start: 2023-05-27 | End: 2023-05-30 | Stop reason: HOSPADM

## 2023-05-27 RX ORDER — TIZANIDINE HYDROCHLORIDE 2 MG/1
CAPSULE, GELATIN COATED ORAL 3 TIMES DAILY
Status: ON HOLD | COMMUNITY
End: 2023-06-13 | Stop reason: SDUPTHER

## 2023-05-27 RX ORDER — LORAZEPAM 2 MG/ML
0.5 INJECTION INTRAMUSCULAR EVERY 12 HOURS
Status: DISCONTINUED | OUTPATIENT
Start: 2023-05-27 | End: 2023-05-27

## 2023-05-27 RX ORDER — LORAZEPAM 2 MG/ML
2 INJECTION INTRAMUSCULAR EVERY 4 HOURS PRN
Status: DISCONTINUED | OUTPATIENT
Start: 2023-05-27 | End: 2023-05-29

## 2023-05-27 RX ADMIN — ATORVASTATIN CALCIUM 20 MG: 20 TABLET, FILM COATED ORAL at 08:05

## 2023-05-27 RX ADMIN — OXYCODONE HYDROCHLORIDE 10 MG: 10 TABLET, FILM COATED, EXTENDED RELEASE ORAL at 08:05

## 2023-05-27 RX ADMIN — MUPIROCIN: 20 OINTMENT TOPICAL at 09:05

## 2023-05-27 RX ADMIN — PROPOFOL 50 MCG/KG/MIN: 10 INJECTION, EMULSION INTRAVENOUS at 04:05

## 2023-05-27 RX ADMIN — LORAZEPAM 1 MG: 2 INJECTION INTRAMUSCULAR; INTRAVENOUS at 10:05

## 2023-05-27 RX ADMIN — DEXMEDETOMIDINE HYDROCHLORIDE 1.4 MCG/KG/HR: 4 INJECTION INTRAVENOUS at 12:05

## 2023-05-27 RX ADMIN — HALOPERIDOL LACTATE 1 MG: 5 INJECTION, SOLUTION INTRAMUSCULAR at 04:05

## 2023-05-27 RX ADMIN — DEXMEDETOMIDINE HYDROCHLORIDE 1.4 MCG/KG/HR: 4 INJECTION INTRAVENOUS at 04:05

## 2023-05-27 RX ADMIN — FENTANYL CITRATE 50 MCG: 50 INJECTION INTRAMUSCULAR; INTRAVENOUS at 06:05

## 2023-05-27 RX ADMIN — LORAZEPAM 1 MG: 2 INJECTION INTRAMUSCULAR; INTRAVENOUS at 01:05

## 2023-05-27 RX ADMIN — POTASSIUM CHLORIDE 40 MEQ: 29.8 INJECTION, SOLUTION INTRAVENOUS at 06:05

## 2023-05-27 RX ADMIN — DEXMEDETOMIDINE HYDROCHLORIDE 1.4 MCG/KG/HR: 4 INJECTION INTRAVENOUS at 10:05

## 2023-05-27 RX ADMIN — PANTOPRAZOLE SODIUM 40 MG: 40 INJECTION, POWDER, FOR SOLUTION INTRAVENOUS at 09:05

## 2023-05-27 RX ADMIN — SODIUM CHLORIDE 250 ML: 9 INJECTION, SOLUTION INTRAVENOUS at 12:05

## 2023-05-27 RX ADMIN — ENOXAPARIN SODIUM 40 MG: 40 INJECTION SUBCUTANEOUS at 05:05

## 2023-05-27 RX ADMIN — PREGABALIN 150 MG: 75 CAPSULE ORAL at 08:05

## 2023-05-27 RX ADMIN — MAGNESIUM SULFATE 2 G: 2 INJECTION INTRAVENOUS at 06:05

## 2023-05-27 RX ADMIN — MORPHINE SULFATE 2 MG: 2 INJECTION, SOLUTION INTRAMUSCULAR; INTRAVENOUS at 01:05

## 2023-05-27 RX ADMIN — DEXMEDETOMIDINE HYDROCHLORIDE 1.4 MCG/KG/HR: 4 INJECTION INTRAVENOUS at 09:05

## 2023-05-27 RX ADMIN — LORAZEPAM 2 MG: 2 INJECTION INTRAMUSCULAR; INTRAVENOUS at 09:05

## 2023-05-27 NOTE — ASSESSMENT & PLAN NOTE
Pt is a 56 y.o female with h/o MS, Takotsubo CM, hld, opioid and benzo abuse presents to the hospital with AMS. On arrival to the hospital she was noted to be hypoxic briefly on bipap and later requiring intubation. BP stable. Afebrile. Labs with leukocytosis with wbc 15. renal function and LFTs stable. BNP 3970 , Trop 2.1, and lactate 2.7.      Bedside TTE with EF approximately 10-15%, no evidence of LVOT obstruction, no RV dilitation.    RIJ central line placed and hemodynamics obtained and were as follow:  CVP 6, svo2 70 CO 3.2 CI 1.9 SVR 2093 Lactate 1.5    shock call activated with conclusion to cont with medical therapy.    Plan:  -- Most recent hemodynamics: CVP 3, SvO2 64, CO 3.54, CI 2.16, SVR 1760   -- Continue to trend hemodynamics  -- monitor I&O's closely  -- Broad spec antibiotics with vanc and cefepime for presumed superimposed pna  -- lactate qAM  -- GDMT when stable  -- Pulm consulted for assistance in extubating patient, appreciate their assistance and able to extubate pt successfully this morning.

## 2023-05-27 NOTE — ASSESSMENT & PLAN NOTE
History of Takotsubo cardiomyopathy and hospitalization due to CHF in March 2022. Formal echo done at the time showed reduced EF 25%, and mid-ventricular hypokinesis suggestive of takutsubo cardiomyopathy. Was lost to follow-up and was not on GDMT at time of this hospitalization.    - Holding Lasix at this time after good response to IV 20mg yesterday.  - Strict I/O's  - Daily CMPs  - If blood pressure tolerates, would like to resume GDMT and uptitrate prior to discharge.  - Good cardiology follow-up upon discharge

## 2023-05-27 NOTE — SUBJECTIVE & OBJECTIVE
[facility] Pulmonary/Critical Care Fellow Progress Note:    Brief Hx: 57 yo with a PMHx of Takotsubo cardiomyopathy (prior EF 20%), MS, opioid and benzo use, HLD who was admitted on 2023 for new onset acute heart failure. Intubated on  because she could not tolerate BiPAP for respiratory distress. CXR and CT imaging concerning for pulmonary edema superimposed on emphysema. Extubated  AM.     Subjective:  No acute events overnight. CXR with ETT still in distal bishop, good aeration in both lung fields.     Objective:  Last 24 Hour Vital Signs:  BP  Min: 77/53  Max: 162/87  Temp  Av.3 °F (36.3 °C)  Min: 96.4 °F (35.8 °C)  Max: 99.1 °F (37.3 °C)  Pulse  Av.8  Min: 52  Max: 89  Resp  Av.4  Min: 16  Max: 40  SpO2  Av.1 %  Min: 90 %  Max: 100 %  Body mass index is 21.63 kg/m².  I/O last 3 completed shifts:  In: 3147.5 [P.O.:780; I.V.:1326.6; IV Piggyback:1040.9]  Out: 2326 [Urine:2326]      Physical Exam:  General: Alert and awake but in significant distress, non-re-directable agitation  HENT:  NCAT; anicteric sclera; OP clear with MMM  Cardio:  Regular rate and rhythm with normal S1 and S2; no murmurs or rubs  Resp:  CTAB; respirations unlabored; no wheezes, crackles or rhonchi  Abdom:  Soft, non-distended  Extrem:  WWP with no clubbing, cyanosis or edema  Pulses:  2+ and symmetric distally  Neuro:  AAOx0; moving all four extremities, very agitated and trying to get out of the bed      Assessment & Plan:

## 2023-05-27 NOTE — PROGRESS NOTES
[facility]  Cardiology  Progress Note    Patient Name: Reza Morrow  MRN: [id number]  Admission Date: 5/23/2023  Hospital Length of Stay: 3 days  Code Status: Full Code   Attending Physician: Neel Green MD   Primary Care Physician: Kip Jimenez MD  Expected Discharge Date: 5/29/2023  Principal Problem:Acute hypoxemic respiratory failure    Subjective:     Hospital Course:   Patient remained intubated overnight, not requiring any vasopressors, maintaining MAPs on her own of 90s. After IV pushes of Lasix, pt diuresed well, putting out ~2L urine. CT chest showed bilateral diffuse ground glass opacities suggestive of interstitial edema associated with inflammation vs infection. She remains on broad spec antibiotics with vanc and cefepime. Hemodynamics overnight on 5/24 worsened slightly and pt developed hypotension, so dobutamine was started. Trialed SAT/SBT on 5/25 with sedation weaned and patient showing more signs of wakefulness, however pt not following commands and still failing SBT. Pulmonology consulted for assistance in extubation given continued difficulty in liberating patient from the vent; pt was successfully extubated on 5/27. Patient stable and continuing to evaluate mentation.      Interval History: No acute events overnight. Patient was extubated successfully this morning. Still not mentating completely appropriately or at baseline, however continuing to improve. Pt and family requesting resumption of her home pain and anxiety regimen. Will restart and adjust accordingly. Medically stable.    Review of Systems   Unable to perform ROS: Mental status change   Objective:     Vital Signs (Most Recent):  Temp: 97 °F (36.1 °C) (05/27/23 0745)  Pulse: 70 (05/27/23 1036)  Resp: (!) 22 (05/27/23 1330)  BP: 114/67 (05/27/23 1036)  SpO2: 98 % (05/27/23 1036) Vital Signs (24h Range):  Temp:  [96.4 °F (35.8 °C)-98.1 °F (36.7 °C)] 97 °F (36.1 °C)  Pulse:  [52-78] 70  Resp:  [16-37]  22  SpO2:  [90 %-100 %] 98 %  BP: ()/(51-87) 114/67     Weight: 59 kg (130 lb)  Body mass index is 21.63 kg/m².     SpO2: 98 %       Intake/Output Summary (Last 24 hours) at 5/27/2023 1410  Last data filed at 5/27/2023 0800  Gross per 24 hour   Intake 1219.11 ml   Output 1741 ml   Net -521.89 ml       Lines/Drains/Airways       Central Venous Catheter Line  Duration             Percutaneous Central Line Insertion/Assessment - Triple Lumen  05/24/23 0539 Internal Jugular Right 3 days              Drain  Duration                  Urethral Catheter 05/23/23 2306 Temperature probe 16 Fr. 3 days         NG/OG Tube 05/25/23 1501 14 Fr. Right nostril 1 day              Airway  Duration                  Airway - Non-Surgical 05/23/23 2300 Endotracheal Tube 3 days                       Physical Exam  Vitals reviewed.   Constitutional:       Appearance: She is ill-appearing.   HENT:      Head: Normocephalic and atraumatic.   Eyes:      General: No scleral icterus.     Conjunctiva/sclera: Conjunctivae normal.   Cardiovascular:      Rate and Rhythm: Normal rate and regular rhythm.   Pulmonary:      Effort: No respiratory distress.   Abdominal:      General: There is no distension.   Musculoskeletal:         General: No swelling.      Right lower leg: No edema.      Left lower leg: No edema.   Skin:     General: Skin is warm and dry.   Neurological:      Mental Status: She is alert.   Psychiatric:         Mood and Affect: Mood normal.          Significant Labs: All pertinent lab results from the last 24 hours have been reviewed.    Significant Imaging:  All relevant imaging reviewed    Assessment and Plan:       * Acute hypoxemic respiratory failure  Pt is a 56 y.o female with h/o MS, Takotsubo CM, hld, opioid and benzo abuse presents to the hospital with AMS. On arrival to the hospital she was noted to be hypoxic briefly on bipap and later requiring intubation. BP stable. Afebrile. Labs with leukocytosis with wbc 15.  renal function and LFTs stable. BNP 3970 , Trop 2.1, and lactate 2.7.      Bedside TTE with EF approximately 10-15%, no evidence of LVOT obstruction, no RV dilitation.    RIJ central line placed and hemodynamics obtained and were as follow:  CVP 6, svo2 70 CO 3.2 CI 1.9 SVR 2093 Lactate 1.5    shock call activated with conclusion to cont with medical therapy.    Plan:  -- Most recent hemodynamics: CVP 3, SvO2 64, CO 3.54, CI 2.16, SVR 1760   -- Continue to trend hemodynamics  -- monitor I&O's closely  -- Broad spec antibiotics with vanc and cefepime for presumed superimposed pna  -- lactate qAM  -- GDMT when stable  -- Pulm consulted for assistance in extubating patient, appreciate their assistance and able to extubate pt successfully this morning.    HFrEF (heart failure with reduced ejection fraction)  History of Takotsubo cardiomyopathy and hospitalization due to CHF in March 2022. Formal echo done at the time showed reduced EF 25%, and mid-ventricular hypokinesis suggestive of takutsubo cardiomyopathy. Was lost to follow-up and was not on GDMT at time of this hospitalization.    - Holding Lasix at this time after good response to IV 20mg yesterday.  - Strict I/O's  - Daily CMPs  - If blood pressure tolerates, would like to resume GDMT and uptitrate prior to discharge.  - Good cardiology follow-up upon discharge    MS (multiple sclerosis)  Dx 2015. Prior to intubation patient reported she does not follow with neuro but is on MS medications-- though not listed on active meds. Evidently some question of the diagnosis of her MS was noted in her chart.    - Will resume patient's home pain medications for treatment of her MS:   - Lyrica 150 BID   - On Oxycontin 30 qd, will resume with Oxycontin 10 BID for time being   - Pain PRNs in with oxycodone  - Adjust as appropriate    Opioid dependence  Patient with chronic pain and significant pain regimen in the form of primarily opioid medications. UDS was negative for  opioids however    - Monitor for opioid withdrawal      VTE Risk Mitigation (From admission, onward)           Ordered     enoxaparin injection 40 mg  Every 24 hours         05/24/23 0902                    Manuel Meza MD  Cardiology  [facility]          I have seen the patient, reviewed the Fellow's history and physical, assessment and plan. I have personally interviewed and examined the patient and agree with the findings.     STEVEN Green MD

## 2023-05-27 NOTE — PROGRESS NOTES
[facility]/Critical [facility] Fellow Progress Note:    Brief Hx: 59 yo with a PMHx of Takotsubo cardiomyopathy (prior EF 20%), MS, opioid and benzo use, HLD who was admitted on 2023 for new onset acute heart failure. Intubated on  because she could not tolerate BiPAP for respiratory distress. CXR and CT imaging concerning for pulmonary edema superimposed on emphysema. Extubated  AM.     Subjective:  No acute events overnight. CXR with ETT still in distal bishop, good aeration in both lung fields.     Objective:  Last 24 Hour Vital Signs:  BP  Min: 77/53  Max: 162/87  Temp  Av.3 °F (36.3 °C)  Min: 96.4 °F (35.8 °C)  Max: 99.1 °F (37.3 °C)  Pulse  Av.8  Min: 52  Max: 89  Resp  Av.4  Min: 16  Max: 40  SpO2  Av.1 %  Min: 90 %  Max: 100 %  Body mass index is 21.63 kg/m².  I/O last 3 completed shifts:  In: 3147.5 [P.O.:780; I.V.:1326.6; IV Piggyback:1040.9]  Out: 2326 [Urine:2326]      Physical Exam:  General: Alert and awake but in significant distress, non-re-directable agitation  HENT:  NCAT; anicteric sclera; OP clear with MMM  Cardio:  Regular rate and rhythm with normal S1 and S2; no murmurs or rubs  Resp:  CTAB; respirations unlabored; no wheezes, crackles or rhonchi  Abdom:  Soft, non-distended  Extrem:  WWP with no clubbing, cyanosis or edema  Pulses:  2+ and symmetric distally  Neuro:  AAOx0; moving all four extremities, very agitated and trying to get out of the bed      Assessment & Plan:       Assessment/Plan:     Pulmonary  * Acute hypoxemic respiratory failure  Likely secondary to CHF exacerbation and new onset decrease in EF with elevated BNP and imaging findings consistent with this. We are consulted because the patient has had problems with extubation from what is reportedly apnea and tachypnea at times and agitation. I performed a bedside SBT/SAT with the patient's bedside nurse on  and the patient has adequate respiratory mechanics for extubation. She was  not able to follow commands but did eventually nod to question by her sister at bedside. She is able to sit completely up in bed and has a strong cough on suctioning with minimal secretions and remained hemodynamically stable with good O2 saturations. The patient has been alkalotic on blood gases so I reduced her Vt closer to lung protective ventilation - 390cc. Patient is breathing over the vent so best intervention is to decrease Vt as opposed to RR adjustments. Received lasix dose 5/26 PM    Plan  - extubated this AM - could not tolerate BiPAP 2/2 agitation requiring restraints, so placed on nasal cannula - doing well on 3L with SpO2 99%  - goal SpO2 88% or higher   - continue lasix as needed to maintain net even to slightly net negative fluid balance  - we respectfully sign off      Marla Prado MD  Pulmonology  [facility]

## 2023-05-27 NOTE — ASSESSMENT & PLAN NOTE
Likely secondary to CHF exacerbation and new onset decrease in EF with elevated BNP and imaging findings consistent with this. We are consulted because the patient has had problems with extubation from what is reportedly apnea and tachypnea at times and agitation. I performed a bedside SBT/SAT with the patient's bedside nurse on 5/26 and the patient has adequate respiratory mechanics for extubation. She was not able to follow commands but did eventually nod to question by her sister at bedside. She is able to sit completely up in bed and has a strong cough on suctioning with minimal secretions and remained hemodynamically stable with good O2 saturations. The patient has been alkalotic on blood gases so I reduced her Vt closer to lung protective ventilation - 390cc. Patient is breathing over the vent so best intervention is to decrease Vt as opposed to RR adjustments. Received lasix dose 5/26 PM    Plan  - extubated this AM - could not tolerate BiPAP 2/2 agitation requiring restraints, so placed on nasal cannula - doing well on 3L with SpO2 99%  - goal SpO2 88% or higher   - continue lasix as needed to maintain net even to slightly net negative fluid balance  - we respectfully sign off

## 2023-05-27 NOTE — SUBJECTIVE & OBJECTIVE
Interval History: No acute events overnight. Patient was extubated successfully this morning. Still not mentating completely appropriately or at baseline, however continuing to improve. Pt and family requesting resumption of her home pain and anxiety regimen. Will restart and adjust accordingly. Medically stable.    Review of Systems   Unable to perform ROS: Mental status change   Objective:     Vital Signs (Most Recent):  Temp: 97 °F (36.1 °C) (05/27/23 0745)  Pulse: 70 (05/27/23 1036)  Resp: (!) 22 (05/27/23 1330)  BP: 114/67 (05/27/23 1036)  SpO2: 98 % (05/27/23 1036) Vital Signs (24h Range):  Temp:  [96.4 °F (35.8 °C)-98.1 °F (36.7 °C)] 97 °F (36.1 °C)  Pulse:  [52-78] 70  Resp:  [16-37] 22  SpO2:  [90 %-100 %] 98 %  BP: ()/(51-87) 114/67     Weight: 59 kg (130 lb)  Body mass index is 21.63 kg/m².     SpO2: 98 %       Intake/Output Summary (Last 24 hours) at 5/27/2023 1410  Last data filed at 5/27/2023 0800  Gross per 24 hour   Intake 1219.11 ml   Output 1741 ml   Net -521.89 ml       Lines/Drains/Airways       Central Venous Catheter Line  Duration             Percutaneous Central Line Insertion/Assessment - Triple Lumen  05/24/23 0539 Internal Jugular Right 3 days              Drain  Duration                  Urethral Catheter 05/23/23 2306 Temperature probe 16 Fr. 3 days         NG/OG Tube 05/25/23 1501 14 Fr. Right nostril 1 day              Airway  Duration                  Airway - Non-Surgical 05/23/23 2300 Endotracheal Tube 3 days                       Physical Exam  Vitals reviewed.   Constitutional:       Appearance: She is ill-appearing.   HENT:      Head: Normocephalic and atraumatic.   Eyes:      General: No scleral icterus.     Conjunctiva/sclera: Conjunctivae normal.   Cardiovascular:      Rate and Rhythm: Normal rate and regular rhythm.   Pulmonary:      Effort: No respiratory distress.   Abdominal:      General: There is no distension.   Musculoskeletal:         General: No swelling.       Right lower leg: No edema.      Left lower leg: No edema.   Skin:     General: Skin is warm and dry.   Neurological:      Mental Status: She is alert.   Psychiatric:         Mood and Affect: Mood normal.          Significant Labs: All pertinent lab results from the last 24 hours have been reviewed.    Significant Imaging:  All relevant imaging reviewed

## 2023-05-27 NOTE — CARE UPDATE
Hemodynamics     CVP: 4  SCVO2: 65  Cardiac Output: 3.4  Cardiac Index: 2.1  SVR: 1535    Continuous Infusions:   dexmedeTOMIDine (Precedex) infusion (titrating) 1.4 mcg/kg/hr (05/26/23 2204)    propofoL 40 mcg/kg/min (05/26/23 2204)       Intake/Output Summary (Last 24 hours) at 5/26/2023 2358  Last data filed at 5/26/2023 2204  Gross per 24 hour   Intake 2221.87 ml   Output 1636 ml   Net 585.87 ml         Plan: No changes.   Case discussed with on call attending.    Rafa Renner MD  Cardiology Fellow, PGY4

## 2023-05-27 NOTE — PLAN OF CARE
Cardiac ICU Care Plan    POC reviewed with Reza Morrow and Celeste (sister). Questions and concerns addressed. See below and flowsheets for full assessment and VS info.    @2230 right fingers are pink/purple and cold to touch. +1 right radal pulse. Core temp 96.8.  BP 92/63 (64). Warming blanket placed on pt.  @2250 BP dropped to 77/53 (61). Pulso ox unable to read pt O2 sat.  MD notified.  MD ordered SvO2 and ABG. Reported results to MD. MD ordered stat lactic and 250cc NS bolus over 2 hours.   @225 repeat hemodynamic reported to  MD  @300 UOP decreased to 20cc/hr, CVP 4  MD aware.      Neuro:  [place] Coma Scale  Best Eye Response: 2-->(E2) to pain  Best Motor Response: 4-->(M4) withdraws from pain  Best Verbal Response: 1-->(V1) none  [place] Coma Scale Score: 7  Assessment Qualifiers: patient chemically sedated or paralyzed, patient intubated  Pupil PERRLA: yes    24 hr Temp:  [96.4 °F (35.8 °C)-99.1 °F (37.3 °C)]      CV:  Rhythm: normal sinus rhythm   DVT prophylaxis: VTE Required Core Measure: Pharmacological prophylaxis initiated/maintained    CVP: 4, 5, 4, 4       SvO2: 65, 46, 69               Pulses  Right Radial Pulse: 1+ (weak)  Left Radial Pulse: 1+ (weak)  Right Dorsalis Pedis Pulse: 1+ (weak)  Left Dorsalis Pedis Pulse: 1+ (weak)  Right Posterior Tibial Pulse: 1+ (weak)  Left Posterior Tibial Pulse: 1+ (weak)    Resp:     Vent Mode: A/C  Set Rate: 16 BPM  Oxygen Concentration (%): 21  Vt Set: 390 mL  PEEP/CPAP: 5 cmH20  Pressure Support: 10 cmH20    GI/:  GI prophylaxis: yes  Diet/Nutrition Received: NPO  Last Bowel Movement:  (ROBIN intubated/sedated)  Voiding Characteristics: external catheter       Urethral Catheter 05/23/23 2306 Temperature probe 16 Fr.-Reason for Continuing Urinary Catheterization: Critically ill in ICU and requiring hourly monitoring of intake/output   Intake/Output Summary (Last 24 hours) at 5/27/2023 5189  Last data filed at 5/27/2023 7287  Gross per 24  hour   Intake 2378.5 ml   Output 2071 ml   Net 307.5 ml          Labs/Accuchecks:  Recent Labs   Lab 05/25/23  0315 05/26/23  0302 05/27/23  0331   WBC 9.46 5.56 4.74   RBC 4.82 4.34 4.08   HGB 14.4 12.8 12.0   HCT 43.1 37.8 37.0    143* 159      Recent Labs   Lab 05/23/23 2207   INR 1.0   APTT 24.2      Recent Labs     05/27/23  0331      K 3.6   CO2 21*      BUN 28*   CREATININE 0.8   ALKPHOS 62   ALT 11   AST 23   BILITOT 0.7       Recent Labs   Lab 05/23/23 2015   *   TROPONINI 2.123*      Recent Labs     05/26/23  2356 05/27/23  0010 05/27/23 0225   PH 7.371 7.422 7.394   PCO2 45.3* 32.4* 38.4   PO2 26* 81 36*   HCO3 26.3 21.2* 23.5*   POCSATURATED 46* 96 69*   BE 1 -3 -1       Electrolytes: Electrolytes replaced  Accuchecks: none    Gtts/LDAs:   dexmedeTOMIDine (Precedex) infusion (titrating) 1.4 mcg/kg/hr (05/27/23 0418)    propofoL 50 mcg/kg/min (05/27/23 0416)       Lines/Drains/Airways       Central Venous Catheter Line  Duration             Percutaneous Central Line Insertion/Assessment - Triple Lumen  05/24/23 0539 Internal Jugular Right 2 days              Drain  Duration                  Urethral Catheter 05/23/23 2306 Temperature probe 16 Fr. 3 days         NG/OG Tube 05/25/23 1501 14 Fr. Right nostril 1 day              Airway  Duration                  Airway - Non-Surgical 05/23/23 2300 Endotracheal Tube 3 days              Peripheral Intravenous Line  Duration                  Peripheral IV - Single Lumen 05/23/23 2219 20 G Right Antecubital 3 days                    Skin/Wounds  Bathing/Skin Care: bath, complete;back care;dressed/undressed;linen changed (05/26/23 2215)  Wounds: No  Wound care consulted: No

## 2023-05-27 NOTE — ASSESSMENT & PLAN NOTE
Dx 2015. Prior to intubation patient reported she does not follow with neuro but is on MS medications-- though not listed on active meds. Evidently some question of the diagnosis of her MS was noted in her chart.    - Will resume patient's home pain medications for treatment of her MS:   - Lyrica 150 BID   - On Oxycontin 30 qd, will resume with Oxycontin 10 BID for time being   - Pain PRNs in with oxycodone  - Adjust as appropriate

## 2023-05-28 LAB
ALBUMIN SERPL BCP-MCNC: 2.8 G/DL (ref 3.5–5.2)
ALLENS TEST: ABNORMAL
ALLENS TEST: ABNORMAL
ALP SERPL-CCNC: 62 U/L (ref 55–135)
ALT SERPL W/O P-5'-P-CCNC: 16 U/L (ref 10–44)
ANION GAP SERPL CALC-SCNC: 6 MMOL/L (ref 8–16)
AST SERPL-CCNC: 33 U/L (ref 10–40)
BASOPHILS # BLD AUTO: 0.02 K/UL (ref 0–0.2)
BASOPHILS NFR BLD: 0.5 % (ref 0–1.9)
BILIRUB SERPL-MCNC: 0.6 MG/DL (ref 0.1–1)
BUN SERPL-MCNC: 22 MG/DL (ref 6–20)
CALCIUM SERPL-MCNC: 8.5 MG/DL (ref 8.7–10.5)
CHLORIDE SERPL-SCNC: 109 MMOL/L (ref 95–110)
CO2 SERPL-SCNC: 23 MMOL/L (ref 23–29)
CREAT SERPL-MCNC: 0.6 MG/DL (ref 0.5–1.4)
DELSYS: ABNORMAL
DIFFERENTIAL METHOD: ABNORMAL
EOSINOPHIL # BLD AUTO: 0.1 K/UL (ref 0–0.5)
EOSINOPHIL NFR BLD: 3 % (ref 0–8)
ERYTHROCYTE [DISTWIDTH] IN BLOOD BY AUTOMATED COUNT: 13 % (ref 11.5–14.5)
EST. GFR  (NO RACE VARIABLE): >60 ML/MIN/1.73 M^2
GLUCOSE SERPL-MCNC: 95 MG/DL (ref 70–110)
HCO3 UR-SCNC: 22.9 MMOL/L (ref 24–28)
HCO3 UR-SCNC: 25.2 MMOL/L (ref 24–28)
HCT VFR BLD AUTO: 34.2 % (ref 37–48.5)
HGB BLD-MCNC: 11.3 G/DL (ref 12–16)
IMM GRANULOCYTES # BLD AUTO: 0.01 K/UL (ref 0–0.04)
IMM GRANULOCYTES NFR BLD AUTO: 0.3 % (ref 0–0.5)
LYMPHOCYTES # BLD AUTO: 0.9 K/UL (ref 1–4.8)
LYMPHOCYTES NFR BLD: 25.1 % (ref 18–48)
MAGNESIUM SERPL-MCNC: 2 MG/DL (ref 1.6–2.6)
MCH RBC QN AUTO: 30 PG (ref 27–31)
MCHC RBC AUTO-ENTMCNC: 33 G/DL (ref 32–36)
MCV RBC AUTO: 91 FL (ref 82–98)
MONOCYTES # BLD AUTO: 0.3 K/UL (ref 0.3–1)
MONOCYTES NFR BLD: 9.3 % (ref 4–15)
NEUTROPHILS # BLD AUTO: 2.3 K/UL (ref 1.8–7.7)
NEUTROPHILS NFR BLD: 61.8 % (ref 38–73)
NRBC BLD-RTO: 0 /100 WBC
PCO2 BLDA: 44.2 MMHG (ref 35–45)
PCO2 BLDA: 44.2 MMHG (ref 35–45)
PH SMN: 7.32 [PH] (ref 7.35–7.45)
PH SMN: 7.36 [PH] (ref 7.35–7.45)
PHOSPHATE SERPL-MCNC: 3.9 MG/DL (ref 2.7–4.5)
PLATELET # BLD AUTO: 157 K/UL (ref 150–450)
PMV BLD AUTO: 11.5 FL (ref 9.2–12.9)
PO2 BLDA: 40 MMHG (ref 40–60)
PO2 BLDA: 42 MMHG (ref 40–60)
POC BE: -3 MMOL/L
POC BE: 0 MMOL/L
POC SATURATED O2: 73 % (ref 95–100)
POC SATURATED O2: 74 % (ref 95–100)
POC TCO2: 24 MMOL/L (ref 24–29)
POC TCO2: 27 MMOL/L (ref 24–29)
POTASSIUM SERPL-SCNC: 3.5 MMOL/L (ref 3.5–5.1)
PROT SERPL-MCNC: 6.3 G/DL (ref 6–8.4)
RBC # BLD AUTO: 3.77 M/UL (ref 4–5.4)
SAMPLE: ABNORMAL
SAMPLE: ABNORMAL
SITE: ABNORMAL
SITE: ABNORMAL
SODIUM SERPL-SCNC: 138 MMOL/L (ref 136–145)
WBC # BLD AUTO: 3.67 K/UL (ref 3.9–12.7)

## 2023-05-28 PROCEDURE — 63600175 PHARM REV CODE 636 W HCPCS

## 2023-05-28 PROCEDURE — 85025 COMPLETE CBC W/AUTO DIFF WBC: CPT

## 2023-05-28 PROCEDURE — 63600175 PHARM REV CODE 636 W HCPCS: Performed by: INTERNAL MEDICINE

## 2023-05-28 PROCEDURE — 27000221 HC OXYGEN, UP TO 24 HOURS

## 2023-05-28 PROCEDURE — 99233 PR SUBSEQUENT HOSPITAL CARE,LEVL III: ICD-10-PCS | Mod: ,,, | Performed by: INTERNAL MEDICINE

## 2023-05-28 PROCEDURE — 94761 N-INVAS EAR/PLS OXIMETRY MLT: CPT

## 2023-05-28 PROCEDURE — 82803 BLOOD GASES ANY COMBINATION: CPT

## 2023-05-28 PROCEDURE — 99900035 HC TECH TIME PER 15 MIN (STAT)

## 2023-05-28 PROCEDURE — 84100 ASSAY OF PHOSPHORUS: CPT

## 2023-05-28 PROCEDURE — 25000003 PHARM REV CODE 250: Performed by: INTERNAL MEDICINE

## 2023-05-28 PROCEDURE — 25000003 PHARM REV CODE 250: Performed by: STUDENT IN AN ORGANIZED HEALTH CARE EDUCATION/TRAINING PROGRAM

## 2023-05-28 PROCEDURE — C9113 INJ PANTOPRAZOLE SODIUM, VIA: HCPCS | Performed by: INTERNAL MEDICINE

## 2023-05-28 PROCEDURE — 99233 SBSQ HOSP IP/OBS HIGH 50: CPT | Mod: ,,, | Performed by: INTERNAL MEDICINE

## 2023-05-28 PROCEDURE — 83735 ASSAY OF MAGNESIUM: CPT

## 2023-05-28 PROCEDURE — 20000000 HC ICU ROOM

## 2023-05-28 PROCEDURE — 80053 COMPREHEN METABOLIC PANEL: CPT

## 2023-05-28 PROCEDURE — 25000003 PHARM REV CODE 250

## 2023-05-28 RX ADMIN — LORAZEPAM 2 MG: 2 INJECTION INTRAMUSCULAR; INTRAVENOUS at 10:05

## 2023-05-28 RX ADMIN — DEXMEDETOMIDINE HYDROCHLORIDE 1.4 MCG/KG/HR: 4 INJECTION INTRAVENOUS at 10:05

## 2023-05-28 RX ADMIN — DEXMEDETOMIDINE HYDROCHLORIDE 1 MCG/KG/HR: 4 INJECTION INTRAVENOUS at 09:05

## 2023-05-28 RX ADMIN — MUPIROCIN: 20 OINTMENT TOPICAL at 08:05

## 2023-05-28 RX ADMIN — ATORVASTATIN CALCIUM 20 MG: 20 TABLET, FILM COATED ORAL at 08:05

## 2023-05-28 RX ADMIN — MUPIROCIN: 20 OINTMENT TOPICAL at 09:05

## 2023-05-28 RX ADMIN — DEXMEDETOMIDINE HYDROCHLORIDE 1.4 MCG/KG/HR: 4 INJECTION INTRAVENOUS at 02:05

## 2023-05-28 RX ADMIN — METOPROLOL SUCCINATE 12.5 MG: 25 TABLET, EXTENDED RELEASE ORAL at 09:05

## 2023-05-28 RX ADMIN — PREGABALIN 150 MG: 75 CAPSULE ORAL at 08:05

## 2023-05-28 RX ADMIN — PREGABALIN 150 MG: 75 CAPSULE ORAL at 09:05

## 2023-05-28 RX ADMIN — OXYCODONE HYDROCHLORIDE 10 MG: 10 TABLET, FILM COATED, EXTENDED RELEASE ORAL at 09:05

## 2023-05-28 RX ADMIN — SODIUM CHLORIDE 250 ML: 9 INJECTION, SOLUTION INTRAVENOUS at 12:05

## 2023-05-28 RX ADMIN — POTASSIUM BICARBONATE 50 MEQ: 978 TABLET, EFFERVESCENT ORAL at 06:05

## 2023-05-28 RX ADMIN — ENOXAPARIN SODIUM 40 MG: 40 INJECTION SUBCUTANEOUS at 05:05

## 2023-05-28 RX ADMIN — LORAZEPAM 2 MG: 2 INJECTION INTRAMUSCULAR; INTRAVENOUS at 02:05

## 2023-05-28 RX ADMIN — PANTOPRAZOLE SODIUM 40 MG: 40 INJECTION, POWDER, FOR SOLUTION INTRAVENOUS at 09:05

## 2023-05-28 RX ADMIN — OXYCODONE HYDROCHLORIDE 10 MG: 10 TABLET, FILM COATED, EXTENDED RELEASE ORAL at 08:05

## 2023-05-28 RX ADMIN — DEXMEDETOMIDINE HYDROCHLORIDE 1 MCG/KG/HR: 4 INJECTION INTRAVENOUS at 04:05

## 2023-05-28 NOTE — PLAN OF CARE
Cardiac ICU Care Plan    POC reviewed with Reza Morrow and Celeste (sister). Questions and concerns addressed. Pt had complained of wanting to urinate. Placed on bedpan but pt could not do it successfully. Bladder scanned showing >390. Notfied Gaye SPANGLER MD ordered to place matias. Pt CVP 0 @2300. MD ordered 250 bolus over 4 hours. CVP improved to 3. See below and flowsheets for full assessment and VS info.       Neuro:  [place] Coma Scale  Best Eye Response: 2-->(E2) to pain  Best Motor Response: 4-->(M4) withdraws from pain  Best Verbal Response: 1-->(V1) none  [place] Coma Scale Score: 7  Assessment Qualifiers: patient chemically sedated or paralyzed, patient intubated  Pupil PERRLA: yes    24 hr Temp:  [96.8 °F (36 °C)-98.6 °F (37 °C)]      CV:  Rhythm: sinus bradycardia   DVT prophylaxis: VTE Required Core Measure: Pharmacological prophylaxis initiated/maintained    CVP: 6, 0, 3       SVO2 (%): 81 % (05/27/23 1901)               Pulses  Right Radial Pulse: 1+ (weak)  Left Radial Pulse: 1+ (weak)  Right Dorsalis Pedis Pulse: 1+ (weak)  Left Dorsalis Pedis Pulse: 1+ (weak)  Right Posterior Tibial Pulse: 1+ (weak)  Left Posterior Tibial Pulse: 1+ (weak)    Resp:  Flow (L/min): 4  Vent Mode: A/C  Set Rate: 16 BPM  Oxygen Concentration (%): 21  Vt Set: 390 mL  PEEP/CPAP: 5 cmH20  Pressure Support: 10 cmH20    GI/:  GI prophylaxis: yes  Diet/Nutrition Received: NPO  Last Bowel Movement:  (ROBIN intubated/sedated)  Voiding Characteristics: external catheter  [REMOVED]      Urethral Catheter 05/23/23 2306 Temperature probe 16 Fr.-Reason for Continuing Urinary Catheterization: Critically ill in ICU and requiring hourly monitoring of intake/output   Intake/Output Summary (Last 24 hours) at 5/28/2023 0724  Last data filed at 5/28/2023 0650  Gross per 24 hour   Intake 1057.79 ml   Output 650 ml   Net 407.79 ml            Labs/Accuchecks:  Recent Labs   Lab 05/26/23  0302 05/27/23  0331 05/28/23  0343   WBC 5.56 4.74  3.67*   RBC 4.34 4.08 3.77*   HGB 12.8 12.0 11.3*   HCT 37.8 37.0 34.2*   * 159 157      Recent Labs   Lab 05/23/23  2207   INR 1.0   APTT 24.2      Recent Labs     05/28/23  0343      K 3.5   CO2 23      BUN 22*   CREATININE 0.6   ALKPHOS 62   ALT 16   AST 33   BILITOT 0.6       Recent Labs   Lab 05/23/23 2015   *   TROPONINI 2.123*      Recent Labs     05/27/23  0225 05/27/23  0818 05/27/23 2019   PH 7.394 7.411 7.358   PCO2 38.4 33.9* 39.9   PO2 36* 32* 47   HCO3 23.5* 21.5* 22.4*   POCSATURATED 69* 64* 81*   BE -1 -3 -3       Electrolytes: Electrolytes replaced  Accuchecks: none    Gtts/LDAs:   dexmedeTOMIDine (Precedex) infusion (titrating) 1 mcg/kg/hr (05/28/23 0600)    propofoL Stopped (05/27/23 0945)       Lines/Drains/Airways       Central Venous Catheter Line  Duration             Percutaneous Central Line Insertion/Assessment - Triple Lumen  05/24/23 0539 Internal Jugular Right 4 days              Drain  Duration                  Urethral Catheter 05/28/23 0030 <1 day                    Skin/Wounds  Bathing/Skin Care: bath, complete;dressed/undressed (05/27/23 1901)  Wounds: No  Wound care consulted: No

## 2023-05-28 NOTE — PROGRESS NOTES
[facility]  Cardiology  Progress Note     Hospital Length of Stay: 4    Interval History:  -No acute events overnight  -Moore placed for urinary retention  -Received IV fluids yesterday for CVP 0  -Will attempt step down today if Precedex can be weaned off    Vitals:  Temp:  [97.4 °F (36.3 °C)-98.6 °F (37 °C)] 97.9 °F (36.6 °C)  Pulse:  [] 92  Resp:  [14-33] 20  SpO2:  [76 %-100 %] 95 %  BP: ()/(51-93) 97/68    Intake/Output    Intake/Output Summary (Last 24 hours) at 5/28/2023 1252  Last data filed at 5/28/2023 1000  Gross per 24 hour   Intake 924.27 ml   Output 700 ml   Net 224.27 ml       Physical Exam  Gen: No apparent distress, resting comfortably  HEENT: Pupils equal and reactive to light  Cardio: Regular rate, point of maximal impulse not displaced, no murmur noted, 2+ radial pulses bilaterally, 2+ DP pulses bilaterally  Resp: CTAB, no wheezing  Abd: Soft, non-tender, non-distended  Skin: Warm, dry, no peripheral edema noted      Labs:  Recent Labs   Lab 05/26/23  0302 05/27/23  0331 05/28/23  0343   WBC 5.56 4.74 3.67*   HGB 12.8 12.0 11.3*   HCT 37.8 37.0 34.2*   * 159 157     Recent Labs   Lab 05/23/23 2015 05/24/23  0430 05/26/23  0302 05/26/23  0753 05/27/23  0331 05/28/23  0343   *   < > 141  --  142 138   K 3.8   < > 3.6 4.3 3.6 3.5      < > 112*  --  107 109   CO2 21*   < > 20*  --  21* 23   BUN 31*   < > 47*  --  28* 22*   CREATININE 0.9   < > 0.9  --  0.8 0.6   *   < > 131*  --  113* 95   CALCIUM 9.9   < > 8.9  --  8.7 8.5*   MG 1.9   < > 2.2  --  1.9 2.0   PHOS  --    < > 2.9  --  4.7* 3.9   LIPASE 34  --   --   --   --   --     < > = values in this interval not displayed.       Micro:    Current Meds:   atorvastatin  20 mg Oral QHS    enoxparin  40 mg Subcutaneous Q24H (prophylaxis, 1700)    metoprolol succinate  12.5 mg Oral Daily    mupirocin   Nasal BID    oxyCODONE  10 mg Oral Q12H    pantoprazole  40 mg Intravenous Daily    pregabalin   150 mg Oral BID       Continuous Infusions:   dexmedeTOMIDine (Precedex) infusion (titrating) 1.4 mcg/kg/hr (05/28/23 1020)    propofoL Stopped (05/27/23 0945)         Assessment and Plan:    Decompensated HFrEF (EF 20%)  -Hemodynamics from this morning:  CVP 4 SVO2 74  CO 5.7 CI 3.5   -Continue metoprolol succinate 12.5 mg qd  -BP softer today, once able will add ARB or Entresto    Delirium  -Wean precedex off today  -If successful will step down to floor    3. MS  -Dx 2015  -Resume home pain meds    4. AHRF  -Resolved, extubated 5/26    Mike Cisneros MD  [facility] PGY-5      I have seen the patient, reviewed the Fellow's history and physical, assessment and plan. I have personally interviewed and examined the patient and agree with the findings.     STEVEN Green MD

## 2023-05-29 PROBLEM — J96.01 ACUTE HYPOXEMIC RESPIRATORY FAILURE: Status: RESOLVED | Noted: 2023-05-23 | Resolved: 2023-05-29

## 2023-05-29 LAB
ALBUMIN SERPL BCP-MCNC: 2.9 G/DL (ref 3.5–5.2)
ALLENS TEST: ABNORMAL
ALLENS TEST: ABNORMAL
ALP SERPL-CCNC: 63 U/L (ref 55–135)
ALT SERPL W/O P-5'-P-CCNC: 20 U/L (ref 10–44)
ANION GAP SERPL CALC-SCNC: 10 MMOL/L (ref 8–16)
AST SERPL-CCNC: 35 U/L (ref 10–40)
BACTERIA BLD CULT: NORMAL
BACTERIA BLD CULT: NORMAL
BASOPHILS # BLD AUTO: 0.03 K/UL (ref 0–0.2)
BASOPHILS NFR BLD: 0.8 % (ref 0–1.9)
BILIRUB SERPL-MCNC: 0.6 MG/DL (ref 0.1–1)
BUN SERPL-MCNC: 11 MG/DL (ref 6–20)
CALCIUM SERPL-MCNC: 8.7 MG/DL (ref 8.7–10.5)
CHLORIDE SERPL-SCNC: 105 MMOL/L (ref 95–110)
CO2 SERPL-SCNC: 24 MMOL/L (ref 23–29)
CREAT SERPL-MCNC: 0.7 MG/DL (ref 0.5–1.4)
DIFFERENTIAL METHOD: ABNORMAL
EOSINOPHIL # BLD AUTO: 0.1 K/UL (ref 0–0.5)
EOSINOPHIL NFR BLD: 3.2 % (ref 0–8)
ERYTHROCYTE [DISTWIDTH] IN BLOOD BY AUTOMATED COUNT: 12.5 % (ref 11.5–14.5)
EST. GFR  (NO RACE VARIABLE): >60 ML/MIN/1.73 M^2
GLUCOSE SERPL-MCNC: 107 MG/DL (ref 70–110)
HCO3 UR-SCNC: 24.6 MMOL/L (ref 24–28)
HCO3 UR-SCNC: 25.1 MMOL/L (ref 24–28)
HCT VFR BLD AUTO: 33.4 % (ref 37–48.5)
HGB BLD-MCNC: 11.4 G/DL (ref 12–16)
IMM GRANULOCYTES # BLD AUTO: 0.01 K/UL (ref 0–0.04)
IMM GRANULOCYTES NFR BLD AUTO: 0.3 % (ref 0–0.5)
LYMPHOCYTES # BLD AUTO: 1 K/UL (ref 1–4.8)
LYMPHOCYTES NFR BLD: 26.7 % (ref 18–48)
MAGNESIUM SERPL-MCNC: 1.6 MG/DL (ref 1.6–2.6)
MCH RBC QN AUTO: 29.7 PG (ref 27–31)
MCHC RBC AUTO-ENTMCNC: 34.1 G/DL (ref 32–36)
MCV RBC AUTO: 87 FL (ref 82–98)
MONOCYTES # BLD AUTO: 0.3 K/UL (ref 0.3–1)
MONOCYTES NFR BLD: 8.3 % (ref 4–15)
NEUTROPHILS # BLD AUTO: 2.3 K/UL (ref 1.8–7.7)
NEUTROPHILS NFR BLD: 60.7 % (ref 38–73)
NRBC BLD-RTO: 0 /100 WBC
OVALOCYTES BLD QL SMEAR: ABNORMAL
PCO2 BLDA: 37.9 MMHG (ref 35–45)
PCO2 BLDA: 41.2 MMHG (ref 35–45)
PF4 HEPARIN CMPLX AB SER QL: 0.38 OD (ref 0–0.4)
PH SMN: 7.38 [PH] (ref 7.35–7.45)
PH SMN: 7.43 [PH] (ref 7.35–7.45)
PHOSPHATE SERPL-MCNC: 3 MG/DL (ref 2.7–4.5)
PLATELET # BLD AUTO: 86 K/UL (ref 150–450)
PLATELET BLD QL SMEAR: ABNORMAL
PMV BLD AUTO: 11.7 FL (ref 9.2–12.9)
PO2 BLDA: 27 MMHG (ref 40–60)
PO2 BLDA: 40 MMHG (ref 40–60)
POC BE: 0 MMOL/L
POC BE: 1 MMOL/L
POC SATURATED O2: 52 % (ref 95–100)
POC SATURATED O2: 74 % (ref 95–100)
POC TCO2: 26 MMOL/L (ref 24–29)
POC TCO2: 26 MMOL/L (ref 24–29)
POTASSIUM SERPL-SCNC: 3.9 MMOL/L (ref 3.5–5.1)
PROT SERPL-MCNC: 6.3 G/DL (ref 6–8.4)
RBC # BLD AUTO: 3.84 M/UL (ref 4–5.4)
SAMPLE: ABNORMAL
SAMPLE: ABNORMAL
SITE: ABNORMAL
SITE: ABNORMAL
SODIUM SERPL-SCNC: 139 MMOL/L (ref 136–145)
SPHEROCYTES BLD QL SMEAR: ABNORMAL
WBC # BLD AUTO: 3.75 K/UL (ref 3.9–12.7)
WBC TOXIC VACUOLES BLD QL SMEAR: PRESENT

## 2023-05-29 PROCEDURE — 84100 ASSAY OF PHOSPHORUS: CPT

## 2023-05-29 PROCEDURE — 25000003 PHARM REV CODE 250

## 2023-05-29 PROCEDURE — 86022 PLATELET ANTIBODIES: CPT

## 2023-05-29 PROCEDURE — 93005 ELECTROCARDIOGRAM TRACING: CPT

## 2023-05-29 PROCEDURE — 93010 ELECTROCARDIOGRAM REPORT: CPT | Mod: ,,, | Performed by: INTERNAL MEDICINE

## 2023-05-29 PROCEDURE — 25000003 PHARM REV CODE 250: Performed by: STUDENT IN AN ORGANIZED HEALTH CARE EDUCATION/TRAINING PROGRAM

## 2023-05-29 PROCEDURE — 92610 EVALUATE SWALLOWING FUNCTION: CPT

## 2023-05-29 PROCEDURE — 85025 COMPLETE CBC W/AUTO DIFF WBC: CPT

## 2023-05-29 PROCEDURE — 99233 PR SUBSEQUENT HOSPITAL CARE,LEVL III: ICD-10-PCS | Mod: ,,, | Performed by: INTERNAL MEDICINE

## 2023-05-29 PROCEDURE — 63600175 PHARM REV CODE 636 W HCPCS: Performed by: STUDENT IN AN ORGANIZED HEALTH CARE EDUCATION/TRAINING PROGRAM

## 2023-05-29 PROCEDURE — 94761 N-INVAS EAR/PLS OXIMETRY MLT: CPT

## 2023-05-29 PROCEDURE — 93010 EKG 12-LEAD: ICD-10-PCS | Mod: ,,, | Performed by: INTERNAL MEDICINE

## 2023-05-29 PROCEDURE — 20600001 HC STEP DOWN PRIVATE ROOM

## 2023-05-29 PROCEDURE — 25000003 PHARM REV CODE 250: Performed by: HOSPITALIST

## 2023-05-29 PROCEDURE — 80053 COMPREHEN METABOLIC PANEL: CPT

## 2023-05-29 PROCEDURE — 99900035 HC TECH TIME PER 15 MIN (STAT)

## 2023-05-29 PROCEDURE — 83735 ASSAY OF MAGNESIUM: CPT

## 2023-05-29 PROCEDURE — 99233 SBSQ HOSP IP/OBS HIGH 50: CPT | Mod: ,,, | Performed by: INTERNAL MEDICINE

## 2023-05-29 PROCEDURE — 63600175 PHARM REV CODE 636 W HCPCS: Performed by: INTERNAL MEDICINE

## 2023-05-29 PROCEDURE — C9113 INJ PANTOPRAZOLE SODIUM, VIA: HCPCS | Performed by: INTERNAL MEDICINE

## 2023-05-29 RX ORDER — METOPROLOL SUCCINATE 25 MG/1
25 TABLET, EXTENDED RELEASE ORAL DAILY
Status: DISCONTINUED | OUTPATIENT
Start: 2023-05-30 | End: 2023-05-29

## 2023-05-29 RX ORDER — MAGNESIUM SULFATE HEPTAHYDRATE 40 MG/ML
2 INJECTION, SOLUTION INTRAVENOUS ONCE
Status: COMPLETED | OUTPATIENT
Start: 2023-05-29 | End: 2023-05-29

## 2023-05-29 RX ORDER — HYDRALAZINE HYDROCHLORIDE 25 MG/1
25 TABLET, FILM COATED ORAL EVERY 8 HOURS PRN
Status: DISCONTINUED | OUTPATIENT
Start: 2023-05-29 | End: 2023-05-30 | Stop reason: HOSPADM

## 2023-05-29 RX ORDER — LORAZEPAM 2 MG/ML
1 INJECTION INTRAMUSCULAR EVERY 4 HOURS PRN
Status: DISCONTINUED | OUTPATIENT
Start: 2023-05-29 | End: 2023-05-30

## 2023-05-29 RX ORDER — HYDROXYZINE PAMOATE 25 MG/1
100 CAPSULE ORAL NIGHTLY PRN
Status: DISCONTINUED | OUTPATIENT
Start: 2023-05-29 | End: 2023-05-30 | Stop reason: HOSPADM

## 2023-05-29 RX ORDER — POTASSIUM CHLORIDE 14.9 MG/ML
20 INJECTION INTRAVENOUS ONCE
Status: COMPLETED | OUTPATIENT
Start: 2023-05-29 | End: 2023-05-29

## 2023-05-29 RX ORDER — OXYCODONE HYDROCHLORIDE 10 MG/1
20 TABLET ORAL EVERY 8 HOURS PRN
Status: DISCONTINUED | OUTPATIENT
Start: 2023-05-29 | End: 2023-05-30

## 2023-05-29 RX ORDER — NITROGLYCERIN 20 MG/100ML
INJECTION INTRAVENOUS
Status: DISCONTINUED
Start: 2023-05-29 | End: 2023-05-29 | Stop reason: WASHOUT

## 2023-05-29 RX ORDER — METOPROLOL TARTRATE 25 MG/1
25 TABLET, FILM COATED ORAL 2 TIMES DAILY
Status: DISCONTINUED | OUTPATIENT
Start: 2023-05-29 | End: 2023-05-29

## 2023-05-29 RX ORDER — ACETAMINOPHEN 325 MG/1
650 TABLET ORAL EVERY 6 HOURS PRN
Status: DISCONTINUED | OUTPATIENT
Start: 2023-05-29 | End: 2023-05-30 | Stop reason: HOSPADM

## 2023-05-29 RX ORDER — VALSARTAN 40 MG/1
40 TABLET ORAL 2 TIMES DAILY
Status: DISCONTINUED | OUTPATIENT
Start: 2023-05-29 | End: 2023-05-29

## 2023-05-29 RX ORDER — METOPROLOL TARTRATE 25 MG/1
25 TABLET, FILM COATED ORAL 4 TIMES DAILY
Status: DISCONTINUED | OUTPATIENT
Start: 2023-05-29 | End: 2023-05-30

## 2023-05-29 RX ADMIN — PREGABALIN 150 MG: 75 CAPSULE ORAL at 08:05

## 2023-05-29 RX ADMIN — VALSARTAN 40 MG: 40 TABLET, FILM COATED ORAL at 09:05

## 2023-05-29 RX ADMIN — METOPROLOL TARTRATE 25 MG: 25 TABLET, FILM COATED ORAL at 05:05

## 2023-05-29 RX ADMIN — PANTOPRAZOLE SODIUM 40 MG: 40 INJECTION, POWDER, FOR SOLUTION INTRAVENOUS at 08:05

## 2023-05-29 RX ADMIN — METOPROLOL SUCCINATE 12.5 MG: 25 TABLET, EXTENDED RELEASE ORAL at 01:05

## 2023-05-29 RX ADMIN — SACUBITRIL AND VALSARTAN 1 TABLET: 24; 26 TABLET, FILM COATED ORAL at 08:05

## 2023-05-29 RX ADMIN — METOPROLOL SUCCINATE 12.5 MG: 25 TABLET, EXTENDED RELEASE ORAL at 08:05

## 2023-05-29 RX ADMIN — HYDROXYZINE PAMOATE 100 MG: 25 CAPSULE ORAL at 08:05

## 2023-05-29 RX ADMIN — MAGNESIUM SULFATE 2 G: 2 INJECTION INTRAVENOUS at 05:05

## 2023-05-29 RX ADMIN — POTASSIUM CHLORIDE 20 MEQ: 14.9 INJECTION, SOLUTION INTRAVENOUS at 05:05

## 2023-05-29 RX ADMIN — ATORVASTATIN CALCIUM 20 MG: 20 TABLET, FILM COATED ORAL at 08:05

## 2023-05-29 RX ADMIN — OXYCODONE HYDROCHLORIDE 10 MG: 10 TABLET, FILM COATED, EXTENDED RELEASE ORAL at 08:05

## 2023-05-29 RX ADMIN — METOPROLOL TARTRATE 25 MG: 25 TABLET, FILM COATED ORAL at 08:05

## 2023-05-29 RX ADMIN — OXYCODONE HYDROCHLORIDE 20 MG: 10 TABLET ORAL at 09:05

## 2023-05-29 RX ADMIN — ACETAMINOPHEN 650 MG: 325 TABLET ORAL at 06:05

## 2023-05-29 NOTE — NURSING
1230- Patient- aaox4- transfer from ICU via stretcher - Patient anxious/shaky - dry cough noted - bp check 161/93- heart rhythm ST on monitor - rate 118- respiration 20 even and unlabored - nadn- nurse will continue to monitor for any changes...    1600- patient remains ST on monitor - no c/o chest pain at this present time -Dr. Costello notified - ekg done...

## 2023-05-29 NOTE — PT/OT/SLP EVAL
Speech Language Pathology Evaluation  Bedside Swallow/Discharge Summary     Patient Name:  Reza Morrow   MRN:  [id number]  Admitting Diagnosis: HFrEF (heart failure with reduced ejection fraction)    Recommendations:                 General Recommendations:  Dysphagia therapy  Diet recommendations:  Regular, Thin   Aspiration Precautions: Standard aspiration precautions   General Precautions: Standard,    Communication strategies:  none    Assessment:     Reza Morrow is a 58 y.o. female with an SLP diagnosis of  intact oral feeding and swallowing skills t o continue regular unrestricted diet.     History:     Past Medical History:   Diagnosis Date    Behavioral problem     Bulging lumbar disc     Bursitis     bilateral hip    Degenerative cervical disc     Hx of psychiatric care     Hypercholesteremia     Labral tear of hip, degenerative bilateral    MS (multiple sclerosis)     Paresthesia of both lower extremities 3/13/2022    Pneumonia     Spinal cord compression        Past Surgical History:   Procedure Laterality Date    ANGIOGRAM, CORONARY, WITH LEFT HEART CATHETERIZATION Left 3/11/2022    Procedure: Angiogram, Coronary, with Left Heart Cath;  Surgeon: Elie Matamoros MD;  Location: [facility] CATH LAB;  Service: Cardiology;  Laterality: Left;    BREAST SURGERY      augmentation     SECTION, CLASSIC      HAND SURGERY      HYSTEROSCOPY      NECK SURGERY      cervical disc removeal    TUBAL LIGATION      X-STOP IMPLANTATION          Hospital Course:   Patient remained intubated overnight, not requiring any vasopressors, maintaining MAPs on her own of 90s. After IV pushes of Lasix, pt diuresed well, putting out ~2L urine. CT chest showed bilateral diffuse ground glass opacities suggestive of interstitial edema associated with inflammation vs infection. She remains on broad spec antibiotics with vanc and cefepime. Hemodynamics overnight on  worsened slightly and pt developed hypotension, so dobutamine was  started. Trialed SAT/SBT on 5/25 with sedation weaned and patient showing more signs of wakefulness, however pt not following commands and still failing SBT. Pulmonology consulted for assistance in extubation given continued difficulty in liberating patient from the vent; pt was successfully extubated on 5/27. Patient stable, however exhibited significant AMS and required precedex for agitation. Mentation markedly improved on 5/28 and pt continues to do well. Will wean precedex and look to step down.    Prior Intubation HX:  5/23-5/25    Prior diet: regular solids and thin liquids    Subjective     Pt awake and alert with sister at bedside     Pain/Comfort:  Pain Rating 1: 0/10  Pain Rating Post-Intervention 1: 0/10    Respiratory Status: Room air    Objective:     Oral Musculature Evaluation  Oral Musculature: WFL  Dentition: present and adequate  Secretion Management: adequate  Oral Labial Strength and Mobility: WFL  Lingual Strength and Mobility: WFL  Volitional Cough: strong dry  Volitional Swallow: prompt  Voice Prior to PO Intake: strong and clear    Bedside Swallow Eval:   Consistencies Assessed:  Thin liquids 2oz via single sips   Solids regular solids x3      Oral Phase:   WFL    Pharyngeal Phase:   no overt clinical signs/symptoms of aspiration  no overt clinical signs/symptoms of pharyngeal dysphagia    Compensatory Strategies  None    Treatment:  Education provided to Pt re: SLP role in acute care setting, overall impressions and therapeutic goals. Whiteboard updated.      Plan:     Patient to be seen:      Plan of Care expires:     Plan of Care reviewed with:  patient, sibling   SLP Follow-Up:  No       Discharge recommendations:      Barriers to Discharge:  None    Time Tracking:     SLP Treatment Date:   05/29/23  Speech Start Time:  0955  Speech Stop Time:  1007     Speech Total Time (min):  12 min    Billable Minutes: Eval Swallow and Oral Function 12    05/29/2023

## 2023-05-29 NOTE — ASSESSMENT & PLAN NOTE
History of Takotsubo cardiomyopathy and hospitalization due to CHF in March 2022. Formal echo done at the time showed reduced EF 25%, and mid-ventricular hypokinesis suggestive of takutsubo cardiomyopathy. Patient was lost to follow-up and suspect may not have been on adequate GDMT at time of this hospitalization.    Plan:  - Holding Lasix at this time given that pt appears euvolemic  - Strict I/O's and monitor UOP  - Daily CMPs  - If blood pressure tolerates, would like to resume GDMT and uptitrate prior to discharge.   - Continue Metoprolol succinate 12.5 qd   - Would like to start Entresto 24-26 or if cost prohibitive, low dose valsartan 40mg BID  - Ensure good cardiology follow-up upon discharge

## 2023-05-29 NOTE — CARE UPDATE
Hemodynamics     CVP: 6  SCVO2: 73  Cardiac Output: 5.2  Cardiac Index: 3.2  SVR: 1129    Continuous Infusions:   dexmedeTOMIDine (Precedex) infusion (titrating) 1 mcg/kg/hr (05/28/23 1800)    propofoL Stopped (05/27/23 0945)       Intake/Output Summary (Last 24 hours) at 5/28/2023 2133  Last data filed at 5/28/2023 1800  Gross per 24 hour   Intake 837.5 ml   Output 1000 ml   Net -162.5 ml         Plan: No changes.   Case discussed with on call attending.    Rafa Renner MD  Cardiology Fellow, PGY4

## 2023-05-29 NOTE — PROGRESS NOTES
[facility] - Cardiac Intensive Care  Cardiology  Progress Note    Patient Name: Reza Morrow  MRN: [id number]  Admission Date: 5/23/2023  Hospital Length of Stay: 5 days  Code Status: Full Code   Attending Physician: Neel Geren MD   Primary Care Physician: Kip Jimenez MD  Expected Discharge Date: 5/29/2023  Principal Problem:HFrEF (heart failure with reduced ejection fraction)    Subjective:     Hospital Course:   Patient remained intubated overnight, not requiring any vasopressors, maintaining MAPs on her own of 90s. After IV pushes of Lasix, pt diuresed well, putting out ~2L urine. CT chest showed bilateral diffuse ground glass opacities suggestive of interstitial edema associated with inflammation vs infection. She remains on broad spec antibiotics with vanc and cefepime. Hemodynamics overnight on 5/24 worsened slightly and pt developed hypotension, so dobutamine was started. Trialed SAT/SBT on 5/25 with sedation weaned and patient showing more signs of wakefulness, however pt not following commands and still failing SBT. Pulmonology consulted for assistance in extubation given continued difficulty in liberating patient from the vent; pt was successfully extubated on 5/27. Patient stable, however exhibited significant AMS and required precedex for agitation. Mentation markedly improved on 5/28 and pt continues to do well. Will wean precedex and look to step down.      Interval History: No acute events overnight. Patient remaining stable. Mentation is significantly better from when she was extubated. Patient is oriented and calm. Will wean off of precedex and look to step down to the floor if she continues to do well.    Review of Systems   Constitutional: Negative for chills and fever.   HENT:  Negative for congestion and sore throat.    Eyes:  Negative for double vision and pain.   Cardiovascular:  Negative for chest pain and palpitations.   Respiratory:  Negative for cough and shortness of breath.     Musculoskeletal:  Negative for back pain and joint pain.   Gastrointestinal:  Negative for abdominal pain and constipation.   Neurological:  Negative for focal weakness and weakness.   Psychiatric/Behavioral:  Negative for altered mental status. The patient does not have insomnia.    Objective:     Vital Signs (Most Recent):  Temp: 98.7 °F (37.1 °C) (05/29/23 0701)  Pulse: 69 (05/29/23 0701)  Resp: (!) 21 (05/29/23 0605)  BP: 128/71 (05/29/23 0605)  SpO2: 100 % (05/29/23 0701) Vital Signs (24h Range):  Temp:  [97.7 °F (36.5 °C)-98.7 °F (37.1 °C)] 98.7 °F (37.1 °C)  Pulse:  [63-95] 69  Resp:  [14-34] 21  SpO2:  [95 %-100 %] 100 %  BP: ()/(52-75) 128/71     Weight: 59 kg (130 lb)  Body mass index is 21.63 kg/m².     SpO2: 100 %         Intake/Output Summary (Last 24 hours) at 5/29/2023 0829  Last data filed at 5/29/2023 0701  Gross per 24 hour   Intake 643.37 ml   Output 1210 ml   Net -566.63 ml       Lines/Drains/Airways       Central Venous Catheter Line  Duration             Percutaneous Central Line Insertion/Assessment - Triple Lumen  05/24/23 0539 Internal Jugular Right 5 days              Drain  Duration                  Urethral Catheter 05/28/23 0030 1 day                       Physical Exam  Vitals reviewed.   Constitutional:       General: She is not in acute distress.  HENT:      Head: Normocephalic and atraumatic.   Eyes:      General: No scleral icterus.     Conjunctiva/sclera: Conjunctivae normal.   Cardiovascular:      Rate and Rhythm: Normal rate and regular rhythm.   Pulmonary:      Effort: No respiratory distress.   Abdominal:      General: There is no distension.   Musculoskeletal:         General: No swelling.      Right lower leg: No edema.      Left lower leg: No edema.   Skin:     General: Skin is warm and dry.   Neurological:      Mental Status: She is alert.   Psychiatric:         Mood and Affect: Mood normal.          Significant Labs: All pertinent lab results from the last 24 hours  have been reviewed.    Significant Imaging:  Imaging reviewed    Assessment and Plan:       * HFrEF (heart failure with reduced ejection fraction)  History of Takotsubo cardiomyopathy and hospitalization due to CHF in March 2022. Formal echo done at the time showed reduced EF 25%, and mid-ventricular hypokinesis suggestive of takutsubo cardiomyopathy. Patient was lost to follow-up and suspect may not have been on adequate GDMT at time of this hospitalization.    Plan:  - Holding Lasix at this time given that pt appears euvolemic  - Strict I/O's and monitor UOP  - Daily CMPs  - If blood pressure tolerates, would like to resume GDMT and uptitrate prior to discharge.   - Continue Metoprolol succinate 12.5 qd   - Would like to start Entresto 24-26 or if cost prohibitive, low dose valsartan 40mg BID  - Ensure good cardiology follow-up upon discharge    MS (multiple sclerosis)  Dx 2015. Prior to intubation patient reported she does not follow with neuro but is on MS medications-- though not listed on active meds. Evidently some question of the diagnosis of her MS was noted in her chart.    - Will resume patient's home pain medications for treatment of her MS:   - Lyrica 150 BID   - On Oxycontin 30 qd, will resume with Oxycontin 10 BID for time being   - Pain PRNs in with oxycodone  - Adjust as appropriate    Opioid dependence  Patient with chronic pain from her MS and significant pain regimen in the form of primarily opioid medications. UDS was negative for opioids however. She reports she takes Oxycodone 30mg q6h PRN at home.    - Monitor for opioid withdrawal  - Will start some of her home regimen and titrate accordingly        VTE Risk Mitigation (From admission, onward)           Ordered     enoxaparin injection 40 mg  Every 24 hours         05/24/23 0902                    Manuel Meza MD  Cardiology  [facility] - [facility]    I have seen the patient, reviewed the Fellow's history and physical, assessment  and plan. I have personally interviewed and examined the patient and agree with the findings.       STEVEN Green MD

## 2023-05-29 NOTE — PLAN OF CARE
Problem: Infection  Goal: Absence of Infection Signs and Symptoms  Outcome: Ongoing, Progressing     Problem: Adult Inpatient Plan of Care  Goal: Plan of Care Review  Outcome: Ongoing, Progressing  Goal: Patient-Specific Goal (Individualized)  Outcome: Ongoing, Progressing  Goal: Optimal Comfort and Wellbeing  Outcome: Ongoing, Progressing  Goal: Readiness for Transition of Care  Outcome: Ongoing, Progressing     Problem: Skin Injury Risk Increased  Goal: Skin Health and Integrity  Outcome: Ongoing, Progressing     Problem: Fall Injury Risk  Goal: Absence of Fall and Fall-Related Injury  Outcome: Ongoing, Progressing

## 2023-05-29 NOTE — ASSESSMENT & PLAN NOTE
Patient with chronic pain from her MS and significant pain regimen in the form of primarily opioid medications. UDS was negative for opioids however. She reports she takes Oxycodone 30mg q6h PRN at home.    - Monitor for opioid withdrawal  - Will start some of her home regimen and titrate accordingly

## 2023-05-29 NOTE — SUBJECTIVE & OBJECTIVE
Interval History: No acute events overnight. Patient remaining stable. Mentation is significantly better from when she was extubated. Patient is oriented and calm. Will wean off of precedex and look to step down to the floor if she continues to do well.    Review of Systems   Constitutional: Negative for chills and fever.   HENT:  Negative for congestion and sore throat.    Eyes:  Negative for double vision and pain.   Cardiovascular:  Negative for chest pain and palpitations.   Respiratory:  Negative for cough and shortness of breath.    Musculoskeletal:  Negative for back pain and joint pain.   Gastrointestinal:  Negative for abdominal pain and constipation.   Neurological:  Negative for focal weakness and weakness.   Psychiatric/Behavioral:  Negative for altered mental status. The patient does not have insomnia.    Objective:     Vital Signs (Most Recent):  Temp: 98.7 °F (37.1 °C) (05/29/23 0701)  Pulse: 69 (05/29/23 0701)  Resp: (!) 21 (05/29/23 0605)  BP: 128/71 (05/29/23 0605)  SpO2: 100 % (05/29/23 0701) Vital Signs (24h Range):  Temp:  [97.7 °F (36.5 °C)-98.7 °F (37.1 °C)] 98.7 °F (37.1 °C)  Pulse:  [63-95] 69  Resp:  [14-34] 21  SpO2:  [95 %-100 %] 100 %  BP: ()/(52-75) 128/71     Weight: 59 kg (130 lb)  Body mass index is 21.63 kg/m².     SpO2: 100 %         Intake/Output Summary (Last 24 hours) at 5/29/2023 0829  Last data filed at 5/29/2023 0701  Gross per 24 hour   Intake 643.37 ml   Output 1210 ml   Net -566.63 ml       Lines/Drains/Airways       Central Venous Catheter Line  Duration             Percutaneous Central Line Insertion/Assessment - Triple Lumen  05/24/23 0539 Internal Jugular Right 5 days              Drain  Duration                  Urethral Catheter 05/28/23 0030 1 day                       Physical Exam  Vitals reviewed.   Constitutional:       General: She is not in acute distress.  HENT:      Head: Normocephalic and atraumatic.   Eyes:      General: No scleral icterus.      Conjunctiva/sclera: Conjunctivae normal.   Cardiovascular:      Rate and Rhythm: Normal rate and regular rhythm.   Pulmonary:      Effort: No respiratory distress.   Abdominal:      General: There is no distension.   Musculoskeletal:         General: No swelling.      Right lower leg: No edema.      Left lower leg: No edema.   Skin:     General: Skin is warm and dry.   Neurological:      Mental Status: She is alert.   Psychiatric:         Mood and Affect: Mood normal.          Significant Labs: All pertinent lab results from the last 24 hours have been reviewed.    Significant Imaging:  Imaging reviewed

## 2023-05-29 NOTE — PLAN OF CARE
Cardiac ICU Care Plan    POC reviewed with Reza Morrow and family. Questions and concerns addressed. Pt progressing toward goals. Will continue to monitor. See below and flowsheets for full assessment and VS info.       Neuro:  Garrick Coma Scale  Best Eye Response: 4-->(E4) spontaneous  Best Motor Response: 6-->(M6) obeys commands  Best Verbal Response: 5-->(V5) oriented  Vinton Coma Scale Score: 15  Assessment Qualifiers: patient chemically sedated or paralyzed  Pupil PERRLA: yes    24 hr Temp:  [97.7 °F (36.5 °C)-98.2 °F (36.8 °C)]      CV:  Rhythm: normal sinus rhythm   DVT prophylaxis: VTE Required Core Measure: Pharmacological prophylaxis initiated/maintained    CVP (mean): (S) 6 mmHg (05/28/23 1905)       SVO2 (%): 81 % (05/27/23 1901)               Pulses  Right Radial Pulse: 1+ (weak)  Left Radial Pulse: 1+ (weak)  Right Dorsalis Pedis Pulse: 1+ (weak)  Left Dorsalis Pedis Pulse: 1+ (weak)  Right Posterior Tibial Pulse: 1+ (weak)  Left Posterior Tibial Pulse: 1+ (weak)    Resp:  Flow (L/min): 4  Vent Mode: A/C  Set Rate: 16 BPM  Oxygen Concentration (%): 36  Vt Set: 390 mL  PEEP/CPAP: 5 cmH20  Pressure Support: 10 cmH20    GI/:  GI prophylaxis: yes  Diet/Nutrition Received: regular  Last Bowel Movement:  (ROBIN intubated/sedated)  Voiding Characteristics: urethral catheter (bladder)  [REMOVED]      Urethral Catheter 05/23/23 2306 Temperature probe 16 Fr.-Reason for Continuing Urinary Catheterization: Critically ill in ICU and requiring hourly monitoring of intake/output   Intake/Output Summary (Last 24 hours) at 5/29/2023 0628  Last data filed at 5/29/2023 0605  Gross per 24 hour   Intake 643.37 ml   Output 1210 ml   Net -566.63 ml        Nutritional Supplement Intake: Quantity none, Type: Boost    Labs/Accuchecks:  Recent Labs   Lab 05/27/23  0331 05/28/23  0343 05/29/23  0315   WBC 4.74 3.67* 3.75*   RBC 4.08 3.77* 3.84*   HGB 12.0 11.3* 11.4*   HCT 37.0 34.2* 33.4*    157 86*      Recent Labs    Lab 05/23/23 2207   INR 1.0   APTT 24.2      Recent Labs     05/29/23  0315      K 3.9   CO2 24      BUN 11   CREATININE 0.7   ALKPHOS 63   ALT 20   AST 35   BILITOT 0.6       Recent Labs   Lab 05/23/23 2015   *   TROPONINI 2.123*      Recent Labs     05/27/23 2019 05/28/23  0828 05/28/23 2012   PH 7.358 7.323* 7.364   PCO2 39.9 44.2 44.2   PO2 47 42 40   HCO3 22.4* 22.9* 25.2   POCSATURATED 81* 74* 73*   BE -3 -3 0       Electrolytes: Electrolytes replaced  Accuchecks: none    Gtts/LDAs:   dexmedeTOMIDine (Precedex) infusion (titrating) 1 mcg/kg/hr (05/29/23 0605)    propofoL Stopped (05/27/23 0945)       Lines/Drains/Airways       Central Venous Catheter Line  Duration             Percutaneous Central Line Insertion/Assessment - Triple Lumen  05/24/23 0539 Internal Jugular Right 5 days              Drain  Duration                  Urethral Catheter 05/28/23 0030 1 day                    Skin/Wounds  Bathing/Skin Care: dressed/undressed;linen changed (05/29/23 0605)  Wounds: No  Wound care consulted: No

## 2023-05-30 VITALS
OXYGEN SATURATION: 93 % | HEIGHT: 65 IN | SYSTOLIC BLOOD PRESSURE: 135 MMHG | DIASTOLIC BLOOD PRESSURE: 79 MMHG | TEMPERATURE: 99 F | BODY MASS INDEX: 19.72 KG/M2 | RESPIRATION RATE: 18 BRPM | HEART RATE: 91 BPM | WEIGHT: 118.38 LBS

## 2023-05-30 LAB
ANION GAP SERPL CALC-SCNC: 10 MMOL/L (ref 8–16)
BASOPHILS # BLD AUTO: 0.05 K/UL (ref 0–0.2)
BASOPHILS NFR BLD: 0.4 % (ref 0–1.9)
BUN SERPL-MCNC: 9 MG/DL (ref 6–20)
CALCIUM SERPL-MCNC: 9.4 MG/DL (ref 8.7–10.5)
CHLORIDE SERPL-SCNC: 102 MMOL/L (ref 95–110)
CO2 SERPL-SCNC: 25 MMOL/L (ref 23–29)
CREAT SERPL-MCNC: 0.7 MG/DL (ref 0.5–1.4)
DIFFERENTIAL METHOD: ABNORMAL
EOSINOPHIL # BLD AUTO: 0.1 K/UL (ref 0–0.5)
EOSINOPHIL NFR BLD: 0.9 % (ref 0–8)
ERYTHROCYTE [DISTWIDTH] IN BLOOD BY AUTOMATED COUNT: 12.9 % (ref 11.5–14.5)
EST. GFR  (NO RACE VARIABLE): >60 ML/MIN/1.73 M^2
GLUCOSE SERPL-MCNC: 145 MG/DL (ref 70–110)
HCT VFR BLD AUTO: 45 % (ref 37–48.5)
HGB BLD-MCNC: 15.8 G/DL (ref 12–16)
IMM GRANULOCYTES # BLD AUTO: 0.04 K/UL (ref 0–0.04)
IMM GRANULOCYTES NFR BLD AUTO: 0.3 % (ref 0–0.5)
LYMPHOCYTES # BLD AUTO: 1.4 K/UL (ref 1–4.8)
LYMPHOCYTES NFR BLD: 11.4 % (ref 18–48)
MAGNESIUM SERPL-MCNC: 1.8 MG/DL (ref 1.6–2.6)
MCH RBC QN AUTO: 30 PG (ref 27–31)
MCHC RBC AUTO-ENTMCNC: 35.1 G/DL (ref 32–36)
MCV RBC AUTO: 85 FL (ref 82–98)
MONOCYTES # BLD AUTO: 1 K/UL (ref 0.3–1)
MONOCYTES NFR BLD: 8.5 % (ref 4–15)
NEUTROPHILS # BLD AUTO: 9.4 K/UL (ref 1.8–7.7)
NEUTROPHILS NFR BLD: 78.5 % (ref 38–73)
NRBC BLD-RTO: 0 /100 WBC
PLATELET # BLD AUTO: 335 K/UL (ref 150–450)
PMV BLD AUTO: 11.1 FL (ref 9.2–12.9)
POTASSIUM SERPL-SCNC: 3.7 MMOL/L (ref 3.5–5.1)
RBC # BLD AUTO: 5.27 M/UL (ref 4–5.4)
SODIUM SERPL-SCNC: 137 MMOL/L (ref 136–145)
TROPONIN I SERPL DL<=0.01 NG/ML-MCNC: 0.07 NG/ML (ref 0–0.03)
WBC # BLD AUTO: 11.97 K/UL (ref 3.9–12.7)

## 2023-05-30 PROCEDURE — 97162 PT EVAL MOD COMPLEX 30 MIN: CPT

## 2023-05-30 PROCEDURE — 99239 PR HOSPITAL DISCHARGE DAY,>30 MIN: ICD-10-PCS | Mod: ,,, | Performed by: HOSPITALIST

## 2023-05-30 PROCEDURE — 63600175 PHARM REV CODE 636 W HCPCS: Performed by: HOSPITALIST

## 2023-05-30 PROCEDURE — 83735 ASSAY OF MAGNESIUM: CPT | Performed by: HOSPITALIST

## 2023-05-30 PROCEDURE — 63600175 PHARM REV CODE 636 W HCPCS: Performed by: INTERNAL MEDICINE

## 2023-05-30 PROCEDURE — 93005 ELECTROCARDIOGRAM TRACING: CPT

## 2023-05-30 PROCEDURE — 84484 ASSAY OF TROPONIN QUANT: CPT | Performed by: HOSPITALIST

## 2023-05-30 PROCEDURE — 97166 OT EVAL MOD COMPLEX 45 MIN: CPT

## 2023-05-30 PROCEDURE — 85025 COMPLETE CBC W/AUTO DIFF WBC: CPT | Performed by: HOSPITALIST

## 2023-05-30 PROCEDURE — 93010 ELECTROCARDIOGRAM REPORT: CPT | Mod: ,,, | Performed by: INTERNAL MEDICINE

## 2023-05-30 PROCEDURE — 97535 SELF CARE MNGMENT TRAINING: CPT

## 2023-05-30 PROCEDURE — 99239 HOSP IP/OBS DSCHRG MGMT >30: CPT | Mod: ,,, | Performed by: HOSPITALIST

## 2023-05-30 PROCEDURE — 36415 COLL VENOUS BLD VENIPUNCTURE: CPT | Performed by: HOSPITALIST

## 2023-05-30 PROCEDURE — 80048 BASIC METABOLIC PNL TOTAL CA: CPT | Performed by: HOSPITALIST

## 2023-05-30 PROCEDURE — 97116 GAIT TRAINING THERAPY: CPT

## 2023-05-30 PROCEDURE — 25000003 PHARM REV CODE 250: Performed by: HOSPITALIST

## 2023-05-30 PROCEDURE — 25000003 PHARM REV CODE 250

## 2023-05-30 PROCEDURE — C9113 INJ PANTOPRAZOLE SODIUM, VIA: HCPCS | Performed by: INTERNAL MEDICINE

## 2023-05-30 PROCEDURE — 93010 EKG 12-LEAD: ICD-10-PCS | Mod: ,,, | Performed by: INTERNAL MEDICINE

## 2023-05-30 PROCEDURE — 25000003 PHARM REV CODE 250: Performed by: STUDENT IN AN ORGANIZED HEALTH CARE EDUCATION/TRAINING PROGRAM

## 2023-05-30 PROCEDURE — 94761 N-INVAS EAR/PLS OXIMETRY MLT: CPT

## 2023-05-30 RX ORDER — GUAIFENESIN/DEXTROMETHORPHAN 100-10MG/5
5 SYRUP ORAL EVERY 4 HOURS PRN
Status: DISCONTINUED | OUTPATIENT
Start: 2023-05-30 | End: 2023-05-30 | Stop reason: HOSPADM

## 2023-05-30 RX ORDER — POTASSIUM CHLORIDE 750 MG/1
30 CAPSULE, EXTENDED RELEASE ORAL ONCE
Status: COMPLETED | OUTPATIENT
Start: 2023-05-30 | End: 2023-05-30

## 2023-05-30 RX ORDER — PANTOPRAZOLE SODIUM 40 MG/1
40 TABLET, DELAYED RELEASE ORAL DAILY
Status: DISCONTINUED | OUTPATIENT
Start: 2023-05-31 | End: 2023-05-30 | Stop reason: HOSPADM

## 2023-05-30 RX ORDER — MAGNESIUM SULFATE HEPTAHYDRATE 40 MG/ML
2 INJECTION, SOLUTION INTRAVENOUS ONCE
Status: COMPLETED | OUTPATIENT
Start: 2023-05-30 | End: 2023-05-30

## 2023-05-30 RX ORDER — METOPROLOL SUCCINATE 100 MG/1
100 TABLET, EXTENDED RELEASE ORAL DAILY
Qty: 30 TABLET | Refills: 11 | Status: ON HOLD | OUTPATIENT
Start: 2023-05-30 | End: 2023-06-13 | Stop reason: HOSPADM

## 2023-05-30 RX ORDER — METOPROLOL TARTRATE 25 MG/1
25 TABLET, FILM COATED ORAL EVERY 6 HOURS
Status: DISCONTINUED | OUTPATIENT
Start: 2023-05-30 | End: 2023-05-30 | Stop reason: HOSPADM

## 2023-05-30 RX ORDER — SACUBITRIL AND VALSARTAN 24; 26 MG/1; MG/1
1 TABLET, FILM COATED ORAL 2 TIMES DAILY
Qty: 60 TABLET | Refills: 11 | Status: ON HOLD | OUTPATIENT
Start: 2023-05-30 | End: 2023-06-13 | Stop reason: HOSPADM

## 2023-05-30 RX ORDER — BENZONATATE 100 MG/1
100 CAPSULE ORAL 3 TIMES DAILY PRN
Qty: 28 CAPSULE | Refills: 0 | Status: SHIPPED | OUTPATIENT
Start: 2023-05-30 | End: 2023-06-13

## 2023-05-30 RX ADMIN — METOPROLOL TARTRATE 25 MG: 25 TABLET, FILM COATED ORAL at 12:05

## 2023-05-30 RX ADMIN — ACETAMINOPHEN 650 MG: 325 TABLET ORAL at 06:05

## 2023-05-30 RX ADMIN — HYDRALAZINE HYDROCHLORIDE 25 MG: 25 TABLET, FILM COATED ORAL at 01:05

## 2023-05-30 RX ADMIN — POTASSIUM CHLORIDE 30 MEQ: 10 CAPSULE, COATED, EXTENDED RELEASE ORAL at 12:05

## 2023-05-30 RX ADMIN — METOPROLOL TARTRATE 25 MG: 25 TABLET, FILM COATED ORAL at 08:05

## 2023-05-30 RX ADMIN — SACUBITRIL AND VALSARTAN 1 TABLET: 49; 51 TABLET, FILM COATED ORAL at 08:05

## 2023-05-30 RX ADMIN — METOPROLOL TARTRATE 25 MG: 25 TABLET, FILM COATED ORAL at 05:05

## 2023-05-30 RX ADMIN — GUAIFENESIN AND DEXTROMETHORPHAN 5 ML: 100; 10 SYRUP ORAL at 01:05

## 2023-05-30 RX ADMIN — MAGNESIUM SULFATE 2 G: 2 INJECTION INTRAVENOUS at 12:05

## 2023-05-30 RX ADMIN — PANTOPRAZOLE SODIUM 40 MG: 40 INJECTION, POWDER, FOR SOLUTION INTRAVENOUS at 08:05

## 2023-05-30 RX ADMIN — OXYCODONE HYDROCHLORIDE 20 MG: 10 TABLET ORAL at 04:05

## 2023-05-30 RX ADMIN — PREGABALIN 150 MG: 75 CAPSULE ORAL at 08:05

## 2023-05-30 RX ADMIN — GUAIFENESIN AND DEXTROMETHORPHAN 5 ML: 100; 10 SYRUP ORAL at 11:05

## 2023-05-30 NOTE — PLAN OF CARE
Problem: Occupational Therapy  Goal: Occupational Therapy Goal  Description: Goals to be met by: 6/13/23     Patient will increase functional independence with ADLs by performing:    LE Dressing with Modified [place].  Grooming while standing at sink with [place].  Toileting from toilet with [place] for hygiene and clothing management.   Step transfer with [place]  Toilet transfer to toilet with [place].  [place] with BUE HEP to improve activity tolerance to complete ADLs and IADLs  Within home ambulation distances with [place] and LRAD necessary to complete ADLs.        Outcome: Ongoing, Progressing

## 2023-05-30 NOTE — PROGRESS NOTES
Pharmacist Intervention IV to PO Note    Reza Morrow is a 58 y.o. female being treated with IV medication pantoprazole    Patient Data:    Vital Signs (Most Recent):  Temp: 98.6 °F (37 °C) (05/30/23 1113)  Pulse: 100 (05/30/23 1113)  Resp: 18 (05/30/23 1113)  BP: 126/77 (05/30/23 1113)  SpO2: 97 % (05/30/23 1113) Vital Signs (72h Range):  Temp:  [97.4 °F (36.3 °C)-101.1 °F (38.4 °C)]   Pulse:  []   Resp:  [14-34]   BP: ()/()   SpO2:  [76 %-100 %]      CBC:  Recent Labs   Lab 05/28/23  0343 05/29/23  0315 05/30/23  1032   WBC 3.67* 3.75* 11.97   RBC 3.77* 3.84* 5.27   HGB 11.3* 11.4* 15.8   HCT 34.2* 33.4* 45.0    86* 335   MCV 91 87 85   MCH 30.0 29.7 30.0   MCHC 33.0 34.1 35.1     CMP:     Recent Labs   Lab 05/27/23  0331 05/28/23  0343 05/29/23 0315 05/30/23  0844   * 95 107 145*   CALCIUM 8.7 8.5* 8.7 9.4   ALBUMIN 2.9* 2.8* 2.9*  --    PROT 6.8 6.3 6.3  --     138 139 137   K 3.6 3.5 3.9 3.7   CO2 21* 23 24 25    109 105 102   BUN 28* 22* 11 9   CREATININE 0.8 0.6 0.7 0.7   ALKPHOS 62 62 63  --    ALT 11 16 20  --    AST 23 33 35  --    BILITOT 0.7 0.6 0.6  --        Dietary Orders:  Diet Orders            Diet Adult Regular (IDDSI Level 7): Regular starting at 05/28 0722            Based on the following criteria, this patient qualifies for intravenous to oral conversion:  [x] The patients gastrointestinal tract is functioning (tolerating medications via oral or enteral route for 24 hours and tolerating food or enteral feeds for 24 hours).  [x] The patient is hemodynamically stable for 24 hours (heart rate <100 beats per minute, systolic blood pressure >99 mm Hg, and respiratory rate <20 breaths per minute).  [x] The patient shows clinical improvement (afebrile for at least 24 hours and white blood cell count downtrending or normalized). Additionally, the patient must be non-neutropenic (absolute neutrophil count >500 cells/mm3).  [x] For antimicrobials, the  patient has received IV therapy for at least 24 hours.    IV medication pantoprazole 40 mg daily will be changed to oral medication pantoprazole 40 mg daily    Pharmacist's Name: Ashley Vasquez  Pharmacist's Extension: [id number]

## 2023-05-30 NOTE — PLAN OF CARE
[facility] - Cardiology Stepdown      HOME HEALTH ORDERS  FACE TO FACE ENCOUNTER    Patient Name: Reza Morrow  YOB: 1965    PCP: Kip Jimenez MD   PCP Address: [address] [place] [address]  PCP Phone Number: [phone]  PCP Fax: [phone]    Encounter Date: 5/23/23    Admit to Home Health    Diagnoses:  Active Hospital Problems    Diagnosis  POA    *HFrEF (heart failure with reduced ejection fraction) [I50.20]  Yes    Acute encephalopathy [G93.40]  Yes    Opioid dependence [F11.20]  Yes     Chronic    MS (multiple sclerosis) [G35]  Yes     Chronic      Resolved Hospital Problems    Diagnosis Date Resolved POA    Acute hypoxemic respiratory failure [J96.01] 05/29/2023 Yes       Follow Up Appointments:  Future Appointments   Date Time Provider Department Center   7/11/2023  1:40 PM Elmer Boston MD [facility]       Allergies:  Review of patient's allergies indicates:   Allergen Reactions    Adhesive Hives    Neosporin [neomycin-bacitracin-polymyxin] Hives    Neomycin-polymyxin Hives    Penicillins Other (See Comments)     Unknown  reaction    Latex Rash       Medications: Review discharge medications with patient and family and provide education.    Current Facility-Administered Medications   Medication Dose Route Frequency Provider Last Rate Last Admin    acetaminophen tablet 650 mg  650 mg Oral Q6H PRN Rola Costello MD   650 mg at 05/30/23 0612    atorvastatin tablet 20 mg  20 mg Oral QHS Garo Siu MD   20 mg at 05/29/23 2059    dextromethorphan-guaiFENesin  mg/5 ml liquid 5 mL  5 mL Oral Q4H PRN Le Miller MD   5 mL at 05/30/23 1102    hydrALAZINE tablet 25 mg  25 mg Oral Q8H PRN Rola Costello MD   25 mg at 05/30/23 0132    hydrOXYzine pamoate capsule 100 mg  100 mg Oral Nightly PRN Manuel Meza MD   100 mg at 05/29/23 2058    magnesium sulfate 2g in water 50mL IVPB (premix)  2 g Intravenous Once Rola Costello MD        metoprolol  tartrate (LOPRESSOR) tablet 25 mg  25 mg Oral Q6H Rola Costello MD   25 mg at 05/30/23 0820    [START ON 5/31/2023] pantoprazole EC tablet 40 mg  40 mg Oral Daily Rola Costello MD        potassium chloride CR capsule 30 mEq  30 mEq Oral Once Rola Costello MD        pregabalin capsule 150 mg  150 mg Oral BID Manuel Meza MD   150 mg at 05/30/23 0820    sacubitriL-valsartan 49-51 mg per tablet 1 tablet  1 tablet Oral BID Rola Costello MD   1 tablet at 05/30/23 0829     Current Discharge Medication List        START taking these medications    Details   benzonatate (TESSALON) 100 MG capsule Take 1 capsule (100 mg total) by mouth 3 (three) times daily as needed for Cough.  Qty: 28 capsule, Refills: 0      sacubitriL-valsartan (ENTRESTO) 24-26 mg per tablet Take 1 tablet by mouth 2 (two) times daily.  Qty: 60 tablet, Refills: 11    Comments: Please send secure chat or teams to Ashley Vasquez, PharmD with price.  Thank you so much!!  Associated Diagnoses: HFrEF (heart failure with reduced ejection fraction)           CONTINUE these medications which have CHANGED    Details   metoprolol succinate (TOPROL-XL) 100 MG 24 hr tablet Take 1 tablet (100 mg total) by mouth once daily.  Qty: 30 tablet, Refills: 11    Comments: .           CONTINUE these medications which have NOT CHANGED    Details   baclofen (LIORESAL) 10 MG tablet Take 10 mg by mouth 2 (two) times daily.      ergocalciferol (ERGOCALCIFEROL) 50,000 unit Cap Take 50,000 Units by mouth every 7 days.      furosemide (LASIX) 40 MG tablet WEIGH YOURSELF DAILY AND TAKE 1 TABLET IF WEIGHT INCREASE BY 3 POUNDS IN 24 HOURS OR IF WEIGHT INCREASE BY 5 POUNDS IN ONE WEEK  Qty: 30 tablet, Refills: 0    Comments: Please send future refills to pt's new cardiologist whom she will see later this month.Thanks.      LORazepam (ATIVAN) 2 MG Tab Take 1 tablet (2 mg total) by mouth 2 (two) times daily.  Qty: 60 tablet, Refills: 0      methocarbamoL (ROBAXIN) 750 MG Tab Take 500 mg by  mouth 4 (four) times daily.      omeprazole (PRILOSEC) 20 MG capsule Take 20 mg by mouth daily as needed (Acid reflux).      rosuvastatin (CRESTOR) 20 MG tablet Take 1 tablet (20 mg total) by mouth once daily.  Qty: 30 tablet, Refills: 11      tiZANidine 2 mg Cap Take by mouth 3 (three) times daily.      VOLTAREN 1 % Gel Apply topically once daily.           STOP taking these medications       lisinopriL (PRINIVIL,ZESTRIL) 2.5 MG tablet Comments:   Reason for Stopping:         naproxen sodium (ALEVE ORAL) Comments:   Reason for Stopping:                 I have seen and examined this patient within the last 30 days. My clinical findings that support the need for the home health skilled services and home bound status are the following:no   Weakness/numbness causing balance and gait disturbance due to Heart Failure, Weakness/Debility, and MS making it taxing to leave home.  Patient with medication mismanagement issues requiring home bound status as evidenced by  Unstable vital signs (blood pressure, heart rate), Poor understanding of medication regimen/dosage, and Poor adherence to medication regimen/dosage.  Medical restrictions requiring assistance of another human to leave home due to  Dyspnea on exertion (SOB), Fluid/volume overload, and Unstable ambulation.     Diet:   cardiac diet, fluid restriction, and 2 gram sodium diet  2 liters        Referrals/ Consults  Physical Therapy to evaluate and treat. Evaluate for home safety and equipment needs; Establish/upgrade home exercise program. Perform / instruct on therapeutic exercises, gait training, transfer training, and Range of Motion.  Occupational Therapy to evaluate and treat. Evaluate home environment for safety and equipment needs. Perform/Instruct on transfers, ADL training, ROM, and therapeutic exercises.    Activities:   activity as tolerated    Nursing:   Agency to admit patient within 24 hours of hospital discharge unless specified on physician order or at  patient request    SN to complete comprehensive assessment including routine vital signs. Instruct on disease process and s/s of complications to report to MD. Review/verify medication list sent home with the patient at time of discharge  and instruct patient/caregiver as needed. Frequency may be adjusted depending on start of care date.     Skilled nurse to perform up to 3 visits PRN for symptoms related to diagnosis    Notify MD if SBP > 160 or < 90; DBP > 90 or < 50; HR > 120 or < 50; Temp > 101; O2 < 88%; Other:       Ok to schedule additional visits based on staff availability and patient request on consecutive days within the home health episode.    When multiple disciplines ordered:    Start of Care occurs on Sunday - Wednesday schedule remaining discipline evaluations as ordered on separate consecutive days following the start of care.    Thursday SOC -schedule subsequent evaluations Friday and Monday the following week.     Friday - Saturday SOC - schedule subsequent discipline evaluations on consecutive days starting Monday of the following week.    For all post-discharge communication and subsequent orders please contact patient's primary care physician. If unable to reach primary care physician or do not receive response within 30 minutes, please contact  for clinical staff order clarification    Miscellaneous   Routine Skin for Bedridden Patients: Instruct patient/caregiver to apply moisture barrier cream to all skin folds and wet areas in perineal area daily and after baths and all bowel movements.  Heart Failure:      SN to instruct on the following:    Instruct on the definition of CHF.   Instruct on the signs/sympoms of CHF to be reported.   Instruct on and monitor daily weights.   Instruct on factors that cause exacerbation.   Instruct on action, dose, schedule, and side effects of medications.   Instruct on diet as prescribed.   Instruct on activity allowed.   Instruct on life-style modifications  for life long management of CHF   SN to assess compliance with daily weights, diet, medications, fluid retention,    safety precautions, activities permitted and life-style modifications.   Additional 1-2 SN visits per week as needed for signs and symptoms     of CHF exacerbation.      For Weight Gain > 2-3 lbs in 1 day or 4-6 lbs over 1 week notify PCP:  Take additional 40 mg in the afternoon until weight returns to baseline (about 118-119 lb)    Home Health Aide:  Nursing Weekly, Physical Therapy Three times weekly, and Occupational Therapy Three times weekly        I certify that this patient is confined to her home and needs intermittent skilled nursing care, physical therapy, and occupational therapy.

## 2023-05-30 NOTE — PLAN OF CARE
[facility] scheduled pcp apt   Follow up with Kip Jimenez MD  Thursday Gabe 15, 2023  @10:45am [facility]  [phone]

## 2023-05-30 NOTE — PLAN OF CARE
[facility] - Cardiology Stepdown  Discharge Final Note    Primary Care Provider: Kip Jimenez MD    Expected Discharge Date: 5/30/2023    Final Discharge Note (most recent)       Final Note - 05/30/23 1359          Final Note    Assessment Type Final Discharge Note     Anticipated Discharge Disposition Home-Health Care [facility]     Hospital Resources/Appts/Education Provided Appointments scheduled and added to AVS;Post-Acute resouces added to AVS        Post-Acute Status    Post-Acute Authorization Home Health     Home Health Status Set-up Complete/Auth obtained                 Pt accepted by [facility].      Sayra Chaudhry, LMSW [facility]  [id number]    Future Appointments   Date Time Provider Department Center   7/11/2023  1:40 PM Elmer Boston MD [facility]       Contact Info       Kip Jimenez MD   Specialty: Family Medicine   Relationship: PCP - General    [facility] [address]   Phone: [phone]       Next Steps: Schedule an appointment as soon as possible for a visit    [facility]   Specialty: Home Health Services, Home Therapy Services, Home Living Aide Services    [address]   [address]  [facility] [address]   Phone: [phone]       Next Steps: Call in 1 day(s)    Instructions: They will contact you to schedule date and time to begin home health services.  Call them if you have not heard from them by Wed 5/31/23.    Kip Jimenez MD   Specialty: Family Medicine    [facility]  [address] [facility] [id number]   Phone: [phone]       Next Steps: Follow up on 6/15/2023    Instructions: @10:45am

## 2023-05-30 NOTE — DISCHARGE INSTRUCTIONS
Your goal weight is 118-119 lb.    Weigh yourself daily first thing in the morning (before eating and after urinating) and record your weights.    Take an additional dose of Lasix daily if you gain 2-3 pounds in 1 day, or 5 pounds in 1 week. (Take the extra dose daily until your weight returns to your goal weight - 118-119 lb).    Do not consume more than 2000 mL fluid and 2 grams of sodium daily.

## 2023-05-30 NOTE — DISCHARGE SUMMARY
[facility] - Cardiology [facility] Medicine  Discharge Summary      Patient Name: Reza Morrow  MRN: [id number]  MCKAYLA: [id number]  Patient Class: IP- Inpatient  Admission Date: 5/23/2023  Hospital Length of Stay: 6 days  Discharge Date and Time:  05/30/2023 2:27 PM  Attending Physician: Rola Costello MD   Discharging Provider: Rola Costello MD  Primary Care Provider: Kip Jimenez MD  [facility] Rola Costello MD  Primary Care Team: [facility]    HPI:   56 year old female with a history of MS, Takotsubo CM, hld, opioid and benzo abuse for whom cardiology was initially consulted on for concern for cardiogenic shock.  She presented to the hospital by her niece and son for AMS and feeling unwell. per son she had an episode of emesis at home this afernoon and was also c/o burning pain in her legs and arms. per family 2 weeks ago she was c/o fever ( unkn duration), and 2 days ago she appeared to have dec po intake.  on arrival to the hospital she was altered. was afebrile with sinus tachycardia with rate 130s, /90 and in respiratory distress requiring bipap. Labs with leukocytosis with wbc 15. renal function and LFTs stable. BNP 3970 , Trop 2.1, and lactate 2.7     cardiology consulted. on my evaluation at bedside she appered to be in significant respiratory distress. She was immediately intubated. Bedside TTE with EF approximately 10-15%, no evidence of LVOT obstruction, no RV dilitation.     RIJ central line placed and hemodynamics obtained and were as follow:  CVP 6, svo2 70 CO 3.2 CI 1.9 SVR 2093 Lactate 1.5     shock call activated with conclusion to cont with medical therapy.     of note patient had a recent hosptialization with discharge on 3/14 where she presented with confusion, weakness for two days . EKG on admission showed 1mm elevation of ST segments in leads 1 and avL, and patient had elevated troponins and BNP.  During bedside echo, patient had complaints of chest  pain and was subsequently taken to cath lab where there was no evidence of coronary artery disease.   Formal echo was completed post procedure which showed reduced EF 25%, and mid-ventricular hypokinesis suggestive of takutsubo cardiomyopathy. placed on GDMT       Hospital Course:   Admitted to CCU for acute hypoxemic respiratory failure requiring intubation and cardiogenic shock. Extubated and weaned off pressors. IV diuresis completed & now euvolemic. Weight now approximately 118 lb. Weaned off oxygen. Echo showed EF 20% (was 25% last). Started & uptitrated GDMT, Entresto and Toprol.  Follow up closely with PCP and Cardiology.        Goals of Care Treatment Preferences:  Code Status: Full Code      Consults:   Consults (From admission, onward)        Status Ordering Provider     Inpatient consult to Pulmonology  Once        Provider:  (Not yet assigned)    Completed MELODY BILL     Inpatient consult to Cardiology  Once        Provider:  (Not yet assigned)    Completed SANYA SUE     Inpatient consult to Critical Care Medicine  Once        Provider:  (Not yet assigned)    Completed BLAISE ANDRE          No new Assessment & Plan notes have been filed under this hospital service since the last note was generated.  Service: Hospital Medicine    Final Active Diagnoses:    Diagnosis Date Noted POA    PRINCIPAL PROBLEM:  HFrEF (heart failure with reduced ejection fraction) [I50.20] 05/27/2023 Yes    Acute encephalopathy [G93.40] 05/24/2023 Yes    Opioid dependence [F11.20] 06/23/2016 Yes     Chronic    MS (multiple sclerosis) [G35] 12/07/2015 Yes     Chronic      Problems Resolved During this Admission:    Diagnosis Date Noted Date Resolved POA    Acute hypoxemic respiratory failure [J96.01] 05/23/2023 05/29/2023 Yes       Discharged Condition: good    Disposition: Home or Self Care    Follow Up:   Follow-up Information     Kip Jimenez MD. Schedule an appointment as soon as possible for a visit.     Specialty: Family Medicine  Contact information:  [address]  [place] [address]  [phone]             [facility]. Call in 1 day(s).    Specialties: Home Health Services, Home Therapy Services, Home Living Aide Services  Why: They will contact you to schedule date and time to begin home health services.  Call them if you have not heard from them by Wed 5/31/23.  Contact information:  [address]  [facility] [id number] [phone]           Kip Jimenez MD Follow up on 6/15/2023.    Specialty: Family Medicine  Why: @10:45am  Contact information:  [address] [id number]  [phone]                       Patient Instructions:      Notify your health care provider if you experience any of the following:  difficulty breathing or increased cough     Notify your health care provider if you experience any of the following:  persistent dizziness, light-headedness, or visual disturbances     Notify your health care provider if you experience any of the following:   Order Comments: Weight gain or swelling not controlled by lasix     Activity as tolerated       Significant Diagnostic Studies: Labs:   CMP   Recent Labs   Lab 05/29/23  0315 05/30/23  0844    137   K 3.9 3.7    102   CO2 24 25    145*   BUN 11 9   CREATININE 0.7 0.7   CALCIUM 8.7 9.4   PROT 6.3  --    ALBUMIN 2.9*  --    BILITOT 0.6  --    ALKPHOS 63  --    AST 35  --    ALT 20  --    ANIONGAP 10 10    and CBC   Recent Labs   Lab 05/29/23  0315 05/30/23  1032   WBC 3.75* 11.97   HGB 11.4* 15.8   HCT 33.4* 45.0   PLT 86* 335       Pending Diagnostic Studies:     None         Medications:  Reconciled Home Medications:      Medication List      START taking these medications    benzonatate 100 MG capsule  Commonly known as: TESSALON  Take 1 capsule (100 mg total) by mouth 3 (three) times daily as needed for Cough.     ENTRESTO 24-26 mg per tablet  Generic  drug: sacubitriL-valsartan  Take 1 tablet by mouth 2 (two) times daily.        CHANGE how you take these medications    metoprolol succinate 100 MG 24 hr tablet  Commonly known as: TOPROL-XL  Take 1 tablet (100 mg total) by mouth once daily.  What changed:   · medication strength  · how much to take        CONTINUE taking these medications    baclofen 10 MG tablet  Commonly known as: LIORESAL  Take 10 mg by mouth 2 (two) times daily.     ergocalciferol 50,000 unit Cap  Commonly known as: ERGOCALCIFEROL  Take 50,000 Units by mouth every 7 days.     furosemide 40 MG tablet  Commonly known as: LASIX  WEIGH YOURSELF DAILY AND TAKE 1 TABLET IF WEIGHT INCREASE BY 3 POUNDS IN 24 HOURS OR IF WEIGHT INCREASE BY 5 POUNDS IN ONE WEEK     LORazepam 2 MG Tab  Commonly known as: ATIVAN  Take 1 tablet (2 mg total) by mouth 2 (two) times daily.     methocarbamoL 750 MG Tab  Commonly known as: ROBAXIN  Take 500 mg by mouth 4 (four) times daily.     omeprazole 20 MG capsule  Commonly known as: PRILOSEC  Take 20 mg by mouth daily as needed (Acid reflux).     rosuvastatin 20 MG tablet  Commonly known as: CRESTOR  Take 1 tablet (20 mg total) by mouth once daily.     tiZANidine 2 mg Cap  Take by mouth 3 (three) times daily.     VOLTAREN 1 % Gel  Generic drug: diclofenac sodium  Apply topically once daily.        STOP taking these medications    ALEVE ORAL     lisinopriL 2.5 MG tablet  Commonly known as: PRINIVIL,ZESTRIL            Indwelling Lines/Drains at time of discharge:   Lines/Drains/Airways     None                 Time spent on the discharge of patient: 35 minutes         Rola Costello MD  Department of Hospital Medicine  [facility] - Cardiology Stepdown

## 2023-05-30 NOTE — HOSPITAL COURSE
Admitted to CCU for acute hypoxemic respiratory failure requiring intubation and cardiogenic shock. Extubated and weaned off pressors. IV diuresis completed & now euvolemic. Weight now approximately 118 lb. Weaned off oxygen. Echo showed EF 20% (was 25% last). Started & uptitrated GDMT, Entresto and Toprol.  Follow up closely with PCP and Cardiology.

## 2023-05-30 NOTE — PLAN OF CARE
Problem: Physical Therapy  Goal: Physical Therapy Goal  Description: Goals to be met by: 23    Patient will increase functional independence with mobility by performin. Supine to sit with [place]  2. Sit to supine with [place]  3. Sit to stand transfer with [place]  4. Gait  x 100 feet with [place]  5. Ascend/descend 4 stair with no handrails Modified [place] using LRAD as needed  6. Lower extremity exercise program x15 reps per handout, with independence    Outcome: Ongoing, Progressing     Pt tolerated PT session well.      All needs met, all questions answered within PT scope of practice.

## 2023-05-30 NOTE — PT/OT/SLP EVAL
Physical Therapy Co-Evaluation and Co-Treatment    Patient Name:  Reza Morrow   MRN:  [id number]    Recommendations:     Discharge Recommendations:  other (see comments)   Discharge Equipment Recommendations: walker, rolling   Barriers to discharge: decreased functional mobility, fall risk, and decreased caregiver support    Assessment:     Reza Morrow is a 58 y.o. female admitted with a medical diagnosis of HFrEF (heart failure with reduced ejection fraction).  Pt demonstrates the below listed impairments with decreased tolerance to functional mobility, impaired endurance, and gait instability being the most limiting.  Pt demonstrates fair tolerance to out of bed mobility and is willing to ambulate in her room.  Pt is close to her baseline and has a similar presentation concerning her MS as her baseline.  She would benefit from a RW for balance.  Pt requires skilled PT due to patient's status as: a fall risk to allow safe d/c home.     Impairments and functional limitations:  weakness, impaired endurance, impaired self care skills, impaired functional mobility, gait instability, impaired balance, decreased coordination, decreased upper extremity function, decreased lower extremity function, impaired coordination, impaired fine motor, impaired cardiopulmonary response to activity.  These deficits affect their roles and responsibilities in which they were able to complete prior to admit.  Rehab Prognosis:   Good ; patient would benefit from acute skilled PT services 3 x/week to address these deficits, improve quality of life, focus on recovery of impairments, provide patient/caregiver education, reduce fall risk, and reach maximum level of function.  Pt is highly  motivated to participated in skilled PT.    Recent Surgery:   * No surgery found *      Plan:     During this hospitalization, patient to be seen 3 x/week to address the identified rehab impairments via gait training, therapeutic activities, therapeutic  exercises, neuromuscular re-education and progress toward the following goals:    Plan of Care Expires:  06/29/23    Subjective     Chief Complaint: decreased tolerance to functional mobility  Patient/Family Comments/Goals: Return home  Pain/Comfort:  Pain Rating 1: 0/10    Patients cultural, spiritual, Scientology conflicts given the current situation: no    Living Environment:  Patient lives alone in single story home, 4 steps with No hand rail, tub/shower.   Pt utilizes No AD for ambulation of all distances.    Prior to admission, patient was independent with ADLs.   DME owned: cane, straight.   DME not currently used: SPC and hurry cane.   Upon discharge, patient will have assistance from family with no 24/7 assist.     Objective:     Communicated with nursing prior to session.  Patient found HOB elevated with telemetry, bed alarm ([employer])  upon PT entry to room.    General Precautions: Standard, fall   Orthopedic Precautions:N/A   Braces: N/A   Oxygen Device:      Exams:  Cognitive Exam:  Patient is oriented to Person, Place, Time, and Situation  Command following: Patient follows 100% of commands   RLE ROM: WFL  RLE Strength: grossly 4/5  LLE ROM: WFL  LLE Strength: grossly 4/5  Gross Motor Coordination:  WFL  Postural Exam:  Patient presented with the following abnormalities:    -       Rounded shoulders  -       Forward head  Tingling in B feet   R UE weaker than L    Functional Mobility:  Bed Mobility:  Rolling Right: Sup  Scooting: Sup  Supine to Sit: Sup  Head of bed position: HOB elevated    Transfers:  Sit to Stand: CGA with No AD    Gait: Patient ambulated 24' with HHA and Min A. Patient demonstrates occasional unsteady gait, decreased step length, decreased arm swing, flexed posture, and decreased [place]. All lines remained intact throughout ambulation trial.  Has x1 minor LOB  Pt aggressively using stepping and ankle strategy to maintain upright posture  No knee buckling     Balance:   Position Score  Time   Static Sitting GOOD-: Takes MODERATE challenges but inconstantly  n/a   Dynamic Sitting GOOD-: Maintains balance through MODERATE excursions of active trunk motion, but inconstantly  n/a   Static Standing FAIR-: Maintains without assist but is inconsistent n/a   Dynamic Standing POOR+: MINIMAL assist to maintain n/a     Therapeutic Activities:  Patient educated on role of acute care PT and PT POC, safety while in hospital including calling nurse for mobility, call light usage, benefits of out of bed mobility, breathing technique, fall risk, assistive device use, bed mobility , transfers, gait technique, positioning, posture, risks of prolonged bed rest, possible discharge disposition , and benefits of continued PT by explanation and demonstration.    Patient demonstrates good understanding of education provided this day.   Whiteboard updated    Therapeutic Exercises:  n/a    AM-PAC 6 CLICK MOBILITY  Total Score:18     Patient left up in chair with all lines intact, call button in reach, and RN notified.    GOALS:   Multidisciplinary Problems       Physical Therapy Goals          Problem: Physical Therapy    Goal Priority Disciplines Outcome Goal Variances Interventions   Physical Therapy Goal     PT, PT/OT Ongoing, Progressing     Description: Goals to be met by: 23    Patient will increase functional independence with mobility by performin. Supine to sit with [place]  2. Sit to supine with [place]  3. Sit to stand transfer with [place]  4. Gait  x 100 feet with [place]  5. Ascend/descend 4 stair with no handrails Modified [place] using LRAD as needed  6. Lower extremity exercise program x15 reps per handout, with independence                         History:     Past Medical History:   Diagnosis Date    Behavioral problem     Bulging lumbar disc     Bursitis     bilateral hip    Degenerative cervical disc     Hx of psychiatric care     Hypercholesteremia     Labral tear  of hip, degenerative bilateral    MS (multiple sclerosis)     Paresthesia of both lower extremities 3/13/2022    Pneumonia     Spinal cord compression        Past Surgical History:   Procedure Laterality Date    ANGIOGRAM, CORONARY, WITH LEFT HEART CATHETERIZATION Left 3/11/2022    Procedure: Angiogram, Coronary, with Left Heart Cath;  Surgeon: Elie Matamoros MD;  Location: [facility] CATH LAB;  Service: Cardiology;  Laterality: Left;    BREAST SURGERY      augmentation     SECTION, CLASSIC      HAND SURGERY      HYSTEROSCOPY      NECK SURGERY      cervical disc removeal    TUBAL LIGATION      X-STOP IMPLANTATION         Time Tracking:     PT Received On: 23  PT Start Time: 919     PT Stop Time: 944  PT Total Time (min): 25 min     Billable Minutes: Evaluation 10 and Gait Training 11    2023    Co-treatment performed for this visit due to patient need for two skilled therapists to ensure patient and staff safety, to accommodate for patient activity tolerance/pain management, and maximize functional potential.

## 2023-05-30 NOTE — PT/OT/SLP EVAL
Occupational Therapy   Co-Evaluation & Co- Treatment    Name: Reza Morrow  MRN: [id number]  Admitting Diagnosis: HFrEF (heart failure with reduced ejection fraction)  Recent Surgery: * No surgery found *      Recommendations:     Discharge Recommendations: other (see comments)  Discharge Equipment Recommendations:  to be determined by next level of care  Barriers to discharge:  Decreased caregiver support    Assessment:     Reza Morrow is a 58 y.o. female with a medical diagnosis of HFrEF (heart failure with reduced ejection fraction).  She presents with some balance difficulties with functional ambulation. Patient with no new sensation changes and at  baseline per patient report despite some difficulty opening food items on tray. Performance deficits affecting function: weakness, impaired endurance, impaired self care skills, impaired functional mobility, impaired balance, gait instability, decreased safety awareness.      Rehab Prognosis: Good; patient would benefit from acute skilled OT services to address these deficits and reach maximum level of function.       Plan:     Patient to be seen 3 x/week to address the above listed problems via self-care/home management, therapeutic activities, therapeutic exercises, neuromuscular re-education  Plan of Care Expires: 06/13/23  Plan of Care Reviewed with: patient    Subjective     Chief Complaint: patient frustrated with level of supervision with within room activities   Patient/Family Comments/goals: get better and go home    Occupational Profile:  Living Environment: Patient lives alone in a one story home with 4 steps to enter (no rails) . Patient uses a bathtub shower combination and stands to bathe.   Previous level of function: Patient reports that she was independent with ADLs and IADLs. Patient does not drive   Roles and Routines: retired industry worker, mother  Equipment Used at Home: none  Assistance upon Discharge: patient reports son lives nearby and  is available to assist occasionally    Pain/Comfort:  Pain Rating 1: 0/10    Patients cultural, spiritual, Gnosticist conflicts given the current situation: no    Objective:     Communicated with: nursing (Telma) prior to session.  Patient found HOB elevated with telemetry upon OT entry to room.    General Precautions: Standard, fall  Orthopedic Precautions: N/A  Braces: N/A  Respiratory Status: Room air    Occupational Performance:    Bed Mobility:    Patient completed Rolling/Turning to Right with supervision    Functional Mobility/Transfers:  Patient completed Sit <> Stand Transfer with contact guard assistance  with  no assistive device   Patient completed Bed <> Chair Transfer using Step Transfer technique with contact guard assistance with no assistive device  Functional Mobility: Patient ambulated to/from bathroom to complete ADLs. Patient with 1 LOB requiring minimal assistance when turning. Patient able to self recover.      Activities of Daily Living:  Feeding:  set up assistance ; patient reported no difficulty with self-feeding and simulated eating 1 bite with food on utensil with no noted increase in FM difficulty; patient requests assistance with opening   Grooming: supervision to wash face and brush teeth while standing at sink. Patient with no difficulty manipulating grooming supplies   Lower Body Dressing: supervision to don socks while sitting EOB     Cognitive/Visual Perceptual:  Cognitive/Psychosocial Skills:     -       Oriented to: Person, Place, Time, and Situation   -       Follows Commands/attention:Follows one-step commands  -       Communication: clear/fluent  -       Memory: No Deficits noted  -       Safety awareness/insight to disability: intact   -       Mood/Affect/Coping skills/emotional control: Appropriate to situation  Visual/Perceptual:      -wears glasses       Physical Exam:  Skin integrity: Visible skin intact  Sensation:    -       Intact; patient reports no new sensation  changes  Motor Planning:    -       intact per skilled observation with functional activities  Dominant hand:    -       R  Upper Extremity Range of Motion:     -       Right Upper Extremity: WFL  -       Left Upper Extremity: WFL  Upper Extremity Strength:    -       Right Upper Extremity: 4-/5  -       Left Upper Extremity: 3+/5   Strength:    -       Right Upper Extremity: WFL  -       Left Upper Extremity: WFL  Fine Motor Coordination:    -       Intact per thumb-finger touching and skilled observation with functional activities     AMPAC 6 Click ADL:  AMPAC Total Score: 21    Treatment & Education:    Patient educated on:   -purpose of OT and OT POC  -facilitation and education on proper body mechanics, energy conservation, and safety  -importance of early mobility and out of bed activities with staff assist  -overall benefits of therapy     Patient left up in chair with all lines intact and call button in reach    Co-evaluation and treatment with PT performed due to anticipated need for education and facilitation of treatment from two skilled therapy disciplines    GOALS:   Multidisciplinary Problems       Occupational Therapy Goals          Problem: Occupational Therapy    Goal Priority Disciplines Outcome Interventions   Occupational Therapy Goal     OT, PT/OT Ongoing, Progressing    Description: Goals to be met by: 6/13/23     Patient will increase functional independence with ADLs by performing:    LE Dressing with Modified [place].  Grooming while standing at sink with [place].  Toileting from toilet with [place] for hygiene and clothing management.   Step transfer with [place]  Toilet transfer to toilet with [place].  [place] with BUE HEP to improve activity tolerance to complete ADLs and IADLs  Within home ambulation distances with [place] and LRAD necessary to complete ADLs.                             History:     Past Medical History:   Diagnosis Date     Behavioral problem     Bulging lumbar disc     Bursitis     bilateral hip    Degenerative cervical disc     Hx of psychiatric care     Hypercholesteremia     Labral tear of hip, degenerative bilateral    MS (multiple sclerosis)     Paresthesia of both lower extremities 3/13/2022    Pneumonia     Spinal cord compression          Past Surgical History:   Procedure Laterality Date    ANGIOGRAM, CORONARY, WITH LEFT HEART CATHETERIZATION Left 3/11/2022    Procedure: Angiogram, Coronary, with Left Heart Cath;  Surgeon: Elie Matamoros MD;  Location: [facility] CATH LAB;  Service: Cardiology;  Laterality: Left;    BREAST SURGERY      augmentation     SECTION, CLASSIC      HAND SURGERY      HYSTEROSCOPY      NECK SURGERY      cervical disc removeal    TUBAL LIGATION      X-STOP IMPLANTATION         Time Tracking:     OT Date of Treatment: 23  OT Start Time: 920  OT Stop Time: 944  OT Total Time (min): 24 min    Billable Minutes:Evaluation 10  Self Care/Home Management 14    2023

## 2023-05-31 LAB
BACTERIA BLD CULT: NORMAL
BACTERIA BLD CULT: NORMAL

## 2023-06-01 ENCOUNTER — PATIENT OUTREACH (OUTPATIENT)
Dept: ADMINISTRATIVE | Facility: CLINIC | Age: 58
End: 2023-06-01
Payer: MEDICAID

## 2023-06-01 NOTE — PROGRESS NOTES
C3 nurse attempted to contact Reza Morrow for a TCC post hospital discharge follow up call. No answer. Left voicemail with callback information. The patient has a scheduled HOSFU appointment with Kip Jimenez MD on 06/15/23 @ [id number] (non-[facility] PCP)

## 2023-06-01 NOTE — TELEPHONE ENCOUNTER
Lyrica 150mg po BID PRN   Gabapentin 300mg po TID PRN  Copoxin 40mg  SQ three times weekly  Meloxicam 7.5mg po BID PRN  Topamax 50mg by mouth PRN  Oxycodone 30mg by mouth TID PRN  Hydroxyzine pamoate 25-50mg PRN HS  Cyclobenzaprine 10mg TID as needed

## 2023-06-01 NOTE — PROGRESS NOTES
C3 nurse spoke with Reza Morrow for a TCC post hospital discharge follow up call. The patient has a scheduled HOSFU appointment with Kip Jimenez MD on 06/05/23 @ 3932.

## 2023-06-03 PROCEDURE — G0180 PR HOME HEALTH MD CERTIFICATION: ICD-10-PCS | Mod: ,,, | Performed by: HOSPITALIST

## 2023-06-03 PROCEDURE — G0180 MD CERTIFICATION HHA PATIENT: HCPCS | Mod: ,,, | Performed by: HOSPITALIST

## 2023-06-05 RX ORDER — FUROSEMIDE 40 MG/1
40 TABLET ORAL DAILY PRN
Qty: 30 TABLET | Refills: 0 | OUTPATIENT
Start: 2023-06-05

## 2023-06-07 ENCOUNTER — TELEPHONE (OUTPATIENT)
Dept: CARDIOLOGY | Facility: CLINIC | Age: 58
End: 2023-06-07
Payer: MEDICAID

## 2023-06-07 NOTE — TELEPHONE ENCOUNTER
"New Patient Appt scheduled 07/11/2023    Notification:  Spoke with Flavia at [facility].  Patient was at [facility] 05/23/2023 - 05/30/2023.  Upon discharge, ordered home health for patient.      The RN went out to patient's home, manual BP 50/22.  The patient "kicked out" the nurse, refused all services.  Home health called an ambulance to the home.    Home health is now discontinued since patient is refusing services.      Attempted to contact patient, no answer, left message.  "

## 2023-06-09 ENCOUNTER — HOSPITAL ENCOUNTER (INPATIENT)
Facility: HOSPITAL | Age: 58
LOS: 4 days | Discharge: HOME | DRG: 682 | End: 2023-06-13
Attending: EMERGENCY MEDICINE | Admitting: EMERGENCY MEDICINE
Payer: MEDICAID

## 2023-06-09 DIAGNOSIS — I50.9 HEART FAILURE: ICD-10-CM

## 2023-06-09 DIAGNOSIS — I51.81 TAKOTSUBO CARDIOMYOPATHY: ICD-10-CM

## 2023-06-09 DIAGNOSIS — R07.9 CHEST PAIN: ICD-10-CM

## 2023-06-09 DIAGNOSIS — I50.20 HFREF (HEART FAILURE WITH REDUCED EJECTION FRACTION): ICD-10-CM

## 2023-06-09 DIAGNOSIS — R07.9 CHEST PAIN, UNSPECIFIED TYPE: ICD-10-CM

## 2023-06-09 DIAGNOSIS — G35 MS (MULTIPLE SCLEROSIS): Chronic | ICD-10-CM

## 2023-06-09 DIAGNOSIS — R50.9 FEVER: ICD-10-CM

## 2023-06-09 DIAGNOSIS — R06.02 SOB (SHORTNESS OF BREATH): ICD-10-CM

## 2023-06-09 DIAGNOSIS — I95.9 HYPOTENSION, UNSPECIFIED HYPOTENSION TYPE: Primary | ICD-10-CM

## 2023-06-09 DIAGNOSIS — I50.9 HEART FAILURE, UNSPECIFIED HF CHRONICITY, UNSPECIFIED HEART FAILURE TYPE: ICD-10-CM

## 2023-06-09 DIAGNOSIS — N17.9 AKI (ACUTE KIDNEY INJURY): ICD-10-CM

## 2023-06-09 LAB
ALBUMIN SERPL BCP-MCNC: 3.5 G/DL (ref 3.5–5.2)
ALLENS TEST: NORMAL
ALP SERPL-CCNC: 81 U/L (ref 55–135)
ALT SERPL W/O P-5'-P-CCNC: 20 U/L (ref 10–44)
AMORPH CRY UR QL COMP ASSIST: ABNORMAL
ANION GAP SERPL CALC-SCNC: 15 MMOL/L (ref 8–16)
AST SERPL-CCNC: 30 U/L (ref 10–40)
BACTERIA #/AREA URNS AUTO: ABNORMAL /HPF
BASOPHILS # BLD AUTO: 0.03 K/UL (ref 0–0.2)
BASOPHILS NFR BLD: 0.5 % (ref 0–1.9)
BILIRUB SERPL-MCNC: 0.4 MG/DL (ref 0.1–1)
BILIRUB UR QL STRIP: NEGATIVE
BNP SERPL-MCNC: 1179 PG/ML (ref 0–99)
BUN SERPL-MCNC: 61 MG/DL (ref 6–20)
CALCIUM SERPL-MCNC: 9.2 MG/DL (ref 8.7–10.5)
CHLORIDE SERPL-SCNC: 99 MMOL/L (ref 95–110)
CLARITY UR REFRACT.AUTO: ABNORMAL
CO2 SERPL-SCNC: 22 MMOL/L (ref 23–29)
COLOR UR AUTO: YELLOW
CREAT SERPL-MCNC: 9.1 MG/DL (ref 0.5–1.4)
DIFFERENTIAL METHOD: ABNORMAL
EOSINOPHIL # BLD AUTO: 0 K/UL (ref 0–0.5)
EOSINOPHIL NFR BLD: 0.5 % (ref 0–8)
ERYTHROCYTE [DISTWIDTH] IN BLOOD BY AUTOMATED COUNT: 13.6 % (ref 11.5–14.5)
EST. GFR  (NO RACE VARIABLE): 4.6 ML/MIN/1.73 M^2
GLUCOSE SERPL-MCNC: 87 MG/DL (ref 70–110)
GLUCOSE UR QL STRIP: NEGATIVE
HCT VFR BLD AUTO: 36.9 % (ref 37–48.5)
HCV AB SERPL QL IA: NORMAL
HGB BLD-MCNC: 12 G/DL (ref 12–16)
HGB UR QL STRIP: ABNORMAL
HIV 1+2 AB+HIV1 P24 AG SERPL QL IA: NORMAL
HYALINE CASTS UR QL AUTO: 3 /LPF
IMM GRANULOCYTES # BLD AUTO: 0.01 K/UL (ref 0–0.04)
IMM GRANULOCYTES NFR BLD AUTO: 0.2 % (ref 0–0.5)
KETONES UR QL STRIP: NEGATIVE
LDH SERPL L TO P-CCNC: 1.09 MMOL/L (ref 0.5–2.2)
LEUKOCYTE ESTERASE UR QL STRIP: NEGATIVE
LYMPHOCYTES # BLD AUTO: 1 K/UL (ref 1–4.8)
LYMPHOCYTES NFR BLD: 16.3 % (ref 18–48)
MCH RBC QN AUTO: 29.8 PG (ref 27–31)
MCHC RBC AUTO-ENTMCNC: 32.5 G/DL (ref 32–36)
MCV RBC AUTO: 92 FL (ref 82–98)
MICROSCOPIC COMMENT: ABNORMAL
MONOCYTES # BLD AUTO: 0.7 K/UL (ref 0.3–1)
MONOCYTES NFR BLD: 10.7 % (ref 4–15)
NEUTROPHILS # BLD AUTO: 4.5 K/UL (ref 1.8–7.7)
NEUTROPHILS NFR BLD: 71.8 % (ref 38–73)
NITRITE UR QL STRIP: NEGATIVE
NRBC BLD-RTO: 0 /100 WBC
PH UR STRIP: 5 [PH] (ref 5–8)
PLATELET # BLD AUTO: 284 K/UL (ref 150–450)
PMV BLD AUTO: 11.2 FL (ref 9.2–12.9)
POTASSIUM SERPL-SCNC: 4.4 MMOL/L (ref 3.5–5.1)
PROCALCITONIN SERPL IA-MCNC: 0.15 NG/ML
PROT SERPL-MCNC: 7.8 G/DL (ref 6–8.4)
PROT UR QL STRIP: ABNORMAL
RBC # BLD AUTO: 4.03 M/UL (ref 4–5.4)
RBC #/AREA URNS AUTO: 0 /HPF (ref 0–4)
SAMPLE: NORMAL
SITE: NORMAL
SODIUM SERPL-SCNC: 136 MMOL/L (ref 136–145)
SP GR UR STRIP: 1.01 (ref 1–1.03)
SQUAMOUS #/AREA URNS AUTO: 1 /HPF
TROPONIN I SERPL DL<=0.01 NG/ML-MCNC: 0.03 NG/ML (ref 0–0.03)
URN SPEC COLLECT METH UR: ABNORMAL
WBC # BLD AUTO: 6.25 K/UL (ref 3.9–12.7)
WBC #/AREA URNS AUTO: 14 /HPF (ref 0–5)

## 2023-06-09 PROCEDURE — 25000003 PHARM REV CODE 250: Performed by: STUDENT IN AN ORGANIZED HEALTH CARE EDUCATION/TRAINING PROGRAM

## 2023-06-09 PROCEDURE — 81001 URINALYSIS AUTO W/SCOPE: CPT | Performed by: EMERGENCY MEDICINE

## 2023-06-09 PROCEDURE — 93005 ELECTROCARDIOGRAM TRACING: CPT

## 2023-06-09 PROCEDURE — 83605 ASSAY OF LACTIC ACID: CPT

## 2023-06-09 PROCEDURE — 96361 HYDRATE IV INFUSION ADD-ON: CPT

## 2023-06-09 PROCEDURE — 96365 THER/PROPH/DIAG IV INF INIT: CPT

## 2023-06-09 PROCEDURE — 83880 ASSAY OF NATRIURETIC PEPTIDE: CPT | Performed by: EMERGENCY MEDICINE

## 2023-06-09 PROCEDURE — 99900035 HC TECH TIME PER 15 MIN (STAT)

## 2023-06-09 PROCEDURE — 93010 EKG 12-LEAD: ICD-10-PCS | Mod: ,,, | Performed by: INTERNAL MEDICINE

## 2023-06-09 PROCEDURE — 82803 BLOOD GASES ANY COMBINATION: CPT

## 2023-06-09 PROCEDURE — 63600175 PHARM REV CODE 636 W HCPCS: Performed by: STUDENT IN AN ORGANIZED HEALTH CARE EDUCATION/TRAINING PROGRAM

## 2023-06-09 PROCEDURE — 84145 PROCALCITONIN (PCT): CPT | Performed by: EMERGENCY MEDICINE

## 2023-06-09 PROCEDURE — 93010 ELECTROCARDIOGRAM REPORT: CPT | Mod: ,,, | Performed by: INTERNAL MEDICINE

## 2023-06-09 PROCEDURE — 96367 TX/PROPH/DG ADDL SEQ IV INF: CPT

## 2023-06-09 PROCEDURE — 85025 COMPLETE CBC W/AUTO DIFF WBC: CPT | Performed by: EMERGENCY MEDICINE

## 2023-06-09 PROCEDURE — 87389 HIV-1 AG W/HIV-1&-2 AB AG IA: CPT | Performed by: PHYSICIAN ASSISTANT

## 2023-06-09 PROCEDURE — 94761 N-INVAS EAR/PLS OXIMETRY MLT: CPT

## 2023-06-09 PROCEDURE — 12000002 HC ACUTE/MED SURGE SEMI-PRIVATE ROOM

## 2023-06-09 PROCEDURE — 87086 URINE CULTURE/COLONY COUNT: CPT | Performed by: EMERGENCY MEDICINE

## 2023-06-09 PROCEDURE — 84484 ASSAY OF TROPONIN QUANT: CPT | Performed by: EMERGENCY MEDICINE

## 2023-06-09 PROCEDURE — 99291 PR CRITICAL CARE, E/M 30-74 MINUTES: ICD-10-PCS | Mod: ,,, | Performed by: EMERGENCY MEDICINE

## 2023-06-09 PROCEDURE — 86803 HEPATITIS C AB TEST: CPT | Performed by: PHYSICIAN ASSISTANT

## 2023-06-09 PROCEDURE — 87040 BLOOD CULTURE FOR BACTERIA: CPT | Performed by: EMERGENCY MEDICINE

## 2023-06-09 PROCEDURE — 99285 EMERGENCY DEPT VISIT HI MDM: CPT | Mod: 25

## 2023-06-09 PROCEDURE — 99291 CRITICAL CARE FIRST HOUR: CPT | Mod: ,,, | Performed by: EMERGENCY MEDICINE

## 2023-06-09 PROCEDURE — 80053 COMPREHEN METABOLIC PANEL: CPT | Performed by: EMERGENCY MEDICINE

## 2023-06-09 PROCEDURE — 96366 THER/PROPH/DIAG IV INF ADDON: CPT

## 2023-06-09 RX ORDER — ACETAMINOPHEN 325 MG/1
650 TABLET ORAL
Status: COMPLETED | OUTPATIENT
Start: 2023-06-09 | End: 2023-06-09

## 2023-06-09 RX ADMIN — SODIUM CHLORIDE 500 ML: 9 INJECTION, SOLUTION INTRAVENOUS at 11:06

## 2023-06-09 RX ADMIN — VANCOMYCIN HYDROCHLORIDE 1250 MG: 1.25 INJECTION, POWDER, LYOPHILIZED, FOR SOLUTION INTRAVENOUS at 10:06

## 2023-06-09 RX ADMIN — CEFEPIME 2 G: 2 INJECTION, POWDER, FOR SOLUTION INTRAVENOUS at 09:06

## 2023-06-09 RX ADMIN — ACETAMINOPHEN 650 MG: 325 TABLET ORAL at 09:06

## 2023-06-09 RX ADMIN — SODIUM CHLORIDE 500 ML: 9 INJECTION, SOLUTION INTRAVENOUS at 09:06

## 2023-06-10 PROBLEM — R06.2 WHEEZE: Status: ACTIVE | Noted: 2023-06-10

## 2023-06-10 PROBLEM — N17.9 AKI (ACUTE KIDNEY INJURY): Status: ACTIVE | Noted: 2023-06-10

## 2023-06-10 PROBLEM — R06.02 SOB (SHORTNESS OF BREATH): Status: ACTIVE | Noted: 2023-06-10

## 2023-06-10 PROBLEM — I95.9 HYPOTENSION: Status: ACTIVE | Noted: 2023-06-10

## 2023-06-10 PROBLEM — I73.00 RAYNAUD'S PHENOMENON: Status: ACTIVE | Noted: 2023-06-10

## 2023-06-10 PROBLEM — I51.81 TAKOTSUBO CARDIOMYOPATHY: Status: ACTIVE | Noted: 2023-06-10

## 2023-06-10 LAB
ALLENS TEST: NORMAL
ANION GAP SERPL CALC-SCNC: 14 MMOL/L (ref 8–16)
ANION GAP SERPL CALC-SCNC: 14 MMOL/L (ref 8–16)
BASOPHILS # BLD AUTO: 0.02 K/UL (ref 0–0.2)
BASOPHILS NFR BLD: 0.4 % (ref 0–1.9)
BILIRUB UR QL STRIP: NEGATIVE
BUN SERPL-MCNC: 60 MG/DL (ref 6–20)
BUN SERPL-MCNC: 62 MG/DL (ref 6–20)
C3 SERPL-MCNC: 141 MG/DL (ref 50–180)
C4 SERPL-MCNC: 34 MG/DL (ref 11–44)
CALCIUM SERPL-MCNC: 8.5 MG/DL (ref 8.7–10.5)
CALCIUM SERPL-MCNC: 8.8 MG/DL (ref 8.7–10.5)
CHLORIDE SERPL-SCNC: 100 MMOL/L (ref 95–110)
CHLORIDE SERPL-SCNC: 101 MMOL/L (ref 95–110)
CK SERPL-CCNC: 479 U/L (ref 20–180)
CLARITY UR REFRACT.AUTO: CLEAR
CO2 SERPL-SCNC: 21 MMOL/L (ref 23–29)
CO2 SERPL-SCNC: 26 MMOL/L (ref 23–29)
COLOR UR AUTO: COLORLESS
CREAT SERPL-MCNC: 7 MG/DL (ref 0.5–1.4)
CREAT SERPL-MCNC: 8.5 MG/DL (ref 0.5–1.4)
CREAT UR-MCNC: 27 MG/DL (ref 15–325)
CREAT UR-MCNC: 90 MG/DL (ref 15–325)
DIFFERENTIAL METHOD: ABNORMAL
EOSINOPHIL # BLD AUTO: 0.1 K/UL (ref 0–0.5)
EOSINOPHIL NFR BLD: 1.1 % (ref 0–8)
ERYTHROCYTE [DISTWIDTH] IN BLOOD BY AUTOMATED COUNT: 13.6 % (ref 11.5–14.5)
EST. GFR  (NO RACE VARIABLE): 5 ML/MIN/1.73 M^2
EST. GFR  (NO RACE VARIABLE): 6.4 ML/MIN/1.73 M^2
GLUCOSE SERPL-MCNC: 114 MG/DL (ref 70–110)
GLUCOSE SERPL-MCNC: 93 MG/DL (ref 70–110)
GLUCOSE UR QL STRIP: NEGATIVE
HBV CORE IGM SERPL QL IA: NORMAL
HCT VFR BLD AUTO: 34 % (ref 37–48.5)
HCV AB SERPL QL IA: NORMAL
HGB BLD-MCNC: 11.2 G/DL (ref 12–16)
HGB UR QL STRIP: ABNORMAL
IGA SERPL-MCNC: <5 MG/DL (ref 40–350)
IMM GRANULOCYTES # BLD AUTO: 0.01 K/UL (ref 0–0.04)
IMM GRANULOCYTES NFR BLD AUTO: 0.2 % (ref 0–0.5)
INFLUENZA A, MOLECULAR: NOT DETECTED
INFLUENZA B, MOLECULAR: NOT DETECTED
KETONES UR QL STRIP: NEGATIVE
LDH SERPL L TO P-CCNC: 1.65 MMOL/L (ref 0.5–2.2)
LEUKOCYTE ESTERASE UR QL STRIP: NEGATIVE
LYMPHOCYTES # BLD AUTO: 0.7 K/UL (ref 1–4.8)
LYMPHOCYTES NFR BLD: 14.6 % (ref 18–48)
MAGNESIUM SERPL-MCNC: 2.1 MG/DL (ref 1.6–2.6)
MCH RBC QN AUTO: 29.9 PG (ref 27–31)
MCHC RBC AUTO-ENTMCNC: 32.9 G/DL (ref 32–36)
MCV RBC AUTO: 91 FL (ref 82–98)
MICROSCOPIC COMMENT: NORMAL
MONOCYTES # BLD AUTO: 0.6 K/UL (ref 0.3–1)
MONOCYTES NFR BLD: 13.9 % (ref 4–15)
NEUTROPHILS # BLD AUTO: 3.2 K/UL (ref 1.8–7.7)
NEUTROPHILS NFR BLD: 69.8 % (ref 38–73)
NITRITE UR QL STRIP: NEGATIVE
NRBC BLD-RTO: 0 /100 WBC
PH UR STRIP: 6 [PH] (ref 5–8)
PHOSPHATE SERPL-MCNC: 8.9 MG/DL (ref 2.7–4.5)
PLATELET # BLD AUTO: 229 K/UL (ref 150–450)
PMV BLD AUTO: 11.9 FL (ref 9.2–12.9)
POTASSIUM SERPL-SCNC: 4.3 MMOL/L (ref 3.5–5.1)
POTASSIUM SERPL-SCNC: 4.4 MMOL/L (ref 3.5–5.1)
PROT UR QL STRIP: ABNORMAL
PROT UR-MCNC: 18 MG/DL (ref 0–15)
PROT/CREAT UR: 0.67 MG/G{CREAT} (ref 0–0.2)
RBC # BLD AUTO: 3.75 M/UL (ref 4–5.4)
RBC #/AREA URNS AUTO: 1 /HPF (ref 0–4)
RHEUMATOID FACT SERPL-ACNC: <13 IU/ML (ref 0–15)
RSV AG BY MOLECULAR METHOD: NOT DETECTED
SAMPLE: NORMAL
SARS-COV-2 RNA RESP QL NAA+PROBE: NOT DETECTED
SITE: NORMAL
SODIUM SERPL-SCNC: 136 MMOL/L (ref 136–145)
SODIUM SERPL-SCNC: 140 MMOL/L (ref 136–145)
SODIUM UR-SCNC: 64 MMOL/L (ref 20–250)
SP GR UR STRIP: 1 (ref 1–1.03)
SQUAMOUS #/AREA URNS AUTO: 1 /HPF
TROPONIN I SERPL DL<=0.01 NG/ML-MCNC: 0.02 NG/ML (ref 0–0.03)
URN SPEC COLLECT METH UR: ABNORMAL
WBC # BLD AUTO: 4.59 K/UL (ref 3.9–12.7)
WBC #/AREA URNS AUTO: 1 /HPF (ref 0–5)

## 2023-06-10 PROCEDURE — 80048 BASIC METABOLIC PNL TOTAL CA: CPT | Mod: 91

## 2023-06-10 PROCEDURE — 99222 1ST HOSP IP/OBS MODERATE 55: CPT | Mod: ,,, | Performed by: HOSPITALIST

## 2023-06-10 PROCEDURE — 99900035 HC TECH TIME PER 15 MIN (STAT)

## 2023-06-10 PROCEDURE — 84100 ASSAY OF PHOSPHORUS: CPT

## 2023-06-10 PROCEDURE — 99291 PR CRITICAL CARE, E/M 30-74 MINUTES: ICD-10-PCS | Mod: ,,, | Performed by: INTERNAL MEDICINE

## 2023-06-10 PROCEDURE — 99223 1ST HOSP IP/OBS HIGH 75: CPT | Mod: ,,, | Performed by: STUDENT IN AN ORGANIZED HEALTH CARE EDUCATION/TRAINING PROGRAM

## 2023-06-10 PROCEDURE — 25000242 PHARM REV CODE 250 ALT 637 W/ HCPCS

## 2023-06-10 PROCEDURE — 86431 RHEUMATOID FACTOR QUANT: CPT | Performed by: HOSPITALIST

## 2023-06-10 PROCEDURE — 86705 HEP B CORE ANTIBODY IGM: CPT | Performed by: HOSPITALIST

## 2023-06-10 PROCEDURE — 82784 ASSAY IGA/IGD/IGG/IGM EACH: CPT | Performed by: HOSPITALIST

## 2023-06-10 PROCEDURE — 25000003 PHARM REV CODE 250

## 2023-06-10 PROCEDURE — 86803 HEPATITIS C AB TEST: CPT | Performed by: HOSPITALIST

## 2023-06-10 PROCEDURE — 36415 COLL VENOUS BLD VENIPUNCTURE: CPT | Performed by: HOSPITALIST

## 2023-06-10 PROCEDURE — 86038 ANTINUCLEAR ANTIBODIES: CPT | Performed by: HOSPITALIST

## 2023-06-10 PROCEDURE — 83735 ASSAY OF MAGNESIUM: CPT

## 2023-06-10 PROCEDURE — 81001 URINALYSIS AUTO W/SCOPE: CPT | Performed by: HOSPITALIST

## 2023-06-10 PROCEDURE — 86160 COMPLEMENT ANTIGEN: CPT | Mod: 59 | Performed by: HOSPITALIST

## 2023-06-10 PROCEDURE — 97535 SELF CARE MNGMENT TRAINING: CPT

## 2023-06-10 PROCEDURE — 83605 ASSAY OF LACTIC ACID: CPT

## 2023-06-10 PROCEDURE — 36415 COLL VENOUS BLD VENIPUNCTURE: CPT

## 2023-06-10 PROCEDURE — 99291 CRITICAL CARE FIRST HOUR: CPT | Mod: ,,, | Performed by: INTERNAL MEDICINE

## 2023-06-10 PROCEDURE — 85025 COMPLETE CBC W/AUTO DIFF WBC: CPT

## 2023-06-10 PROCEDURE — 99222 PR INITIAL HOSPITAL CARE,LEVL II: ICD-10-PCS | Mod: ,,, | Performed by: HOSPITALIST

## 2023-06-10 PROCEDURE — 83516 IMMUNOASSAY NONANTIBODY: CPT | Mod: 59 | Performed by: HOSPITALIST

## 2023-06-10 PROCEDURE — 20600001 HC STEP DOWN PRIVATE ROOM

## 2023-06-10 PROCEDURE — 82550 ASSAY OF CK (CPK): CPT | Performed by: HOSPITALIST

## 2023-06-10 PROCEDURE — 84484 ASSAY OF TROPONIN QUANT: CPT

## 2023-06-10 PROCEDURE — 82570 ASSAY OF URINE CREATININE: CPT | Mod: 91 | Performed by: HOSPITALIST

## 2023-06-10 PROCEDURE — 80048 BASIC METABOLIC PNL TOTAL CA: CPT | Performed by: STUDENT IN AN ORGANIZED HEALTH CARE EDUCATION/TRAINING PROGRAM

## 2023-06-10 PROCEDURE — 63600175 PHARM REV CODE 636 W HCPCS

## 2023-06-10 PROCEDURE — 84300 ASSAY OF URINE SODIUM: CPT

## 2023-06-10 PROCEDURE — 94640 AIRWAY INHALATION TREATMENT: CPT

## 2023-06-10 PROCEDURE — 86160 COMPLEMENT ANTIGEN: CPT | Performed by: HOSPITALIST

## 2023-06-10 PROCEDURE — 83516 IMMUNOASSAY NONANTIBODY: CPT | Performed by: HOSPITALIST

## 2023-06-10 PROCEDURE — 82803 BLOOD GASES ANY COMBINATION: CPT

## 2023-06-10 PROCEDURE — 94761 N-INVAS EAR/PLS OXIMETRY MLT: CPT

## 2023-06-10 PROCEDURE — 82570 ASSAY OF URINE CREATININE: CPT

## 2023-06-10 PROCEDURE — 97165 OT EVAL LOW COMPLEX 30 MIN: CPT

## 2023-06-10 PROCEDURE — 0241U SARS-COV2 (COVID) WITH FLU/RSV BY PCR: CPT

## 2023-06-10 PROCEDURE — 99223 PR INITIAL HOSPITAL CARE,LEVL III: ICD-10-PCS | Mod: ,,, | Performed by: STUDENT IN AN ORGANIZED HEALTH CARE EDUCATION/TRAINING PROGRAM

## 2023-06-10 RX ORDER — BENZONATATE 100 MG/1
100 CAPSULE ORAL 3 TIMES DAILY PRN
Status: DISCONTINUED | OUTPATIENT
Start: 2023-06-10 | End: 2023-06-13 | Stop reason: HOSPADM

## 2023-06-10 RX ORDER — HYDROXYZINE HYDROCHLORIDE 25 MG/1
25 TABLET, FILM COATED ORAL ONCE
Status: DISCONTINUED | OUTPATIENT
Start: 2023-06-11 | End: 2023-06-10

## 2023-06-10 RX ORDER — IPRATROPIUM BROMIDE AND ALBUTEROL SULFATE 2.5; .5 MG/3ML; MG/3ML
3 SOLUTION RESPIRATORY (INHALATION) EVERY 6 HOURS
Status: DISCONTINUED | OUTPATIENT
Start: 2023-06-10 | End: 2023-06-11

## 2023-06-10 RX ORDER — HYDROXYZINE HYDROCHLORIDE 25 MG/1
25 TABLET, FILM COATED ORAL 3 TIMES DAILY
COMMUNITY

## 2023-06-10 RX ORDER — PANTOPRAZOLE SODIUM 40 MG/1
40 TABLET, DELAYED RELEASE ORAL DAILY
Status: DISCONTINUED | OUTPATIENT
Start: 2023-06-10 | End: 2023-06-13 | Stop reason: HOSPADM

## 2023-06-10 RX ORDER — SODIUM CHLORIDE 0.9 % (FLUSH) 0.9 %
10 SYRINGE (ML) INJECTION EVERY 12 HOURS PRN
Status: DISCONTINUED | OUTPATIENT
Start: 2023-06-10 | End: 2023-06-13 | Stop reason: HOSPADM

## 2023-06-10 RX ORDER — CYCLOBENZAPRINE HCL 10 MG
10 TABLET ORAL 3 TIMES DAILY PRN
Status: ON HOLD | COMMUNITY
End: 2023-06-13 | Stop reason: SDUPTHER

## 2023-06-10 RX ORDER — TAPENTADOL HYDROCHLORIDE 100 MG/1
1 TABLET, FILM COATED, EXTENDED RELEASE ORAL DAILY
Status: ON HOLD | COMMUNITY
End: 2023-06-13 | Stop reason: HOSPADM

## 2023-06-10 RX ORDER — SODIUM CHLORIDE, SODIUM LACTATE, POTASSIUM CHLORIDE, CALCIUM CHLORIDE 600; 310; 30; 20 MG/100ML; MG/100ML; MG/100ML; MG/100ML
INJECTION, SOLUTION INTRAVENOUS CONTINUOUS
Status: DISCONTINUED | OUTPATIENT
Start: 2023-06-11 | End: 2023-06-10

## 2023-06-10 RX ORDER — METRONIDAZOLE 10 MG/G
GEL TOPICAL DAILY
Status: ON HOLD | COMMUNITY
End: 2023-06-13 | Stop reason: HOSPADM

## 2023-06-10 RX ORDER — SODIUM CHLORIDE, SODIUM LACTATE, POTASSIUM CHLORIDE, CALCIUM CHLORIDE 600; 310; 30; 20 MG/100ML; MG/100ML; MG/100ML; MG/100ML
INJECTION, SOLUTION INTRAVENOUS CONTINUOUS
Status: ACTIVE | OUTPATIENT
Start: 2023-06-10 | End: 2023-06-10

## 2023-06-10 RX ORDER — DEXTROSE 40 %
30 GEL (GRAM) ORAL
Status: DISCONTINUED | OUTPATIENT
Start: 2023-06-10 | End: 2023-06-13 | Stop reason: HOSPADM

## 2023-06-10 RX ORDER — GLATIRAMER 40 MG/ML
40 INJECTION, SOLUTION SUBCUTANEOUS DAILY
COMMUNITY

## 2023-06-10 RX ORDER — OXYCODONE HYDROCHLORIDE 30 MG/1
5 TABLET ORAL EVERY 6 HOURS PRN
COMMUNITY

## 2023-06-10 RX ORDER — SODIUM CHLORIDE, SODIUM LACTATE, POTASSIUM CHLORIDE, CALCIUM CHLORIDE 600; 310; 30; 20 MG/100ML; MG/100ML; MG/100ML; MG/100ML
INJECTION, SOLUTION INTRAVENOUS CONTINUOUS
Status: DISCONTINUED | OUTPATIENT
Start: 2023-06-10 | End: 2023-06-10

## 2023-06-10 RX ORDER — ACETAMINOPHEN 325 MG/1
650 TABLET ORAL EVERY 6 HOURS PRN
Status: DISCONTINUED | OUTPATIENT
Start: 2023-06-10 | End: 2023-06-13 | Stop reason: HOSPADM

## 2023-06-10 RX ORDER — ALBUTEROL SULFATE 90 UG/1
2 AEROSOL, METERED RESPIRATORY (INHALATION) EVERY 6 HOURS PRN
Status: DISCONTINUED | OUTPATIENT
Start: 2023-06-10 | End: 2023-06-11

## 2023-06-10 RX ORDER — DEXTROSE 40 %
15 GEL (GRAM) ORAL
Status: DISCONTINUED | OUTPATIENT
Start: 2023-06-10 | End: 2023-06-13 | Stop reason: HOSPADM

## 2023-06-10 RX ORDER — GLUCAGON 1 MG
1 KIT INJECTION
Status: DISCONTINUED | OUTPATIENT
Start: 2023-06-10 | End: 2023-06-13 | Stop reason: HOSPADM

## 2023-06-10 RX ORDER — HYDROXYZINE HYDROCHLORIDE 10 MG/1
10 TABLET, FILM COATED ORAL ONCE
Status: COMPLETED | OUTPATIENT
Start: 2023-06-11 | End: 2023-06-10

## 2023-06-10 RX ORDER — GABAPENTIN 300 MG/1
300 CAPSULE ORAL 3 TIMES DAILY
Status: ON HOLD | COMMUNITY
End: 2023-06-13 | Stop reason: SDUPTHER

## 2023-06-10 RX ORDER — NALOXONE HCL 0.4 MG/ML
0.02 VIAL (ML) INJECTION
Status: DISCONTINUED | OUTPATIENT
Start: 2023-06-10 | End: 2023-06-13 | Stop reason: HOSPADM

## 2023-06-10 RX ORDER — LISINOPRIL 2.5 MG/1
2.5 TABLET ORAL DAILY
Status: ON HOLD | COMMUNITY
End: 2023-06-13 | Stop reason: HOSPADM

## 2023-06-10 RX ORDER — ONDANSETRON 4 MG/1
4 TABLET, ORALLY DISINTEGRATING ORAL EVERY 8 HOURS PRN
Status: DISCONTINUED | OUTPATIENT
Start: 2023-06-10 | End: 2023-06-13 | Stop reason: HOSPADM

## 2023-06-10 RX ORDER — TRIAMCINOLONE ACETONIDE 1 MG/G
CREAM TOPICAL 2 TIMES DAILY
Status: ON HOLD | COMMUNITY
End: 2023-06-13 | Stop reason: HOSPADM

## 2023-06-10 RX ORDER — SODIUM CHLORIDE, SODIUM LACTATE, POTASSIUM CHLORIDE, CALCIUM CHLORIDE 600; 310; 30; 20 MG/100ML; MG/100ML; MG/100ML; MG/100ML
INJECTION, SOLUTION INTRAVENOUS CONTINUOUS
Status: DISCONTINUED | OUTPATIENT
Start: 2023-06-11 | End: 2023-06-11

## 2023-06-10 RX ADMIN — SODIUM CHLORIDE, POTASSIUM CHLORIDE, SODIUM LACTATE AND CALCIUM CHLORIDE 500 ML: 600; 310; 30; 20 INJECTION, SOLUTION INTRAVENOUS at 05:06

## 2023-06-10 RX ADMIN — IPRATROPIUM BROMIDE AND ALBUTEROL SULFATE 3 ML: .5; 3 SOLUTION RESPIRATORY (INHALATION) at 05:06

## 2023-06-10 RX ADMIN — IPRATROPIUM BROMIDE AND ALBUTEROL SULFATE 3 ML: .5; 3 SOLUTION RESPIRATORY (INHALATION) at 07:06

## 2023-06-10 RX ADMIN — CEFEPIME 1 G: 1 INJECTION, POWDER, FOR SOLUTION INTRAMUSCULAR; INTRAVENOUS at 10:06

## 2023-06-10 RX ADMIN — SODIUM CHLORIDE 500 ML: 9 INJECTION, SOLUTION INTRAVENOUS at 01:06

## 2023-06-10 RX ADMIN — SODIUM CHLORIDE, POTASSIUM CHLORIDE, SODIUM LACTATE AND CALCIUM CHLORIDE: 600; 310; 30; 20 INJECTION, SOLUTION INTRAVENOUS at 11:06

## 2023-06-10 RX ADMIN — BENZONATATE 100 MG: 100 CAPSULE ORAL at 11:06

## 2023-06-10 RX ADMIN — ONDANSETRON 4 MG: 4 TABLET, ORALLY DISINTEGRATING ORAL at 11:06

## 2023-06-10 RX ADMIN — HYDROXYZINE HYDROCHLORIDE 10 MG: 10 TABLET ORAL at 11:06

## 2023-06-10 NOTE — ED NOTES
Telemetry Verification   Patient placed on Telemetry Box  Verified with War Room  Box # [id number]   Monitor Tech k   Rate 62   Rhythm NSR

## 2023-06-10 NOTE — SUBJECTIVE & OBJECTIVE
Past Medical History:   Diagnosis Date    Behavioral problem     Bulging lumbar disc     Bursitis     bilateral hip    Degenerative cervical disc     Hx of psychiatric care     Hypercholesteremia     Labral tear of hip, degenerative bilateral    MS (multiple sclerosis)     Paresthesia of both lower extremities 3/13/2022    Pneumonia     Spinal cord compression        Past Surgical History:   Procedure Laterality Date    ANGIOGRAM, CORONARY, WITH LEFT HEART CATHETERIZATION Left 3/11/2022    Procedure: Angiogram, Coronary, with Left Heart Cath;  Surgeon: Elie Matamoros MD;  Location: [facility] CATH LAB;  Service: Cardiology;  Laterality: Left;    BREAST SURGERY      augmentation     SECTION, CLASSIC      HAND SURGERY      HYSTEROSCOPY      NECK SURGERY      cervical disc removeal    TUBAL LIGATION      X-STOP IMPLANTATION         Review of patient's allergies indicates:   Allergen Reactions    Adhesive Hives    Neosporin [neomycin-bacitracin-polymyxin] Hives    Neomycin-polymyxin Hives    Penicillins Other (See Comments)     Unknown  reaction    Latex Rash       Family History       Problem Relation (Age of Onset)    Bipolar disorder Brother    COPD Mother    Cancer Father    Diabetes Father, Sister    Drug abuse Mother    Heart disease Maternal Uncle    Hypothyroidism Maternal Grandmother    Lung cancer Father          Tobacco Use    Smoking status: Former     Types: Cigarettes     Quit date: 2013     Years since quittin.8    Smokeless tobacco: Never   Substance and Sexual Activity    Alcohol use: No    Drug use: Yes     Types: Benzodiazepines, Marijuana    Sexual activity: Never      Review of Systems   Constitutional:  Positive for fever. Negative for fatigue.   HENT:  Negative for nosebleeds and sinus pain.    Respiratory:  Positive for shortness of breath. Negative for chest tightness.    Cardiovascular:  Negative for chest pain and leg swelling.   Gastrointestinal:  Negative for abdominal distention  and diarrhea.   Musculoskeletal:  Negative for arthralgias and myalgias.   Skin:  Negative for rash and wound.   Neurological:  Negative for seizures and syncope.   Psychiatric/Behavioral:  Negative for agitation and confusion.    All other systems reviewed and are negative.  Objective:     Vital Signs (Most Recent):  Temp: 99.7 °F (37.6 °C) (06/09/23 2040)  Pulse: 77 (06/10/23 0017)  Resp: (!) 22 (06/10/23 0016)  BP: (!) 87/49 (06/10/23 0017)  SpO2: (!) 94 % (06/10/23 0015) Vital Signs (24h Range):  Temp:  [99.7 °F (37.6 °C)] 99.7 °F (37.6 °C)  Pulse:  [74-88] 77  Resp:  [15-27] 22  SpO2:  [94 %-100 %] 94 %  BP: ()/(40-55) 87/49   Weight: 53.5 kg (118 lb)  Body mass index is 19.64 kg/m².    No intake or output data in the 24 hours ending 06/10/23 0032       Physical Exam  Vitals and nursing note reviewed.   Constitutional:       General: She is not in acute distress.     Appearance: She is not ill-appearing.   HENT:      Head: Normocephalic and atraumatic.      Mouth/Throat:      Mouth: Mucous membranes are moist.      Pharynx: Oropharynx is clear.   Eyes:      Extraocular Movements: Extraocular movements intact.      Conjunctiva/sclera: Conjunctivae normal.   Cardiovascular:      Rate and Rhythm: Normal rate and regular rhythm.      Heart sounds: No murmur heard.  Pulmonary:      Effort: No respiratory distress.      Breath sounds: No rales.   Abdominal:      General: There is no distension.      Palpations: Abdomen is soft.   Musculoskeletal:         General: No swelling or tenderness.      Cervical back: Normal range of motion and neck supple.   Skin:     General: Skin is warm and dry.   Neurological:      General: No focal deficit present.      Mental Status: She is alert and oriented to person, place, and time. Mental status is at baseline.      Cranial Nerves: No cranial nerve deficit.   Psychiatric:         Mood and Affect: Mood normal.         Thought Content: Thought content normal.          Vents:      Lines/Drains/Airways       Peripheral Intravenous Line  Duration                  Peripheral IV - Single Lumen 06/09/23 2122 20 G Right Antecubital <1 day                  Significant Labs:    CBC/Anemia Profile:  Recent Labs   Lab 06/09/23 2115   WBC 6.25   HGB 12.0   HCT 36.9*      MCV 92   RDW 13.6        Chemistries:  Recent Labs   Lab 06/09/23 2115      K 4.4   CL 99   CO2 22*   BUN 61*   CREATININE 9.1*   CALCIUM 9.2   ALBUMIN 3.5   PROT 7.8   BILITOT 0.4   ALKPHOS 81   ALT 20   AST 30       All pertinent labs within the past 24 hours have been reviewed.    Significant Imaging: I have reviewed all pertinent imaging results/findings within the past 24 hours.

## 2023-06-10 NOTE — ED NOTES
"Pt resting in bed comfortably;y, NAD. Pt aware of plan of care, waiting on room assignment to be ready. Pt has call light within reach, verbalized understanding of use if staff need arises    Patient identifiers for Reza Morrow 58 y.o. female checked and correct.  Chief Complaint   Patient presents with    Fever     Pt reports fever of 101 at home. Dc'd 2 days ago for "heart infection".  SOB from "fluid in lungs".      Past Medical History:   Diagnosis Date    Behavioral problem     Bulging lumbar disc     Bursitis     bilateral hip    Degenerative cervical disc     Hx of psychiatric care     Hypercholesteremia     Labral tear of hip, degenerative bilateral    MS (multiple sclerosis)     Paresthesia of both lower extremities 3/13/2022    Pneumonia     Spinal cord compression      Allergies reported:   Review of patient's allergies indicates:   Allergen Reactions    Adhesive Hives    Neosporin [neomycin-bacitracin-polymyxin] Hives    Neomycin-polymyxin Hives    Penicillins Other (See Comments)     Unknown  reaction    Latex Rash       Appearance: Pt awake, alert & oriented to person, place & time. Pt in no acute distress at present time. Pt is clean and well groomed with clothes appropriately fastened.   Skin: Skin warm, dry & intact. Color consistent with ethnicity. Mucous membranes moist. No breakdown or brusing noted.   Musculoskeletal: Patient moving all extremities well, no obvious swelling or deformities noted.   Respiratory: Respirations spontaneous, even, and non-labored. Visible chest rise noted. Airway is open and patent. No accessory muscle use noted.   Neurologic: Sensation is intact. Speech is clear and appropriate. Eyes open spontaneously, behavior appropriate to situation, follows commands, facial expression symmetrical, bilateral hand grasp equal and even, purposeful motor response noted.  Cardiac: All peripheral pulses present. No Bilateral lower extremity edema. Cap refill is <3 " seconds.  Abdomen: Abdomen soft, non distended, non tender to palpation.   : Pt voids independently, denies dysuria, hematuria, frequency.

## 2023-06-10 NOTE — CONSULTS
Reza Morrow   MR#:[id number]   RM:[id number] A  :1965  AGE:58 y.o.     Progress Note  Nephrology    Admit Date: 2023  Length of Stay: 0  Consult Requested By: Ludwin Clement MD  Reason for Consult: LANEY    58 year old F w/ hx MS, recent hospitalization for Takotsubo cardiomyopathy with EF 25% as well as as a second hospitalization for hypoxemic respiratory failure requiring intubation and cardiogenic shock. She was discharged with GDMT which included Entresto and metoprolol, patient noted hypotension at home as low as 60s/40s but continued taking lasix and her GDMT. On presentation she was hypotensive and improved with IVF (1L), Reports SOB and wheezing noted on exam CXR unremarkable, lactic acid is normal does not appear to be in a low output state.   Her renal functions were normal upon prior discharge on  at 0.9 mg/dl.  Admission serum creatinine at 9.0 mg/dl. Phos 9.5 mg/dl.  and renal ultrasound pending.    Review of Systems:  Constitutional: denies fever/weakness  Respiratory: denies SOB, cough   Cardiovascular: denies chest pain/palpitations   Gastrointestinal: denies N/V, diarrhea/constipation   Psych: denies confusion, depression    Patient Active Problem List   Diagnosis    Arthropathy of sacroiliac joint    Substance induced mood disorder    Opioid dependence    Benzodiazepine dependence    Open scalp wound    NSTEMI (non-ST elevated myocardial infarction)    Demand ischemia    Confusion    Acute systolic heart failure    Paresthesia of both lower extremities    Carpal tunnel syndrome, bilateral    Chronic pain syndrome    MS (multiple sclerosis)    Postmenopausal bleeding    Vaginal atrophy    Unspecified inflammatory spondylopathy, sacral and sacrococcygeal region    Psychosis    Hypertension    Catatonia    Acute encephalopathy    HFrEF (heart failure with reduced ejection fraction)    Hypotension    Takotsubo cardiomyopathy    LANEY (acute kidney injury)    Raynaud's phenomenon     Wheeze     Past Medical History:   Diagnosis Date    Behavioral problem     Bulging lumbar disc     Bursitis     bilateral hip    Degenerative cervical disc     Hx of psychiatric care     Hypercholesteremia     Labral tear of hip, degenerative bilateral    MS (multiple sclerosis)     Paresthesia of both lower extremities 3/13/2022    Pneumonia     Spinal cord compression         OBJECTIVE:   Temp:  [98.5 °F (36.9 °C)-99.7 °F (37.6 °C)]   Pulse:  [59-91]   Resp:  [9-27]   BP: ()/(40-63)   SpO2:  [94 %-100 %]       Intake/Output Summary (Last 24 hours) at 6/10/2023 1616  Last data filed at 6/10/2023 0057  Gross per 24 hour   Intake 500 ml   Output --   Net 500 ml         Physical Exam:  General Appearance: well developed, well nourished   HEENT: normocephalic, atraumatic    Eyes: conjunctivae/corneas clear. PERRL.   Oropharynx: MMM  Pulm:  Effort: normal   Auscultation: CTA B, no crackles/wheezes  Card:   Palpation:   Auscultation: RRR, S1/S2 nml   Ext. Edema:   GI: Tenderness/Masses:    Other:None       Laboratory:  CBC with Diff:   Recent Labs   Lab 06/09/23  2115 06/10/23  1243   WBC 6.25 4.59   HGB 12.0 11.2*   HCT 36.9* 34.0*    229   LYMPH 16.3*  1.0 14.6*  0.7*   MONO 10.7  0.7 13.9  0.6   EOSINOPHIL 0.5 1.1     COAG:  No results for input(s): APTT, INR, PTT in the last 168 hours.    CMP:   Recent Labs   Lab 06/09/23  2115 06/10/23  0053 06/10/23  0430 06/10/23  0431 06/10/23  1509   GLU 87 114*  --   --  93   CALCIUM 9.2 8.5*  --   --  8.8   ALBUMIN 3.5  --   --   --   --    PROT 7.8  --   --   --   --     136  --   --  140   K 4.4 4.4  --   --  4.3   CO2 22* 21*  --   --  26   CL 99 101  --   --  100   BUN 61* 60*  --   --  62*   CREATININE 9.1* 8.5*  --   --  7.0*   ALKPHOS 81  --   --   --   --    ALT 20  --   --   --   --    AST 30  --   --   --   --    BILITOT 0.4  --   --   --   --    MG  --   --  2.1  --   --    PHOS  --   --   --  8.9*  --      Estimated Creatinine Clearance: 7.5  mL/min (A) (based on SCr of 7 mg/dL (H)).  Corrected Calcium:      Cardiac markers: No results for input(s): CKMB, TROPONINT, MYOGLOBIN in the last 168 hours.  PT, PTT, INR    ABGNo results for input(s): PH, PO2, PCO2, HCO3, BE in the last 168 hours.    Urinalysis:  Recent Labs   Lab 06/09/23  2245   COLORU Yellow   SPECGRAV 1.010   PHUR 5.0   PROTEINUA 1+*   BACTERIA Rare   NITRITE Negative   LEUKOCYTESUR Negative   HYALINECASTS 3*     CMP:   Recent Labs   Lab 06/09/23  2115 06/10/23  0053 06/10/23  1509   GLU 87   < > 93   CALCIUM 9.2   < > 8.8   ALBUMIN 3.5  --   --    PROT 7.8  --   --       < > 140   K 4.4   < > 4.3   CO2 22*   < > 26   CL 99   < > 100   BUN 61*   < > 62*   CREATININE 9.1*   < > 7.0*   ALKPHOS 81  --   --    ALT 20  --   --    AST 30  --   --    BILITOT 0.4  --   --     < > = values in this interval not displayed.     LFTs:   Recent Labs   Lab 06/09/23 2115   ALT 20   AST 30   ALKPHOS 81   BILITOT 0.4   PROT 7.8   ALBUMIN 3.5     Coagulation: No results for input(s): PT, INR, APTT in the last 24 hours.        Hemoglobin A1C   Date Value Ref Range Status   03/23/2022 5.4 4.0 - 5.6 % Final     Comment:     ADA Screening Guidelines:  5.7-6.4%  Consistent with prediabetes  >or=6.5%  Consistent with diabetes    High levels of fetal hemoglobin interfere with the HbA1C  assay. Heterozygous hemoglobin variants (HbS, HgC, etc)do  not significantly interfere with this assay.   However, presence of multiple variants may affect accuracy.             ACCESS    ASSESSMENT/PLAN:   Principle Problem:  Hypotension  Active Hospital Problems    Diagnosis  POA    *Hypotension [I95.9]  Yes    Takotsubo cardiomyopathy [I51.81]  Unknown    LANEY (acute kidney injury) [N17.9]  Unknown    Raynaud's phenomenon [I73.00]  Unknown    Wheeze [R06.2]  Unknown    HFrEF (heart failure with reduced ejection fraction) [I50.20]  Yes    Chronic pain syndrome [G89.4]  Yes     Chronic    MS (multiple sclerosis) [G35]  Yes      Chronic      Resolved Hospital Problems   No resolved problems to display.       A/P:    1.  58 yr old with Takotsubo cardiomyopathy with EF 25%, recently discharged on entresto and increased dose of metoprolol comes with severe hypotension at BP 60/40 which has been persistent since previous discharge     LANEY appears to be likely pre renal with severe symptomatic hypotension nd volume depletion    UA with few WBC and  2+ Blood and hyaline cast   Urine microscopy with few granular casts and no florid muddy brown casts noted   Agree with fluid resuscitation with normal saline at @100 cc/hr as patient looks volume depleted   Would also check other serologies including CK, C3, C4, MPO, PR3, IgA, RACHEAL, Hepatitis B and C given SEVERE LANEY   Renal ultrasound pending   Currently slight improvement in serum creatinine with IV fluids noted. Serum creatinine down to 7.0 mg/dL from 9.0 mg/dL     Hyperphosphatemia    Serum phosphate at 9.5 mg/dl   Likely secondary to  LANEY   Would currently monitor on IV fluids.    Will follow arabella . Rosalia for the consult

## 2023-06-10 NOTE — ASSESSMENT & PLAN NOTE
58 year old female with recent admit for Takatsubo cardiomyopathy discharge 5/30 who presents for hypotension. At the time of her initial discharge patient was started on GDMT and states that she has been compliant with her medication regardless of her blood pressure level. On bedside POCUS patient's EF appeared to be > 55% with a collapsible IVC. At bedside patients MAP was maintained > 65 with multiple repeat blood pressures. In the ED labs notable for significant LANEY with creatinine of 9.1. Patient received x2 500 cc bolus' while in the ED with improvement of BP    - Recommend continue fluid resuscitation as hypotension likely secondary to continued use of antihypertensives and lasix in the setting of recovered EF  - F/u repeat BMP to assess improvement of LANEY, suspect it is likely pre-renal   - Ok for admission to hospital medicine, please contact MICU if any new concerns arise

## 2023-06-10 NOTE — ASSESSMENT & PLAN NOTE
Diagnosed with Takotsubo cardiomyopathy  Last TTE 5/24/23   The left ventricle is normal in size with severely decreased systolic function.   The estimated ejection fraction is 20%.   There is left ventricular global hypokinesis.   Left ventricular diastolic dysfunction.   Normal right ventricular size with normal right ventricular systolic function.   Mechanically ventilated; cannot use inferior caval vein diameter to estimate central venous pressure.    See hypotension

## 2023-06-10 NOTE — HPI
58 year old female with HLD, MS presenting with hypotension. She has had a couple of recent hospitalizations. During the first one, she presented confused and had JACKI; was taken to the cath lab where they found no CAD. TTE showed EF 25% suggestive of takutsubo cardiomyopathy; discharged home on GDMT. During her most recent hosptialization, she presented in acute hypoxemic respiratory failure requiring intubation and cardiogenic shock. Her GDMT was uptitrated and she was started on entresto. Discharged home on 5/30 with HH. Reports her BPs have been running low at home, with some BPs 60/40. Continued to take her BP meds and lasix despite this. Reports some lightheadedness and dizziness. Febrile at home yesterday with Tmax 101 and associated dry cough. Denies rhinorrhea or congestion. Denies chest pain or dyspnea. Denies abdominal pain, nausea, or vomiting. Has been eating and drinking OK. Has had decreased UOP over the past couple days (hasn't had nocturia which she normally does). Denies dysuria or hematuria. She lives with her son. Non- smoker, no alcohol. Is seen in a chronic pain clinic and is prescribed opioids, but hasn't taken any in several days. Also hasn't taken her MS meds (baclofen, muscle relaxants) in a few days.    On arrival to ED, afebrile, hypotensive BP 89/51, SBP 86-96, saturating 97% on RA. Hb 12, WBC 6.2, Plt 284. Na 136, K 4.4, bicarb 21, sCr 9.1, BUN 61. BGL OK. BNP 1200, initial troponin elevated. Procal negative. UA unremarkable. BCx pending. CXR unremarkable. In ED, received 1.5L of IV fluids and broad spectrum abx. Pt reviewed by MICU who felt her presentation was likely from hypovolemia as her IVC was collapsible, and she was responsive to fluids.      Admit to hospital medicine for further evaluation and management.

## 2023-06-10 NOTE — H&P
"[facility] Emergency Dept  Hospital Medicine  History & Physical    Patient Name: Reza Morrow  MRN: [id number]  Patient Class: IP- Inpatient  Admission Date: 6/9/2023  Attending Physician: Ludwin Clement MD   Primary Care Provider: Kip Jimenez MD    Patient information was obtained from patient and ER records.     Subjective:     Principal Problem:Hypotension    Chief Complaint:   Chief Complaint   Patient presents with    Fever     Pt reports fever of 101 at home. Dc'd 2 days ago for "heart infection".  SOB from "fluid in lungs".       HPI: 58 year old female with HLD, MS presenting with hypotension. She has had a couple of recent hospitalizations. During the first one, she presented confused and had JACKI; was taken to the cath lab where they found no CAD. TTE showed EF 25% suggestive of takutsubo cardiomyopathy; discharged home on GDMT. During her most recent hosptialization, she presented in acute hypoxemic respiratory failure requiring intubation and cardiogenic shock. Her GDMT was uptitrated and she was started on entresto. Discharged home on 5/30 with . Reports her BPs have been running low at home, with some BPs 60/40. Continued to take her BP meds and lasix despite this. Reports some lightheadedness and dizziness. Febrile at home yesterday with Tmax 101 and associated dry cough. Denies rhinorrhea or congestion. Denies chest pain or dyspnea. Denies abdominal pain, nausea, or vomiting. Has been eating and drinking OK. Has had decreased UOP over the past couple days (hasn't had nocturia which she normally does). Denies dysuria or hematuria. She lives with her son. Non- smoker, no alcohol. Is seen in a chronic pain clinic and is prescribed opioids, but hasn't taken any in several days. Also hasn't taken her MS meds (baclofen, muscle relaxants) in a few days.    On arrival to ED, afebrile, hypotensive BP 89/51, SBP 86-96, saturating 97% on RA. Hb 12, WBC 6.2, Plt 284. Na 136, K 4.4, bicarb 21, sCr 9.1, BUN " 61. BGL OK. BNP 1200, initial troponin elevated. Procal negative. UA unremarkable. BCx pending. CXR unremarkable. In ED, received 1.5L of IV fluids and broad spectrum abx. Pt reviewed by MICU who felt her presentation was likely from hypovolemia as her IVC was collapsible, and she was responsive to fluids.      Admit to hospital medicine for further evaluation and management.     Past Medical History:   Diagnosis Date    Behavioral problem     Bulging lumbar disc     Bursitis     bilateral hip    Degenerative cervical disc     Hx of psychiatric care     Hypercholesteremia     Labral tear of hip, degenerative bilateral    MS (multiple sclerosis)     Paresthesia of both lower extremities 3/13/2022    Pneumonia     Spinal cord compression      Past Surgical History:   Procedure Laterality Date    ANGIOGRAM, CORONARY, WITH LEFT HEART CATHETERIZATION Left 3/11/2022    Procedure: Angiogram, Coronary, with Left Heart Cath;  Surgeon: Elie Matamoros MD;  Location: [facility] CATH LAB;  Service: Cardiology;  Laterality: Left;    BREAST SURGERY      augmentation     SECTION, CLASSIC      HAND SURGERY      HYSTEROSCOPY      NECK SURGERY      cervical disc removeal    TUBAL LIGATION      X-STOP IMPLANTATION       Review of patient's allergies indicates:   Allergen Reactions    Adhesive Hives    Neosporin [neomycin-bacitracin-polymyxin] Hives    Neomycin-polymyxin Hives    Penicillins Other (See Comments)     Unknown  reaction    Latex Rash       No current facility-administered medications on file prior to encounter.     Current Outpatient Medications on File Prior to Encounter   Medication Sig    baclofen (LIORESAL) 10 MG tablet Take 10 mg by mouth 2 (two) times daily.    benzonatate (TESSALON) 100 MG capsule Take 1 capsule (100 mg total) by mouth 3 (three) times daily as needed for Cough.    ergocalciferol (ERGOCALCIFEROL) 50,000 unit Cap Take 50,000 Units by mouth every 7 days.    furosemide  (LASIX) 40 MG tablet WEIGH YOURSELF DAILY AND TAKE 1 TABLET IF WEIGHT INCREASE BY 3 POUNDS IN 24 HOURS OR IF WEIGHT INCREASE BY 5 POUNDS IN ONE WEEK (Patient not taking: Reported on 2023)    LORazepam (ATIVAN) 2 MG Tab Take 1 tablet (2 mg total) by mouth 2 (two) times daily.    methocarbamoL (ROBAXIN) 750 MG Tab Take 500 mg by mouth 4 (four) times daily.    metoprolol succinate (TOPROL-XL) 100 MG 24 hr tablet Take 1 tablet (100 mg total) by mouth once daily.    omeprazole (PRILOSEC) 20 MG capsule Take 20 mg by mouth daily as needed (Acid reflux).    rosuvastatin (CRESTOR) 20 MG tablet Take 1 tablet (20 mg total) by mouth once daily.    sacubitriL-valsartan (ENTRESTO) 24-26 mg per tablet Take 1 tablet by mouth 2 (two) times daily.    tiZANidine 2 mg Cap Take by mouth 3 (three) times daily.    VOLTAREN 1 % Gel Apply topically once daily.    [DISCONTINUED] atorvastatin (LIPITOR) 20 MG tablet Take 20 mg by mouth once daily.    [DISCONTINUED] EScitalopram oxalate (LEXAPRO) 10 MG tablet Take 10 mg by mouth once daily.    [DISCONTINUED] pregabalin (LYRICA) 150 MG capsule Take 150 mg by mouth 2 (two) times daily.     Family History       Problem Relation (Age of Onset)    Bipolar disorder Brother    COPD Mother    Cancer Father    Diabetes Father, Sister    Drug abuse Mother    Heart disease Maternal Uncle    Hypothyroidism Maternal Grandmother    Lung cancer Father          Tobacco Use    Smoking status: Former     Types: Cigarettes     Quit date: 2013     Years since quittin.8    Smokeless tobacco: Never   Substance and Sexual Activity    Alcohol use: No    Drug use: Yes     Types: Benzodiazepines, Marijuana    Sexual activity: Never     Review of Systems   Constitutional:  Positive for fever. Negative for chills.   HENT:  Negative for congestion and rhinorrhea.    Eyes:  Negative for photophobia and visual disturbance.   Respiratory:  Positive for cough and wheezing. Negative for shortness of  breath.    Cardiovascular:  Negative for chest pain, palpitations and leg swelling.   Gastrointestinal:  Negative for abdominal pain, nausea and vomiting.   Genitourinary:  Positive for decreased urine volume. Negative for dysuria, frequency and hematuria.   Musculoskeletal:  Negative for arthralgias and myalgias.   Skin:  Positive for color change. Negative for rash and wound.   Neurological:  Positive for dizziness and light-headedness. Negative for weakness and headaches.   Psychiatric/Behavioral:  Negative for agitation and confusion.    Objective:     Vital Signs (Most Recent):  Temp: 98.7 °F (37.1 °C) (06/10/23 0440)  Pulse: 64 (06/10/23 0440)  Resp: 16 (06/10/23 0440)  BP: (!) 106/56 (06/10/23 0440)  SpO2: 100 % (06/10/23 0440) Vital Signs (24h Range):  Temp:  [98.7 °F (37.1 °C)-99.7 °F (37.6 °C)] 98.7 °F (37.1 °C)  Pulse:  [59-91] 64  Resp:  [9-27] 16  SpO2:  [94 %-100 %] 100 %  BP: ()/(40-63) 106/56     Weight: 53.5 kg (118 lb)  Body mass index is 19.64 kg/m².     Physical Exam  Vitals and nursing note reviewed.   Constitutional:       General: She is not in acute distress.     Appearance: Normal appearance. She is not ill-appearing.   HENT:      Head: Normocephalic and atraumatic.      Mouth/Throat:      Mouth: Mucous membranes are moist.   Eyes:      Extraocular Movements: Extraocular movements intact.      Pupils: Pupils are equal, round, and reactive to light.   Cardiovascular:      Rate and Rhythm: Normal rate and regular rhythm.      Pulses: Normal pulses.      Heart sounds: Normal heart sounds. No murmur heard.  Pulmonary:      Effort: Pulmonary effort is normal.      Breath sounds: Wheezing (global wheeze) present. No rhonchi or rales.   Abdominal:      General: Bowel sounds are normal. There is no distension.      Palpations: Abdomen is soft.      Tenderness: There is no abdominal tenderness. There is no guarding.   Musculoskeletal:         General: No tenderness.      Right lower leg: No  edema.      Left lower leg: No edema.   Skin:     General: Skin is warm and dry.      Capillary Refill: Capillary refill takes less than 2 seconds.      Findings: No erythema or rash.   Neurological:      General: No focal deficit present.      Mental Status: She is alert and oriented to person, place, and time.   Psychiatric:         Mood and Affect: Mood normal.         Thought Content: Thought content normal.            CRANIAL NERVES     CN III, IV, VI   Pupils are equal, round, and reactive to light.     Significant Labs: All pertinent labs within the past 24 hours have been reviewed.  Blood Culture:   Recent Labs   Lab 06/09/23 2115 06/09/23 2116   LABBLOO No Growth to date No Growth to date     CBC:   Recent Labs   Lab 06/09/23 2115   WBC 6.25   HGB 12.0   HCT 36.9*        CMP:   Recent Labs   Lab 06/09/23  2115 06/10/23  0053    136   K 4.4 4.4   CL 99 101   CO2 22* 21*   GLU 87 114*   BUN 61* 60*   CREATININE 9.1* 8.5*   CALCIUM 9.2 8.5*   PROT 7.8  --    ALBUMIN 3.5  --    BILITOT 0.4  --    ALKPHOS 81  --    AST 30  --    ALT 20  --    ANIONGAP 15 14     Lactic Acid: No results for input(s): LACTATE in the last 48 hours.  Troponin:   Recent Labs   Lab 06/09/23 2115   TROPONINI 0.029*     Urine Culture: No results for input(s): LABURIN in the last 48 hours.  Urine Studies:   Recent Labs   Lab 06/09/23 2245   COLORU Yellow   APPEARANCEUA Hazy*   PHUR 5.0   SPECGRAV 1.010   PROTEINUA 1+*   GLUCUA Negative   KETONESU Negative   BILIRUBINUA Negative   OCCULTUA 2+*   NITRITE Negative   LEUKOCYTESUR Negative   RBCUA 0   WBCUA 14*   BACTERIA Rare   SQUAMEPITHEL 1   HYALINECASTS 3*     Recent Lab Results  (Last 5 results in the past 24 hours)        06/10/23  0112   06/10/23  0053   06/09/23  2245   06/09/23  2125   06/09/23  2116        Procalcitonin               Albumin               Alkaline Phosphatase               Allens Test N/A       N/A         ALT               Amorphous, UA      Moderate           Anion Gap   14             Appearance, UA     Hazy           AST               Bacteria, UA     Rare           Baso #               Basophil %               Bilirubin (UA)     Negative           BILIRUBIN TOTAL               Blood Culture, Routine         No Growth to date  [P]       BNP               Site Other       Other         BUN   60             Calcium   8.5             Chloride   101             CO2   21             Color, UA     Yellow           Creatinine   8.5             Differential Method               eGFR   5.0             Eos #               Eosinophil %               Glucose   114             Glucose, UA     Negative           Gran # (ANC)               Gran %               Hematocrit               Hemoglobin               Hepatitis C Ab               HIV 1/2 Ag/Ab               Hyaline Casts, UA     3           Immature Grans (Abs)               Immature Granulocytes               Ketones, UA     Negative           Leukocytes, UA     Negative           Lymph #               Lymph %               MCH               MCHC               MCV               Microscopic Comment     SEE COMMENT  Comment: Other formed elements not mentioned in the report are not   present in the microscopic examination.              Mono #               Mono %               MPV               NITRITE UA     Negative           nRBC               Occult Blood UA     2+           pH, UA     5.0           Platelets               POC Lactate 1.65       1.09         Potassium   4.4             PROTEIN TOTAL               Protein, UA     1+  Comment: Recommend a 24 hour urine protein or a urine   protein/creatinine ratio if globulin induced proteinuria is  clinically suspected.             RBC               RBC, UA     0           RDW               Sample VENOUS       VENOUS         Sodium   136             Specific [place], UA     1.010           Specimen UA     Urine, Clean Catch           Squam Epithel, UA      1           Troponin I               WBC, UA     14           WBC                                       [P] - Preliminary Result             Significant Imaging: I have reviewed all pertinent imaging results/findings within the past 24 hours.    Assessment/Plan:     * Hypotension  Recently discharged from hospital on 5/30 after she was admitted with Takusubo cardiomyopathy; was started on entresto and had metoprolol dose increased  Has been having low BP readings at home but continued to take her BP meds and lasix  Reports 1x fever at home and new dry cough. No other infective symptoms  States she's been eating and drinking OK at home  Afebrile, saturating well on RA  Labs fairly unremarkable with nothing obvious for infection. Normal lactate  CXR unremarkable    DDx: hypotension related to medication dosages vs infection vs cardiogenic (less likely)    Plan:  - continue broad abx for now until culture data returns, although low suspicion for infection  - follow-up BCx, UCx  - initial troponin elevated, EKG with NSR and no ischemic changes, repeat troponin  - monitor BP, can trial 250-500cc boluses as needed  - repeat TTE  - re-culture if febrile  - hold BP meds    Wheeze  New onset global wheeze  Denies inhaler use at home. Non-smoker  Saturating well on RA  Will give duonebs q6h scheduled and albuterol prn  No h/o COPD so no indication to treat for COPD exacerbation  Can add other inhalers if needed    Raynaud's phenomenon  Likely has Raynaud's. Noted to have white, blue, and red fingertips BL (see pictures)  Hands are warm and well perfused  Will likely need outpatient rheumatology follow-up    LANEY (acute kidney injury)  Presenting with sCr 9.1  Baseline is normal with sCr ~ 0.7  Pt denies urinary symptoms although has noticed some decrease urinary output over the past few days; normally has nocturia but hasn't had any    Likely prerenal in the setting of hypotension    Plan:  - UA with reflex to culture  - US  retroperitoneal  - urine lytes  - monitor sCr q12h  - avoid NSAIDs, contrast, etc  - nephrology consulted, appreciate recs    Takotsubo cardiomyopathy  Diagnosed with Takotsubo cardiomyopathy  Last TTE 5/24/23   The left ventricle is normal in size with severely decreased systolic function.   The estimated ejection fraction is 20%.   There is left ventricular global hypokinesis.   Left ventricular diastolic dysfunction.   Normal right ventricular size with normal right ventricular systolic function.   Mechanically ventilated; cannot use inferior caval vein diameter to estimate central venous pressure.    See hypotension    HFrEF (heart failure with reduced ejection fraction)  See Takotsubo cardiomyopathy    Hypertension  See Takotsubo cardiomyopathy    MS (multiple sclerosis)  Follows with Dr. Frey  Takes baclofen, robaxin, and tizanidine prn  Hasn't taken any of these meds in the past few days; usually staggers them when she does take them  Hold for now in setting of hypotension    Chronic pain syndrome  Goes to a chronic pain clinic  Takes oxycodone 30mg prn  Will hold for now as Pt hypotensive and pain controlled    VTE Risk Mitigation (From admission, onward)         Ordered     IP VTE LOW RISK PATIENT  Once         06/10/23 0338     Place sequential compression device  Until discontinued         06/10/23 0338              Ninfa Issa MD  Department of Hospital Medicine  [facility] - Emergency Dept

## 2023-06-10 NOTE — PROGRESS NOTES
Pharmacokinetic Initial Assessment: IV Vancomycin    Assessment/Plan:    Initiate intravenous vancomycin with loading dose of 1250 mg once with subsequent doses when random concentrations are less than 20 mcg/mL  Desired empiric serum trough concentration is 15 to 20 mcg/mL  Draw vancomycin random level on 6/10 at 2130.  Pharmacy will continue to follow and monitor vancomycin.      Please contact pharmacy at extension [phone] with any questions regarding this assessment.     Thank you for the consult,   Jose Manuel Burnett       Patient brief summary:  Reza Morrow is a 58 y.o. female initiated on antimicrobial therapy with IV Vancomycin for treatment of suspected sepsis    Drug Allergies:   Review of patient's allergies indicates:   Allergen Reactions    Adhesive Hives    Neosporin [neomycin-bacitracin-polymyxin] Hives    Neomycin-polymyxin Hives    Penicillins Other (See Comments)     Unknown  reaction    Latex Rash       Actual Body Weight:   53.5kg    Renal Function:   Estimated Creatinine Clearance: 6.2 mL/min (A) (based on SCr of 8.5 mg/dL (H)).,         CBC (last 72 hours):  Recent Labs   Lab Result Units 06/09/23  2115   WBC K/uL 6.25   Hemoglobin g/dL 12.0   Hematocrit % 36.9*   Platelets K/uL 284   Gran % % 71.8   Lymph % % 16.3*   Mono % % 10.7   Eosinophil % % 0.5   Basophil % % 0.5   Differential Method  Automated       Metabolic Panel (last 72 hours):  Recent Labs   Lab Result Units 06/09/23  2115 06/09/23  2245 06/10/23  0053   Sodium mmol/L 136  --  136   Potassium mmol/L 4.4  --  4.4   Chloride mmol/L 99  --  101   CO2 mmol/L 22*  --  21*   Glucose mg/dL 87  --  114*   Glucose, UA   --  Negative  --    BUN mg/dL 61*  --  60*   Creatinine mg/dL 9.1*  --  8.5*   Albumin g/dL 3.5  --   --    Total Bilirubin mg/dL 0.4  --   --    Alkaline Phosphatase U/L 81  --   --    AST U/L 30  --   --    ALT U/L 20  --   --        Drug levels (last 3 results):  No results for input(s): VANCOMYCINRA, VANCORANDOM,  VANCOMYCINPE, VANCOPEAK, VANCOMYCINTR, VANCOTROUGH in the last 72 hours.    Microbiologic Results:  Microbiology Results (last 7 days)       Procedure Component Value Units Date/Time    Blood culture x two cultures. Draw prior to antibiotics. [[id number]] Collected: 06/09/23 2115    Order Status: Completed Specimen: Blood from Peripheral, Antecubital, Right Updated: 06/10/23 0345     Blood Culture, Routine No Growth to date    Narrative:      Aerobic and anaerobic    Blood culture x two cultures. Draw prior to antibiotics. [[id number]] Collected: 06/09/23 2116    Order Status: Completed Specimen: Blood from Peripheral, Antecubital, Left Updated: 06/10/23 0345     Blood Culture, Routine No Growth to date    Narrative:      Aerobic and anaerobic    Urine culture [[id number]] Collected: 06/09/23 2245    Order Status: No result Specimen: Urine Updated: 06/09/23 [id number]

## 2023-06-10 NOTE — PHARMACY MED REC
"Admission Medication History     The home medication history was taken by Deepak Troy.    You may go to "Admission" then "Reconcile Home Medications" tabs to review and/or act upon these items.     The home medication list has been updated by the Pharmacy department.   Please read ALL comments highlighted in yellow.   Please address this information as you see fit.    Feel free to contact us if you have any questions or require assistance.          Current Outpatient Medications on File Prior to Encounter   Medication Sig    baclofen (LIORESAL) 10 MG tablet   Take 10 mg by mouth 2 (two) times daily.  (Patient not taking: Reported on 6/1/2023)    benzonatate (TESSALON) 100 MG capsule     Take 1 capsule (100 mg total) by mouth 3 (three) times daily as needed for Cough.  (Patient not taking: Reported on 6/1/2023)    cyclobenzaprine (FLEXERIL) 10 MG tablet     Take 10 mg by mouth 3 (three) times daily as needed for Muscle spasms.  (Patient not taking: Reported on 6/1/2023)    ergocalciferol (ERGOCALCIFEROL) 50,000 unit Cap   Take 50,000 Units by mouth every 7 days.  (Patient not taking: Reported on 6/1/2023)    furosemide (LASIX) 40 MG tablet         WEIGH YOURSELF DAILY AND TAKE 1 TABLET IF WEIGHT INCREASE BY 3 POUNDS IN 24 HOURS OR IF WEIGHT INCREASE BY 5 POUNDS IN ONE WEEK (Patient not taking: Reported on 6/1/2023)    gabapentin (NEURONTIN) 300 MG capsule   Take 300 mg by mouth 3 (three) times daily.  (Patient not taking: Reported on 6/1/2023)    glatiramer (COPAXONE) 40 mg/mL injection   Inject 40 mg into the skin once daily.  (Patient not taking: Reported on 6/1/2023)    hydrOXYzine HCL (ATARAX) 25 MG tablet   Take 25 mg by mouth 3 (three) times daily.  (Patient not taking: Reported on 6/1/2023)    lisinopriL (PRINIVIL,ZESTRIL) 2.5 MG tablet   Take 2.5 mg by mouth once daily.  (Patient not taking: Reported on 6/1/2023)    LORazepam (ATIVAN) 2 MG Tab     Take 1 tablet (2 mg total) by mouth 2 (two) times " daily.  (Patient not taking: Reported on 6/1/2023)    methocarbamoL (ROBAXIN) 750 MG Tab   Take 500 mg by mouth 4 (four) times daily.  (Patient not taking: Reported on 6/1/2023)    metoprolol succinate (TOPROL-XL) 100 MG 24 hr tablet   Take 1 tablet (100 mg total) by mouth once daily.  (Patient not taking: Reported on 6/1/2023)    metronidazole 1% (METROGEL) 1 % Gel Apply topically once daily.(Patient not taking: Reported on 6/1/2023)      omeprazole (PRILOSEC) 20 MG capsule   Take 20 mg by mouth daily as needed (Acid reflux).  (Patient not taking: Reported on 6/1/2023)    oxyCODONE (ROXICODONE) 30 MG Tab   Take 5 mg by mouth every 6 (six) hours as needed.  (Patient not taking: Reported on 6/1/2023)    rosuvastatin (CRESTOR) 20 MG tablet   Take 1 tablet (20 mg total) by mouth once daily.  (Patient not taking: Reported on 6/1/2023)    sacubitriL-valsartan (ENTRESTO) 24-26 mg per tablet Take 1 tablet by mouth 2 (two) times daily.  (Patient not taking: Reported on 6/1/2023)    tapentadoL (NUCYNTA ER) 100 mg Tb12   Take 1 tablet by mouth once daily.  (Patient not taking: Reported on 6/1/2023)    tiZANidine 2 mg Cap     Take by mouth 3 (three) times daily.  (Patient not taking: Reported on 6/1/2023)    triamcinolone acetonide 0.1% (KENALOG) 0.1 % cream   Apply topically 2 (two) times daily.  (Patient not taking: Reported on 6/1/2023)    VOLTAREN 1 % Gel     Apply topically once daily.  (Patient not taking: Reported on 6/1/2023)     atorvastatin (LIPITOR) 20 MG tablet   Take 20 mg by mouth once daily.  (Patient not taking: Reported on 6/1/2023)    EScitalopram oxalate (LEXAPRO) 10 MG tablet   Take 10 mg by mouth once daily.  (Patient not taking: Reported on 6/1/2023)     pregabalin (LYRICA) 150 MG capsule   Take 150 mg by mouth 2 (two) times daily.  (Patient not taking: Reported on 6/1/2023)       PATIENT REPORTS NOT TAKING ANY MEDICATIONS FOR THE PAST WEEK TO THREE WEEKS DUE TO THE ADVISE GIVEN TO HER FROM  HER NEIGHBOR  WHO IS A REGISTERED NURSE.      Potential issues to be addressed PRIOR TO DISCHARGE  Please discuss with the patient barriers to adherence with medication treatment plans  Patient requires education regarding drug therapies     Deepak Troy  EXT [phone]                  .

## 2023-06-10 NOTE — ASSESSMENT & PLAN NOTE
Goes to a chronic pain clinic  Takes oxycodone 30mg prn  Will hold for now as Pt hypotensive and pain controlled

## 2023-06-10 NOTE — PROVIDER PROGRESS NOTES - EMERGENCY DEPT.
Encounter Date: 6/9/2023    ED Physician Progress Notes        Physician Attestation Statement for Resident:  As the supervising MD   Physician Attestation Statement: I have personally seen and examined this patient.       ***I agree with the history unless otherwise noted.     ***As the supervising MD I agree with the PE unless otherwise noted.      ***I have reviewed and agree with the residents interpretation of the following unless otherwise noted:   ***I have personally reviewed and interpreted the patients laboratory studies:  ***I have personally reviewed and interpreted imaging studies:  ***I have personally reviewed and interpreted the patient's EKG: NSR @ 86, motion artifact, no ischemic changes      ***I have also reviewed the following:   ***old records at this facility,   ***records from referring facility   ***old EKGs,   ***old imaging and results    ***As the supervising MD I agree with the treatment, course, plan, and disposition unless otherwise noted.

## 2023-06-10 NOTE — ED TRIAGE NOTES
"Reza Morrow, a 58 y.o. female presents to the ED w/ complaint of fever and low blood pressure.    Triage note:  Chief Complaint   Patient presents with    Fever     Pt reports fever of 101 at home. Dc'd 2 days ago for "heart infection".  SOB from "fluid in lungs".      Review of patient's allergies indicates:   Allergen Reactions    Adhesive Hives    Neosporin [neomycin-bacitracin-polymyxin] Hives    Neomycin-polymyxin Hives    Penicillins Other (See Comments)     Unknown  reaction    Latex Rash     Past Medical History:   Diagnosis Date    Behavioral problem     Bulging lumbar disc     Bursitis     bilateral hip    Degenerative cervical disc     Hx of psychiatric care     Hypercholesteremia     Labral tear of hip, degenerative bilateral    MS (multiple sclerosis)     Paresthesia of both lower extremities 3/13/2022    Pneumonia     Spinal cord compression       LOC: The patient is awake, alert, aware of environment with an appropriate affect. Oriented x4, speaking appropriately  APPEARANCE: Pt resting comfortably, in no acute distress, pt is clean and well groomed, clothing properly fastened  SKIN:The skin is warm and dry, pale in color , patient has normal skin turgor and moist mucus membranes, no bruising noted  RESPIRATORY:Airway is open and patent, respirations are spontaneous, patient has a normal effort and rate, no accessory muscle use noted.. pt does complain of nonproductive cough  CARDIAC: Normal rate and rhythm, no peripheral edema noted, capillary refill < 3 seconds, bilateral radial pulses 2+.  ABDOMEN: Soft, non tender, non distended.   NEUROLOGIC: PERRLA, facial expression is symmetrical, patient moving all extremities spontaneously, normal sensation in all extremities when touched with a finger.  Follows all commands appropriately. Pt states chronic weakness to extremities related to MS with spasms  MUSCULOSKELETAL: Patient moving all extremities spontaneously, no obvious swelling or deformities " noted.  Pt urinates without issue/ continent of bowel and bladder

## 2023-06-10 NOTE — HPI
Ms. Morrow is a 58 year old female with recent admit for Takatsubo cardiomyopathy discharge 5/30 who presents for hypotension. Patient is confused about timeline because she thinks that she discharged 2 days ago, but discharged 10 days ago. At home she stated that she has had fever (up to 101 1 day ago, she thinks) and states that her home BP cuff has been reading low for a while (her timeline is confused, so she isn't sure how long that has been). She endorses some SOB and a forced wheeze on expiration. She reports that she has been eating and drinking regularly at home. She does endorse decreased UOP over the past 1 day. She lives with her son and has had home health coming to the house. She has been taking her antihypertensives and lasix, even when she noted that her BP was running with systolic <100. She takes opioids in a chronic pain clinic, but has  not taken any in several days. She also states that she hasn't taken her MS meds (baclofen, muscle relaxants) in a few days. Other than the fever, she denies any significant symptoms.     In the ED she was found to be afebrile, initially hypotensive which responded to 500cc fluid bolus and with a notable LANEY (Cr 9.1 from 0.7), non toxic appearing. POCUS revealed grossly normal EF (unable to check EPSS with this US) and collapsible IVC.

## 2023-06-10 NOTE — ASSESSMENT & PLAN NOTE
Likely has Raynaud's. Noted to have white, blue, and red fingertips BL (see pictures)  Hands are warm and well perfused  Will likely need outpatient rheumatology follow-up

## 2023-06-10 NOTE — ED PROVIDER NOTES
"Encounter Date: 2023       History     Chief Complaint   Patient presents with    Fever     Pt reports fever of 101 at home. Dc'd 2 days ago for "heart infection".  SOB from "fluid in lungs".      58 y.o. female with HLD, MS, recent history of takotsubo cardiomyopathy presents for fever and cough/shortness of breath.  Patient was DC from ICU stay 2 days ago, however she feels more short of breath and is starting to have fevers once again.  She reports her cough is wet and productive.  She also reports some chest discomfort associated with coughing.  She has had several episodes of emesis.  She denies any abdominal pain, nausea/vomiting, dysuria    The history is provided by the patient, medical records and a relative.   Review of patient's allergies indicates:   Allergen Reactions    Adhesive Hives    Neosporin [neomycin-bacitracin-polymyxin] Hives    Neomycin-polymyxin Hives    Penicillins Other (See Comments)     Unknown  reaction    Latex Rash     Past Medical History:   Diagnosis Date    Behavioral problem     Bulging lumbar disc     Bursitis     bilateral hip    Degenerative cervical disc     Hx of psychiatric care     Hypercholesteremia     Labral tear of hip, degenerative bilateral    MS (multiple sclerosis)     Paresthesia of both lower extremities 3/13/2022    Pneumonia     Spinal cord compression      Past Surgical History:   Procedure Laterality Date    ANGIOGRAM, CORONARY, WITH LEFT HEART CATHETERIZATION Left 3/11/2022    Procedure: Angiogram, Coronary, with Left Heart Cath;  Surgeon: Elie Matamoros MD;  Location: [facility] CATH LAB;  Service: Cardiology;  Laterality: Left;    BREAST SURGERY      augmentation     SECTION, CLASSIC      HAND SURGERY      HYSTEROSCOPY      NECK SURGERY      cervical disc removeal    TUBAL LIGATION      X-STOP IMPLANTATION       Family History   Problem Relation Age of Onset    Cancer Father     Diabetes Father     Lung cancer Father     Diabetes Sister     COPD " Mother     Drug abuse Mother     Bipolar disorder Brother     Heart disease Maternal Uncle     Hypothyroidism Maternal Grandmother      Social History     Tobacco Use    Smoking status: Former     Types: Cigarettes     Quit date: 2013     Years since quittin.8    Smokeless tobacco: Never   Substance Use Topics    Alcohol use: No    Drug use: Yes     Types: Benzodiazepines, Marijuana     Review of Systems   Reason unable to perform ROS: See HPI for relevant ROS.     Physical Exam     Initial Vitals [23]   BP Pulse Resp Temp SpO2   (!) 77/47 88 18 99.7 °F (37.6 °C) 96 %      MAP       --         Physical Exam    Nursing note and vitals reviewed.  Constitutional:   Alert, ill-appearing, speaking full sentences   HENT:   Head: Normocephalic and atraumatic.   Eyes: Conjunctivae are normal. No scleral icterus.   Cardiovascular:  Normal rate, regular rhythm and intact distal pulses.           Pulmonary/Chest: No stridor.   Coughing frequently, lungs clear to auscultation   Abdominal: Abdomen is soft. She exhibits no distension. There is no abdominal tenderness.   Musculoskeletal:         General: No edema.     Neurological: She is alert and oriented to person, place, and time.   Skin: Skin is warm and dry. No rash noted.       ED Course   Procedures  Labs Reviewed   CBC W/ AUTO DIFFERENTIAL - Abnormal; Notable for the following components:       Result Value    Hematocrit 36.9 (*)     Lymph % 16.3 (*)     All other components within normal limits    Narrative:     Release to patient->Immediate   COMPREHENSIVE METABOLIC PANEL - Abnormal; Notable for the following components:    CO2 22 (*)     BUN 61 (*)     Creatinine 9.1 (*)     eGFR 4.6 (*)     All other components within normal limits    Narrative:     Release to patient->Immediate   URINALYSIS, REFLEX TO URINE CULTURE - Abnormal; Notable for the following components:    Appearance, UA Hazy (*)     Protein, UA 1+ (*)     Occult Blood UA 2+ (*)     All  other components within normal limits    Narrative:     Specimen Source->Urine   TROPONIN I - Abnormal; Notable for the following components:    Troponin I 0.029 (*)     All other components within normal limits    Narrative:     Release to patient->Immediate   B-TYPE NATRIURETIC PEPTIDE - Abnormal; Notable for the following components:    BNP 1,179 (*)     All other components within normal limits    Narrative:     Release to patient->Immediate   URINALYSIS MICROSCOPIC - Abnormal; Notable for the following components:    WBC, UA 14 (*)     Hyaline Casts, UA 3 (*)     All other components within normal limits    Narrative:     Specimen Source->Urine   CULTURE, BLOOD   CULTURE, BLOOD   CULTURE, URINE   HIV 1 / 2 ANTIBODY    Narrative:     Release to patient->Immediate   HEPATITIS C ANTIBODY    Narrative:     Release to patient->Immediate   PROCALCITONIN   ISTAT LACTATE     EKG Readings: (Independently Interpreted)   Sinus rhythm, regular, narrow complex, rate of 88, no STEMI, interpretation limited by wandering baseline, no significant changes compared to previous     Imaging Results              X-Ray Chest AP Portable (Final result)  Result time 06/09/23 22:14:16      Final result by Austyn Jin MD (06/09/23 22:14:16)                   Impression:      No acute process.  Specifically, no airspace opacity.      Electronically signed by: Austyn Jin MD  Date:    06/09/2023  Time:    22:14               Narrative:    EXAMINATION:  XR CHEST AP PORTABLE    CLINICAL HISTORY:  Sepsis;    TECHNIQUE:  Single frontal view of the chest was performed.    COMPARISON:  05/26/2023.    FINDINGS:  There are postoperative changes in the cervical spine.  Monitoring EKG leads are present.    The trachea is unremarkable.  There are calcifications of the aortic knob.  The cardiomediastinal silhouette is within normal limits.  There is no evidence of free air beneath hemidiaphragms.  There are no pleural effusions.  There is no  evidence of a pneumothorax.  There is no evidence of pneumomediastinum.  No airspace opacity is present.  There are degenerative changes in the osseous structures.                                       Medications   vancomycin 1,250 mg in dextrose 5 % (D5W) 250 mL IVPB (Vial-Mate) (1,250 mg Intravenous New Bag 6/9/23 2243)   sodium chloride 0.9% bolus 500 mL 500 mL (has no administration in time range)   sodium chloride 0.9% bolus 500 mL 500 mL (500 mLs Intravenous New Bag 6/9/23 2136)   acetaminophen tablet 650 mg (650 mg Oral Given 6/9/23 2136)   ceFEPIme (MAXIPIME) 2 g in dextrose 5 % in water (D5W) 5 % 100 mL IVPB (MB+) (0 g Intravenous Stopped 6/9/23 2227)     Medical Decision Making:   History:   Old Medical Records: I decided to obtain old medical records.  Old Records Summarized: records from previous admission(s) and records from clinic visits.  Initial Assessment:   58 y.o. female with HLD, MS, recent history of takotsubo cardiomyopathy presents for fever and cough/shortness of breath  Patient with symptoms concerning for sepsis, patient is markedly hypotensive on arrival  Sepsis protocols initiated, patient started on IV fluids, IV antibiotics, broad workup including blood cultures ordered, will evaluate for possible source.    Differentials include pneumonia, pulmonary edema, CHF exacerbation, pericarditis, endocarditis, ACS, UTI, metabolic derangement  IV antibiotics were given empirically  Full 30 cc/kg bolus was not ordered as patient does have contraindications to aggressive IV fluid resuscitation (heart failure)  Patient with recent ICU stay for takotsubo cardiomyopathy, however on bedside US she has EF that is only mildly diminished, she has a collapsible IVC concerning for dehydration, IV fluids given with significant improvement in her blood pressure  Patient does have chest pain that improved during her ED visit.  No evidence of ischemia on EKG  Independently Interpreted Test(s):   I have  "ordered and independently interpreted EKG Reading(s) - see summary below  Clinical Tests:   Lab Tests: Ordered and Reviewed  Radiological Study: Ordered and Reviewed  Medical Tests: Ordered and Reviewed  Sepsis Perfusion Assessment: "I attest a sepsis perfusion exam was performed within 6 hours of sepsis, severe sepsis, or septic shock presentation, following fluid resuscitation."           ED Course as of 06/09/23 2324 Fri Jun 09, 2023 2100 BP(!): 77/47 [OK]   2151 BP(!): 93/55 [OK]   2247 Patient care handed off to oncoming team being re-evaluation at admission to hospital medicine [OK]      ED Course User Index  [OK] Mak Blakely MD                 Clinical Impression:   Final diagnoses:  [R06.02] SOB (shortness of breath)  [R50.9] Fever  [R07.9] Chest pain, unspecified type  [N17.9] LANEY (acute kidney injury)  [I95.9] Hypotension, unspecified hypotension type (Primary)        ED Disposition Condition    Admit Stable                Mak Blakely MD  Resident  06/09/23 2324    "

## 2023-06-10 NOTE — PROVIDER PROGRESS NOTES - EMERGENCY DEPT.
Encounter Date: 6/9/2023    ED Physician Progress Notes            ED Resident HAND-OFF NOTE:  Reza Morrow is a 58 y.o. female who presented to the ED on 6/10/2023, patient C/O fever. I assumed care of patient from off-going ED physician team patient pending chest x-ray, admission.    Briefly, patient had recent admission for takotsubo cardiomyopathy and is presenting for fever/cough/shortness of breath.  Sepsis workup initiated.  Patient given IV fluids and antibiotics.  Since patient had flash pulmonary edema requiring intubation at previous admission, previous team decided to give gentle hydration.    On my evaluation, Reza Morrow appears well, hemodynamically stable and in NAD. Thus far, Reza Morrow has received:  Medications   benzonatate capsule 100 mg (has no administration in time range)   pantoprazole EC tablet 40 mg (has no administration in time range)   sodium chloride 0.9% flush 10 mL (has no administration in time range)   naloxone 0.4 mg/mL injection 0.02 mg (has no administration in time range)   glucagon (human recombinant) injection 1 mg (has no administration in time range)   dextrose 10% bolus 125 mL 125 mL (has no administration in time range)   dextrose 10% bolus 250 mL 250 mL (has no administration in time range)   dextrose 40 % gel 15,000 mg (has no administration in time range)   dextrose 40 % gel 30,000 mg (has no administration in time range)   vancomycin - pharmacy to dose (has no administration in time range)   ceFEPIme (MAXIPIME) 1 g in dextrose 5 % in water (D5W) 5 % 100 mL IVPB (MB+) (has no administration in time range)   albuterol inhaler 2 puff (has no administration in time range)   acetaminophen tablet 650 mg (has no administration in time range)   ondansetron disintegrating tablet 4 mg (has no administration in time range)   albuterol-ipratropium 2.5 mg-0.5 mg/3 mL nebulizer solution 3 mL (3 mLs Nebulization Given 6/10/23 0526)   lactated ringers infusion ( Intravenous  New Bag 6/10/23 1151)   sodium chloride 0.9% bolus 500 mL 500 mL (0 mLs Intravenous Stopped 6/9/23 2330)   acetaminophen tablet 650 mg (650 mg Oral Given 6/9/23 2136)   vancomycin 1,250 mg in dextrose 5 % (D5W) 250 mL IVPB (Vial-Mate) (0 mg Intravenous Stopped 6/10/23 0020)   ceFEPIme (MAXIPIME) 2 g in dextrose 5 % in water (D5W) 5 % 100 mL IVPB (MB+) (0 g Intravenous Stopped 6/9/23 2227)   sodium chloride 0.9% bolus 500 mL 500 mL (0 mLs Intravenous Stopped 6/10/23 0057)   sodium chloride 0.9% bolus 500 mL 500 mL (0 mLs Intravenous Stopped 6/10/23 0217)       BP (!) 98/50   Pulse 85   Temp 98.5 °F (36.9 °C) (Oral)   Resp 16   Wt 53.5 kg (118 lb)   SpO2 100%   Breastfeeding No   BMI 19.64 kg/m²         Disposition: I anticipate patient will admission  ______________________  Dannielle Chaudhry MD   Emergency Medicine Resident      UPDATE:     Despite gentle hydration, patient persistently having maps in the 60s.  Discussed patient's case with Cardiac ICU, they state that patient is not a an appropriate candidate further service.  Recommended discussing with medical ICU.    Discussed patient's case with medical ICU.  They believe that patient likely over diuresed at home after discharge.  Recommend additional IV fluids since bedside echo appeared to have normal ejection fraction.  Critical care team believes that patient has recovered from episode of cardiomyopathy.  Will administer additional fluids.      Discussed patient's case with Hospital Medicine, they agreed to admit patient to step-down unit for further management of possible pneumonia, LANEY.      :  SOB (shortness of breath)  Fever  Chest pain, unspecified type  LANEY (acute kidney injury)  Hypotension, unspecified hypotension type (Primary)

## 2023-06-10 NOTE — ASSESSMENT & PLAN NOTE
New onset global wheeze  Denies inhaler use at home. Non-smoker  Saturating well on RA  Will give duonebs q6h scheduled and albuterol prn  No h/o COPD so no indication to treat for COPD exacerbation  Can add other inhalers if needed

## 2023-06-10 NOTE — PLAN OF CARE
[facility] - Telemetry Stepdown  Initial Discharge Assessment       Primary Care Provider: Kip Jimenez MD    Admission Diagnosis: SOB (shortness of breath) [R06.02]  Fever [R50.9]  LANEY (acute kidney injury) [N17.9]  Chest pain [R07.9]  HFrEF (heart failure with reduced ejection fraction) [I50.20]  Hypotension, unspecified hypotension type [I95.9]  Chest pain, unspecified type [R07.9]    Admission Date: 6/9/2023  Expected Discharge Date:     Transition of Care Barriers: None    Payor: MEDICAID / Plan: [employer] / Product Type: Managed Medicaid /     Extended Emergency Contact Information  Primary Emergency Contact: Celeste Olivarez   [facility]  Home Phone: [phone]  Relation: Sister  Secondary Emergency Contact: Jono Ceballos   [place]  Mobile Phone: [phone]  Relation: Son   needed? No    Discharge Plan A: Home  Discharge Plan B: Home with family      [facility] [place]  [address] [address]  Phone: [phone] Fax: [phone]    [facility] [address] at [place], [place] - [address] AT [facility] [address]  [address]  [address]  [facility] [address]  Phone: [phone] Fax: [phone]    [id number] [place], [place] - [address] [facility] AT [facility] OF [facility]  [address] [address]  Phone: [phone] Fax: [phone]    SW met with patient at bedside to complete discharge planning assessment.  Patient alert and oriented xs 4.  Patient verified all demographic information on facesheet is correct.  Patient verified PCP is Dr. Jimenez.  Patient verified primary health insurance [place] [employer] [place]  Patient with NO home health but has listed DME.  Patient with NO POA or Living Will.  Patient not on dialysis or medication coumadin.  Patient with no 30 day admission.  Patient with no financial issues at this time.  Patient family  will provide transportation upon discharge from facility.  Patient independent with ADLs, live with alone, drives self.      Initial Assessment (most recent)       Adult Discharge Assessment - 06/10/23 1707          Discharge Assessment    Assessment Type Discharge Planning Assessment     Confirmed/corrected address, phone number and insurance Yes     Confirmed Demographics Correct on Facesheet     Source of Information patient     Communicated JOANNE with patient/caregiver Date not available/Unable to determine     People in Home alone     Facility Arrived From: home     Do you expect to return to your current living situation? Yes     Do you have help at home or someone to help you manage your care at home? Yes     Who are your caregiver(s) and their phone number(s)? self     Prior to hospitilization cognitive status: Alert/Oriented     Current cognitive status: Alert/Oriented     Walking or Climbing Stairs ambulation difficulty, requires equipment;stair climbing difficulty, requires equipment     Equipment Currently Used at Home cane, straight     Readmission within 30 days? Yes     Patient currently being followed by outpatient case management? No     Do you currently have service(s) that help you manage your care at home? No     Do you take prescription medications? Yes     Do you have prescription coverage? Yes     Do you have any problems affording any of your prescribed medications? No     Is the patient taking medications as prescribed? yes     Who is going to help you get home at discharge? son     How do you get to doctors appointments? car, drives self     Are you on dialysis? No     Do you take coumadin? No     Discharge Plan A Home     Discharge Plan B Home with family     DME Needed Upon Discharge  none     Discharge Plan discussed with: Patient     Transition of Care Barriers None

## 2023-06-10 NOTE — CONSULTS
[facility] - Emergency Dept  Critical Care Medicine  Consult Note    Patient Name: Reza Morrow  MRN: [id number]  Admission Date: 6/9/2023  Hospital Length of Stay: 0 days  Code Status: Prior  Attending Physician: Dr. Randy Herrera  Primary Care Provider: Kip Jimenez MD   Principal Problem: <principal problem not specified>    Inpatient consult to Critical Care Medicine  Consult performed by: Nando Galvan MD  Consult ordered by: Dannielle Chaudhry MD  Reason for consult: Hypotension  Assessment/Recommendations: 58 year old female with recent admit for Takatsubo cardiomyopathy discharge 5/30 who presents for hypotension. At the time of her initial discharge patient was started on GDMT and states that she has been compliant with her medication regardless of her blood pressure level. On bedside POCUS patient's EF appeared to be > 55% with a collapsible IVC. At bedside patients MAP was maintained > 65 with multiple repeat blood pressures. In the ED labs notable for significant LANEY with creatinine of 9.1. Patient received x2 500 cc bolus' while in the ED with improvement of BP    - Recommend continue fluid resuscitation as hypotension likely secondary to continued use of antihypertensives and lasix in the setting of recovered EF  - F/u repeat BMP to assess improvement of LANEY, suspect it is likely pre-renal   - Ok for admission to hospital medicine, please contact MICU if any new concerns arise         Subjective:     HPI:  Ms. Morrow is a 58 year old female with recent admit for Takatsubo cardiomyopathy discharge 5/30 who presents for hypotension. Patient is confused about timeline because she thinks that she discharged 2 days ago, but discharged 10 days ago. At home she stated that she has had fever (up to 101 1 day ago, she thinks) and states that her home BP cuff has been reading low for a while (her timeline is confused, so she isn't sure how long that has been). She endorses some SOB and a forced wheeze on  expiration. She reports that she has been eating and drinking regularly at home. She does endorse decreased UOP over the past 1 day. She lives with her son and has had home health coming to the house. She has been taking her antihypertensives and lasix, even when she noted that her BP was running with systolic <100. She takes opioids in a chronic pain clinic, but has  not taken any in several days. She also states that she hasn't taken her MS meds (baclofen, muscle relaxants) in a few days. Other than the fever, she denies any significant symptoms.     In the ED she was found to be afebrile, initially hypotensive which responded to 500cc fluid bolus and with a notable LANEY (Cr 9.1 from 0.7), non toxic appearing. POCUS revealed grossly normal EF (unable to check EPSS with this US) and collapsible IVC.       Hospital/ICU Course:  No notes on file    Past Medical History:   Diagnosis Date    Behavioral problem     Bulging lumbar disc     Bursitis     bilateral hip    Degenerative cervical disc     Hx of psychiatric care     Hypercholesteremia     Labral tear of hip, degenerative bilateral    MS (multiple sclerosis)     Paresthesia of both lower extremities 3/13/2022    Pneumonia     Spinal cord compression        Past Surgical History:   Procedure Laterality Date    ANGIOGRAM, CORONARY, WITH LEFT HEART CATHETERIZATION Left 3/11/2022    Procedure: Angiogram, Coronary, with Left Heart Cath;  Surgeon: Elie Matamoros MD;  Location: [facility] CATH LAB;  Service: Cardiology;  Laterality: Left;    BREAST SURGERY      augmentation     SECTION, CLASSIC      HAND SURGERY      HYSTEROSCOPY      NECK SURGERY      cervical disc removeal    TUBAL LIGATION      X-STOP IMPLANTATION         Review of patient's allergies indicates:   Allergen Reactions    Adhesive Hives    Neosporin [neomycin-bacitracin-polymyxin] Hives    Neomycin-polymyxin Hives    Penicillins Other (See Comments)     Unknown  reaction     Latex Rash       Family History       Problem Relation (Age of Onset)    Bipolar disorder Brother    COPD Mother    Cancer Father    Diabetes Father, Sister    Drug abuse Mother    Heart disease Maternal Uncle    Hypothyroidism Maternal Grandmother    Lung cancer Father          Tobacco Use    Smoking status: Former     Types: Cigarettes     Quit date: 2013     Years since quittin.8    Smokeless tobacco: Never   Substance and Sexual Activity    Alcohol use: No    Drug use: Yes     Types: Benzodiazepines, Marijuana    Sexual activity: Never      Review of Systems   Constitutional:  Positive for fever. Negative for fatigue.   HENT:  Negative for nosebleeds and sinus pain.    Respiratory:  Positive for shortness of breath. Negative for chest tightness.    Cardiovascular:  Negative for chest pain and leg swelling.   Gastrointestinal:  Negative for abdominal distention and diarrhea.   Musculoskeletal:  Negative for arthralgias and myalgias.   Skin:  Negative for rash and wound.   Neurological:  Negative for seizures and syncope.   Psychiatric/Behavioral:  Negative for agitation and confusion.    All other systems reviewed and are negative.  Objective:     Vital Signs (Most Recent):  Temp: 99.7 °F (37.6 °C) (23)  Pulse: 77 (06/10/23 0017)  Resp: (!) 22 (06/10/23 0016)  BP: (!) 87/49 (06/10/23 0017)  SpO2: (!) 94 % (06/10/23 0015) Vital Signs (24h Range):  Temp:  [99.7 °F (37.6 °C)] 99.7 °F (37.6 °C)  Pulse:  [74-88] 77  Resp:  [15-27] 22  SpO2:  [94 %-100 %] 94 %  BP: ()/(40-55) 87/49   Weight: 53.5 kg (118 lb)  Body mass index is 19.64 kg/m².    No intake or output data in the 24 hours ending 06/10/23 0032       Physical Exam  Vitals and nursing note reviewed.   Constitutional:       General: She is not in acute distress.     Appearance: She is not ill-appearing.   HENT:      Head: Normocephalic and atraumatic.      Mouth/Throat:      Mouth: Mucous membranes are moist.      Pharynx:  Oropharynx is clear.   Eyes:      Extraocular Movements: Extraocular movements intact.      Conjunctiva/sclera: Conjunctivae normal.   Cardiovascular:      Rate and Rhythm: Normal rate and regular rhythm.      Heart sounds: No murmur heard.  Pulmonary:      Effort: No respiratory distress.      Breath sounds: No rales.   Abdominal:      General: There is no distension.      Palpations: Abdomen is soft.   Musculoskeletal:         General: No swelling or tenderness.      Cervical back: Normal range of motion and neck supple.   Skin:     General: Skin is warm and dry.   Neurological:      General: No focal deficit present.      Mental Status: She is alert and oriented to person, place, and time. Mental status is at baseline.      Cranial Nerves: No cranial nerve deficit.   Psychiatric:         Mood and Affect: Mood normal.         Thought Content: Thought content normal.          Vents:     Lines/Drains/Airways       Peripheral Intravenous Line  Duration                  Peripheral IV - Single Lumen 06/09/23 2122 20 G Right Antecubital <1 day                  Significant Labs:    CBC/Anemia Profile:  Recent Labs   Lab 06/09/23 2115   WBC 6.25   HGB 12.0   HCT 36.9*      MCV 92   RDW 13.6        Chemistries:  Recent Labs   Lab 06/09/23 2115      K 4.4   CL 99   CO2 22*   BUN 61*   CREATININE 9.1*   CALCIUM 9.2   ALBUMIN 3.5   PROT 7.8   BILITOT 0.4   ALKPHOS 81   ALT 20   AST 30       All pertinent labs within the past 24 hours have been reviewed.    Significant Imaging: I have reviewed all pertinent imaging results/findings within the past 24 hours.        ABG  No results for input(s): PH, PO2, PCO2, HCO3, BE in the last 168 hours.  Assessment/Plan:     Cardiac/Vascular  Hypotension  58 year old female with recent admit for Takatsubo cardiomyopathy discharge 5/30 who presents for hypotension. At the time of her initial discharge patient was started on GDMT and states that she has been compliant with her  medication regardless of her blood pressure level. On bedside POCUS patient's EF appeared to be > 55% with a collapsible IVC. At bedside patients MAP was maintained > 65 with multiple repeat blood pressures. In the ED labs notable for significant LANEY with creatinine of 9.1. Patient received x2 500 cc bolus' while in the ED with improvement of BP    - Recommend continue fluid resuscitation as hypotension likely secondary to continued use of antihypertensives and lasix in the setting of recovered EF  - F/u repeat BMP to assess improvement of LANEY, suspect it is likely pre-renal   - Ok for admission to hospital medicine, please contact MICU if any new concerns arise          Nando Galvan MD  Critical Care Medicine  [facility] - Emergency Dept

## 2023-06-10 NOTE — CARE UPDATE
Called patient's sister Celeste at her request, left a voicemail updating her on patient's clinical course.

## 2023-06-10 NOTE — PT/OT/SLP EVAL
Occupational Therapy   Evaluation and Discharge Note    Name: Reza Morrow  MRN: [id number]  Admitting Diagnosis: Hypotension  Recent Surgery: * No surgery found *      Recommendations:     Discharge Recommendations: home  Discharge Equipment Recommendations: shower chair  Barriers to discharge:       Assessment:     Reza Morrow is a 58 y.o. female with a medical diagnosis of Hypotension. At this time, patient is functioning at their prior level of function and does not require further acute OT services.     Plan:     During this hospitalization, patient does not require further acute OT services.  Please re-consult if situation changes.    Plan of Care Reviewed with: patient    Subjective     Chief Complaint: 8/10 (baseline)--chest pain and labral tears to hips (L>R pain)  Patient/Family Comments/goals: return home     Occupational Profile:  Living Environment: Pt lives alone in a H with 3 JACKI. Pt has a tub for bathing.Pt's son lives 5 streets over.  Previous level of function: I with ADLs, occasionally using a PSC  Roles and Routines: Pt enjoys her pets. Pt is on disability. Retired .  Equipment Used at home: cane, straight  Assistance upon Discharge: Son    Pain/Comfort:  Pain Rating 1: 8/10 (chest and hips)    Patients cultural, spiritual, Orthodoxy conflicts given the current situation: no    Objective:     Communicated with: RN prior to session.  Patient found HOB elevated with   upon OT entry to room.    General Precautions: Standard, fall  Orthopedic Precautions: N/A  Braces: N/A  Respiratory Status: Room air     Occupational Performance:    Bed Mobility:    Patient completed Rolling/Turning to Left with  independence  Patient completed Scooting/Bridging with independence  Patient completed Supine to Sit with independence  Patient completed Sit to Supine with independence    Functional Mobility/Transfers:  Patient completed Sit <> Stand Transfer with independence  with  no assistive device    Functional Mobility: Pt demonstrated functional mobility training to simulate household and community environment gait training during session. Pt tolerated ~5 minutes total using no AD with no LOB and good visual search and navigation strategies.       Activities of Daily Living:  Upper Body Dressing: independence donning socks    Cognitive/Visual Perceptual:  Cognitive/Psychosocial Skills:     -       Oriented to: Person, Place, Time, and Situation   -       Follows Commands/attention:Follows multistep  commands  -       Communication: clear/fluent  -       Memory: No Deficits noted  -       Safety awareness/insight to disability: intact   -       Mood/Affect/Coping skills/emotional control: Pleasant    Physical Exam:  Balance:    -       demo good sitting and standing balance   Upper Extremity Range of Motion:     -       Right Upper Extremity: WFL  -       Left Upper Extremity: WFL  Upper Extremity Strength:    -       Right Upper Extremity: WFL  -       Left Upper Extremity: WFL   Strength:    -       Right Upper Extremity: WFL  -       Left Upper Extremity: WFL    AMPAC 6 Click ADL:  AMPAC Total Score: 24    Treatment & Education:  Pt educated on role of occupational therapy, POC, and safety during ADLs and functional mobility. Pt and OT discussed importance of safe, continued mobility to optimize daily living skills. Pt verbalized understanding. White board updated during session. Pt given instruction to call for medical staff/nurse for assistance.       Patient left HOB elevated with all lines intact, call button in reach, and RN notified    GOALS:   Multidisciplinary Problems       Occupational Therapy Goals       Not on file                    History:     Past Medical History:   Diagnosis Date    Behavioral problem     Bulging lumbar disc     Bursitis     bilateral hip    Degenerative cervical disc     Hx of psychiatric care     Hypercholesteremia     Labral tear of hip, degenerative bilateral    MS  (multiple sclerosis)     Paresthesia of both lower extremities 3/13/2022    Pneumonia     Spinal cord compression          Past Surgical History:   Procedure Laterality Date    ANGIOGRAM, CORONARY, WITH LEFT HEART CATHETERIZATION Left 3/11/2022    Procedure: Angiogram, Coronary, with Left Heart Cath;  Surgeon: Elie Matamoros MD;  Location: [facility] CATH LAB;  Service: Cardiology;  Laterality: Left;    BREAST SURGERY      augmentation     SECTION, CLASSIC      HAND SURGERY      HYSTEROSCOPY      NECK SURGERY      cervical disc removeal    TUBAL LIGATION      X-STOP IMPLANTATION         Time Tracking:     OT Date of Treatment: 06/10/23  OT Start Time: 758  OT Stop Time: 813  OT Total Time (min): 15 min    Billable Minutes:Evaluation 7 min  Self Care/Home Management 8 min    6/10/2023

## 2023-06-10 NOTE — ASSESSMENT & PLAN NOTE
Recently discharged from hospital on 5/30 after she was admitted with Takusubo cardiomyopathy; was started on entresto and had metoprolol dose increased  Has been having low BP readings at home but continued to take her BP meds and lasix  Reports 1x fever at home and new dry cough. No other infective symptoms  States she's been eating and drinking OK at home  Afebrile, saturating well on RA  Labs fairly unremarkable with nothing obvious for infection. Normal lactate  CXR unremarkable    DDx: hypotension related to medication dosages vs infection vs cardiogenic (less likely)    Plan:  - continue broad abx for now until culture data returns, although low suspicion for infection  - follow-up BCx, UCx  - initial troponin elevated, EKG with NSR and no ischemic changes, repeat troponin  - monitor BP, can trial 250-500cc boluses as needed  - repeat TTE  - re-culture if febrile  - hold BP meds

## 2023-06-10 NOTE — ASSESSMENT & PLAN NOTE
Follows with Dr. Frey  Takes baclofen, robaxin, and tizanidine prn  Hasn't taken any of these meds in the past few days; usually staggers them when she does take them  Hold for now in setting of hypotension

## 2023-06-10 NOTE — SUBJECTIVE & OBJECTIVE
Past Medical History:   Diagnosis Date    Behavioral problem     Bulging lumbar disc     Bursitis     bilateral hip    Degenerative cervical disc     Hx of psychiatric care     Hypercholesteremia     Labral tear of hip, degenerative bilateral    MS (multiple sclerosis)     Paresthesia of both lower extremities 3/13/2022    Pneumonia     Spinal cord compression      Past Surgical History:   Procedure Laterality Date    ANGIOGRAM, CORONARY, WITH LEFT HEART CATHETERIZATION Left 3/11/2022    Procedure: Angiogram, Coronary, with Left Heart Cath;  Surgeon: Elie Matamoros MD;  Location: [facility] CATH LAB;  Service: Cardiology;  Laterality: Left;    BREAST SURGERY      augmentation     SECTION, CLASSIC      HAND SURGERY      HYSTEROSCOPY      NECK SURGERY      cervical disc removeal    TUBAL LIGATION      X-STOP IMPLANTATION       Review of patient's allergies indicates:   Allergen Reactions    Adhesive Hives    Neosporin [neomycin-bacitracin-polymyxin] Hives    Neomycin-polymyxin Hives    Penicillins Other (See Comments)     Unknown  reaction    Latex Rash       No current facility-administered medications on file prior to encounter.     Current Outpatient Medications on File Prior to Encounter   Medication Sig    baclofen (LIORESAL) 10 MG tablet Take 10 mg by mouth 2 (two) times daily.    benzonatate (TESSALON) 100 MG capsule Take 1 capsule (100 mg total) by mouth 3 (three) times daily as needed for Cough.    ergocalciferol (ERGOCALCIFEROL) 50,000 unit Cap Take 50,000 Units by mouth every 7 days.    furosemide (LASIX) 40 MG tablet WEIGH YOURSELF DAILY AND TAKE 1 TABLET IF WEIGHT INCREASE BY 3 POUNDS IN 24 HOURS OR IF WEIGHT INCREASE BY 5 POUNDS IN ONE WEEK (Patient not taking: Reported on 2023)    LORazepam (ATIVAN) 2 MG Tab Take 1 tablet (2 mg total) by mouth 2 (two) times daily.    methocarbamoL (ROBAXIN) 750 MG Tab Take 500 mg by mouth 4 (four) times daily.    metoprolol succinate (TOPROL-XL) 100 MG 24 hr  tablet Take 1 tablet (100 mg total) by mouth once daily.    omeprazole (PRILOSEC) 20 MG capsule Take 20 mg by mouth daily as needed (Acid reflux).    rosuvastatin (CRESTOR) 20 MG tablet Take 1 tablet (20 mg total) by mouth once daily.    sacubitriL-valsartan (ENTRESTO) 24-26 mg per tablet Take 1 tablet by mouth 2 (two) times daily.    tiZANidine 2 mg Cap Take by mouth 3 (three) times daily.    VOLTAREN 1 % Gel Apply topically once daily.    [DISCONTINUED] atorvastatin (LIPITOR) 20 MG tablet Take 20 mg by mouth once daily.    [DISCONTINUED] EScitalopram oxalate (LEXAPRO) 10 MG tablet Take 10 mg by mouth once daily.    [DISCONTINUED] pregabalin (LYRICA) 150 MG capsule Take 150 mg by mouth 2 (two) times daily.     Family History       Problem Relation (Age of Onset)    Bipolar disorder Brother    COPD Mother    Cancer Father    Diabetes Father, Sister    Drug abuse Mother    Heart disease Maternal Uncle    Hypothyroidism Maternal Grandmother    Lung cancer Father          Tobacco Use    Smoking status: Former     Types: Cigarettes     Quit date: 2013     Years since quittin.8    Smokeless tobacco: Never   Substance and Sexual Activity    Alcohol use: No    Drug use: Yes     Types: Benzodiazepines, Marijuana    Sexual activity: Never     Review of Systems   Constitutional:  Positive for fever. Negative for chills.   HENT:  Negative for congestion and rhinorrhea.    Eyes:  Negative for photophobia and visual disturbance.   Respiratory:  Positive for cough and wheezing. Negative for shortness of breath.    Cardiovascular:  Negative for chest pain, palpitations and leg swelling.   Gastrointestinal:  Negative for abdominal pain, nausea and vomiting.   Genitourinary:  Positive for decreased urine volume. Negative for dysuria, frequency and hematuria.   Musculoskeletal:  Negative for arthralgias and myalgias.   Skin:  Positive for color change. Negative for rash and wound.   Neurological:  Positive for dizziness and  light-headedness. Negative for weakness and headaches.   Psychiatric/Behavioral:  Negative for agitation and confusion.    Objective:     Vital Signs (Most Recent):  Temp: 98.7 °F (37.1 °C) (06/10/23 0440)  Pulse: 64 (06/10/23 0440)  Resp: 16 (06/10/23 0440)  BP: (!) 106/56 (06/10/23 0440)  SpO2: 100 % (06/10/23 0440) Vital Signs (24h Range):  Temp:  [98.7 °F (37.1 °C)-99.7 °F (37.6 °C)] 98.7 °F (37.1 °C)  Pulse:  [59-91] 64  Resp:  [9-27] 16  SpO2:  [94 %-100 %] 100 %  BP: ()/(40-63) 106/56     Weight: 53.5 kg (118 lb)  Body mass index is 19.64 kg/m².     Physical Exam  Vitals and nursing note reviewed.   Constitutional:       General: She is not in acute distress.     Appearance: Normal appearance. She is not ill-appearing.   HENT:      Head: Normocephalic and atraumatic.      Mouth/Throat:      Mouth: Mucous membranes are moist.   Eyes:      Extraocular Movements: Extraocular movements intact.      Pupils: Pupils are equal, round, and reactive to light.   Cardiovascular:      Rate and Rhythm: Normal rate and regular rhythm.      Pulses: Normal pulses.      Heart sounds: Normal heart sounds. No murmur heard.  Pulmonary:      Effort: Pulmonary effort is normal.      Breath sounds: Wheezing (global wheeze) present. No rhonchi or rales.   Abdominal:      General: Bowel sounds are normal. There is no distension.      Palpations: Abdomen is soft.      Tenderness: There is no abdominal tenderness. There is no guarding.   Musculoskeletal:         General: No tenderness.      Right lower leg: No edema.      Left lower leg: No edema.   Skin:     General: Skin is warm and dry.      Capillary Refill: Capillary refill takes less than 2 seconds.      Findings: No erythema or rash.   Neurological:      General: No focal deficit present.      Mental Status: She is alert and oriented to person, place, and time.   Psychiatric:         Mood and Affect: Mood normal.         Thought Content: Thought content normal.             CRANIAL NERVES     CN III, IV, VI   Pupils are equal, round, and reactive to light.     Significant Labs: All pertinent labs within the past 24 hours have been reviewed.  Blood Culture:   Recent Labs   Lab 06/09/23 2115 06/09/23 2116   LABBLOO No Growth to date No Growth to date     CBC:   Recent Labs   Lab 06/09/23 2115   WBC 6.25   HGB 12.0   HCT 36.9*        CMP:   Recent Labs   Lab 06/09/23  2115 06/10/23  0053    136   K 4.4 4.4   CL 99 101   CO2 22* 21*   GLU 87 114*   BUN 61* 60*   CREATININE 9.1* 8.5*   CALCIUM 9.2 8.5*   PROT 7.8  --    ALBUMIN 3.5  --    BILITOT 0.4  --    ALKPHOS 81  --    AST 30  --    ALT 20  --    ANIONGAP 15 14     Lactic Acid: No results for input(s): LACTATE in the last 48 hours.  Troponin:   Recent Labs   Lab 06/09/23 2115   TROPONINI 0.029*     Urine Culture: No results for input(s): LABURIN in the last 48 hours.  Urine Studies:   Recent Labs   Lab 06/09/23 2245   COLORU Yellow   APPEARANCEUA Hazy*   PHUR 5.0   SPECGRAV 1.010   PROTEINUA 1+*   GLUCUA Negative   KETONESU Negative   BILIRUBINUA Negative   OCCULTUA 2+*   NITRITE Negative   LEUKOCYTESUR Negative   RBCUA 0   WBCUA 14*   BACTERIA Rare   SQUAMEPITHEL 1   HYALINECASTS 3*     Recent Lab Results  (Last 5 results in the past 24 hours)        06/10/23  0112   06/10/23  0053   06/09/23 2245 06/09/23 2125 06/09/23 2116        Procalcitonin               Albumin               Alkaline Phosphatase               Allens Test N/A       N/A         ALT               Amorphous, UA     Moderate           Anion Gap   14             Appearance, UA     Hazy           AST               Bacteria, UA     Rare           Baso #               Basophil %               Bilirubin (UA)     Negative           BILIRUBIN TOTAL               Blood Culture, Routine         No Growth to date  [P]       BNP               Site Other       Other         BUN   60             Calcium   8.5             Chloride   101              CO2   21             Color, UA     Yellow           Creatinine   8.5             Differential Method               eGFR   5.0             Eos #               Eosinophil %               Glucose   114             Glucose, UA     Negative           Gran # (ANC)               Gran %               Hematocrit               Hemoglobin               Hepatitis C Ab               HIV 1/2 Ag/Ab               Hyaline Casts, UA     3           Immature Grans (Abs)               Immature Granulocytes               Ketones, UA     Negative           Leukocytes, UA     Negative           Lymph #               Lymph %               MCH               MCHC               MCV               Microscopic Comment     SEE COMMENT  Comment: Other formed elements not mentioned in the report are not   present in the microscopic examination.              Mono #               Mono %               MPV               NITRITE UA     Negative           nRBC               Occult Blood UA     2+           pH, UA     5.0           Platelets               POC Lactate 1.65       1.09         Potassium   4.4             PROTEIN TOTAL               Protein, UA     1+  Comment: Recommend a 24 hour urine protein or a urine   protein/creatinine ratio if globulin induced proteinuria is  clinically suspected.             RBC               RBC, UA     0           RDW               Sample VENOUS       VENOUS         Sodium   136             Specific [place], UA     1.010           Specimen UA     Urine, Clean Catch           Squam Epithel, UA     1           Troponin I               WBC, UA     14           WBC                                       [P] - Preliminary Result             Significant Imaging: I have reviewed all pertinent imaging results/findings within the past 24 hours.

## 2023-06-10 NOTE — ASSESSMENT & PLAN NOTE
Presenting with sCr 9.1  Baseline is normal with sCr ~ 0.7  Pt denies urinary symptoms although has noticed some decrease urinary output over the past few days; normally has nocturia but hasn't had any    Likely prerenal in the setting of hypotension    Plan:  - UA with reflex to culture  - US retroperitoneal  - urine lytes  - monitor sCr q12h  - avoid NSAIDs, contrast, etc  - nephrology consulted, appreciate recs

## 2023-06-11 PROBLEM — F41.9 ANXIETY: Status: ACTIVE | Noted: 2023-06-11

## 2023-06-11 PROBLEM — R50.9 FEVER: Status: ACTIVE | Noted: 2023-06-11

## 2023-06-11 LAB
ALBUMIN SERPL BCP-MCNC: 2.7 G/DL (ref 3.5–5.2)
ALP SERPL-CCNC: 76 U/L (ref 55–135)
ALT SERPL W/O P-5'-P-CCNC: 14 U/L (ref 10–44)
ANION GAP SERPL CALC-SCNC: 14 MMOL/L (ref 8–16)
AST SERPL-CCNC: 19 U/L (ref 10–40)
BACTERIA UR CULT: NORMAL
BASOPHILS # BLD AUTO: 0.04 K/UL (ref 0–0.2)
BASOPHILS NFR BLD: 0.8 % (ref 0–1.9)
BILIRUB SERPL-MCNC: 0.3 MG/DL (ref 0.1–1)
BUN SERPL-MCNC: 61 MG/DL (ref 6–20)
CALCIUM SERPL-MCNC: 8.7 MG/DL (ref 8.7–10.5)
CHLORIDE SERPL-SCNC: 99 MMOL/L (ref 95–110)
CO2 SERPL-SCNC: 26 MMOL/L (ref 23–29)
CREAT SERPL-MCNC: 5.6 MG/DL (ref 0.5–1.4)
DIFFERENTIAL METHOD: ABNORMAL
EOSINOPHIL # BLD AUTO: 0.1 K/UL (ref 0–0.5)
EOSINOPHIL NFR BLD: 1 % (ref 0–8)
ERYTHROCYTE [DISTWIDTH] IN BLOOD BY AUTOMATED COUNT: 13.7 % (ref 11.5–14.5)
EST. GFR  (NO RACE VARIABLE): 8.3 ML/MIN/1.73 M^2
GLUCOSE SERPL-MCNC: 156 MG/DL (ref 70–110)
HCT VFR BLD AUTO: 35.3 % (ref 37–48.5)
HGB BLD-MCNC: 11.4 G/DL (ref 12–16)
IMM GRANULOCYTES # BLD AUTO: 0.01 K/UL (ref 0–0.04)
IMM GRANULOCYTES NFR BLD AUTO: 0.2 % (ref 0–0.5)
LYMPHOCYTES # BLD AUTO: 1.1 K/UL (ref 1–4.8)
LYMPHOCYTES NFR BLD: 22.6 % (ref 18–48)
MAGNESIUM SERPL-MCNC: 1.9 MG/DL (ref 1.6–2.6)
MCH RBC QN AUTO: 29.8 PG (ref 27–31)
MCHC RBC AUTO-ENTMCNC: 32.3 G/DL (ref 32–36)
MCV RBC AUTO: 92 FL (ref 82–98)
MONOCYTES # BLD AUTO: 0.6 K/UL (ref 0.3–1)
MONOCYTES NFR BLD: 13.3 % (ref 4–15)
NEUTROPHILS # BLD AUTO: 3 K/UL (ref 1.8–7.7)
NEUTROPHILS NFR BLD: 62.1 % (ref 38–73)
NRBC BLD-RTO: 0 /100 WBC
PHOSPHATE SERPL-MCNC: 7.9 MG/DL (ref 2.7–4.5)
PLATELET # BLD AUTO: 225 K/UL (ref 150–450)
PMV BLD AUTO: 11.9 FL (ref 9.2–12.9)
POTASSIUM SERPL-SCNC: 4.5 MMOL/L (ref 3.5–5.1)
PROT SERPL-MCNC: 6.7 G/DL (ref 6–8.4)
RBC # BLD AUTO: 3.83 M/UL (ref 4–5.4)
SODIUM SERPL-SCNC: 139 MMOL/L (ref 136–145)
VANCOMYCIN SERPL-MCNC: 18 UG/ML
WBC # BLD AUTO: 4.82 K/UL (ref 3.9–12.7)

## 2023-06-11 PROCEDURE — 94645 CONT INHLJ TX EACH ADDL HOUR: CPT

## 2023-06-11 PROCEDURE — 63600175 PHARM REV CODE 636 W HCPCS: Performed by: STUDENT IN AN ORGANIZED HEALTH CARE EDUCATION/TRAINING PROGRAM

## 2023-06-11 PROCEDURE — 87081 CULTURE SCREEN ONLY: CPT | Performed by: STUDENT IN AN ORGANIZED HEALTH CARE EDUCATION/TRAINING PROGRAM

## 2023-06-11 PROCEDURE — 63600175 PHARM REV CODE 636 W HCPCS

## 2023-06-11 PROCEDURE — 25000003 PHARM REV CODE 250

## 2023-06-11 PROCEDURE — 94761 N-INVAS EAR/PLS OXIMETRY MLT: CPT

## 2023-06-11 PROCEDURE — 25000003 PHARM REV CODE 250: Performed by: STUDENT IN AN ORGANIZED HEALTH CARE EDUCATION/TRAINING PROGRAM

## 2023-06-11 PROCEDURE — 80202 ASSAY OF VANCOMYCIN: CPT | Performed by: STUDENT IN AN ORGANIZED HEALTH CARE EDUCATION/TRAINING PROGRAM

## 2023-06-11 PROCEDURE — 99233 PR SUBSEQUENT HOSPITAL CARE,LEVL III: ICD-10-PCS | Mod: ,,, | Performed by: STUDENT IN AN ORGANIZED HEALTH CARE EDUCATION/TRAINING PROGRAM

## 2023-06-11 PROCEDURE — 25000242 PHARM REV CODE 250 ALT 637 W/ HCPCS

## 2023-06-11 PROCEDURE — 99233 SBSQ HOSP IP/OBS HIGH 50: CPT | Mod: ,,, | Performed by: STUDENT IN AN ORGANIZED HEALTH CARE EDUCATION/TRAINING PROGRAM

## 2023-06-11 PROCEDURE — 80053 COMPREHEN METABOLIC PANEL: CPT

## 2023-06-11 PROCEDURE — 84100 ASSAY OF PHOSPHORUS: CPT

## 2023-06-11 PROCEDURE — 20600001 HC STEP DOWN PRIVATE ROOM

## 2023-06-11 PROCEDURE — 85025 COMPLETE CBC W/AUTO DIFF WBC: CPT

## 2023-06-11 PROCEDURE — 94640 AIRWAY INHALATION TREATMENT: CPT

## 2023-06-11 PROCEDURE — 36415 COLL VENOUS BLD VENIPUNCTURE: CPT | Performed by: STUDENT IN AN ORGANIZED HEALTH CARE EDUCATION/TRAINING PROGRAM

## 2023-06-11 PROCEDURE — 83735 ASSAY OF MAGNESIUM: CPT

## 2023-06-11 RX ORDER — OXYCODONE HYDROCHLORIDE 5 MG/1
5 TABLET ORAL EVERY 6 HOURS PRN
Status: DISCONTINUED | OUTPATIENT
Start: 2023-06-11 | End: 2023-06-12

## 2023-06-11 RX ORDER — SODIUM CHLORIDE, SODIUM LACTATE, POTASSIUM CHLORIDE, CALCIUM CHLORIDE 600; 310; 30; 20 MG/100ML; MG/100ML; MG/100ML; MG/100ML
INJECTION, SOLUTION INTRAVENOUS CONTINUOUS
Status: ACTIVE | OUTPATIENT
Start: 2023-06-11 | End: 2023-06-12

## 2023-06-11 RX ORDER — GUAIFENESIN/DEXTROMETHORPHAN 100-10MG/5
10 SYRUP ORAL EVERY 6 HOURS PRN
Status: DISCONTINUED | OUTPATIENT
Start: 2023-06-11 | End: 2023-06-13 | Stop reason: HOSPADM

## 2023-06-11 RX ORDER — SODIUM CHLORIDE, SODIUM LACTATE, POTASSIUM CHLORIDE, CALCIUM CHLORIDE 600; 310; 30; 20 MG/100ML; MG/100ML; MG/100ML; MG/100ML
INJECTION, SOLUTION INTRAVENOUS CONTINUOUS
Status: DISCONTINUED | OUTPATIENT
Start: 2023-06-11 | End: 2023-06-11

## 2023-06-11 RX ORDER — LORAZEPAM 0.5 MG/1
0.5 TABLET ORAL EVERY 8 HOURS PRN
Status: DISCONTINUED | OUTPATIENT
Start: 2023-06-11 | End: 2023-06-13

## 2023-06-11 RX ORDER — IPRATROPIUM BROMIDE AND ALBUTEROL SULFATE 2.5; .5 MG/3ML; MG/3ML
3 SOLUTION RESPIRATORY (INHALATION) EVERY 6 HOURS PRN
Status: DISCONTINUED | OUTPATIENT
Start: 2023-06-11 | End: 2023-06-13 | Stop reason: HOSPADM

## 2023-06-11 RX ORDER — LORAZEPAM 0.5 MG/1
0.5 TABLET ORAL EVERY 8 HOURS PRN
Status: DISCONTINUED | OUTPATIENT
Start: 2023-06-11 | End: 2023-06-11

## 2023-06-11 RX ORDER — HYDROXYZINE HYDROCHLORIDE 25 MG/1
25 TABLET, FILM COATED ORAL 3 TIMES DAILY PRN
Status: DISCONTINUED | OUTPATIENT
Start: 2023-06-11 | End: 2023-06-12

## 2023-06-11 RX ADMIN — BENZONATATE 100 MG: 100 CAPSULE ORAL at 11:06

## 2023-06-11 RX ADMIN — OXYCODONE HYDROCHLORIDE 5 MG: 5 TABLET ORAL at 11:06

## 2023-06-11 RX ADMIN — HYDROXYZINE HYDROCHLORIDE 25 MG: 25 TABLET, FILM COATED ORAL at 11:06

## 2023-06-11 RX ADMIN — PANTOPRAZOLE SODIUM 40 MG: 40 TABLET, DELAYED RELEASE ORAL at 09:06

## 2023-06-11 RX ADMIN — IPRATROPIUM BROMIDE AND ALBUTEROL SULFATE 3 ML: .5; 3 SOLUTION RESPIRATORY (INHALATION) at 01:06

## 2023-06-11 RX ADMIN — CEFEPIME 1 G: 1 INJECTION, POWDER, FOR SOLUTION INTRAMUSCULAR; INTRAVENOUS at 09:06

## 2023-06-11 RX ADMIN — SODIUM CHLORIDE, POTASSIUM CHLORIDE, SODIUM LACTATE AND CALCIUM CHLORIDE: 600; 310; 30; 20 INJECTION, SOLUTION INTRAVENOUS at 04:06

## 2023-06-11 RX ADMIN — VANCOMYCIN HYDROCHLORIDE 500 MG: 500 INJECTION, POWDER, LYOPHILIZED, FOR SOLUTION INTRAVENOUS at 09:06

## 2023-06-11 RX ADMIN — IPRATROPIUM BROMIDE AND ALBUTEROL SULFATE 3 ML: .5; 3 SOLUTION RESPIRATORY (INHALATION) at 08:06

## 2023-06-11 NOTE — PROGRESS NOTES
Pharmacokinetic Assessment Follow Up: IV Vancomycin    Vancomycin serum concentration assessment/plan(s):    -Vancomycin random within goal of 15 - 20 for possible sepsis  -LANEY improving with Scr decreased to 5.6  -UOP @ 1.3 mL/kg/hr for 24 hours  -Will re-dose with vancomycin 500 mg IVPB x1 and obtain random level in AM  -Plans for re-dosing with levels < 20      Drug levels (last 3 results):  Recent Labs   Lab Result Units 06/11/23  0315   Vancomycin, Random ug/mL 18.0       Pharmacy will continue to follow and monitor vancomycin.    Please contact pharmacy at extension [phone] for questions regarding this assessment.    Thank you for the consult,   Kip Watson       Patient brief summary:  Reza Morrow is a 58 y.o. female initiated on antimicrobial therapy with IV Vancomycin for treatment of sepsis    The patient's current regimen is vancomycin pulse dosing    Drug Allergies:   Review of patient's allergies indicates:   Allergen Reactions    Adhesive Hives    Neosporin [neomycin-bacitracin-polymyxin] Hives    Neomycin-polymyxin Hives    Penicillins Other (See Comments)     Unknown  reaction    Latex Rash       Actual Body Weight:   54 kg    Renal Function:   Estimated Creatinine Clearance: 9.4 mL/min (A) (based on SCr of 5.6 mg/dL (H)).,     Dialysis Method (if applicable):  N/A    CBC (last 72 hours):  Recent Labs   Lab Result Units 06/09/23  2115 06/10/23  1243 06/11/23  0316   WBC K/uL 6.25 4.59 4.82   Hemoglobin g/dL 12.0 11.2* 11.4*   Hematocrit % 36.9* 34.0* 35.3*   Platelets K/uL 284 229 225   Gran % % 71.8 69.8 62.1   Lymph % % 16.3* 14.6* 22.6   Mono % % 10.7 13.9 13.3   Eosinophil % % 0.5 1.1 1.0   Basophil % % 0.5 0.4 0.8   Differential Method  Automated Automated Automated       Metabolic Panel (last 72 hours):  Recent Labs   Lab Result Units 06/09/23 2115 06/09/23  2245 06/10/23  0053 06/10/23  0430 06/10/23  0431 06/10/23  0434 06/10/23  1217 06/10/23  1509 06/11/23  0316   Sodium mmol/L 136  --   136  --   --   --   --  140 139   Sodium, Urine mmol/L  --   --   --   --   --  64  --   --   --    Potassium mmol/L 4.4  --  4.4  --   --   --   --  4.3 4.5   Chloride mmol/L 99  --  101  --   --   --   --  100 99   CO2 mmol/L 22*  --  21*  --   --   --   --  26 26   Glucose mg/dL 87  --  114*  --   --   --   --  93 156*   Glucose, UA   --  Negative  --   --   --   --  Negative  --   --    BUN mg/dL 61*  --  60*  --   --   --   --  62* 61*   Creatinine mg/dL 9.1*  --  8.5*  --   --   --   --  7.0* 5.6*   Creatinine, Urine mg/dL  --   --   --   --   --  90.0 27.0  --   --    Albumin g/dL 3.5  --   --   --   --   --   --   --  2.7*   Total Bilirubin mg/dL 0.4  --   --   --   --   --   --   --  0.3   Alkaline Phosphatase U/L 81  --   --   --   --   --   --   --  76   AST U/L 30  --   --   --   --   --   --   --  19   ALT U/L 20  --   --   --   --   --   --   --  14   Magnesium mg/dL  --   --   --  2.1  --   --   --   --  1.9   Phosphorus mg/dL  --   --   --   --  8.9*  --   --   --  7.9*       Vancomycin Administrations:  vancomycin given in the last 96 hours                     vancomycin 1,250 mg in dextrose 5 % (D5W) 250 mL IVPB (Vial-Mate) (mg) 1,250 mg New Bag 06/09/23 2243                    Microbiologic Results:  Microbiology Results (last 7 days)       Procedure Component Value Units Date/Time    Urine culture [[id number]] Collected: 06/09/23 2245    Order Status: Completed Specimen: Urine Updated: 06/11/23 0301     Urine Culture, Routine No significant growth    Narrative:      Specimen Source->Urine    Blood culture x two cultures. Draw prior to antibiotics. [[id number]] Collected: 06/09/23 2116    Order Status: Completed Specimen: Blood from Peripheral, Antecubital, Left Updated: 06/10/23 2212     Blood Culture, Routine No Growth to date      No Growth to date    Narrative:      Aerobic and anaerobic    Blood culture x two cultures. Draw prior to antibiotics. [[id number]] Collected: 06/09/23 2115    Order  Status: Completed Specimen: Blood from Peripheral, Antecubital, Right Updated: 06/10/23 4679     Blood Culture, Routine No Growth to date      No Growth to date    Narrative:      Aerobic and anaerobic

## 2023-06-11 NOTE — ASSESSMENT & PLAN NOTE
Likely combination of dehydration, medication, and possible infection. Now improving.  - hold GDMT  - follow-up BCx, can likely d/c vanc at 48 hours  - repeat TTE pending

## 2023-06-11 NOTE — PROGRESS NOTES
[facility] Medicine  Progress Note    Patient Name: Reza Morrow  MRN: [id number]  Patient Class: IP- Inpatient   Admission Date: 6/9/2023  Length of Stay: 1 days  Attending Physician: Ludwin Clement MD  Primary Care Provider: Kip Jimenez MD        Subjective:     Principal Problem:LANEY (acute kidney injury)        HPI:  58 year old female with HLD, MS presenting with hypotension. She has had a couple of recent hospitalizations. During the first one, she presented confused and had JACKI; was taken to the cath lab where they found no CAD. TTE showed EF 25% suggestive of takutsubo cardiomyopathy; discharged home on GDMT. During her most recent hosptialization, she presented in acute hypoxemic respiratory failure requiring intubation and cardiogenic shock. Her GDMT was uptitrated and she was started on entresto. Discharged home on 5/30 with . Reports her BPs have been running low at home, with some BPs 60/40. Continued to take her BP meds and lasix despite this. Reports some lightheadedness and dizziness. Febrile at home yesterday with Tmax 101 and associated dry cough. Denies rhinorrhea or congestion. Denies chest pain or dyspnea. Denies abdominal pain, nausea, or vomiting. Has been eating and drinking OK. Has had decreased UOP over the past couple days (hasn't had nocturia which she normally does). Denies dysuria or hematuria. She lives with her son. Non- smoker, no alcohol. Is seen in a chronic pain clinic and is prescribed opioids, but hasn't taken any in several days. Also hasn't taken her MS meds (baclofen, muscle relaxants) in a few days.    On arrival to ED, afebrile, hypotensive BP 89/51, SBP 86-96, saturating 97% on RA. Hb 12, WBC 6.2, Plt 284. Na 136, K 4.4, bicarb 21, sCr 9.1, BUN 61. BGL OK. BNP 1200, initial troponin elevated. Procal negative. UA unremarkable. BCx pending. CXR unremarkable. In ED, received 1.5L of IV fluids and broad spectrum abx. Pt reviewed by MICU who felt  her presentation was likely from hypovolemia as her IVC was collapsible, and she was responsive to fluids.      Admit to hospital medicine for further evaluation and management.       Overview/Hospital Course:  All GDMT meds held on admission. Nephrology consulted for LANEY and recommended fluids. LANEY improved w/ fluids. Home pain meds resumed. Started on broad spec abx for possible PNA. Sputum culture sent.      Interval History: NAEO. Feeling anxious this AM and having bilateral hip pain. Having more cough now.     Review of Systems   Constitutional:  Positive for fever. Negative for chills.   HENT:  Negative for congestion and rhinorrhea.    Eyes:  Negative for photophobia and visual disturbance.   Respiratory:  Positive for cough and wheezing. Negative for shortness of breath.    Cardiovascular:  Negative for chest pain, palpitations and leg swelling.   Gastrointestinal:  Negative for abdominal pain, nausea and vomiting.   Genitourinary:  Positive for decreased urine volume. Negative for dysuria, frequency and hematuria.   Musculoskeletal:  Negative for arthralgias and myalgias.   Skin:  Positive for color change. Negative for rash and wound.   Neurological:  Positive for dizziness and light-headedness. Negative for weakness and headaches.   Psychiatric/Behavioral:  Negative for agitation and confusion. The patient is nervous/anxious.    Objective:     Vital Signs (Most Recent):  Temp: 99.3 °F (37.4 °C) (06/11/23 0900)  Pulse: 77 (06/11/23 0801)  Resp: 18 (06/11/23 0801)  BP: (!) 94/56 (06/11/23 0400)  SpO2: 100 % (06/11/23 0801) Vital Signs (24h Range):  Temp:  [98.2 °F (36.8 °C)-99.3 °F (37.4 °C)] 99.3 °F (37.4 °C)  Pulse:  [61-89] 77  Resp:  [14-28] 18  SpO2:  [95 %-100 %] 100 %  BP: ()/(50-60) 94/56     Weight: 53.7 kg (118 lb 7.3 oz)  Body mass index is 20.33 kg/m².    Intake/Output Summary (Last 24 hours) at 6/11/2023 1039  Last data filed at 6/11/2023 0400  Gross per 24 hour   Intake 1260 ml   Output  1700 ml   Net -440 ml         Physical Exam  Vitals reviewed.   Constitutional:       General: She is not in acute distress.     Appearance: Normal appearance.   HENT:      Mouth/Throat:      Mouth: Mucous membranes are moist.   Cardiovascular:      Rate and Rhythm: Normal rate and regular rhythm.      Heart sounds: No murmur heard.  Pulmonary:      Effort: Pulmonary effort is normal.      Breath sounds: Normal breath sounds. Transmitted upper airway sounds present. No wheezing or rales.   Abdominal:      General: Abdomen is flat. Bowel sounds are normal. There is no distension.      Palpations: Abdomen is soft.      Tenderness: There is no abdominal tenderness.   Musculoskeletal:         General: No swelling.      Right lower leg: No edema.      Left lower leg: No edema.   Skin:     General: Skin is warm.      Capillary Refill: Capillary refill takes less than 2 seconds.   Neurological:      General: No focal deficit present.      Mental Status: She is alert and oriented to person, place, and time.   Psychiatric:         Mood and Affect: Mood is anxious.         Behavior: Behavior normal.           Significant Labs: All pertinent labs within the past 24 hours have been reviewed.  BMP:   Recent Labs   Lab 06/11/23  0316   *      K 4.5   CL 99   CO2 26   BUN 61*   CREATININE 5.6*   CALCIUM 8.7   MG 1.9     CBC:   Recent Labs   Lab 06/09/23  2115 06/10/23  1243 06/11/23  0316   WBC 6.25 4.59 4.82   HGB 12.0 11.2* 11.4*   HCT 36.9* 34.0* 35.3*    229 225       Significant Imaging: I have reviewed all pertinent imaging results/findings within the past 24 hours.      Assessment/Plan:      * LANEY (acute kidney injury)  Presenting with sCr 9.1. Baseline is normal with sCr ~ 0.7. Likely prerenal in the setting of hypotension, IVVD.  -nephrology following  -will continue slow fluids    Fever  Patient reports having fever at home. Has been afebrile since admission. Treating for possible PNA.  -f/u BCx,  sputum Cx  -vanc/cefepime, likely d/c vanc at 48 hours      Wheeze  New onset global wheeze on admission. Denies inhaler use at home. Non-smoker  -duo-nebs PRN    Raynaud's phenomenon  Likely has Raynaud's. Noted to have white, blue, and red fingertips BL (see pictures)  Hands are warm and well perfused  Will likely need outpatient rheumatology follow-up    Takotsubo cardiomyopathy  Noted last month. EF 20%  -repeat echo pending    Hypotension  Likely combination of dehydration, medication, and possible infection. Now improving.  - hold GDMT  - follow-up BCx, can likely d/c vanc at 48 hours  - repeat TTE pending    HFrEF (heart failure with reduced ejection fraction)  EF 20% on last ECHO, attributed to takotsubo  -hold GDMT due to hypotension and LANEY  -repeat echo pending    Hypertension  See Takotsubo cardiomyopathy    MS (multiple sclerosis)  Follows with Dr. Frey. Takes copaxone three times weekly. Also takes baclofen, robaxin, and tizanidine prn  -hold muscle relaxers in setting of decreased renal function    Chronic pain syndrome  -oxycodone PRN    VTE Risk Mitigation (From admission, onward)         Ordered     IP VTE LOW RISK PATIENT  Once         06/10/23 0338     Place sequential compression device  Until discontinued         06/10/23 0338                Discharge Planning   JOANNE: 6/13/2023     Code Status: Full Code   Is the patient medically ready for discharge?: No    Reason for patient still in hospital (select all that apply): Patient trending condition and Treatment  Discharge Plan A: Home                  Neel Gr MD  Department of Hospital Medicine   [facility] - Telemetry Stepdown

## 2023-06-11 NOTE — ASSESSMENT & PLAN NOTE
EF 20% on last ECHO, attributed to takotsubo  -hold GDMT due to hypotension and LANEY  -repeat echo pending

## 2023-06-11 NOTE — ASSESSMENT & PLAN NOTE
Presenting with sCr 9.1. Baseline is normal with sCr ~ 0.7. Likely prerenal in the setting of hypotension, IVVD.  -nephrology following  -will continue slow fluids

## 2023-06-11 NOTE — ASSESSMENT & PLAN NOTE
Follows with Dr. Frey. Takes copaxone three times weekly. Also takes baclofen, robaxin, and tizanidine prn  -hold muscle relaxers in setting of decreased renal function

## 2023-06-11 NOTE — PLAN OF CARE
Problem: Adult Inpatient Plan of Care  Goal: Plan of Care Review  Outcome: Ongoing, Not Progressing  Goal: Patient-Specific Goal (Individualized)  Outcome: Ongoing, Not Progressing  Goal: Absence of Hospital-Acquired Illness or Injury  Outcome: Ongoing, Not Progressing  Goal: Optimal Comfort and Wellbeing  Outcome: Ongoing, Not Progressing  Goal: Readiness for Transition of Care  Outcome: Ongoing, Not Progressing     Problem: Fluid and Electrolyte Imbalance (Acute Kidney Injury/Impairment)  Goal: Fluid and Electrolyte Balance  Outcome: Ongoing, Not Progressing     Problem: Oral Intake Inadequate (Acute Kidney Injury/Impairment)  Goal: Optimal Nutrition Intake  Outcome: Ongoing, Not Progressing     Problem: Renal Function Impairment (Acute Kidney Injury/Impairment)  Goal: Effective Renal Function  Outcome: Ongoing, Not Progressing     Problem: Infection  Goal: Absence of Infection Signs and Symptoms  Outcome: Ongoing, Not Progressing   ANOX4. VSS. Mildly hypotensive. No complaints. Will continue to monitor and assess. Care ongoing.

## 2023-06-11 NOTE — PROGRESS NOTES
Pharmacokinetic Assessment Follow Up: IV Vancomycin    Vancomycin order has been discontinued. Pharmacy will sign off. Please reconsult as needed! Thank you!    Please contact pharmacy for questions regarding this assessment.    Thank you for the consult,   Namrata Romo

## 2023-06-11 NOTE — HOSPITAL COURSE
All GDMT meds held on admission. Nephrology consulted for LANEY and recommended fluids. LANEY improved w/ fluids. Home pain meds resumed. Started on broad spec abx for possible PNA. Sputum culture sent. Vanc discontinued, transitioned to abx for CAP coverage. Completed 5 days of antibiotics. Referral placed for transitional HF clinic. LANEY continued to improve, plans to discharge, check BMP in one week, and follow up with nephrology. Patient to hold all muscle relaxers and gabapentin until following up outpatient. Repeat echo done showing recovered EF to 65-70%. Will discontinue all GDMT meds and have patient f/u with cardiology as outpatient.

## 2023-06-11 NOTE — SUBJECTIVE & OBJECTIVE
Interval History: NAEO. Feeling anxious this AM and having bilateral hip pain. Having more cough now.     Review of Systems   Constitutional:  Positive for fever. Negative for chills.   HENT:  Negative for congestion and rhinorrhea.    Eyes:  Negative for photophobia and visual disturbance.   Respiratory:  Positive for cough and wheezing. Negative for shortness of breath.    Cardiovascular:  Negative for chest pain, palpitations and leg swelling.   Gastrointestinal:  Negative for abdominal pain, nausea and vomiting.   Genitourinary:  Positive for decreased urine volume. Negative for dysuria, frequency and hematuria.   Musculoskeletal:  Negative for arthralgias and myalgias.   Skin:  Positive for color change. Negative for rash and wound.   Neurological:  Positive for dizziness and light-headedness. Negative for weakness and headaches.   Psychiatric/Behavioral:  Negative for agitation and confusion. The patient is nervous/anxious.    Objective:     Vital Signs (Most Recent):  Temp: 99.3 °F (37.4 °C) (06/11/23 0900)  Pulse: 77 (06/11/23 0801)  Resp: 18 (06/11/23 0801)  BP: (!) 94/56 (06/11/23 0400)  SpO2: 100 % (06/11/23 0801) Vital Signs (24h Range):  Temp:  [98.2 °F (36.8 °C)-99.3 °F (37.4 °C)] 99.3 °F (37.4 °C)  Pulse:  [61-89] 77  Resp:  [14-28] 18  SpO2:  [95 %-100 %] 100 %  BP: ()/(50-60) 94/56     Weight: 53.7 kg (118 lb 7.3 oz)  Body mass index is 20.33 kg/m².    Intake/Output Summary (Last 24 hours) at 6/11/2023 1039  Last data filed at 6/11/2023 0400  Gross per 24 hour   Intake 1260 ml   Output 1700 ml   Net -440 ml         Physical Exam  Vitals reviewed.   Constitutional:       General: She is not in acute distress.     Appearance: Normal appearance.   HENT:      Mouth/Throat:      Mouth: Mucous membranes are moist.   Cardiovascular:      Rate and Rhythm: Normal rate and regular rhythm.      Heart sounds: No murmur heard.  Pulmonary:      Effort: Pulmonary effort is normal.      Breath sounds: Normal  breath sounds. Transmitted upper airway sounds present. No wheezing or rales.   Abdominal:      General: Abdomen is flat. Bowel sounds are normal. There is no distension.      Palpations: Abdomen is soft.      Tenderness: There is no abdominal tenderness.   Musculoskeletal:         General: No swelling.      Right lower leg: No edema.      Left lower leg: No edema.   Skin:     General: Skin is warm.      Capillary Refill: Capillary refill takes less than 2 seconds.   Neurological:      General: No focal deficit present.      Mental Status: She is alert and oriented to person, place, and time.   Psychiatric:         Mood and Affect: Mood is anxious.         Behavior: Behavior normal.           Significant Labs: All pertinent labs within the past 24 hours have been reviewed.  BMP:   Recent Labs   Lab 06/11/23  0316   *      K 4.5   CL 99   CO2 26   BUN 61*   CREATININE 5.6*   CALCIUM 8.7   MG 1.9     CBC:   Recent Labs   Lab 06/09/23  2115 06/10/23  1243 06/11/23  0316   WBC 6.25 4.59 4.82   HGB 12.0 11.2* 11.4*   HCT 36.9* 34.0* 35.3*    229 225       Significant Imaging: I have reviewed all pertinent imaging results/findings within the past 24 hours.

## 2023-06-11 NOTE — PLAN OF CARE
Good hours noted this shift. Reviewed POC with patient and verbalizes goals within parameter set per POC. Denies questions or needs at this time. Rested well and denies questions or needs at this time. Safety barriers intact and verbalized need to call prn for all needs or questions.

## 2023-06-11 NOTE — ASSESSMENT & PLAN NOTE
Patient reports having fever at home. Has been afebrile since admission. Treating for possible PNA.  -f/u BCx, sputum Cx  -vanc/cefepime, likely d/c vanc at 48 hours

## 2023-06-12 PROBLEM — J18.9 CAP (COMMUNITY ACQUIRED PNEUMONIA): Status: ACTIVE | Noted: 2023-06-11

## 2023-06-12 LAB
ALBUMIN SERPL BCP-MCNC: 2.6 G/DL (ref 3.5–5.2)
ALP SERPL-CCNC: 55 U/L (ref 55–135)
ALT SERPL W/O P-5'-P-CCNC: 13 U/L (ref 10–44)
ANA SER QL IF: NORMAL
ANION GAP SERPL CALC-SCNC: 12 MMOL/L (ref 8–16)
ASCENDING AORTA: 2.53 CM
AST SERPL-CCNC: 17 U/L (ref 10–40)
AV INDEX (PROSTH): 1.16
AV MEAN GRADIENT: 6 MMHG
AV PEAK GRADIENT: 12 MMHG
AV VALVE AREA: 3.56 CM2
AV VELOCITY RATIO: 0.87
BASOPHILS # BLD AUTO: 0.02 K/UL (ref 0–0.2)
BASOPHILS NFR BLD: 0.5 % (ref 0–1.9)
BILIRUB SERPL-MCNC: 0.3 MG/DL (ref 0.1–1)
BSA FOR ECHO PROCEDURE: 1.55 M2
BUN SERPL-MCNC: 52 MG/DL (ref 6–20)
CALCIUM SERPL-MCNC: 8.7 MG/DL (ref 8.7–10.5)
CHLORIDE SERPL-SCNC: 102 MMOL/L (ref 95–110)
CO2 SERPL-SCNC: 26 MMOL/L (ref 23–29)
CREAT SERPL-MCNC: 4.7 MG/DL (ref 0.5–1.4)
CV ECHO LV RWT: 0.32 CM
DIFFERENTIAL METHOD: ABNORMAL
DOP CALC AO PEAK VEL: 1.73 M/S
DOP CALC AO VTI: 23.93 CM
DOP CALC LVOT AREA: 3.1 CM2
DOP CALC LVOT DIAMETER: 1.98 CM
DOP CALC LVOT PEAK VEL: 1.5 M/S
DOP CALC LVOT STROKE VOLUME: 85.31 CM3
DOP CALCLVOT PEAK VEL VTI: 27.72 CM
E WAVE DECELERATION TIME: 202.6 MSEC
E/A RATIO: 1.06
E/E' RATIO: 5.03 M/S
ECHO LV POSTERIOR WALL: 0.74 CM (ref 0.6–1.1)
EJECTION FRACTION: 68 %
EOSINOPHIL # BLD AUTO: 0.1 K/UL (ref 0–0.5)
EOSINOPHIL NFR BLD: 2 % (ref 0–8)
ERYTHROCYTE [DISTWIDTH] IN BLOOD BY AUTOMATED COUNT: 13.5 % (ref 11.5–14.5)
EST. GFR  (NO RACE VARIABLE): 10.3 ML/MIN/1.73 M^2
FRACTIONAL SHORTENING: 33 % (ref 28–44)
GLUCOSE SERPL-MCNC: 101 MG/DL (ref 70–110)
HCT VFR BLD AUTO: 31.3 % (ref 37–48.5)
HGB BLD-MCNC: 10.1 G/DL (ref 12–16)
IMM GRANULOCYTES # BLD AUTO: 0 K/UL (ref 0–0.04)
IMM GRANULOCYTES NFR BLD AUTO: 0 % (ref 0–0.5)
INTERVENTRICULAR SEPTUM: 0.6 CM (ref 0.6–1.1)
IVRT: 81.83 MSEC
LA MAJOR: 5.19 CM
LA MINOR: 5.2 CM
LA WIDTH: 3.53 CM
LEFT ATRIUM SIZE: 3.27 CM
LEFT ATRIUM VOLUME INDEX MOD: 29.7 ML/M2
LEFT ATRIUM VOLUME INDEX: 32.7 ML/M2
LEFT ATRIUM VOLUME MOD: 46.37 CM3
LEFT ATRIUM VOLUME: 50.97 CM3
LEFT INTERNAL DIMENSION IN SYSTOLE: 3.05 CM (ref 2.1–4)
LEFT VENTRICLE DIASTOLIC VOLUME INDEX: 61.39 ML/M2
LEFT VENTRICLE DIASTOLIC VOLUME: 95.77 ML
LEFT VENTRICLE MASS INDEX: 60 G/M2
LEFT VENTRICLE SYSTOLIC VOLUME INDEX: 23.4 ML/M2
LEFT VENTRICLE SYSTOLIC VOLUME: 36.44 ML
LEFT VENTRICULAR INTERNAL DIMENSION IN DIASTOLE: 4.57 CM (ref 3.5–6)
LEFT VENTRICULAR MASS: 92.94 G
LV LATERAL E/E' RATIO: 4.63 M/S
LV SEPTAL E/E' RATIO: 5.5 M/S
LYMPHOCYTES # BLD AUTO: 0.9 K/UL (ref 1–4.8)
LYMPHOCYTES NFR BLD: 23.7 % (ref 18–48)
MAGNESIUM SERPL-MCNC: 1.6 MG/DL (ref 1.6–2.6)
MCH RBC QN AUTO: 29.2 PG (ref 27–31)
MCHC RBC AUTO-ENTMCNC: 32.3 G/DL (ref 32–36)
MCV RBC AUTO: 91 FL (ref 82–98)
MONOCYTES # BLD AUTO: 0.5 K/UL (ref 0.3–1)
MONOCYTES NFR BLD: 12.8 % (ref 4–15)
MV PEAK A VEL: 0.83 M/S
MV PEAK E VEL: 0.88 M/S
MV STENOSIS PRESSURE HALF TIME: 58.76 MS
MV VALVE AREA P 1/2 METHOD: 3.74 CM2
NEUTROPHILS # BLD AUTO: 2.4 K/UL (ref 1.8–7.7)
NEUTROPHILS NFR BLD: 61 % (ref 38–73)
NRBC BLD-RTO: 0 /100 WBC
PHOSPHATE SERPL-MCNC: 7.1 MG/DL (ref 2.7–4.5)
PISA TR MAX VEL: 2.54 M/S
PLATELET # BLD AUTO: 184 K/UL (ref 150–450)
PMV BLD AUTO: 11.7 FL (ref 9.2–12.9)
POTASSIUM SERPL-SCNC: 4.8 MMOL/L (ref 3.5–5.1)
PROT SERPL-MCNC: 6.2 G/DL (ref 6–8.4)
RA MAJOR: 4.45 CM
RA PRESSURE: 3 MMHG
RA WIDTH: 3.27 CM
RBC # BLD AUTO: 3.46 M/UL (ref 4–5.4)
RIGHT VENTRICULAR END-DIASTOLIC DIMENSION: 2.76 CM
SINUS: 2.54 CM
SODIUM SERPL-SCNC: 140 MMOL/L (ref 136–145)
STJ: 2.23 CM
TDI LATERAL: 0.19 M/S
TDI SEPTAL: 0.16 M/S
TDI: 0.18 M/S
TR MAX PG: 26 MMHG
TRICUSPID ANNULAR PLANE SYSTOLIC EXCURSION: 2.44 CM
TV REST PULMONARY ARTERY PRESSURE: 29 MMHG
WBC # BLD AUTO: 3.92 K/UL (ref 3.9–12.7)

## 2023-06-12 PROCEDURE — 20600001 HC STEP DOWN PRIVATE ROOM

## 2023-06-12 PROCEDURE — 25000003 PHARM REV CODE 250

## 2023-06-12 PROCEDURE — 63600175 PHARM REV CODE 636 W HCPCS

## 2023-06-12 PROCEDURE — 99232 SBSQ HOSP IP/OBS MODERATE 35: CPT | Mod: ,,, | Performed by: INTERNAL MEDICINE

## 2023-06-12 PROCEDURE — 84100 ASSAY OF PHOSPHORUS: CPT

## 2023-06-12 PROCEDURE — 63700000 PHARM REV CODE 250 ALT 637 W/O HCPCS

## 2023-06-12 PROCEDURE — 83735 ASSAY OF MAGNESIUM: CPT

## 2023-06-12 PROCEDURE — 99232 SBSQ HOSP IP/OBS MODERATE 35: CPT | Mod: ,,, | Performed by: STUDENT IN AN ORGANIZED HEALTH CARE EDUCATION/TRAINING PROGRAM

## 2023-06-12 PROCEDURE — 85025 COMPLETE CBC W/AUTO DIFF WBC: CPT

## 2023-06-12 PROCEDURE — 99232 PR SUBSEQUENT HOSPITAL CARE,LEVL II: ICD-10-PCS | Mod: ,,, | Performed by: STUDENT IN AN ORGANIZED HEALTH CARE EDUCATION/TRAINING PROGRAM

## 2023-06-12 PROCEDURE — 99232 PR SUBSEQUENT HOSPITAL CARE,LEVL II: ICD-10-PCS | Mod: ,,, | Performed by: INTERNAL MEDICINE

## 2023-06-12 PROCEDURE — 36415 COLL VENOUS BLD VENIPUNCTURE: CPT

## 2023-06-12 PROCEDURE — 80053 COMPREHEN METABOLIC PANEL: CPT

## 2023-06-12 RX ORDER — AZITHROMYCIN 250 MG/1
500 TABLET, FILM COATED ORAL DAILY
Status: DISCONTINUED | OUTPATIENT
Start: 2023-06-12 | End: 2023-06-13 | Stop reason: HOSPADM

## 2023-06-12 RX ORDER — HYDROXYZINE HYDROCHLORIDE 25 MG/1
50 TABLET, FILM COATED ORAL 3 TIMES DAILY PRN
Status: DISCONTINUED | OUTPATIENT
Start: 2023-06-12 | End: 2023-06-13 | Stop reason: HOSPADM

## 2023-06-12 RX ORDER — OXYCODONE HYDROCHLORIDE 10 MG/1
10 TABLET ORAL EVERY 6 HOURS PRN
Status: DISCONTINUED | OUTPATIENT
Start: 2023-06-12 | End: 2023-06-13 | Stop reason: HOSPADM

## 2023-06-12 RX ORDER — HEPARIN SODIUM 5000 [USP'U]/ML
5000 INJECTION, SOLUTION INTRAVENOUS; SUBCUTANEOUS EVERY 8 HOURS
Status: DISCONTINUED | OUTPATIENT
Start: 2023-06-12 | End: 2023-06-13 | Stop reason: HOSPADM

## 2023-06-12 RX ADMIN — PANTOPRAZOLE SODIUM 40 MG: 40 TABLET, DELAYED RELEASE ORAL at 09:06

## 2023-06-12 RX ADMIN — HYDROXYZINE HYDROCHLORIDE 25 MG: 25 TABLET, FILM COATED ORAL at 01:06

## 2023-06-12 RX ADMIN — CEFTRIAXONE 1 G: 1 INJECTION, POWDER, FOR SOLUTION INTRAMUSCULAR; INTRAVENOUS at 01:06

## 2023-06-12 RX ADMIN — OXYCODONE HYDROCHLORIDE 10 MG: 10 TABLET ORAL at 04:06

## 2023-06-12 RX ADMIN — OXYCODONE HYDROCHLORIDE 5 MG: 5 TABLET ORAL at 01:06

## 2023-06-12 RX ADMIN — LORAZEPAM 0.5 MG: 0.5 TABLET ORAL at 09:06

## 2023-06-12 RX ADMIN — OXYCODONE HYDROCHLORIDE 10 MG: 10 TABLET ORAL at 11:06

## 2023-06-12 RX ADMIN — AZITHROMYCIN MONOHYDRATE 500 MG: 250 TABLET ORAL at 01:06

## 2023-06-12 RX ADMIN — OXYCODONE HYDROCHLORIDE 10 MG: 10 TABLET ORAL at 09:06

## 2023-06-12 RX ADMIN — BENZONATATE 100 MG: 100 CAPSULE ORAL at 10:06

## 2023-06-12 RX ADMIN — ACETAMINOPHEN 650 MG: 325 TABLET ORAL at 10:06

## 2023-06-12 RX ADMIN — HEPARIN SODIUM 5000 UNITS: 5000 INJECTION INTRAVENOUS; SUBCUTANEOUS at 09:06

## 2023-06-12 RX ADMIN — GUAIFENESIN AND DEXTROMETHORPHAN 10 ML: 100; 10 SYRUP ORAL at 10:06

## 2023-06-12 NOTE — HPI
58 year old F w/ hx MS, recent hospitalization for Takotsubo cardiomyopathy with EF 25% as well as as a second hospitalization for hypoxemic respiratory failure requiring intubation and cardiogenic shock. She was discharged with GDMT which included Entresto and metoprolol, patient noted hypotension at home as low as 60s/40s but continued taking lasix and her GDMT. On presentation she was hypotensive and improved with IVF (1L), Reports SOB and wheezing noted on exam CXR unremarkable, lactic acid is normal does not appear to be in a low output state.

## 2023-06-12 NOTE — ASSESSMENT & PLAN NOTE
Reports that she takes 30mg PRN at home but that she breaks the pills into quarters  -oxycodone PRN

## 2023-06-12 NOTE — SUBJECTIVE & OBJECTIVE
Interval History: Continuing to have some anxiety and pain today. Otherwise feeling OK.    Review of Systems   Constitutional:  Positive for fever. Negative for chills.   HENT:  Negative for congestion and rhinorrhea.    Eyes:  Negative for photophobia and visual disturbance.   Respiratory:  Positive for cough and wheezing. Negative for shortness of breath.    Cardiovascular:  Negative for chest pain, palpitations and leg swelling.   Gastrointestinal:  Negative for abdominal pain, nausea and vomiting.   Genitourinary:  Positive for decreased urine volume. Negative for dysuria, frequency and hematuria.   Musculoskeletal:  Negative for arthralgias and myalgias.   Skin:  Positive for color change. Negative for rash and wound.   Neurological:  Positive for dizziness and light-headedness. Negative for weakness and headaches.   Psychiatric/Behavioral:  Negative for agitation and confusion. The patient is nervous/anxious.    Objective:     Vital Signs (Most Recent):  Temp: 98.5 °F (36.9 °C) (06/12/23 0342)  Pulse: 94 (06/12/23 0342)  Resp: 16 (06/12/23 0909)  BP: (!) 99/52 (06/12/23 0342)  SpO2: 97 % (06/12/23 0342) Vital Signs (24h Range):  Temp:  [97.8 °F (36.6 °C)-98.7 °F (37.1 °C)] 98.5 °F (36.9 °C)  Pulse:  [] 94  Resp:  [16-20] 16  SpO2:  [97 %-100 %] 97 %  BP: (94-99)/(52-62) 99/52     Weight: 53.7 kg (118 lb 7.3 oz)  Body mass index is 20.33 kg/m².    Intake/Output Summary (Last 24 hours) at 6/12/2023 1102  Last data filed at 6/11/2023 1204  Gross per 24 hour   Intake 300 ml   Output --   Net 300 ml           Physical Exam  Vitals reviewed.   Constitutional:       General: She is not in acute distress.     Appearance: Normal appearance.   HENT:      Mouth/Throat:      Mouth: Mucous membranes are moist.   Cardiovascular:      Rate and Rhythm: Normal rate and regular rhythm.      Heart sounds: No murmur heard.  Pulmonary:      Effort: Pulmonary effort is normal.      Breath sounds: Normal breath sounds.  Transmitted upper airway sounds present. No wheezing or rales.   Abdominal:      General: Abdomen is flat. Bowel sounds are normal. There is no distension.      Palpations: Abdomen is soft.      Tenderness: There is no abdominal tenderness.   Musculoskeletal:         General: No swelling.      Right lower leg: No edema.      Left lower leg: No edema.   Skin:     General: Skin is warm.      Capillary Refill: Capillary refill takes less than 2 seconds.   Neurological:      General: No focal deficit present.      Mental Status: She is alert and oriented to person, place, and time.   Psychiatric:         Mood and Affect: Mood is anxious.         Behavior: Behavior normal.           Significant Labs: All pertinent labs within the past 24 hours have been reviewed.  BMP:   Recent Labs   Lab 06/12/23  0559         K 4.8      CO2 26   BUN 52*   CREATININE 4.7*   CALCIUM 8.7   MG 1.6       CBC:   Recent Labs   Lab 06/10/23  1243 06/11/23  0316 06/12/23  0559   WBC 4.59 4.82 3.92   HGB 11.2* 11.4* 10.1*   HCT 34.0* 35.3* 31.3*    225 184         Significant Imaging: I have reviewed all pertinent imaging results/findings within the past 24 hours.

## 2023-06-12 NOTE — ASSESSMENT & PLAN NOTE
Patient reports having fever at home. Has been afebrile since admission. Treating for possible PNA.  -f/u BCx, sputum Cx  -vanc/cefepime initially, transitioning to CAP coverage

## 2023-06-12 NOTE — ASSESSMENT & PLAN NOTE
Presenting with sCr 9.1. Baseline is normal with sCr ~ 0.7. Likely prerenal in the setting of hypotension, IVVD.  -nephrology following  -d/c fluids and monitor CMP

## 2023-06-12 NOTE — ASSESSMENT & PLAN NOTE
Likely combination of dehydration, medication, and possible infection. Now improving.  - hold GDMT  - repeat TTE pending

## 2023-06-12 NOTE — PROGRESS NOTES
[facility]  Progress Note    Patient Name: Reza Morrow  MRN: [id number]  Patient Class: IP- Inpatient   Admission Date: 6/9/2023  Length of Stay: 2 days  Attending Physician: Ludwin Clement MD  Primary Care Provider: Kip Jimenez MD        Subjective:     Principal Problem:LANEY (acute kidney injury)        HPI:  58 year old female with HLD, MS presenting with hypotension. She has had a couple of recent hospitalizations. During the first one, she presented confused and had JACKI; was taken to the cath lab where they found no CAD. TTE showed EF 25% suggestive of takutsubo cardiomyopathy; discharged home on GDMT. During her most recent hosptialization, she presented in acute hypoxemic respiratory failure requiring intubation and cardiogenic shock. Her GDMT was uptitrated and she was started on entresto. Discharged home on 5/30 with . Reports her BPs have been running low at home, with some BPs 60/40. Continued to take her BP meds and lasix despite this. Reports some lightheadedness and dizziness. Febrile at home yesterday with Tmax 101 and associated dry cough. Denies rhinorrhea or congestion. Denies chest pain or dyspnea. Denies abdominal pain, nausea, or vomiting. Has been eating and drinking OK. Has had decreased UOP over the past couple days (hasn't had nocturia which she normally does). Denies dysuria or hematuria. She lives with her son. Non- smoker, no alcohol. Is seen in a chronic pain clinic and is prescribed opioids, but hasn't taken any in several days. Also hasn't taken her MS meds (baclofen, muscle relaxants) in a few days.    On arrival to ED, afebrile, hypotensive BP 89/51, SBP 86-96, saturating 97% on RA. Hb 12, WBC 6.2, Plt 284. Na 136, K 4.4, bicarb 21, sCr 9.1, BUN 61. BGL OK. BNP 1200, initial troponin elevated. Procal negative. UA unremarkable. BCx pending. CXR unremarkable. In ED, received 1.5L of IV fluids and broad spectrum abx. Pt reviewed by MICU who felt  her presentation was likely from hypovolemia as her IVC was collapsible, and she was responsive to fluids.      Admit to hospital medicine for further evaluation and management.       Overview/Hospital Course:  All GDMT meds held on admission. Nephrology consulted for LANEY and recommended fluids. LANEY improved w/ fluids. Home pain meds resumed. Started on broad spec abx for possible PNA. Sputum culture sent. Vanc discontinued, transitioned to abx for CAP coverage. Referral placed for transitional HF clinic.      Interval History: Continuing to have some anxiety and pain today. Otherwise feeling OK.    Review of Systems   Constitutional:  Positive for fever. Negative for chills.   HENT:  Negative for congestion and rhinorrhea.    Eyes:  Negative for photophobia and visual disturbance.   Respiratory:  Positive for cough and wheezing. Negative for shortness of breath.    Cardiovascular:  Negative for chest pain, palpitations and leg swelling.   Gastrointestinal:  Negative for abdominal pain, nausea and vomiting.   Genitourinary:  Positive for decreased urine volume. Negative for dysuria, frequency and hematuria.   Musculoskeletal:  Negative for arthralgias and myalgias.   Skin:  Positive for color change. Negative for rash and wound.   Neurological:  Positive for dizziness and light-headedness. Negative for weakness and headaches.   Psychiatric/Behavioral:  Negative for agitation and confusion. The patient is nervous/anxious.    Objective:     Vital Signs (Most Recent):  Temp: 98.5 °F (36.9 °C) (06/12/23 0342)  Pulse: 94 (06/12/23 0342)  Resp: 16 (06/12/23 0909)  BP: (!) 99/52 (06/12/23 0342)  SpO2: 97 % (06/12/23 0342) Vital Signs (24h Range):  Temp:  [97.8 °F (36.6 °C)-98.7 °F (37.1 °C)] 98.5 °F (36.9 °C)  Pulse:  [] 94  Resp:  [16-20] 16  SpO2:  [97 %-100 %] 97 %  BP: (94-99)/(52-62) 99/52     Weight: 53.7 kg (118 lb 7.3 oz)  Body mass index is 20.33 kg/m².    Intake/Output Summary (Last 24 hours) at 6/12/2023  1102  Last data filed at 6/11/2023 1204  Gross per 24 hour   Intake 300 ml   Output --   Net 300 ml           Physical Exam  Vitals reviewed.   Constitutional:       General: She is not in acute distress.     Appearance: Normal appearance.   HENT:      Mouth/Throat:      Mouth: Mucous membranes are moist.   Cardiovascular:      Rate and Rhythm: Normal rate and regular rhythm.      Heart sounds: No murmur heard.  Pulmonary:      Effort: Pulmonary effort is normal.      Breath sounds: Normal breath sounds. Transmitted upper airway sounds present. No wheezing or rales.   Abdominal:      General: Abdomen is flat. Bowel sounds are normal. There is no distension.      Palpations: Abdomen is soft.      Tenderness: There is no abdominal tenderness.   Musculoskeletal:         General: No swelling.      Right lower leg: No edema.      Left lower leg: No edema.   Skin:     General: Skin is warm.      Capillary Refill: Capillary refill takes less than 2 seconds.   Neurological:      General: No focal deficit present.      Mental Status: She is alert and oriented to person, place, and time.   Psychiatric:         Mood and Affect: Mood is anxious.         Behavior: Behavior normal.           Significant Labs: All pertinent labs within the past 24 hours have been reviewed.  BMP:   Recent Labs   Lab 06/12/23  0559         K 4.8      CO2 26   BUN 52*   CREATININE 4.7*   CALCIUM 8.7   MG 1.6       CBC:   Recent Labs   Lab 06/10/23  1243 06/11/23  0316 06/12/23  0559   WBC 4.59 4.82 3.92   HGB 11.2* 11.4* 10.1*   HCT 34.0* 35.3* 31.3*    225 184         Significant Imaging: I have reviewed all pertinent imaging results/findings within the past 24 hours.      Assessment/Plan:      * LANEY (acute kidney injury)  Presenting with sCr 9.1. Baseline is normal with sCr ~ 0.7. Likely prerenal in the setting of hypotension, IVVD.  -nephrology following  -d/c fluids and monitor CMP    Anxiety  -hydroxyzine PRN w/ ativan  if not controlled by hydroxyzine      CAP (community acquired pneumonia)  Patient reports having fever at home. Has been afebrile since admission. Treating for possible PNA.  -f/u BCx, sputum Cx  -vanc/cefepime initially, transitioning to CAP coverage      Wheeze  New onset global wheeze on admission. Denies inhaler use at home. Improving.  -duo-nebs PRN    Raynaud's phenomenon  Likely has Raynaud's. Noted to have white, blue, and red fingertips BL (see pictures)  Hands are warm and well perfused  Will likely need outpatient rheumatology follow-up    Takotsubo cardiomyopathy  Noted last month. EF 20%  -repeat echo pending    Hypotension  Likely combination of dehydration, medication, and possible infection. Now improving.  - hold GDMT  - repeat TTE pending    HFrEF (heart failure with reduced ejection fraction)  EF 20% on last ECHO, attributed to takotsubo  -hold GDMT due to hypotension and LANEY  -repeat echo pending    Hypertension  See Takotsubo cardiomyopathy    MS (multiple sclerosis)  Follows with Dr. Frey. Takes copaxone three times weekly. Also takes baclofen, robaxin, and tizanidine prn  -hold muscle relaxers in setting of decreased renal function    Chronic pain syndrome  Reports that she takes 30mg PRN at home but that she breaks the pills into quarters  -oxycodone PRN      VTE Risk Mitigation (From admission, onward)         Ordered     IP VTE LOW RISK PATIENT  Once         06/10/23 0338     Place sequential compression device  Until discontinued         06/10/23 0338                Discharge Planning   JOANNE: 6/13/2023     Code Status: Full Code   Is the patient medically ready for discharge?: No    Reason for patient still in hospital (select all that apply): Patient trending condition  Discharge Plan A: Home                  Neel Gr MD  Department of Hospital Medicine   [facility] - Tele[facility] Step[facility]

## 2023-06-12 NOTE — SUBJECTIVE & OBJECTIVE
Interval History: no acute events overnight - 2 unmeasured UOP overnight    Review of patient's allergies indicates:   Allergen Reactions    Adhesive Hives    Neosporin [neomycin-bacitracin-polymyxin] Hives    Neomycin-polymyxin Hives    Penicillins Other (See Comments)     Unknown  reaction    Latex Rash     Current Facility-Administered Medications   Medication Frequency    acetaminophen tablet 650 mg Q6H PRN    albuterol-ipratropium 2.5 mg-0.5 mg/3 mL nebulizer solution 3 mL Q6H PRN    azithromycin tablet 500 mg Daily    benzonatate capsule 100 mg TID PRN    cefTRIAXone (ROCEPHIN) 1 g in dextrose 5 % in water (D5W) 5 % 100 mL IVPB (MB+) Q24H    dextromethorphan-guaiFENesin  mg/5 ml liquid 10 mL Q6H PRN    dextrose 10% bolus 125 mL 125 mL PRN    dextrose 10% bolus 250 mL 250 mL PRN    dextrose 40 % gel 15,000 mg PRN    dextrose 40 % gel 30,000 mg PRN    glucagon (human recombinant) injection 1 mg PRN    hydrOXYzine HCL tablet 50 mg TID PRN    LORazepam tablet 0.5 mg Q8H PRN    naloxone 0.4 mg/mL injection 0.02 mg PRN    ondansetron disintegrating tablet 4 mg Q8H PRN    oxyCODONE immediate release tablet Tab 10 mg Q6H PRN    pantoprazole EC tablet 40 mg Daily    sodium chloride 0.9% flush 10 mL Q12H PRN       Objective:     Vital Signs (Most Recent):  Temp: 98.5 °F (36.9 °C) (06/12/23 1150)  Pulse: 91 (06/12/23 1150)  Resp: 18 (06/12/23 1150)  BP: (!) 109/58 (06/12/23 1150)  SpO2: 97 % (06/12/23 1150) Vital Signs (24h Range):  Temp:  [97.8 °F (36.6 °C)-98.5 °F (36.9 °C)] 98.5 °F (36.9 °C)  Pulse:  [] 91  Resp:  [16-19] 18  SpO2:  [97 %-100 %] 97 %  BP: ()/(52-62) 109/58     Weight: 53.7 kg (118 lb 7.3 oz) (06/11/23 0400)  Body mass index is 20.33 kg/m².  Body surface area is 1.56 meters squared.    I/O last 3 completed shifts:  In: 960 [P.O.:960]  Out: 900 [Urine:900]     Physical Exam  Vitals and nursing note reviewed.   Constitutional:       General: She is not in acute distress.     Appearance:  Normal appearance. She is not ill-appearing.   HENT:      Head: Normocephalic and atraumatic.      Mouth/Throat:      Mouth: Mucous membranes are moist.   Eyes:      Extraocular Movements: Extraocular movements intact.      Pupils: Pupils are equal, round, and reactive to light.   Cardiovascular:      Rate and Rhythm: Normal rate and regular rhythm.      Pulses: Normal pulses.      Heart sounds: Normal heart sounds. No murmur heard.  Pulmonary:      Effort: Pulmonary effort is normal.      Breath sounds: No wheezing, rhonchi or rales.   Abdominal:      General: Bowel sounds are normal. There is no distension.      Palpations: Abdomen is soft.      Tenderness: There is no abdominal tenderness. There is no guarding.   Musculoskeletal:         General: No tenderness.      Right lower leg: No edema.      Left lower leg: No edema.   Skin:     General: Skin is warm and dry.      Capillary Refill: Capillary refill takes less than 2 seconds.      Findings: No erythema or rash.   Neurological:      General: No focal deficit present.      Mental Status: She is alert and oriented to person, place, and time.   Psychiatric:         Mood and Affect: Mood normal.         Thought Content: Thought content normal.        Significant Labs:  All labs within the past 24 hours have been reviewed.     Significant Imaging:  Labs: Reviewed

## 2023-06-12 NOTE — PROGRESS NOTES
[facility] - Tele[facility] Stepdown  Nephrology  Progress Note    Patient Name: Reza Morrow  MRN: [id number]  Admission Date: 6/9/2023  Hospital Length of Stay: 2 days  Attending Provider: Ludwin Clement MD   Primary Care Physician: Kip Jimenez MD  Principal Problem:LANEY (acute kidney injury)    Subjective:     HPI: 58 year old F w/ hx MS, recent hospitalization for Takotsubo cardiomyopathy with EF 25% as well as as a second hospitalization for hypoxemic respiratory failure requiring intubation and cardiogenic shock. She was discharged with GDMT which included Entresto and metoprolol, patient noted hypotension at home as low as 60s/40s but continued taking lasix and her GDMT. On presentation she was hypotensive and improved with IVF (1L), Reports SOB and wheezing noted on exam CXR unremarkable, lactic acid is normal does not appear to be in a low output state.           Interval History: no acute events overnight - 2 unmeasured UOP overnight    Review of patient's allergies indicates:   Allergen Reactions    Adhesive Hives    Neosporin [neomycin-bacitracin-polymyxin] Hives    Neomycin-polymyxin Hives    Penicillins Other (See Comments)     Unknown  reaction    Latex Rash     Current Facility-Administered Medications   Medication Frequency    acetaminophen tablet 650 mg Q6H PRN    albuterol-ipratropium 2.5 mg-0.5 mg/3 mL nebulizer solution 3 mL Q6H PRN    azithromycin tablet 500 mg Daily    benzonatate capsule 100 mg TID PRN    cefTRIAXone (ROCEPHIN) 1 g in dextrose 5 % in water (D5W) 5 % 100 mL IVPB (MB+) Q24H    dextromethorphan-guaiFENesin  mg/5 ml liquid 10 mL Q6H PRN    dextrose 10% bolus 125 mL 125 mL PRN    dextrose 10% bolus 250 mL 250 mL PRN    dextrose 40 % gel 15,000 mg PRN    dextrose 40 % gel 30,000 mg PRN    glucagon (human recombinant) injection 1 mg PRN    hydrOXYzine HCL tablet 50 mg TID PRN    LORazepam tablet 0.5 mg Q8H PRN    naloxone 0.4 mg/mL injection 0.02 mg PRN    ondansetron disintegrating  tablet 4 mg Q8H PRN    oxyCODONE immediate release tablet Tab 10 mg Q6H PRN    pantoprazole EC tablet 40 mg Daily    sodium chloride 0.9% flush 10 mL Q12H PRN       Objective:     Vital Signs (Most Recent):  Temp: 98.5 °F (36.9 °C) (06/12/23 1150)  Pulse: 91 (06/12/23 1150)  Resp: 18 (06/12/23 1150)  BP: (!) 109/58 (06/12/23 1150)  SpO2: 97 % (06/12/23 1150) Vital Signs (24h Range):  Temp:  [97.8 °F (36.6 °C)-98.5 °F (36.9 °C)] 98.5 °F (36.9 °C)  Pulse:  [] 91  Resp:  [16-19] 18  SpO2:  [97 %-100 %] 97 %  BP: ()/(52-62) 109/58     Weight: 53.7 kg (118 lb 7.3 oz) (06/11/23 0400)  Body mass index is 20.33 kg/m².  Body surface area is 1.56 meters squared.    I/O last 3 completed shifts:  In: 960 [P.O.:960]  Out: 900 [Urine:900]     Physical Exam  Vitals and nursing note reviewed.   Constitutional:       General: She is not in acute distress.     Appearance: Normal appearance. She is not ill-appearing.   HENT:      Head: Normocephalic and atraumatic.      Mouth/Throat:      Mouth: Mucous membranes are moist.   Eyes:      Extraocular Movements: Extraocular movements intact.      Pupils: Pupils are equal, round, and reactive to light.   Cardiovascular:      Rate and Rhythm: Normal rate and regular rhythm.      Pulses: Normal pulses.      Heart sounds: Normal heart sounds. No murmur heard.  Pulmonary:      Effort: Pulmonary effort is normal.      Breath sounds: No wheezing, rhonchi or rales.   Abdominal:      General: Bowel sounds are normal. There is no distension.      Palpations: Abdomen is soft.      Tenderness: There is no abdominal tenderness. There is no guarding.   Musculoskeletal:         General: No tenderness.      Right lower leg: No edema.      Left lower leg: No edema.   Skin:     General: Skin is warm and dry.      Capillary Refill: Capillary refill takes less than 2 seconds.      Findings: No erythema or rash.   Neurological:      General: No focal deficit present.      Mental Status: She is  alert and oriented to person, place, and time.   Psychiatric:         Mood and Affect: Mood normal.         Thought Content: Thought content normal.        Significant Labs:  All labs within the past 24 hours have been reviewed.     Significant Imaging:  Labs: Reviewed    Assessment/Plan:     Neuro  MS (multiple sclerosis)  Per primary    Pulmonary  CAP (community acquired pneumonia)  Per primary    Cardiac/Vascular  HFrEF (heart failure with reduced ejection fraction)  Would recommend reducing outpatient goal directed medical therapy BP control    Renal/  * LANEY (acute kidney injury)  58 yr old with Takotsubo cardiomyopathy with EF 25%, recently discharged on entresto and increased dose of metoprolol comes with severe hypotension at BP 60/40 which has been persistent since previous discharge      LANEY appears to be likely pre renal with severe symptomatic hypotension nd volume depletion    UA with few WBC and  2+ Blood and hyaline cast   Urine microscopy with few granular casts and no florid muddy brown casts noted      Plan/Recommendation:   -no need for further IVF, diruesis or RRT at this time, ATN is improving and is making urine  -Keep MAP > 65  -Keep hemoglobin > 7  -Strict ins and outs  -Avoid nephrotoxic agents if possible and renally dose medications  -Avoid drastic hemodynamic changes if possible  -Daily RFP        Thank you for your consult. I will follow-up with patient. Please contact us if you have any additional questions.    Oziel Cole MD  Nephrology  [facility]    ATTENDING PHYSICIAN ATTESTATION  I have personally verified the history and examined the patient. I thoroughly reviewed the demographic, clinical, laboratorial and imaging information available in medical records. I agree with the assessment and recommendations provided by the subspecialty resident who was under my supervision.

## 2023-06-13 ENCOUNTER — DOCUMENTATION ONLY (OUTPATIENT)
Dept: CARDIOLOGY | Facility: CLINIC | Age: 58
End: 2023-06-13
Payer: MEDICAID

## 2023-06-13 ENCOUNTER — TELEPHONE (OUTPATIENT)
Dept: NEPHROLOGY | Facility: CLINIC | Age: 58
End: 2023-06-13
Payer: MEDICAID

## 2023-06-13 VITALS
RESPIRATION RATE: 16 BRPM | DIASTOLIC BLOOD PRESSURE: 54 MMHG | WEIGHT: 118 LBS | SYSTOLIC BLOOD PRESSURE: 109 MMHG | OXYGEN SATURATION: 99 % | HEART RATE: 71 BPM | HEIGHT: 64 IN | TEMPERATURE: 99 F | BODY MASS INDEX: 20.14 KG/M2

## 2023-06-13 DIAGNOSIS — R06.02 SOB (SHORTNESS OF BREATH): Primary | ICD-10-CM

## 2023-06-13 LAB
ALBUMIN SERPL BCP-MCNC: 2.7 G/DL (ref 3.5–5.2)
ALP SERPL-CCNC: 52 U/L (ref 55–135)
ALT SERPL W/O P-5'-P-CCNC: 9 U/L (ref 10–44)
ANION GAP SERPL CALC-SCNC: 12 MMOL/L (ref 8–16)
AST SERPL-CCNC: 14 U/L (ref 10–40)
BASOPHILS # BLD AUTO: 0.02 K/UL (ref 0–0.2)
BASOPHILS NFR BLD: 0.7 % (ref 0–1.9)
BILIRUB SERPL-MCNC: 0.3 MG/DL (ref 0.1–1)
BUN SERPL-MCNC: 47 MG/DL (ref 6–20)
CALCIUM SERPL-MCNC: 9.1 MG/DL (ref 8.7–10.5)
CHLORIDE SERPL-SCNC: 102 MMOL/L (ref 95–110)
CO2 SERPL-SCNC: 27 MMOL/L (ref 23–29)
CREAT SERPL-MCNC: 4.2 MG/DL (ref 0.5–1.4)
DIFFERENTIAL METHOD: ABNORMAL
EOSINOPHIL # BLD AUTO: 0.1 K/UL (ref 0–0.5)
EOSINOPHIL NFR BLD: 3.8 % (ref 0–8)
ERYTHROCYTE [DISTWIDTH] IN BLOOD BY AUTOMATED COUNT: 13.4 % (ref 11.5–14.5)
EST. GFR  (NO RACE VARIABLE): 11.7 ML/MIN/1.73 M^2
GLUCOSE SERPL-MCNC: 85 MG/DL (ref 70–110)
HCT VFR BLD AUTO: 31.7 % (ref 37–48.5)
HGB BLD-MCNC: 10.2 G/DL (ref 12–16)
IMM GRANULOCYTES # BLD AUTO: 0 K/UL (ref 0–0.04)
IMM GRANULOCYTES NFR BLD AUTO: 0 % (ref 0–0.5)
LYMPHOCYTES # BLD AUTO: 1.1 K/UL (ref 1–4.8)
LYMPHOCYTES NFR BLD: 38.4 % (ref 18–48)
MAGNESIUM SERPL-MCNC: 1.6 MG/DL (ref 1.6–2.6)
MCH RBC QN AUTO: 29.3 PG (ref 27–31)
MCHC RBC AUTO-ENTMCNC: 32.2 G/DL (ref 32–36)
MCV RBC AUTO: 91 FL (ref 82–98)
MONOCYTES # BLD AUTO: 0.4 K/UL (ref 0.3–1)
MONOCYTES NFR BLD: 13.7 % (ref 4–15)
NEUTROPHILS # BLD AUTO: 1.3 K/UL (ref 1.8–7.7)
NEUTROPHILS NFR BLD: 43.4 % (ref 38–73)
NRBC BLD-RTO: 0 /100 WBC
PHOSPHATE SERPL-MCNC: 5.9 MG/DL (ref 2.7–4.5)
PLATELET # BLD AUTO: 158 K/UL (ref 150–450)
PMV BLD AUTO: 12.4 FL (ref 9.2–12.9)
POTASSIUM SERPL-SCNC: 4.5 MMOL/L (ref 3.5–5.1)
PROT SERPL-MCNC: 6.3 G/DL (ref 6–8.4)
PROTEINASE3 IGG SER-ACNC: <0.2 U
RBC # BLD AUTO: 3.48 M/UL (ref 4–5.4)
SODIUM SERPL-SCNC: 141 MMOL/L (ref 136–145)
WBC # BLD AUTO: 2.92 K/UL (ref 3.9–12.7)

## 2023-06-13 PROCEDURE — 80053 COMPREHEN METABOLIC PANEL: CPT

## 2023-06-13 PROCEDURE — 99232 SBSQ HOSP IP/OBS MODERATE 35: CPT | Mod: ,,, | Performed by: INTERNAL MEDICINE

## 2023-06-13 PROCEDURE — 83735 ASSAY OF MAGNESIUM: CPT

## 2023-06-13 PROCEDURE — 36415 COLL VENOUS BLD VENIPUNCTURE: CPT

## 2023-06-13 PROCEDURE — 63600175 PHARM REV CODE 636 W HCPCS

## 2023-06-13 PROCEDURE — 99239 PR HOSPITAL DISCHARGE DAY,>30 MIN: ICD-10-PCS | Mod: ,,, | Performed by: STUDENT IN AN ORGANIZED HEALTH CARE EDUCATION/TRAINING PROGRAM

## 2023-06-13 PROCEDURE — 63700000 PHARM REV CODE 250 ALT 637 W/O HCPCS

## 2023-06-13 PROCEDURE — 85025 COMPLETE CBC W/AUTO DIFF WBC: CPT

## 2023-06-13 PROCEDURE — 84100 ASSAY OF PHOSPHORUS: CPT

## 2023-06-13 PROCEDURE — 99239 HOSP IP/OBS DSCHRG MGMT >30: CPT | Mod: ,,, | Performed by: STUDENT IN AN ORGANIZED HEALTH CARE EDUCATION/TRAINING PROGRAM

## 2023-06-13 PROCEDURE — 99232 PR SUBSEQUENT HOSPITAL CARE,LEVL II: ICD-10-PCS | Mod: ,,, | Performed by: INTERNAL MEDICINE

## 2023-06-13 PROCEDURE — 25000003 PHARM REV CODE 250

## 2023-06-13 RX ORDER — TIZANIDINE HYDROCHLORIDE 2 MG/1
2 CAPSULE, GELATIN COATED ORAL 3 TIMES DAILY
Start: 2023-06-13

## 2023-06-13 RX ORDER — BACLOFEN 10 MG/1
10 TABLET ORAL 2 TIMES DAILY
Start: 2023-06-13

## 2023-06-13 RX ORDER — LORAZEPAM 0.5 MG/1
0.5 TABLET ORAL EVERY 8 HOURS PRN
Qty: 10 TABLET | Refills: 0 | Status: CANCELLED | OUTPATIENT
Start: 2023-06-13 | End: 2023-07-13

## 2023-06-13 RX ORDER — METHOCARBAMOL 750 MG/1
750 TABLET, FILM COATED ORAL 4 TIMES DAILY
Start: 2023-06-13

## 2023-06-13 RX ORDER — CYCLOBENZAPRINE HCL 10 MG
10 TABLET ORAL 3 TIMES DAILY PRN
Start: 2023-06-13

## 2023-06-13 RX ORDER — GABAPENTIN 300 MG/1
300 CAPSULE ORAL 3 TIMES DAILY
Start: 2023-06-13

## 2023-06-13 RX ADMIN — LORAZEPAM 0.5 MG: 0.5 TABLET ORAL at 07:06

## 2023-06-13 RX ADMIN — BENZONATATE 100 MG: 100 CAPSULE ORAL at 09:06

## 2023-06-13 RX ADMIN — AZITHROMYCIN MONOHYDRATE 500 MG: 250 TABLET ORAL at 08:06

## 2023-06-13 RX ADMIN — SODIUM CHLORIDE, POTASSIUM CHLORIDE, SODIUM LACTATE AND CALCIUM CHLORIDE 1000 ML: 600; 310; 30; 20 INJECTION, SOLUTION INTRAVENOUS at 09:06

## 2023-06-13 RX ADMIN — OXYCODONE HYDROCHLORIDE 10 MG: 10 TABLET ORAL at 12:06

## 2023-06-13 RX ADMIN — HEPARIN SODIUM 5000 UNITS: 5000 INJECTION INTRAVENOUS; SUBCUTANEOUS at 05:06

## 2023-06-13 RX ADMIN — PANTOPRAZOLE SODIUM 40 MG: 40 TABLET, DELAYED RELEASE ORAL at 08:06

## 2023-06-13 RX ADMIN — CEFTRIAXONE 1 G: 1 INJECTION, POWDER, FOR SOLUTION INTRAMUSCULAR; INTRAVENOUS at 12:06

## 2023-06-13 RX ADMIN — OXYCODONE HYDROCHLORIDE 10 MG: 10 TABLET ORAL at 05:06

## 2023-06-13 NOTE — PLAN OF CARE
[facility] - Tele[facility] Step[facility]  Discharge Final Note    Primary Care Provider: Kip Jimenez MD    Expected Discharge Date: 6/13/2023    Final Discharge Note (most recent)       Final Note - 06/13/23 1313          Final Note    Assessment Type Final Discharge Note     Anticipated Discharge Disposition Home or Self Care     Hospital Resources/Appts/Education Provided Appointments scheduled by Navigator/Coordinator   CHW scheduled pt's hospital f/u visit on 6/15/23 @ 10:45 am.   sent a message to Nephrology to make contact with pt to schedule her hospital f/u appointment.       Post-Acute Status    Post-Acute Authorization Other     Other Status No Post-Acute Service Needs     Discharge Delays None known at this time                   Contact Info       Kip Jimenez MD   Specialty: Family Medicine   Relationship: PCP - General    [address] [place] [address]   Phone: [phone]       Next Steps: Follow up on 6/15/2023    Instructions: Established pt's hospital f/u visit @ 10:45am. Please bring discharge summary, ID, insurance card, and medication list.          Future Appointments   Date Time Provider Department Center   6/20/2023 12:30 PM LAB, APPOINTMENT [facility] LAB VNP [facility]   6/20/2023  1:00 PM Christine Skaggs PA-C [facility]   6/20/2023  1:30 PM [facility] HEART FAILURE NURSE [facility]   7/11/2023  1:40 PM Elmer Boston MD [facility] CARDIO [facility]     Anjelica Alonso LCSW [facility]  Ext. [phone]

## 2023-06-13 NOTE — SUBJECTIVE & OBJECTIVE
Interval History: no acute events overnight - echo normal. ATN resolving    Review of patient's allergies indicates:   Allergen Reactions    Adhesive Hives    Neosporin [neomycin-bacitracin-polymyxin] Hives    Neomycin-polymyxin Hives    Penicillins Other (See Comments)     Unknown  reaction    Latex Rash     Current Facility-Administered Medications   Medication Frequency    acetaminophen tablet 650 mg Q6H PRN    albuterol-ipratropium 2.5 mg-0.5 mg/3 mL nebulizer solution 3 mL Q6H PRN    azithromycin tablet 500 mg Daily    benzonatate capsule 100 mg TID PRN    cefTRIAXone (ROCEPHIN) 1 g in dextrose 5 % in water (D5W) 5 % 100 mL IVPB (MB+) Q24H    dextromethorphan-guaiFENesin  mg/5 ml liquid 10 mL Q6H PRN    dextrose 10% bolus 125 mL 125 mL PRN    dextrose 10% bolus 250 mL 250 mL PRN    dextrose 40 % gel 15,000 mg PRN    dextrose 40 % gel 30,000 mg PRN    glucagon (human recombinant) injection 1 mg PRN    heparin (porcine) injection 5,000 Units Q8H    hydrOXYzine HCL tablet 50 mg TID PRN    naloxone 0.4 mg/mL injection 0.02 mg PRN    ondansetron disintegrating tablet 4 mg Q8H PRN    oxyCODONE immediate release tablet Tab 10 mg Q6H PRN    pantoprazole EC tablet 40 mg Daily    sodium chloride 0.9% flush 10 mL Q12H PRN       Objective:     Vital Signs (Most Recent):  Temp: 98.6 °F (37 °C) (06/13/23 1130)  Pulse: 71 (06/13/23 1130)  Resp: 16 (06/13/23 1257)  BP: (!) 109/54 (06/13/23 1130)  SpO2: 99 % (06/13/23 1130) Vital Signs (24h Range):  Temp:  [97.7 °F (36.5 °C)-99.5 °F (37.5 °C)] 98.6 °F (37 °C)  Pulse:  [71-92] 71  Resp:  [16-18] 16  SpO2:  [96 %-99 %] 99 %  BP: (107-126)/(53-86) 109/54     Weight: 53.5 kg (118 lb) (06/12/23 1400)  Body mass index is 20.25 kg/m².  Body surface area is 1.55 meters squared.    I/O last 3 completed shifts:  In: 500 [P.O.:500]  Out: -      Physical Exam  Vitals and nursing note reviewed.   Constitutional:       General: She is not in acute distress.     Appearance: Normal  appearance. She is not ill-appearing.   HENT:      Head: Normocephalic and atraumatic.      Mouth/Throat:      Mouth: Mucous membranes are moist.   Eyes:      Extraocular Movements: Extraocular movements intact.      Pupils: Pupils are equal, round, and reactive to light.   Cardiovascular:      Rate and Rhythm: Normal rate and regular rhythm.      Pulses: Normal pulses.      Heart sounds: Normal heart sounds. No murmur heard.  Pulmonary:      Effort: Pulmonary effort is normal.      Breath sounds: No wheezing, rhonchi or rales.   Abdominal:      General: Bowel sounds are normal. There is no distension.      Palpations: Abdomen is soft.      Tenderness: There is no abdominal tenderness. There is no guarding.   Musculoskeletal:         General: No tenderness.      Right lower leg: No edema.      Left lower leg: No edema.   Skin:     General: Skin is warm and dry.      Capillary Refill: Capillary refill takes less than 2 seconds.      Findings: No erythema or rash.   Neurological:      General: No focal deficit present.      Mental Status: She is alert and oriented to person, place, and time.   Psychiatric:         Mood and Affect: Mood normal.         Thought Content: Thought content normal.        Significant Labs:  All labs within the past 24 hours have been reviewed.     Significant Imaging:  Labs: Reviewed

## 2023-06-13 NOTE — DISCHARGE INSTRUCTIONS
Go to lab for a repeat test to check your kidney function in one week. Follow up with nephrology, your PCP, and cardiology. Hold your muscle relaxers until you follow up with nephrology to ensure that your kidney function has improved.

## 2023-06-13 NOTE — PROGRESS NOTES
[facility] - Tele[facility] Stepdown  Nephrology  Progress Note    Patient Name: Reza Morrow  MRN: [id number]  Admission Date: 6/9/2023  Hospital Length of Stay: 3 days  Attending Provider: Ludwin Clement MD   Primary Care Physician: Kip Jimenez MD  Principal Problem:LANEY (acute kidney injury)    Subjective:     HPI: 58 year old F w/ hx MS, recent hospitalization for Takotsubo cardiomyopathy with EF 25% as well as as a second hospitalization for hypoxemic respiratory failure requiring intubation and cardiogenic shock. She was discharged with GDMT which included Entresto and metoprolol, patient noted hypotension at home as low as 60s/40s but continued taking lasix and her GDMT. On presentation she was hypotensive and improved with IVF (1L), Reports SOB and wheezing noted on exam CXR unremarkable, lactic acid is normal does not appear to be in a low output state.           Interval History: no acute events overnight - echo normal. ATN resolving    Review of patient's allergies indicates:   Allergen Reactions    Adhesive Hives    Neosporin [neomycin-bacitracin-polymyxin] Hives    Neomycin-polymyxin Hives    Penicillins Other (See Comments)     Unknown  reaction    Latex Rash     Current Facility-Administered Medications   Medication Frequency    acetaminophen tablet 650 mg Q6H PRN    albuterol-ipratropium 2.5 mg-0.5 mg/3 mL nebulizer solution 3 mL Q6H PRN    azithromycin tablet 500 mg Daily    benzonatate capsule 100 mg TID PRN    cefTRIAXone (ROCEPHIN) 1 g in dextrose 5 % in water (D5W) 5 % 100 mL IVPB (MB+) Q24H    dextromethorphan-guaiFENesin  mg/5 ml liquid 10 mL Q6H PRN    dextrose 10% bolus 125 mL 125 mL PRN    dextrose 10% bolus 250 mL 250 mL PRN    dextrose 40 % gel 15,000 mg PRN    dextrose 40 % gel 30,000 mg PRN    glucagon (human recombinant) injection 1 mg PRN    heparin (porcine) injection 5,000 Units Q8H    hydrOXYzine HCL tablet 50 mg TID PRN    naloxone 0.4 mg/mL injection 0.02 mg PRN    ondansetron  disintegrating tablet 4 mg Q8H PRN    oxyCODONE immediate release tablet Tab 10 mg Q6H PRN    pantoprazole EC tablet 40 mg Daily    sodium chloride 0.9% flush 10 mL Q12H PRN       Objective:     Vital Signs (Most Recent):  Temp: 98.6 °F (37 °C) (06/13/23 1130)  Pulse: 71 (06/13/23 1130)  Resp: 16 (06/13/23 1257)  BP: (!) 109/54 (06/13/23 1130)  SpO2: 99 % (06/13/23 1130) Vital Signs (24h Range):  Temp:  [97.7 °F (36.5 °C)-99.5 °F (37.5 °C)] 98.6 °F (37 °C)  Pulse:  [71-92] 71  Resp:  [16-18] 16  SpO2:  [96 %-99 %] 99 %  BP: (107-126)/(53-86) 109/54     Weight: 53.5 kg (118 lb) (06/12/23 1400)  Body mass index is 20.25 kg/m².  Body surface area is 1.55 meters squared.    I/O last 3 completed shifts:  In: 500 [P.O.:500]  Out: -      Physical Exam  Vitals and nursing note reviewed.   Constitutional:       General: She is not in acute distress.     Appearance: Normal appearance. She is not ill-appearing.   HENT:      Head: Normocephalic and atraumatic.      Mouth/Throat:      Mouth: Mucous membranes are moist.   Eyes:      Extraocular Movements: Extraocular movements intact.      Pupils: Pupils are equal, round, and reactive to light.   Cardiovascular:      Rate and Rhythm: Normal rate and regular rhythm.      Pulses: Normal pulses.      Heart sounds: Normal heart sounds. No murmur heard.  Pulmonary:      Effort: Pulmonary effort is normal.      Breath sounds: No wheezing, rhonchi or rales.   Abdominal:      General: Bowel sounds are normal. There is no distension.      Palpations: Abdomen is soft.      Tenderness: There is no abdominal tenderness. There is no guarding.   Musculoskeletal:         General: No tenderness.      Right lower leg: No edema.      Left lower leg: No edema.   Skin:     General: Skin is warm and dry.      Capillary Refill: Capillary refill takes less than 2 seconds.      Findings: No erythema or rash.   Neurological:      General: No focal deficit present.      Mental Status: She is alert and  oriented to person, place, and time.   Psychiatric:         Mood and Affect: Mood normal.         Thought Content: Thought content normal.        Significant Labs:  All labs within the past 24 hours have been reviewed.     Significant Imaging:  Labs: Reviewed    Assessment/Plan:     Pulmonary  CAP (community acquired pneumonia)  Per primary    Cardiac/Vascular  HFrEF (heart failure with reduced ejection fraction)  Resolved. Most recent echo nl    Renal/  * LANEY (acute kidney injury)  58 yr old with Takotsubo cardiomyopathy with EF 25%, recently discharged on entresto and increased dose of metoprolol comes with severe hypotension at BP 60/40 which has been persistent since previous discharge      LANEY appears to be likely pre renal with severe symptomatic hypotension nd volume depletion    UA with few WBC and  2+ Blood and hyaline cast   Urine microscopy with few granular casts and no florid muddy brown casts noted      Plan/Recommendation:   -no need for further IVF, diruesis or RRT at this time, ATN is improving and is making urine  -okay to discharge from nephrology standpoint; will arrange for outpatient follow up.   -Keep MAP > 65  -Keep hemoglobin > 7  -Strict ins and outs  -Avoid nephrotoxic agents if possible and renally dose medications  -Avoid drastic hemodynamic changes if possible  -Daily RFP        Thank you for your consult. I will sign off. Please contact us if you have any additional questions.    Oziel Cole MD  Nephrology  [facility] Tele[facility]    ATTENDING PHYSICIAN ATTESTATION  I have personally verified the history and examined the patient. I thoroughly reviewed the demographic, clinical, laboratorial and imaging information available in medical records. I agree with the assessment and recommendations provided by the subspecialty resident who was under my supervision.

## 2023-06-13 NOTE — DISCHARGE SUMMARY
[facility] Martin - Telemetry [facility] Medicine  Discharge Summary      Patient Name: Reza Morrow  MRN: [id number]  MCKAYLA: [id number]  Patient Class: IP- Inpatient  Admission Date: 6/9/2023  Hospital Length of Stay: 3 days  Discharge Date and Time:  06/13/2023 10:31 AM  Attending Physician: Ludwin Clement MD   Discharging Provider: Neel Gr MD  Primary Care Provider: Kip Jimenez MD  Hospital Medicine Team: [facility] 4 Neel Gr MD  Primary Care Team: [facility] 4    HPI:   58 year old female with HLD, MS presenting with hypotension. She has had a couple of recent hospitalizations. During the first one, she presented confused and had JACKI; was taken to the cath lab where they found no CAD. TTE showed EF 25% suggestive of takutsubo cardiomyopathy; discharged home on GDMT. During her most recent hosptialization, she presented in acute hypoxemic respiratory failure requiring intubation and cardiogenic shock. Her GDMT was uptitrated and she was started on entresto. Discharged home on 5/30 with . Reports her BPs have been running low at home, with some BPs 60/40. Continued to take her BP meds and lasix despite this. Reports some lightheadedness and dizziness. Febrile at home yesterday with Tmax 101 and associated dry cough. Denies rhinorrhea or congestion. Denies chest pain or dyspnea. Denies abdominal pain, nausea, or vomiting. Has been eating and drinking OK. Has had decreased UOP over the past couple days (hasn't had nocturia which she normally does). Denies dysuria or hematuria. She lives with her son. Non- smoker, no alcohol. Is seen in a chronic pain clinic and is prescribed opioids, but hasn't taken any in several days. Also hasn't taken her MS meds (baclofen, muscle relaxants) in a few days.    On arrival to ED, afebrile, hypotensive BP 89/51, SBP 86-96, saturating 97% on RA. Hb 12, WBC 6.2, Plt 284. Na 136, K 4.4, bicarb 21, sCr 9.1, BUN 61. BGL OK. BNP 1200, initial troponin  elevated. Procal negative. UA unremarkable. BCx pending. CXR unremarkable. In ED, received 1.5L of IV fluids and broad spectrum abx. Pt reviewed by MICU who felt her presentation was likely from hypovolemia as her IVC was collapsible, and she was responsive to fluids.      Admit to hospital medicine for further evaluation and management.       * No surgery found *      Hospital Course:   All GDMT meds held on admission. Nephrology consulted for LANEY and recommended fluids. LANEY improved w/ fluids. Home pain meds resumed. Started on broad spec abx for possible PNA. Sputum culture sent. Vanc discontinued, transitioned to abx for CAP coverage. Completed 5 days of antibiotics. Referral placed for transitional HF clinic. LANEY continued to improve, plans to discharge, check BMP in one week, and follow up with nephrology. Patient to hold all muscle relaxers and gabapentin until following up outpatient. Repeat echo done showing recovered EF to 65-70%. Will discontinue all GDMT meds and have patient f/u with cardiology as outpatient.     Vitals:    06/13/23 0730   BP: 126/78   Pulse: 90   Resp: 16   Temp: 98.2 °F (36.8 °C)     Physical Exam  Vitals reviewed.   Constitutional:       General: She is not in acute distress.     Appearance: Normal appearance.   HENT:      Mouth/Throat:      Mouth: Mucous membranes are moist.   Cardiovascular:      Rate and Rhythm: Normal rate and regular rhythm.      Heart sounds: No murmur heard.  Pulmonary:      Effort: Pulmonary effort is normal.      Breath sounds: Normal breath sounds. Transmitted upper airway sounds present. No wheezing or rales.   Abdominal:      General: Abdomen is flat. Bowel sounds are normal. There is no distension.      Palpations: Abdomen is soft.      Tenderness: There is no abdominal tenderness.   Musculoskeletal:         General: No swelling.      Right lower leg: No edema.      Left lower leg: No edema.   Skin:     General: Skin is warm.      Capillary Refill:  Capillary refill takes less than 2 seconds.   Neurological:      General: No focal deficit present.      Mental Status: She is alert and oriented to person, place, and time.   Psychiatric:         Mood and Affect: Mood is anxious.         Behavior: Behavior normal.     Goals of Care Treatment Preferences:  Code Status: Full Code      Consults:   Consults (From admission, onward)          Status Ordering Provider     Inpatient consult to Nephrology  Once        Provider:  (Not yet assigned)    Completed PAMELA BECKFORD     Inpatient consult to Critical Care Medicine  Once        Provider:  (Not yet assigned)    Completed CRISSY PEREZ new Assessment & Plan notes have been filed under this hospital service since the last note was generated.  Service: Hospital Medicine    Final Active Diagnoses:    Diagnosis Date Noted POA    PRINCIPAL PROBLEM:  LANEY (acute kidney injury) [N17.9] 06/10/2023 Yes    CAP (community acquired pneumonia) [J18.9] 06/11/2023 Yes    Anxiety [F41.9] 06/11/2023 Yes    Hypotension [I95.9] 06/10/2023 Yes    Takotsubo cardiomyopathy [I51.81] 06/10/2023 Yes    Raynaud's phenomenon [I73.00] 06/10/2023 Yes    Wheeze [R06.2] 06/10/2023 Yes    SOB (shortness of breath) [R06.02] 06/10/2023 Yes    HFrEF (heart failure with reduced ejection fraction) [I50.20] 05/27/2023 Yes    Chronic pain syndrome [G89.4] 06/23/2016 Yes     Chronic    MS (multiple sclerosis) [G35] 12/07/2015 Yes     Chronic      Problems Resolved During this Admission:       Discharged Condition: stable    Disposition: Home or Self Care    Follow Up:   Follow-up Information       Kip Jimenez MD Follow up on 6/15/2023.    Specialty: Family Medicine  Why: Established 's [facility] f/u visit @ 10:45am. Please bring discharge summary, ID, insurance card, and medication list.  Contact information:  [address] [place] [id number] [phone]                           Patient Instructions:      BASIC  METABOLIC PANEL   Standing Status: Future Standing Exp. Date: 08/11/24     Ambulatory referral/consult to Heart Failure Transitional Care Clinic   Standing Status: Future   Referral Priority: Routine Referral Type: Consultation   Referral Reason: Specialty Services Required   Requested Specialty: Cardiology   Number of Visits Requested: 1     Ambulatory referral/consult to Internal Medicine   Standing Status: Future   Referral Priority: Routine Referral Type: Consultation   Referral Reason: Specialty Services Required   Requested Specialty: Internal Medicine   Number of Visits Requested: 1     Ambulatory referral/consult to Cardiology   Standing Status: Future   Referral Priority: Routine Referral Type: Consultation   Referral Reason: Specialty Services Required   Requested Specialty: Cardiology   Number of Visits Requested: 1     Ambulatory referral/consult to Nephrology   Standing Status: Future   Referral Priority: Routine Referral Type: Consultation   Referral Reason: Specialty Services Required   Requested Specialty: Nephrology   Number of Visits Requested: 1       Significant Diagnostic Studies: Labs: All labs within the past 24 hours have been reviewed    Pending Diagnostic Studies:       Procedure Component Value Units Date/Time    Myeloperoxidase Antibody (MPO) [[id number]] Collected: 06/10/23 1613    Order Status: Sent Lab Status: In process Updated: 06/10/23 1621    Specimen: Blood     Proteinase 3 Autoantibodies [[id number]] Collected: 06/10/23 1613    Order Status: Sent Lab Status: In process Updated: 06/10/23 1621    Specimen: Blood            Medications:  Reconciled Home Medications:      Medication List        CHANGE how you take these medications      methocarbamoL 750 MG Tab  Commonly known as: ROBAXIN  Take 1 tablet (750 mg total) by mouth 4 (four) times daily.  What changed: how much to take     tiZANidine 2 mg Cap  Take 1 capsule (2 mg total) by mouth 3 (three) times daily.  What changed: how  much to take            CONTINUE taking these medications      baclofen 10 MG tablet  Commonly known as: LIORESAL  Take 1 tablet (10 mg total) by mouth 2 (two) times daily.     benzonatate 100 MG capsule  Commonly known as: TESSALON  Take 1 capsule (100 mg total) by mouth 3 (three) times daily as needed for Cough.     COPAXONE 40 mg/mL injection  Generic drug: glatiramer  Inject 40 mg into the skin once daily.     cyclobenzaprine 10 MG tablet  Commonly known as: FLEXERIL  Take 1 tablet (10 mg total) by mouth 3 (three) times daily as needed for Muscle spasms.     ergocalciferol 50,000 unit Cap  Commonly known as: ERGOCALCIFEROL  Take 50,000 Units by mouth every 7 days.     gabapentin 300 MG capsule  Commonly known as: NEURONTIN  Take 1 capsule (300 mg total) by mouth 3 (three) times daily.     hydrOXYzine HCL 25 MG tablet  Commonly known as: ATARAX  Take 25 mg by mouth 3 (three) times daily.     omeprazole 20 MG capsule  Commonly known as: PRILOSEC  Take 20 mg by mouth daily as needed (Acid reflux).     oxyCODONE 30 MG Tab  Commonly known as: ROXICODONE  Take 5 mg by mouth every 6 (six) hours as needed.     rosuvastatin 20 MG tablet  Commonly known as: CRESTOR  Take 1 tablet (20 mg total) by mouth once daily.            STOP taking these medications      ENTRESTO 24-26 mg per tablet  Generic drug: sacubitriL-valsartan     furosemide 40 MG tablet  Commonly known as: LASIX     lisinopriL 2.5 MG tablet  Commonly known as: PRINIVIL,ZESTRIL     LORazepam 2 MG Tab  Commonly known as: ATIVAN     metoprolol succinate 100 MG 24 hr tablet  Commonly known as: TOPROL-XL     metronidazole 1% 1 % Gel  Commonly known as: METROGEL     NUCYNTA  mg Tb12  Generic drug: tapentadoL     triamcinolone acetonide 0.1% 0.1 % cream  Commonly known as: KENALOG     VOLTAREN 1 % Gel  Generic drug: diclofenac sodium              Indwelling Lines/Drains at time of discharge:   Lines/Drains/Airways       None                   Time spent on  the discharge of patient: 30 minutes         Neel Gr MD  Department of Hospital Medicine  [facility] - Telemetry Stepdown

## 2023-06-13 NOTE — PROGRESS NOTES
"Heart Failure Transitional Care Clinic(HFTCC) nurse navigator notified of HFTCC candidate in need of education and introduction to 4-6 week program.      PT aao x 3 while lying in bed, NICK. Introduced self to pt as HFTCC nurse navigator.     Patient given "Heart Failure Transitional Care Clinic Home Care Guide" which includes "Daily weight and symptom tracker".  Encouraged pt to review information.      Reviewed the following key points of HFTCC program with pt and family:   1.) Take your medications as directed.    2.) Weight yourself daily   3.) Follow low salt and limited fluid diet.    4.) Stop smoking and start exercising   5.) Go to your appointments and call your team.      Pt reminded to follow Symptom tracker and to call at the onset of symptoms according to tracker.     Reviewed plan for follow up once discharged to include phone calls, in person and virtual visits to assist pt optimizing their heart failure medication regimen and encouraging healthy lifestyle modifications.  Reminded pt that program will assist them over the next 4-6 weeks and then patient will be transferred to long term care provider .  Reminded pt how to contact HFTCC navigator via phone and or via Rixty.     Pt instructed appointment will be printed on hospital discharge paperwork.     Pt also reminded RN will call 48-72 hours after discharge to check on them.     Pt verbalized read back of information given.  Encouraged pt and family to read over information often and contact team with any questions or concerns.      "

## 2023-06-13 NOTE — TELEPHONE ENCOUNTER
Called pt no answer  l/m     ----- Message from Elana Sawyer LPN sent at 6/13/2023 10:09 AM CDT -----  Regarding: FW: Appointment  Contact: pt.[phone]    ----- Message -----  From: Kimani NELSON Route  Sent: 6/13/2023  10:02 AM CDT  To: [facility] Nephro Clinical Staff  Subject: Appointment                                      Pt is currently in the hospital and told to follow up with nephrology within a week of discharge. She will be discharged on 6/13. Pt has been diagnosed with N17.9 (ICD-10-CM) - LANEY (acute kidney injury). Patient Requesting Call Back @ [phone]

## 2023-06-13 NOTE — ASSESSMENT & PLAN NOTE
58 yr old with Takotsubo cardiomyopathy with EF 25%, recently discharged on entresto and increased dose of metoprolol comes with severe hypotension at BP 60/40 which has been persistent since previous discharge      LANEY appears to be likely pre renal with severe symptomatic hypotension nd volume depletion    UA with few WBC and  2+ Blood and hyaline cast   Urine microscopy with few granular casts and no florid muddy brown casts noted      Plan/Recommendation:   -no need for further IVF, diruesis or RRT at this time, ATN is improving and is making urine  -okay to discharge from nephrology standpoint; will arrange for outpatient follow up.   -Keep MAP > 65  -Keep hemoglobin > 7  -Strict ins and outs  -Avoid nephrotoxic agents if possible and renally dose medications  -Avoid drastic hemodynamic changes if possible  -Daily RFP

## 2023-06-13 NOTE — PLAN OF CARE
Problem: Adult Inpatient Plan of Care  Goal: Plan of Care Review  Outcome: Ongoing, Progressing     Problem: Adult Inpatient Plan of Care  Goal: Optimal Comfort and Wellbeing  Outcome: Ongoing, Progressing   POC reviewed with patient. VSS. Complains of chronic hip pain, PRN oxycodone given. PRN Ativan given for anxiety. No acute changes during shift. Bed alarm set and call light within reach.

## 2023-06-13 NOTE — PLAN OF CARE
CHW scheduled pt's hospital f/u visit on 6/15/23 @ 10:45 am.   sent a message to Nephrology to make contact with pt to schedule her hospital f/u appointment.

## 2023-06-14 ENCOUNTER — TELEPHONE (OUTPATIENT)
Dept: CARDIOLOGY | Facility: CLINIC | Age: 58
End: 2023-06-14
Payer: MEDICAID

## 2023-06-14 LAB
BACTERIA BLD CULT: NORMAL
BACTERIA BLD CULT: NORMAL
MYELOPEROXIDASE AB SER-ACNC: <0.2 U/ML

## 2023-06-14 NOTE — TELEPHONE ENCOUNTER
"Heart Failure Transitional Care Clinic    Attempted to call pt to complete 24-72hour post discharge "check in" call. Unable to reach pt at listed phone numbers.  Was able to leave message on voicemail encouraging [facility]..     Will continue to try to reach patient.    "

## 2023-06-15 ENCOUNTER — EXTERNAL HOME HEALTH (OUTPATIENT)
Dept: HOME HEALTH SERVICES | Facility: HOSPITAL | Age: 58
End: 2023-06-15
Payer: MEDICAID

## 2023-06-15 ENCOUNTER — TELEPHONE (OUTPATIENT)
Dept: CARDIOLOGY | Facility: CLINIC | Age: 58
End: 2023-06-15
Payer: MEDICAID

## 2023-06-15 LAB — MRSA SPEC QL CULT: NORMAL

## 2023-06-15 NOTE — TELEPHONE ENCOUNTER
"Heart Failure Transitional Care Clinic    Attempted to call pt to complete 24-72hour post discharge "check in" call. Unable to reach pt at listed phone numbers.  Was able to leave message on voicemail [facility]     Will continue to try to reach patient.    "

## 2023-06-16 ENCOUNTER — TELEPHONE (OUTPATIENT)
Dept: CARDIOLOGY | Facility: CLINIC | Age: 58
End: 2023-06-16
Payer: MEDICAID

## 2023-06-16 RX ORDER — BENZONATATE 100 MG/1
100 CAPSULE ORAL 3 TIMES DAILY PRN
Qty: 28 CAPSULE | Refills: 0 | Status: CANCELLED | OUTPATIENT
Start: 2023-06-16 | End: 2023-06-30

## 2023-06-16 NOTE — TELEPHONE ENCOUNTER
"Heart Failure Transitional Care Clinic(HFTCC) hospital discharge 48-72 hour phone follow up completed.     Most Recent Hospital Discharge Date: 6-13-23  Last admission Diagnosis/chief complaint:SOB    TCC RN Navigator spoke with Pt    Current Patient reported weight: 125.8 lbs    Current Patient reported blood pressure and heart rate: /75 P 100 Pt states that is not good and I tell her nml is  BP yesterday was 114/74    Pt reports the following:  [x]  Shortness of Breath with Activity Pt feels that she still has Pneumonia  []  Shortness of Breath at rest   []  Fatigue  []  Edema   [] Chest pain or tightness  [] Weight Increase since discharge  [] None of the above    Medications:   Discharge medication reviewed with pt.  Pt reports having medication list available and has all medications at home for use per list. erPt states that they stopped a lot of her meds.    Education:   Confirmed pt received "Home Care Guide for Heart Failure Patients" while admitted. Pt unsure if she has it but will look for it.  Reviewed key points as listed below.     Recommend 2 -3 gram sodium restriction and 1500 cc-2000 cc fluid restriction.  Encourage physical activity with graded exercise program.  Requested patient to weigh themselves daily, and to notify us if their weight increases by more than 3 lbs in 1 day or 5 lbs in 3 days.   Reminded patient to use "Daily weight and symptom tracker" to record and guide patient on when and how to call HFTCC. PT may also use symptom tracker if no scale available  Pt reports being in the Green(color) Zone. If in yellow/red, reminded that they should be calling HFTCC today or now.     Watch for these Signs and Symptoms: If any of these occur, contact HFTCC immediately:   Increase in shortness of breath with movement   Increase in swelling in your legs and ankles   Weight gain of more than 3 pounds in a day or 5 pounds in 3 days.   Difficulty breathing when you are lying down   " Worsening fatigue or tiredness   Stomach bloating, a full feeling or a loss of appetite   Increased coughing--especially when you are lying down      Pt was able to verbalize back to RN in their own words correct diet/fluid restrictions, necessity for exercise, warning signs and symptoms, when and how to contact their TCC team.      Pt educated on follow-up plan while in HFTCC program to include:   Week 1 -  F/u appt with Provider and RN (6-20-23 @ 1:00) .   Week 2-5 - In person/ Virtual/ phone call check ins    Week 5-7 - Pt will discharge from HFTCC and transition to longterm care provider (Cardiology/PCP/ Advanced Heart Failure).      Patient active on myChart? Yes      Pt given the following contact information for ease of communication: [phone] (Mon-Fri, 8a-5p) & for urgent issues on the weekend to page the Heart Transplant MD on call.  Pt also encouraged utilize [facility] messaging as well.      Will follow up with pt at first clinic visit and RN navigator available for pt questions, issues or concerns.     Pt states that she will call [facility] to see if she can get more Tessalon Pearles, she states that she has been having trouble sleeping and I ask if that is b/c of SOB she states that she thinks she still has the pneumonia but only has 1 Tessalon Pearle left and she is till coughing. Pt confused with our appt. Thinking that she is seeing Dr. Jimenez at the [facility] and we discuss and clarify that issue. I tell her to call us and give her the # and remind her that it is on every page of the booklet. I tell her we look forward to seeing her and she thanks me for the call.

## 2023-06-19 RX ORDER — BENZONATATE 100 MG/1
100 CAPSULE ORAL 3 TIMES DAILY PRN
Qty: 28 CAPSULE | Refills: 0 | Status: CANCELLED | OUTPATIENT
Start: 2023-06-16 | End: 2023-06-30

## 2023-06-19 NOTE — PROGRESS NOTES
[facility] Provider Note (Initial Clinic) Consult Note    Date of original referral: 6/13/23  Age: 58 y.o.  Gender: female  Ethnicity: White  Number of admissions for CHF within the preceding year: 1   Duration of CHF: 2022  Type of Congestive Heart Failure: recovered (EF 20->65%)  Etiology: Stress-induced    Social history: former smoker, quit 14 years ago (previously smoking 1 pack per couple days x30 years), social alcohol use, medical marijuana use  Enrolled in Infusion suite: no    Diagnostic Labs:   EKG - 06/10/2023  CXR - 06/09/2023  ECHO - 06/12/2023  Stress test -   Stress echo -   Pharmacologic stress -   Cardiac catheterization - 03/14/2022   Cardiac MRI -     Lab Results   Component Value Date     06/13/2023     06/12/2023    K 4.5 06/13/2023    K 4.8 06/12/2023     06/13/2023     06/12/2023    CO2 27 06/13/2023    CO2 26 06/12/2023    GLU 85 06/13/2023     06/12/2023    BUN 47 (H) 06/13/2023    BUN 52 (H) 06/12/2023    CREATININE 4.2 (H) 06/13/2023    CREATININE 4.7 (H) 06/12/2023    CALCIUM 9.1 06/13/2023    CALCIUM 8.7 06/12/2023    PROT 6.3 06/13/2023    PROT 6.2 06/12/2023    ALBUMIN 2.7 (L) 06/13/2023    ALBUMIN 2.6 (L) 06/12/2023    BILITOT 0.3 06/13/2023    BILITOT 0.3 06/12/2023    ALKPHOS 52 (L) 06/13/2023    ALKPHOS 55 06/12/2023    AST 14 06/13/2023    AST 17 06/12/2023    ALT 9 (L) 06/13/2023    ALT 13 06/12/2023    ANIONGAP 12 06/13/2023    ANIONGAP 12 06/12/2023    ESTGFRAFRICA >60.0 04/13/2022    ESTGFRAFRICA >60.0 04/12/2022    EGFRNONAA >60.0 04/13/2022    EGFRNONAA >60.0 04/12/2022       Lab Results   Component Value Date    WBC 2.92 (L) 06/13/2023    WBC 3.92 06/12/2023    RBC 3.48 (L) 06/13/2023    RBC 3.46 (L) 06/12/2023    HGB 10.2 (L) 06/13/2023    HGB 10.1 (L) 06/12/2023    HCT 31.7 (L) 06/13/2023    HCT 31.3 (L) 06/12/2023    MCV 91 06/13/2023    MCV 91 06/12/2023    MCH 29.3 06/13/2023    MCH 29.2 06/12/2023    MCHC 32.2 06/13/2023    MCHC 32.3  06/12/2023    RDW 13.4 06/13/2023    RDW 13.5 06/12/2023     06/13/2023     06/12/2023    MPV 12.4 06/13/2023    MPV 11.7 06/12/2023    IMMGR 0.0 06/13/2023    IMMGR 0.0 06/12/2023    IGABS 0.00 06/13/2023    IGABS 0.00 06/12/2023    LYMPH 1.1 06/13/2023    LYMPH 38.4 06/13/2023    MONO 0.4 06/13/2023    MONO 13.7 06/13/2023    EOS 0.1 06/13/2023    EOS 0.1 06/12/2023    BASO 0.02 06/13/2023    BASO 0.02 06/12/2023    NRBC 0 06/13/2023    NRBC 0 06/12/2023    GRAN 1.3 (L) 06/13/2023    GRAN 43.4 06/13/2023    EOSINOPHIL 3.8 06/13/2023    EOSINOPHIL 2.0 06/12/2023    BASOPHIL 0.7 06/13/2023    BASOPHIL 0.5 06/12/2023    PLTEST Decreased (A) 05/29/2023       Lab Results   Component Value Date    BNP 1,179 (H) 06/09/2023    BNP 3,970 (H) 05/23/2023    MG 1.6 06/13/2023    MG 1.6 06/12/2023    PHOS 5.9 (H) 06/13/2023    PHOS 7.1 (H) 06/12/2023    TROPONINI 0.021 06/10/2023    TROPONINI 0.029 (H) 06/09/2023    HGBA1C 5.4 03/23/2022    TSH 0.102 (L) 05/23/2023    TSH 0.109 (L) 04/10/2022    FREET4 1.10 05/23/2023    FREET4 1.25 04/10/2022       Lab Results   Component Value Date    IRON 55 03/23/2022    TIBC 395 03/23/2022    FERRITIN 72 03/23/2022    COLORU Colorless (A) 06/10/2023    APPEARANCEUA Clear 06/10/2023    PHUR 6.0 06/10/2023    SPECGRAV 1.005 06/10/2023    PROTEINUA Trace (A) 06/10/2023    GLUCUA Negative 06/10/2023    KETONESU Negative 06/10/2023    BILIRUBINUA Negative 06/10/2023    OCCULTUA 1+ (A) 06/10/2023    NITRITE Negative 06/10/2023    LEUKOCYTESUR Negative 06/10/2023       No implanted cardiac devices    Current Outpatient Medications on File Prior to Visit   Medication Sig Dispense Refill    baclofen (LIORESAL) 10 MG tablet Take 1 tablet (10 mg total) by mouth 2 (two) times daily.      cyclobenzaprine (FLEXERIL) 10 MG tablet Take 1 tablet (10 mg total) by mouth 3 (three) times daily as needed for Muscle spasms.      ergocalciferol (ERGOCALCIFEROL) 50,000 unit Cap Take 50,000 Units by  mouth every 7 days.      gabapentin (NEURONTIN) 300 MG capsule Take 1 capsule (300 mg total) by mouth 3 (three) times daily.      glatiramer (COPAXONE) 40 mg/mL injection Inject 40 mg into the skin once daily.      hydrOXYzine HCL (ATARAX) 25 MG tablet Take 25 mg by mouth 3 (three) times daily.      methocarbamoL (ROBAXIN) 750 MG Tab Take 1 tablet (750 mg total) by mouth 4 (four) times daily.      omeprazole (PRILOSEC) 20 MG capsule Take 20 mg by mouth daily as needed (Acid reflux).      oxyCODONE (ROXICODONE) 30 MG Tab Take 5 mg by mouth every 6 (six) hours as needed.      rosuvastatin (CRESTOR) 20 MG tablet Take 1 tablet (20 mg total) by mouth once daily. 30 tablet 11    tiZANidine 2 mg Cap Take 1 capsule (2 mg total) by mouth 3 (three) times daily.      [DISCONTINUED] atorvastatin (LIPITOR) 20 MG tablet Take 20 mg by mouth once daily.      [DISCONTINUED] EScitalopram oxalate (LEXAPRO) 10 MG tablet Take 10 mg by mouth once daily.      [DISCONTINUED] pregabalin (LYRICA) 150 MG capsule Take 150 mg by mouth 2 (two) times daily.       No current facility-administered medications on file prior to visit.         HPI:  Patient ambulating to clinic today and pace slow. Endorses SOB walking across lobby to clinic room. Climbing 1 flight of stairs to clinic today with SOB   Patient sleeps on 1 number of pillows   Patient wakes up SOB, has to get out of bed, associated cough- denies PND symptoms. Nonproductive cough following admission in May requiring intubation   Palpitations - intermittent, denies recent    Since discharge frequency of performing weights, home weight and weight change- performing daily weights   Other information felt pertinent to HPI: Ms. Reza Morrow is a 58 year old female with a PMHx of MS, recent hospitalization for Takotsubo cardiomyopathy with EF previously 25% but now recovered who presents to first TC visit following recent admission for hypotension and LANEY. She has had a couple of recent  hospitalizations. During her first admission, she presented confused and had STEMI; was taken to the cath lab with no CAD on LHC. TTE showed EF 25% suggestive of takutsubo cardiomyopathy; discharged home on GDMT. During her 5/23 admission, she presented in acute hypoxemic respiratory failure requiring intubation and cardiogenic shock. Her GDMT was uptitrated and she was started on entresto. On most recent presentation, she was hypotensive with significant decline in renal function. Cr 9.1 on admission from b/l normal, improved to 4.2 with IV fluids. Nephrology was consulted and recommends outpatient follow up and a BMP in one week. Treated empirically for CAP, completed course inpatient. Will hold all GDMT at discharge due to severe hypotension and now with recovered EF.     PHYSICAL:   Vitals:    06/20/23 1320   BP: 120/61   Pulse: 80      Wt Readings from Last 3 Encounters:   06/20/23 56 kg (123 lb 6.4 oz)   06/12/23 53.5 kg (118 lb)   05/29/23 53.7 kg (118 lb 6.2 oz)       JVD: no   Heart rhythm: regular  Cardiac murmur: No    S3: no  S4: no  Lungs: consolidation noted in LLL  Hepatojugular reflux: no  Edema: no      Echo 6/12/23:  The left ventricle is normal in size with normal systolic function.  The estimated ejection fraction is 65-70%.  Normal left ventricular diastolic function.  Normal right ventricular size with normal right ventricular systolic function.  Normal central venous pressure (3 mmHg).  The estimated PA systolic pressure is 29 mmHg.    Echo 5/24/23:  The left ventricle is normal in size with severely decreased systolic function.  The estimated ejection fraction is 20%.  There is left ventricular global hypokinesis.  Left ventricular diastolic dysfunction.  Normal right ventricular size with normal right ventricular systolic function.  Mechanically ventilated; cannot use inferior caval vein diameter to estimate central venous pressure.    ASSESSMENT: Takotsubo cardiomyopathy    PLAN:   "    Patient Instructions:   Instruct the patient to notify this clinic if HH, a physician or an advanced care provider wants to change medication one of their HF medications   Activity and Diet restrictions:   Recommend 2-3 gram sodium restriction and 1500cc- 2000cc fluid restriction.  Encourage physical activity with graded exercise program.  Requested patient to weigh themselves daily, and to notify us if their weight increases by more than 3 lbs in 1 day or 5 lbs in 3 days.    Medication changes (include current dose and changed dose): NYHA Class II symptoms. EF recovered on TTE. BNP downtrending. No appreciable fluid volume on exam. Renal function improving. Continue to hold lasix for now. Plan to resume lasix 40mg daily prn next visit. Endorses nonproductive cough since May admission following extubation. Completed course of abx for CAP during most recent admission. Fevers/chills now resolved. CTA on 5/24 with "numerous scattered lucent lesions throughout the pulmonary parenchyma measuring up to 9 mm in size, suggestive of pulmonary emphysema versus cystic lung disease such as lymphangiomyomatosis, lymphocytic interstitial pneumonitis, or pulmonary Langerhans cell histiocytosis in the appropriate clinical setting. Mild diffuse ground-glass attenuation throughout the lungs, most pronounced in the upper lobes. Mild interlobular septal thickening in the lung bases. No focal consolidation." High resolution CT chest ordered for further evaluation. Pending results, plan for Pulm referral. Nephrology messaged for close follow up.  Upcoming labs and date anticipated: RTC in 1 week for repeat labs and close follow up  Other diagnostic tests ordered: CT chest    Christine Skaggs PA-C        "

## 2023-06-20 ENCOUNTER — DOCUMENTATION ONLY (OUTPATIENT)
Dept: CARDIOLOGY | Facility: CLINIC | Age: 58
End: 2023-06-20

## 2023-06-20 ENCOUNTER — OFFICE VISIT (OUTPATIENT)
Dept: CARDIOLOGY | Facility: CLINIC | Age: 58
End: 2023-06-20
Payer: MEDICAID

## 2023-06-20 ENCOUNTER — LAB VISIT (OUTPATIENT)
Dept: LAB | Facility: HOSPITAL | Age: 58
End: 2023-06-20
Payer: MEDICAID

## 2023-06-20 VITALS
DIASTOLIC BLOOD PRESSURE: 61 MMHG | HEIGHT: 63 IN | OXYGEN SATURATION: 98 % | WEIGHT: 123.38 LBS | BODY MASS INDEX: 21.86 KG/M2 | HEART RATE: 80 BPM | SYSTOLIC BLOOD PRESSURE: 120 MMHG | TEMPERATURE: 99 F

## 2023-06-20 DIAGNOSIS — N17.9 AKI (ACUTE KIDNEY INJURY): ICD-10-CM

## 2023-06-20 DIAGNOSIS — G35 MS (MULTIPLE SCLEROSIS): Chronic | ICD-10-CM

## 2023-06-20 DIAGNOSIS — R06.02 SOB (SHORTNESS OF BREATH): ICD-10-CM

## 2023-06-20 DIAGNOSIS — I51.81 TAKOTSUBO CARDIOMYOPATHY: Primary | ICD-10-CM

## 2023-06-20 DIAGNOSIS — R93.89 ABNORMAL COMPUTED TOMOGRAPHY ANGIOGRAPHY (CTA): ICD-10-CM

## 2023-06-20 DIAGNOSIS — R05.2 SUBACUTE COUGH: ICD-10-CM

## 2023-06-20 LAB
ALBUMIN SERPL BCP-MCNC: 3.5 G/DL (ref 3.5–5.2)
ALP SERPL-CCNC: 60 U/L (ref 55–135)
ALT SERPL W/O P-5'-P-CCNC: 11 U/L (ref 10–44)
ANION GAP SERPL CALC-SCNC: 13 MMOL/L (ref 8–16)
AST SERPL-CCNC: 19 U/L (ref 10–40)
BASOPHILS # BLD AUTO: 0.04 K/UL (ref 0–0.2)
BASOPHILS NFR BLD: 1.1 % (ref 0–1.9)
BILIRUB SERPL-MCNC: 0.6 MG/DL (ref 0.1–1)
BNP SERPL-MCNC: 308 PG/ML (ref 0–99)
BUN SERPL-MCNC: 26 MG/DL (ref 6–20)
CALCIUM SERPL-MCNC: 9.5 MG/DL (ref 8.7–10.5)
CHLORIDE SERPL-SCNC: 100 MMOL/L (ref 95–110)
CO2 SERPL-SCNC: 30 MMOL/L (ref 23–29)
CREAT SERPL-MCNC: 2.3 MG/DL (ref 0.5–1.4)
DIFFERENTIAL METHOD: ABNORMAL
EOSINOPHIL # BLD AUTO: 0.1 K/UL (ref 0–0.5)
EOSINOPHIL NFR BLD: 2.5 % (ref 0–8)
ERYTHROCYTE [DISTWIDTH] IN BLOOD BY AUTOMATED COUNT: 12.6 % (ref 11.5–14.5)
EST. GFR  (NO RACE VARIABLE): 24.2 ML/MIN/1.73 M^2
GLUCOSE SERPL-MCNC: 83 MG/DL (ref 70–110)
HCT VFR BLD AUTO: 31.9 % (ref 37–48.5)
HGB BLD-MCNC: 10.4 G/DL (ref 12–16)
IMM GRANULOCYTES # BLD AUTO: 0.01 K/UL (ref 0–0.04)
IMM GRANULOCYTES NFR BLD AUTO: 0.3 % (ref 0–0.5)
LYMPHOCYTES # BLD AUTO: 0.8 K/UL (ref 1–4.8)
LYMPHOCYTES NFR BLD: 22.2 % (ref 18–48)
MAGNESIUM SERPL-MCNC: 2 MG/DL (ref 1.6–2.6)
MCH RBC QN AUTO: 30.2 PG (ref 27–31)
MCHC RBC AUTO-ENTMCNC: 32.6 G/DL (ref 32–36)
MCV RBC AUTO: 93 FL (ref 82–98)
MONOCYTES # BLD AUTO: 0.4 K/UL (ref 0.3–1)
MONOCYTES NFR BLD: 9.8 % (ref 4–15)
NEUTROPHILS # BLD AUTO: 2.3 K/UL (ref 1.8–7.7)
NEUTROPHILS NFR BLD: 64.1 % (ref 38–73)
NRBC BLD-RTO: 0 /100 WBC
PHOSPHATE SERPL-MCNC: 4.2 MG/DL (ref 2.7–4.5)
PLATELET # BLD AUTO: 177 K/UL (ref 150–450)
PMV BLD AUTO: 11.2 FL (ref 9.2–12.9)
POTASSIUM SERPL-SCNC: 3.8 MMOL/L (ref 3.5–5.1)
PROT SERPL-MCNC: 7.9 G/DL (ref 6–8.4)
RBC # BLD AUTO: 3.44 M/UL (ref 4–5.4)
SODIUM SERPL-SCNC: 143 MMOL/L (ref 136–145)
WBC # BLD AUTO: 3.56 K/UL (ref 3.9–12.7)

## 2023-06-20 PROCEDURE — 4010F PR ACE/ARB THEARPY RXD/TAKEN: ICD-10-PCS | Mod: CPTII,,,

## 2023-06-20 PROCEDURE — 84100 ASSAY OF PHOSPHORUS: CPT

## 2023-06-20 PROCEDURE — 36415 COLL VENOUS BLD VENIPUNCTURE: CPT

## 2023-06-20 PROCEDURE — 99999 PR PBB SHADOW E&M-EST. PATIENT-LVL V: CPT | Mod: PBBFAC,,,

## 2023-06-20 PROCEDURE — 3078F DIAST BP <80 MM HG: CPT | Mod: CPTII,,,

## 2023-06-20 PROCEDURE — 1160F RVW MEDS BY RX/DR IN RCRD: CPT | Mod: CPTII,,,

## 2023-06-20 PROCEDURE — 1159F MED LIST DOCD IN RCRD: CPT | Mod: CPTII,,,

## 2023-06-20 PROCEDURE — 83735 ASSAY OF MAGNESIUM: CPT

## 2023-06-20 PROCEDURE — 3074F PR MOST RECENT SYSTOLIC BLOOD PRESSURE < 130 MM HG: ICD-10-PCS | Mod: CPTII,,,

## 2023-06-20 PROCEDURE — 85025 COMPLETE CBC W/AUTO DIFF WBC: CPT

## 2023-06-20 PROCEDURE — 1160F PR REVIEW ALL MEDS BY PRESCRIBER/CLIN PHARMACIST DOCUMENTED: ICD-10-PCS | Mod: CPTII,,,

## 2023-06-20 PROCEDURE — 3074F SYST BP LT 130 MM HG: CPT | Mod: CPTII,,,

## 2023-06-20 PROCEDURE — 3008F PR BODY MASS INDEX (BMI) DOCUMENTED: ICD-10-PCS | Mod: CPTII,,,

## 2023-06-20 PROCEDURE — 99999 PR PBB SHADOW E&M-EST. PATIENT-LVL V: ICD-10-PCS | Mod: PBBFAC,,,

## 2023-06-20 PROCEDURE — 80053 COMPREHEN METABOLIC PANEL: CPT

## 2023-06-20 PROCEDURE — 1111F PR DISCHARGE MEDS RECONCILED W/ CURRENT OUTPATIENT MED LIST: ICD-10-PCS | Mod: CPTII,,,

## 2023-06-20 PROCEDURE — 1159F PR MEDICATION LIST DOCUMENTED IN MEDICAL RECORD: ICD-10-PCS | Mod: CPTII,,,

## 2023-06-20 PROCEDURE — 83880 ASSAY OF NATRIURETIC PEPTIDE: CPT

## 2023-06-20 PROCEDURE — 3008F BODY MASS INDEX DOCD: CPT | Mod: CPTII,,,

## 2023-06-20 PROCEDURE — 99205 OFFICE O/P NEW HI 60 MIN: CPT | Mod: S$PBB,,,

## 2023-06-20 PROCEDURE — 3078F PR MOST RECENT DIASTOLIC BLOOD PRESSURE < 80 MM HG: ICD-10-PCS | Mod: CPTII,,,

## 2023-06-20 PROCEDURE — 1111F DSCHRG MED/CURRENT MED MERGE: CPT | Mod: CPTII,,,

## 2023-06-20 PROCEDURE — 99205 PR OFFICE/OUTPT VISIT, NEW, LEVL V, 60-74 MIN: ICD-10-PCS | Mod: S$PBB,,,

## 2023-06-20 PROCEDURE — 4010F ACE/ARB THERAPY RXD/TAKEN: CPT | Mod: CPTII,,,

## 2023-06-20 PROCEDURE — 99215 OFFICE O/P EST HI 40 MIN: CPT | Mod: PBBFAC

## 2023-06-20 RX ORDER — BENZONATATE 100 MG/1
100 CAPSULE ORAL 3 TIMES DAILY PRN
Qty: 28 CAPSULE | Refills: 0 | Status: CANCELLED | OUTPATIENT
Start: 2023-06-16 | End: 2023-06-30

## 2023-06-20 RX ORDER — BENZONATATE 200 MG/1
200 CAPSULE ORAL 3 TIMES DAILY PRN
Qty: 30 CAPSULE | Refills: 0 | Status: SHIPPED | OUTPATIENT
Start: 2023-06-20 | End: 2023-10-19

## 2023-06-20 NOTE — PROGRESS NOTES
Pt here for her first [facility] appt. Teaching reinforced; 1)reminded Pt of Daily Dry weights and how they are done, Pt states she is doing daily wts. and recording 2) Reinforced Sodium restriction and Fluid restriction  as well.  Pt states that it is difficult to keep things straight because of her MS but she has been reading the booklet she had and now has the Heart failure Home Care Guide to read. I show her the symptom tracker and we go over the signs and symptoms and remind her to compare the wts to make sure she isn't gaining fluid wt. Pt states her little dog ate her BP cuff so she hasn't been able to check her BP except with her wrist watch. I explain the farther from the heart you check the less accurate the BP reading will be. I give her a BP cuff with the rechargeable batteries that has no cords and show her how to use it. 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 Pt promises that she will. I remind her to call us if she has any questions or concerns and she agrees.

## 2023-06-21 ENCOUNTER — TELEPHONE (OUTPATIENT)
Dept: CARDIOLOGY | Facility: CLINIC | Age: 58
End: 2023-06-21
Payer: MEDICAID

## 2023-06-21 RX ORDER — BENZONATATE 100 MG/1
100 CAPSULE ORAL 3 TIMES DAILY PRN
Qty: 28 CAPSULE | Refills: 0 | Status: CANCELLED | OUTPATIENT
Start: 2023-06-16 | End: 2023-06-30

## 2023-06-21 NOTE — TELEPHONE ENCOUNTER
Pt asks what missed calls were for, there were post Hospital call but we saw her in Clinic so she can disregard the old VM. Pt asks re her lab work from yesterday and I tell her I will speak with Ms. Skaggs and call her back.

## 2023-06-21 NOTE — TELEPHONE ENCOUNTER
Call to answer questions that Pt has about her lab work. Explained to Pt that we look at the trends and yes her BNP is high 308 but it is coming down from 1,179. Explained to the Pt that her sodium is good 143, K is 3.8 which is good Crt is 2.3 which is a little high but it is coming down from 4.2 so that is good, Mag is 2.0 which   is excellent and Phos is 4.2 which is down from 5.9  Pt encouraged to watch Sodium content of her food, use fresh or frozen, Keep to a 1.5 to 2.0 Liter fluid restriction and don't take the Lasix if Ms. Skaggs told you not to. Encouraged Pt to call with any questions.

## 2023-06-22 RX ORDER — BENZONATATE 100 MG/1
100 CAPSULE ORAL 3 TIMES DAILY PRN
Qty: 28 CAPSULE | Refills: 0 | Status: CANCELLED | OUTPATIENT
Start: 2023-06-16 | End: 2023-06-30

## 2023-06-22 RX ORDER — BENZONATATE 100 MG/1
100 CAPSULE ORAL 3 TIMES DAILY PRN
Qty: 28 CAPSULE | Refills: 0 | OUTPATIENT
Start: 2023-06-22 | End: 2023-07-06

## 2023-06-26 NOTE — PROGRESS NOTES
[facility] Provider Note (Follow-up) Consult Note      HPI:     Patient ambulating to clinic today and pace slow. Endorses mild SOB presenting to clinic today with some improvement from last visit              Patient sleeps on 1 number of pillows              Patient wakes up SOB, has to get out of bed, associated cough- denies PND symptoms. Persisting cough following admission in May requiring intubation. Cough productive for dark yellow sputum. Denies further fevers/chills              Palpitations - intermittent heart skipping a beat lasting couple seconds              Since discharge frequency of performing weights, home weight and weight change- performing daily weights. Weight 118lbs when left hospital. Now stabilized at 122lbs              Other information felt pertinent to HPI: Ms. Reza Morrow is a 58 year old female with a PMHx of MS, recent hospitalization for Takotsubo cardiomyopathy with EF previously 25% but now recovered who presents to second TCC visit following recent admission for hypotension and LANEY. She has had a couple of recent hospitalizations. During her first admission, she presented confused and had STEMI; was taken to the cath lab with no CAD on LHC. TTE showed EF 25% suggestive of takutsubo cardiomyopathy; discharged home on GDMT. During her 5/23 admission, she presented in acute hypoxemic respiratory failure requiring intubation and cardiogenic shock. Her GDMT was uptitrated and she was started on entresto. On most recent presentation, she was hypotensive with significant decline in renal function. Cr 9.1 on admission from b/l normal, improved to 4.2 with IV fluids. Nephrology was consulted and recommends outpatient follow up and a BMP in one week. Treated empirically for CAP, completed course inpatient. Will hold all GDMT at discharge due to severe hypotension and now with recovered EF.    6/27/23: Today she has no acute complaints. Reports some improvement in SOB and cough from  "previous visit. Denies BLE edema.     PHYSICAL:   Vitals:    06/27/23 1317   BP: 130/70   Pulse: 72      Wt Readings from Last 3 Encounters:   06/27/23 55.7 kg (122 lb 11.2 oz)   06/20/23 56 kg (123 lb 6.4 oz)   06/12/23 53.5 kg (118 lb)     JVD: no   Heart rhythm: regular  Cardiac murmur: No    S3: no  S4: no  Lungs: no audible crackles/rales, expiratory wheeze  Hepatojugular reflux: no  Edema: no      Lab Results   Component Value Date     06/27/2023    K 3.7 06/27/2023    MG 1.6 06/27/2023     06/27/2023    CO2 25 06/27/2023    BUN 13 06/27/2023    CREATININE 0.9 06/27/2023    GLU 94 06/27/2023    CALCIUM 9.7 06/27/2023    AST 22 06/27/2023    ALT 12 06/27/2023    ALBUMIN 3.7 06/27/2023    PROT 7.7 06/27/2023    BILITOT 0.8 06/27/2023     Lab Results   Component Value Date     (H) 06/27/2023     (H) 06/20/2023    BNP 1,179 (H) 06/09/2023       ASSESSMENT: Takotsubo cardiomyopathy    PLAN:      Patient Instructions:   Instruct the patient to notify this clinic if HH, a physician or an advanced care provider wants to change medication one of their HF medications   Activity and Diet restrictions:   Recommend 2-3 gram sodium restriction and 1500cc- 2000cc fluid restriction.  Encourage physical activity with graded exercise program.  Requested patient to weigh themselves daily, and to notify us if their weight increases by more than 3 lbs in 1 day or 5 lbs in 3 days.    Assigned dry weight on home scale: NYHA Class II symptoms. EF recovered on TTE. BNP downtrending. Well compensated on exam. Renal function now returned to baseline with resolution of LANEY. VSS. Continue to hold GDMT for now with continued monitoring of renal function. Lasix 40mg daily prn reordered. Endorses cough since May admission following extubation. Completed course of abx for CAP during most recent admission. Fevers/chills now resolved. CTA on 5/24 with "numerous scattered lucent lesions throughout the pulmonary " "parenchyma measuring up to 9 mm in size, suggestive of pulmonary emphysema versus cystic lung disease such as lymphangiomyomatosis, lymphocytic interstitial pneumonitis, or pulmonary Langerhans cell histiocytosis in the appropriate clinical setting. Mild diffuse ground-glass attenuation throughout the lungs, most pronounced in the upper lobes. Mild interlobular septal thickening in the lung bases. No focal consolidation." High resolution CT chest ordered for further evaluation. Pending results, plan for Pulm referral.     RD education provided during visit.    Upcoming labs and date anticipated: plan for weekly phone check in and RTC in 3 weeks or sooner prn    Gen Cards appointment scheduled 7/11 to establish care.     Christine Skaggs PA-C        "

## 2023-06-27 ENCOUNTER — HOSPITAL ENCOUNTER (OUTPATIENT)
Dept: RADIOLOGY | Facility: HOSPITAL | Age: 58
Discharge: HOME | End: 2023-06-27
Payer: MEDICAID

## 2023-06-27 ENCOUNTER — EDUCATION (OUTPATIENT)
Dept: TRANSPLANT | Facility: CLINIC | Age: 58
End: 2023-06-27
Payer: MEDICAID

## 2023-06-27 ENCOUNTER — OFFICE VISIT (OUTPATIENT)
Dept: CARDIOLOGY | Facility: CLINIC | Age: 58
End: 2023-06-27
Payer: MEDICAID

## 2023-06-27 VITALS
HEIGHT: 65 IN | DIASTOLIC BLOOD PRESSURE: 70 MMHG | WEIGHT: 122.69 LBS | BODY MASS INDEX: 20.44 KG/M2 | SYSTOLIC BLOOD PRESSURE: 130 MMHG | HEART RATE: 72 BPM | OXYGEN SATURATION: 99 %

## 2023-06-27 DIAGNOSIS — R93.89 ABNORMAL COMPUTED TOMOGRAPHY ANGIOGRAPHY (CTA): ICD-10-CM

## 2023-06-27 DIAGNOSIS — I51.81 TAKOTSUBO CARDIOMYOPATHY: Primary | ICD-10-CM

## 2023-06-27 DIAGNOSIS — G35 MS (MULTIPLE SCLEROSIS): ICD-10-CM

## 2023-06-27 DIAGNOSIS — R05.2 SUBACUTE COUGH: ICD-10-CM

## 2023-06-27 PROCEDURE — 3008F PR BODY MASS INDEX (BMI) DOCUMENTED: ICD-10-PCS | Mod: CPTII,,,

## 2023-06-27 PROCEDURE — 71250 CT CHEST HIGH RESOLUTION WITHOUT CONTRAST: ICD-10-PCS | Mod: 26,,, | Performed by: STUDENT IN AN ORGANIZED HEALTH CARE EDUCATION/TRAINING PROGRAM

## 2023-06-27 PROCEDURE — 1111F DSCHRG MED/CURRENT MED MERGE: CPT | Mod: CPTII,,,

## 2023-06-27 PROCEDURE — 1111F PR DISCHARGE MEDS RECONCILED W/ CURRENT OUTPATIENT MED LIST: ICD-10-PCS | Mod: CPTII,,,

## 2023-06-27 PROCEDURE — 99999 PR PBB SHADOW E&M-EST. PATIENT-LVL IV: CPT | Mod: PBBFAC,,,

## 2023-06-27 PROCEDURE — 99214 OFFICE O/P EST MOD 30 MIN: CPT | Mod: S$PBB,,,

## 2023-06-27 PROCEDURE — 99214 OFFICE O/P EST MOD 30 MIN: CPT | Mod: PBBFAC

## 2023-06-27 PROCEDURE — 1159F MED LIST DOCD IN RCRD: CPT | Mod: CPTII,,,

## 2023-06-27 PROCEDURE — 3078F PR MOST RECENT DIASTOLIC BLOOD PRESSURE < 80 MM HG: ICD-10-PCS | Mod: CPTII,,,

## 2023-06-27 PROCEDURE — 1159F PR MEDICATION LIST DOCUMENTED IN MEDICAL RECORD: ICD-10-PCS | Mod: CPTII,,,

## 2023-06-27 PROCEDURE — 99999 PR PBB SHADOW E&M-EST. PATIENT-LVL IV: ICD-10-PCS | Mod: PBBFAC,,,

## 2023-06-27 PROCEDURE — 3075F SYST BP GE 130 - 139MM HG: CPT | Mod: CPTII,,,

## 2023-06-27 PROCEDURE — 99214 PR OFFICE/OUTPT VISIT, EST, LEVL IV, 30-39 MIN: ICD-10-PCS | Mod: S$PBB,,,

## 2023-06-27 PROCEDURE — 3008F BODY MASS INDEX DOCD: CPT | Mod: CPTII,,,

## 2023-06-27 PROCEDURE — 4010F ACE/ARB THERAPY RXD/TAKEN: CPT | Mod: CPTII,,,

## 2023-06-27 PROCEDURE — 71250 CT THORAX DX C-: CPT | Mod: TC

## 2023-06-27 PROCEDURE — 4010F PR ACE/ARB THEARPY RXD/TAKEN: ICD-10-PCS | Mod: CPTII,,,

## 2023-06-27 PROCEDURE — 71250 CT THORAX DX C-: CPT | Mod: 26,,, | Performed by: STUDENT IN AN ORGANIZED HEALTH CARE EDUCATION/TRAINING PROGRAM

## 2023-06-27 PROCEDURE — 3078F DIAST BP <80 MM HG: CPT | Mod: CPTII,,,

## 2023-06-27 PROCEDURE — 3075F PR MOST RECENT SYSTOLIC BLOOD PRESS GE 130-139MM HG: ICD-10-PCS | Mod: CPTII,,,

## 2023-06-27 RX ORDER — FUROSEMIDE 40 MG/1
40 TABLET ORAL DAILY PRN
Qty: 30 TABLET | Refills: 11 | Status: SHIPPED | OUTPATIENT
Start: 2023-06-27 | End: 2024-06-26

## 2023-06-27 NOTE — PATIENT INSTRUCTIONS
Take lasix only as needed for worsening shortness of breath, swelling or 3lb weight gain in 1 day/5lb weight gain in 1 week.    Notify us if you take lasix 3 days in a row to coordinate repeat labs.

## 2023-06-27 NOTE — PROGRESS NOTES
Met with pt to discuss 2g Na diet and 1500 ml fluid restriction      Pt states good prince, no n/v/d/c, no prob chew/swallow      Pt stated diet is 2 meals per day that include breakfast of raisin toast, cereal, fruit, or yue francisco's BEC croissant. Dinner is a frozen dinner including pot pie or pizza. Pt drinks 3L of water per day and 1 coke zero.          Pt was educated on how to read nutrition labels to understand food has natural sodium present. Recc'd limiting frozen meals to 600 - 700mg Na per meal. Pt recc to drink no more than 2500ml per day due to her yard work habits. Pt given nutrition handout and contact info if needs arise. Will follow up in [facility] as needed

## 2023-06-28 ENCOUNTER — DOCUMENT SCAN (OUTPATIENT)
Dept: HOME HEALTH SERVICES | Facility: HOSPITAL | Age: 58
End: 2023-06-28
Payer: MEDICAID

## 2023-06-29 ENCOUNTER — TELEPHONE (OUTPATIENT)
Dept: CARDIOLOGY | Facility: CLINIC | Age: 58
End: 2023-06-29
Payer: MEDICAID

## 2023-06-29 DIAGNOSIS — R91.8 ABNORMAL CT SCAN, LUNG: Primary | ICD-10-CM

## 2023-06-29 NOTE — TELEPHONE ENCOUNTER
Call from Pt who has questions about her BNP being high. I explain that the BNP is still coming down from when she was in the Hospital pt can understand that now. Pt then asks about her Lung scan which says she has Emphysema, Pt reads the scan to me from her My Chart and I explain the Ms. Skaggs will probably place a referral to Pulmonary because that is not her field of practice. I tell Pt I will check to see if a referral is being put in and get back with her.    12:12 Returned call to Pt to inform her of my discussion re her Lung scan with Elias Skaggs. Ms Skaggs was going to call Pt between her Clinic visits to explain the good news about her Lung Scan and that is that there is no inflammation. I remind Pt that she had a Pneumonia while in the Hospital. Pt is happy about that and she vents some of her feelings re the Emphysema diagnosis and again I remind her not to worry until she speaks to the Lung doctors. Pt asks if she can be referred to Dr. Shayan Olivera who took care of her Mother's Emphysema but then remembers he is with [facility]. Pt states she feels better now and at least there is no inflammation.

## 2023-06-30 RX ORDER — ROSUVASTATIN CALCIUM 20 MG/1
20 TABLET, COATED ORAL DAILY
Qty: 30 TABLET | Refills: 11 | Status: SHIPPED | OUTPATIENT
Start: 2023-06-30 | End: 2024-06-29

## 2023-07-05 ENCOUNTER — TELEPHONE (OUTPATIENT)
Dept: CARDIOLOGY | Facility: CLINIC | Age: 58
End: 2023-07-05
Payer: MEDICAID

## 2023-07-05 NOTE — TELEPHONE ENCOUNTER
"[facility]    Attempted to call pt to complete 1 week "check in" call. Unable to reach pt at listed phone numbers.  Was able to leave message on voicemail encouraging pt to return call with [facility] phone number..     Will continue to try to reach patient.    "

## 2023-07-06 ENCOUNTER — TELEPHONE (OUTPATIENT)
Dept: CARDIOLOGY | Facility: CLINIC | Age: 58
End: 2023-07-06
Payer: MEDICAID

## 2023-07-06 NOTE — TELEPHONE ENCOUNTER
"[facility]    Attempted to call pt to complete 1 week "check in" call. Unable to reach pt at listed phone numbers.  Was able to leave message on voicemail encouraging pt to return call with [facility] phone number.  RN has attempted 2 times to contact pt.     Will try to reach patient  one more time before clearing her call.     "

## 2023-07-07 ENCOUNTER — TELEPHONE (OUTPATIENT)
Dept: CARDIOLOGY | Facility: CLINIC | Age: 58
End: 2023-07-07
Payer: MEDICAID

## 2023-07-07 NOTE — TELEPHONE ENCOUNTER
"[facility]    Attempted to call pt to complete 1 week "check in" call. Unable to reach pt at listed phone numbers.  Was able to leave message on voicemail encouraging pt to return call with [facility] phone number.  RN attempted to contact pt 3x wit no return call.   Rn will no longer attempt to contact pt.        "

## 2023-07-11 ENCOUNTER — TELEPHONE (OUTPATIENT)
Dept: CARDIOLOGY | Facility: CLINIC | Age: 58
End: 2023-07-11
Payer: MEDICAID

## 2023-07-11 NOTE — TELEPHONE ENCOUNTER
"Pt calls to complain that she was ready to walk out of the door for her appointment with Dr. Elmer Boston when she got a call from his office cancelling her appointment because "she is established with [facility] so she does not need to see him". I explain that we will be D/C her soon and that is why she needs to see him. She understands that and says now it will take her to December to get another appointment. I explain that I will inform Ms. Skaggs. Pt is understandably upset. Pt requests that I let her know what she is supposed to do. I assure her that I will call her back with an answer.  "

## 2023-07-11 NOTE — TELEPHONE ENCOUNTER
"[facility]    Attempted to call pt to complete 1 week "check in" call. Unable to reach pt at listed phone numbers.  Was able to leave message on voicemail [facility]     Will continue to try to reach patient.    Will call alternate number for sister.  "

## 2023-07-11 NOTE — TELEPHONE ENCOUNTER
"[facility]    Attempted to call pt to complete 1 week "check in" call. Unable to reach pt at listed phone numbers.  Was able to leave message with family encouraging pt to return call with [facility] phone number and reminded of upcoming appt .     Will continue to try to reach patient.    Spoke to Pt's Sister and asked her to have Pt call me if she speaks to her. Explained that we are concerned as she usually returns our calls.    "

## 2023-07-11 NOTE — TELEPHONE ENCOUNTER
"[facility] weekly phone follow up / triage call completed.     TCC RN Navigator spoke with  PT    Current Patient reported weight:  122-123 lbs   Patient Goal Weight: (122 lbs)  Recent Patient reported blood pressure and heart rate: PT says her BP has been within her normal range and she checks it each morning, she is in garden working before it gets too hot.    Pt reports the following:  []  Shortness of Breath with Activity  []  Shortness of Breath at rest   []  Fatigue  []  Edema   [] Chest pain or tightness  [] Weight Increase since discharge  [x] None of the above    Pt reports using "Daily weight and symptom tracker".    Pt reports being in the Green(color) Zone. If in yellow/red, reminded that they should be calling [facility] today or now.     Medications:   Medication compliance reviewed with pt.  Pt reports having medication list available and has all medications at home for use per list.     Education:   Confirmed pt still has "[facility] Home Care Guide"  .     Reminded of key points as listed below.     Recommend 2 -3 gram sodium restriction and 1500 cc-2000 cc fluid restriction.  Encourage physical activity with graded exercise program.  Requested patient to weigh themselves daily, and to notify us if their weight increases by more than 3 lbs in 1 day or 5 lbs in 3 days.   Reminded to use "Daily weight and symptom tracker".  Even if pt does not have a scale, to use symptom tracker.       Watch for these Signs and Symptoms: If any of these occur, contact [facility] immediately:   Increase in shortness of breath with movement   Increase in swelling in your legs and ankles   Weight gain of more than 3 pounds in a day or 5 pounds in 3 days.   Difficulty breathing when you are lying down   Worsening fatigue or tiredness   Stomach bloating, a full feeling or a loss of appetite   Increased coughing--especially when you are lying down      Pt was able to " verbalize back to RN in their own words correct diet/fluid restrictions, necessity for exercise, warning signs and symptoms, when and how to contact their [facility] team.      Pt reminded of upcoming appointment.  PT reports they will attend. PT has weekly check in calls with F2JAMIL CHOWN. She sees Dr. Kamara at 1:40 today and will call me to let me know how everything goes. Pt asks if we are still following her and I say we are not quite ready to D/C her.      Pt reminded of how and when to contact [facility]:  [phone] (Mon-Fri, 8a-5p) & for urgent issues on the weekend to page the Heart Transplant MD on call.  Pt also encouraged utilize [facility] messaging as well.      Pt  verbalized understanding and in agreement of plan.       Will follow up with pt at next clinic visit and RN navigator available for pt questions, issues or concerns.      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

## 2023-07-18 ENCOUNTER — TELEPHONE (OUTPATIENT)
Dept: CARDIOLOGY | Facility: CLINIC | Age: 58
End: 2023-07-18
Payer: MEDICAID

## 2023-07-19 ENCOUNTER — TELEPHONE (OUTPATIENT)
Dept: CARDIOLOGY | Facility: CLINIC | Age: 58
End: 2023-07-19
Payer: MEDICAID

## 2023-07-21 ENCOUNTER — PATIENT MESSAGE (OUTPATIENT)
Dept: PSYCHIATRY | Facility: CLINIC | Age: 58
End: 2023-07-21
Payer: MEDICAID

## 2023-07-25 ENCOUNTER — TELEPHONE (OUTPATIENT)
Dept: CARDIOLOGY | Facility: CLINIC | Age: 58
End: 2023-07-25
Payer: MEDICAID

## 2023-07-25 NOTE — TELEPHONE ENCOUNTER
Pt returned [facility] RN phone call. RN performed sx check, informed pt she was dis enrolled from [facility]. RN reviewed pt appt with Dr Lara on Oct 31, 2203. RN instructed pt to contact [facility] if she has sxs of HF. Pt stated she will report to nearest ED if she has sxs similar to HF or MI.

## 2023-08-02 NOTE — ADDENDUM NOTE
Addended by: JONATHAN ROLLINS on: 8/2/2023 03:02 PM     Modules accepted: Orders, Level of Service

## 2023-08-04 ENCOUNTER — OFFICE VISIT (OUTPATIENT)
Dept: PULMONOLOGY | Facility: CLINIC | Age: 58
End: 2023-08-04
Payer: MEDICAID

## 2023-08-04 VITALS
HEART RATE: 88 BPM | OXYGEN SATURATION: 97 % | BODY MASS INDEX: 20.42 KG/M2 | WEIGHT: 122.56 LBS | DIASTOLIC BLOOD PRESSURE: 62 MMHG | HEIGHT: 65 IN | SYSTOLIC BLOOD PRESSURE: 109 MMHG

## 2023-08-04 DIAGNOSIS — R91.8 ABNORMAL CT SCAN, LUNG: Primary | ICD-10-CM

## 2023-08-04 DIAGNOSIS — R06.2 WHEEZE: ICD-10-CM

## 2023-08-04 PROCEDURE — 3078F PR MOST RECENT DIASTOLIC BLOOD PRESSURE < 80 MM HG: ICD-10-PCS | Mod: CPTII,,, | Performed by: INTERNAL MEDICINE

## 2023-08-04 PROCEDURE — 4010F ACE/ARB THERAPY RXD/TAKEN: CPT | Mod: CPTII,,, | Performed by: INTERNAL MEDICINE

## 2023-08-04 PROCEDURE — 1159F PR MEDICATION LIST DOCUMENTED IN MEDICAL RECORD: ICD-10-PCS | Mod: CPTII,,, | Performed by: INTERNAL MEDICINE

## 2023-08-04 PROCEDURE — 3078F DIAST BP <80 MM HG: CPT | Mod: CPTII,,, | Performed by: INTERNAL MEDICINE

## 2023-08-04 PROCEDURE — 99999 PR PBB SHADOW E&M-EST. PATIENT-LVL IV: CPT | Mod: PBBFAC,,, | Performed by: STUDENT IN AN ORGANIZED HEALTH CARE EDUCATION/TRAINING PROGRAM

## 2023-08-04 PROCEDURE — 99214 OFFICE O/P EST MOD 30 MIN: CPT | Mod: PBBFAC | Performed by: STUDENT IN AN ORGANIZED HEALTH CARE EDUCATION/TRAINING PROGRAM

## 2023-08-04 PROCEDURE — 4010F PR ACE/ARB THEARPY RXD/TAKEN: ICD-10-PCS | Mod: CPTII,,, | Performed by: INTERNAL MEDICINE

## 2023-08-04 PROCEDURE — 3008F PR BODY MASS INDEX (BMI) DOCUMENTED: ICD-10-PCS | Mod: CPTII,,, | Performed by: INTERNAL MEDICINE

## 2023-08-04 PROCEDURE — 3074F PR MOST RECENT SYSTOLIC BLOOD PRESSURE < 130 MM HG: ICD-10-PCS | Mod: CPTII,,, | Performed by: INTERNAL MEDICINE

## 2023-08-04 PROCEDURE — 3008F BODY MASS INDEX DOCD: CPT | Mod: CPTII,,, | Performed by: INTERNAL MEDICINE

## 2023-08-04 PROCEDURE — 99999 PR PBB SHADOW E&M-EST. PATIENT-LVL IV: ICD-10-PCS | Mod: PBBFAC,,, | Performed by: STUDENT IN AN ORGANIZED HEALTH CARE EDUCATION/TRAINING PROGRAM

## 2023-08-04 PROCEDURE — 3074F SYST BP LT 130 MM HG: CPT | Mod: CPTII,,, | Performed by: INTERNAL MEDICINE

## 2023-08-04 PROCEDURE — 99214 OFFICE O/P EST MOD 30 MIN: CPT | Mod: S$PBB,,, | Performed by: INTERNAL MEDICINE

## 2023-08-04 PROCEDURE — 1159F MED LIST DOCD IN RCRD: CPT | Mod: CPTII,,, | Performed by: INTERNAL MEDICINE

## 2023-08-04 PROCEDURE — 99214 PR OFFICE/OUTPT VISIT, EST, LEVL IV, 30-39 MIN: ICD-10-PCS | Mod: S$PBB,,, | Performed by: INTERNAL MEDICINE

## 2023-08-04 RX ORDER — METRONIDAZOLE 10 MG/G
1 GEL TOPICAL
COMMUNITY

## 2023-08-04 RX ORDER — TRIAMCINOLONE ACETONIDE 1 MG/G
0.1 CREAM TOPICAL
COMMUNITY

## 2023-08-04 RX ORDER — WATER 1 ML/ML
IRRIGANT IRRIGATION
COMMUNITY
Start: 2023-06-30

## 2023-08-04 RX ORDER — TAPENTADOL HYDROCHLORIDE 100 MG/1
1 TABLET, FILM COATED, EXTENDED RELEASE ORAL
COMMUNITY
Start: 2023-07-03

## 2023-08-04 RX ORDER — HYDROXYZINE PAMOATE 25 MG/1
50 CAPSULE ORAL NIGHTLY
COMMUNITY
Start: 2023-06-04

## 2023-08-04 NOTE — PROGRESS NOTES
[facility]    SUBJECTIVE:     Chief Complaint: Emphysema    History of Present Illness:  Reza Morrow is a 58 y.o. female who presents for initial evaluation of abnormal chest imaging. The patient has a history of MS and had a recent hospitalization for LANEY and presumed CAP. Her history is also notable for a hospitalization 1 month prior to that with cardiogenic shock 2/2 Taketsubo.E F recovered following that hospitalization. During the most recent hospitalization patient was treated with a course of antibiotics and discharged in good condition. CT imaging at that time showed concerns for potential emphysema vs. Possible cystic lung disease however she had significant ground glass attenuation in setting of acute illness. Follow up CT was performed as an outpatient with resolution of GGOs and persistence of mild centrilobular emphysema.    The patient denies any shortness of breath, wheezing, or cough. She feels she is able to complete all activities and is not limited by her breathing. She is very concerned because her mother had emphysema when she . The patient smoked 30 pack years and quit smoking 13 years ago. She does vape medical marijuana due to MS, and denies vaping outside of that. She has no other complaints today.    Smoking: Former 30 pack year history, quit 13 years ago.  Other substances: Vapes medical marijuana  Occupational exposures: None  Family history: Mother with COPD and emphysema      Review of patient's allergies indicates:   Allergen Reactions    Adhesive Hives    Neosporin [neomycin-bacitracin-polymyxin] Hives    Neomycin     Neomycin-polymyxin Hives    Neosporin g.u. irrigant     Penicillins Other (See Comments)     Unknown  reaction    Latex Rash       Past Medical History:   Diagnosis Date    Behavioral problem     Bulging lumbar disc     Bursitis     bilateral hip    Degenerative cervical disc     Hx of psychiatric care     Hypercholesteremia     Labral tear of  "hip, degenerative bilateral    MS (multiple sclerosis)     Paresthesia of both lower extremities 3/13/2022    Pneumonia     Spinal cord compression      Past Surgical History:   Procedure Laterality Date    ANGIOGRAM, CORONARY, WITH LEFT HEART CATHETERIZATION Left 3/11/2022    Procedure: Angiogram, Coronary, with Left Heart Cath;  Surgeon: Elie Matamoros MD;  Location: [facility] CATH LAB;  Service: Cardiology;  Laterality: Left;    BREAST SURGERY      augmentation     SECTION, CLASSIC      HAND SURGERY      HYSTEROSCOPY      NECK SURGERY      cervical disc removeal    TUBAL LIGATION      X-STOP IMPLANTATION       Family History   Problem Relation Age of Onset    Cancer Father     Diabetes Father     Lung cancer Father     Diabetes Sister     COPD Mother     Drug abuse Mother     Bipolar disorder Brother     Heart disease Maternal Uncle     Hypothyroidism Maternal Grandmother      Social History     Socioeconomic History    Marital status: Single   Tobacco Use    Smoking status: Former     Current packs/day: 0.00     Types: Cigarettes     Quit date: 2013     Years since quitting: 10.0    Smokeless tobacco: Never   Substance and Sexual Activity    Alcohol use: No    Drug use: Yes     Types: Benzodiazepines, Marijuana    Sexual activity: Never       Review of Systems:  ROS  CV: no syncope  ENT: no sore throat  Resp: per hpi  Eyes: no eye pain  Gastrointestinal: no nausea or vomiting  Integument/Breast: no rash  Musculoskeletal: no arthralgias  Neurological: no headaches  Behavioral/Psych: no confusion or depression  Heme: no bleeding      OBJECTIVE:     Vital Signs  Vitals:    23 1621   BP: 109/62   BP Location: Left arm   Patient Position: Sitting   BP Method: Medium (Manual)   Pulse: 88   SpO2: 97%   Weight: 55.6 kg (122 lb 9.2 oz)   Height: 5' 5" (1.651 m)     Body mass index is 20.4 kg/m².    Physical Exam:  Physical Exam  General: no distress  Eyes:  conjunctivae/corneas clear  Nose: no " discharge  Neck: trachea midline with no masses appreciated  Lungs:  normal respiratory effort, no wheezes, no rales  Heart: regular rate and rhythm and no murmur  Abdomen: non-distended  Extremities: no cyanosis, no edema, no clubbing  Skin: No rashes or lesions. good skin turgor  Neurologic: alert, oriented, thought content appropriate    Laboratory:  CBC  Lab Results   Component Value Date    WBC 3.56 (L) 06/20/2023    HGB 10.4 (L) 06/20/2023    HCT 31.9 (L) 06/20/2023     06/20/2023    MCV 93 06/20/2023    RDW 12.6 06/20/2023     BMP  Lab Results   Component Value Date     06/27/2023    K 3.7 06/27/2023     06/27/2023    CO2 25 06/27/2023    BUN 13 06/27/2023    CREATININE 0.9 06/27/2023    GLU 94 06/27/2023    CALCIUM 9.7 06/27/2023    MG 1.6 06/27/2023    PHOS 3.4 06/27/2023     LFTs  Lab Results   Component Value Date    PROT 7.7 06/27/2023    ALBUMIN 3.7 06/27/2023    BILITOT 0.8 06/27/2023    AST 22 06/27/2023    ALKPHOS 60 06/27/2023    ALT 12 06/27/2023       Chest Imaging, My Impression:   CTA 5/2023: Diffuse bilateral ground glass, consistent with pulmonary edema. Bilateral cystic vs. Emphysematous lung disease. Bilateral pleural effusions. Unclear if thickened interlobular septae vs. Walls of cysts, however can see dilated blood vessels within some lucencies suggestive of emphysema.    CT 6/2023: Resolution of bilateral GGOs, prior identified lucencies appear more consistent with emphysematous changes, with blood vessels noted in many of them.      Diagnostic Results:  CT: Reviewed  X-Ray: Reviewed  Echo: Reviewed    ASSESSMENT/PLAN:     Problem Noted   Abnormal CT Scan, Lung 8/4/2023    Patient with CT imaging concerning initially for Emphysema vs. Possible cystic lung disease. At that time patient was admitted for cardiogenic shock and on mechanical ventilation. Subsequent CT afterwards showed resolution of significant GGO which helped picture look more consistent with  centrilobular emphysema rather than cysts. Able to identify vessels inside defects pointing towards emphysema. Patient is asymptomatic but does have significant past smoking history.     - will check PFTs / 6MWT to establish baseline  - No indication for further imaging at this time  - Counseled on transitioning to edible for prescription marijuana use  - counseled on increasing exercise     Wheeze 6/10/2023     Problem List Items Addressed This Visit          Pulmonary    Wheeze    Current Assessment & Plan     Patient endorsed some wheeze following hospitalization for presumed CAP. That has improved, she denies any shortness of breath and sounds clear on exam.         Relevant Orders    Complete PFT with bronchodilator    Six Minute Walk Test to qualify for Home Oxygen    Abnormal CT scan, lung - Primary    Overview     Patient with CT imaging concerning initially for Emphysema vs. Possible cystic lung disease. At that time patient was admitted for cardiogenic shock and on mechanical ventilation. Subsequent CT afterwards showed resolution of significant GGO which helped picture look more consistent with centrilobular emphysema rather than cysts. Able to identify vessels inside defects pointing towards emphysema. Patient is asymptomatic but does have significant past smoking history.     - will check PFTs / 6MWT to establish baseline  - No indication for further imaging at this time  - Counseled on transitioning to edible for prescription marijuana use  - counseled on increasing exercise         Relevant Orders    Complete PFT with bronchodilator    Six Minute Walk Test to qualify for Home Oxygen     Follow up in 1 year, or sooner if patient has any new/worsening symptoms.    Daniel Baumann M.D.  \[facility] [place] [facility]\"" Pulmonary & Critical Care Fellow

## 2023-08-05 NOTE — ASSESSMENT & PLAN NOTE
Patient endorsed some wheeze following hospitalization for presumed CAP. That has improved, she denies any shortness of breath and sounds clear on exam.

## 2023-08-24 NOTE — PROGRESS NOTES
I have reviewed the notes, assessments, and/or procedures performed by Dr. Baumann, I concur with her/his documentation of Reza Morrow.

## 2023-09-11 PROBLEM — N17.9 AKI (ACUTE KIDNEY INJURY): Status: RESOLVED | Noted: 2023-06-10 | Resolved: 2023-09-11

## 2023-09-11 PROBLEM — J18.9 CAP (COMMUNITY ACQUIRED PNEUMONIA): Status: RESOLVED | Noted: 2023-06-11 | Resolved: 2023-09-11

## 2023-10-19 RX ORDER — BENZONATATE 200 MG/1
200 CAPSULE ORAL
Qty: 30 CAPSULE | Refills: 0 | Status: SHIPPED | OUTPATIENT
Start: 2023-10-19 | End: 2023-10-27

## 2023-10-27 ENCOUNTER — TELEPHONE (OUTPATIENT)
Dept: CARDIOLOGY | Facility: CLINIC | Age: 58
End: 2023-10-27
Payer: MEDICAID

## 2023-10-27 RX ORDER — BENZONATATE 100 MG/1
200 CAPSULE ORAL 2 TIMES DAILY PRN
Qty: 20 CAPSULE | Refills: 0 | Status: SHIPPED | OUTPATIENT
Start: 2023-10-27 | End: 2023-11-01

## 2023-10-27 NOTE — TELEPHONE ENCOUNTER
Pt contacted [facility] to request the Rx for Tessalon pearls be changed from 200 mg daily to 100 mg 2 pills once daiy. RN sent request to [facility] for review and refill at her discretion. RN waiting for [facility] reply.    @5015: Pt notified [facility] will give limited Rx. She will need to f/u with her PCP for additional care and evaluation of needs for Tessalon pearls.     on unit

## 2024-01-22 ENCOUNTER — PATIENT MESSAGE (OUTPATIENT)
Dept: PSYCHIATRY | Facility: CLINIC | Age: 59
End: 2024-01-22
Payer: COMMERCIAL

## 2024-03-19 ENCOUNTER — TELEPHONE (OUTPATIENT)
Dept: CARDIOLOGY | Facility: CLINIC | Age: 59
End: 2024-03-19
Payer: COMMERCIAL

## 2024-03-19 NOTE — TELEPHONE ENCOUNTER
Call pt and stated to pt I will call her next week if I have any cancellations pt verbalized she understand.

## 2024-03-19 NOTE — TELEPHONE ENCOUNTER
----- Message from Sayra Horan sent at 3/19/2024  9:27 AM CDT -----  Type:  Sooner Apoointment Request    Caller is requesting a sooner appointment.  Caller declined first available appointment listed below.  Caller will not accept being placed on the waitlist and is requesting a message be sent to doctor.  Name of Caller:pt  When is the first available appointment?05/22  Symptoms:needs to get in sooner had to reschedule due to no transportation today   Would the patient rather a call back or a response via [facility]? call  Best Call Back Number:[phone]  Additional Information:

## 2024-04-18 ENCOUNTER — HOSPITAL ENCOUNTER (INPATIENT)
Facility: HOSPITAL | Age: 59
LOS: 6 days | Discharge: HOME | DRG: 917 | End: 2024-04-24
Attending: EMERGENCY MEDICINE | Admitting: INTERNAL MEDICINE
Payer: MEDICAID

## 2024-04-18 DIAGNOSIS — E83.42 HYPOMAGNESEMIA: ICD-10-CM

## 2024-04-18 DIAGNOSIS — T40.2X1A OPIOID OVERDOSE, ACCIDENTAL OR UNINTENTIONAL, INITIAL ENCOUNTER: ICD-10-CM

## 2024-04-18 DIAGNOSIS — E87.6 HYPOKALEMIA: ICD-10-CM

## 2024-04-18 DIAGNOSIS — R10.13 EPIGASTRIC ABDOMINAL PAIN: ICD-10-CM

## 2024-04-18 DIAGNOSIS — R94.31 QT PROLONGATION: ICD-10-CM

## 2024-04-18 DIAGNOSIS — G35 MS (MULTIPLE SCLEROSIS): Chronic | ICD-10-CM

## 2024-04-18 DIAGNOSIS — I21.4 NSTEMI (NON-ST ELEVATED MYOCARDIAL INFARCTION): Primary | ICD-10-CM

## 2024-04-18 DIAGNOSIS — G93.40 ACUTE ENCEPHALOPATHY: ICD-10-CM

## 2024-04-18 DIAGNOSIS — G93.41 ACUTE METABOLIC ENCEPHALOPATHY: ICD-10-CM

## 2024-04-18 DIAGNOSIS — I50.20 HFREF (HEART FAILURE WITH REDUCED EJECTION FRACTION): ICD-10-CM

## 2024-04-18 DIAGNOSIS — R00.0 TACHYCARDIA: ICD-10-CM

## 2024-04-18 DIAGNOSIS — T50.901A ACCIDENTAL OVERDOSE, INITIAL ENCOUNTER: ICD-10-CM

## 2024-04-18 LAB
BASOPHILS # BLD AUTO: 0.03 K/UL (ref 0–0.2)
BASOPHILS NFR BLD: 0.3 % (ref 0–1.9)
DIFFERENTIAL METHOD BLD: ABNORMAL
EOSINOPHIL # BLD AUTO: 0 K/UL (ref 0–0.5)
EOSINOPHIL NFR BLD: 0 % (ref 0–8)
ERYTHROCYTE [DISTWIDTH] IN BLOOD BY AUTOMATED COUNT: 12.8 % (ref 11.5–14.5)
HCT VFR BLD AUTO: 43.7 % (ref 37–48.5)
HGB BLD-MCNC: 15.1 G/DL (ref 12–16)
IMM GRANULOCYTES # BLD AUTO: 0.04 K/UL (ref 0–0.04)
IMM GRANULOCYTES NFR BLD AUTO: 0.3 % (ref 0–0.5)
LYMPHOCYTES # BLD AUTO: 0.6 K/UL (ref 1–4.8)
LYMPHOCYTES NFR BLD: 5.1 % (ref 18–48)
MCH RBC QN AUTO: 30 PG (ref 27–31)
MCHC RBC AUTO-ENTMCNC: 34.6 G/DL (ref 32–36)
MCV RBC AUTO: 87 FL (ref 82–98)
MONOCYTES # BLD AUTO: 0.5 K/UL (ref 0.3–1)
MONOCYTES NFR BLD: 4.5 % (ref 4–15)
NEUTROPHILS # BLD AUTO: 10.5 K/UL (ref 1.8–7.7)
NEUTROPHILS NFR BLD: 89.8 % (ref 38–73)
NRBC BLD-RTO: 0 /100 WBC
PLATELET # BLD AUTO: 201 K/UL (ref 150–450)
PMV BLD AUTO: 11.4 FL (ref 9.2–12.9)
RBC # BLD AUTO: 5.04 M/UL (ref 4–5.4)
WBC # BLD AUTO: 11.72 K/UL (ref 3.9–12.7)

## 2024-04-18 PROCEDURE — 12000002 HC ACUTE/MED SURGE SEMI-PRIVATE ROOM

## 2024-04-18 PROCEDURE — 83690 ASSAY OF LIPASE: CPT | Performed by: EMERGENCY MEDICINE

## 2024-04-18 PROCEDURE — 83735 ASSAY OF MAGNESIUM: CPT | Performed by: EMERGENCY MEDICINE

## 2024-04-18 PROCEDURE — 85025 COMPLETE CBC W/AUTO DIFF WBC: CPT | Performed by: EMERGENCY MEDICINE

## 2024-04-18 PROCEDURE — 80053 COMPREHEN METABOLIC PANEL: CPT | Performed by: EMERGENCY MEDICINE

## 2024-04-18 PROCEDURE — 84484 ASSAY OF TROPONIN QUANT: CPT | Performed by: EMERGENCY MEDICINE

## 2024-04-18 RX ORDER — DROPERIDOL 2.5 MG/ML
0.62 INJECTION, SOLUTION INTRAMUSCULAR; INTRAVENOUS ONCE
Status: COMPLETED | OUTPATIENT
Start: 2024-04-19 | End: 2024-04-19

## 2024-04-19 PROBLEM — E87.6 HYPOKALEMIA: Status: ACTIVE | Noted: 2024-04-19

## 2024-04-19 PROBLEM — R00.0 TACHYCARDIA: Status: ACTIVE | Noted: 2024-04-19

## 2024-04-19 PROBLEM — E83.42 HYPOMAGNESEMIA: Status: ACTIVE | Noted: 2024-04-19

## 2024-04-19 PROBLEM — G93.41 ACUTE METABOLIC ENCEPHALOPATHY: Status: ACTIVE | Noted: 2024-04-19

## 2024-04-19 LAB
ALBUMIN SERPL BCP-MCNC: 3.7 G/DL (ref 3.5–5.2)
ALBUMIN SERPL BCP-MCNC: 4.1 G/DL (ref 3.5–5.2)
ALP SERPL-CCNC: 77 U/L (ref 55–135)
ALP SERPL-CCNC: 79 U/L (ref 55–135)
ALT SERPL W/O P-5'-P-CCNC: 12 U/L (ref 10–44)
ALT SERPL W/O P-5'-P-CCNC: 13 U/L (ref 10–44)
AMMONIA PLAS-SCNC: 30 UMOL/L (ref 10–50)
AMPHET+METHAMPHET UR QL: NEGATIVE
ANION GAP SERPL CALC-SCNC: 15 MMOL/L (ref 8–16)
ANION GAP SERPL CALC-SCNC: 18 MMOL/L (ref 8–16)
ANION GAP SERPL CALC-SCNC: 19 MMOL/L (ref 8–16)
ASCENDING AORTA: 2.45 CM
AST SERPL-CCNC: 19 U/L (ref 10–40)
AST SERPL-CCNC: 26 U/L (ref 10–40)
B-OH-BUTYR BLD STRIP-SCNC: 0.4 MMOL/L (ref 0–0.5)
BACTERIA #/AREA URNS HPF: NORMAL /HPF
BARBITURATES UR QL SCN>200 NG/ML: NEGATIVE
BASOPHILS # BLD AUTO: 0.02 K/UL (ref 0–0.2)
BASOPHILS # BLD AUTO: 0.02 K/UL (ref 0–0.2)
BASOPHILS NFR BLD: 0.1 % (ref 0–1.9)
BASOPHILS NFR BLD: 0.1 % (ref 0–1.9)
BENZODIAZ UR QL SCN>200 NG/ML: NEGATIVE
BILIRUB SERPL-MCNC: 1.2 MG/DL (ref 0.1–1)
BILIRUB SERPL-MCNC: 1.5 MG/DL (ref 0.1–1)
BILIRUB UR QL STRIP: NEGATIVE
BNP SERPL-MCNC: 681 PG/ML (ref 0–99)
BSA FOR ECHO PROCEDURE: 1.61 M2
BUN SERPL-MCNC: 11 MG/DL (ref 6–20)
BUN SERPL-MCNC: 11 MG/DL (ref 6–20)
BUN SERPL-MCNC: 12 MG/DL (ref 6–20)
BZE UR QL SCN: NEGATIVE
CALCIUM SERPL-MCNC: 9.5 MG/DL (ref 8.7–10.5)
CALCIUM SERPL-MCNC: 9.6 MG/DL (ref 8.7–10.5)
CALCIUM SERPL-MCNC: 9.7 MG/DL (ref 8.7–10.5)
CANNABINOIDS UR QL SCN: ABNORMAL
CHLORIDE SERPL-SCNC: 103 MMOL/L (ref 95–110)
CHLORIDE SERPL-SCNC: 104 MMOL/L (ref 95–110)
CHLORIDE SERPL-SCNC: 105 MMOL/L (ref 95–110)
CK SERPL-CCNC: 210 U/L (ref 20–180)
CLARITY UR: CLEAR
CO2 SERPL-SCNC: 18 MMOL/L (ref 23–29)
CO2 SERPL-SCNC: 19 MMOL/L (ref 23–29)
CO2 SERPL-SCNC: 19 MMOL/L (ref 23–29)
COLOR UR: YELLOW
CREAT SERPL-MCNC: 0.8 MG/DL (ref 0.5–1.4)
CREAT SERPL-MCNC: 0.9 MG/DL (ref 0.5–1.4)
CREAT SERPL-MCNC: 0.9 MG/DL (ref 0.5–1.4)
CREAT UR-MCNC: 46 MG/DL (ref 15–325)
CV ECHO LV RWT: 0.37 CM
DIFFERENTIAL METHOD BLD: ABNORMAL
DIFFERENTIAL METHOD BLD: ABNORMAL
DOP CALC LVOT AREA: 2.2 CM2
DOP CALC LVOT DIAMETER: 1.66 CM
ECHO LV POSTERIOR WALL: 0.96 CM (ref 0.6–1.1)
EOSINOPHIL # BLD AUTO: 0 K/UL (ref 0–0.5)
EOSINOPHIL # BLD AUTO: 0.1 K/UL (ref 0–0.5)
EOSINOPHIL NFR BLD: 0.2 % (ref 0–8)
EOSINOPHIL NFR BLD: 0.3 % (ref 0–8)
ERYTHROCYTE [DISTWIDTH] IN BLOOD BY AUTOMATED COUNT: 13.2 % (ref 11.5–14.5)
ERYTHROCYTE [DISTWIDTH] IN BLOOD BY AUTOMATED COUNT: 13.2 % (ref 11.5–14.5)
EST. GFR  (NO RACE VARIABLE): >60 ML/MIN/1.73 M^2
ETHANOL SERPL-MCNC: <10 MG/DL
FIO2: 21 %
FRACTIONAL SHORTENING: 14 % (ref 28–44)
GLUCOSE SERPL-MCNC: 164 MG/DL (ref 70–110)
GLUCOSE SERPL-MCNC: 182 MG/DL (ref 70–110)
GLUCOSE SERPL-MCNC: 206 MG/DL (ref 70–110)
GLUCOSE UR QL STRIP: ABNORMAL
HCT VFR BLD AUTO: 48.6 % (ref 37–48.5)
HCT VFR BLD AUTO: 49.3 % (ref 37–48.5)
HGB BLD-MCNC: 16.3 G/DL (ref 12–16)
HGB BLD-MCNC: 16.8 G/DL (ref 12–16)
HGB UR QL STRIP: ABNORMAL
HYALINE CASTS #/AREA URNS LPF: 1 /LPF
IMM GRANULOCYTES # BLD AUTO: 0.06 K/UL (ref 0–0.04)
IMM GRANULOCYTES # BLD AUTO: 0.07 K/UL (ref 0–0.04)
IMM GRANULOCYTES NFR BLD AUTO: 0.4 % (ref 0–0.5)
IMM GRANULOCYTES NFR BLD AUTO: 0.5 % (ref 0–0.5)
INTERVENTRICULAR SEPTUM: 0.61 CM (ref 0.6–1.1)
KETONES UR QL STRIP: ABNORMAL
LACTATE SERPL-SCNC: 2.4 MMOL/L (ref 0.5–2.2)
LACTATE SERPL-SCNC: 3 MMOL/L (ref 0.5–2.2)
LACTATE SERPL-SCNC: 4.9 MMOL/L (ref 0.5–2.2)
LEFT ATRIUM SIZE: 3.31 CM
LEFT INTERNAL DIMENSION IN SYSTOLE: 4.52 CM (ref 2.1–4)
LEFT VENTRICLE DIASTOLIC VOLUME INDEX: 82.02 ML/M2
LEFT VENTRICLE DIASTOLIC VOLUME: 132.05 ML
LEFT VENTRICLE MASS INDEX: 89 G/M2
LEFT VENTRICLE SYSTOLIC VOLUME INDEX: 58.1 ML/M2
LEFT VENTRICLE SYSTOLIC VOLUME: 93.61 ML
LEFT VENTRICULAR INTERNAL DIMENSION IN DIASTOLE: 5.24 CM (ref 3.5–6)
LEFT VENTRICULAR MASS: 143.66 G
LEUKOCYTE ESTERASE UR QL STRIP: NEGATIVE
LIPASE SERPL-CCNC: 28 U/L (ref 4–60)
LYMPHOCYTES # BLD AUTO: 0.8 K/UL (ref 1–4.8)
LYMPHOCYTES # BLD AUTO: 1 K/UL (ref 1–4.8)
LYMPHOCYTES NFR BLD: 5.4 % (ref 18–48)
LYMPHOCYTES NFR BLD: 6.5 % (ref 18–48)
MAGNESIUM SERPL-MCNC: 1.4 MG/DL (ref 1.6–2.6)
MCH RBC QN AUTO: 30 PG (ref 27–31)
MCH RBC QN AUTO: 30.1 PG (ref 27–31)
MCHC RBC AUTO-ENTMCNC: 33.5 G/DL (ref 32–36)
MCHC RBC AUTO-ENTMCNC: 34.1 G/DL (ref 32–36)
MCV RBC AUTO: 88 FL (ref 82–98)
MCV RBC AUTO: 90 FL (ref 82–98)
METHADONE UR QL SCN>300 NG/ML: NEGATIVE
MICROSCOPIC COMMENT: NORMAL
MONOCYTES # BLD AUTO: 0.8 K/UL (ref 0.3–1)
MONOCYTES # BLD AUTO: 0.8 K/UL (ref 0.3–1)
MONOCYTES NFR BLD: 5 % (ref 4–15)
MONOCYTES NFR BLD: 5.4 % (ref 4–15)
NEUTROPHILS # BLD AUTO: 13.4 K/UL (ref 1.8–7.7)
NEUTROPHILS # BLD AUTO: 13.5 K/UL (ref 1.8–7.7)
NEUTROPHILS NFR BLD: 87.3 % (ref 38–73)
NEUTROPHILS NFR BLD: 88.8 % (ref 38–73)
NITRITE UR QL STRIP: NEGATIVE
NRBC BLD-RTO: 0 /100 WBC
NRBC BLD-RTO: 0 /100 WBC
OPIATES UR QL SCN: NEGATIVE
PCO2 BLDA: 49.1 MMHG (ref 35–45)
PCP UR QL SCN>25 NG/ML: NEGATIVE
PH SMN: 7.33 [PH] (ref 7.35–7.45)
PH UR STRIP: 7 [PH] (ref 5–8)
PLATELET # BLD AUTO: 270 K/UL (ref 150–450)
PLATELET # BLD AUTO: 298 K/UL (ref 150–450)
PMV BLD AUTO: 10.4 FL (ref 9.2–12.9)
PMV BLD AUTO: 10.7 FL (ref 9.2–12.9)
PO2 BLDA: 21 MMHG (ref 40–60)
POC BASE DEFICIT: -1.1 MMOL/L (ref -2–2)
POC HCO3: 25.7 MMOL/L (ref 24–28)
POC PERFORMED BY: ABNORMAL
POC SATURATED O2: 34.1 % (ref 95–100)
POTASSIUM SERPL-SCNC: 3.2 MMOL/L (ref 3.5–5.1)
POTASSIUM SERPL-SCNC: 3.5 MMOL/L (ref 3.5–5.1)
POTASSIUM SERPL-SCNC: 3.8 MMOL/L (ref 3.5–5.1)
PROT SERPL-MCNC: 8.4 G/DL (ref 6–8.4)
PROT SERPL-MCNC: 8.6 G/DL (ref 6–8.4)
PROT UR QL STRIP: ABNORMAL
PV MV: 0.76 M/S
PV PEAK GRADIENT: 4 MMHG
PV PEAK VELOCITY: 0.99 M/S
RBC # BLD AUTO: 5.43 M/UL (ref 4–5.4)
RBC # BLD AUTO: 5.59 M/UL (ref 4–5.4)
RBC #/AREA URNS HPF: 1 /HPF (ref 0–4)
SINUS: 2.65 CM
SODIUM SERPL-SCNC: 139 MMOL/L (ref 136–145)
SODIUM SERPL-SCNC: 140 MMOL/L (ref 136–145)
SODIUM SERPL-SCNC: 141 MMOL/L (ref 136–145)
SP GR UR STRIP: 1.01 (ref 1–1.03)
SPECIMEN SOURCE: ABNORMAL
STJ: 2.1 CM
T4 FREE SERPL-MCNC: 1.09 NG/DL (ref 0.71–1.51)
TDI LATERAL: 0.09 M/S
TDI SEPTAL: 0.09 M/S
TDI: 0.09 M/S
TOXICOLOGY INFORMATION: ABNORMAL
TROPONIN I SERPL DL<=0.01 NG/ML-MCNC: 0.1 NG/ML (ref 0–0.03)
TROPONIN I SERPL DL<=0.01 NG/ML-MCNC: 0.47 NG/ML (ref 0–0.03)
TROPONIN I SERPL DL<=0.01 NG/ML-MCNC: 1.24 NG/ML (ref 0–0.03)
TROPONIN I SERPL DL<=0.01 NG/ML-MCNC: 2.64 NG/ML (ref 0–0.03)
TSH SERPL DL<=0.005 MIU/L-ACNC: 0.27 UIU/ML (ref 0.4–4)
URN SPEC COLLECT METH UR: ABNORMAL
UROBILINOGEN UR STRIP-ACNC: NEGATIVE EU/DL
WBC # BLD AUTO: 15.13 K/UL (ref 3.9–12.7)
WBC # BLD AUTO: 15.46 K/UL (ref 3.9–12.7)
WBC #/AREA URNS HPF: 1 /HPF (ref 0–5)
YEAST URNS QL MICRO: NORMAL
Z-SCORE OF LEFT VENTRICULAR DIMENSION IN END DIASTOLE: 1.37
Z-SCORE OF LEFT VENTRICULAR DIMENSION IN END SYSTOLE: 3.69

## 2024-04-19 PROCEDURE — 85025 COMPLETE CBC W/AUTO DIFF WBC: CPT | Performed by: INTERNAL MEDICINE

## 2024-04-19 PROCEDURE — 87040 BLOOD CULTURE FOR BACTERIA: CPT | Performed by: EMERGENCY MEDICINE

## 2024-04-19 PROCEDURE — 63600175 PHARM REV CODE 636 W HCPCS: Performed by: EMERGENCY MEDICINE

## 2024-04-19 PROCEDURE — 99291 CRITICAL CARE FIRST HOUR: CPT

## 2024-04-19 PROCEDURE — 63600175 PHARM REV CODE 636 W HCPCS: Performed by: STUDENT IN AN ORGANIZED HEALTH CARE EDUCATION/TRAINING PROGRAM

## 2024-04-19 PROCEDURE — 94761 N-INVAS EAR/PLS OXIMETRY MLT: CPT | Mod: XB

## 2024-04-19 PROCEDURE — 93010 ELECTROCARDIOGRAM REPORT: CPT | Mod: ,,, | Performed by: INTERNAL MEDICINE

## 2024-04-19 PROCEDURE — 85025 COMPLETE CBC W/AUTO DIFF WBC: CPT | Mod: 91 | Performed by: INTERNAL MEDICINE

## 2024-04-19 PROCEDURE — 25000003 PHARM REV CODE 250

## 2024-04-19 PROCEDURE — 82550 ASSAY OF CK (CPK): CPT

## 2024-04-19 PROCEDURE — 80354 DRUG SCREENING FENTANYL: CPT

## 2024-04-19 PROCEDURE — 93005 ELECTROCARDIOGRAM TRACING: CPT

## 2024-04-19 PROCEDURE — 51798 US URINE CAPACITY MEASURE: CPT

## 2024-04-19 PROCEDURE — 84484 ASSAY OF TROPONIN QUANT: CPT | Performed by: EMERGENCY MEDICINE

## 2024-04-19 PROCEDURE — 36415 COLL VENOUS BLD VENIPUNCTURE: CPT | Mod: XB | Performed by: INTERNAL MEDICINE

## 2024-04-19 PROCEDURE — 25000003 PHARM REV CODE 250: Performed by: INTERNAL MEDICINE

## 2024-04-19 PROCEDURE — 83880 ASSAY OF NATRIURETIC PEPTIDE: CPT | Performed by: REGISTERED NURSE

## 2024-04-19 PROCEDURE — 82077 ASSAY SPEC XCP UR&BREATH IA: CPT | Performed by: EMERGENCY MEDICINE

## 2024-04-19 PROCEDURE — 36415 COLL VENOUS BLD VENIPUNCTURE: CPT | Mod: XB | Performed by: REGISTERED NURSE

## 2024-04-19 PROCEDURE — 82140 ASSAY OF AMMONIA: CPT | Performed by: EMERGENCY MEDICINE

## 2024-04-19 PROCEDURE — 51702 INSERT TEMP BLADDER CATH: CPT

## 2024-04-19 PROCEDURE — 82010 KETONE BODYS QUAN: CPT | Performed by: EMERGENCY MEDICINE

## 2024-04-19 PROCEDURE — 96375 TX/PRO/DX INJ NEW DRUG ADDON: CPT

## 2024-04-19 PROCEDURE — 96374 THER/PROPH/DIAG INJ IV PUSH: CPT

## 2024-04-19 PROCEDURE — 80307 DRUG TEST PRSMV CHEM ANLYZR: CPT | Performed by: EMERGENCY MEDICINE

## 2024-04-19 PROCEDURE — 25000003 PHARM REV CODE 250: Performed by: EMERGENCY MEDICINE

## 2024-04-19 PROCEDURE — 63600175 PHARM REV CODE 636 W HCPCS: Performed by: REGISTERED NURSE

## 2024-04-19 PROCEDURE — 82803 BLOOD GASES ANY COMBINATION: CPT

## 2024-04-19 PROCEDURE — 84443 ASSAY THYROID STIM HORMONE: CPT | Performed by: EMERGENCY MEDICINE

## 2024-04-19 PROCEDURE — 99291 CRITICAL CARE FIRST HOUR: CPT | Mod: ,,, | Performed by: STUDENT IN AN ORGANIZED HEALTH CARE EDUCATION/TRAINING PROGRAM

## 2024-04-19 PROCEDURE — 99900035 HC TECH TIME PER 15 MIN (STAT)

## 2024-04-19 PROCEDURE — 81000 URINALYSIS NONAUTO W/SCOPE: CPT | Mod: 59 | Performed by: EMERGENCY MEDICINE

## 2024-04-19 PROCEDURE — 11000001 HC ACUTE MED/SURG PRIVATE ROOM

## 2024-04-19 PROCEDURE — 83605 ASSAY OF LACTIC ACID: CPT | Mod: 91 | Performed by: INTERNAL MEDICINE

## 2024-04-19 PROCEDURE — 84484 ASSAY OF TROPONIN QUANT: CPT | Mod: 91 | Performed by: INTERNAL MEDICINE

## 2024-04-19 PROCEDURE — 84439 ASSAY OF FREE THYROXINE: CPT | Performed by: EMERGENCY MEDICINE

## 2024-04-19 PROCEDURE — 96376 TX/PRO/DX INJ SAME DRUG ADON: CPT

## 2024-04-19 PROCEDURE — 99222 1ST HOSP IP/OBS MODERATE 55: CPT | Mod: ,,, | Performed by: NURSE PRACTITIONER

## 2024-04-19 PROCEDURE — 80053 COMPREHEN METABOLIC PANEL: CPT | Performed by: INTERNAL MEDICINE

## 2024-04-19 PROCEDURE — 84484 ASSAY OF TROPONIN QUANT: CPT | Mod: 91 | Performed by: REGISTERED NURSE

## 2024-04-19 PROCEDURE — 63600175 PHARM REV CODE 636 W HCPCS: Performed by: INTERNAL MEDICINE

## 2024-04-19 PROCEDURE — 36415 COLL VENOUS BLD VENIPUNCTURE: CPT

## 2024-04-19 PROCEDURE — 80048 BASIC METABOLIC PNL TOTAL CA: CPT | Mod: XB | Performed by: INTERNAL MEDICINE

## 2024-04-19 PROCEDURE — 83605 ASSAY OF LACTIC ACID: CPT | Mod: 91 | Performed by: EMERGENCY MEDICINE

## 2024-04-19 RX ORDER — SODIUM CHLORIDE, SODIUM LACTATE, POTASSIUM CHLORIDE, CALCIUM CHLORIDE 600; 310; 30; 20 MG/100ML; MG/100ML; MG/100ML; MG/100ML
INJECTION, SOLUTION INTRAVENOUS CONTINUOUS
Status: DISCONTINUED | OUTPATIENT
Start: 2024-04-19 | End: 2024-04-19

## 2024-04-19 RX ORDER — ENOXAPARIN SODIUM 100 MG/ML
40 INJECTION SUBCUTANEOUS EVERY 24 HOURS
Status: DISCONTINUED | OUTPATIENT
Start: 2024-04-19 | End: 2024-04-24 | Stop reason: HOSPADM

## 2024-04-19 RX ORDER — DIPHENHYDRAMINE HYDROCHLORIDE 50 MG/ML
12.5 INJECTION INTRAMUSCULAR; INTRAVENOUS
Status: COMPLETED | OUTPATIENT
Start: 2024-04-19 | End: 2024-04-19

## 2024-04-19 RX ORDER — NALOXONE HCL 0.4 MG/ML
0.4 VIAL (ML) INJECTION
Status: DISCONTINUED | OUTPATIENT
Start: 2024-04-19 | End: 2024-04-24 | Stop reason: HOSPADM

## 2024-04-19 RX ORDER — METOPROLOL TARTRATE 1 MG/ML
2.5 INJECTION, SOLUTION INTRAVENOUS ONCE
Qty: 5 ML | Refills: 0 | Status: COMPLETED | OUTPATIENT
Start: 2024-04-19 | End: 2024-04-19

## 2024-04-19 RX ORDER — SODIUM CHLORIDE 9 MG/ML
INJECTION, SOLUTION INTRAVENOUS
Status: COMPLETED | OUTPATIENT
Start: 2024-04-19 | End: 2024-04-19

## 2024-04-19 RX ORDER — THIAMINE HYDROCHLORIDE 100 MG/ML
500 INJECTION, SOLUTION INTRAMUSCULAR; INTRAVENOUS 3 TIMES DAILY
Status: DISCONTINUED | OUTPATIENT
Start: 2024-04-19 | End: 2024-04-19

## 2024-04-19 RX ORDER — DROPERIDOL 2.5 MG/ML
2.5 INJECTION, SOLUTION INTRAMUSCULAR; INTRAVENOUS ONCE
Status: COMPLETED | OUTPATIENT
Start: 2024-04-19 | End: 2024-04-19

## 2024-04-19 RX ORDER — MAGNESIUM SULFATE 1 G/100ML
1 INJECTION INTRAVENOUS ONCE
Qty: 100 ML | Refills: 0 | Status: COMPLETED | OUTPATIENT
Start: 2024-04-19 | End: 2024-04-19

## 2024-04-19 RX ORDER — DEXMEDETOMIDINE HYDROCHLORIDE 4 UG/ML
0-1.4 INJECTION, SOLUTION INTRAVENOUS CONTINUOUS
Status: DISCONTINUED | OUTPATIENT
Start: 2024-04-19 | End: 2024-04-20

## 2024-04-19 RX ADMIN — DIPHENHYDRAMINE HYDROCHLORIDE 12.5 MG: 50 INJECTION, SOLUTION INTRAMUSCULAR; INTRAVENOUS at 12:04

## 2024-04-19 RX ADMIN — DEXMEDETOMIDINE HYDROCHLORIDE 0.2 MCG/KG/HR: 4 INJECTION, SOLUTION INTRAVENOUS at 10:04

## 2024-04-19 RX ADMIN — SODIUM CHLORIDE: 9 INJECTION, SOLUTION INTRAVENOUS at 02:04

## 2024-04-19 RX ADMIN — THIAMINE HYDROCHLORIDE 500 MG: 100 INJECTION, SOLUTION INTRAMUSCULAR; INTRAVENOUS at 05:04

## 2024-04-19 RX ADMIN — NALXONE HYDROCHLORIDE 0.4 MG: 0.4 INJECTION INTRAMUSCULAR; INTRAVENOUS; SUBCUTANEOUS at 10:04

## 2024-04-19 RX ADMIN — THIAMINE HYDROCHLORIDE 500 MG: 100 INJECTION, SOLUTION INTRAMUSCULAR; INTRAVENOUS at 10:04

## 2024-04-19 RX ADMIN — CEFTRIAXONE SODIUM 1 G: 1 INJECTION, POWDER, FOR SOLUTION INTRAMUSCULAR; INTRAVENOUS at 05:04

## 2024-04-19 RX ADMIN — SODIUM CHLORIDE, POTASSIUM CHLORIDE, SODIUM LACTATE AND CALCIUM CHLORIDE: 600; 310; 30; 20 INJECTION, SOLUTION INTRAVENOUS at 06:04

## 2024-04-19 RX ADMIN — FOLIC ACID 1 MG: 5 INJECTION, SOLUTION INTRAMUSCULAR; INTRAVENOUS; SUBCUTANEOUS at 11:04

## 2024-04-19 RX ADMIN — DROPERIDOL 0.62 MG: 2.5 INJECTION, SOLUTION INTRAMUSCULAR; INTRAVENOUS at 12:04

## 2024-04-19 RX ADMIN — MAGNESIUM SULFATE 1 G: 1 INJECTION INTRAVENOUS at 05:04

## 2024-04-19 RX ADMIN — ENOXAPARIN SODIUM 40 MG: 40 INJECTION SUBCUTANEOUS at 05:04

## 2024-04-19 RX ADMIN — DROPERIDOL 2.5 MG: 2.5 INJECTION, SOLUTION INTRAMUSCULAR; INTRAVENOUS at 12:04

## 2024-04-19 RX ADMIN — METOPROLOL TARTRATE 2.5 MG: 1 INJECTION, SOLUTION INTRAVENOUS at 06:04

## 2024-04-19 NOTE — ED NOTES
This RN spoke to pt sister Celeste Olivarez ([phone]) and provided update on pt status. PT sister reports she had a similar episode like this last July and was intubated. Pt sister would like to be called with any pertinent updates

## 2024-04-19 NOTE — ASSESSMENT & PLAN NOTE
- K 3.2 initially with trend up to 3.5  - related to recent GI illness   - replacement ordered; goal K+ >4.0

## 2024-04-19 NOTE — NURSING
Pt arrived to . Pt attached to monitor, , SpO2 96% RA, /87. Pt not following commands or answering questions. Pt attempting to sit up and get out of bed, GAMAL wrist restraints continued per order. NP notified of pt arrival. Pt belongings include green bathrobe, 2 bracelets, 2 rings, earring.

## 2024-04-19 NOTE — ASSESSMENT & PLAN NOTE
CT brain without acute abnormality, unchanged MS findings  Ammonia normal  Has hx MS  Will consult neuro for weigh in  UA/UDS pending  Non meningeal  Check RPR?

## 2024-04-19 NOTE — ED NOTES
Pt was noncompliant while this RN attempted to do an EKG. Had to be redirected multiple times and had one episode of emesis while attempting to obtain EKG. This RN changed bedding and gown on pt and provided another warm blanket. Pt is not resting in bed and denies any needs

## 2024-04-19 NOTE — ASSESSMENT & PLAN NOTE
Troponin 0.103, repeat 0.472..trend  Consult cards  ECHO ordered  EKG w/o acute MI  Patient most recent C in 2022 without CAD.    Patient's echo in 2023 without coronary artery wall abnormalities.

## 2024-04-19 NOTE — ED NOTES
Pt is resting quietly on stretcher. PT vitals are stable at this time and she continues to be on cardiac monitor. Pt denies any pain.

## 2024-04-19 NOTE — HPI
59yo female with Takotsubo CM, acute metabolic encephalopathy, HTN, MS who presented with abdominal pian and vomiting. She was confused therefore HPI was obtained from admit H&P. She was brought in by EMS from [facility] with complaint of abdominal pain to her epigastric region. EMS reported that patient was noted to have abdominal pain vomiting and confusion starting today.  In ED patient noncooperative and was given droperidol and Benadryl in ED K+ 3.2 and Mag 1.4.  Troponin 0.103-0.472-1.242 EKG ST .  Lactic acid elevated at 3.0.  Ammonia normal.  UA and UDS pending.  CT head abdomen and pelvis all without acute abnormality.  She was seen this morning on AM rounds was fidgeting around the bed but was not communicative. She was admitted a year ago with Takotsubo and cardiogenic shock with [facility] with normal coronaries

## 2024-04-19 NOTE — ED NOTES
Pt was placed into soft hand restraints for safety as she continues to try to get out of bed and rip her IV and monitoring devices out. Order was placed and restraints were applied at this time

## 2024-04-19 NOTE — CONSULTS
[facility] - Intensive Care  Cardiology  Consult Note    Patient Name: Reza Morrow  MRN: [id number]  Admission Date: 4/18/2024  Hospital Length of Stay: 0 days  Code Status: Full Code   Attending Provider: Loly Waite MD   Consulting Provider: SYLVIA Nova ANP  Primary Care Physician: Kip Jimenez MD  Principal Problem:Acute metabolic encephalopathy    Patient information was obtained from past medical records and ER records.     Inpatient consult to [facility]  Consult performed by: Mojgan Larios APRN, ANDRES  Consult ordered by: Martínez Brito NP  Reason for consult: Takotsubo CM; elevated troponin        Subjective:     Chief Complaint:  abdominal pain      HPI:   59yo female with Takotsubo CM, acute metabolic encephalopathy, HTN, MS who presented with abdominal pian and vomiting. She was confused therefore HPI was obtained from admit H&P. She was brought in by EMS from [facility] with complaint of abdominal pain to her epigastric region. EMS reported that patient was noted to have abdominal pain vomiting and confusion starting today.  In ED patient noncooperative and was given droperidol and Benadryl in ED K+ 3.2 and Mag 1.4.  Troponin 0.103-0.472-1.242 EKG ST .  Lactic acid elevated at 3.0.  Ammonia normal.  UA and UDS pending.  CT head abdomen and pelvis all without acute abnormality.  She was seen this morning on AM rounds was fidgeting around the bed but was not communicative. She was admitted a year ago with Takotsubo and cardiogenic shock with [facility] with normal coronaries     Past Medical History:   Diagnosis Date    Behavioral problem     Bulging lumbar disc     Bursitis     bilateral hip    Degenerative cervical disc     Hx of psychiatric care     Hypercholesteremia     Labral tear of hip, degenerative bilateral    MS (multiple sclerosis)     Paresthesia of both lower extremities 3/13/2022    Pneumonia     Spinal cord compression        Past Surgical History:    Procedure Laterality Date    ANGIOGRAM, CORONARY, WITH LEFT HEART CATHETERIZATION Left 3/11/2022    Procedure: Angiogram, Coronary, with Left Heart Cath;  Surgeon: Elie Matamoros MD;  Location: [facility] CATH LAB;  Service: Cardiology;  Laterality: Left;    BREAST SURGERY      augmentation     SECTION, CLASSIC      HAND SURGERY      HYSTEROSCOPY      NECK SURGERY      cervical disc removeal    TUBAL LIGATION      X-STOP IMPLANTATION         Review of patient's allergies indicates:   Allergen Reactions    Adhesive Hives    Neosporin [neomycin-bacitracin-polymyxin] Hives    Neomycin     Neomycin-polymyxin Hives    Neosporin g.u. irrigant     Penicillins Other (See Comments)     Unknown  reaction    Latex Rash       Current Facility-Administered Medications   Medication Dose Route Frequency Provider Last Rate Last Admin    dexmedetomidine (PRECEDEX) 400mcg/100mL 0.9% NaCL infusion  0-1.4 mcg/kg/hr Intravenous Continuous Erica Barber MD 5.62 mL/hr at 24 1307 0.4 mcg/kg/hr at 24 1307    enoxaparin injection 40 mg  40 mg Subcutaneous Q24H (prophylaxis, 1700) Daniel Baumann MD        folic acid 1 mg in dextrose 5 % (D5W) 100 mL IVPB  1 mg Intravenous Daily Erica Barber MD   Stopped at 24 1258    iohexoL (OMNIPAQUE 350) injection 100 mL  100 mL Intravenous ONCE PRN Lefort, Guy J., MD        naloxone 0.4 mg/mL injection 0.4 mg  0.4 mg Intravenous PRN Daniel Baumann MD   0.4 mg at 24 1019    thiamine (B-1) 500 mg in dextrose 5 % (D5W) 100 mL IVPB  500 mg Intravenous TID Loly Waite MD         Family History       Problem Relation (Age of Onset)    Bipolar disorder Brother    COPD Mother    Cancer Father    Diabetes Father, Sister    Drug abuse Mother    Heart disease Maternal Uncle    Hypothyroidism Maternal Grandmother    Lung cancer Father          Tobacco Use    Smoking status: Former     Current packs/day: 0.00     Types: Cigarettes     Quit date: 2013     Years  since quitting: 10.7    Smokeless tobacco: Never   Substance and Sexual Activity    Alcohol use: No    Drug use: Yes     Types: Benzodiazepines, Marijuana    Sexual activity: Never     Review of Systems   Unable to perform ROS: Other     Objective:     Vital Signs (Most Recent):  Temp: 98.6 °F (37 °C) (04/19/24 1130)  Pulse: (!) 127 (04/19/24 1215)  Resp: (!) 35 (04/19/24 1215)  BP: (!) 169/110 (04/19/24 1215)  SpO2: 100 % (04/19/24 1215) Vital Signs (24h Range):  Temp:  [98.4 °F (36.9 °C)-99.5 °F (37.5 °C)] 98.6 °F (37 °C)  Pulse:  [] 127  Resp:  [12-73] 35  SpO2:  [89 %-100 %] 100 %  BP: (123-169)/() 169/110     Weight: 56.2 kg (124 lb)  Body mass index is 20.63 kg/m².    SpO2: 100 %         Intake/Output Summary (Last 24 hours) at 4/19/2024 1321  Last data filed at 4/19/2024 1307  Gross per 24 hour   Intake 1506.54 ml   Output 550 ml   Net 956.54 ml       Lines/Drains/Airways       Drain  Duration                  Urethral Catheter 04/19/24 0530 Double-lumen <1 day              Peripheral Intravenous Line  Duration                  Peripheral IV - Single Lumen 04/19/24 0532 20 G Anterior;Left Forearm <1 day         Peripheral IV - Single Lumen 04/19/24 0533 20 G Anterior;Right Forearm <1 day                     Physical Exam  Constitutional:       General: She is not in acute distress.     Appearance: She is well-developed.   Cardiovascular:      Rate and Rhythm: Normal rate and regular rhythm.      Heart sounds: No murmur heard.     No gallop.   Pulmonary:      Effort: Pulmonary effort is normal. No respiratory distress.      Breath sounds: Normal breath sounds. No wheezing.   Abdominal:      General: Bowel sounds are normal. There is no distension.      Palpations: Abdomen is soft.      Tenderness: There is no abdominal tenderness.   Skin:     General: Skin is warm and dry.          Significant Labs: BMP:   Recent Labs   Lab 04/18/24  2342 04/19/24  0621   * 182*    141   K 3.2* 3.8     104   CO2 19* 18*   BUN 11 11   CREATININE 0.9 0.9   CALCIUM 9.6 9.5   MG 1.4*  --    , CBC   Recent Labs   Lab 04/18/24  2342 04/19/24  0621 04/19/24  1302   WBC 11.72 15.13* 15.46*   HGB 15.1 16.3* 16.8*   HCT 43.7 48.6* 49.3*    270 298   , and Troponin   Recent Labs   Lab 04/18/24  2342 04/19/24  0224 04/19/24  0542   TROPONINI 0.103* 0.472* 1.242*       Significant Imaging: Echocardiogram: Transthoracic echo (TTE) complete ([facility] Only):   Results for orders placed or performed during the hospital encounter of 04/18/24   Echo   Result Value Ref Range    BSA 1.61 m2    LVIDd 5.24 3.5 - 6.0 cm    LV Systolic Volume 93.61 mL    LV Systolic Volume Index 58.1 mL/m2    LVIDs 4.52 (A) 2.1 - 4.0 cm    LV Diastolic Volume 132.05 mL    LV Diastolic Volume Index 82.02 mL/m2    IVS 0.61 0.6 - 1.1 cm    LVOT diameter 1.66 cm    LVOT area 2.2 cm2    FS 14 (A) 28 - 44 %    Left Ventricle Relative Wall Thickness 0.37 cm    Posterior Wall 0.96 0.6 - 1.1 cm    LV mass 143.66 g    LV Mass Index 89 g/m2    TDI LATERAL 0.09 m/s    TDI SEPTAL 0.09 m/s    LA size 3.31 cm    PV PEAK VELOCITY 0.99 m/s    PV peak gradient 4 mmHg    Pulmonary Valve Mean Velocity 0.76 m/s    Sinus 2.65 cm    STJ 2.10 cm    Ascending aorta 2.45 cm    Mean e' 0.09 m/s    ZLVIDS 3.69     ZLVIDD 1.37     Narrative      Left Ventricle: Not well visualized due to poor sonic window. Global   hypokinesis present. There is unable to assess systolic function visually   estimated ejection fraction of 25 - 30%. Unable to assess diastolic   function due to poor image quality.    Mitral Valve: There is mild regurgitation.    Overall the study quality was technically difficult. The study was   difficult due to patient's uncooperativeness and clinical status.       Assessment and Plan:     Hypokalemia  - K 3.2 initially with trend up to 3.5  - related to recent GI illness   - replacement ordered; goal K+ >4.0    Hypomagnesemia  - Mg 1.5  - related to  vomiting as well  - replaced with Mg rider; goal Mg>2.0        Takotsubo cardiomyopathy  - history of Takotsubo in 2022 with normal coronaries and cardiogenic shock  - EF normalized on repeat echo in 2023; was on GDMT but held after admission for LANEY and hypotension; followed by HF clinic post discharge with continued holding of GDMT  - echo today with EF 25-30% and global hypokinesis- images difficult to fully visualize; WBCs elevated and EKG with ST; Troponin 0.1 with trend up to 1.2; currently feel more related to demand etiology but given history of Takotsubo would recommend GDMT if not contraindicated; no plans for ischemic evaluation as of now         VTE Risk Mitigation (From admission, onward)           Ordered     enoxaparin injection 40 mg  Every 24 hours         04/19/24 1011                    Thank you for your consult.     SYLVIA Nova, ANP  Cardiology   [facility] - Intensive Care

## 2024-04-19 NOTE — HPI
Patient is a 58-year-old female  with a history of MS, polysubstance abuse, takotsubo, presents by EMS from [facility] with complaint of abdominal pain. Patient is provide details regarding her complaints but does place her hand over the epigastrium regarding her pain. She is answering questions unreliable. EMS reports that patient was noted to have abdominal pain vomiting and confusion starting today.  In ED patient noncooperative but unable to be directed.  Given droperidol and Benadryl in ED also needing restraints as pulling out IVs.  Labs significant for anion gap 18 potassium 3.2 and Mag 1.4.  Troponin 0.103, repeat 0.472.  Lactic acid elevated at 3.0.  Ammonia normal.  UA and UDS pending.  CT head abdomen and pelvis all without acute abnormality.      Patient's sister reports she has been in a group home for 2 weeks, does report she has history of polysubstance abuse and is on pain management meds.  Patient had cardiogenic shock 1 year ago requiring intubation with takotsubo EF was 20% however repeat echo  months later with resolution of EF at 60%.  Patient most recent LHC in 2022 without CAD.  Patient's echo in 2023 without coronary artery wall abnormalities.

## 2024-04-19 NOTE — H&P
[facility] Medicine  History & Physical    Patient Name: Reza Morrow  MRN: [id number]  Patient Class: OP- Observation  Admission Date: 4/18/2024  Attending Physician: Nissa Gallego*   Primary Care Provider: Kip Jimenez MD         Patient information was obtained from relative(s), past medical records, and ER records.     Subjective:     Principal Problem:Acute metabolic encephalopathy    Chief Complaint:   Chief Complaint   Patient presents with    Abdominal Pain     C/O abdominal pain, vomiting and confusion since this morning.  EMT gave pt 4 mg Zofran IV.        HPI: Patient is a 58-year-old female  with a history of MS, polysubstance abuse, takotsubo, presents by EMS from residential with complaint of abdominal pain. Patient is provide details regarding her complaints but does place her hand over the epigastrium regarding her pain. She is answering questions unreliable. EMS reports that patient was noted to have abdominal pain vomiting and confusion starting today.  In ED patient noncooperative but unable to be directed.  Given droperidol and Benadryl in ED also needing restraints as pulling out IVs.  Labs significant for anion gap 18 potassium 3.2 and Mag 1.4.  Troponin 0.103, repeat 0.472.  Lactic acid elevated at 3.0.  Ammonia normal.  UA and UDS pending.  CT head abdomen and pelvis all without acute abnormality.      Patient's sister reports she has been in a group home for 2 weeks, does report she has history of polysubstance abuse and is on pain management meds.  Patient had cardiogenic shock 1 year ago requiring intubation with takotsubo EF was 20% however repeat echo  months later with resolution of EF at 60%.  Patient most recent LHC in 2022 without CAD.  Patient's echo in 2023 without coronary artery wall abnormalities.    Past Medical History:   Diagnosis Date    Behavioral problem     Bulging lumbar disc     Bursitis     bilateral hip    Degenerative cervical  disc     Hx of psychiatric care     Hypercholesteremia     Labral tear of hip, degenerative bilateral    MS (multiple sclerosis)     Paresthesia of both lower extremities 3/13/2022    Pneumonia     Spinal cord compression        Past Surgical History:   Procedure Laterality Date    ANGIOGRAM, CORONARY, WITH LEFT HEART CATHETERIZATION Left 3/11/2022    Procedure: Angiogram, Coronary, with Left Heart Cath;  Surgeon: Elie Matamoros MD;  Location: [facility] CATH LAB;  Service: Cardiology;  Laterality: Left;    BREAST SURGERY      augmentation     SECTION, CLASSIC      HAND SURGERY      HYSTEROSCOPY      NECK SURGERY      cervical disc removeal    TUBAL LIGATION      X-STOP IMPLANTATION         Review of patient's allergies indicates:   Allergen Reactions    Adhesive Hives    Neosporin [neomycin-bacitracin-polymyxin] Hives    Neomycin     Neomycin-polymyxin Hives    Neosporin g.u. irrigant     Penicillins Other (See Comments)     Unknown  reaction    Latex Rash       Current Facility-Administered Medications   Medication Dose Route Frequency Provider Last Rate Last Admin    cefTRIAXone (Rocephin) 1 g in dextrose 5 % in water (D5W) 100 mL IVPB (MB+)  1 g Intravenous ED 1 Time Lefort, Guy J.,  mL/hr at 24 0549 1 g at 24 0549    iohexoL (OMNIPAQUE 350) injection 100 mL  100 mL Intravenous ONCE PRN Lefort, Guy J., MD        magnesium sulfate in dextrose IVPB (premix) 1 g  1 g Intravenous Once Martínez Brito  mL/hr at 24 0549 1 g at 24 0549     Family History       Problem Relation (Age of Onset)    Bipolar disorder Brother    COPD Mother    Cancer Father    Diabetes Father, Sister    Drug abuse Mother    Heart disease Maternal Uncle    Hypothyroidism Maternal Grandmother    Lung cancer Father          Tobacco Use    Smoking status: Former     Current packs/day: 0.00     Types: Cigarettes     Quit date: 2013     Years since quitting: 10.7    Smokeless tobacco: Never    Substance and Sexual Activity    Alcohol use: No    Drug use: Yes     Types: Benzodiazepines, Marijuana    Sexual activity: Never     Review of Systems   Unable to perform ROS: Acuity of condition     Objective:     Vital Signs (Most Recent):  Temp: 99.5 °F (37.5 °C) (04/19/24 0452)  Pulse: (!) 131 (04/19/24 0551)  Resp: 20 (04/19/24 0551)  BP: (!) 156/108 (04/19/24 0551)  SpO2: 95 % (04/19/24 0551) Vital Signs (24h Range):  Temp:  [98.4 °F (36.9 °C)-99.5 °F (37.5 °C)] 99.5 °F (37.5 °C)  Pulse:  [] 131  Resp:  [12-47] 20  SpO2:  [95 %-100 %] 95 %  BP: (123-164)/() 156/108     Weight: 56.5 kg (124 lb 9 oz)  Body mass index is 20.73 kg/m².     Physical Exam  Vitals reviewed.   Constitutional:       Comments: Lethargic   HENT:      Head: Normocephalic and atraumatic.      Nose: Nose normal.      Mouth/Throat:      Mouth: Mucous membranes are moist.      Pharynx: Oropharynx is clear.   Cardiovascular:      Rate and Rhythm: Regular rhythm. Tachycardia present.      Pulses: Normal pulses.      Heart sounds: Normal heart sounds.   Pulmonary:      Effort: Pulmonary effort is normal.      Breath sounds: Normal breath sounds.   Abdominal:      General: Bowel sounds are normal.   Musculoskeletal:         General: Normal range of motion.      Cervical back: Normal range of motion.   Skin:     General: Skin is warm and dry.      Capillary Refill: Capillary refill takes less than 2 seconds.   Neurological:      Mental Status: She is lethargic.      GCS: GCS eye subscore is 2. GCS verbal subscore is 3. GCS motor subscore is 5.                Significant Labs: All pertinent labs within the past 24 hours have been reviewed.  Recent Lab Results  (Last 5 results in the past 24 hours)        04/19/24  0534   04/19/24  0224   04/19/24  0107   04/19/24  0103   04/18/24  2342        Base Deficit       -1.1         Performed By:       [facility]ins         Specimen source       Venous         Albumin         4.1       Alcohol,  Serum   <10             ALP         77       ALT         13       Ammonia   30             Anion Gap         18       Appearance, UA Clear               AST         19       Bacteria, UA None               Baso #         0.03       Basophil %         0.3       Beta-Hydroxybutyrate     0.4           Bilirubin (UA) Negative               BILIRUBIN TOTAL         1.5  Comment: For infants and newborns, interpretation of results should be based  on gestational age, weight and in agreement with clinical  observations.    Premature Infant recommended reference ranges:  Up to 24 hours.............<8.0 mg/dL  Up to 48 hours............<12.0 mg/dL  3-5 days..................<15.0 mg/dL  6-29 days.................<15.0 mg/dL         BUN         11       Calcium         9.6       Chloride         103       CO2         19       Color, UA Yellow               Creatinine         0.9       Differential Method         Automated       eGFR         >60       Eos #         0.0       Eos %         0.0       FiO2       21.0         Free T4   1.09             Glucose         206       Glucose, UA 3+               Gran # (ANC)         10.5       Gran %         89.8       Hematocrit         43.7       Hemoglobin         15.1       Hyaline Casts, UA 1               Immature Grans (Abs)         0.04  Comment: Mild elevation in immature granulocytes is non specific and   can be seen in a variety of conditions including stress response,   acute inflammation, trauma and pregnancy. Correlation with other   laboratory and clinical findings is essential.         Immature Granulocytes         0.3       Ketones, UA 2+               Lactic Acid Level   3.0  Comment: Falsely low lactic acid results can be found in samples   containing >=13.0 mg/dL total bilirubin and/or >=3.5 mg/dL   direct bilirubin.               Leukocyte Esterase, UA Negative               Lipase         28       Lymph #         0.6       Lymph %         5.1       Magnesium           1.4       MCH         30.0       MCHC         34.6       MCV         87       Microscopic Comment SEE COMMENT  Comment: Other formed elements not mentioned in the report are not   present in the microscopic examination.                  Mono #         0.5       Mono %         4.5       MPV         11.4       NITRITE UA Negative               nRBC         0       Blood, UA Trace               pH, UA 7.0               Platelet Count         201       POC HCO3       25.7         POC PCO2       49.1  Comment: Value above reference range         POC PH       7.327  Comment: Value below reference range         POC PO2       21.0  Comment:  notified  at    read back  Value below critical limit           POC SATURATED O2       34.1  Comment:  notified  at    read back  Value below critical limit           Potassium         3.2       PROTEIN TOTAL         8.6       Protein, UA 1+  Comment: Recommend a 24 hour urine protein or a urine   protein/creatinine ratio if globulin induced proteinuria is  clinically suspected.                 RBC         5.04       RBC, UA 1               RDW         12.8       Sodium         140       Specific [place], UA 1.015               Specimen UA Urine, Catheterized               Troponin I   0.472  Comment: The reference interval for Troponin I represents the 99th percentile   cutoff   for our facility and is consistent with 3rd generation assay   performance.         0.103  Comment: The reference interval for Troponin I represents the 99th percentile   cutoff   for our facility and is consistent with 3rd generation assay   performance.         TSH   0.266             UROBILINOGEN UA Negative               WBC, UA 1               WBC         11.72       Yeast, UA None                                      Significant Imaging: I have reviewed all pertinent imaging results/findings within the past 24 hours.  I have reviewed and interpreted all pertinent imaging results/findings within the past  24 hours.  Assessment/Plan:     * Acute metabolic encephalopathy  CT brain without acute abnormality, unchanged MS findings  Ammonia normal  Has hx MS  Will consult neuro for weigh in  UA/UDS pending  Non meningeal  Check RPR?  Requiring soft wrist restraints    Hypokalemia  Patient has hypokalemia which is Acute and currently controlled. Most recent potassium levels reviewed-   Lab Results   Component Value Date    K 3.2 (L) 04/18/2024   . Will continue potassium replacement per protocol and recheck repeat levels after replacement completed.     Hypomagnesemia  Patient has Abnormal Magnesium: hypomagnesemia. Will continue to monitor electrolytes closely. Will replace the affected electrolytes and repeat labs to be done after interventions completed. The patient's magnesium results have been reviewed and are listed below.  Recent Labs   Lab 04/18/24  2342   MG 1.4*        NSTEMI (non-ST elevated myocardial infarction)  Troponin 0.103, repeat 0.472..trend  Consult cards  ECHO ordered  EKG w/o acute MI  Patient most recent LHC in 2022 without CAD.    Patient's echo in 2023 without coronary artery wall abnormalities.      VTE Risk Mitigation (From admission, onward)      None          Critical care time spent on the evaluation and treatment of severe organ dysfunction, review of pertinent labs and imaging studies, discussions with consulting providers and discussions with patient/family: 25 minutes.       On 04/19/2024, patient should be placed in hospital observation services under my care in collaboration with Dr Barragan.           Martínez Brito NP  Department of Hospital Medicine  [facility] - Intensive Care

## 2024-04-19 NOTE — ASSESSMENT & PLAN NOTE
- history of Takotsubo in 2022 with normal coronaries and cardiogenic shock  - EF normalized on repeat echo in 2023; was on GDMT but held after admission for LANEY and hypotension; followed by HF clinic post discharge with continued holding of GDMT  - echo today with EF 25-30% and global hypokinesis- images difficult to fully visualize; WBCs elevated and EKG with ST; Troponin 0.1 with trend up to 1.2; currently feel more related to demand etiology but given history of Takotsubo would recommend GDMT if not contraindicated; no plans for ischemic evaluation as of now    none

## 2024-04-19 NOTE — PLAN OF CARE
"  Care Plan    Pt remains in ICU at this time. Pt confused and restless. Sinus tachycardia. SBP 160s. 2L NC applied, SpO2 90s.   POC reviewed with pt and family. Questions and concerns addressed. Safety and infection precautions in place. See below and flowsheets for full assessment and VS info.     Neuro:  Pupil PERRLA: yes  24 hr Temp:  [98.4 °F (36.9 °C)-99.5 °F (37.5 °C)]      CV:  Rhythm: sinus tachycardia  DVT prophylaxis: VTE Required Core Measure: (SCDs) Sequential compression device initiated/maintained    Resp:          GI/:  GI prophylaxis: yes  Diet/Nutrition Received: NPO  Last Bowel Movement:  (ROBIN)  Voiding Characteristics: urethral catheter (bladder)   Intake/Output Summary (Last 24 hours) at 4/19/2024 0721  Last data filed at 4/19/2024 0701  Gross per 24 hour   Intake 270.86 ml   Output 550 ml   Net -279.14 ml       Labs/Accuchecks:  Recent Labs   Lab 04/19/24  0621   WBC 15.13*   RBC 5.43*   HGB 16.3*   HCT 48.6*         Recent Labs   Lab 04/18/24  2342      K 3.2*   CO2 19*      BUN 11   CREATININE 0.9   ALKPHOS 77   ALT 13   AST 19   BILITOT 1.5*    No results for input(s): "PROTIME", "INR", "APTT", "HEPANTIXA" in the last 168 hours.   Recent Labs   Lab 04/19/24  0224   TROPONINI 0.472*       Electrolytes: Electrolytes replaced  Accuchecks: none    Gtts/LDAs:  Current Facility-Administered Medications   Medication Dose Route Frequency Last Rate Last Admin    lactated ringers   Intravenous Continuous 100 mL/hr at 04/19/24 0701 Rate Verify at 04/19/24 0701       Lines/Drains/Airways       Drain  Duration                  Urethral Catheter 04/19/24 0530 Double-lumen <1 day              Peripheral Intravenous Line  Duration                  Peripheral IV - Single Lumen 04/19/24 0532 20 G Anterior;Left Forearm <1 day         Peripheral IV - Single Lumen 04/19/24 0533 20 G Anterior;Right Forearm <1 day                    Skin/Wounds     Wounds: No  Wound care consulted: " No    Consults  Consults (From admission, onward)          Status Ordering Provider     Inpatient consult to [facility]  Once        Provider:  (Not yet assigned)    Acknowledged CARLYLE OCAMPO     Inpatient consult to [facility]  Once        Provider:  (Not yet assigned)    Acknowledged CARLYLE OCAMPO

## 2024-04-19 NOTE — ED NOTES
Pt is agitated, restless and continues to try to get out of bed. Attempted to rip out her IV. This RN was unsuccessful with attempting to straight cath pt as she is very restless and will not follow commands and tenses up when touched. Three additional RN's attempted to assist with catching pt and were unsuccessful. Pt was put on external catheter and placed into a brief.

## 2024-04-19 NOTE — ED NOTES
Pt continues to be restless and not answering questions. Pt not cooperative while attempting to get chest x-ray and CT done. This RN, ER tech and provider had to accompany pt to CT to assist CT tech with getting pt on to CT table and to lay still. Pt does not follow commands and has to be redirected.

## 2024-04-19 NOTE — ED NOTES
This RN contacted lab for assistance with obtaining blood cultures prior to antibiotic administration

## 2024-04-19 NOTE — CONSULTS
Pulm/CC Fellow Consult Note    Attending Physician: Loly Waite MD    Date of Admit: 2024  Hospital day: 0    History of Present Illness:  Reza Morrow is a 58 y.o.  female with a PMHx of polysubstance use, Takutsubo cardiomyopathy who presented to the ED with altered mental status. History provided by chart review as patient is currently altered. Apparently the patient had some abdominal pain and confusion the day of presentation. She currently lives with two roommates. In the ED the patient was uncooperative and required PRN antipsychotics. Labs were notable for a lactic acid of 3, low bicarb and anion gap of 18. She was admitted to the ICU for altered mental status.    Past Medical History:  Past Medical History:   Diagnosis Date    Behavioral problem     Bulging lumbar disc     Bursitis     bilateral hip    Degenerative cervical disc     Hx of psychiatric care     Hypercholesteremia     Labral tear of hip, degenerative bilateral    MS (multiple sclerosis)     Paresthesia of both lower extremities 3/13/2022    Pneumonia     Spinal cord compression        Past Surgical History:  Past Surgical History:   Procedure Laterality Date    ANGIOGRAM, CORONARY, WITH LEFT HEART CATHETERIZATION Left 3/11/2022    Procedure: Angiogram, Coronary, with Left Heart Cath;  Surgeon: Elie Matamoros MD;  Location: [facility] CATH LAB;  Service: Cardiology;  Laterality: Left;    BREAST SURGERY      augmentation     SECTION, CLASSIC      HAND SURGERY      HYSTEROSCOPY      NECK SURGERY      cervical disc removeal    TUBAL LIGATION      X-STOP IMPLANTATION         Allergies:  Review of patient's allergies indicates:   Allergen Reactions    Adhesive Hives    Neosporin [neomycin-bacitracin-polymyxin] Hives    Neomycin     Neomycin-polymyxin Hives    Neosporin g.u. irrigant     Penicillins Other (See Comments)     Unknown  reaction    Latex Rash       Family History:  Family History   Problem Relation Name Age of Onset     "Cancer Father      Diabetes Father      Lung cancer Father      Diabetes Sister      COPD Mother      Drug abuse Mother      Bipolar disorder Brother      Heart disease Maternal Uncle      Hypothyroidism Maternal Grandmother         Social History:  Social History     Tobacco Use    Smoking status: Former     Current packs/day: 0.00     Types: Cigarettes     Quit date: 2013     Years since quitting: 10.7    Smokeless tobacco: Never   Substance Use Topics    Alcohol use: No    Drug use: Yes     Types: Benzodiazepines, Marijuana       Review of Systems:  ROS     Objective:   Last 24 Hour Vital Signs:  BP  Min: 123/87  Max: 185/85  Temp  Av °F (37.2 °C)  Min: 98.4 °F (36.9 °C)  Max: 99.5 °F (37.5 °C)  Pulse  Av.4  Min: 72  Max: 141  Resp  Av.6  Min: 12  Max: 73  SpO2  Av.3 %  Min: 89 %  Max: 100 %  Height  Av' 5" (165.1 cm)  Min: 5' 5" (165.1 cm)  Max: 5' 5" (165.1 cm)  Weight  Av.4 kg (124 lb 4.5 oz)  Min: 56.2 kg (124 lb)  Max: 56.5 kg (124 lb 9 oz)  Body mass index is 20.63 kg/m².  I & O (Last 24H):  Intake/Output Summary (Last 24 hours) at 2024 1733  Last data filed at 2024 1307  Gross per 24 hour   Intake 1506.54 ml   Output 550 ml   Net 956.54 ml       Physical Exam:  GEN: Awakens with stimulation, does not follow commands.  HEENT: MMM, no scleral icterus, EOMI  NECK: Supple, midline trachea, no raised JVP or a-waves notable  CV: RRR, no MRG, pulses equal and symmetric 2+ at radial  PULM: CTAB, No adventitious sounds.  ABDOMEN: Soft, non-tender, non-distended, no rebound or guarding  SKIN: Warm, dry, intact, no rashes  MSK: No deformity, no clubbing, cyanosis, or lower extremity edema  NEURO: Lethargic, no focal deficits. Horizontal nystagmus present.  LINES: Intact, no extravasation or induration, no erythema to PIV or support devices       Assessment & Plan:     NEURO:  Acute metabolic encephalopathy.    Likely due to polypharmacy. Patient with multiple medications on " board that could precipitate encephalopathy. Received 0.4 of narcan in ICU with positive response. Also has baclofen, ketamine, and long acting potent opioids on .    - blood gas without hypercapnia, mental status appears to be improving  - Precedex for minimal sedation to assist with care of patient  - Holding all medications that could precipitate AMS  - Neurology consulted    CV:  History of Taketsubo, patient with significant stressors and multiple substances used. Troponin trending up but no ischemic changes on EKG.  - Last Echo following hospitalization last year with recovery of EF. Repeat Echo here again with decreased EF and global hypokineses  - Cardiology consulted  - Maintain euvolemia  - at this point would likely benefit from GDMT as tolerated     PULM:  On room air, at baseline  - Emphysematous changes on CT. But did not get PFTs  - Goal sat > 88%       GI/FEN  NPO until able to be more alert for diet        RENAL:   Renal function at baseline     HEME/ONC:  -- Transfusion threshold <7g/dl per TRICC     ENDOCRINE:  -- Hypoglycemic precautions Goal blood glucose 140-180     INFECTIOUS DISEASE:  No concern for infection at this time. Afebrile, no meningismus    MSK/RHEUM:  -- PT/OT for early mobility      PSYCHOSOCIAL:  -- Addiction psych consult when patient able to participate.    Feeding: Regular diet once more alert  Analgesia: Holding all pain meds  Sedation: Stopped precedex.  DVT prophylaxis:   Anticoagulants       Ordered     Route Frequency Start Stop    04/19/24 1011  enoxaparin         SubQ Every 24 hours 04/19/24 1700 --           Head of Bed: 30 degrees to prevent VAP  Ulcer PPX: N/A  Glucose: Hypoglycemic precautions, SSI  SBT/SAT: N/A  Bowels: Bowel regimen for constipation  Indwelling Lines: Peripherals  Deescalation Abx: N/A/       Patient seen with Dr. Schwab and critical care team. We will continue to follow. Please contact me with any questions should they arise.       Daniel  IRIS Baumann.  [facility] Fellow    Pt seen and examined with Pulmonary/Critical Care team and this note was reviewed and validated with the following additional comments: On arrival pt was unarousable to pain.  She did have a partial arousal to naloxone. This has all of the features of a polypharmacy sedative/hypnotic/opioid overdose. We will support pt in the ICU.  Intubation not required at this time.  Prn naloxone.     Critical Care time was spent validating the history and physical exam, reviewing the lab and imaging results, and discussing the care of the patient with the bedside nurse and the patient and/or surrogates. This critical care time did not overlap with that of any other provider or involve time for any procedures.  This patient has a high probability of sudden clinically significant deterioration which requires the highest level of physician preparedness to intervene urgently. I managed/supervised life or organ supporting interventions that required frequent physician assessments. I devoted my full attention in the ICU to the direct care of this patient for this period of time. Organ systems which are failing and require intensive, critical care support are: respiratory, neurologic.  Critical Care time: 40 minutes    Aamir Loera MD  Phone [phone]

## 2024-04-19 NOTE — ASSESSMENT & PLAN NOTE
Patient has Abnormal Magnesium: hypomagnesemia. Will continue to monitor electrolytes closely. Will replace the affected electrolytes and repeat labs to be done after interventions completed. The patient's magnesium results have been reviewed and are listed below.  Recent Labs   Lab 04/18/24  2342   MG 1.4*

## 2024-04-19 NOTE — ASSESSMENT & PLAN NOTE
Patient has hypokalemia which is Acute and currently controlled. Most recent potassium levels reviewed-   Lab Results   Component Value Date    K 3.2 (L) 04/18/2024   . Will continue potassium replacement per protocol and recheck repeat levels after replacement completed.

## 2024-04-19 NOTE — CONSULTS
NEUROLOGY FLOOR CONSULT    Reason for consult: Acute encephalopathy    CC: Acute encephalopathy    HPI:     This is a 58 year old female with history of polysubstance use, questionable MS, Takutsubo cardiomyopathy who presented to the ED due to abdominal pain. History from the chart as patient is mute.   Patient was complaining of epigastrium pain and unable to answer questions. EMS reports that patient was noted to have abdominal pain vomiting and confusion starting today.  In ED patient noncooperative but unable to be directed.  Given droperidol and Benadryl in ED also needing restraints as pulling out IVs.  Labs significant for anion gap 18 potassium 3.2 and Mag 1.4.  Troponin 0.103, repeat 0.472.  Lactic acid elevated at 3.0.  Ammonia normal.  UA and UDS pending.  CT head abdomen and pelvis all without acute abnormality.       Patient's sister reports she has been in a group home for 2 weeks, does report she has history of polysubstance abuse and is on pain management meds.  Patient had cardiogenic shock 1 year ago requiring intubation with takotsubo EF was 20% however repeat echo  months later with resolution of EF at 60%.  Patient most recent LHC in 2022 without CAD.  Patient's echo in 2023 without coronary artery wall abnormalities.     On my evaluation, patient is laying on the bed, restrained. She is on Precedex, awake, moving all extremity but not to command. Vitals stable and on RA. CTH with no lesion or infarct.       ROS: As per HPI    Histories:     Allergies:  Adhesive, Neosporin [neomycin-bacitracin-polymyxin], Neomycin, Neomycin-polymyxin, Neosporin g.u. irrigant, Penicillins, and Latex    Current Medications:    Current Facility-Administered Medications   Medication Dose Route Frequency Provider Last Rate Last Admin    dexmedetomidine (PRECEDEX) 400mcg/100mL 0.9% NaCL infusion  0-1.4 mcg/kg/hr Intravenous Continuous Erica Barber MD 2.81 mL/hr at 04/19/24 1018 0.2 mcg/kg/hr at 04/19/24 1018     enoxaparin injection 40 mg  40 mg Subcutaneous Q24H (prophylaxis, 1700) Daniel Baumann MD        folic acid 1 mg in dextrose 5 % (D5W) 100 mL IVPB  1 mg Intravenous Daily Erica Barber MD        iohexoL (OMNIPAQUE 350) injection 100 mL  100 mL Intravenous ONCE PRN Lefort, Guy J., MD        naloxone 0.4 mg/mL injection 0.4 mg  0.4 mg Intravenous PRN Daniel Baumann MD   0.4 mg at 24 1019    thiamine (B-1) 500 mg in dextrose 5 % (D5W) 100 mL IVPB  500 mg Intravenous TID Loly Waite MD           Past Medical/Surgical/Family History:  Medical:   Past Medical History:   Diagnosis Date    Behavioral problem     Bulging lumbar disc     Bursitis     bilateral hip    Degenerative cervical disc     Hx of psychiatric care     Hypercholesteremia     Labral tear of hip, degenerative bilateral    MS (multiple sclerosis)     Paresthesia of both lower extremities 3/13/2022    Pneumonia     Spinal cord compression       Surgeries:   Past Surgical History:   Procedure Laterality Date    ANGIOGRAM, CORONARY, WITH LEFT HEART CATHETERIZATION Left 3/11/2022    Procedure: Angiogram, Coronary, with Left Heart Cath;  Surgeon: Elie Matamoros MD;  Location: [facility] CATH LAB;  Service: Cardiology;  Laterality: Left;    BREAST SURGERY      augmentation     SECTION, CLASSIC      HAND SURGERY      HYSTEROSCOPY      NECK SURGERY      cervical disc removeal    TUBAL LIGATION      X-STOP IMPLANTATION        Family:   Family History   Problem Relation Name Age of Onset    Cancer Father      Diabetes Father      Lung cancer Father      Diabetes Sister      COPD Mother      Drug abuse Mother      Bipolar disorder Brother      Heart disease Maternal Uncle      Hypothyroidism Maternal Grandmother       Current Evaluation:     Vital Signs:   Vitals:    24 1015   BP: (!) 148/85   Pulse: (!) 122   Resp: 16   Temp:       Neurological Examination   Orientation  Alert, not oriented to self, place, time, and  situation  Language  Unable to assess, replies by nodding head  Cranial Nerves  PERRL, VF intact, EOMI, V1-V3 intact, symmetric facial expression  Motor  Normal Bulk  Normal Tone  Sensory  Withdraws to pain   DTR   +2 symmetric  Cerebellar/Gait  Unable to assess    RADIOLOGY STUDIES:  I have personally reviewed the images performed.     HEAD CT: No infarct or lesion      Assessment:  Plan:   58 year old female with history of polysubstance use, questionable MS, Takutsubo cardiomyopathy who presented to the ED due to abdominal pain. On arrival still encephalopathic, admitted to ICU. CTH with no acute lesion or infarct. Labs unrevealing except low potassium and magnesium. Lytes repleted.Patient on Precedex for agitation.     Plan  Acute encephalopathy  -presented with confusion and abdominal pain   -CTH with no lesion   -On precedex for agitation   -Consider MRI brain if patient continues to be encephalopathic  -Consider sEEG to rule out seizure if there is no changes in mental status  -Consider LP if patient still encephalopathic to rule out meningitis ot any infectious cause.  -rest of care per team    Case to be discussed with Dr. Sary Finney MD  [facility] PGY-4  [facility] Consult Service

## 2024-04-19 NOTE — ED NOTES
Pt not answering questions. Pt arouses to painful and verbal stimuli but goes right back to sleep. Pt vitals are stable and denies any pain at this time. Per EMS, pt is staying at group home and they were not able to obtain an adequate medical history from staff or pt. Pt was placed into gown and put on cardiac monitor.

## 2024-04-19 NOTE — SUBJECTIVE & OBJECTIVE
Past Medical History:   Diagnosis Date    Behavioral problem     Bulging lumbar disc     Bursitis     bilateral hip    Degenerative cervical disc     Hx of psychiatric care     Hypercholesteremia     Labral tear of hip, degenerative bilateral    MS (multiple sclerosis)     Paresthesia of both lower extremities 3/13/2022    Pneumonia     Spinal cord compression        Past Surgical History:   Procedure Laterality Date    ANGIOGRAM, CORONARY, WITH LEFT HEART CATHETERIZATION Left 3/11/2022    Procedure: Angiogram, Coronary, with Left Heart Cath;  Surgeon: Elie Matamoros MD;  Location: [facility] CATH LAB;  Service: Cardiology;  Laterality: Left;    BREAST SURGERY      augmentation     SECTION, CLASSIC      HAND SURGERY      HYSTEROSCOPY      NECK SURGERY      cervical disc removeal    TUBAL LIGATION      X-STOP IMPLANTATION         Review of patient's allergies indicates:   Allergen Reactions    Adhesive Hives    Neosporin [neomycin-bacitracin-polymyxin] Hives    Neomycin     Neomycin-polymyxin Hives    Neosporin g.u. irrigant     Penicillins Other (See Comments)     Unknown  reaction    Latex Rash       Current Facility-Administered Medications   Medication Dose Route Frequency Provider Last Rate Last Admin    cefTRIAXone (Rocephin) 1 g in dextrose 5 % in water (D5W) 100 mL IVPB (MB+)  1 g Intravenous ED 1 Time Lefort, Guy J.,  mL/hr at 24 0549 1 g at 24 0549    iohexoL (OMNIPAQUE 350) injection 100 mL  100 mL Intravenous ONCE PRN Lefort, Guy J., MD        magnesium sulfate in dextrose IVPB (premix) 1 g  1 g Intravenous Once Martínez Brito  mL/hr at 24 0549 1 g at 24 0549     Family History       Problem Relation (Age of Onset)    Bipolar disorder Brother    COPD Mother    Cancer Father    Diabetes Father, Sister    Drug abuse Mother    Heart disease Maternal Uncle    Hypothyroidism Maternal Grandmother    Lung cancer Father          Tobacco Use    Smoking status: Former      Current packs/day: 0.00     Types: Cigarettes     Quit date: 8/1/2013     Years since quitting: 10.7    Smokeless tobacco: Never   Substance and Sexual Activity    Alcohol use: No    Drug use: Yes     Types: Benzodiazepines, Marijuana    Sexual activity: Never     Review of Systems   Unable to perform ROS: Acuity of condition     Objective:     Vital Signs (Most Recent):  Temp: 99.5 °F (37.5 °C) (04/19/24 0452)  Pulse: (!) 131 (04/19/24 0551)  Resp: 20 (04/19/24 0551)  BP: (!) 156/108 (04/19/24 0551)  SpO2: 95 % (04/19/24 0551) Vital Signs (24h Range):  Temp:  [98.4 °F (36.9 °C)-99.5 °F (37.5 °C)] 99.5 °F (37.5 °C)  Pulse:  [] 131  Resp:  [12-47] 20  SpO2:  [95 %-100 %] 95 %  BP: (123-164)/() 156/108     Weight: 56.5 kg (124 lb 9 oz)  Body mass index is 20.73 kg/m².     Physical Exam  Vitals reviewed.   Constitutional:       Comments: Lethargic   HENT:      Head: Normocephalic and atraumatic.      Nose: Nose normal.      Mouth/Throat:      Mouth: Mucous membranes are moist.      Pharynx: Oropharynx is clear.   Cardiovascular:      Rate and Rhythm: Regular rhythm. Tachycardia present.      Pulses: Normal pulses.      Heart sounds: Normal heart sounds.   Pulmonary:      Effort: Pulmonary effort is normal.      Breath sounds: Normal breath sounds.   Abdominal:      General: Bowel sounds are normal.   Musculoskeletal:         General: Normal range of motion.      Cervical back: Normal range of motion.   Skin:     General: Skin is warm and dry.      Capillary Refill: Capillary refill takes less than 2 seconds.   Neurological:      Mental Status: She is lethargic.      GCS: GCS eye subscore is 2. GCS verbal subscore is 3. GCS motor subscore is 5.                Significant Labs: All pertinent labs within the past 24 hours have been reviewed.  Recent Lab Results  (Last 5 results in the past 24 hours)        04/19/24  0534   04/19/24  0224   04/19/24  0107   04/19/24  0103   04/18/24  2342        Base  Deficit       -1.1         Performed By:       acousins         Specimen source       Venous         Albumin         4.1       Alcohol, Serum   <10             ALP         77       ALT         13       Ammonia   30             Anion Gap         18       Appearance, UA Clear               AST         19       Bacteria, UA None               Baso #         0.03       Basophil %         0.3       Beta-Hydroxybutyrate     0.4           Bilirubin (UA) Negative               BILIRUBIN TOTAL         1.5  Comment: For infants and newborns, interpretation of results should be based  on gestational age, weight and in agreement with clinical  observations.    Premature Infant recommended reference ranges:  Up to 24 hours.............<8.0 mg/dL  Up to 48 hours............<12.0 mg/dL  3-5 days..................<15.0 mg/dL  6-29 days.................<15.0 mg/dL         BUN         11       Calcium         9.6       Chloride         103       CO2         19       Color, UA Yellow               Creatinine         0.9       Differential Method         Automated       eGFR         >60       Eos #         0.0       Eos %         0.0       FiO2       21.0         Free T4   1.09             Glucose         206       Glucose, UA 3+               Gran # (ANC)         10.5       Gran %         89.8       Hematocrit         43.7       Hemoglobin         15.1       Hyaline Casts, UA 1               Immature Grans (Abs)         0.04  Comment: Mild elevation in immature granulocytes is non specific and   can be seen in a variety of conditions including stress response,   acute inflammation, trauma and pregnancy. Correlation with other   laboratory and clinical findings is essential.         Immature Granulocytes         0.3       Ketones, UA 2+               Lactic Acid Level   3.0  Comment: Falsely low lactic acid results can be found in samples   containing >=13.0 mg/dL total bilirubin and/or >=3.5 mg/dL   direct bilirubin.                Leukocyte Esterase, UA Negative               Lipase         28       Lymph #         0.6       Lymph %         5.1       Magnesium          1.4       MCH         30.0       MCHC         34.6       MCV         87       Microscopic Comment SEE COMMENT  Comment: Other formed elements not mentioned in the report are not   present in the microscopic examination.                  Mono #         0.5       Mono %         4.5       MPV         11.4       NITRITE UA Negative               nRBC         0       Blood, UA Trace               pH, UA 7.0               Platelet Count         201       POC HCO3       25.7         POC PCO2       49.1  Comment: Value above reference range         POC PH       7.327  Comment: Value below reference range         POC PO2       21.0  Comment:  notified  at    read back  Value below critical limit           POC SATURATED O2       34.1  Comment:  notified  at    read back  Value below critical limit           Potassium         3.2       PROTEIN TOTAL         8.6       Protein, UA 1+  Comment: Recommend a 24 hour urine protein or a urine   protein/creatinine ratio if globulin induced proteinuria is  clinically suspected.                 RBC         5.04       RBC, UA 1               RDW         12.8       Sodium         140       Specific [place], UA 1.015               Specimen UA Urine, Catheterized               Troponin I   0.472  Comment: The reference interval for Troponin I represents the 99th percentile   cutoff   for our facility and is consistent with 3rd generation assay   performance.         0.103  Comment: The reference interval for Troponin I represents the 99th percentile   cutoff   for our facility and is consistent with 3rd generation assay   performance.         TSH   0.266             UROBILINOGEN UA Negative               WBC, UA 1               WBC         11.72       Yeast, UA None                                      Significant Imaging: I have reviewed all pertinent  imaging results/findings within the past 24 hours.  I have reviewed and interpreted all pertinent imaging results/findings within the past 24 hours.

## 2024-04-19 NOTE — ED PROVIDER NOTES
Encounter Date: 2024       History     Chief Complaint   Patient presents with    Abdominal Pain     C/O abdominal pain, vomiting and confusion since this morning.  EMT gave pt 4 mg Zofran IV.     58-year-old female with a history of MS, polysubstance abuse, takotsubo, presents by EMS from [facility] with complaint of abdominal pain.  Patient is provide details regarding her complaints but does place her hand over the epigastrium regarding her pain.  She is answering questions unreliable.  EMS reports that patient was noted to have abdominal pain vomiting and confusion starting today.      The history is provided by the patient. The history is limited by the condition of the patient.   Abdominal Pain      Review of patient's allergies indicates:   Allergen Reactions    Adhesive Hives    Neosporin [neomycin-bacitracin-polymyxin] Hives    Neomycin     Neomycin-polymyxin Hives    Neosporin g.u. irrigant     Penicillins Other (See Comments)     Unknown  reaction    Latex Rash     Past Medical History:   Diagnosis Date    Behavioral problem     Bulging lumbar disc     Bursitis     bilateral hip    Degenerative cervical disc     Hx of psychiatric care     Hypercholesteremia     Labral tear of hip, degenerative bilateral    MS (multiple sclerosis)     Paresthesia of both lower extremities 3/13/2022    Pneumonia     Spinal cord compression      Past Surgical History:   Procedure Laterality Date    ANGIOGRAM, CORONARY, WITH LEFT HEART CATHETERIZATION Left 3/11/2022    Procedure: Angiogram, Coronary, with Left Heart Cath;  Surgeon: Elie Matamoros MD;  Location: [facility] CATH LAB;  Service: Cardiology;  Laterality: Left;    BREAST SURGERY      augmentation     SECTION, CLASSIC      HAND SURGERY      HYSTEROSCOPY      NECK SURGERY      cervical disc removeal    TUBAL LIGATION      X-STOP IMPLANTATION       Family History   Problem Relation Name Age of Onset    Cancer Father      Diabetes Father      Lung cancer Father       Diabetes Sister      COPD Mother      Drug abuse Mother      Bipolar disorder Brother      Heart disease Maternal Uncle      Hypothyroidism Maternal Grandmother       Social History     Tobacco Use    Smoking status: Former     Current packs/day: 0.00     Types: Cigarettes     Quit date: 8/1/2013     Years since quitting: 10.7    Smokeless tobacco: Never   Substance Use Topics    Alcohol use: No    Drug use: Yes     Types: Benzodiazepines, Marijuana     Review of Systems   Unable to perform ROS: Mental status change   Gastrointestinal:  Positive for abdominal pain.       Physical Exam     Initial Vitals [04/18/24 2244]   BP Pulse Resp Temp SpO2   (!) 164/90 72 20 98.4 °F (36.9 °C) 100 %      MAP       --         Physical Exam    Nursing note and vitals reviewed.  Constitutional: She appears well-developed and well-nourished. She is not diaphoretic. No distress.   HENT:   Head: Normocephalic and atraumatic.   DMM   Eyes: Conjunctivae and EOM are normal. No scleral icterus.   Neck: Neck supple.   Normal range of motion.  Cardiovascular:  Normal rate, regular rhythm and intact distal pulses.           Pulmonary/Chest: No respiratory distress. She exhibits no tenderness.   Abdominal: She exhibits no distension. There is abdominal tenderness (epigastrum).   Musculoskeletal:         General: No edema. Normal range of motion.      Cervical back: Normal range of motion and neck supple.     Neurological: She has normal strength. She is disoriented. She displays no tremor. Coordination normal.   Oriented to person  DUFFY     Skin: Skin is warm and dry.         ED Course   Critical Care    Date/Time: 4/19/2024 3:46 AM    Performed by: Lefort, Guy J., MD  Authorized by: Lefort, Guy J., MD  Other critical care time: 35 minutes  Total critical care time (exclusive of procedural time) : 35 minutes  Critical care time was exclusive of separately billable procedures and treating other patients.  Critical care was necessary to  treat or prevent imminent or life-threatening deterioration of the following conditions: CNS failure or compromise and cardiac failure.        Labs Reviewed   CBC W/ AUTO DIFFERENTIAL - Abnormal; Notable for the following components:       Result Value    Gran # (ANC) 10.5 (*)     Lymph # 0.6 (*)     Gran % 89.8 (*)     Lymph % 5.1 (*)     All other components within normal limits   COMPREHENSIVE METABOLIC PANEL - Abnormal; Notable for the following components:    Potassium 3.2 (*)     CO2 19 (*)     Glucose 206 (*)     Total Protein 8.6 (*)     Total Bilirubin 1.5 (*)     Anion Gap 18 (*)     All other components within normal limits   TROPONIN I - Abnormal; Notable for the following components:    Troponin I 0.103 (*)     All other components within normal limits   MAGNESIUM - Abnormal; Notable for the following components:    Magnesium 1.4 (*)     All other components within normal limits   TROPONIN I - Abnormal; Notable for the following components:    Troponin I 0.472 (*)     All other components within normal limits   LACTIC ACID, PLASMA - Abnormal; Notable for the following components:    Lactate (Lactic Acid) 3.0 (*)     All other components within normal limits   CULTURE, BLOOD   CULTURE, BLOOD   LIPASE   BETA - HYDROXYBUTYRATE, SERUM   ALCOHOL,MEDICAL (ETHANOL)   AMMONIA   URINALYSIS, REFLEX TO URINE CULTURE   DRUG SCREEN PANEL, URINE EMERGENCY   TSH   LACTIC ACID, PLASMA     EKG Readings: (Independently Interpreted)   Initial Reading: No STEMI. Rhythm: Normal Sinus Rhythm. Ectopy: No Ectopy. Conduction: Normal. ST Segments: Normal ST Segments.       Imaging Results              CT Head Without Contrast (Final result)  Result time 04/19/24 02:17:53      Final result by Sonya Webster MD (04/19/24 02:17:53)                   Impression:      1. No CT evidence of acute intracranial abnormality. Clinical correlation and further evaluation as warranted.  2. Patchy hypoattenuation in the supratentorial white  matter, similar to prior studies and possibly relating to prior demyelinating process in this patient with reported history of multiple sclerosis.  Further assessment as warranted.      Electronically signed by: Sonya Webster MD  Date:    04/19/2024  Time:    02:17               Narrative:    EXAMINATION:  CT HEAD WITHOUT CONTRAST    CLINICAL HISTORY:  Mental status change, unknown cause;    TECHNIQUE:  Low dose axial images were obtained through the head.  Coronal and sagittal reformations were also performed. Contrast was not administered.    COMPARISON:  Head CT 05/24/2023    FINDINGS:  Please note image quality is degraded by patient motion artifact.  Allowing for this, there is no evidence of acute intracranial hemorrhage, hydrocephalus, midline shift or mass effect.  Brain parenchyma appears similar to most recent examination with patchy hypoattenuation in the supratentorial white matter, but likely relating to sequela of prior demyelination in this patient with reported history of multiple sclerosis.  Basal cisterns are patent.  The visualized paranasal sinuses and mastoid air cells are clear of acute process.  The visualized bones of the calvarium demonstrate no acute osseous abnormality.                                       CT Abdomen Pelvis  Without Contrast (Final result)  Result time 04/19/24 02:01:22      Final result by Noah Meléndez DO (04/19/24 02:01:22)                   Impression:      No acute abnormality of the abdomen or pelvis.      Electronically signed by: Noah Meléndez  Date:    04/19/2024  Time:    02:01               Narrative:    EXAMINATION:  CT ABDOMEN PELVIS WITHOUT CONTRAST    CLINICAL HISTORY:  Epigastric pain;    TECHNIQUE:  Multiplanar images were obtained of the abdomen and pelvis from the hemidiaphragms through the symphysis pubis without intravenous contrast.  Examination was repeated due to motion artifact.    COMPARISON:  CT chest from 06/27/2023.    FINDINGS:  There is  motion artifact.  There is also artifact from the patient's arms which are within the field of view, resulting in streak artifact.  Examination is also limited due to abnormal patient positioning.    Lung Bases: Clear.    Heart: Heart size is normal.  No pericardial effusion.    Liver: The liver is enlarged.  There are no focal hepatic lesions.    Biliary tract: No intrahepatic or extrahepatic biliary ductal dilatation.    Gallbladder: No radiodense gallstone. No wall thickening or pericholecystic fluid.    Pancreas: Normal. No pancreatic ductal dilatation.    Spleen: Normal size without focal lesion.    Adrenals: Normal.    Kidneys and urinary collecting systems: Normal.  No hydronephrosis or urolithiasis.    Lymph nodes: None enlarged.    Stomach and bowel: The stomach is normal.  Loops of small and large bowel are normal in caliber without evidence for inflammation or obstruction.  The appendix is normal.    Peritoneum and mesentery: No ascites or free intraperitoneal air. No abdominal fluid collection.    Vasculature: There is mild atherosclerosis.    Urinary bladder: Normal.    Reproductive organs: The uterus is absent.    Body wall: Partially imaged bilateral breast prostheses.    Musculoskeletal: No aggressive osseous lesion.  There is grade 1 anterolisthesis of L4 on L5.                                       X-Ray Chest 1 View (Final result)  Result time 04/19/24 02:03:40      Final result by Noah Meléndez DO (04/19/24 02:03:40)                   Impression:      No acute abnormality.      Electronically signed by: Noah Meléndez  Date:    04/19/2024  Time:    02:03               Narrative:    EXAMINATION:  XR CHEST 1 VIEW    CLINICAL HISTORY:  Epigastric pain    TECHNIQUE:  Single frontal view of the chest was performed.    COMPARISON:  None    FINDINGS:  The lungs are well expanded.  Stable chronic coarse interstitial prominence.  No new focal consolidation.  The pleural spaces are clear. The cardiac  silhouette is unremarkable.  There are calcifications of the aortic arch.  The visualized osseous structures are unremarkable.  Cervical spine hardware noted.                                       Medications   iohexoL (OMNIPAQUE 350) injection 100 mL (has no administration in time range)   cefTRIAXone (Rocephin) 1 g in dextrose 5 % in water (D5W) 100 mL IVPB (MB+) (has no administration in time range)   droPERidol injection 0.625 mg (0.625 mg Intravenous Given 4/19/24 0011)   droPERidol injection 2.5 mg (2.5 mg Intravenous Given 4/19/24 0053)   diphenhydrAMINE injection 12.5 mg (12.5 mg Intravenous Given 4/19/24 0052)   0.9%  NaCl infusion ( Intravenous New Bag 4/19/24 0250)     Medical Decision Making  No emesis in the department.  Serial exam abdominal exam is unchanged.  EKG without ischemic change noted.  Remarkable labs include troponin rising, lactate elevated, anion gap, hyperglycemia negative ketone, mildly low potassium and magnesium.  Imaging of the head and abdomen without acute process as well as chest x-ray appears clear Clinically appears dry, hydration initiated.  Patient displayed progressive resistance to routine care throughout the stand department.  Soft restraints were initiated as the patient was requiring frequent redirected and trying to climb out of bed.  Placed in observation with concern for NSTEMI and acute encephalopathy    Amount and/or Complexity of Data Reviewed  Labs: ordered. Decision-making details documented in ED Course.  Radiology: ordered and independent interpretation performed. Decision-making details documented in ED Course.  ECG/medicine tests: ordered and independent interpretation performed. Decision-making details documented in ED Course.    Risk  Prescription drug management.                                      Clinical Impression:  Final diagnoses:  [R10.13] Epigastric abdominal pain  [I21.4] NSTEMI (non-ST elevated myocardial infarction) (Primary)  [G93.40] Acute  encephalopathy  [E83.42] Hypomagnesemia  [E87.6] Hypokalemia          ED Disposition Condition    Observation Stable                Lefort, Guy J., MD  04/19/24 5745

## 2024-04-19 NOTE — EICU
Brief Note     58 yr old female with prior diagnosis of MS   Per group home 'not acting like herself'   Nausea/ vomitting   No other signs or symptoms that she can relate to us    Possible NSTEMI ? Type 1 vs 2   Acute metabolic acidosis     Repeat labs   On IVF   Metoprolol added   Swallow eval   Magnesium being replaced   Follow am CBC and BMP , Lactic acid, troponin   HD stable at the moment

## 2024-04-19 NOTE — PLAN OF CARE
The sw is in ICU currently,very altered. The sw spoke to the pt's son Jono Ceballos [phone] via phone to complete the assessment. The pt's son states the pt recently moved to [place] with 2 of her friends,not a group home. The pt is a transfer from [facility]. The pt is independent with her ADL's and has the dme listed below. The pt's not employed due to being disabled and receives a monthly Disability check. Jono states the pt presented to the hospital about 6 months ago with the same symptoms. The sw spoke to the pt's sister Celeste Olivarez [phone] very briefly on the phone who states the pt lives in a house with 2 of her friends,not a group home as listed in the chart. The sw left her name and contact info with the pt's family members. The sw encouraged them to call if they have any further questions or concerns. The sw will continue to follow the pt throughout her transitions of care and will assist with any d/c needs. The sw completed the white board in the pt's room with her name and contact info. The sw left a d/c brochure at bedside for the pt's family with her contact info on it.     [facility] - Intensive Care  Discharge Assessment    Primary Care Provider: Kip Jimenez MD     Discharge Assessment (most recent)       BRIEF DISCHARGE ASSESSMENT - 04/19/24 1787          Discharge Planning    Assessment Type Discharge Planning Assessment (P)      Resource/Environmental Concerns none (P)      Support Systems Children;Family members;Friends/neighbors (P)      Equipment Currently Used at Home cane, straight (P)      Current Living Arrangements home (P)      Patient/Family Anticipates Transition to home with family (P)      Patient/Family Anticipated Services at Transition -- (P)    TBD    DME Needed Upon Discharge  other (see comments) (P)    TBD    Discharge Plan A Home with family (P)      Discharge Plan B Home Health (P)         Physical Activity    On average, how many days per week do you  engage in moderate to strenuous exercise (like a brisk walk)? 0 days (P)      On average, how many minutes do you engage in exercise at this level? 0 min (P)         Financial Resource Strain    How hard is it for you to pay for the very basics like food, housing, medical care, and heating? Not hard at all (P)         Housing Stability    In the last 12 months, was there a time when you were not able to pay the mortgage or rent on time? No (P)      In the past 12 months, how many times have you moved where you were living? 1 (P)      At any time in the past 12 months, were you homeless or living in a shelter (including now)? No (P)         Transportation Needs    In the past 12 months, has lack of transportation kept you from medical appointments or from getting medications? No (P)      In the past 12 months, has lack of transportation kept you from meetings, work, or from getting things needed for daily living? No (P)         Food Insecurity    Within the past 12 months, you worried that your food would run out before you got the money to buy more. Never true (P)      Within the past 12 months, the food you bought just didn't last and you didn't have money to get more. Never true (P)         Stress    Do you feel stress - tense, restless, nervous, or anxious, or unable to sleep at night because your mind is troubled all the time - these days? Very much (P)         Social Connections    In a typical week, how many times do you talk on the phone with family, friends, or neighbors? More than three times a week (P)      How often do you get together with friends or relatives? More than three times a week (P)      How often do you attend Evangelical or Caodaism services? Never (P)      Do you belong to any clubs or organizations such as Evangelical groups, unions, fraternal or athletic groups, or school groups? No (P)      How often do you attend meetings of the clubs or organizations you belong to? Never (P)      Are you  , , , , never , or living with a partner?  (P)         Alcohol Use    Q1: How often do you have a drink containing alcohol? Monthly or less (P)      Q2: How many drinks containing alcohol do you have on a typical day when you are drinking? 1 or 2 (P)      Q3: How often do you have six or more drinks on one occasion? Never (P)

## 2024-04-19 NOTE — SUBJECTIVE & OBJECTIVE
Past Medical History:   Diagnosis Date    Behavioral problem     Bulging lumbar disc     Bursitis     bilateral hip    Degenerative cervical disc     Hx of psychiatric care     Hypercholesteremia     Labral tear of hip, degenerative bilateral    MS (multiple sclerosis)     Paresthesia of both lower extremities 3/13/2022    Pneumonia     Spinal cord compression        Past Surgical History:   Procedure Laterality Date    ANGIOGRAM, CORONARY, WITH LEFT HEART CATHETERIZATION Left 3/11/2022    Procedure: Angiogram, Coronary, with Left Heart Cath;  Surgeon: Elie Matamoros MD;  Location: [facility] CATH LAB;  Service: Cardiology;  Laterality: Left;    BREAST SURGERY      augmentation     SECTION, CLASSIC      HAND SURGERY      HYSTEROSCOPY      NECK SURGERY      cervical disc removeal    TUBAL LIGATION      X-STOP IMPLANTATION         Review of patient's allergies indicates:   Allergen Reactions    Adhesive Hives    Neosporin [neomycin-bacitracin-polymyxin] Hives    Neomycin     Neomycin-polymyxin Hives    Neosporin g.u. irrigant     Penicillins Other (See Comments)     Unknown  reaction    Latex Rash       Current Facility-Administered Medications   Medication Dose Route Frequency Provider Last Rate Last Admin    dexmedetomidine (PRECEDEX) 400mcg/100mL 0.9% NaCL infusion  0-1.4 mcg/kg/hr Intravenous Continuous Erica Barber MD 5.62 mL/hr at 24 1307 0.4 mcg/kg/hr at 24 1307    enoxaparin injection 40 mg  40 mg Subcutaneous Q24H (prophylaxis, 1700) Daniel Baumann MD        folic acid 1 mg in dextrose 5 % (D5W) 100 mL IVPB  1 mg Intravenous Daily Erica Barber MD   Stopped at 24 1258    iohexoL (OMNIPAQUE 350) injection 100 mL  100 mL Intravenous ONCE PRN Lefort, Guy J., MD        naloxone 0.4 mg/mL injection 0.4 mg  0.4 mg Intravenous PRN Daniel Baumann MD   0.4 mg at 24 1019    thiamine (B-1) 500 mg in dextrose 5 % (D5W) 100 mL IVPB  500 mg Intravenous TID Loly Waite MD          Family History       Problem Relation (Age of Onset)    Bipolar disorder Brother    COPD Mother    Cancer Father    Diabetes Father, Sister    Drug abuse Mother    Heart disease Maternal Uncle    Hypothyroidism Maternal Grandmother    Lung cancer Father          Tobacco Use    Smoking status: Former     Current packs/day: 0.00     Types: Cigarettes     Quit date: 8/1/2013     Years since quitting: 10.7    Smokeless tobacco: Never   Substance and Sexual Activity    Alcohol use: No    Drug use: Yes     Types: Benzodiazepines, Marijuana    Sexual activity: Never     Review of Systems   Unable to perform ROS: Other     Objective:     Vital Signs (Most Recent):  Temp: 98.6 °F (37 °C) (04/19/24 1130)  Pulse: (!) 127 (04/19/24 1215)  Resp: (!) 35 (04/19/24 1215)  BP: (!) 169/110 (04/19/24 1215)  SpO2: 100 % (04/19/24 1215) Vital Signs (24h Range):  Temp:  [98.4 °F (36.9 °C)-99.5 °F (37.5 °C)] 98.6 °F (37 °C)  Pulse:  [] 127  Resp:  [12-73] 35  SpO2:  [89 %-100 %] 100 %  BP: (123-169)/() 169/110     Weight: 56.2 kg (124 lb)  Body mass index is 20.63 kg/m².    SpO2: 100 %         Intake/Output Summary (Last 24 hours) at 4/19/2024 1321  Last data filed at 4/19/2024 1307  Gross per 24 hour   Intake 1506.54 ml   Output 550 ml   Net 956.54 ml       Lines/Drains/Airways       Drain  Duration                  Urethral Catheter 04/19/24 0530 Double-lumen <1 day              Peripheral Intravenous Line  Duration                  Peripheral IV - Single Lumen 04/19/24 0532 20 G Anterior;Left Forearm <1 day         Peripheral IV - Single Lumen 04/19/24 0533 20 G Anterior;Right Forearm <1 day                     Physical Exam  Constitutional:       General: She is not in acute distress.     Appearance: She is well-developed.   Cardiovascular:      Rate and Rhythm: Normal rate and regular rhythm.      Heart sounds: No murmur heard.     No gallop.   Pulmonary:      Effort: Pulmonary effort is normal. No respiratory  distress.      Breath sounds: Normal breath sounds. No wheezing.   Abdominal:      General: Bowel sounds are normal. There is no distension.      Palpations: Abdomen is soft.      Tenderness: There is no abdominal tenderness.   Skin:     General: Skin is warm and dry.          Significant Labs: BMP:   Recent Labs   Lab 04/18/24 2342 04/19/24  0621   * 182*    141   K 3.2* 3.8    104   CO2 19* 18*   BUN 11 11   CREATININE 0.9 0.9   CALCIUM 9.6 9.5   MG 1.4*  --    , CBC   Recent Labs   Lab 04/18/24 2342 04/19/24  0621 04/19/24  1302   WBC 11.72 15.13* 15.46*   HGB 15.1 16.3* 16.8*   HCT 43.7 48.6* 49.3*    270 298   , and Troponin   Recent Labs   Lab 04/18/24 2342 04/19/24  0224 04/19/24  0542   TROPONINI 0.103* 0.472* 1.242*       Significant Imaging: Echocardiogram: Transthoracic echo (TTE) complete ([facility] Only):   Results for orders placed or performed during the hospital encounter of 04/18/24   Echo   Result Value Ref Range    BSA 1.61 m2    LVIDd 5.24 3.5 - 6.0 cm    LV Systolic Volume 93.61 mL    LV Systolic Volume Index 58.1 mL/m2    LVIDs 4.52 (A) 2.1 - 4.0 cm    LV Diastolic Volume 132.05 mL    LV Diastolic Volume Index 82.02 mL/m2    IVS 0.61 0.6 - 1.1 cm    LVOT diameter 1.66 cm    LVOT area 2.2 cm2    FS 14 (A) 28 - 44 %    Left Ventricle Relative Wall Thickness 0.37 cm    Posterior Wall 0.96 0.6 - 1.1 cm    LV mass 143.66 g    LV Mass Index 89 g/m2    TDI LATERAL 0.09 m/s    TDI SEPTAL 0.09 m/s    LA size 3.31 cm    PV PEAK VELOCITY 0.99 m/s    PV peak gradient 4 mmHg    Pulmonary Valve Mean Velocity 0.76 m/s    Sinus 2.65 cm    STJ 2.10 cm    Ascending aorta 2.45 cm    Mean e' 0.09 m/s    ZLVIDS 3.69     ZLVIDD 1.37     Narrative      Left Ventricle: Not well visualized due to poor sonic window. Global   hypokinesis present. There is unable to assess systolic function visually   estimated ejection fraction of 25 - 30%. Unable to assess diastolic   function due to poor image  quality.    Mitral Valve: There is mild regurgitation.    Overall the study quality was technically difficult. The study was   difficult due to patient's uncooperativeness and clinical status.

## 2024-04-20 PROBLEM — T40.2X1A OPIOID OVERDOSE: Status: ACTIVE | Noted: 2024-04-20

## 2024-04-20 PROBLEM — T50.901A ACCIDENTAL OVERDOSE: Status: ACTIVE | Noted: 2024-04-20

## 2024-04-20 LAB
ALBUMIN SERPL BCP-MCNC: 3.6 G/DL (ref 3.5–5.2)
ALP SERPL-CCNC: 71 U/L (ref 55–135)
ALT SERPL W/O P-5'-P-CCNC: 12 U/L (ref 10–44)
ANION GAP SERPL CALC-SCNC: 13 MMOL/L (ref 8–16)
AST SERPL-CCNC: 31 U/L (ref 10–40)
BASOPHILS # BLD AUTO: 0.02 K/UL (ref 0–0.2)
BASOPHILS NFR BLD: 0.1 % (ref 0–1.9)
BILIRUB SERPL-MCNC: 1.9 MG/DL (ref 0.1–1)
BUN SERPL-MCNC: 16 MG/DL (ref 6–20)
CALCIUM SERPL-MCNC: 9.1 MG/DL (ref 8.7–10.5)
CHLORIDE SERPL-SCNC: 104 MMOL/L (ref 95–110)
CO2 SERPL-SCNC: 22 MMOL/L (ref 23–29)
CREAT SERPL-MCNC: 0.8 MG/DL (ref 0.5–1.4)
DIFFERENTIAL METHOD BLD: ABNORMAL
EOSINOPHIL # BLD AUTO: 0 K/UL (ref 0–0.5)
EOSINOPHIL NFR BLD: 0.1 % (ref 0–8)
ERYTHROCYTE [DISTWIDTH] IN BLOOD BY AUTOMATED COUNT: 13.3 % (ref 11.5–14.5)
EST. GFR  (NO RACE VARIABLE): >60 ML/MIN/1.73 M^2
GLUCOSE SERPL-MCNC: 126 MG/DL (ref 70–110)
HCT VFR BLD AUTO: 47.9 % (ref 37–48.5)
HGB BLD-MCNC: 16.2 G/DL (ref 12–16)
IMM GRANULOCYTES # BLD AUTO: 0.07 K/UL (ref 0–0.04)
IMM GRANULOCYTES NFR BLD AUTO: 0.4 % (ref 0–0.5)
LYMPHOCYTES # BLD AUTO: 0.8 K/UL (ref 1–4.8)
LYMPHOCYTES NFR BLD: 5.2 % (ref 18–48)
MCH RBC QN AUTO: 29.7 PG (ref 27–31)
MCHC RBC AUTO-ENTMCNC: 33.8 G/DL (ref 32–36)
MCV RBC AUTO: 88 FL (ref 82–98)
MONOCYTES # BLD AUTO: 1 K/UL (ref 0.3–1)
MONOCYTES NFR BLD: 6.2 % (ref 4–15)
NEUTROPHILS # BLD AUTO: 14.2 K/UL (ref 1.8–7.7)
NEUTROPHILS NFR BLD: 88 % (ref 38–73)
NRBC BLD-RTO: 0 /100 WBC
PLATELET # BLD AUTO: 199 K/UL (ref 150–450)
PMV BLD AUTO: 11.5 FL (ref 9.2–12.9)
POTASSIUM SERPL-SCNC: 3.3 MMOL/L (ref 3.5–5.1)
PROT SERPL-MCNC: 8.2 G/DL (ref 6–8.4)
RBC # BLD AUTO: 5.46 M/UL (ref 4–5.4)
SODIUM SERPL-SCNC: 139 MMOL/L (ref 136–145)
TROPONIN I SERPL DL<=0.01 NG/ML-MCNC: 1.79 NG/ML (ref 0–0.03)
WBC # BLD AUTO: 16.09 K/UL (ref 3.9–12.7)

## 2024-04-20 PROCEDURE — 85025 COMPLETE CBC W/AUTO DIFF WBC: CPT | Performed by: INTERNAL MEDICINE

## 2024-04-20 PROCEDURE — 51701 INSERT BLADDER CATHETER: CPT

## 2024-04-20 PROCEDURE — 25000003 PHARM REV CODE 250

## 2024-04-20 PROCEDURE — 63600175 PHARM REV CODE 636 W HCPCS: Performed by: STUDENT IN AN ORGANIZED HEALTH CARE EDUCATION/TRAINING PROGRAM

## 2024-04-20 PROCEDURE — 11000001 HC ACUTE MED/SURG PRIVATE ROOM

## 2024-04-20 PROCEDURE — 25000003 PHARM REV CODE 250: Performed by: INTERNAL MEDICINE

## 2024-04-20 PROCEDURE — 36415 COLL VENOUS BLD VENIPUNCTURE: CPT | Performed by: STUDENT IN AN ORGANIZED HEALTH CARE EDUCATION/TRAINING PROGRAM

## 2024-04-20 PROCEDURE — 63600175 PHARM REV CODE 636 W HCPCS: Performed by: INTERNAL MEDICINE

## 2024-04-20 PROCEDURE — 84484 ASSAY OF TROPONIN QUANT: CPT | Performed by: STUDENT IN AN ORGANIZED HEALTH CARE EDUCATION/TRAINING PROGRAM

## 2024-04-20 PROCEDURE — 99231 SBSQ HOSP IP/OBS SF/LOW 25: CPT | Mod: 95,,, | Performed by: PSYCHIATRY & NEUROLOGY

## 2024-04-20 PROCEDURE — 51798 US URINE CAPACITY MEASURE: CPT

## 2024-04-20 PROCEDURE — 94761 N-INVAS EAR/PLS OXIMETRY MLT: CPT

## 2024-04-20 PROCEDURE — 36415 COLL VENOUS BLD VENIPUNCTURE: CPT | Performed by: INTERNAL MEDICINE

## 2024-04-20 PROCEDURE — 80053 COMPREHEN METABOLIC PANEL: CPT | Performed by: INTERNAL MEDICINE

## 2024-04-20 PROCEDURE — 63600175 PHARM REV CODE 636 W HCPCS: Performed by: FAMILY MEDICINE

## 2024-04-20 RX ORDER — MUPIROCIN 20 MG/G
OINTMENT TOPICAL 2 TIMES DAILY
Status: DISCONTINUED | OUTPATIENT
Start: 2024-04-20 | End: 2024-04-24 | Stop reason: HOSPADM

## 2024-04-20 RX ORDER — POTASSIUM CHLORIDE 7.45 MG/ML
10 INJECTION INTRAVENOUS
Status: COMPLETED | OUTPATIENT
Start: 2024-04-20 | End: 2024-04-21

## 2024-04-20 RX ORDER — FUROSEMIDE 10 MG/ML
40 INJECTION INTRAMUSCULAR; INTRAVENOUS ONCE
Status: COMPLETED | OUTPATIENT
Start: 2024-04-20 | End: 2024-04-20

## 2024-04-20 RX ADMIN — FUROSEMIDE 40 MG: 10 INJECTION, SOLUTION INTRAVENOUS at 12:04

## 2024-04-20 RX ADMIN — THIAMINE HYDROCHLORIDE 500 MG: 100 INJECTION, SOLUTION INTRAMUSCULAR; INTRAVENOUS at 09:04

## 2024-04-20 RX ADMIN — ENOXAPARIN SODIUM 40 MG: 40 INJECTION SUBCUTANEOUS at 05:04

## 2024-04-20 RX ADMIN — MUPIROCIN: 20 OINTMENT TOPICAL at 12:04

## 2024-04-20 RX ADMIN — THIAMINE HYDROCHLORIDE 500 MG: 100 INJECTION, SOLUTION INTRAMUSCULAR; INTRAVENOUS at 03:04

## 2024-04-20 RX ADMIN — DEXMEDETOMIDINE HYDROCHLORIDE 0.6 MCG/KG/HR: 4 INJECTION, SOLUTION INTRAVENOUS at 02:04

## 2024-04-20 RX ADMIN — THIAMINE HYDROCHLORIDE 500 MG: 100 INJECTION, SOLUTION INTRAMUSCULAR; INTRAVENOUS at 08:04

## 2024-04-20 RX ADMIN — MUPIROCIN: 20 OINTMENT TOPICAL at 08:04

## 2024-04-20 RX ADMIN — FOLIC ACID 1 MG: 5 INJECTION, SOLUTION INTRAMUSCULAR; INTRAVENOUS; SUBCUTANEOUS at 09:04

## 2024-04-20 RX ADMIN — POTASSIUM CHLORIDE 10 MEQ: 7.46 INJECTION, SOLUTION INTRAVENOUS at 08:04

## 2024-04-20 RX ADMIN — POTASSIUM CHLORIDE 10 MEQ: 7.46 INJECTION, SOLUTION INTRAVENOUS at 10:04

## 2024-04-20 NOTE — SUBJECTIVE & OBJECTIVE
Interval History: Per chart check patient was able to speak a little to her sister yesterday however she was agitated again requiring precedex for a while that was discontinued this am    Review of Systems   Unable to perform ROS: Acuity of condition     Objective:     Vital Signs (Most Recent):  Temp: 97.8 °F (36.6 °C) (04/20/24 1515)  Pulse: (!) 127 (04/20/24 1615)  Resp: (!) 35 (04/20/24 1615)  BP: (!) 159/100 (04/20/24 1600)  SpO2: (!) 89 % (04/20/24 1615) Vital Signs (24h Range):  Temp:  [97.8 °F (36.6 °C)-99.8 °F (37.7 °C)] 97.8 °F (36.6 °C)  Pulse:  [103-131] 127  Resp:  [24-48] 35  SpO2:  [88 %-100 %] 89 %  BP: (129-176)/() 159/100     Weight: 56.5 kg (124 lb 9 oz)  Body mass index is 20.73 kg/m².    Intake/Output Summary (Last 24 hours) at 4/20/2024 1710  Last data filed at 4/20/2024 1043  Gross per 24 hour   Intake 497.09 ml   Output 400 ml   Net 97.09 ml         Physical Exam  Vitals reviewed.   HENT:      Head: Normocephalic and atraumatic.      Nose: Nose normal.      Mouth/Throat:      Mouth: Mucous membranes are moist.      Pharynx: Oropharynx is clear.   Cardiovascular:      Rate and Rhythm: Regular rhythm. Tachycardia present.      Pulses: Normal pulses.      Heart sounds: Normal heart sounds.   Pulmonary:      Effort: Pulmonary effort is normal.      Breath sounds: Normal breath sounds.   Abdominal:      General: Bowel sounds are normal.   Musculoskeletal:         General: Normal range of motion.      Cervical back: Normal range of motion.   Skin:     General: Skin is warm and dry.      Capillary Refill: Capillary refill takes less than 2 seconds.   Neurological:      Mental Status: She is lethargic.      GCS: GCS eye subscore is 2. GCS verbal subscore is 3. GCS motor subscore is 5.             Significant Labs: All pertinent labs within the past 24 hours have been reviewed.  Blood Culture:   Recent Labs   Lab 04/19/24  0530 04/19/24  0535   LABBLOO No Growth to date  No Growth to date No  Growth to date  No Growth to date     CBC:   Recent Labs   Lab 04/19/24  0621 04/19/24  1302 04/20/24  0412   WBC 15.13* 15.46* 16.09*   HGB 16.3* 16.8* 16.2*   HCT 48.6* 49.3* 47.9    298 199     CMP:   Recent Labs   Lab 04/18/24  2342 04/19/24  0621 04/19/24  1302 04/20/24  0412    141 139 139   K 3.2* 3.8 3.5 3.3*    104 105 104   CO2 19* 18* 19* 22*   * 182* 164* 126*   BUN 11 11 12 16   CREATININE 0.9 0.9 0.8 0.8   CALCIUM 9.6 9.5 9.7 9.1   PROT 8.6*  --  8.4 8.2   ALBUMIN 4.1  --  3.7 3.6   BILITOT 1.5*  --  1.2* 1.9*   ALKPHOS 77  --  79 71   AST 19  --  26 31   ALT 13  --  12 12   ANIONGAP 18* 19* 15 13       Significant Imaging: I have reviewed all pertinent imaging results/findings within the past 24 hours.

## 2024-04-20 NOTE — ASSESSMENT & PLAN NOTE
CT brain without acute abnormality, unchanged MS findings  Ammonia normal  Has hx MS  Neurology consult   Acute encephalopathy  -presented with confusion and abdominal pain   -CTH with no lesion   -On precedex for agitation   -Consider MRI brain if patient continues to be encephalopathic  -Consider sEEG to rule out seizure if there is no changes in mental status  -Consider LP if patient still encephalopathic to rule out meningitis ot any infectious cause.  -rest of care per team     Will place Tele-Psychiatry consult today

## 2024-04-20 NOTE — PROGRESS NOTES
[facility]  Progress Note    Patient Name: Reza Morrow  MRN: [id number]  Patient Class: IP- Inpatient   Admission Date: 4/18/2024  Length of Stay: 1 days  Attending Physician: Loly Waite MD  Primary Care Provider: Kip Jimenez MD        Subjective:     Principal Problem:Acute metabolic encephalopathy        HPI:  Patient is a 58-year-old female  with a history of MS, polysubstance abuse, takotsubo, presents by EMS from longterm with complaint of abdominal pain. Patient is provide details regarding her complaints but does place her hand over the epigastrium regarding her pain. She is answering questions unreliable. EMS reports that patient was noted to have abdominal pain vomiting and confusion starting today.  In ED patient noncooperative but unable to be directed.  Given droperidol and Benadryl in ED also needing restraints as pulling out IVs.  Labs significant for anion gap 18 potassium 3.2 and Mag 1.4.  Troponin 0.103, repeat 0.472.  Lactic acid elevated at 3.0.  Ammonia normal.  UA and UDS pending.  CT head abdomen and pelvis all without acute abnormality.      Patient's sister reports she has been in a group home for 2 weeks, does report she has history of polysubstance abuse and is on pain management meds.  Patient had cardiogenic shock 1 year ago requiring intubation with takotsubo EF was 20% however repeat echo  months later with resolution of EF at 60%.  Patient most recent LHC in 2022 without CAD.  Patient's echo in 2023 without coronary artery wall abnormalities.    Overview/Hospital Course:  4/20/24 Alert but not responding to questions, off precedex    Interval History: Per chart check patient was able to speak a little to her sister yesterday however she was agitated again requiring precedex for a while that was discontinued this am    Review of Systems   Unable to perform ROS: Acuity of condition     Objective:     Vital Signs (Most Recent):  Temp: 97.8 °F  (36.6 °C) (04/20/24 1515)  Pulse: (!) 127 (04/20/24 1615)  Resp: (!) 35 (04/20/24 1615)  BP: (!) 159/100 (04/20/24 1600)  SpO2: (!) 89 % (04/20/24 1615) Vital Signs (24h Range):  Temp:  [97.8 °F (36.6 °C)-99.8 °F (37.7 °C)] 97.8 °F (36.6 °C)  Pulse:  [103-131] 127  Resp:  [24-48] 35  SpO2:  [88 %-100 %] 89 %  BP: (129-176)/() 159/100     Weight: 56.5 kg (124 lb 9 oz)  Body mass index is 20.73 kg/m².    Intake/Output Summary (Last 24 hours) at 4/20/2024 1710  Last data filed at 4/20/2024 1043  Gross per 24 hour   Intake 497.09 ml   Output 400 ml   Net 97.09 ml         Physical Exam  Vitals reviewed.   HENT:      Head: Normocephalic and atraumatic.      Nose: Nose normal.      Mouth/Throat:      Mouth: Mucous membranes are moist.      Pharynx: Oropharynx is clear.   Cardiovascular:      Rate and Rhythm: Regular rhythm. Tachycardia present.      Pulses: Normal pulses.      Heart sounds: Normal heart sounds.   Pulmonary:      Effort: Pulmonary effort is normal.      Breath sounds: Normal breath sounds.   Abdominal:      General: Bowel sounds are normal.   Musculoskeletal:         General: Normal range of motion.      Cervical back: Normal range of motion.   Skin:     General: Skin is warm and dry.      Capillary Refill: Capillary refill takes less than 2 seconds.   Neurological:      Mental Status: She is lethargic.      GCS: GCS eye subscore is 2. GCS verbal subscore is 3. GCS motor subscore is 5.             Significant Labs: All pertinent labs within the past 24 hours have been reviewed.  Blood Culture:   Recent Labs   Lab 04/19/24  0530 04/19/24  0535   LABBLOO No Growth to date  No Growth to date No Growth to date  No Growth to date     CBC:   Recent Labs   Lab 04/19/24  0621 04/19/24  1302 04/20/24  0412   WBC 15.13* 15.46* 16.09*   HGB 16.3* 16.8* 16.2*   HCT 48.6* 49.3* 47.9    298 199     CMP:   Recent Labs   Lab 04/18/24  2342 04/19/24  0621 04/19/24  1302 04/20/24  0412    141 139 139   K  3.2* 3.8 3.5 3.3*    104 105 104   CO2 19* 18* 19* 22*   * 182* 164* 126*   BUN 11 11 12 16   CREATININE 0.9 0.9 0.8 0.8   CALCIUM 9.6 9.5 9.7 9.1   PROT 8.6*  --  8.4 8.2   ALBUMIN 4.1  --  3.7 3.6   BILITOT 1.5*  --  1.2* 1.9*   ALKPHOS 77  --  79 71   AST 19  --  26 31   ALT 13  --  12 12   ANIONGAP 18* 19* 15 13       Significant Imaging: I have reviewed all pertinent imaging results/findings within the past 24 hours.    Assessment/Plan:      * Acute metabolic encephalopathy  CT brain without acute abnormality, unchanged MS findings  Ammonia normal  Has hx MS  Neurology consult   Acute encephalopathy  -presented with confusion and abdominal pain   -CTH with no lesion   -On precedex for agitation   -Consider MRI brain if patient continues to be encephalopathic  -Consider sEEG to rule out seizure if there is no changes in mental status  -Consider LP if patient still encephalopathic to rule out meningitis ot any infectious cause.  -rest of care per team     Will place Tele-Psychiatry consult today    Accidental overdose  Treated for opiate overdose      Hypokalemia  Patient has hypokalemia which is Acute and currently controlled. Most recent potassium levels reviewed-   Lab Results   Component Value Date    K 3.2 (L) 04/18/2024   . Will continue potassium replacement per protocol and recheck repeat levels after replacement completed.     Hypomagnesemia  Patient has Abnormal Magnesium: hypomagnesemia. Will continue to monitor electrolytes closely. Will replace the affected electrolytes and repeat labs to be done after interventions completed. The patient's magnesium results have been reviewed and are listed below.  Recent Labs   Lab 04/18/24  2342   MG 1.4*        Takotsubo cardiomyopathy  Echo    Left Ventricle: Not well visualized due to poor sonic window. Global hypokinesis present. There is unable to assess systolic function visually estimated ejection fraction of 25 - 30%. Unable to assess  diastolic function due to poor image quality.    Mitral Valve: There is mild regurgitation.    Overall the study quality was technically difficult. The study was difficult due to patient's uncooperativeness and clinical status.      NSTEMI (non-ST elevated myocardial infarction)  Troponin 0.103, repeat 0.472..trend  Consult cards  ECHO ordered  EKG w/o acute MI  Patient most recent C in 2022 without CAD.    Patient's echo in 2023 without coronary artery wall abnormalities.      VTE Risk Mitigation (From admission, onward)           Ordered     enoxaparin injection 40 mg  Every 24 hours         04/19/24 1011                    Discharge Planning   JOANNE:      Code Status: Full Code   Is the patient medically ready for discharge?:     Reason for patient still in hospital (select all that apply): Patient trending condition and Consult recommendations  Discharge Plan A: Home with family            Critical care time spent on the evaluation and treatment of severe organ dysfunction, review of pertinent labs and imaging studies, discussions with consulting providers and discussions with patient/family: 35 minutes.      Loly Waite MD  Department of Hospital Medicine   [facility] - Intensive Care

## 2024-04-20 NOTE — PLAN OF CARE
Problem: Fall Injury Risk  Goal: Absence of Fall and Fall-Related Injury  Outcome: Ongoing, Progressing    VSS, no acute events noted, pt confused; responsive to verbal stimuli. Precedex gtt titrated to rass score.  Intervention: Identify and Manage Contributors  Flowsheets (Taken 4/19/2024 1953)  Medication Review/Management:   infusion initiated   infusion titrated     Problem: Adult Inpatient Plan of Care  Goal: Plan of Care Review  Outcome: Ongoing, Progressing  Flowsheets (Taken 4/19/2024 1953)  Plan of Care Reviewed With: patient  Goal: Patient-Specific Goal (Individualized)  Outcome: Ongoing, Progressing     Problem: Skin Injury Risk Increased  Goal: Skin Health and Integrity  Outcome: Ongoing, Progressing  Intervention: Optimize Skin Protection  Flowsheets (Taken 4/19/2024 1953)  Pressure Reduction Techniques: frequent weight shift encouraged  Head of Bed (HOB) Positioning: HOB at 30-45 degrees

## 2024-04-20 NOTE — ASSESSMENT & PLAN NOTE
Echo    Left Ventricle: Not well visualized due to poor sonic window. Global hypokinesis present. There is unable to assess systolic function visually estimated ejection fraction of 25 - 30%. Unable to assess diastolic function due to poor image quality.    Mitral Valve: There is mild regurgitation.    Overall the study quality was technically difficult. The study was difficult due to patient's uncooperativeness and clinical status.

## 2024-04-20 NOTE — PROGRESS NOTES
Pulm/CC Fellow Consult Note    Attending Physician: Loly Waite MD    Date of Admit: 2024  Hospital day: 1  History of Present Illness:  Reza Morrow is a 58 y.o.  female with a PMHx of polysubstance use, Takutsubo cardiomyopathy who presented to the ED with altered mental status. History provided by chart review as patient is currently altered. Apparently the patient had some abdominal pain and confusion the day of presentation. She currently lives with two roommates. In the ED the patient was uncooperative and required PRN antipsychotics. Labs were notable for a lactic acid of 3, low bicarb and anion gap of 18. She was admitted to the ICU for altered mental status.    Interval History:  Patient with improvement in mental status by evening. She recognized her sister and answered some questions appropriately. She was placed back on precedex overnight and this AM is again encephalopathic. No signs of significant withdrawal.      Objective:   Last 24 Hour Vital Signs:  BP  Min: 127/90  Max: 176/102  Temp  Av °F (37.2 °C)  Min: 98.6 °F (37 °C)  Max: 99.8 °F (37.7 °C)  Pulse  Av  Min: 103  Max: 131  Resp  Av.9  Min: 24  Max: 48  SpO2  Av.5 %  Min: 88 %  Max: 100 %  Weight  Av.5 kg (124 lb 9 oz)  Min: 56.5 kg (124 lb 9 oz)  Max: 56.5 kg (124 lb 9 oz)  Body mass index is 20.73 kg/m².  I & O (Last 24H):  Intake/Output Summary (Last 24 hours) at 2024 1425  Last data filed at 2024 1043  Gross per 24 hour   Intake 497.09 ml   Output 400 ml   Net 97.09 ml       Physical Exam:  GEN: Awakens with stimulation, does not follow commands.  HEENT: MMM, no scleral icterus, EOMI  NECK: Supple, midline trachea, no raised JVP or a-waves notable  CV: RRR, no MRG, pulses equal and symmetric 2+ at radial  PULM: CTAB, No adventitious sounds.  ABDOMEN: Soft, non-tender, non-distended, no rebound or guarding  SKIN: Warm, dry, intact, no rashes  MSK: No deformity, no clubbing, cyanosis, or  lower extremity edema  NEURO: Lethargic, no focal deficits. Horizontal nystagmus present.  LINES: Intact, no extravasation or induration, no erythema to PIV or support devices       Assessment & Plan:   NEURO:  Acute metabolic encephalopathy.  Likely due to polypharmacy. Patient with multiple medications on board that could precipitate encephalopathy. Received 0.4 of narcan in ICU with positive response. Also has baclofen, ketamine, and long acting potent opioids on . Mental status had improved however required precedex overnight and this AM is again more encephalopathic.    - blood gas without hypercapnia  - Hold all sedating medications including precedex  - Monitor for withdrawal symptoms  - Holding all medications that could precipitate AMS  - Neurology consulted    CV:  History of Taketsubo, patient with significant stressors and multiple substances used. Troponin trended and plateaued. No ischemic changes on EKG.  - Last Echo following hospitalization last year with recovery of EF. Repeat Echo here again with decreased EF and global hypokineses.  - Cardiology consulted  - Maintain euvolemia, 1x dose of lasix today  - at this point would likely benefit from GDMT as tolerated. Will start once mental status improves     PULM:  On room air, at baseline  - Emphysematous changes on CT. But did not get PFTs  - Goal sat > 88%       GI/FEN  NPO until able to be more alert for diet        RENAL:   Renal function at baseline     HEME/ONC:  -- Transfusion threshold <7g/dl per TRICC     ENDOCRINE:  -- Hypoglycemic precautions Goal blood glucose 140-180     INFECTIOUS DISEASE:  No concern for infection at this time. Afebrile, no meningismus    MSK/RHEUM:  -- PT/OT for early mobility      PSYCHOSOCIAL:  -- Addiction psych consult when patient able to participate.    Feeding: Regular diet once more alert  Analgesia: Holding all pain meds  Sedation: Stopped precedex.  DVT prophylaxis:   Anticoagulants       Ordered      Route Frequency Start Stop    04/19/24 1011  enoxaparin         SubQ Every 24 hours 04/19/24 1700 --           Head of Bed: 30 degrees to prevent VAP  Ulcer PPX: N/A  Glucose: Hypoglycemic precautions, SSI  SBT/SAT: N/A  Bowels: Bowel regimen for constipation  Indwelling Lines: Peripherals  Deescalation Abx: N/A/       Patient seen with Dr. Schwab and critical care team. We will continue to follow. Please contact me with any questions should they arise.       Daniel Baumann M.D.  [facility]

## 2024-04-20 NOTE — HOSPITAL COURSE
4/20/24 Alert but not responding to questions, off precedex  4/21 talking, step down from ICU, suboxone  4/22 Much more alert, awaiting transfer to floor  4/23: MRI brain pending. Psych evaulated pt and added effexor  4/24: MRI brain negative for acute abnormalities. Pt back to baseline. Deemed stable to be d/c.

## 2024-04-20 NOTE — PROGRESS NOTES
Medical Decision Making    Admission Date: 2024     I have reviewed medical record data and the patient was evaluated. See Pastor's note for details. This is an attestation of a separate note written by the house officer today. As the teaching physician, I was present for and have confirmed the key portions of the service performed by the resident today. In addition to the resident's note, I add the followin yo with polysubstance use, MS, Takutsobo's, chronic pain syndrome admitted with opioid overdose.  In withdrawal this morning as evidenced by tachycardia, tachypnea, and delirium - little to suggest alcohol or benzodiazepine. Suspect opioids so will monitor closely but hold all sedating Rx for now.  Troponins had been uptreading - reorder  EF 25-30%;  - starting GDMT for HFrEF.  + 0.8L since admit - limit IVF, lasix x1    Critical Care Daily Checklist:     Davis Agitation Sedation Scale (RASS)  Goal/Actual Goal: 0     /    Actual = +1  Actual =    Analgesia/Sedation: Precedex - d/c   Sedation Holiday? off   Spontaneous Breathing Trial Performed? na   Delirium: CAM-ICU positive   Feeding? Hold for now   Thromboembolic Prophylaxis yes   HOB > 300 yes   Stress Ulcer Prophylaxis (if needed) na   Glucose Control 126   Bowel Function Last BM: 24     Regimen:    Indwelling Catheters (Lines & Moore)? Necessity? Moore is out; was   De-escalation of Antimicrobials/Pharmacotherapies na   Code Status: full   Goals of Care:      Critical Care time was spent validating the history and physical exam, reviewing the lab and imaging results, and discussing the care of the patient with the bedside nurse and the patient and/or surrogates. This critical care time did not overlap with that of any other provider or involve time for any procedures.  This patient has a high probability of sudden clinically significant deterioration which requires the highest level of physician preparedness to intervene  urgently. I managed/supervised life or organ supporting interventions that required frequent physician assessments. I devoted my full attention in the ICU to the direct care of this patient for this period of time. Organ systems which are failing and require intensive, critical care support are: cardiovascular, GI, infectious  Critical Care time: 40 minutes    Marquise Gonzalez MD  [facility]  [phone]

## 2024-04-21 LAB
ALBUMIN SERPL BCP-MCNC: 3.3 G/DL (ref 3.5–5.2)
ALP SERPL-CCNC: 63 U/L (ref 55–135)
ALT SERPL W/O P-5'-P-CCNC: 10 U/L (ref 10–44)
ANION GAP SERPL CALC-SCNC: 12 MMOL/L (ref 8–16)
AST SERPL-CCNC: 21 U/L (ref 10–40)
BASOPHILS # BLD AUTO: 0.01 K/UL (ref 0–0.2)
BASOPHILS NFR BLD: 0.1 % (ref 0–1.9)
BILIRUB SERPL-MCNC: 2.1 MG/DL (ref 0.1–1)
BUN SERPL-MCNC: 24 MG/DL (ref 6–20)
CALCIUM SERPL-MCNC: 9.2 MG/DL (ref 8.7–10.5)
CHLORIDE SERPL-SCNC: 102 MMOL/L (ref 95–110)
CO2 SERPL-SCNC: 23 MMOL/L (ref 23–29)
CREAT SERPL-MCNC: 0.8 MG/DL (ref 0.5–1.4)
DIFFERENTIAL METHOD BLD: ABNORMAL
EOSINOPHIL # BLD AUTO: 0 K/UL (ref 0–0.5)
EOSINOPHIL NFR BLD: 0 % (ref 0–8)
ERYTHROCYTE [DISTWIDTH] IN BLOOD BY AUTOMATED COUNT: 13.1 % (ref 11.5–14.5)
EST. GFR  (NO RACE VARIABLE): >60 ML/MIN/1.73 M^2
GLUCOSE SERPL-MCNC: 130 MG/DL (ref 70–110)
HCT VFR BLD AUTO: 45.1 % (ref 37–48.5)
HGB BLD-MCNC: 15.5 G/DL (ref 12–16)
IMM GRANULOCYTES # BLD AUTO: 0.04 K/UL (ref 0–0.04)
IMM GRANULOCYTES NFR BLD AUTO: 0.3 % (ref 0–0.5)
LYMPHOCYTES # BLD AUTO: 0.9 K/UL (ref 1–4.8)
LYMPHOCYTES NFR BLD: 7 % (ref 18–48)
MAGNESIUM SERPL-MCNC: 1.8 MG/DL (ref 1.6–2.6)
MCH RBC QN AUTO: 29.6 PG (ref 27–31)
MCHC RBC AUTO-ENTMCNC: 34.4 G/DL (ref 32–36)
MCV RBC AUTO: 86 FL (ref 82–98)
MONOCYTES # BLD AUTO: 0.9 K/UL (ref 0.3–1)
MONOCYTES NFR BLD: 7 % (ref 4–15)
NEUTROPHILS # BLD AUTO: 10.3 K/UL (ref 1.8–7.7)
NEUTROPHILS NFR BLD: 85.6 % (ref 38–73)
NRBC BLD-RTO: 0 /100 WBC
PHOSPHATE SERPL-MCNC: 2.4 MG/DL (ref 2.7–4.5)
PLATELET # BLD AUTO: 246 K/UL (ref 150–450)
PMV BLD AUTO: 10.5 FL (ref 9.2–12.9)
POCT GLUCOSE: 125 MG/DL (ref 70–110)
POTASSIUM SERPL-SCNC: 3.5 MMOL/L (ref 3.5–5.1)
PROT SERPL-MCNC: 7.9 G/DL (ref 6–8.4)
RBC # BLD AUTO: 5.24 M/UL (ref 4–5.4)
SODIUM SERPL-SCNC: 137 MMOL/L (ref 136–145)
WBC # BLD AUTO: 12.08 K/UL (ref 3.9–12.7)

## 2024-04-21 PROCEDURE — 93010 ELECTROCARDIOGRAM REPORT: CPT | Mod: ,,, | Performed by: INTERNAL MEDICINE

## 2024-04-21 PROCEDURE — 80053 COMPREHEN METABOLIC PANEL: CPT | Performed by: INTERNAL MEDICINE

## 2024-04-21 PROCEDURE — 84100 ASSAY OF PHOSPHORUS: CPT | Performed by: INTERNAL MEDICINE

## 2024-04-21 PROCEDURE — 21400001 HC TELEMETRY ROOM

## 2024-04-21 PROCEDURE — 25000003 PHARM REV CODE 250

## 2024-04-21 PROCEDURE — 93005 ELECTROCARDIOGRAM TRACING: CPT

## 2024-04-21 PROCEDURE — 94761 N-INVAS EAR/PLS OXIMETRY MLT: CPT

## 2024-04-21 PROCEDURE — 63600175 PHARM REV CODE 636 W HCPCS: Performed by: STUDENT IN AN ORGANIZED HEALTH CARE EDUCATION/TRAINING PROGRAM

## 2024-04-21 PROCEDURE — 51701 INSERT BLADDER CATHETER: CPT

## 2024-04-21 PROCEDURE — 83735 ASSAY OF MAGNESIUM: CPT | Performed by: INTERNAL MEDICINE

## 2024-04-21 PROCEDURE — 51798 US URINE CAPACITY MEASURE: CPT

## 2024-04-21 PROCEDURE — 85025 COMPLETE CBC W/AUTO DIFF WBC: CPT | Performed by: INTERNAL MEDICINE

## 2024-04-21 PROCEDURE — 63600175 PHARM REV CODE 636 W HCPCS: Performed by: INTERNAL MEDICINE

## 2024-04-21 PROCEDURE — 36415 COLL VENOUS BLD VENIPUNCTURE: CPT | Performed by: INTERNAL MEDICINE

## 2024-04-21 PROCEDURE — 63600175 PHARM REV CODE 636 W HCPCS: Performed by: FAMILY MEDICINE

## 2024-04-21 PROCEDURE — 25000003 PHARM REV CODE 250: Performed by: INTERNAL MEDICINE

## 2024-04-21 RX ORDER — BUPRENORPHINE AND NALOXONE 2; .5 MG/1; MG/1
1 FILM, SOLUBLE BUCCAL; SUBLINGUAL DAILY
Status: DISCONTINUED | OUTPATIENT
Start: 2024-04-21 | End: 2024-04-24 | Stop reason: HOSPADM

## 2024-04-21 RX ORDER — FUROSEMIDE 10 MG/ML
40 INJECTION INTRAMUSCULAR; INTRAVENOUS ONCE
Status: COMPLETED | OUTPATIENT
Start: 2024-04-21 | End: 2024-04-21

## 2024-04-21 RX ORDER — ONDANSETRON HYDROCHLORIDE 2 MG/ML
4 INJECTION, SOLUTION INTRAVENOUS EVERY 6 HOURS PRN
Status: DISCONTINUED | OUTPATIENT
Start: 2024-04-21 | End: 2024-04-24 | Stop reason: HOSPADM

## 2024-04-21 RX ORDER — PROCHLORPERAZINE EDISYLATE 5 MG/ML
2.5 INJECTION INTRAMUSCULAR; INTRAVENOUS EVERY 6 HOURS PRN
Status: DISCONTINUED | OUTPATIENT
Start: 2024-04-21 | End: 2024-04-24 | Stop reason: HOSPADM

## 2024-04-21 RX ORDER — GLATIRAMER 40 MG/ML
40 INJECTION, SOLUTION SUBCUTANEOUS
Status: DISCONTINUED | OUTPATIENT
Start: 2024-04-22 | End: 2024-04-24 | Stop reason: HOSPADM

## 2024-04-21 RX ADMIN — BUPRENORPHINE AND NALOXONE 1 FILM: 2; .5 FILM BUCCAL; SUBLINGUAL at 10:04

## 2024-04-21 RX ADMIN — ONDANSETRON 4 MG: 2 INJECTION INTRAMUSCULAR; INTRAVENOUS at 05:04

## 2024-04-21 RX ADMIN — ENOXAPARIN SODIUM 40 MG: 40 INJECTION SUBCUTANEOUS at 04:04

## 2024-04-21 RX ADMIN — THIAMINE HYDROCHLORIDE 500 MG: 100 INJECTION, SOLUTION INTRAMUSCULAR; INTRAVENOUS at 04:04

## 2024-04-21 RX ADMIN — MUPIROCIN: 20 OINTMENT TOPICAL at 08:04

## 2024-04-21 RX ADMIN — THIAMINE HYDROCHLORIDE 500 MG: 100 INJECTION, SOLUTION INTRAMUSCULAR; INTRAVENOUS at 08:04

## 2024-04-21 RX ADMIN — POTASSIUM CHLORIDE 10 MEQ: 7.46 INJECTION, SOLUTION INTRAVENOUS at 01:04

## 2024-04-21 RX ADMIN — FOLIC ACID 1 MG: 5 INJECTION, SOLUTION INTRAMUSCULAR; INTRAVENOUS; SUBCUTANEOUS at 09:04

## 2024-04-21 RX ADMIN — PROCHLORPERAZINE EDISYLATE 2.5 MG: 5 INJECTION INTRAMUSCULAR; INTRAVENOUS at 01:04

## 2024-04-21 RX ADMIN — PROCHLORPERAZINE EDISYLATE 2.5 MG: 5 INJECTION INTRAMUSCULAR; INTRAVENOUS at 02:04

## 2024-04-21 RX ADMIN — FUROSEMIDE 40 MG: 10 INJECTION, SOLUTION INTRAVENOUS at 10:04

## 2024-04-21 NOTE — ASSESSMENT & PLAN NOTE
CT brain without acute abnormality, unchanged MS findings  Ammonia normal  Has hx MS  Neurology consult   Acute encephalopathy  -presented with confusion and abdominal pain   -CTH with no lesion   -On precedex for agitation   -Consider MRI brain if patient continues to be encephalopathic  -Consider sEEG to rule out seizure if there is no changes in mental status  -Consider LP if patient still encephalopathic to rule out meningitis ot any infectious cause.  -rest of care per team     Will place Tele-Psychiatry consult today  -Patient and collateral were unable to provide any information for interview  -Continue to assess medical causes for delirium  -Consider Ativan challenge for potential catatonia if all medical causes for the patient's presentation have been ruled out.  -Recommend reconsulting psychiatry when more information can be obtained.

## 2024-04-21 NOTE — ASSESSMENT & PLAN NOTE
Patient has Abnormal Magnesium: hypomagnesemia. Will continue to monitor electrolytes closely. Will replace the affected electrolytes and repeat labs to be done after interventions completed. The patient's magnesium results have been reviewed and are listed below.  Recent Labs   Lab 04/21/24  0437   MG 1.8

## 2024-04-21 NOTE — ASSESSMENT & PLAN NOTE
Echo    Left Ventricle: Not well visualized due to poor sonic window. Global hypokinesis present. There is unable to assess systolic function visually estimated ejection fraction of 25 - 30%. Unable to assess diastolic function due to poor image quality.    Mitral Valve: There is mild regurgitation.    Overall the study quality was technically difficult. The study was difficult due to patient's uncooperativeness and clinical status.

## 2024-04-21 NOTE — CONSULTS
[facility]  Psychiatry  Telepsychiatry Consult Note    Please see previous notes:    Patient agreeable to consultation via telepsychiatry.    Tele-Consultation from Psychiatry started: 4/20/2024 at 10:20 PM  The chief complaint leading to psychiatric consultation is: Delirium vs Psychosis  This consultation was requested by Dr. Waite, the Emergency Department attending physician.  The location of the consulting psychiatrist is [place], [place] .  The patient location is  [facility] ICU 5TH FLOOR   Also present with the patient at the time of the consultation: Nursing staff    Patient Identification:   Reza Morrow is a 58 y.o. female.    Patient information was obtained from primary team.    Inpatient consult to Telemedicine - Psyc  Consult performed by: Nick Huynh DO  Consult ordered by: Loly Waite MD        Teleconsult Time Documentation  Subjective:     History of Present Illness:  Per Primary Team:  Abdominal Pain        C/O abdominal pain, vomiting and confusion since this morning.  EMT gave pt 4 mg Zofran IV.         HPI: Patient is a 58-year-old female  with a history of MS, polysubstance abuse, takotsubo, presents by EMS from senior living with complaint of abdominal pain. Patient is provide details regarding her complaints but does place her hand over the epigastrium regarding her pain. She is answering questions unreliable. EMS reports that patient was noted to have abdominal pain vomiting and confusion starting today.  In ED patient noncooperative but unable to be directed.  Given droperidol and Benadryl in ED also needing restraints as pulling out IVs.  Labs significant for anion gap 18 potassium 3.2 and Mag 1.4.  Troponin 0.103, repeat 0.472.  Lactic acid elevated at 3.0.  Ammonia normal.  UA and UDS pending.  CT head abdomen and pelvis all without acute abnormality.       Patient's sister reports she has been in a group home for 2 weeks, does report she has  history of polysubstance abuse and is on pain management meds.  Patient had cardiogenic shock 1 year ago requiring intubation with takotsubo EF was 20% however repeat echo  months later with resolution of EF at 60%.  Patient most recent LHC in 2022 without CAD.  Patient's echo in 2023 without coronary artery wall     On Interview:  Patient seen laying in bed tonight on my approach. The patient did not speak but nodded her head when asked if she could hear. Nurse attempted to get the patient to speak but she would not even say her name. Interview terminated.     Collateral   Celeste Olivarez Sister [phone]  She reported that she was not sure if this was truly a doctor and she did not want to answer any questions. She planned on coming to the hospital tomorrow to speak with the staff.    Psychiatric History:   Unable to assess    Substance Abuse History:  Unable to assess    Legal History: Past charges/incarcerations: Unable to assess    Family Psychiatric History: Unable to assess      Social History:  Unable to assess    Psychiatric Mental Status Exam:  Arousal: lethargic  Sensorium/Orientation: ROBIN  Behavior/Cooperation: uncooperative   Speech: ROBIN  Language: ROBIN  Mood: ROBIN  Affect: [facility]  Thought Process: ROBIN  Thought Content:   Auditory hallucinations: ROBIN  Visual hallucinations: ROBIN  Paranoia: ROBIN  Delusions:  ROBIN  Suicidal ideation: ROBIN  Homicidal ideation: ROBIN  Attention/Concentration:  ROBIN  Memory:    Recent:  ROBIN   Remote: ROBIN  Fund of Knowledge: [facility]   Abstract reasoning: [facility]  Insight: [facility]  Judgment: [facility]      Past Medical History:   Past Medical History:   Diagnosis Date    Behavioral problem     Bulging lumbar disc     Bursitis     bilateral hip    Degenerative cervical disc     Hx of psychiatric care     Hypercholesteremia     Labral tear of hip, degenerative bilateral    MS (multiple sclerosis)     Paresthesia of both lower extremities 3/13/2022    Pneumonia     Spinal cord compression       Laboratory  Data:   Labs Reviewed   CBC W/ AUTO DIFFERENTIAL - Abnormal; Notable for the following components:       Result Value    Gran # (ANC) 10.5 (*)     Lymph # 0.6 (*)     Gran % 89.8 (*)     Lymph % 5.1 (*)     All other components within normal limits   COMPREHENSIVE METABOLIC PANEL - Abnormal; Notable for the following components:    Potassium 3.2 (*)     CO2 19 (*)     Glucose 206 (*)     Total Protein 8.6 (*)     Total Bilirubin 1.5 (*)     Anion Gap 18 (*)     All other components within normal limits   TROPONIN I - Abnormal; Notable for the following components:    Troponin I 0.103 (*)     All other components within normal limits   MAGNESIUM - Abnormal; Notable for the following components:    Magnesium 1.4 (*)     All other components within normal limits   TROPONIN I - Abnormal; Notable for the following components:    Troponin I 0.472 (*)     All other components within normal limits   LACTIC ACID, PLASMA - Abnormal; Notable for the following components:    Lactate (Lactic Acid) 3.0 (*)     All other components within normal limits   TSH - Abnormal; Notable for the following components:    TSH 0.266 (*)     All other components within normal limits   LIPASE   BETA - HYDROXYBUTYRATE, SERUM   ALCOHOL,MEDICAL (ETHANOL)   AMMONIA   T4, FREE       Neurological History:  Unable to assess    Allergies:   Review of patient's allergies indicates:   Allergen Reactions    Adhesive Hives    Neosporin [neomycin-bacitracin-polymyxin] Hives    Neomycin     Neomycin-polymyxin Hives    Neosporin g.u. irrigant     Penicillins Other (See Comments)     Unknown  reaction    Latex Rash       Medications in ER:   Medications   iohexoL (OMNIPAQUE 350) injection 100 mL (has no administration in time range)   naloxone 0.4 mg/mL injection 0.4 mg (0.4 mg Intravenous Given by Other 4/19/24 1019)   thiamine (B-1) 500 mg in dextrose 5 % (D5W) 100 mL IVPB (0 mg Intravenous Stopped 4/20/24 2040)   enoxaparin injection 40 mg (40 mg  Subcutaneous Given 4/20/24 1752)   folic acid 1 mg in dextrose 5 % (D5W) 100 mL IVPB (0 mg Intravenous Stopped 4/20/24 1054)   mupirocin 2 % ointment ( Nasal Given 4/20/24 2010)   potassium chloride 10 mEq in 100 mL IVPB (10 mEq Intravenous New Bag 4/20/24 2256)   droPERidol injection 0.625 mg (0.625 mg Intravenous Given 4/19/24 0011)   droPERidol injection 2.5 mg (2.5 mg Intravenous Given 4/19/24 0053)   diphenhydrAMINE injection 12.5 mg (12.5 mg Intravenous Given 4/19/24 0052)   0.9%  NaCl infusion ( Intravenous New Bag 4/19/24 0250)   cefTRIAXone (Rocephin) 1 g in dextrose 5 % in water (D5W) 100 mL IVPB (MB+) (0 g Intravenous Stopped 4/19/24 0619)   magnesium sulfate in dextrose IVPB (premix) 1 g (0 g Intravenous Stopped 4/19/24 0649)   metoprolol injection 2.5 mg (2.5 mg Intravenous Given 4/19/24 0622)   furosemide injection 40 mg (40 mg Intravenous Given 4/20/24 1210)       Medications at home:     No new subjective & objective note has been filed under this hospital service since the last note was generated.      Assessment - Diagnosis - Goals:     Diagnosis/Impression: Patient is a 58 year old woman with a past psychiatric history of Opiate Use Disorder and Sedative Hypnotic Use Disorder currently admitted to the ICU with encephalopathy.    Rec:   -Patient and collateral were unable to provide any information for interview  -Continue to assess medical causes for delirium  -Consider Ativan challenge for potential catatonia if all medical causes for the patient's presentation have been ruled out.  -Recommend reconsulting psychiatry when more information can be obtained.    Time with patient: 20 minutes       More than 50% of the time was spent counseling/coordinating care    Consulting clinician was informed of the encounter and consult note.    Consultation ended: 4/20/2024 at 10:40 PM    Nick Huynh DO   Psychiatry  [facility]

## 2024-04-21 NOTE — PROGRESS NOTES
[facility] Medicine  Progress Note    Patient Name: Reza Morrow  MRN: [id number]  Patient Class: IP- Inpatient   Admission Date: 4/18/2024  Length of Stay: 2 days  Attending Physician: Loly Waite MD  Primary Care Provider: Kip Jimenez MD        Subjective:     Principal Problem:Acute metabolic encephalopathy        HPI:  Patient is a 58-year-old female  with a history of MS, polysubstance abuse, takotsubo, presents by EMS from [facility] with complaint of abdominal pain. Patient is provide details regarding her complaints but does place her hand over the epigastrium regarding her pain. She is answering questions unreliable. EMS reports that patient was noted to have abdominal pain vomiting and confusion starting today.  In ED patient noncooperative but unable to be directed.  Given droperidol and Benadryl in ED also needing restraints as pulling out IVs.  Labs significant for anion gap 18 potassium 3.2 and Mag 1.4.  Troponin 0.103, repeat 0.472.  Lactic acid elevated at 3.0.  Ammonia normal.  UA and UDS pending.  CT head abdomen and pelvis all without acute abnormality.      Patient's sister reports she has been in a group home for 2 weeks, does report she has history of polysubstance abuse and is on pain management meds.  Patient had cardiogenic shock 1 year ago requiring intubation with takotsubo EF was 20% however repeat echo  months later with resolution of EF at 60%.  Patient most recent LHC in 2022 without CAD.  Patient's echo in 2023 without coronary artery wall abnormalities.    Overview/Hospital Course:  4/20/24 Alert but not responding to questions, off precedex  4/21 talking, step down from ICU, suboxone    Interval History: Alert, no complaints, says that she takes suboxone but doesn't remember her dose    Still kind of confused, plan to step down    Review of Systems   Unable to perform ROS: Acuity of condition     Objective:     Vital Signs (Most Recent):  Temp: 98.6  "°F (37 °C) (04/21/24 1615)  Pulse: 94 (04/21/24 1615)  Resp: (!) 42 (04/21/24 1615)  BP: (!) 113/92 (04/21/24 1600)  SpO2: (!) 92 % (04/21/24 1615) Vital Signs (24h Range):  Temp:  [97.5 °F (36.4 °C)-98.8 °F (37.1 °C)] 98.6 °F (37 °C)  Pulse:  [] 94  Resp:  [14-42] 42  SpO2:  [92 %-100 %] 92 %  BP: (110-181)/() 113/92     Weight: 56.5 kg (124 lb 9 oz)  Body mass index is 20.73 kg/m².    Intake/Output Summary (Last 24 hours) at 4/21/2024 1711  Last data filed at 4/21/2024 1415  Gross per 24 hour   Intake 918.42 ml   Output 2250 ml   Net -1331.58 ml         Physical Exam  Vitals reviewed.   HENT:      Head: Normocephalic and atraumatic.      Nose: Nose normal.      Mouth/Throat:      Mouth: Mucous membranes are moist.      Pharynx: Oropharynx is clear.   Cardiovascular:      Rate and Rhythm: Regular rhythm. Tachycardia present.      Pulses: Normal pulses.      Heart sounds: Normal heart sounds.   Pulmonary:      Effort: Pulmonary effort is normal.      Breath sounds: Normal breath sounds.   Abdominal:      General: Bowel sounds are normal.   Musculoskeletal:         General: Normal range of motion.      Cervical back: Normal range of motion.   Skin:     General: Skin is warm and dry.      Capillary Refill: Capillary refill takes less than 2 seconds.   Neurological:      Mental Status: She is lethargic.      GCS: GCS eye subscore is 2. GCS verbal subscore is 3. GCS motor subscore is 5.             Significant Labs: All pertinent labs within the past 24 hours have been reviewed.  Blood Culture: No results for input(s): "LABBLOO" in the last 48 hours.  CBC:   Recent Labs   Lab 04/20/24 0412 04/21/24  0437   WBC 16.09* 12.08   HGB 16.2* 15.5   HCT 47.9 45.1    246     CMP:   Recent Labs   Lab 04/20/24 0412 04/21/24  0437    137   K 3.3* 3.5    102   CO2 22* 23   * 130*   BUN 16 24*   CREATININE 0.8 0.8   CALCIUM 9.1 9.2   PROT 8.2 7.9   ALBUMIN 3.6 3.3*   BILITOT 1.9* 2.1*   ALKPHOS " "71 63   AST 31 21   ALT 12 10   ANIONGAP 13 12     Urine Culture: No results for input(s): "LABURIN" in the last 48 hours.    Significant Imaging: I have reviewed all pertinent imaging results/findings within the past 24 hours.    Assessment/Plan:      * Acute metabolic encephalopathy  CT brain without acute abnormality, unchanged MS findings  Ammonia normal  Has hx MS  Neurology consult   Acute encephalopathy  -presented with confusion and abdominal pain   -CTH with no lesion   -On precedex for agitation   -Consider MRI brain if patient continues to be encephalopathic  -Consider sEEG to rule out seizure if there is no changes in mental status  -Consider LP if patient still encephalopathic to rule out meningitis ot any infectious cause.  -rest of care per team     Will place Tele-Psychiatry consult today  -Patient and collateral were unable to provide any information for interview  -Continue to assess medical causes for delirium  -Consider Ativan challenge for potential catatonia if all medical causes for the patient's presentation have been ruled out.  -Recommend reconsulting psychiatry when more information can be obtained.    Opioid overdose        Accidental overdose  Treated for opiate overdose        Hypokalemia  Patient has hypokalemia which is Acute and currently controlled. Most recent potassium levels reviewed-   Lab Results   Component Value Date    K 3.2 (L) 04/18/2024   . Will continue potassium replacement per protocol and recheck repeat levels after replacement completed.     Hypomagnesemia  Patient has Abnormal Magnesium: hypomagnesemia. Will continue to monitor electrolytes closely. Will replace the affected electrolytes and repeat labs to be done after interventions completed. The patient's magnesium results have been reviewed and are listed below.  Recent Labs   Lab 04/21/24  0437   MG 1.8          Takotsubo cardiomyopathy  Echo    Left Ventricle: Not well visualized due to poor sonic window. " Global hypokinesis present. There is unable to assess systolic function visually estimated ejection fraction of 25 - 30%. Unable to assess diastolic function due to poor image quality.    Mitral Valve: There is mild regurgitation.    Overall the study quality was technically difficult. The study was difficult due to patient's uncooperativeness and clinical status.      MS (multiple sclerosis)        NSTEMI (non-ST elevated myocardial infarction)  Troponin 0.103, repeat 0.472..trend  Consult cards  ECHO ordered  EKG w/o acute MI  Patient most recent C in 2022 without CAD.    Patient's echo in 2023 without coronary artery wall abnormalities.      VTE Risk Mitigation (From admission, onward)           Ordered     enoxaparin injection 40 mg  Every 24 hours         04/19/24 1011                    Discharge Planning   JOANNE:      Code Status: Full Code   Is the patient medically ready for discharge?:     Reason for patient still in hospital (select all that apply): Patient trending condition and Laboratory test  Discharge Plan A: Home with family            Critical care time spent on the evaluation and treatment of severe organ dysfunction, review of pertinent labs and imaging studies, discussions with consulting providers and discussions with patient/family: 35 minutes.      Loly Waite MD  Department of Hospital Medicine   [facility]

## 2024-04-21 NOTE — PROGRESS NOTES
Pulm/CC Fellow Consult Note    Attending Physician: Loly Waite MD    Date of Admit: 2024  Hospital day: 2  History of Present Illness:  Reza Morrow is a 58 y.o.  female with a PMHx of polysubstance use, Takutsubo cardiomyopathy who presented to the ED with altered mental status. History provided by chart review as patient is currently altered. Apparently the patient had some abdominal pain and confusion the day of presentation. She currently lives with two roommates. In the ED the patient was uncooperative and required PRN antipsychotics. Labs were notable for a lactic acid of 3, low bicarb and anion gap of 18. She was admitted to the ICU for altered mental status.    Interval History:  Patient's mental status improved today. Able to converse without any difficulty. Having pain all over her body today. Started suboxone with good control of symptoms.     Objective:   Last 24 Hour Vital Signs:  BP  Min: 110/55  Max: 171/105  Temp  Av °F (36.7 °C)  Min: 97.5 °F (36.4 °C)  Max: 98.8 °F (37.1 °C)  Pulse  Av.1  Min: 95  Max: 127  Resp  Av.2  Min: 14  Max: 41  SpO2  Av.5 %  Min: 89 %  Max: 100 %  Weight  Av.5 kg (124 lb 9 oz)  Min: 56.5 kg (124 lb 9 oz)  Max: 56.5 kg (124 lb 9 oz)  Body mass index is 20.73 kg/m².  I & O (Last 24H):  Intake/Output Summary (Last 24 hours) at 2024 1549  Last data filed at 2024 1415  Gross per 24 hour   Intake 918.42 ml   Output 2250 ml   Net -1331.58 ml       Physical Exam:  GEN: Awakens with stimulation, following commands and conversing appropriately.  HEENT: MMM, no scleral icterus, EOMI  NECK: Supple, midline trachea, no raised JVP or a-waves notable  CV: RRR, no MRG, pulses equal and symmetric 2+ at radial  PULM: CTAB, No adventitious sounds.  ABDOMEN: Soft, non-tender, non-distended, no rebound or guarding  SKIN: Warm, dry, intact, no rashes  MSK: No deformity, no clubbing, cyanosis, or lower extremity edema  NEURO: Improved, back to  baseline. No focal deficits.  LINES: Intact, no extravasation or induration, no erythema to PIV or support devices       Assessment & Plan:   NEURO:  Acute metabolic encephalopathy.  Likely due to polypharmacy. Patient with multiple medications on board that could precipitate encephalopathy. Received 0.4 of narcan in ICU with positive response. Also has baclofen, ketamine, and long acting potent opioids on . Patient's mental status has dramatically improved with cessation of all sedating medications.     - Started suboxone, discussed with patient and sister. She states it has worked for her in the past.  - Would consult addiction psych here tomorrow now that patient is able to participate in interview.    MS  Patient with history of MS, takes Copaxone three times weekly. Family has brought her meds to the bedside  - Continue Copaxone 3x weekly (patient supplied, to start 4/22)  - Will repeat MRI brain with/without myasthenia study this hospitalization.    CV:  History of Taketsubo, patient with significant stressors and multiple substances used. Troponin trended and plateaued. No ischemic changes on EKG.  - Last Echo following hospitalization last year with recovery of EF. Repeat Echo here again with decreased EF and global hypokineses.  - Cardiology consulted  - Maintain euvolemia  - at this point would likely benefit from GDMT as tolerated. Should start tomorrow     PULM:  On room air, at baseline  - Emphysematous changes on CT. But did not get PFTs  - Goal sat > 88%       GI/FEN  NPO until able to be more alert for diet        RENAL:   Renal function at baseline     HEME/ONC:  -- Transfusion threshold <7g/dl per TRICC     ENDOCRINE:  -- Hypoglycemic precautions Goal blood glucose 140-180     INFECTIOUS DISEASE:  No concern for infection at this time. Afebrile, no meningismus    MSK/RHEUM:  -- PT/OT for early mobility      PSYCHOSOCIAL:  -- Addiction psych consult when patient able to participate.    Feeding:  Regular diet once more alert  Analgesia: Holding all pain meds  Sedation: Stopped precedex.  DVT prophylaxis:   Anticoagulants       Ordered     Route Frequency Start Stop    04/19/24 1011  enoxaparin         SubQ Every 24 hours 04/19/24 1700 --           Head of Bed: 30 degrees to prevent VAP  Ulcer PPX: N/A  Glucose: Hypoglycemic precautions, SSI  SBT/SAT: N/A  Bowels: Bowel regimen for constipation  Indwelling Lines: Peripherals  Deescalation Abx: N/A/       Patient seen with Dr. Gonzalez. We will continue to follow. Please contact me with any questions should they arise.       Daniel Baumann M.D.  [employer] Fellow

## 2024-04-21 NOTE — PROGRESS NOTES
Medical Decision Making    Admission Date: 2024     I have reviewed medical record data and the patient was evaluated. See Pastor's note for details. This is an attestation of a separate note written by the house officer today. As the teaching physician, I was present for and have confirmed the key portions of the service performed by the resident today. In addition to the resident's note, I add the followin yo with polysubstance use, MS, Takutsobo's, chronic pain syndrome admitted with presumed opioid overdose.  More oriented today.  Has been on suboxone in past with some success. Plan to re-introduce with induction protocol.   Would benefit from MRI with MS protocol but non-emergent.  EF 25-30%;  - starting GDMT for HFrEF.  Troponin decreased to 1.787  + 1.3L since admit - limit IVF, give another dose lasix --- However is retaining urine so continue bladder scans and prn straight catheterizations. MS related? Restart Capaxone 3x/wk     Critical Care Daily Checklist:     Davis Agitation Sedation Scale (RASS) Goal: 0     /    Actual = +1   Analgesia/Sedation: Precedex - d/c   Sedation Holiday? off   Spontaneous Breathing Trial Performed? na   Delirium: CAM-ICU positive   Feeding? Start observed po   Thromboembolic Prophylaxis yes   HOB > 300 yes   Stress Ulcer Prophylaxis (if needed) na   Glucose Control 130   Bowel Function Last BM: 24     Regimen:    Indwelling Catheters (Lines & Moore)? Necessity? Moore is out; only PIVs   De-escalation of Antimicrobials/Pharmacotherapies na   Code Status: full   Goals of Care:      Critical Care time was spent validating the history and physical exam, reviewing the lab and imaging results, and discussing the care of the patient with the bedside nurse and the patient and/or surrogates. This critical care time did not overlap with that of any other provider or involve time for any procedures.  This patient has a high probability of sudden clinically  significant deterioration which requires the highest level of physician preparedness to intervene urgently. I managed/supervised life or organ supporting interventions that required frequent physician assessments. I devoted my full attention in the ICU to the direct care of this patient for this period of time. Organ systems which are failing and require intensive, critical care support are: cardiovascular, GI, infectious  Critical Care time: 35 minutes    Marquise Gonzalez MD  [facility]  [phone]

## 2024-04-21 NOTE — PLAN OF CARE
Address inpatient pysch eval  Address PAIN??  Pt improving mental status, following simple commands, speaking name, yes, no and simple commands. Still restless but seems d/t pain vs agitation. Restraints renewed    Cough, productive-- RESP SWAB???  c/o nausea- Comepazine PRN ordered    Replaced Potassium 30meq from previous shift    Not voiding?? Unsure if its pysch or retention  Bladder scn 603/ straight cath 650 @0200      Problem: Infection  Goal: Absence of Infection Signs and Symptoms  Outcome: Ongoing, Progressing     Problem: Fall Injury Risk  Goal: Absence of Fall and Fall-Related Injury  Outcome: Ongoing, Progressing     Problem: Adult Inpatient Plan of Care  Goal: Plan of Care Review  Outcome: Ongoing, Progressing  Goal: Patient-Specific Goal (Individualized)  Outcome: Ongoing, Progressing  Goal: Absence of Hospital-Acquired Illness or Injury  Outcome: Ongoing, Progressing  Goal: Optimal Comfort and Wellbeing  Outcome: Ongoing, Progressing  Goal: Readiness for Transition of Care  Outcome: Ongoing, Progressing     Problem: Skin Injury Risk Increased  Goal: Skin Health and Integrity  Outcome: Ongoing, Progressing

## 2024-04-21 NOTE — NURSING
Family brought home medication copaxone injection. Sent to pharmacy to keep refrigerated. Pharmacy will dispense per order.

## 2024-04-21 NOTE — SUBJECTIVE & OBJECTIVE
"Interval History: Alert, no complaints, says that she takes suboxone but doesn't remember her dose    Still kind of confused, plan to step down    Review of Systems   Unable to perform ROS: Acuity of condition     Objective:     Vital Signs (Most Recent):  Temp: 98.6 °F (37 °C) (04/21/24 1615)  Pulse: 94 (04/21/24 1615)  Resp: (!) 42 (04/21/24 1615)  BP: (!) 113/92 (04/21/24 1600)  SpO2: (!) 92 % (04/21/24 1615) Vital Signs (24h Range):  Temp:  [97.5 °F (36.4 °C)-98.8 °F (37.1 °C)] 98.6 °F (37 °C)  Pulse:  [] 94  Resp:  [14-42] 42  SpO2:  [92 %-100 %] 92 %  BP: (110-181)/() 113/92     Weight: 56.5 kg (124 lb 9 oz)  Body mass index is 20.73 kg/m².    Intake/Output Summary (Last 24 hours) at 4/21/2024 1711  Last data filed at 4/21/2024 1415  Gross per 24 hour   Intake 918.42 ml   Output 2250 ml   Net -1331.58 ml         Physical Exam  Vitals reviewed.   HENT:      Head: Normocephalic and atraumatic.      Nose: Nose normal.      Mouth/Throat:      Mouth: Mucous membranes are moist.      Pharynx: Oropharynx is clear.   Cardiovascular:      Rate and Rhythm: Regular rhythm. Tachycardia present.      Pulses: Normal pulses.      Heart sounds: Normal heart sounds.   Pulmonary:      Effort: Pulmonary effort is normal.      Breath sounds: Normal breath sounds.   Abdominal:      General: Bowel sounds are normal.   Musculoskeletal:         General: Normal range of motion.      Cervical back: Normal range of motion.   Skin:     General: Skin is warm and dry.      Capillary Refill: Capillary refill takes less than 2 seconds.   Neurological:      Mental Status: She is lethargic.      GCS: GCS eye subscore is 2. GCS verbal subscore is 3. GCS motor subscore is 5.             Significant Labs: All pertinent labs within the past 24 hours have been reviewed.  Blood Culture: No results for input(s): "LABBLOO" in the last 48 hours.  CBC:   Recent Labs   Lab 04/20/24  0412 04/21/24  0437   WBC 16.09* 12.08   HGB 16.2* 15.5 " "  HCT 47.9 45.1    246     CMP:   Recent Labs   Lab 04/20/24  0412 04/21/24  0437    137   K 3.3* 3.5    102   CO2 22* 23   * 130*   BUN 16 24*   CREATININE 0.8 0.8   CALCIUM 9.1 9.2   PROT 8.2 7.9   ALBUMIN 3.6 3.3*   BILITOT 1.9* 2.1*   ALKPHOS 71 63   AST 31 21   ALT 12 10   ANIONGAP 13 12     Urine Culture: No results for input(s): "LABURIN" in the last 48 hours.    Significant Imaging: I have reviewed all pertinent imaging results/findings within the past 24 hours.  "

## 2024-04-21 NOTE — EICU
eICU Intervention    Bedside RN reports pt c/o nausea & no prn meds ordered.   Qtc 502  Rceived request for renewal of restraints  Patient seen on camera and is at risk of self-harm due to pulling at lines and tubes   Patient remains critical ill.    Compazine prn ordered  Restraints renewed  Bedside rounding team to reassess in AM when restraints can be safely removed

## 2024-04-22 LAB
ALBUMIN SERPL BCP-MCNC: 3.5 G/DL (ref 3.5–5.2)
ALP SERPL-CCNC: 64 U/L (ref 55–135)
ALT SERPL W/O P-5'-P-CCNC: 10 U/L (ref 10–44)
ANION GAP SERPL CALC-SCNC: 14 MMOL/L (ref 8–16)
AST SERPL-CCNC: 15 U/L (ref 10–40)
BASOPHILS # BLD AUTO: 0.02 K/UL (ref 0–0.2)
BASOPHILS NFR BLD: 0.2 % (ref 0–1.9)
BILIRUB SERPL-MCNC: 1.9 MG/DL (ref 0.1–1)
BUN SERPL-MCNC: 29 MG/DL (ref 6–20)
CALCIUM SERPL-MCNC: 9.4 MG/DL (ref 8.7–10.5)
CHLORIDE SERPL-SCNC: 96 MMOL/L (ref 95–110)
CO2 SERPL-SCNC: 27 MMOL/L (ref 23–29)
CREAT SERPL-MCNC: 0.9 MG/DL (ref 0.5–1.4)
DIFFERENTIAL METHOD BLD: ABNORMAL
EOSINOPHIL # BLD AUTO: 0 K/UL (ref 0–0.5)
EOSINOPHIL NFR BLD: 0.2 % (ref 0–8)
ERYTHROCYTE [DISTWIDTH] IN BLOOD BY AUTOMATED COUNT: 13.1 % (ref 11.5–14.5)
EST. GFR  (NO RACE VARIABLE): >60 ML/MIN/1.73 M^2
GLUCOSE SERPL-MCNC: 115 MG/DL (ref 70–110)
HCT VFR BLD AUTO: 44.8 % (ref 37–48.5)
HGB BLD-MCNC: 15.6 G/DL (ref 12–16)
IMM GRANULOCYTES # BLD AUTO: 0.02 K/UL (ref 0–0.04)
IMM GRANULOCYTES NFR BLD AUTO: 0.2 % (ref 0–0.5)
LYMPHOCYTES # BLD AUTO: 1.5 K/UL (ref 1–4.8)
LYMPHOCYTES NFR BLD: 16.1 % (ref 18–48)
MAGNESIUM SERPL-MCNC: 1.9 MG/DL (ref 1.6–2.6)
MCH RBC QN AUTO: 30.2 PG (ref 27–31)
MCHC RBC AUTO-ENTMCNC: 34.8 G/DL (ref 32–36)
MCV RBC AUTO: 87 FL (ref 82–98)
MONOCYTES # BLD AUTO: 0.9 K/UL (ref 0.3–1)
MONOCYTES NFR BLD: 9.5 % (ref 4–15)
NEUTROPHILS # BLD AUTO: 6.9 K/UL (ref 1.8–7.7)
NEUTROPHILS NFR BLD: 73.8 % (ref 38–73)
NRBC BLD-RTO: 0 /100 WBC
PHOSPHATE SERPL-MCNC: 3.1 MG/DL (ref 2.7–4.5)
PLATELET # BLD AUTO: 255 K/UL (ref 150–450)
PMV BLD AUTO: 10.6 FL (ref 9.2–12.9)
POTASSIUM SERPL-SCNC: 2.9 MMOL/L (ref 3.5–5.1)
PROT SERPL-MCNC: 8.2 G/DL (ref 6–8.4)
RBC # BLD AUTO: 5.16 M/UL (ref 4–5.4)
SODIUM SERPL-SCNC: 137 MMOL/L (ref 136–145)
WBC # BLD AUTO: 9.37 K/UL (ref 3.9–12.7)

## 2024-04-22 PROCEDURE — 63600175 PHARM REV CODE 636 W HCPCS: Performed by: INTERNAL MEDICINE

## 2024-04-22 PROCEDURE — 83735 ASSAY OF MAGNESIUM: CPT | Performed by: INTERNAL MEDICINE

## 2024-04-22 PROCEDURE — 25000003 PHARM REV CODE 250: Performed by: INTERNAL MEDICINE

## 2024-04-22 PROCEDURE — 63600175 PHARM REV CODE 636 W HCPCS: Performed by: STUDENT IN AN ORGANIZED HEALTH CARE EDUCATION/TRAINING PROGRAM

## 2024-04-22 PROCEDURE — 85025 COMPLETE CBC W/AUTO DIFF WBC: CPT | Performed by: INTERNAL MEDICINE

## 2024-04-22 PROCEDURE — 80053 COMPREHEN METABOLIC PANEL: CPT | Performed by: INTERNAL MEDICINE

## 2024-04-22 PROCEDURE — 36415 COLL VENOUS BLD VENIPUNCTURE: CPT | Performed by: INTERNAL MEDICINE

## 2024-04-22 PROCEDURE — 84100 ASSAY OF PHOSPHORUS: CPT | Performed by: INTERNAL MEDICINE

## 2024-04-22 PROCEDURE — 11000001 HC ACUTE MED/SURG PRIVATE ROOM

## 2024-04-22 PROCEDURE — 94761 N-INVAS EAR/PLS OXIMETRY MLT: CPT

## 2024-04-22 RX ORDER — POTASSIUM CHLORIDE 20 MEQ/1
40 TABLET, EXTENDED RELEASE ORAL
Status: COMPLETED | OUTPATIENT
Start: 2024-04-22 | End: 2024-04-22

## 2024-04-22 RX ORDER — FOLIC ACID 1 MG/1
1 TABLET ORAL DAILY
Status: DISCONTINUED | OUTPATIENT
Start: 2024-04-23 | End: 2024-04-24 | Stop reason: HOSPADM

## 2024-04-22 RX ADMIN — THIAMINE HYDROCHLORIDE 500 MG: 100 INJECTION, SOLUTION INTRAMUSCULAR; INTRAVENOUS at 02:04

## 2024-04-22 RX ADMIN — ONDANSETRON 4 MG: 2 INJECTION INTRAMUSCULAR; INTRAVENOUS at 07:04

## 2024-04-22 RX ADMIN — GLATIRAMER ACETATE 40 MG: 40 INJECTION, SOLUTION SUBCUTANEOUS at 10:04

## 2024-04-22 RX ADMIN — FOLIC ACID 1 MG: 5 INJECTION, SOLUTION INTRAMUSCULAR; INTRAVENOUS; SUBCUTANEOUS at 08:04

## 2024-04-22 RX ADMIN — BUPRENORPHINE AND NALOXONE 1 FILM: 2; .5 FILM BUCCAL; SUBLINGUAL at 12:04

## 2024-04-22 RX ADMIN — ENOXAPARIN SODIUM 40 MG: 40 INJECTION SUBCUTANEOUS at 04:04

## 2024-04-22 RX ADMIN — MUPIROCIN: 20 OINTMENT TOPICAL at 09:04

## 2024-04-22 RX ADMIN — POTASSIUM CHLORIDE 40 MEQ: 1500 TABLET, EXTENDED RELEASE ORAL at 04:04

## 2024-04-22 RX ADMIN — THIAMINE HYDROCHLORIDE 500 MG: 100 INJECTION, SOLUTION INTRAMUSCULAR; INTRAVENOUS at 08:04

## 2024-04-22 RX ADMIN — POTASSIUM CHLORIDE 40 MEQ: 1500 TABLET, EXTENDED RELEASE ORAL at 12:04

## 2024-04-22 RX ADMIN — THIAMINE HYDROCHLORIDE 500 MG: 100 INJECTION, SOLUTION INTRAMUSCULAR; INTRAVENOUS at 09:04

## 2024-04-22 RX ADMIN — MUPIROCIN: 20 OINTMENT TOPICAL at 08:04

## 2024-04-22 NOTE — PLAN OF CARE
Vss, nadn, SR noted on mon, pt on RA, o2 sats wnls. Aaox4, calm, coop. Cont's to wait on a floor bed/room. Family at bedside.

## 2024-04-22 NOTE — PROGRESS NOTES
Pulm/CC Resident Progress Note    Attending Physician: Loly Waite MD    Date of Admit: 2024  Hospital day: 3  History of Present Illness:  Reza Morrow is a 58 y.o.  female with a PMHx of polysubstance use, Takutsubo cardiomyopathy who presented to the ED with altered mental status. History provided by chart review as patient is currently altered. Apparently the patient had some abdominal pain and confusion the day of presentation. She currently lives with two roommates. In the ED the patient was uncooperative and required PRN antipsychotics. Labs were notable for a lactic acid of 3, low bicarb and anion gap of 18. She was admitted to the ICU for altered mental status.    Interval History:  No acute events overnight. Able to converse. Speech somewhat tangential. Denies any improvement with suboxone so far.      Objective:   Last 24 Hour Vital Signs:  BP  Min: 106/52  Max: 134/63  Temp  Av.6 °F (37 °C)  Min: 98.5 °F (36.9 °C)  Max: 98.8 °F (37.1 °C)  Pulse  Av.2  Min: 76  Max: 124  Resp  Av.6  Min: 17  Max: 65  SpO2  Av.3 %  Min: 90 %  Max: 99 %  Body mass index is 20.73 kg/m².  I & O (Last 24H):  Intake/Output Summary (Last 24 hours) at 2024 1629  Last data filed at 2024 1414  Gross per 24 hour   Intake 1163.9 ml   Output 200 ml   Net 963.9 ml       Physical Exam:  General: awake, cooperative, no acute distress  Head: Normocephalic, atraumatic  Lungs: clear to auscultation bilaterally, respirations unlabored  Heart: regular rate and rhythm, no murmur appreciated  Extremities: extremities normal, no joint deformity or tenderness noted  Skin: warm and dry, no rashes appreciated  Neuro: alert, following commands, answering questions appropriately         Assessment & Plan:   NEURO:  Acute metabolic encephalopathy.  Likely due to polypharmacy. Patient with multiple medications on board that could precipitate encephalopathy. Received 0.4 of narcan in ICU with positive  response. Also has baclofen, ketamine, and long acting potent opioids on . Patient's mental status has dramatically improved with cessation of all sedating medications.     - Started suboxone, discussed with patient and sister. She states it has worked for her in the past.  - Denies any improvement with suboxone however expressed willingness to try it for one more day    MS  Patient with history of MS, takes Copaxone three times weekly. Family has brought her meds to the bedside  - Continue Copaxone 3x weekly (patient supplied, to start 4/22)  - Will repeat MRI brain with/without myasthenia study this hospitalization.    CV:  History of Taketsubo, patient with significant stressors and multiple substances used. Troponin trended and plateaued. No ischemic changes on EKG.  - Last Echo following hospitalization last year with recovery of EF. Repeat Echo here again with decreased EF and global hypokineses.  - Cardiology consulted  - Maintain euvolemia     PULM:  On room air, at baseline  - Emphysematous changes on CT. But did not get PFTs  - Goal sat > 88%       GI/FEN  Regular diet        RENAL:   Renal function at baseline     HEME/ONC:  -- Transfusion threshold <7g/dl per TRICC     ENDOCRINE:  -- Hypoglycemic precautions Goal blood glucose 140-180     INFECTIOUS DISEASE:  No concern for infection at this time. Afebrile, no meningismus    MSK/RHEUM:  -- PT/OT for early mobility      PSYCHOSOCIAL:  -- Psych consulted    Feeding: Regular diet  Analgesia: Holding all pain meds  Sedation: Stopped precedex.  DVT prophylaxis:   Anticoagulants       Ordered     Route Frequency Start Stop    04/19/24 1011  enoxaparin         SubQ Every 24 hours 04/19/24 1700 --           Head of Bed: 30 degrees to prevent VAP  Ulcer PPX: N/A  Glucose: Hypoglycemic precautions, SSI  SBT/SAT: N/A  Bowels: Bowel regimen for constipation  Indwelling Lines: Peripherals  Deescalation Abx: N/A/       Patient seen with Dr. Cullen. We will  continue to follow. Please contact me with any questions should they arise.       Satya Demarco MD  [employer]  [facility]    Pt seen and examined with Pulmonary/Critical Care team and this note reviewed and validated with the following additional comments: Much improved.  Almost manic.  Wants to go back onto prescription opioids. HM will try to manage this.  OK to step down.    Medical Decision Making (MDM) was straight forward.  The number and complexity of problems addressed was limited.  The risk of complications and/or morbidity/mortality was high.  The tests ordered were none.  I communicated with the following providers HM.  Time spent in the care of this patient was 10 minutes    Aamir Loera MD  Phone [phone]

## 2024-04-22 NOTE — ASSESSMENT & PLAN NOTE
Echo    Left Ventricle: Not well visualized due to poor sonic window. Global hypokinesis present. There is unable to assess systolic function visually estimated ejection fraction of 25 - 30%. Unable to assess diastolic function due to poor image quality.    Mitral Valve: There is mild regurgitation.    Overall the study quality was technically difficult. The study was difficult due to patient's uncooperativeness and clinical status.    Cardiology consulted  Treated with IV lasix x 1 dose  No plan for further cardiac work up at this time

## 2024-04-22 NOTE — ASSESSMENT & PLAN NOTE
- Continue Copaxone 3x weekly (patient supplied, to start 4/22)  - Will repeat MRI brain with/without myasthenia study this hospitalization.

## 2024-04-22 NOTE — SUBJECTIVE & OBJECTIVE
Interval History: Very talkative today says that she doesn't like the suboxone and would like to restart her pain medications      Review of Systems   Unable to perform ROS: Acuity of condition     Objective:     Vital Signs (Most Recent):  Temp: 98.7 °F (37.1 °C) (04/22/24 1530)  Pulse: 88 (04/22/24 1707)  Resp: (!) 37 (04/22/24 1707)  BP: 120/87 (04/22/24 1707)  SpO2: 97 % (04/22/24 1707) Vital Signs (24h Range):  Temp:  [98.5 °F (36.9 °C)-98.8 °F (37.1 °C)] 98.7 °F (37.1 °C)  Pulse:  [] 88  Resp:  [17-65] 37  SpO2:  [90 %-99 %] 97 %  BP: (106-134)/(52-89) 120/87     Weight: 56.5 kg (124 lb 9 oz)  Body mass index is 20.73 kg/m².    Intake/Output Summary (Last 24 hours) at 4/22/2024 1711  Last data filed at 4/22/2024 1707  Gross per 24 hour   Intake 1236.1 ml   Output 200 ml   Net 1036.1 ml         Physical Exam  Vitals reviewed.   HENT:      Head: Normocephalic and atraumatic.      Nose: Nose normal.      Mouth/Throat:      Mouth: Mucous membranes are moist.      Pharynx: Oropharynx is clear.   Cardiovascular:      Rate and Rhythm: Regular rhythm. Tachycardia present.      Pulses: Normal pulses.      Heart sounds: Normal heart sounds.   Pulmonary:      Effort: Pulmonary effort is normal.      Breath sounds: Normal breath sounds.   Abdominal:      General: Bowel sounds are normal.   Musculoskeletal:         General: Normal range of motion.      Cervical back: Normal range of motion.   Skin:     General: Skin is warm and dry.      Capillary Refill: Capillary refill takes less than 2 seconds.   Neurological:      Mental Status: She is lethargic.      GCS: GCS eye subscore is 2. GCS verbal subscore is 3. GCS motor subscore is 5.             Significant Labs: All pertinent labs within the past 24 hours have been reviewed.  CBC:   Recent Labs   Lab 04/21/24  0437 04/22/24  0407   WBC 12.08 9.37   HGB 15.5 15.6   HCT 45.1 44.8    255     CMP:   Recent Labs   Lab 04/21/24  0437 04/22/24  0407    137   K  3.5 2.9*    96   CO2 23 27   * 115*   BUN 24* 29*   CREATININE 0.8 0.9   CALCIUM 9.2 9.4   PROT 7.9 8.2   ALBUMIN 3.3* 3.5   BILITOT 2.1* 1.9*   ALKPHOS 63 64   AST 21 15   ALT 10 10   ANIONGAP 12 14       Significant Imaging: I have reviewed all pertinent imaging results/findings within the past 24 hours.

## 2024-04-22 NOTE — PLAN OF CARE
Patient did not sleep at all last night and was very talkative. Pt has transfer orders to the floor but no bed is available at this time. No acute events throughout shift, VS and assessment per flow sheet, patient progressing towards goals as tolerated, plan of care reviewed with Reza Morrow and her sister Celeste, questions answered and concerns addressed.

## 2024-04-22 NOTE — PROGRESS NOTES
[facility] Medicine  Progress Note    Patient Name: Reza Morrow  MRN: [id number]  Patient Class: IP- Inpatient   Admission Date: 4/18/2024  Length of Stay: 3 days  Attending Physician: Loly Waite MD  Primary Care Provider: Kip Jimenez MD        Subjective:     Principal Problem:Acute metabolic encephalopathy        HPI:  Patient is a 58-year-old female  with a history of MS, polysubstance abuse, takotsubo, [facility] EMS from [facility] with complaint of abdominal pain. Patient is provide details regarding her complaints but does place her hand over the epigastrium regarding her pain. She is answering questions unreliable. EMS reports that patient was noted to have abdominal pain vomiting and confusion starting today.  In ED patient noncooperative but unable to be directed.  Given droperidol and Benadryl in ED also needing restraints as pulling out IVs.  Labs significant for anion gap 18 potassium 3.2 and Mag 1.4.  Troponin 0.103, repeat 0.472.  Lactic acid elevated at 3.0.  Ammonia normal.  UA and UDS pending.  CT head abdomen and pelvis all without acute abnormality.      Patient's sister reports she has been in a group home for 2 weeks, does report she has history of polysubstance abuse and is on pain management meds.  Patient had cardiogenic shock 1 year ago requiring intubation with takotsubo EF was 20% however repeat echo  months later with resolution of EF at 60%.  Patient most recent LHC in 2022 without CAD.  Patient's echo in 2023 without coronary artery wall abnormalities.    Overview/Hospital Course:  4/20/24 Alert but not responding to questions, off precedex  4/21 talking, step down from ICU, suboxone  4/22 Much more alert, awaiting transfer to floor    Interval History: Very talkative today says that she doesn't like the suboxone and would like to restart her pain medications      Review of Systems   Unable to perform ROS: Acuity of condition     Objective:      Vital Signs (Most Recent):  Temp: 98.7 °F (37.1 °C) (04/22/24 1530)  Pulse: 88 (04/22/24 1707)  Resp: (!) 37 (04/22/24 1707)  BP: 120/87 (04/22/24 1707)  SpO2: 97 % (04/22/24 1707) Vital Signs (24h Range):  Temp:  [98.5 °F (36.9 °C)-98.8 °F (37.1 °C)] 98.7 °F (37.1 °C)  Pulse:  [] 88  Resp:  [17-65] 37  SpO2:  [90 %-99 %] 97 %  BP: (106-134)/(52-89) 120/87     Weight: 56.5 kg (124 lb 9 oz)  Body mass index is 20.73 kg/m².    Intake/Output Summary (Last 24 hours) at 4/22/2024 1711  Last data filed at 4/22/2024 1707  Gross per 24 hour   Intake 1236.1 ml   Output 200 ml   Net 1036.1 ml         Physical Exam  Vitals reviewed.   HENT:      Head: Normocephalic and atraumatic.      Nose: Nose normal.      Mouth/Throat:      Mouth: Mucous membranes are moist.      Pharynx: Oropharynx is clear.   Cardiovascular:      Rate and Rhythm: Regular rhythm. Tachycardia present.      Pulses: Normal pulses.      Heart sounds: Normal heart sounds.   Pulmonary:      Effort: Pulmonary effort is normal.      Breath sounds: Normal breath sounds.   Abdominal:      General: Bowel sounds are normal.   Musculoskeletal:         General: Normal range of motion.      Cervical back: Normal range of motion.   Skin:     General: Skin is warm and dry.      Capillary Refill: Capillary refill takes less than 2 seconds.   Neurological:      Mental Status: She is lethargic.      GCS: GCS eye subscore is 2. GCS verbal subscore is 3. GCS motor subscore is 5.             Significant Labs: All pertinent labs within the past 24 hours have been reviewed.  CBC:   Recent Labs   Lab 04/21/24 0437 04/22/24 0407   WBC 12.08 9.37   HGB 15.5 15.6   HCT 45.1 44.8    255     CMP:   Recent Labs   Lab 04/21/24 0437 04/22/24 0407    137   K 3.5 2.9*    96   CO2 23 27   * 115*   BUN 24* 29*   CREATININE 0.8 0.9   CALCIUM 9.2 9.4   PROT 7.9 8.2   ALBUMIN 3.3* 3.5   BILITOT 2.1* 1.9*   ALKPHOS 63 64   AST 21 15   ALT 10 10   ANIONGAP  12 14       Significant Imaging: I have reviewed all pertinent imaging results/findings within the past 24 hours.    Assessment/Plan:      * Acute metabolic encephalopathy  CT brain without acute abnormality, unchanged MS findings  Ammonia normal  Has hx MS  Neurology consult   Acute encephalopathy  -presented with confusion and abdominal pain   -CTH with no lesion   -On precedex for agitation   -Consider MRI brain if patient continues to be encephalopathic  -Consider sEEG to rule out seizure if there is no changes in mental status  -Consider LP if patient still encephalopathic to rule out meningitis ot any infectious cause.  -rest of care per team     Will place Tele-Psychiatry consult today  -Patient and collateral were unable to provide any information for interview  -Continue to assess medical causes for delirium  -Consider Ativan challenge for potential catatonia if all medical causes for the patient's presentation have been ruled out.  -Recommend reconsulting psychiatry when more information can be obtained.    Opioid overdose        Accidental overdose  Treated for opiate overdose  Today mental status has improved      Hypokalemia  Patient has hypokalemia which is Acute and currently controlled. Most recent potassium levels reviewed-   Lab Results   Component Value Date    K 3.2 (L) 04/18/2024   . Will continue potassium replacement per protocol and recheck repeat levels after replacement completed.     Hypomagnesemia  Patient has Abnormal Magnesium: hypomagnesemia. Will continue to monitor electrolytes closely. Will replace the affected electrolytes and repeat labs to be done after interventions completed. The patient's magnesium results have been reviewed and are listed below.  Recent Labs   Lab 04/21/24  0437   MG 1.8          Takotsubo cardiomyopathy  Echo    Left Ventricle: Not well visualized due to poor sonic window. Global hypokinesis present. There is unable to assess systolic function visually  estimated ejection fraction of 25 - 30%. Unable to assess diastolic function due to poor image quality.    Mitral Valve: There is mild regurgitation.    Overall the study quality was technically difficult. The study was difficult due to patient's uncooperativeness and clinical status.    Cardiology consulted  Treated with IV lasix x 1 dose  No plan for further cardiac work up at this time    MS (multiple sclerosis)  - Continue Copaxone 3x weekly (patient supplied, to start 4/22)  - Will repeat MRI brain with/without myasthenia study this hospitalization.      NSTEMI (non-ST elevated myocardial infarction)  Troponin 0.103, repeat 0.472..trend  Consult cards  ECHO ordered  EKG w/o acute MI  Patient most recent LHC in 2022 without CAD.    Patient's echo in 2023 without coronary artery wall abnormalities.      VTE Risk Mitigation (From admission, onward)           Ordered     enoxaparin injection 40 mg  Every 24 hours         04/19/24 1011                    Discharge Planning   JOANNE:      Code Status: Full Code   Is the patient medically ready for discharge?:     Reason for patient still in hospital (select all that apply): Patient trending condition, Laboratory test, and Pending disposition  Discharge Plan A: Home with family            Critical care time spent on the evaluation and treatment of severe organ dysfunction, review of pertinent labs and imaging studies, discussions with consulting providers and discussions with patient/family: 32 minutes.      Loly Waite MD  Department of Hospital Medicine   [facility] Intensive Care

## 2024-04-22 NOTE — PROGRESS NOTES
Pharmacist Intervention IV to PO Note    Reza Morrow is a 58 y.o. female being treated with IV medication folic acid    Patient Data:    Vital Signs (Most Recent):  Temp: 98.7 °F (37.1 °C) (04/22/24 0730)  Pulse: 79 (04/22/24 0741)  Resp: (!) 25 (04/22/24 0741)  BP: 132/79 (04/22/24 0730)  SpO2: 98 % (04/22/24 0741) Vital Signs (72h Range):  Temp:  [97.5 °F (36.4 °C)-99.8 °F (37.7 °C)]   Pulse:  []   Resp:  [14-48]   BP: (110-185)/()   SpO2:  [88 %-100 %]      CBC:  Recent Labs   Lab 04/20/24 0412 04/21/24 0437 04/22/24  0407   WBC 16.09* 12.08 9.37   RBC 5.46* 5.24 5.16   HGB 16.2* 15.5 15.6   HCT 47.9 45.1 44.8    246 255   MCV 88 86 87   MCH 29.7 29.6 30.2   MCHC 33.8 34.4 34.8     CMP:     Recent Labs   Lab 04/20/24 0412 04/21/24 0437 04/22/24  0407   * 130* 115*   CALCIUM 9.1 9.2 9.4   ALBUMIN 3.6 3.3* 3.5   PROT 8.2 7.9 8.2    137 137   K 3.3* 3.5 2.9*   CO2 22* 23 27    102 96   BUN 16 24* 29*   CREATININE 0.8 0.8 0.9   ALKPHOS 71 63 64   ALT 12 10 10   AST 31 21 15   BILITOT 1.9* 2.1* 1.9*       Dietary Orders:  Diet Orders            Diet Adult Regular (IDDSI Level 7): Regular starting at 04/21 0949            Based on the following criteria, this patient qualifies for intravenous to oral conversion:  [x] The patients gastrointestinal tract is functioning (tolerating medications via oral or enteral route for 24 hours and tolerating food or enteral feeds for 24 hours).  [x] The patient is hemodynamically stable for 24 hours (heart rate <100 beats per minute, systolic blood pressure >99 mm Hg, and respiratory rate <20 breaths per minute).  [x] The patient shows clinical improvement (afebrile for at least 24 hours and white blood cell count downtrending or normalized). Additionally, the patient must be non-neutropenic (absolute neutrophil count >500 cells/mm3).  [x] For antimicrobials, the patient has received IV therapy for at least 24 hours.    IV medication  folic acid will be changed to oral medication folic acid    Pharmacist's Name: Terra Bey  Pharmacist's Extension: [phone]

## 2024-04-23 LAB
ALBUMIN SERPL BCP-MCNC: 3.1 G/DL (ref 3.5–5.2)
ALBUMIN SERPL BCP-MCNC: 3.6 G/DL (ref 3.5–5.2)
ALP SERPL-CCNC: 58 U/L (ref 55–135)
ALP SERPL-CCNC: 66 U/L (ref 55–135)
ALT SERPL W/O P-5'-P-CCNC: 11 U/L (ref 10–44)
ALT SERPL W/O P-5'-P-CCNC: 9 U/L (ref 10–44)
ANION GAP SERPL CALC-SCNC: 11 MMOL/L (ref 8–16)
ANION GAP SERPL CALC-SCNC: 13 MMOL/L (ref 8–16)
AST SERPL-CCNC: 13 U/L (ref 10–40)
AST SERPL-CCNC: 13 U/L (ref 10–40)
BASOPHILS # BLD AUTO: 0.02 K/UL (ref 0–0.2)
BASOPHILS # BLD AUTO: 0.03 K/UL (ref 0–0.2)
BASOPHILS NFR BLD: 0.4 % (ref 0–1.9)
BASOPHILS NFR BLD: 0.5 % (ref 0–1.9)
BILIRUB SERPL-MCNC: 1.3 MG/DL (ref 0.1–1)
BILIRUB SERPL-MCNC: 1.5 MG/DL (ref 0.1–1)
BUN SERPL-MCNC: 24 MG/DL (ref 6–20)
BUN SERPL-MCNC: 26 MG/DL (ref 6–20)
CALCIUM SERPL-MCNC: 8.9 MG/DL (ref 8.7–10.5)
CALCIUM SERPL-MCNC: 9.5 MG/DL (ref 8.7–10.5)
CHLORIDE SERPL-SCNC: 100 MMOL/L (ref 95–110)
CHLORIDE SERPL-SCNC: 97 MMOL/L (ref 95–110)
CO2 SERPL-SCNC: 25 MMOL/L (ref 23–29)
CO2 SERPL-SCNC: 26 MMOL/L (ref 23–29)
CREAT SERPL-MCNC: 0.8 MG/DL (ref 0.5–1.4)
CREAT SERPL-MCNC: 0.9 MG/DL (ref 0.5–1.4)
DIFFERENTIAL METHOD BLD: NORMAL
DIFFERENTIAL METHOD BLD: NORMAL
EOSINOPHIL # BLD AUTO: 0 K/UL (ref 0–0.5)
EOSINOPHIL # BLD AUTO: 0 K/UL (ref 0–0.5)
EOSINOPHIL NFR BLD: 0.5 % (ref 0–8)
EOSINOPHIL NFR BLD: 0.6 % (ref 0–8)
ERYTHROCYTE [DISTWIDTH] IN BLOOD BY AUTOMATED COUNT: 12.9 % (ref 11.5–14.5)
ERYTHROCYTE [DISTWIDTH] IN BLOOD BY AUTOMATED COUNT: 12.9 % (ref 11.5–14.5)
EST. GFR  (NO RACE VARIABLE): >60 ML/MIN/1.73 M^2
EST. GFR  (NO RACE VARIABLE): >60 ML/MIN/1.73 M^2
GLUCOSE SERPL-MCNC: 106 MG/DL (ref 70–110)
GLUCOSE SERPL-MCNC: 121 MG/DL (ref 70–110)
HCT VFR BLD AUTO: 41.5 % (ref 37–48.5)
HCT VFR BLD AUTO: 47.2 % (ref 37–48.5)
HGB BLD-MCNC: 14 G/DL (ref 12–16)
HGB BLD-MCNC: 15.9 G/DL (ref 12–16)
IMM GRANULOCYTES # BLD AUTO: 0.01 K/UL (ref 0–0.04)
IMM GRANULOCYTES # BLD AUTO: 0.02 K/UL (ref 0–0.04)
IMM GRANULOCYTES NFR BLD AUTO: 0.2 % (ref 0–0.5)
IMM GRANULOCYTES NFR BLD AUTO: 0.4 % (ref 0–0.5)
LYMPHOCYTES # BLD AUTO: 1.4 K/UL (ref 1–4.8)
LYMPHOCYTES # BLD AUTO: 1.4 K/UL (ref 1–4.8)
LYMPHOCYTES NFR BLD: 22.5 % (ref 18–48)
LYMPHOCYTES NFR BLD: 26.5 % (ref 18–48)
MAGNESIUM SERPL-MCNC: 2 MG/DL (ref 1.6–2.6)
MCH RBC QN AUTO: 29.8 PG (ref 27–31)
MCH RBC QN AUTO: 29.9 PG (ref 27–31)
MCHC RBC AUTO-ENTMCNC: 33.7 G/DL (ref 32–36)
MCHC RBC AUTO-ENTMCNC: 33.7 G/DL (ref 32–36)
MCV RBC AUTO: 88 FL (ref 82–98)
MCV RBC AUTO: 89 FL (ref 82–98)
MONOCYTES # BLD AUTO: 0.6 K/UL (ref 0.3–1)
MONOCYTES # BLD AUTO: 0.6 K/UL (ref 0.3–1)
MONOCYTES NFR BLD: 11.6 % (ref 4–15)
MONOCYTES NFR BLD: 9.1 % (ref 4–15)
NEUTROPHILS # BLD AUTO: 3.1 K/UL (ref 1.8–7.7)
NEUTROPHILS # BLD AUTO: 4.1 K/UL (ref 1.8–7.7)
NEUTROPHILS NFR BLD: 60.5 % (ref 38–73)
NEUTROPHILS NFR BLD: 67.2 % (ref 38–73)
NRBC BLD-RTO: 0 /100 WBC
NRBC BLD-RTO: 0 /100 WBC
OHS QRS DURATION: 82 MS
OHS QTC CALCULATION: 468 MS
PHOSPHATE SERPL-MCNC: 3.6 MG/DL (ref 2.7–4.5)
PLATELET # BLD AUTO: 215 K/UL (ref 150–450)
PLATELET # BLD AUTO: 322 K/UL (ref 150–450)
PMV BLD AUTO: 10.4 FL (ref 9.2–12.9)
PMV BLD AUTO: 10.8 FL (ref 9.2–12.9)
POTASSIUM SERPL-SCNC: 3.6 MMOL/L (ref 3.5–5.1)
POTASSIUM SERPL-SCNC: 3.6 MMOL/L (ref 3.5–5.1)
PROT SERPL-MCNC: 7.1 G/DL (ref 6–8.4)
PROT SERPL-MCNC: 8.5 G/DL (ref 6–8.4)
RBC # BLD AUTO: 4.7 M/UL (ref 4–5.4)
RBC # BLD AUTO: 5.32 M/UL (ref 4–5.4)
SODIUM SERPL-SCNC: 136 MMOL/L (ref 136–145)
SODIUM SERPL-SCNC: 136 MMOL/L (ref 136–145)
WBC # BLD AUTO: 5.1 K/UL (ref 3.9–12.7)
WBC # BLD AUTO: 6.04 K/UL (ref 3.9–12.7)

## 2024-04-23 PROCEDURE — 90833 PSYTX W PT W E/M 30 MIN: CPT | Mod: ,,, | Performed by: PSYCHIATRY & NEUROLOGY

## 2024-04-23 PROCEDURE — 25000003 PHARM REV CODE 250: Performed by: INTERNAL MEDICINE

## 2024-04-23 PROCEDURE — 93005 ELECTROCARDIOGRAM TRACING: CPT

## 2024-04-23 PROCEDURE — 83735 ASSAY OF MAGNESIUM: CPT | Performed by: FAMILY MEDICINE

## 2024-04-23 PROCEDURE — 99900035 HC TECH TIME PER 15 MIN (STAT)

## 2024-04-23 PROCEDURE — 84100 ASSAY OF PHOSPHORUS: CPT | Performed by: FAMILY MEDICINE

## 2024-04-23 PROCEDURE — 25000003 PHARM REV CODE 250: Performed by: NURSE PRACTITIONER

## 2024-04-23 PROCEDURE — 85025 COMPLETE CBC W/AUTO DIFF WBC: CPT | Performed by: INTERNAL MEDICINE

## 2024-04-23 PROCEDURE — 63600175 PHARM REV CODE 636 W HCPCS: Performed by: INTERNAL MEDICINE

## 2024-04-23 PROCEDURE — 63600175 PHARM REV CODE 636 W HCPCS: Performed by: STUDENT IN AN ORGANIZED HEALTH CARE EDUCATION/TRAINING PROGRAM

## 2024-04-23 PROCEDURE — 94761 N-INVAS EAR/PLS OXIMETRY MLT: CPT

## 2024-04-23 PROCEDURE — 36415 COLL VENOUS BLD VENIPUNCTURE: CPT | Performed by: INTERNAL MEDICINE

## 2024-04-23 PROCEDURE — 25500020 PHARM REV CODE 255: Performed by: FAMILY MEDICINE

## 2024-04-23 PROCEDURE — A9585 GADOBUTROL INJECTION: HCPCS | Performed by: FAMILY MEDICINE

## 2024-04-23 PROCEDURE — 25000003 PHARM REV CODE 250: Performed by: PSYCHIATRY & NEUROLOGY

## 2024-04-23 PROCEDURE — 11000001 HC ACUTE MED/SURG PRIVATE ROOM

## 2024-04-23 PROCEDURE — 80053 COMPREHEN METABOLIC PANEL: CPT | Mod: 91 | Performed by: FAMILY MEDICINE

## 2024-04-23 PROCEDURE — 99223 1ST HOSP IP/OBS HIGH 75: CPT | Mod: ,,, | Performed by: PSYCHIATRY & NEUROLOGY

## 2024-04-23 PROCEDURE — 25000003 PHARM REV CODE 250: Performed by: FAMILY MEDICINE

## 2024-04-23 PROCEDURE — 85025 COMPLETE CBC W/AUTO DIFF WBC: CPT | Mod: 91 | Performed by: FAMILY MEDICINE

## 2024-04-23 PROCEDURE — 80053 COMPREHEN METABOLIC PANEL: CPT | Performed by: INTERNAL MEDICINE

## 2024-04-23 PROCEDURE — 93010 ELECTROCARDIOGRAM REPORT: CPT | Mod: ,,, | Performed by: INTERNAL MEDICINE

## 2024-04-23 PROCEDURE — 36415 COLL VENOUS BLD VENIPUNCTURE: CPT | Performed by: FAMILY MEDICINE

## 2024-04-23 RX ORDER — ACETAMINOPHEN 325 MG/1
650 TABLET ORAL EVERY 6 HOURS PRN
Status: DISCONTINUED | OUTPATIENT
Start: 2024-04-23 | End: 2024-04-24 | Stop reason: HOSPADM

## 2024-04-23 RX ORDER — TRAZODONE HYDROCHLORIDE 50 MG/1
50 TABLET ORAL NIGHTLY PRN
Status: DISCONTINUED | OUTPATIENT
Start: 2024-04-23 | End: 2024-04-24 | Stop reason: HOSPADM

## 2024-04-23 RX ORDER — VENLAFAXINE HYDROCHLORIDE 37.5 MG/1
37.5 CAPSULE, EXTENDED RELEASE ORAL DAILY
Status: DISCONTINUED | OUTPATIENT
Start: 2024-04-23 | End: 2024-04-24 | Stop reason: HOSPADM

## 2024-04-23 RX ORDER — GADOBUTROL 604.72 MG/ML
5.5 INJECTION INTRAVENOUS
Status: COMPLETED | OUTPATIENT
Start: 2024-04-23 | End: 2024-04-23

## 2024-04-23 RX ORDER — CARBOXYMETHYLCELLULOSE SODIUM 5 MG/ML
2 SOLUTION/ DROPS OPHTHALMIC 2 TIMES DAILY PRN
Status: CANCELLED | OUTPATIENT
Start: 2024-04-23

## 2024-04-23 RX ORDER — SODIUM CHLORIDE 0.9 % (FLUSH) 0.9 %
10 SYRINGE (ML) INJECTION
Status: DISCONTINUED | OUTPATIENT
Start: 2024-04-23 | End: 2024-04-24 | Stop reason: HOSPADM

## 2024-04-23 RX ADMIN — GADOBUTROL 5.5 ML: 604.72 INJECTION INTRAVENOUS at 03:04

## 2024-04-23 RX ADMIN — FOLIC ACID 1 MG: 1 TABLET ORAL at 08:04

## 2024-04-23 RX ADMIN — TRAZODONE HYDROCHLORIDE 50 MG: 50 TABLET ORAL at 09:04

## 2024-04-23 RX ADMIN — MUPIROCIN: 20 OINTMENT TOPICAL at 08:04

## 2024-04-23 RX ADMIN — THIAMINE HYDROCHLORIDE 500 MG: 100 INJECTION, SOLUTION INTRAMUSCULAR; INTRAVENOUS at 04:04

## 2024-04-23 RX ADMIN — ENOXAPARIN SODIUM 40 MG: 40 INJECTION SUBCUTANEOUS at 04:04

## 2024-04-23 RX ADMIN — THIAMINE HYDROCHLORIDE 500 MG: 100 INJECTION, SOLUTION INTRAMUSCULAR; INTRAVENOUS at 09:04

## 2024-04-23 RX ADMIN — HYPROMELLOSE 2910 1 DROP: 5 SOLUTION/ DROPS OPHTHALMIC at 09:04

## 2024-04-23 RX ADMIN — MUPIROCIN: 20 OINTMENT TOPICAL at 09:04

## 2024-04-23 RX ADMIN — VENLAFAXINE HYDROCHLORIDE 37.5 MG: 37.5 CAPSULE, EXTENDED RELEASE ORAL at 04:04

## 2024-04-23 RX ADMIN — ACETAMINOPHEN 650 MG: 325 TABLET ORAL at 09:04

## 2024-04-23 RX ADMIN — HYPROMELLOSE 2910 1 DROP: 5 SOLUTION/ DROPS OPHTHALMIC at 10:04

## 2024-04-23 RX ADMIN — BUPRENORPHINE AND NALOXONE 1 FILM: 2; .5 FILM BUCCAL; SUBLINGUAL at 08:04

## 2024-04-23 RX ADMIN — THIAMINE HYDROCHLORIDE 500 MG: 100 INJECTION, SOLUTION INTRAMUSCULAR; INTRAVENOUS at 10:04

## 2024-04-23 NOTE — CONSULTS
PSYCHIATRY INPATIENT CONSULT NOTE      4/23/2024 12:00 PM   Reza Morrow   1965   [id number]           DATE OF ADMISSION: 4/18/2024 10:45 PM    SITE: [facility]    CURRENT LEGAL STATUS: Patient does not currently meet PEC criteria due to not currently being an imminent threat to self/others and not being gravely disabled 2/2 mental illness at this time.     HISTORY    Per Initial History from Primary Team:  Patient presents with    Abdominal Pain       C/O abdominal pain, vomiting and confusion since this morning.  EMT gave pt 4 mg Zofran IV.   Patient is a 58-year-old female  with a history of MS, polysubstance abuse, takotsubo, presents by EMS from skilled nursing with complaint of abdominal pain. Patient is provide details regarding her complaints but does place her hand over the epigastrium regarding her pain. She is answering questions unreliable. EMS reports that patient was noted to have abdominal pain vomiting and confusion starting today.  In ED patient noncooperative but unable to be directed.  Given droperidol and Benadryl in ED also needing restraints as pulling out IVs.  Labs significant for anion gap 18 potassium 3.2 and Mag 1.4.  Troponin 0.103, repeat 0.472.  Lactic acid elevated at 3.0.  Ammonia normal.  UA and UDS pending.  CT head abdomen and pelvis all without acute abnormality.    Patient's sister reports she has been in a group home for 2 weeks, does report she has history of polysubstance abuse and is on pain management meds.  Patient had cardiogenic shock 1 year ago requiring intubation with takotsubo EF was 20% however repeat echo  months later with resolution of EF at 60%.  Patient most recent LHC in 2022 without CAD.  Patient's echo in 2023 without coronary artery wall abnormalities.  Overview/Hospital Course from Primary Team:  4/20/24 Alert but not responding to questions, off precedex  4/21 talking, step down from ICU, suboxone  4/22 Much more alert, awaiting transfer to  floor  Interval History from Primary Team on 04/22/2024:   Very talkative today says that she doesn't like the suboxone and would like to restart her pain medications      Chief Complaint / Reason for Psychiatry Consult: acute encephalopathy vs psychosis, recent loss of son, forced to move out of house of 33 years, possible OCD, drug use       HPI:   Reza Morrow is a 58 y.o. female with a past medical history as noted above/below and a past psychiatric history of ANYA, Depression, Insomnia, Opiate Dependence, and Delirium, currently being treated by her inpatient primary team for a principle problem of acute metabolic encephalopathy.  Psychiatry was originally consulted as noted above.  The patient was seen and examined.  The chart was reviewed.  On examination today, the patient was alert and oriented to person, place, city, state, month, year, and situation.  She was CAM-ICU negative for delirium.  She endorses a multi-year history of opiate dependence on Oxycodone (30 mg QID per [place] review ; patient states that she only takes 1 or 2 pills daily) as well as use of flexeril, baclofen, tizanidine, robaxin, hydroxyzine, gabapentin, and lyrica.  She endorses chronic B hip and back pain as well as pain issues related to her MS diagnosis.  She states that the Suboxone that she is on here in the hospital has helped with opiate withdrawal s/s but states that it hasn't helped at all with the pain.  Despite counseling, education, and motivational interviewing, she denies any desire for buprenorphine pain management (even at higher dosages).  She does endorses a multi-year hx of symptoms consistent with ANYA as well as intermittent depression symptoms (see detailed psych ROS below for current / recent symptomatology).  She endorses getting about 5-6 hours of sleep per night on average with intermittent insomnia.  She endorses a fair appetite.  She denies any current or recent s/s consistent with lucita, psychosis, panic,  OCD, or PTSD.  She denies AH, VH, TH, delusions, paranoia, or DIGFAST (lucita) s/s.  She denies any current/recent passive/active SI/HI.  She denies any adverse effects to her current medication regimen.  Regarding current medical/physical complaints, she endorses fatigue and chronic B hip and back pain.  She denies any other medical complaints at this time.  NAD was observed during the examination.  Psychotherapy was implemented as noted below with a focus on improving anxiety, depression, sleep, and opiate cessation / total sobriety / polypharmacy reduction.  See detailed psych ROS below.  See A/P below.        Psychiatric Review Of Systems - Currently, the patient is endorsing and/or denying the following:  (patient's endorsements are BOLDED below; if not BOLDED, then patient denied):    Endorses intermittent Symptoms of Depression: diminished mood, low motivation, loss of interest/anhedonia, irritability, diminished energy, change in sleep, change in appetite, diminished concentration or cognition or indecisiveness, PMA/R, excessive guilt or hopelessness or worthlessness, suicidal ideations    Endorses intermittent issues with Sleep: initiation, maintenance, early morning awakening with inability to return to sleep    Denies Suicidal/Homicidal ideations: active/passive ideations, organized plans, future intentions    Denies Symptoms of psychosis: hallucinations, delusions, disorganized thinking, disorganized behavior or abnormal motor behavior, or negative symptoms (diminshed emotional expression, avolition, anhedonia, alogia, asociality)     Denies Symptoms of lucita or hypomania: elevated, expansive, or irritable mood with increased energy or activity; with inflated self-esteem or grandiosity, decreased need for sleep, increased rate of speech, FOI or racing thoughts, distractibility, increased goal directed activity or PMA, risky/disinhibited behavior    Endorses Symptoms of Anxiety / ANYA: excessive  anxiety/worry/fear, more days than not, about numerous issues, difficult to control, with restlessness, fatigue, poor concentration, irritability, muscle tension, sleep disturbance; causes functionally impairing distress     Denies Symptoms of Panic Disorder: recurrent panic attacks, precipitated or un-precipitated, source of worry and/or behavioral changes secondary; with or without agoraphobia    Denies Symptoms of PTSD: h/o trauma; re-experiencing/intrusive symptoms, avoidant behavior, negative alterations in cognition or mood, or hyperarousal symptoms; with or without dissociative symptoms     Denies Symptoms of OCD: obsessions or compulsions     Denies Symptoms of Eating Disorders: anorexia, bulimia or binging    Denies Substance Use: intoxication, withdrawal, tolerance, used in larger amounts or duration than intended, unsuccessful attempts to limit or quit, increased time engaging in or seeking out, cravings or strong desire to use, failure to fulfill obligations, negative consequences in social/interpersonal/occupational,/recreational areas, use in dangerous situations, medical or psychological consequences       [facility] Toolkit ASQ Suicide Screening Tool:  In the past few weeks, have you wished you were dead? Denies   In the past few weeks, have you felt that you or your family would be better off if you were dead? Denies  In the past week, have you been having thoughts about killing yourself? Denies  Have you ever tried to kill yourself? Denies  Are you having thoughts of killing yourself right now? Denies       PSYCHOTHERAPY ADD-ON +[id number]   30 (16-37*) minutes    Time: 20 minutes  Participants: Met with patient    Therapeutic Intervention Type: behavior modifying psychotherapy, supportive psychotherapy  Why chosen therapy is appropriate versus another modality: relevant to diagnosis, patient responds to this modality, evidence based practice    Target symptoms: anxiety, depression, insomnia, and opiate  dependence / polypharmacy   Primary focus: improving anxiety, depression, sleep, and opiate cessation / total sobriety / polypharmacy reduction   Psychotherapeutic techniques: supportive and psychodynamic techniques; psycho-education; deep breathing exercises; motivational interviewing; reality / insight orientation; CBT; problem solving techniques and managing life & medical stressors    Outcome monitoring methods: self-report, observation    Patient's response to intervention:  The patient's response to intervention is accepting.    Progress toward goals:  The patient's progress toward goals is fair.      ROS:  General ROS: negative for - chills, fever or night sweats; positive for fatigue  Ophthalmic ROS: negative for - blurry vision, double vision or eye pain  ENT ROS: negative for - sinus pain, headaches, sore throat or visual changes  Allergy and Immunology ROS: negative for - hives, itchy/watery eyes or nasal congestion  Hematological and Lymphatic ROS: negative for - bleeding problems, bruising, jaundice or pallor  Endocrine ROS: negative for - galactorrhea, hot flashes, mood swings, palpitations or temperature intolerance  Respiratory ROS: negative for - cough, hemoptysis, shortness of breath, tachypnea or wheezing  Cardiovascular ROS: negative for - chest pain, dyspnea on exertion, loss of consciousness, palpitations, rapid heart rate or shortness of breath  Gastrointestinal ROS: negative for - appetite loss, nausea, abdominal pain, blood in stools, change in bowel habits, constipation or diarrhea  Genito-Urinary ROS: negative for - incontinence, nocturia or pelvic pain  Musculoskeletal ROS: negative for - joint stiffness, joint swelling, or muscle pain ; positive for chronic B hip / back pain   Neurological ROS: negative for - behavioral changes, confusion, dizziness, memory loss, numbness/tingling or seizures  Dermatological ROS: negative for dry skin, hair changes, pruritus or rash  Psychiatric ROS: see  detailed psychiatric ROS above in history section       Past Psychiatric History:  Previous Diagnoses: ANYA, Depression, Insomnia, Opiate Dependence, and Delirium   Previous Medication Trials: Lexapro and Buspar   Previous Psychiatric Hospitalizations: once around  for prolonged delirium with behavioral disturbances / psychosis     Previous Suicide Attempts: denies  History of Violence: denies  Outpatient Psychiatrist: denies     Social History:  Marital Status: single  Children: 2 (one  son in 2019 and one current living adult son)  Employment Status/Info: on disability for MS  Education: college  Special Ed: denies  : denies  Oriental orthodox: Episcopal   Housing Status: lives with her sister in law in [place], [place]   Hobbies/Leisure time: watching TV and making jewelry   History of phys/sexual abuse: denies  Access to gun: denies     Family Psychiatric History: both sons with opiate addiction     Substance Abuse History (with focus the past 12 months):  Recreational Drugs: takes medical THC daily ; has been rx opiate dependent for several years ; denies other   Use of Alcohol: denies  Rehab History: denies    Tobacco Use: see below (counseled on continued cessation)   Social History     Tobacco Use   Smoking Status Former    Current packs/day: 0.00    Types: Cigarettes    Quit date: 2013    Years since quitting: 10.7   Smokeless Tobacco Never   Use of Caffeine: denies  Use of OTC: denies  Legal consequences of chemical use: prior DWI    Legal History:  Past Charges/Incarcerations: as noted above  Pending charges: denies      Psychosocial Stressors: health and polypharmacy  Functioning Relationships: good support system in sister in law   Strengths AND Liabilities: Strength: Patient accepts guidance/feedback, Strength: Patient is expressive/articulate., Strength: Patient is motivated for change., Liability: Patient has poor health., Liability: Patient lacks coping skills.      PAST MEDICAL &  SURGICAL HISTORY   Past Medical History:   Diagnosis Date    Behavioral problem     Bulging lumbar disc     Bursitis     bilateral hip    Degenerative cervical disc     Hx of psychiatric care     Hypercholesteremia     Labral tear of hip, degenerative bilateral    MS (multiple sclerosis)     Paresthesia of both lower extremities 3/13/2022    Pneumonia     Spinal cord compression      Past Surgical History:   Procedure Laterality Date    ANGIOGRAM, CORONARY, WITH LEFT HEART CATHETERIZATION Left 3/11/2022    Procedure: Angiogram, Coronary, with Left Heart Cath;  Surgeon: Elie Matamoros MD;  [facility] C[facility] LAB;  Service: Cardiology;  Laterality: Left;    BREAST SURGERY      augmentation     SECTION, CLASSIC      HAND SURGERY      HYSTEROSCOPY      NECK SURGERY      cervical disc removeal    TUBAL LIGATION      X-STOP IMPLANTATION         NEUROLOGIC HISTORY  Seizures: denies   Head trauma with LOC: denies    CVA: denies   MS: yes     FAMILY HISTORY   Family History   Problem Relation Name Age of Onset    Cancer Father      Diabetes Father      Lung cancer Father      Diabetes Sister      COPD Mother      Drug abuse Mother      Bipolar disorder Brother      Heart disease Maternal Uncle      Hypothyroidism Maternal Grandmother         ALLERGIES   Review of patient's allergies indicates:   Allergen Reactions    Adhesive Hives    Neosporin [neomycin-bacitracin-polymyxin] Hives    Neomycin     Neomycin-polymyxin Hives    Neosporin g.u. irrigant     Penicillins Other (See Comments)     Unknown  reaction    Latex Rash       CURRENT MEDICATION REGIMEN   Home Meds:   Prior to Admission medications    Medication Sig Start Date End Date Taking? Authorizing Provider   baclofen (LIORESAL) 10 MG tablet Take 1 tablet (10 mg total) by mouth 2 (two) times daily. 23   Neel Gr MD   cyclobenzaprine (FLEXERIL) 10 MG tablet Take 1 tablet (10 mg total) by mouth 3 (three) times daily as needed for Muscle spasms.  6/13/23   Neel Gr MD   ergocalciferol (ERGOCALCIFEROL) 50,000 unit Cap Take 50,000 Units by mouth every 7 days. 11/23/21   Provider, Historical   furosemide (LASIX) 40 MG tablet Take 1 tablet (40 mg total) by mouth daily as needed (for worsenign shortnes of breath, swelling, or 3lb weight gain in 1 day/5lb weight gain in 1 week). 6/27/23 6/26/24  Christine Skaggs PA-C   gabapentin (NEURONTIN) 300 MG capsule Take 1 capsule (300 mg total) by mouth 3 (three) times daily. 6/13/23   Neel Gr MD   glatiramer (COPAXONE, GLATOPA) 40 mg/mL injection Inject 40 mg into the skin once daily.    Provider, Historical   hydrOXYzine HCL (ATARAX) 25 MG tablet Take 25 mg by mouth 3 (three) times daily.    Provider, Historical   hydrOXYzine pamoate (VISTARIL) 25 MG Cap Take 50 mg by mouth every evening. 6/4/23   Provider, Historical   methocarbamoL (ROBAXIN) 750 MG Tab Take 1 tablet (750 mg total) by mouth 4 (four) times daily. 6/13/23   Neel Gr MD   metroNIDAZOLE 1 % GlwP Apply 1 Application topically.    Provider, Historical   NUCYNTA  mg Tb12 Take 1 tablet by mouth. 7/3/23   Provider, Historical   omeprazole (PRILOSEC) 20 MG capsule Take 20 mg by mouth daily as needed (Acid reflux). 2/15/22   Provider, Historical   oxyCODONE (ROXICODONE) 30 MG Tab Take 5 mg by mouth every 6 (six) hours as needed.    Provider, Historical   rosuvastatin (CRESTOR) 20 MG tablet Take 1 tablet (20 mg total) by mouth once daily. 6/30/23 6/29/24  Christine Skaggs PA-C   sterile water, bottle, irrigation  6/30/23   Provider, Historical   tiZANidine 2 mg Cap Take 1 capsule (2 mg total) by mouth 3 (three) times daily. 6/13/23   Neel Gr MD   triamcinolone acetonide 0.1% (KENALOG) 0.1 % cream Apply 0.1 applicators topically.    Provider, Historical   atorvastatin (LIPITOR) 20 MG tablet Take 20 mg by mouth once daily.  1/15/16  Provider, Historical   EScitalopram oxalate (LEXAPRO) 10 MG tablet Take 10 mg  by mouth once daily. 2/15/22 4/13/22  Provider, Historical   pregabalin (LYRICA) 150 MG capsule Take 150 mg by mouth 2 (two) times daily. 2/15/22 4/13/22  Provider, Historical       OTC Meds: denies     Scheduled Meds:   Current Facility-Administered Medications   Medication Dose Route Frequency    buprenorphine-naloxone 2-0.5 mg  1 Film Sublingual Daily    enoxparin  40 mg Subcutaneous Q24H (prophylaxis, 1700)    folic acid  1 mg Oral Daily    glatiramer  40 mg Subcutaneous Once per day on Monday Wednesday Friday    mupirocin   Nasal BID    thiamine (B-1) 500 mg in dextrose 5 % (D5W) 100 mL IVPB  500 mg Intravenous TID      PRN Meds:   Current Facility-Administered Medications:     artificial tears, 1 drop, Both Eyes, TID PRN    iohexoL, 100 mL, Intravenous, ONCE PRN    naloxone, 0.4 mg, Intravenous, PRN    ondansetron, 4 mg, Intravenous, Q6H PRN    prochlorperazine, 2.5 mg, Intravenous, Q6H PRN   Psychotherapeutics (From admission, onward)      None            LABORATORY DATA   Recent Results (from the past 72 hour(s))   EKG 12-lead    Collection Time: 04/20/24  8:08 PM   Result Value Ref Range    QRS Duration 76 ms    [facility] QTC Calculation 481 ms   EKG 12-lead    Collection Time: 04/20/24  9:21 PM   Result Value Ref Range    QRS Duration 80 ms    [facility] QTC Calculation 485 ms   EKG 12-lead    Collection Time: 04/20/24  9:21 PM   Result Value Ref Range    QRS Duration 82 ms    [facility] QTC Calculation 502 ms   EKG 12-lead    Collection Time: 04/21/24  1:09 AM   Result Value Ref Range    QRS Duration 76 ms    [facility] QTC Calculation 526 ms   POCT glucose    Collection Time: 04/21/24  1:58 AM   Result Value Ref Range    POCT Glucose 125 (H) 70 - 110 mg/dL   CBC auto differential    Collection Time: 04/21/24  4:37 AM   Result Value Ref Range    WBC 12.08 3.90 - 12.70 K/uL    RBC 5.24 4.00 - 5.40 M/uL    Hemoglobin 15.5 12.0 - 16.0 g/dL    Hematocrit 45.1 37.0 - 48.5 %    MCV 86 82 - 98 fL    MCH 29.6 27.0 - 31.0 pg    MCHC 34.4  32.0 - 36.0 g/dL    RDW 13.1 11.5 - 14.5 %    Platelets 246 150 - 450 K/uL    MPV 10.5 9.2 - 12.9 fL    Immature Granulocytes 0.3 0.0 - 0.5 %    Gran # (ANC) 10.3 (H) 1.8 - 7.7 K/uL    Immature Grans (Abs) 0.04 0.00 - 0.04 K/uL    Lymph # 0.9 (L) 1.0 - 4.8 K/uL    Mono # 0.9 0.3 - 1.0 K/uL    Eos # 0.0 0.0 - 0.5 K/uL    Baso # 0.01 0.00 - 0.20 K/uL    nRBC 0 0 /100 WBC    Gran % 85.6 (H) 38.0 - 73.0 %    Lymph % 7.0 (L) 18.0 - 48.0 %    Mono % 7.0 4.0 - 15.0 %    Eosinophil % 0.0 0.0 - 8.0 %    Basophil % 0.1 0.0 - 1.9 %    Differential Method Automated    Comprehensive metabolic panel    Collection Time: 04/21/24  4:37 AM   Result Value Ref Range    Sodium 137 136 - 145 mmol/L    Potassium 3.5 3.5 - 5.1 mmol/L    Chloride 102 95 - 110 mmol/L    CO2 23 23 - 29 mmol/L    Glucose 130 (H) 70 - 110 mg/dL    BUN 24 (H) 6 - 20 mg/dL    Creatinine 0.8 0.5 - 1.4 mg/dL    Calcium 9.2 8.7 - 10.5 mg/dL    Total Protein 7.9 6.0 - 8.4 g/dL    Albumin 3.3 (L) 3.5 - 5.2 g/dL    Total Bilirubin 2.1 (H) 0.1 - 1.0 mg/dL    Alkaline Phosphatase 63 55 - 135 U/L    AST 21 10 - 40 U/L    ALT 10 10 - 44 U/L    eGFR >60 >60 mL/min/1.73 m^2    Anion Gap 12 8 - 16 mmol/L   Magnesium    Collection Time: 04/21/24  4:37 AM   Result Value Ref Range    Magnesium 1.8 1.6 - 2.6 mg/dL   Phosphorus    Collection Time: 04/21/24  4:37 AM   Result Value Ref Range    Phosphorus 2.4 (L) 2.7 - 4.5 mg/dL   EKG 12-lead    Collection Time: 04/21/24  5:12 PM   Result Value Ref Range    QRS Duration 82 ms    [facility] QTC Calculation 468 ms   CBC auto differential    Collection Time: 04/22/24  4:07 AM   Result Value Ref Range    WBC 9.37 3.90 - 12.70 K/uL    RBC 5.16 4.00 - 5.40 M/uL    Hemoglobin 15.6 12.0 - 16.0 g/dL    Hematocrit 44.8 37.0 - 48.5 %    MCV 87 82 - 98 fL    MCH 30.2 27.0 - 31.0 pg    MCHC 34.8 32.0 - 36.0 g/dL    RDW 13.1 11.5 - 14.5 %    Platelets 255 150 - 450 K/uL    MPV 10.6 9.2 - 12.9 fL    Immature Granulocytes 0.2 0.0 - 0.5 %    Gran # (ANC)  6.9 1.8 - 7.7 K/uL    Immature Grans (Abs) 0.02 0.00 - 0.04 K/uL    Lymph # 1.5 1.0 - 4.8 K/uL    Mono # 0.9 0.3 - 1.0 K/uL    Eos # 0.0 0.0 - 0.5 K/uL    Baso # 0.02 0.00 - 0.20 K/uL    nRBC 0 0 /100 WBC    Gran % 73.8 (H) 38.0 - 73.0 %    Lymph % 16.1 (L) 18.0 - 48.0 %    Mono % 9.5 4.0 - 15.0 %    Eosinophil % 0.2 0.0 - 8.0 %    Basophil % 0.2 0.0 - 1.9 %    Differential Method Automated    Comprehensive metabolic panel    Collection Time: 04/22/24  4:07 AM   Result Value Ref Range    Sodium 137 136 - 145 mmol/L    Potassium 2.9 (L) 3.5 - 5.1 mmol/L    Chloride 96 95 - 110 mmol/L    CO2 27 23 - 29 mmol/L    Glucose 115 (H) 70 - 110 mg/dL    BUN 29 (H) 6 - 20 mg/dL    Creatinine 0.9 0.5 - 1.4 mg/dL    Calcium 9.4 8.7 - 10.5 mg/dL    Total Protein 8.2 6.0 - 8.4 g/dL    Albumin 3.5 3.5 - 5.2 g/dL    Total Bilirubin 1.9 (H) 0.1 - 1.0 mg/dL    Alkaline Phosphatase 64 55 - 135 U/L    AST 15 10 - 40 U/L    ALT 10 10 - 44 U/L    eGFR >60 >60 mL/min/1.73 m^2    Anion Gap 14 8 - 16 mmol/L   Magnesium    Collection Time: 04/22/24  4:07 AM   Result Value Ref Range    Magnesium 1.9 1.6 - 2.6 mg/dL   Phosphorus    Collection Time: 04/22/24  4:07 AM   Result Value Ref Range    Phosphorus 3.1 2.7 - 4.5 mg/dL   CBC auto differential    Collection Time: 04/23/24  5:22 AM   Result Value Ref Range    WBC 5.10 3.90 - 12.70 K/uL    RBC 4.70 4.00 - 5.40 M/uL    Hemoglobin 14.0 12.0 - 16.0 g/dL    Hematocrit 41.5 37.0 - 48.5 %    MCV 88 82 - 98 fL    MCH 29.8 27.0 - 31.0 pg    MCHC 33.7 32.0 - 36.0 g/dL    RDW 12.9 11.5 - 14.5 %    Platelets 215 150 - 450 K/uL    MPV 10.8 9.2 - 12.9 fL    Immature Granulocytes 0.4 0.0 - 0.5 %    Gran # (ANC) 3.1 1.8 - 7.7 K/uL    Immature Grans (Abs) 0.02 0.00 - 0.04 K/uL    Lymph # 1.4 1.0 - 4.8 K/uL    Mono # 0.6 0.3 - 1.0 K/uL    Eos # 0.0 0.0 - 0.5 K/uL    Baso # 0.02 0.00 - 0.20 K/uL    nRBC 0 0 /100 WBC    Gran % 60.5 38.0 - 73.0 %    Lymph % 26.5 18.0 - 48.0 %    Mono % 11.6 4.0 - 15.0 %     "Eosinophil % 0.6 0.0 - 8.0 %    Basophil % 0.4 0.0 - 1.9 %    Differential Method Automated    Comprehensive metabolic panel    Collection Time: 04/23/24  5:22 AM   Result Value Ref Range    Sodium 136 136 - 145 mmol/L    Potassium 3.6 3.5 - 5.1 mmol/L    Chloride 100 95 - 110 mmol/L    CO2 25 23 - 29 mmol/L    Glucose 106 70 - 110 mg/dL    BUN 26 (H) 6 - 20 mg/dL    Creatinine 0.8 0.5 - 1.4 mg/dL    Calcium 8.9 8.7 - 10.5 mg/dL    Total Protein 7.1 6.0 - 8.4 g/dL    Albumin 3.1 (L) 3.5 - 5.2 g/dL    Total Bilirubin 1.3 (H) 0.1 - 1.0 mg/dL    Alkaline Phosphatase 58 55 - 135 U/L    AST 13 10 - 40 U/L    ALT 9 (L) 10 - 44 U/L    eGFR >60 >60 mL/min/1.73 m^2    Anion Gap 11 8 - 16 mmol/L      No results found for: "PHENYTOIN", "PHENOBARB", "VALPROATE", "CBMZ"      EXAMINATION    VITALS   Vitals:    04/23/24 0338 04/23/24 0444 04/23/24 0800 04/23/24 1116   BP: 124/73  116/67 119/75   BP Location:   Left arm Right arm   Patient Position:   Lying Sitting   Pulse: 79 74 66 72   Resp: 18  18 18   Temp: 98.9 °F (37.2 °C)  97.5 °F (36.4 °C) 98.8 °F (37.1 °C)   TempSrc:   Oral Oral   SpO2: 95%  100% 95%   Weight:       Height:            CONSTITUTIONAL  General Appearance: NAD, unremarkable, age appropriate, normal weight, lying in bed, calm    MUSCULOSKELETAL  Muscle Strength and Tone: WNL    Abnormal Involuntary Movements: none observed   Gait and Station: Attempted but unable to assess due to medical acuity     PSYCHIATRIC   Behavior/Cooperation:  friendly and cooperative, eye contact normal, calm  Speech:  normal tone, increased rate, normal pitch, normal volume  Language: grossly intact, able to name, able to repeat with spontaneous speech  Mood: "anxious"  Affect:  congruent with mood and mildly tense ; full / reactive   Associations: intact; no ANDREE  Thought Process: Linear / Circumferential   Thought Content: denies SI, HI, AH, VH, TH, delusions, or paranoia (no RIS observed)   Sensorium: Awake  Alert and Oriented: to " person, place, city, state, month, year, and situation   Memory: 3/3 immediate, 3/3 at 5 minutes    Recent: Intact; able to report recent events   Remote: Intact; Named 4/4 past presidents   Attention/concentration: Intact. Appropriate for age/education. Able to spell [id number] & [id number].   Similarities: Intact (difference between apple and orange?)  Abstract reasoning: Intact  Fund of Knowledge: Named 4/4 past presidents   Insight: Intact  Judgment: Intact    CAM ICU Delirium Assessment - NEGATIVE    Is the patient aware of the biomedical complications associated with substance abuse and mental illness? yes        MEDICAL DECISION MAKING    ASSESSMENT        Delirium due to Medical Condition (acute metabolic encephalopathy & polypharmacy) with Behavioral Disturbance (improving / resolving)   Opiate Dependence / Withdrawal   Generalized Anxiety Disorder   Unspecified Depressive Disorder   Unspecified Insomnia        RECOMMENDATIONS       - With reasonable medical certainty, based on a present-state examination, the patient does not currently meet PEC criteria due to not being an imminent threat to self/others and not being gravely disabled 2/2 mental illness at this time.       - Begin Effexor XR 37.5 mg PO daily x 1 week, then increased to Effexor XR 75 mg PO daily thereafter for anxiety / depression / pain (discussed risks/benefits/alt vs no treatment with patient).    - Can use Trazodone 50 mg PO QHS PRN for insomnia (discussed risks/benefits/alt vs no treatment with patient).    - Continue to taper Suboxone for Opiate Withdrawal (despite counseling, education, and motivational interviewing, the patient denies any interest in taking Suboxone for pain management / opiate dependence MAT) (discussed risks/benefits/alt vs no treatment with patient).    - Informed the patient of the risks of continuous Benzodiazepine use including but not limited to falls, CNS depression, respiratory depression, reduced cognition,  "tolerance, dependence and withdrawals that may be life threatening upon abrupt cessation. Also advised not to take Benzodiazepines with Opiates or other sedatives and also not to drive or operate heavy machinery while using Benzodiazepines or Opiates.   Advised patient not to take Opiates with other sedating medications such as antihistamines, anticholinergics, hypnotics, THC, muscle relaxants, etc.    Implement the below DELIRIUM BEHAVIOR MANAGEMENT:  - Minimize use of restraints; if physical restraints necessary, please utilize medical/chemical prns for agitation.  - Keep shades open and room lit during day and room dim at night in order to promote healthy circadian rhythms.  - Encourage family at bedside.  - Keep whiteboard in patient's room current with the date and name of the members of patient's team for easy patient self re-orientation.  - Avoid benzodiazepines, antihistamines, anticholinergics, hypnotics, and minimize opiates while controlling for pain as these medications may exacerbate delirium.      - Psychotherapy was performed with patient as noted above with a focus on improving anxiety, depression, sleep, and opiate cessation / total sobriety / polypharmacy reduction.    - Patient's most recent resulted labs, imaging, and EKG were reviewed today ; UDS positive for THC (fentanyl testing is pending) ; Ethanol < 10 ; Free T4 wnl ; Ammonia WNL ; CT Head with "1. No CT evidence of acute intracranial abnormality. Clinical correlation and further evaluation as warranted. 2. Patchy hypoattenuation in the supratentorial white matter, similar to prior studies and possibly relating to prior demyelinating process in this patient with reported history of multiple sclerosis.  Further assessment as warranted." MRI Brain pending      - Please have CM/SW assist patient with asap outpatient mental health f/u for s/p discharge from this facility.       - Patient was instructed to call 911 and/or 528 and return to the " [facility] ED if she begins feeling suicidal, homicidal, or gravely disabled (for s/p discharge from this facility).     - Thank you for this consult ; will continue to follow patient while at [facility]         Total time spent with patient and/or managing/coordinating patient's care today (excluding the time spent on psychotherapy): 78 minutes   Time spent on psychotherapy today (as noted above): 20 minutes   Total time for encounter today including psychotherapy: 98 minutes      More than 50% of the time was spent counseling/coordinating care.     Consulting clinician was informed of the encounter and consult note.       STAFF:  Hunter Nguyen MD  [facility]   4/23/2024

## 2024-04-23 NOTE — NURSING TRANSFER
Nursing Transfer Note      4/22/2024   8:36 PM    Nurse giving handoff:LETTY Monroy   Nurse receiving handoff:LETTY Trotter     Reason patient is being transferred: Downgrade    Transfer To: [id number]    Transfer via bed    Transfer with cardiac monitoring    Transported by LETTY Monroy    Transfer Vital Signs:  Blood Pressure:112/54  Heart Rate:91  O2:96  Temperature:98.7 O  Respirations:26    Telemetry: Rhythm SR  Tele monitor placed in ICU  Order for Tele Monitor? Yes    4eyes on Skin: no    Medicines sent: mupirocin 2 % ointment     Any special needs or follow-up needed: No    Patient belongings transferred with patient: Yes    Chart send with patient: No    Notified: jerzy Bhardwaj    Patient reassessed at: 4/22/24 @ 2036   1  Upon arrival to floor: patient oriented to room, call bell in reach, and bed in lowest position

## 2024-04-23 NOTE — ASSESSMENT & PLAN NOTE
CT brain without acute abnormality, unchanged MS findings  Ammonia normal  Has hx MS  Neurology consult   Acute encephalopathy  -presented with confusion and abdominal pain   -CTH with no lesion   -On precedex for agitation   -Consider MRI brain if patient continues to be encephalopathic  -Consider sEEG to rule out seizure if there is no changes in mental status  -Consider LP if patient still encephalopathic to rule out meningitis ot any infectious cause.  -rest of care per team     Will place Tele-Psychiatry consult today  -Patient and collateral were unable to provide any information for interview  -Continue to assess medical causes for delirium  -Consider Ativan challenge for potential catatonia if all medical causes for the patient's presentation have been ruled out.    -psych evaluated  -mri brain pending

## 2024-04-23 NOTE — PLAN OF CARE
Problem: Infection  Goal: Absence of Infection Signs and Symptoms  Outcome: Progressing     Problem: Fall Injury Risk  Goal: Absence of Fall and Fall-Related Injury  Outcome: Progressing     Problem: Adult Inpatient Plan of Care  Goal: Plan of Care Review  Outcome: Progressing  Goal: Patient-Specific Goal (Individualized)  Outcome: Progressing  Goal: Absence of Hospital-Acquired Illness or Injury  Outcome: Progressing  Goal: Optimal Comfort and Wellbeing  Outcome: Progressing     Problem: Skin Injury Risk Increased  Goal: Skin Health and Integrity  Outcome: Progressing     Problem: Activity and Energy Impairment (Excessive Substance Use)  Goal: Optimized Energy Level (Excessive Substance Use)  Outcome: Progressing     Problem: Adjustment to Illness (Overdose)  Goal: Optimal Coping  Outcome: Progressing

## 2024-04-23 NOTE — SUBJECTIVE & OBJECTIVE
Interval History: back to baseline. Waiting for her mri brain      Review of Systems   Unable to perform ROS: Acuity of condition     Objective:     Vital Signs (Most Recent):  Temp: 98.6 °F (37 °C) (04/23/24 1608)  Pulse: 91 (04/23/24 1608)  Resp: 18 (04/23/24 1608)  BP: 130/80 (04/23/24 1608)  SpO2: (!) 94 % (04/23/24 1608) Vital Signs (24h Range):  Temp:  [97.5 °F (36.4 °C)-99.7 °F (37.6 °C)] 98.6 °F (37 °C)  Pulse:  [] 91  Resp:  [18-36] 18  SpO2:  [94 %-100 %] 94 %  BP: (112-166)/(54-87) 130/80     Weight: 56.5 kg (124 lb 9 oz)  Body mass index is 20.73 kg/m².    Intake/Output Summary (Last 24 hours) at 4/23/2024 1648  Last data filed at 4/22/2024 1707  Gross per 24 hour   Intake 82.2 ml   Output --   Net 82.2 ml         Physical Exam  Vitals reviewed.   HENT:      Head: Normocephalic and atraumatic.      Nose: Nose normal.      Mouth/Throat:      Mouth: Mucous membranes are moist.      Pharynx: Oropharynx is clear.   Cardiovascular:      Rate and Rhythm: Regular rhythm. Tachycardia present.      Pulses: Normal pulses.      Heart sounds: Normal heart sounds.   Pulmonary:      Effort: Pulmonary effort is normal.      Breath sounds: Normal breath sounds.   Abdominal:      General: Bowel sounds are normal.   Musculoskeletal:         General: Normal range of motion.      Cervical back: Normal range of motion.   Skin:     General: Skin is warm and dry.      Capillary Refill: Capillary refill takes less than 2 seconds.   Neurological:      Mental Status: She is lethargic.      GCS: GCS eye subscore is 2. GCS verbal subscore is 3. GCS motor subscore is 5.             Significant Labs: All pertinent labs within the past 24 hours have been reviewed.  CBC:   Recent Labs   Lab 04/22/24  0407 04/23/24  0522   WBC 9.37 5.10   HGB 15.6 14.0   HCT 44.8 41.5    215     CMP:   Recent Labs   Lab 04/22/24  0407 04/23/24  0522 04/23/24  1605    136 136   K 2.9* 3.6 3.6   CL 96 100 97   CO2 27 25 26   *  106 121*   BUN 29* 26* 24*   CREATININE 0.9 0.8 0.9   CALCIUM 9.4 8.9 9.5   PROT 8.2 7.1 8.5*   ALBUMIN 3.5 3.1* 3.6   BILITOT 1.9* 1.3* 1.5*   ALKPHOS 64 58 66   AST 15 13 13   ALT 10 9* 11   ANIONGAP 14 11 13       Significant Imaging: I have reviewed all pertinent imaging results/findings within the past 24 hours.

## 2024-04-23 NOTE — PLAN OF CARE
went to meet with patient. Patient reports she is independent and lives with roommates. She does not use any DME. Upon discharge, patient confirmed she would have a ride home. Patient confirmed her PCP is Dr. Jimenez.  will contact office to schedule follow-up. Cardiology follow-up noted to be scheduled. Patient reports she does not see any OP Psychiatrist, will need to refer on discharge. Patient encouraged to call with any questions or concerns.  will continue to follow patient through transitions of care and assist with any discharge needs.     Patient Contacts    Name Relation Home Work Mobile   Celeste Olivarez Sister [phone]     Jono Ceballos   [phone]     Future Appointments   Date Time Provider Department Center   5/22/2024  3:00 PM Diana Lara MD [facility]  [facility]         04/23/24 1549   Discharge Reassessment   Assessment Type Discharge Planning Reassessment   Did the patient's condition or plan change since previous assessment? No   Discharge Plan discussed with: Patient   Discharge Plan A Home   Discharge Plan B Home with family   DME Needed Upon Discharge  none   Transition of Care Barriers None   Why the patient remains in the hospital Requires continued medical care   Post-Acute Status   Hospital Resources/Appts/Education Provided Appointments scheduled and added to AVS   Discharge Delays None known at this time     Tiffanie Garcia RN    [phone]

## 2024-04-23 NOTE — PROGRESS NOTES
[facility]  Progress Note    Patient Name: Reza Morrow  MRN: [id number]  Patient Class: IP- Inpatient   Admission Date: 4/18/2024  Length of Stay: 4 days  Attending Physician: Sheila Burnett MD  Primary Care Provider: Kip Jimenez MD        Subjective:     Principal Problem:Acute metabolic encephalopathy        HPI:  Patient is a 58-year-old female  with a history of MS, polysubstance abuse, takotsubo, presents by EMS from [facility] with complaint of abdominal pain. Patient is provide details regarding her complaints but does place her hand over the epigastrium regarding her pain. She is answering questions unreliable. EMS reports that patient was noted to have abdominal pain vomiting and confusion starting today.  In ED patient noncooperative but unable to be directed.  Given droperidol and Benadryl in ED also needing restraints as pulling out IVs.  Labs significant for anion gap 18 potassium 3.2 and Mag 1.4.  Troponin 0.103, repeat 0.472.  Lactic acid elevated at 3.0.  Ammonia normal.  UA and UDS pending.  CT head abdomen and pelvis all without acute abnormality.      Patient's sister reports she has been in a group home for 2 weeks, does report she has history of polysubstance abuse and is on pain management meds.  Patient had cardiogenic shock 1 year ago requiring intubation with takotsubo EF was 20% however repeat echo  months later with resolution of EF at 60%.  Patient most recent LHC in 2022 without CAD.  Patient's echo in 2023 without coronary artery wall abnormalities.    Overview/Hospital Course:  4/20/24 Alert but not responding to questions, off precedex  4/21 talking, step down from ICU, suboxone  4/22 Much more alert, awaiting transfer to floor  4/23: MRI brain pending. Psych evaulated pt    Interval History: back to baseline. Waiting for her mri brain      Review of Systems   Unable to perform ROS: Acuity of condition     Objective:     Vital Signs (Most  Recent):  Temp: 98.6 °F (37 °C) (04/23/24 1608)  Pulse: 91 (04/23/24 1608)  Resp: 18 (04/23/24 1608)  BP: 130/80 (04/23/24 1608)  SpO2: (!) 94 % (04/23/24 1608) Vital Signs (24h Range):  Temp:  [97.5 °F (36.4 °C)-99.7 °F (37.6 °C)] 98.6 °F (37 °C)  Pulse:  [] 91  Resp:  [18-36] 18  SpO2:  [94 %-100 %] 94 %  BP: (112-166)/(54-87) 130/80     Weight: 56.5 kg (124 lb 9 oz)  Body mass index is 20.73 kg/m².    Intake/Output Summary (Last 24 hours) at 4/23/2024 1648  Last data filed at 4/22/2024 1707  Gross per 24 hour   Intake 82.2 ml   Output --   Net 82.2 ml         Physical Exam  Vitals reviewed.   HENT:      Head: Normocephalic and atraumatic.      Nose: Nose normal.      Mouth/Throat:      Mouth: Mucous membranes are moist.      Pharynx: Oropharynx is clear.   Cardiovascular:      Rate and Rhythm: Regular rhythm. Tachycardia present.      Pulses: Normal pulses.      Heart sounds: Normal heart sounds.   Pulmonary:      Effort: Pulmonary effort is normal.      Breath sounds: Normal breath sounds.   Abdominal:      General: Bowel sounds are normal.   Musculoskeletal:         General: Normal range of motion.      Cervical back: Normal range of motion.   Skin:     General: Skin is warm and dry.      Capillary Refill: Capillary refill takes less than 2 seconds.   Neurological:      Mental Status: She is lethargic.      GCS: GCS eye subscore is 2. GCS verbal subscore is 3. GCS motor subscore is 5.             Significant Labs: All pertinent labs within the past 24 hours have been reviewed.  CBC:   Recent Labs   Lab 04/22/24  0407 04/23/24  0522   WBC 9.37 5.10   HGB 15.6 14.0   HCT 44.8 41.5    215     CMP:   Recent Labs   Lab 04/22/24  0407 04/23/24  0522 04/23/24  1605    136 136   K 2.9* 3.6 3.6   CL 96 100 97   CO2 27 25 26   * 106 121*   BUN 29* 26* 24*   CREATININE 0.9 0.8 0.9   CALCIUM 9.4 8.9 9.5   PROT 8.2 7.1 8.5*   ALBUMIN 3.5 3.1* 3.6   BILITOT 1.9* 1.3* 1.5*   ALKPHOS 64 58 66   AST 15  13 13   ALT 10 9* 11   ANIONGAP 14 11 13       Significant Imaging: I have reviewed all pertinent imaging results/findings within the past 24 hours.    Assessment/Plan:      * Acute metabolic encephalopathy  CT brain without acute abnormality, unchanged MS findings  Ammonia normal  Has hx MS  Neurology consult   Acute encephalopathy  -presented with confusion and abdominal pain   -CTH with no lesion   -On precedex for agitation   -Consider MRI brain if patient continues to be encephalopathic  -Consider sEEG to rule out seizure if there is no changes in mental status  -Consider LP if patient still encephalopathic to rule out meningitis ot any infectious cause.  -rest of care per team     Will place Tele-Psychiatry consult today  -Patient and collateral were unable to provide any information for interview  -Continue to assess medical causes for delirium  -Consider Ativan challenge for potential catatonia if all medical causes for the patient's presentation have been ruled out.    -psych evaluated  -mri brain pending    Opioid overdose        Accidental overdose  Treated for opiate overdose  Today mental status has improved      Hypokalemia  Patient has hypokalemia which is Acute and currently controlled. Most recent potassium levels reviewed-   Lab Results   Component Value Date    K 3.2 (L) 04/18/2024   . Will continue potassium replacement per protocol and recheck repeat levels after replacement completed.     Hypomagnesemia  Patient has Abnormal Magnesium: hypomagnesemia. Will continue to monitor electrolytes closely. Will replace the affected electrolytes and repeat labs to be done after interventions completed. The patient's magnesium results have been reviewed and are listed below.  Recent Labs   Lab 04/21/24  0437   MG 1.8          Takotsubo cardiomyopathy  Echo    Left Ventricle: Not well visualized due to poor sonic window. Global hypokinesis present. There is unable to assess systolic function visually  estimated ejection fraction of 25 - 30%. Unable to assess diastolic function due to poor image quality.    Mitral Valve: There is mild regurgitation.    Overall the study quality was technically difficult. The study was difficult due to patient's uncooperativeness and clinical status.    Cardiology consulted  Treated with IV lasix x 1 dose  No plan for further cardiac work up at this time    MS (multiple sclerosis)  - Continue Copaxone 3x weekly (patient supplied, to start 4/22)  - Will repeat MRI brain with/without myasthenia study this hospitalization.      NSTEMI (non-ST elevated myocardial infarction)  Troponin 0.103, repeat 0.472..trend  Consult cards  ECHO ordered  EKG w/o acute MI  Patient most recent LHC in 2022 without CAD.    Patient's echo in 2023 without coronary artery wall abnormalities.      VTE Risk Mitigation (From admission, onward)           Ordered     enoxaparin injection 40 mg  Every 24 hours         04/19/24 1011                    Discharge Planning   JOANNE: 4/23/2024     Code Status: Full Code   Is the patient medically ready for discharge?:     Reason for patient still in hospital (select all that apply): Treatment  Discharge Plan A: Home   Discharge Delays: None known at this time              Sheila Burnett MD  Department of Hospital Medicine   [facility]

## 2024-04-24 VITALS
RESPIRATION RATE: 17 BRPM | SYSTOLIC BLOOD PRESSURE: 131 MMHG | HEIGHT: 65 IN | TEMPERATURE: 98 F | WEIGHT: 124 LBS | HEART RATE: 83 BPM | OXYGEN SATURATION: 97 % | DIASTOLIC BLOOD PRESSURE: 67 MMHG | BODY MASS INDEX: 20.66 KG/M2

## 2024-04-24 LAB
ANION GAP SERPL CALC-SCNC: 11 MMOL/L (ref 8–16)
ASCENDING AORTA: 2.55 CM
AV INDEX (PROSTH): 0.96
AV MEAN GRADIENT: 6 MMHG
AV PEAK GRADIENT: 9 MMHG
AV VALVE AREA BY VELOCITY RATIO: 2.99 CM²
AV VALVE AREA: 2.92 CM²
AV VELOCITY RATIO: 0.98
BACTERIA BLD CULT: NORMAL
BACTERIA BLD CULT: NORMAL
BSA FOR ECHO PROCEDURE: 1.61 M2
BUN SERPL-MCNC: 19 MG/DL (ref 6–20)
CALCIUM SERPL-MCNC: 8.8 MG/DL (ref 8.7–10.5)
CHLORIDE SERPL-SCNC: 98 MMOL/L (ref 95–110)
CO2 SERPL-SCNC: 27 MMOL/L (ref 23–29)
CREAT SERPL-MCNC: 0.9 MG/DL (ref 0.5–1.4)
CV ECHO LV RWT: 0.43 CM
DOP CALC AO PEAK VEL: 1.49 M/S
DOP CALC AO VTI: 21.2 CM
DOP CALC LVOT AREA: 3 CM2
DOP CALC LVOT DIAMETER: 1.97 CM
DOP CALC LVOT PEAK VEL: 1.46 M/S
DOP CALC LVOT STROKE VOLUME: 61.84 CM3
DOP CALCLVOT PEAK VEL VTI: 20.3 CM
E WAVE DECELERATION TIME: 192.78 MSEC
E/A RATIO: 0.71
E/E' RATIO: 8.17 M/S
ECHO LV POSTERIOR WALL: 0.93 CM (ref 0.6–1.1)
EST. GFR  (NO RACE VARIABLE): >60 ML/MIN/1.73 M^2
FRACTIONAL SHORTENING: 21 % (ref 28–44)
GLUCOSE SERPL-MCNC: 91 MG/DL (ref 70–110)
INTERVENTRICULAR SEPTUM: 0.73 CM (ref 0.6–1.1)
IVRT: 117.03 MSEC
LA MAJOR: 4.31 CM
LA MINOR: 4.53 CM
LA WIDTH: 3.7 CM
LEFT ATRIUM SIZE: 3.52 CM
LEFT ATRIUM VOLUME INDEX MOD: 23.5 ML/M2
LEFT ATRIUM VOLUME INDEX: 30.4 ML/M2
LEFT ATRIUM VOLUME MOD: 37.88 CM3
LEFT ATRIUM VOLUME: 48.9 CM3
LEFT INTERNAL DIMENSION IN SYSTOLE: 3.38 CM (ref 2.1–4)
LEFT VENTRICLE DIASTOLIC VOLUME INDEX: 51.29 ML/M2
LEFT VENTRICLE DIASTOLIC VOLUME: 82.57 ML
LEFT VENTRICLE MASS INDEX: 68 G/M2
LEFT VENTRICLE SYSTOLIC VOLUME INDEX: 29.1 ML/M2
LEFT VENTRICLE SYSTOLIC VOLUME: 46.84 ML
LEFT VENTRICULAR INTERNAL DIMENSION IN DIASTOLE: 4.29 CM (ref 3.5–6)
LEFT VENTRICULAR MASS: 110.17 G
LV LATERAL E/E' RATIO: 6.13 M/S
LV SEPTAL E/E' RATIO: 12.25 M/S
LVOT MG: 4.24 MMHG
LVOT MV: 0.96 CM/S
MV PEAK A VEL: 0.69 M/S
MV PEAK E VEL: 0.49 M/S
MV STENOSIS PRESSURE HALF TIME: 55.91 MS
MV VALVE AREA P 1/2 METHOD: 3.93 CM2
OHS CV RV/LV RATIO: 0.59 CM
OHS QRS DURATION: 76 MS
OHS QTC CALCULATION: 457 MS
POTASSIUM SERPL-SCNC: 3.5 MMOL/L (ref 3.5–5.1)
PULM VEIN S/D RATIO: 1.02
PV PEAK D VEL: 0.46 M/S
PV PEAK S VEL: 0.47 M/S
RA MAJOR: 4.19 CM
RA PRESSURE ESTIMATED: 3 MMHG
RA WIDTH: 3.57 CM
RIGHT VENTRICULAR END-DIASTOLIC DIMENSION: 2.52 CM
RV TISSUE DOPPLER FREE WALL SYSTOLIC VELOCITY 1 (APICAL 4 CHAMBER VIEW): 12.51 CM/S
SINUS: 2.74 CM
SODIUM SERPL-SCNC: 136 MMOL/L (ref 136–145)
STJ: 2.58 CM
TDI LATERAL: 0.08 M/S
TDI SEPTAL: 0.04 M/S
TDI: 0.06 M/S
TRICUSPID ANNULAR PLANE SYSTOLIC EXCURSION: 1.95 CM
Z-SCORE OF LEFT VENTRICULAR DIMENSION IN END DIASTOLE: -0.63
Z-SCORE OF LEFT VENTRICULAR DIMENSION IN END SYSTOLE: 1.4

## 2024-04-24 PROCEDURE — 36415 COLL VENOUS BLD VENIPUNCTURE: CPT | Performed by: FAMILY MEDICINE

## 2024-04-24 PROCEDURE — 63600175 PHARM REV CODE 636 W HCPCS: Performed by: INTERNAL MEDICINE

## 2024-04-24 PROCEDURE — 63600175 PHARM REV CODE 636 W HCPCS: Mod: JG | Performed by: STUDENT IN AN ORGANIZED HEALTH CARE EDUCATION/TRAINING PROGRAM

## 2024-04-24 PROCEDURE — 80048 BASIC METABOLIC PNL TOTAL CA: CPT | Performed by: FAMILY MEDICINE

## 2024-04-24 PROCEDURE — 99900035 HC TECH TIME PER 15 MIN (STAT)

## 2024-04-24 PROCEDURE — 94761 N-INVAS EAR/PLS OXIMETRY MLT: CPT

## 2024-04-24 PROCEDURE — 25000003 PHARM REV CODE 250: Performed by: PSYCHIATRY & NEUROLOGY

## 2024-04-24 PROCEDURE — 25000003 PHARM REV CODE 250: Performed by: NURSE PRACTITIONER

## 2024-04-24 PROCEDURE — 25000003 PHARM REV CODE 250: Performed by: INTERNAL MEDICINE

## 2024-04-24 RX ORDER — VENLAFAXINE HYDROCHLORIDE 37.5 MG/1
37.5 CAPSULE, EXTENDED RELEASE ORAL DAILY
Qty: 30 CAPSULE | Refills: 3 | Status: ON HOLD | OUTPATIENT
Start: 2024-04-25 | End: 2024-05-16 | Stop reason: HOSPADM

## 2024-04-24 RX ADMIN — ACETAMINOPHEN 650 MG: 325 TABLET ORAL at 10:04

## 2024-04-24 RX ADMIN — MUPIROCIN: 20 OINTMENT TOPICAL at 09:04

## 2024-04-24 RX ADMIN — FOLIC ACID 1 MG: 1 TABLET ORAL at 09:04

## 2024-04-24 RX ADMIN — VENLAFAXINE HYDROCHLORIDE 37.5 MG: 37.5 CAPSULE, EXTENDED RELEASE ORAL at 09:04

## 2024-04-24 RX ADMIN — THIAMINE HYDROCHLORIDE 500 MG: 100 INJECTION, SOLUTION INTRAMUSCULAR; INTRAVENOUS at 09:04

## 2024-04-24 RX ADMIN — GLATIRAMER ACETATE 40 MG: 40 INJECTION, SOLUTION SUBCUTANEOUS at 09:04

## 2024-04-24 RX ADMIN — BUPRENORPHINE AND NALOXONE 1 FILM: 2; .5 FILM BUCCAL; SUBLINGUAL at 09:04

## 2024-04-24 NOTE — ASSESSMENT & PLAN NOTE
Echo    Left Ventricle: Not well visualized due to poor sonic window. Global hypokinesis present. There is unable to assess systolic function visually estimated ejection fraction of 25 - 30%. Unable to assess diastolic function due to poor image quality.    Mitral Valve: There is mild regurgitation.    Overall the study quality was technically difficult. The study was difficult due to patient's uncooperativeness and clinical status.    Cardiology consulted  Treated with IV lasix x 1 dose  No plan for further cardiac work up at this time

## 2024-04-24 NOTE — PLAN OF CARE
went to meet with patient. Patient notified of anticipated discharge today.  contacted PCP office, patient has not seen Dr. Jimenez for years. She would like to be scheduled for hospital follow-up at [facility] in [place]. Hospital follow-up reviewed. She was also updated on Cardiology follow-up already scheduled Patient aware will need to schedule own Psych follow-up. Referral placed. Patient confirmed she will have a ride at discharge. No anticipated discharge needs.  will continue to follow.    Patient Contacts    Name Relation Home Work Mobile   Celeste Olivarez Sister [phone]     Jono Ceballos   [phone]     Future Appointments   Date Time Provider Department Center   5/22/2024  3:00 PM Diana Lara MD [facility]  [place] [facility]      e Relationship Specialty Phone Fax Address Order                [facility]  [facility]al Health Psychiatry [phone]  [address] [place] [address]     Next Steps: Follow up on 4/30/2024  Instructions: Appointment is at 3:00 pm. Please arrive at 2:30 pm with ID, Medications, Insurance Card, and Discharge Papers. You will see Samuel Villeda, Primary Care Physician        [facility]     Mental health clinic in [place], [place] Address: [address] [place] [address] [place] [address] Phone: [phone]     Next Steps: Follow up  Instructions: Please contact office to schedule follow-up. Mental Health.            04/24/24 1029   Final Note   Assessment Type Final Discharge Note   Anticipated Discharge Disposition Home   Hospital Resources/Appts/Education Provided Appointments scheduled and added to AVS   Post-Acute Status   Discharge Delays None known at this time     Tiffanie Garcia RN    [phone]

## 2024-04-24 NOTE — ASSESSMENT & PLAN NOTE
CT brain without acute abnormality, unchanged MS findings  Ammonia normal  Has hx MS  Neurology consult   Acute encephalopathy  -presented with confusion and abdominal pain   -CTH with no lesion   -On precedex for agitation   -Consider MRI brain if patient continues to be encephalopathic  -Consider sEEG to rule out seizure if there is no changes in mental status  -Consider LP if patient still encephalopathic to rule out meningitis ot any infectious cause.  -rest of care per team     Will place Tele-Psychiatry consult today  -Patient and collateral were unable to provide any information for interview  -Continue to assess medical causes for delirium  -Consider Ativan challenge for potential catatonia if all medical causes for the patient's presentation have been ruled out.    -psych evaluated and added effoxor  -mri brain negative for acute abnormalities    Pt back to baseline

## 2024-04-24 NOTE — DISCHARGE SUMMARY
[facility] Medicine  Discharge Summary      Patient Name: Reza Morrow  MRN: [id number]  MCKAYLA: [id number]  Patient Class: IP- Inpatient  Admission Date: 4/18/2024  Hospital Length of Stay: 5 days  Discharge Date and Time:  04/24/2024 4:04 PM  Attending Physician: No att. providers found   Discharging Provider: Sheila Burnett MD  Primary Care Provider: Tony, Kip HENRY MD    Primary Care Team: Networked reference to record PCT     HPI:   Patient is a 58-year-old female  with a history of MS, polysubstance abuse, takotsubo, presents by EMS from snf with complaint of abdominal pain. Patient is provide details regarding her complaints but does place her hand over the epigastrium regarding her pain. She is answering questions unreliable. EMS reports that patient was noted to have abdominal pain vomiting and confusion starting today.  In ED patient noncooperative but unable to be directed.  Given droperidol and Benadryl in ED also needing restraints as pulling out IVs.  Labs significant for anion gap 18 potassium 3.2 and Mag 1.4.  Troponin 0.103, repeat 0.472.  Lactic acid elevated at 3.0.  Ammonia normal.  UA and UDS pending.  CT head abdomen and pelvis all without acute abnormality.      Patient's sister reports she has been in a group home for 2 weeks, does report she has history of polysubstance abuse and is on pain management meds.  Patient had cardiogenic shock 1 year ago requiring intubation with takotsubo EF was 20% however repeat echo  months later with resolution of EF at 60%.  Patient most recent C in 2022 without CAD.  Patient's echo in 2023 without coronary artery wall abnormalities.    * No surgery found *      Hospital Course:   4/20/24 Alert but not responding to questions, off precedex  4/21 talking, step down from ICU, suboxone  4/22 Much more alert, awaiting transfer to floor  4/23: MRI brain pending. Psych evaulated pt and added effexor  4/24: MRI brain negative for acute  abnormalities. Pt back to baseline. Deemed stable to be d/c.      Goals of Care Treatment Preferences:  Code Status: Full Code      Consults:   Consults (From admission, onward)          Status Ordering Provider     Inpatient consult to Telemedicine - Psyc  Once        Provider:  (Not yet assigned)    Completed GOPI COLBERT     Inpatient consult to Psychiatry  Once        Provider:  (Not yet assigned)    Completed GOPI COLBERT     Inpatient consult to [facility]  Once        Provider:  (Not yet assigned)    Completed CARLYLE OCAMPO     [facility]  Once        Provider:  (Not yet assigned)    Completed CARLYLE OCAMPO            Neuro  * Acute metabolic encephalopathy  CT brain without acute abnormality, unchanged MS findings  Ammonia normal  Has hx MS  Neurology consult   Acute encephalopathy  -presented with confusion and abdominal pain   -CTH with no lesion   -On precedex for agitation   -Consider MRI brain if patient continues to be encephalopathic  -Consider sEEG to rule out seizure if there is no changes in mental status  -Consider LP if patient still encephalopathic to rule out meningitis ot any infectious cause.  -rest of care per team     Will place Tele-Psychiatry consult today  -Patient and collateral were unable to provide any information for interview  -Continue to assess medical causes for delirium  -Consider Ativan challenge for potential catatonia if all medical causes for the patient's presentation have been ruled out.    -psych evaluated and added effoxor  -mri brain negative for acute abnormalities    Pt back to baseline    MS (multiple sclerosis)  - Continue Copaxone 3x weekly (patient supplied, to start 4/22)  - repeated MRI brain with/without myasthenia study this hospitalization without any acute abnormalities      Cardiac/Vascular  Takotsubo cardiomyopathy  Echo    Left Ventricle: Not well visualized due to poor sonic window. Global  hypokinesis present. There is unable to assess systolic function visually estimated ejection fraction of 25 - 30%. Unable to assess diastolic function due to poor image quality.    Mitral Valve: There is mild regurgitation.    Overall the study quality was technically difficult. The study was difficult due to patient's uncooperativeness and clinical status.    Cardiology consulted  Treated with IV lasix x 1 dose  No plan for further cardiac work up at this time    Other  Opioid overdose  counseled      Accidental overdose  Treated for opiate overdose  Today mental status has returned to baseline  counseled        Final Active Diagnoses:    Diagnosis Date Noted POA    PRINCIPAL PROBLEM:  Acute metabolic encephalopathy [G93.41] 04/19/2024 Yes    Accidental overdose [T50.901A] 04/20/2024 Yes    Opioid overdose [T40.2X1A] 04/20/2024 Yes    Hypomagnesemia [E83.42] 04/19/2024 Yes    Hypokalemia [E87.6] 04/19/2024 Yes    Tachycardia [R00.0] 04/19/2024 No    Takotsubo cardiomyopathy [I51.81] 06/10/2023 Yes    MS (multiple sclerosis) [G35] 12/07/2015 Yes     Chronic      Problems Resolved During this Admission:       Discharged Condition: stable    Disposition: Home or Self Care    Follow Up:   Follow-up Information       [facility] Follow up on 4/30/2024.    Specialties: Behavioral Health, Psychiatry  Why: Appointment is at 3:00 pm. Please arrive at 2:30 pm with ID, Medications, Insurance Card, and Discharge Papers. You will see Samuel Gao., Primary Care Physician  Contact information:  [address] [place] [id number] [phone]               [facility] Follow up.    Why: Please contact office to schedule follow-up. Mental Health.  Contact information:  Mental health clinic in [place], [place]  Address: [address] [place] [address]  Phone: [phone]                         Patient Instructions:      Ambulatory referral/consult to Psychiatry    Standing Status: Future   Referral Priority: Routine Referral Type: Psychiatric   Referral Reason: Specialty Services Required   Requested Specialty: Psychiatry   Number of Visits Requested: 1     Diet Adult Regular     Notify your health care provider if you experience any of the following:  temperature >100.4     Notify your health care provider if you experience any of the following:  persistent nausea and vomiting or diarrhea     Notify your health care provider if you experience any of the following:  severe uncontrolled pain     Activity as tolerated       Significant Diagnostic Studies: Labs: BMP:   Recent Labs   Lab 04/23/24  0522 04/23/24  1605 04/24/24  0448    121* 91    136 136   K 3.6 3.6 3.5    97 98   CO2 25 26 27   BUN 26* 24* 19   CREATININE 0.8 0.9 0.9   CALCIUM 8.9 9.5 8.8   MG  --  2.0  --     and CBC   Recent Labs   Lab 04/23/24  0522 04/23/24  1605   WBC 5.10 6.04   HGB 14.0 15.9   HCT 41.5 47.2    322       Pending Diagnostic Studies:       Procedure Component Value Units Date/Time    FENTANYL AND METABOLITE, SERUM [[id number]] Collected: 04/19/24 1149    Order Status: Sent Lab Status: In process Updated: 04/19/24 2041    Specimen: Blood            Medications:  Reconciled Home Medications:      Medication List        START taking these medications      venlafaxine 37.5 MG 24 hr capsule  Commonly known as: EFFEXOR-XR  Take 1 capsule (37.5 mg total) by mouth once daily.  Start taking on: April 25, 2024            CONTINUE taking these medications      cyclobenzaprine 10 MG tablet  Commonly known as: FLEXERIL  Take 1 tablet (10 mg total) by mouth 3 (three) times daily as needed for Muscle spasms.     ergocalciferol 50,000 unit Cap  Commonly known as: ERGOCALCIFEROL  Take 50,000 Units by mouth every 7 days.     furosemide 40 MG tablet  Commonly known as: LASIX  Take 1 tablet (40 mg total) by mouth daily as needed (for worsenign shortnes of breath, swelling, or 3lb  weight gain in 1 day/5lb weight gain in 1 week).     gabapentin 300 MG capsule  Commonly known as: NEURONTIN  Take 1 capsule (300 mg total) by mouth 3 (three) times daily.     glatiramer 40 mg/mL injection  Commonly known as: COPAXONE, GLATOPA  Inject 40 mg into the skin once daily.     metroNIDAZOLE 1 % Glwp  Apply 1 Application topically.     NUCYNTA  mg Tb12  Generic drug: tapentadoL  Take 1 tablet by mouth.     omeprazole 20 MG capsule  Commonly known as: PRILOSEC  Take 20 mg by mouth daily as needed (Acid reflux).     oxyCODONE 30 MG Tab  Commonly known as: ROXICODONE  Take 5 mg by mouth every 6 (six) hours as needed.     rosuvastatin 20 MG tablet  Commonly known as: CRESTOR  Take 1 tablet (20 mg total) by mouth once daily.     sterile water (bottle) irrigation     triamcinolone acetonide 0.1% 0.1 % cream  Commonly known as: KENALOG  Apply 0.1 applicators topically.            STOP taking these medications      baclofen 10 MG tablet  Commonly known as: LIORESAL     hydrOXYzine HCL 25 MG tablet  Commonly known as: ATARAX     hydrOXYzine pamoate 25 MG Cap  Commonly known as: VISTARIL     methocarbamoL 750 MG Tab  Commonly known as: ROBAXIN     tiZANidine 2 mg Cap              Indwelling Lines/Drains at time of discharge:   Lines/Drains/Airways       None                   Time spent on the discharge of patient: >30 minutes         Sheila Burnett MD  Department of Hospital Medicine  [facility]

## 2024-04-24 NOTE — NURSING
Pt removed IV catheter by herself. Nurse informed pt to let medical staff removed IV to ensure proper removal. AVS reviewed with pt by virtual nurse. All questions answered. Medications were delivered to bedside by pharmacy. Pt off unit via wheelchair by pt escort.

## 2024-04-24 NOTE — ASSESSMENT & PLAN NOTE
- Continue Copaxone 3x weekly (patient supplied, to start 4/22)  - repeated MRI brain with/without myasthenia study this hospitalization without any acute abnormalities

## 2024-04-24 NOTE — PLAN OF CARE
Discharge orders noted. AVS prepared with medication list, importance of medication compliance, follow up appointments, diet, home care instructions, treatment plan, self management, and when to seek medical attention. Detailed clinical reference list attached. AVS printed and handed to patient by bedside nurse. VN reviewed discharge instructions with patient using teachback method.  Allowed time for questions, all questions answered.  Patient verbalized complete understanding of discharge instructions and voices no concerns.      Discharge instructions complete.  Bedside delivery complete.  Pt waiting on family,  Bedside nurse notified.

## 2024-04-24 NOTE — NURSING
Report received from SAWYER Villar. All questions answered.  Pt resting with eyes closed. Easily arousal to voice. No acute distress noted. Care assumed.

## 2024-04-24 NOTE — PLAN OF CARE
Pt resting in bed with eyes closed. Respirations even and unlabored. PRN Tylenol given for c/o pain. No c/o N/V. Medications administered per MAR. On RA. Cardiac monitoring maintained, NSR on monitor. Pending Echo. Bed alarm set, side rails raised, and call light in reach. Encouraged to call for assistance with needs.

## 2024-04-25 LAB
OHS QRS DURATION: 74 MS
OHS QRS DURATION: 74 MS
OHS QRS DURATION: 76 MS
OHS QRS DURATION: 76 MS
OHS QRS DURATION: 80 MS
OHS QRS DURATION: 82 MS
OHS QTC CALCULATION: 473 MS
OHS QTC CALCULATION: 481 MS
OHS QTC CALCULATION: 483 MS
OHS QTC CALCULATION: 485 MS
OHS QTC CALCULATION: 502 MS
OHS QTC CALCULATION: 526 MS

## 2024-04-30 LAB
FENTANYL SERPL-MCNC: NORMAL NG/ML
NORFENTANYL SERPL-MCNC: NORMAL NG/ML

## 2024-05-02 ENCOUNTER — PATIENT MESSAGE (OUTPATIENT)
Dept: PSYCHIATRY | Facility: CLINIC | Age: 59
End: 2024-05-02
Payer: COMMERCIAL

## 2024-05-03 ENCOUNTER — HOSPITAL ENCOUNTER (EMERGENCY)
Facility: HOSPITAL | Age: 59
Discharge: HOME | End: 2024-05-03
Attending: EMERGENCY MEDICINE
Payer: COMMERCIAL

## 2024-05-03 VITALS
DIASTOLIC BLOOD PRESSURE: 93 MMHG | SYSTOLIC BLOOD PRESSURE: 156 MMHG | HEART RATE: 102 BPM | RESPIRATION RATE: 15 BRPM | OXYGEN SATURATION: 99 %

## 2024-05-03 DIAGNOSIS — J02.9 VIRAL PHARYNGITIS: ICD-10-CM

## 2024-05-03 DIAGNOSIS — Z76.5 MALINGERING: Primary | ICD-10-CM

## 2024-05-03 LAB
ETHANOL SERPL-MCNC: <10 MG/DL
GROUP A STREP, MOLECULAR: NEGATIVE
INFLUENZA A, MOLECULAR: NEGATIVE
INFLUENZA B, MOLECULAR: NEGATIVE
SARS-COV-2 RDRP RESP QL NAA+PROBE: NEGATIVE
SPECIMEN SOURCE: NORMAL

## 2024-05-03 PROCEDURE — 99284 EMERGENCY DEPT VISIT MOD MDM: CPT | Mod: 25

## 2024-05-03 PROCEDURE — 96372 THER/PROPH/DIAG INJ SC/IM: CPT

## 2024-05-03 PROCEDURE — 25000003 PHARM REV CODE 250

## 2024-05-03 PROCEDURE — 63600175 PHARM REV CODE 636 W HCPCS

## 2024-05-03 PROCEDURE — 87651 STREP A DNA AMP PROBE: CPT

## 2024-05-03 PROCEDURE — U0002 COVID-19 LAB TEST NON-CDC: HCPCS

## 2024-05-03 PROCEDURE — 87502 INFLUENZA DNA AMP PROBE: CPT

## 2024-05-03 PROCEDURE — 82077 ASSAY SPEC XCP UR&BREATH IA: CPT

## 2024-05-03 RX ORDER — DEXAMETHASONE SODIUM PHOSPHATE 4 MG/ML
8 INJECTION, SOLUTION INTRA-ARTICULAR; INTRALESIONAL; INTRAMUSCULAR; INTRAVENOUS; SOFT TISSUE
Status: COMPLETED | OUTPATIENT
Start: 2024-05-03 | End: 2024-05-03

## 2024-05-03 RX ORDER — KETOROLAC TROMETHAMINE 30 MG/ML
30 INJECTION, SOLUTION INTRAMUSCULAR; INTRAVENOUS
Status: COMPLETED | OUTPATIENT
Start: 2024-05-03 | End: 2024-05-03

## 2024-05-03 RX ORDER — ACETAMINOPHEN 500 MG
1000 TABLET ORAL
Status: COMPLETED | OUTPATIENT
Start: 2024-05-03 | End: 2024-05-03

## 2024-05-03 RX ADMIN — DEXAMETHASONE SODIUM PHOSPHATE 8 MG: 4 INJECTION, SOLUTION INTRA-ARTICULAR; INTRALESIONAL; INTRAMUSCULAR; INTRAVENOUS; SOFT TISSUE at 03:05

## 2024-05-03 RX ADMIN — KETOROLAC TROMETHAMINE 30 MG: 30 INJECTION, SOLUTION INTRAMUSCULAR; INTRAVENOUS at 03:05

## 2024-05-03 RX ADMIN — ACETAMINOPHEN 1000 MG: 500 TABLET ORAL at 03:05

## 2024-05-03 NOTE — ED NOTES
Pt refusing to leave, discharge papers given along with instructions. Security paged and at bedside, along with charge RN.

## 2024-05-03 NOTE — DISCHARGE INSTRUCTIONS
Thank you for allowing me and my emergency team to take care of you here today! I hope you feel better soon. Please do not hesitate to return with any additional concerns that may arise from this or any new problem you encounter.    Our goal in the emergency department is to always give you outstanding care and exceptional service. If you receive a survey by mail or e-mail in the next week regarding your experience in our ED, we would greatly appreciate you completing it. Your feedback provides us with a way to recognize our staff who give very good care and it helps us learn how to improve when your experience was below the excellence we aspire to be!    Brook Juneau, PA-C [facility]   Emergency Room Physician Assistant

## 2024-05-03 NOTE — ED NOTES
pt presents to ED c/o sore throat. Upon assessing pt, she remained tearful and would not answer anymore questions. Pt ambulatory to chair in room 25.     RR even and unlabored, NAD noted. pt o2 sats 99% on RA.   full ROM and sensation present to all extremities bilaterally. +2 bilateral radial and pedal pulses present. skin warm and dry, capillary refill less than 3 seconds.  abdomen soft, non tender and non distended.    pt placed on monitors, chair locked and in lowest position and call light within reach. ED workup in process, will continue to monitor.

## 2024-05-03 NOTE — ED PROVIDER NOTES
"Encounter Date: 5/3/2024       History     Chief Complaint   Patient presents with    Sore Throat     Pt presents to eD today from home via [facility] c/o sore throat and tearful stating help me      Patient is a 51-year-old female with a past medical history behavior problem, bulging lumbar disc psychiatric care, hypercholesterolemia, MS, and spinal cord compression who presents to the emergency room for "sore throat and right ear pain." Patient willing, continuing to stay "please help me." She is refusing to answer any questions.  Just states that she is in pain.  Tells me that her right ear and throat hurts.  Refusing to answer any other questions.  History limited to such.    The history is provided by the patient. History limited by: patient refusal to answer. No  was used.     Review of patient's allergies indicates:   Allergen Reactions    Adhesive Hives    Neosporin [neomycin-bacitracin-polymyxin] Hives    Neomycin     Neomycin-polymyxin Hives    Neosporin g.u. irrigant     Penicillins Other (See Comments)     Unknown  reaction    Latex Rash     Past Medical History:   Diagnosis Date    Behavioral problem     Bulging lumbar disc     Bursitis     bilateral hip    Degenerative cervical disc     Hx of psychiatric care     Hypercholesteremia     Labral tear of hip, degenerative bilateral    MS (multiple sclerosis)     Paresthesia of both lower extremities 3/13/2022    Pneumonia     Spinal cord compression      Past Surgical History:   Procedure Laterality Date    ANGIOGRAM, CORONARY, WITH LEFT HEART CATHETERIZATION Left 3/11/2022    Procedure: Angiogram, Coronary, with Left Heart Cath;  Surgeon: Elie Matamoros MD;  Location: [facility] CATH LAB;  Service: Cardiology;  Laterality: Left;    BREAST SURGERY      augmentation     SECTION, CLASSIC      HAND SURGERY      HYSTEROSCOPY      NECK SURGERY      cervical disc removeal    TUBAL LIGATION      X-STOP IMPLANTATION       Family History " "  Problem Relation Name Age of Onset    Cancer Father      Diabetes Father      Lung cancer Father      Diabetes Sister      COPD Mother      Drug abuse Mother      Bipolar disorder Brother      Heart disease Maternal Uncle      Hypothyroidism Maternal Grandmother       Social History     Tobacco Use    Smoking status: Former     Current packs/day: 0.00     Types: Cigarettes     Quit date: 8/1/2013     Years since quitting: 10.7    Smokeless tobacco: Never   Substance Use Topics    Alcohol use: No    Drug use: Yes     Types: Benzodiazepines, Marijuana     Review of Systems   Unable to perform ROS: Other (Uncooperative with exam)   HENT:  Positive for ear pain (right) and sore throat.        Physical Exam     Initial Vitals [05/03/24 1330]   BP Pulse Resp Temp SpO2   110/68 95 18 -- 100 %      MAP       --         Physical Exam    Nursing note and vitals reviewed.  Constitutional: She is not diaphoretic. No distress.   Patient hunched over in wheelchair, rocking back and forth. Wailing "someone please help me." Moaning. Maintaining airway appropriately. Awake and alert.    HENT:   Right Ear: Hearing, tympanic membrane, external ear and ear canal normal.   Left Ear: Hearing, tympanic membrane, external ear and ear canal normal.   Mouth/Throat: Uvula is midline, oropharynx is clear and moist and mucous membranes are normal. No oropharyngeal exudate, posterior oropharyngeal edema or posterior oropharyngeal erythema.   Eyes: Conjunctivae and EOM are normal.   Neck: Neck supple.   Neck with normal ROM.    Normal range of motion.  Pulmonary/Chest: No respiratory distress.   Musculoskeletal:         General: No tenderness or edema.      Cervical back: Normal range of motion and neck supple.     Neurological: She is alert.   Skin: Skin is warm.   Psychiatric: Her mood appears anxious. She is hyperactive.         ED Course   Procedures  Labs Reviewed   GROUP A STREP, MOLECULAR   INFLUENZA A & B BY MOLECULAR   SARS-COV-2 RNA " AMPLIFICATION, QUAL   ALCOHOL,MEDICAL (ETHANOL)          Imaging Results    None          Medications   dexAMETHasone injection 8 mg (8 mg Intramuscular Given 5/3/24 1538)   ketorolac injection 30 mg (30 mg Intramuscular Given 5/3/24 1538)   acetaminophen tablet 1,000 mg (1,000 mg Oral Given 5/3/24 1538)     Medical Decision Making  Patient presents to emergency room for sore throat and right ear pain.  Vital signs stable and within limits.  Physical exam stated above.    Differential Diagnosis includes, but is not limited to Garfield's angina, epiglottitis, foreign body aspiration, retropharyngeal abscess, peritonsillar abscess, esophageal perforation, mediastinitis, esophagitis, sialolithiasis, parotitis, laryngitis, tracheitis, dental/periapical abscess, TMJ disorder, pneumonia, Streptococcal/bacterial pharyngitis, or viral pharyngitis.  Patient submandibular swelling induration.  No buccal cellulitis.  I do suspect Garfield's angina.  No history of foreign body aspiration.  Uvula midline.  Posterior oropharynx without erythema or edema.  Unlikely peritonsillar abscess.  I do not suspect esophageal perforation at this time.  Patient without any facial edema.  This is not parotitis.  No dental pain that would suggest internal/prior apical abscess.  COVID test negative.  Influenza test negative.  Strep test negative.  Patient given dexamethasone, Toradol, and Tylenol.  On re-evaluation, patient resting comfortably in bed.  Still refusing to answer any questions.  Continues to sleep.  Discussed with supervising physician, Dr. Hansen, who agrees with plan to discharge due to possibility of malingering.    I see no indication of an emergent process beyond that addressed during our encounter. Patient stable for discharge at this time. I have counseled the patient regarding strict return precautions. Patient verbalized understanding and is agreeable.     Problems Addressed:  Malingering: acute illness or injury  Viral  pharyngitis: acute illness or injury    Amount and/or Complexity of Data Reviewed  External Data Reviewed: notes.     Details: Patient admitted on 04/18/2024.  She had psych evaluation done at that time.  History of polysubstance abuse.  Did not meet PEC criteria at that time.   Labs: ordered. Decision-making details documented in ED Course.  Radiology:      Details: Consider watering imaging.  However, patient with full range of motion of neck. Low suspicion for retropharyngeal abscess.     Risk  OTC drugs.  Prescription drug management.  Risk Details: Comorbidities taken into consideration during the patient's evaluation and treatment include behavior problem, bulging lumbar disc psychiatric care, hypercholesterolemia, MS, and spinal cord compression.    Social determinants of health taken into consideration during development of our treatment plan include difficulty in obtaining follow-up, obtaining medications, health literacy, access to healthy options for preventative/conservative management, and/or support systems due to, but not limited to, transportation limitations, socioeconomic status, and environmental factors.                ED Course as of 05/03/24 1635   Fri May 03, 2024   1503 Group A Strep, Molecular  Strep negative.  [BJ]   1516 Ethanol  Ethanol WNL.  [BJ]   1527 COVID-19 Rapid Screening  COVID negative.  [BJ]   1527 Influenza A & B by Molecular  Influenza negative.  [BJ]   1605 Patient resting comfortably at this time.  [BJ]   1634 Patient continues to lay back down after trying to discharge. Refusing to leave. Security notified.  [BJ]      ED Course User Index  [BJ] Emelina Reyna PA-C                           Clinical Impression:  Final diagnoses:  [J02.9] Viral pharyngitis  [Z76.5] Malingering (Primary)          ED Disposition Condition    Discharge Stable          ED Prescriptions    None       Follow-up Information       Follow up With Specialties Details Why Contact Info    Your doctor   Schedule an appointment as soon as possible for a visit             This note was partially created using [employer] Voice Recognition software. Typographical and content errors may occur with this process. While efforts are made to detect and correct such errors, in some cases errors will persist. For this reason, wording in this document should be considered in the proper context and not strictly verbatim.        Emelina Reyna PA-C  05/03/24 7428

## 2024-05-14 ENCOUNTER — HOSPITAL ENCOUNTER (INPATIENT)
Facility: HOSPITAL | Age: 59
LOS: 1 days | Discharge: HOME HEALTH | DRG: 092 | End: 2024-05-16
Attending: EMERGENCY MEDICINE | Admitting: INTERNAL MEDICINE
Payer: COMMERCIAL

## 2024-05-14 DIAGNOSIS — R33.9 URINARY RETENTION: ICD-10-CM

## 2024-05-14 DIAGNOSIS — E83.42 HYPOMAGNESEMIA: ICD-10-CM

## 2024-05-14 DIAGNOSIS — R56.9 SEIZURE: ICD-10-CM

## 2024-05-14 DIAGNOSIS — R00.0 TACHYCARDIA: ICD-10-CM

## 2024-05-14 DIAGNOSIS — R41.82 ALTERED MENTAL STATUS: Primary | ICD-10-CM

## 2024-05-14 DIAGNOSIS — E87.20 ACIDOSIS: ICD-10-CM

## 2024-05-14 DIAGNOSIS — G93.40 ACUTE ENCEPHALOPATHY: ICD-10-CM

## 2024-05-14 DIAGNOSIS — E87.20 LACTIC ACIDOSIS: ICD-10-CM

## 2024-05-14 LAB
ALBUMIN SERPL BCP-MCNC: 4.8 G/DL (ref 3.5–5.2)
ALLENS TEST: ABNORMAL
ALP SERPL-CCNC: 93 U/L (ref 38–126)
ALT SERPL W/O P-5'-P-CCNC: 31 U/L (ref 10–44)
AMPHET+METHAMPHET UR QL: NEGATIVE
ANION GAP SERPL CALC-SCNC: 21 MMOL/L (ref 8–16)
AST SERPL-CCNC: 27 U/L (ref 15–46)
BARBITURATES UR QL SCN>200 NG/ML: NEGATIVE
BASOPHILS # BLD AUTO: 0.01 K/UL (ref 0–0.2)
BASOPHILS NFR BLD: 0.1 % (ref 0–1.9)
BENZODIAZ UR QL SCN>200 NG/ML: NEGATIVE
BILIRUB SERPL-MCNC: 1.9 MG/DL (ref 0.1–1)
BILIRUB UR QL STRIP: ABNORMAL
BZE UR QL SCN: NEGATIVE
CALCIUM SERPL-MCNC: 10 MG/DL (ref 8.7–10.5)
CANNABINOIDS UR QL SCN: ABNORMAL
CHLORIDE SERPL-SCNC: 102 MMOL/L (ref 95–110)
CLARITY UR REFRACT.AUTO: CLEAR
CO2 SERPL-SCNC: 16 MMOL/L (ref 23–29)
COLOR UR AUTO: YELLOW
CREAT SERPL-MCNC: 0.63 MG/DL (ref 0.5–1.4)
CREAT UR-MCNC: 71.3 MG/DL (ref 15–325)
DIFFERENTIAL METHOD BLD: ABNORMAL
EOSINOPHIL # BLD AUTO: 0 K/UL (ref 0–0.5)
EOSINOPHIL NFR BLD: 0.1 % (ref 0–8)
ERYTHROCYTE [DISTWIDTH] IN BLOOD BY AUTOMATED COUNT: 13 % (ref 11.5–14.5)
EST. GFR  (NO RACE VARIABLE): >60 ML/MIN/1.73 M^2
ESTIMATED AVG GLUCOSE: 103 MG/DL (ref 68–131)
ETHANOL SERPL-MCNC: <10 MG/DL
FIO2: 21 %
GLUCOSE SERPL-MCNC: 221 MG/DL (ref 70–110)
GLUCOSE UR QL STRIP: ABNORMAL
HBA1C MFR BLD: 5.2 % (ref 4–5.6)
HCT VFR BLD AUTO: 44 % (ref 37–48.5)
HCT VFR BLD CALC: 44.7 % (ref 36–54)
HGB BLD-MCNC: 14.6 G/DL (ref 9–18)
HGB BLD-MCNC: 15.1 G/DL (ref 12–16)
HGB UR QL STRIP: ABNORMAL
IMM GRANULOCYTES # BLD AUTO: 0.02 K/UL (ref 0–0.04)
IMM GRANULOCYTES NFR BLD AUTO: 0.2 % (ref 0–0.5)
KETONES UR QL STRIP: ABNORMAL
LACTATE SERPL-SCNC: 1.5 MMOL/L (ref 0.5–2.2)
LACTATE SERPL-SCNC: 3.4 MMOL/L (ref 0.5–2.2)
LDH SERPL L TO P-CCNC: 1.5 MMOL/L (ref 0.5–2.2)
LEUKOCYTE ESTERASE UR QL STRIP: NEGATIVE
LYMPHOCYTES # BLD AUTO: 0.4 K/UL (ref 1–4.8)
LYMPHOCYTES NFR BLD: 3.9 % (ref 18–48)
MAGNESIUM SERPL-MCNC: 1.5 MG/DL (ref 1.6–2.6)
MCH RBC QN AUTO: 30.1 PG (ref 27–31)
MCHC RBC AUTO-ENTMCNC: 34.3 G/DL (ref 32–36)
MCV RBC AUTO: 88 FL (ref 82–98)
METHADONE UR QL SCN>300 NG/ML: NEGATIVE
MONOCYTES # BLD AUTO: 0.2 K/UL (ref 0.3–1)
MONOCYTES NFR BLD: 1.9 % (ref 4–15)
NEUTROPHILS # BLD AUTO: 10.1 K/UL (ref 1.8–7.7)
NEUTROPHILS NFR BLD: 93.8 % (ref 38–73)
NITRITE UR QL STRIP: NEGATIVE
NRBC BLD-RTO: 0 /100 WBC
OHS QRS DURATION: 74 MS
OHS QTC CALCULATION: 517 MS
OPIATES UR QL SCN: NEGATIVE
PCO2 BLDA: 39.9 MMHG (ref 35–45)
PCP UR QL SCN>25 NG/ML: NEGATIVE
PH SMN: 7.42 [PH] (ref 7.35–7.45)
PH UR STRIP: 6 [PH] (ref 5–8)
PHOSPHATE SERPL-MCNC: 2.7 MG/DL (ref 2.7–4.5)
PLATELET # BLD AUTO: 253 K/UL (ref 150–450)
PMV BLD AUTO: 9.9 FL (ref 9.2–12.9)
PO2 BLDA: 20.7 MMHG (ref 40–60)
POC BASE DEFICIT: 1.5 MMOL/L (ref -2–2)
POC HCO3: 26 MMOL/L (ref 24–28)
POC IONIZED CALCIUM: 1.03 MMOL/L (ref 1.06–1.42)
POC PERFORMED BY: ABNORMAL
POC SATURATED O2: 37.8 % (ref 95–100)
POCT GLUCOSE: 207 MG/DL (ref 70–110)
POTASSIUM BLD-SCNC: 5.5 MMOL/L (ref 3.5–5.1)
POTASSIUM SERPL-SCNC: 3.5 MMOL/L (ref 3.5–5.1)
PROT SERPL-MCNC: 8.9 G/DL (ref 6–8.4)
PROT UR QL STRIP: ABNORMAL
RBC # BLD AUTO: 5.02 M/UL (ref 4–5.4)
SODIUM BLD-SCNC: 141 MMOL/L (ref 136–145)
SODIUM SERPL-SCNC: 139 MMOL/L (ref 136–145)
SP GR UR STRIP: >=1.03 (ref 1–1.03)
SPECIMEN SOURCE: ABNORMAL
TOXICOLOGY INFORMATION: ABNORMAL
TREPONEMA PALLIDUM IGG+IGM AB [PRESENCE] IN SERUM OR PLASMA BY IMMUNOASSAY: NONREACTIVE
TROPONIN I SERPL-MCNC: <0.012 NG/ML (ref 0.01–0.03)
URN SPEC COLLECT METH UR: ABNORMAL
UROBILINOGEN UR STRIP-ACNC: NEGATIVE EU/DL
UUN UR-MCNC: 14 MG/DL (ref 7–17)
WBC # BLD AUTO: 10.77 K/UL (ref 3.9–12.7)

## 2024-05-14 PROCEDURE — 96365 THER/PROPH/DIAG IV INF INIT: CPT | Mod: ER

## 2024-05-14 PROCEDURE — 96367 TX/PROPH/DG ADDL SEQ IV INF: CPT | Mod: ER

## 2024-05-14 PROCEDURE — 51798 US URINE CAPACITY MEASURE: CPT

## 2024-05-14 PROCEDURE — 96375 TX/PRO/DX INJ NEW DRUG ADDON: CPT | Mod: ER

## 2024-05-14 PROCEDURE — 82077 ASSAY SPEC XCP UR&BREATH IA: CPT | Mod: ER | Performed by: EMERGENCY MEDICINE

## 2024-05-14 PROCEDURE — 63600175 PHARM REV CODE 636 W HCPCS

## 2024-05-14 PROCEDURE — 25000003 PHARM REV CODE 250

## 2024-05-14 PROCEDURE — 63600175 PHARM REV CODE 636 W HCPCS: Mod: ER | Performed by: EMERGENCY MEDICINE

## 2024-05-14 PROCEDURE — 85025 COMPLETE CBC W/AUTO DIFF WBC: CPT | Mod: ER | Performed by: EMERGENCY MEDICINE

## 2024-05-14 PROCEDURE — G0378 HOSPITAL OBSERVATION PER HR: HCPCS | Mod: ER

## 2024-05-14 PROCEDURE — 96374 THER/PROPH/DIAG INJ IV PUSH: CPT | Mod: 59,ER

## 2024-05-14 PROCEDURE — 99291 CRITICAL CARE FIRST HOUR: CPT | Mod: ER

## 2024-05-14 PROCEDURE — 96366 THER/PROPH/DIAG IV INF ADDON: CPT

## 2024-05-14 PROCEDURE — 36415 COLL VENOUS BLD VENIPUNCTURE: CPT

## 2024-05-14 PROCEDURE — 94761 N-INVAS EAR/PLS OXIMETRY MLT: CPT | Mod: ER

## 2024-05-14 PROCEDURE — 93005 ELECTROCARDIOGRAM TRACING: CPT | Mod: ER

## 2024-05-14 PROCEDURE — 83605 ASSAY OF LACTIC ACID: CPT | Mod: ER | Performed by: EMERGENCY MEDICINE

## 2024-05-14 PROCEDURE — 96376 TX/PRO/DX INJ SAME DRUG ADON: CPT

## 2024-05-14 PROCEDURE — 96366 THER/PROPH/DIAG IV INF ADDON: CPT | Mod: ER

## 2024-05-14 PROCEDURE — 93010 ELECTROCARDIOGRAM REPORT: CPT | Mod: ,,, | Performed by: INTERNAL MEDICINE

## 2024-05-14 PROCEDURE — P9612 CATHETERIZE FOR URINE SPEC: HCPCS | Mod: ER

## 2024-05-14 PROCEDURE — 84484 ASSAY OF TROPONIN QUANT: CPT | Mod: ER | Performed by: EMERGENCY MEDICINE

## 2024-05-14 PROCEDURE — 99900035 HC TECH TIME PER 15 MIN (STAT)

## 2024-05-14 PROCEDURE — 81003 URINALYSIS AUTO W/O SCOPE: CPT | Mod: 59,ER | Performed by: EMERGENCY MEDICINE

## 2024-05-14 PROCEDURE — 51701 INSERT BLADDER CATHETER: CPT

## 2024-05-14 PROCEDURE — 25000003 PHARM REV CODE 250: Mod: ER | Performed by: EMERGENCY MEDICINE

## 2024-05-14 PROCEDURE — 84100 ASSAY OF PHOSPHORUS: CPT

## 2024-05-14 PROCEDURE — G0378 HOSPITAL OBSERVATION PER HR: HCPCS

## 2024-05-14 PROCEDURE — 87040 BLOOD CULTURE FOR BACTERIA: CPT | Mod: 59,ER | Performed by: EMERGENCY MEDICINE

## 2024-05-14 PROCEDURE — 80307 DRUG TEST PRSMV CHEM ANLYZR: CPT | Mod: ER | Performed by: EMERGENCY MEDICINE

## 2024-05-14 PROCEDURE — 83735 ASSAY OF MAGNESIUM: CPT

## 2024-05-14 PROCEDURE — 82962 GLUCOSE BLOOD TEST: CPT | Mod: ER

## 2024-05-14 PROCEDURE — 83036 HEMOGLOBIN GLYCOSYLATED A1C: CPT

## 2024-05-14 PROCEDURE — 25000003 PHARM REV CODE 250: Mod: ER | Performed by: INTERNAL MEDICINE

## 2024-05-14 PROCEDURE — 80053 COMPREHEN METABOLIC PANEL: CPT | Mod: ER | Performed by: EMERGENCY MEDICINE

## 2024-05-14 PROCEDURE — 86593 SYPHILIS TEST NON-TREP QUANT: CPT

## 2024-05-14 PROCEDURE — 96365 THER/PROPH/DIAG IV INF INIT: CPT | Mod: 59

## 2024-05-14 RX ORDER — NALOXONE HYDROCHLORIDE 4 MG/.1ML
1 SPRAY NASAL ONCE AS NEEDED
COMMUNITY
Start: 2023-11-28

## 2024-05-14 RX ORDER — ETOMIDATE 2 MG/ML
INJECTION INTRAVENOUS
Status: DISCONTINUED
Start: 2024-05-14 | End: 2024-05-14 | Stop reason: WASHOUT

## 2024-05-14 RX ORDER — SUCCINYLCHOLINE CHLORIDE 20 MG/ML
INJECTION INTRAMUSCULAR; INTRAVENOUS
Status: DISCONTINUED
Start: 2024-05-14 | End: 2024-05-14 | Stop reason: WASHOUT

## 2024-05-14 RX ORDER — MAGNESIUM SULFATE HEPTAHYDRATE 40 MG/ML
2 INJECTION, SOLUTION INTRAVENOUS ONCE
Status: COMPLETED | OUTPATIENT
Start: 2024-05-14 | End: 2024-05-15

## 2024-05-14 RX ORDER — SODIUM CHLORIDE 9 MG/ML
1000 INJECTION, SOLUTION INTRAVENOUS
Status: COMPLETED | OUTPATIENT
Start: 2024-05-14 | End: 2024-05-14

## 2024-05-14 RX ORDER — COVID-19 ANTIGEN TEST
KIT MISCELLANEOUS
COMMUNITY
Start: 2024-03-05

## 2024-05-14 RX ORDER — SCOLOPAMINE TRANSDERMAL SYSTEM 1 MG/1
1 PATCH, EXTENDED RELEASE TRANSDERMAL
Status: CANCELLED | OUTPATIENT
Start: 2024-05-14

## 2024-05-14 RX ORDER — SCOLOPAMINE TRANSDERMAL SYSTEM 1 MG/1
1 PATCH, EXTENDED RELEASE TRANSDERMAL ONCE
Status: DISCONTINUED | OUTPATIENT
Start: 2024-05-14 | End: 2024-05-16 | Stop reason: HOSPADM

## 2024-05-14 RX ORDER — OXYCODONE HYDROCHLORIDE 15 MG/1
30 TABLET ORAL 4 TIMES DAILY
COMMUNITY
Start: 2024-01-26 | End: 2024-05-14 | Stop reason: DRUGHIGH

## 2024-05-14 RX ORDER — LORAZEPAM 2 MG/ML
1 INJECTION INTRAMUSCULAR
Status: COMPLETED | OUTPATIENT
Start: 2024-05-14 | End: 2024-05-14

## 2024-05-14 RX ORDER — ONDANSETRON HYDROCHLORIDE 2 MG/ML
4 INJECTION, SOLUTION INTRAVENOUS
Status: COMPLETED | OUTPATIENT
Start: 2024-05-14 | End: 2024-05-14

## 2024-05-14 RX ORDER — SODIUM CHLORIDE 0.9 % (FLUSH) 0.9 %
10 SYRINGE (ML) INJECTION EVERY 12 HOURS PRN
Status: DISCONTINUED | OUTPATIENT
Start: 2024-05-14 | End: 2024-05-16 | Stop reason: HOSPADM

## 2024-05-14 RX ORDER — ROCURONIUM BROMIDE 10 MG/ML
INJECTION, SOLUTION INTRAVENOUS
Status: DISCONTINUED
Start: 2024-05-14 | End: 2024-05-14 | Stop reason: WASHOUT

## 2024-05-14 RX ORDER — IBUPROFEN 200 MG
24 TABLET ORAL
Status: DISCONTINUED | OUTPATIENT
Start: 2024-05-14 | End: 2024-05-16 | Stop reason: HOSPADM

## 2024-05-14 RX ORDER — GLUCAGON 1 MG
1 KIT INJECTION
Status: DISCONTINUED | OUTPATIENT
Start: 2024-05-14 | End: 2024-05-16 | Stop reason: HOSPADM

## 2024-05-14 RX ORDER — IBUPROFEN 200 MG
16 TABLET ORAL
Status: DISCONTINUED | OUTPATIENT
Start: 2024-05-14 | End: 2024-05-16 | Stop reason: HOSPADM

## 2024-05-14 RX ORDER — OLANZAPINE 10 MG/2ML
2.5 INJECTION, POWDER, FOR SOLUTION INTRAMUSCULAR ONCE AS NEEDED
Status: CANCELLED | OUTPATIENT
Start: 2024-05-14 | End: 2035-10-11

## 2024-05-14 RX ORDER — ENOXAPARIN SODIUM 100 MG/ML
40 INJECTION SUBCUTANEOUS EVERY 24 HOURS
Status: DISCONTINUED | OUTPATIENT
Start: 2024-05-15 | End: 2024-05-16 | Stop reason: HOSPADM

## 2024-05-14 RX ORDER — LORAZEPAM 2 MG/ML
2 INJECTION INTRAMUSCULAR ONCE AS NEEDED
Status: COMPLETED | OUTPATIENT
Start: 2024-05-14 | End: 2024-05-14

## 2024-05-14 RX ORDER — NALOXONE HCL 0.4 MG/ML
0.02 VIAL (ML) INJECTION
Status: DISCONTINUED | OUTPATIENT
Start: 2024-05-14 | End: 2024-05-16 | Stop reason: HOSPADM

## 2024-05-14 RX ORDER — PREGABALIN 100 MG/1
100 CAPSULE ORAL 3 TIMES DAILY
COMMUNITY
Start: 2024-05-07

## 2024-05-14 RX ORDER — ACETAMINOPHEN 500 MG
1000 TABLET ORAL ONCE
Status: DISCONTINUED | OUTPATIENT
Start: 2024-05-14 | End: 2024-05-16 | Stop reason: HOSPADM

## 2024-05-14 RX ORDER — LEVETIRACETAM 500 MG/5ML
1000 INJECTION, SOLUTION, CONCENTRATE INTRAVENOUS
Status: COMPLETED | OUTPATIENT
Start: 2024-05-14 | End: 2024-05-14

## 2024-05-14 RX ORDER — PROPOFOL 10 MG/ML
INJECTION, EMULSION INTRAVENOUS
Status: DISCONTINUED
Start: 2024-05-14 | End: 2024-05-14 | Stop reason: WASHOUT

## 2024-05-14 RX ADMIN — CEFEPIME 2 G: 2 INJECTION, POWDER, FOR SOLUTION INTRAVENOUS at 11:05

## 2024-05-14 RX ADMIN — ACYCLOVIR SODIUM 570 MG: 50 INJECTION, SOLUTION INTRAVENOUS at 06:05

## 2024-05-14 RX ADMIN — ONDANSETRON 4 MG: 2 INJECTION INTRAMUSCULAR; INTRAVENOUS at 10:05

## 2024-05-14 RX ADMIN — LEVETIRACETAM 1000 MG: 100 INJECTION, SOLUTION INTRAVENOUS at 10:05

## 2024-05-14 RX ADMIN — LORAZEPAM 2 MG: 2 INJECTION INTRAMUSCULAR; INTRAVENOUS at 11:05

## 2024-05-14 RX ADMIN — SODIUM CHLORIDE 1000 ML: 9 INJECTION, SOLUTION INTRAVENOUS at 11:05

## 2024-05-14 RX ADMIN — SODIUM CHLORIDE 1000 ML: 9 INJECTION, SOLUTION INTRAVENOUS at 12:05

## 2024-05-14 RX ADMIN — MAGNESIUM SULFATE HEPTAHYDRATE 2 G: 40 INJECTION, SOLUTION INTRAVENOUS at 10:05

## 2024-05-14 RX ADMIN — VANCOMYCIN HYDROCHLORIDE 1500 MG: 1 INJECTION, POWDER, LYOPHILIZED, FOR SOLUTION INTRAVENOUS at 12:05

## 2024-05-14 RX ADMIN — SCOPALAMINE 1 PATCH: 1 PATCH, EXTENDED RELEASE TRANSDERMAL at 10:05

## 2024-05-14 RX ADMIN — CEFTRIAXONE SODIUM 2 G: 2 INJECTION, POWDER, FOR SOLUTION INTRAMUSCULAR; INTRAVENOUS at 06:05

## 2024-05-14 RX ADMIN — LORAZEPAM 1 MG: 2 INJECTION INTRAMUSCULAR; INTRAVENOUS at 10:05

## 2024-05-14 NOTE — PROGRESS NOTES
Pharmacokinetic Initial Assessment: IV Vancomycin    Assessment/Plan:    Initiate intravenous vancomycin with loading dose of 1500 mg once followed by a maintenance dose of vancomycin 750mg IV every 12 hours  Desired empiric serum trough concentration is 15 to 20 mcg/mL  Draw vancomycin trough level 60 min prior to fourth dose on 05/15/24 at approximately 2300  Pharmacy will continue to follow and monitor vancomycin.      Please contact pharmacy at extension [phone] with any questions regarding this assessment.     Thank you for the consult,   Lakeisha Sanders       Patient brief summary:  Reza Morrow is a 59 y.o. female initiated on antimicrobial therapy with IV Vancomycin for treatment of suspected meningitis    Drug Allergies:   Review of patient's allergies indicates:   Allergen Reactions    Adhesive Hives    Neosporin [neomycin-bacitracin-polymyxin] Hives    Neomycin     Neomycin-polymyxin Hives    Neosporin g.u. irrigant     Penicillins Other (See Comments)     Unknown  reaction    Latex Rash       Actual Body Weight:   57.4 kg    Renal Function:   Estimated Creatinine Clearance: 87.6 mL/min (based on SCr of 0.63 mg/dL).,     Dialysis Method (if applicable):  N/A    CBC (last 72 hours):  Recent Labs   Lab Result Units 05/14/24  1050   WBC K/uL 10.77   Hemoglobin g/dL 15.1   Hematocrit % 44.0   Platelets K/uL 253   Gran % % 93.8*   Lymph % % 3.9*   Mono % % 1.9*   Eosinophil % % 0.1   Basophil % % 0.1   Differential Method  Automated       Metabolic Panel (last 72 hours):  Recent Labs   Lab Result Units 05/14/24  1017 05/14/24  1108   Sodium mmol/L 139  --    Potassium mmol/L 3.5  --    Chloride mmol/L 102  --    CO2 mmol/L 16*  --    Glucose mg/dL 221*  --    Glucose, UA   --  1+*   BUN mg/dL 14  --    Creatinine mg/dL 0.63  --    Creatinine, Urine mg/dL  --  71.3   Albumin g/dL 4.8  --    Total Bilirubin mg/dL 1.9*  --    Alkaline Phosphatase U/L 93  --    AST U/L 27  --    ALT U/L 31  --        Drug levels  "(last 3 results):  No results for input(s): "VANCOMYCINRA", "VANCORANDOM", "VANCOMYCINPE", "VANCOPEAK", "VANCOMYCINTR", "VANCOTROUGH" in the last 72 hours.    Microbiologic Results:  Microbiology Results (last 7 days)       Procedure Component Value Units Date/Time    Blood culture x two cultures. Draw prior to antibiotics. [[id number]] Collected: 05/14/24 1017    Order Status: Sent Specimen: Blood from Peripheral, Antecubital, Right Updated: 05/14/24 1545    Blood culture x two cultures. Draw prior to antibiotics. [[id number]] Collected: 05/14/24 1016    Order Status: Sent Specimen: Blood from Peripheral, Antecubital, Right Updated: 05/14/24 1545            "

## 2024-05-14 NOTE — H&P
[facility] Medicine H&P Note     Admitting Team: [facility] Hospitalist Team B  Attending Physician: Lamin Singh MD  Resident: Ruperto  Intern: Johanna    Date of Admit: 5/14/2024    Chief Complaint     Acute encephalopathy x1 day    Subjective:      History of Present Illness:  Reza Morrow is a 59 y.o. with a PMHx of polysubstance abuse, Multiple Sclerosis, Hypercholesteremia, [place] Cardiomyopathy, and hypertension who is presenting to [facility] ED for acutely altered mental status x1 day.    Pt was seen at bedside and unable to provide any useful history. Patient withdraws from pain but otherwise not making any meaningful movements. Unable to answer questions appropriately, simply moaning and making meaningless sounds.     Had extended discussion however with sister who was able to provide further history. Explained that she last saw her sister on Saturday. At that time patient was in her normal state of health and doing well. She helped her get her house cleaned up and patient was doing ok. On Mother's Day patient again spoke with her sister, at which time she still felt well besides having some nausea. This was the last time her sister heard from Ms. Cobb before her current presentation for acute encephalopathy. Patient does take multiple opiods at home for her MS inclusing MS contin 30mg , Roxicodne PRN, and Nucynta.     Past Medical History:  Past Medical History:   Diagnosis Date    Behavioral problem     Bulging lumbar disc     Bursitis     bilateral hip    Degenerative cervical disc     Hx of psychiatric care     Hypercholesteremia     Labral tear of hip, degenerative bilateral    MS (multiple sclerosis)     Paresthesia of both lower extremities 3/13/2022    Pneumonia     Spinal cord compression        Past Surgical History:  Past Surgical History:   Procedure Laterality Date    ANGIOGRAM, CORONARY, WITH LEFT HEART CATHETERIZATION Left 3/11/2022    Procedure: Angiogram, Coronary, with Left Heart Cath;   Surgeon: Elie Matamoros MD;  Location: [facility] CATH LAB;  Service: Cardiology;  Laterality: Left;    BREAST SURGERY      augmentation     SECTION, CLASSIC      HAND SURGERY      HYSTEROSCOPY      NECK SURGERY      cervical disc removeal    TUBAL LIGATION      X-STOP IMPLANTATION         Allergies:  Review of patient's allergies indicates:   Allergen Reactions    Adhesive Hives    Neosporin [neomycin-bacitracin-polymyxin] Hives    Neomycin     Neomycin-polymyxin Hives    Neosporin g.u. irrigant     Penicillins Other (See Comments)     Unknown  reaction    Latex Rash       Home Medications:  -pt unable to confirm medications at this time.   Prior to Admission medications    Medication Sig Start Date End Date Taking? Authorizing Provider   COVID-19 AT-HOME TEST Kit TEST AS DIRECTED 3/5/24  Yes Provider, Historical   furosemide (LASIX) 40 MG tablet Take 1 tablet (40 mg total) by mouth daily as needed (for worsenign shortnes of breath, swelling, or 3lb weight gain in 1 day/5lb weight gain in 1 week). 23 Yes Christine Skaggs PA-C   gabapentin (NEURONTIN) 300 MG capsule Take 1 capsule (300 mg total) by mouth 3 (three) times daily. 23  Yes Neel Gr MD   glatiramer (COPAXONE, GLATOPA) 40 mg/mL injection Inject 40 mg into the skin 3 (three) times a week.   Yes Provider, Historical   naloxone (NARCAN) 4 mg/actuation Spry 1 spray by Nasal route once as needed. 23  Yes Provider, Historical   NUCYNTA  mg Tb12 Take 100 mg by mouth 2 (two) times a day. 7/3/23  Yes Provider, Historical   oxyCODONE (ROXICODONE) 30 MG Tab Take 5 mg by mouth every 6 (six) hours as needed.   Yes Provider, Historical   pregabalin (LYRICA) 100 MG capsule Take 100 mg by mouth 3 (three) times daily. 24  Yes Provider, Historical   triamcinolone acetonide 0.1% (KENALOG) 0.1 % cream Apply 1 applicator topically 4 (four) times daily as needed.   Yes Provider, Historical   venlafaxine (EFFEXOR-XR) 37.5 MG 24 hr  capsule Take 1 capsule (37.5 mg total) by mouth once daily. 4/25/24 4/25/25 Yes Sheila Burnett MD   oxyCODONE (ROXICODONE) 15 MG Tab Take 30 mg by mouth 4 (four) times daily. 1/26/24 5/14/24 Yes Provider, Historical   atorvastatin (LIPITOR) 20 MG tablet Take 20 mg by mouth once daily.  1/15/16  Provider, Historical   cyclobenzaprine (FLEXERIL) 10 MG tablet Take 1 tablet (10 mg total) by mouth 3 (three) times daily as needed for Muscle spasms. 6/13/23 5/14/24  Neel Gr MD   ergocalciferol (ERGOCALCIFEROL) 50,000 unit Cap Take 50,000 Units by mouth every 7 days. 11/23/21 5/14/24  Provider, Historical   EScitalopram oxalate (LEXAPRO) 10 MG tablet Take 10 mg by mouth once daily. 2/15/22 4/13/22  Provider, Historical   metroNIDAZOLE 1 % GlwP Apply 1 Application topically.  5/14/24  Provider, Historical   omeprazole (PRILOSEC) 20 MG capsule Take 20 mg by mouth daily as needed (Acid reflux). 2/15/22 5/14/24  Provider, Historical   rosuvastatin (CRESTOR) 20 MG tablet Take 1 tablet (20 mg total) by mouth once daily. 6/30/23 5/14/24  Christine Skaggs PA-C   sterile water, bottle, irrigation  6/30/23 5/14/24  Provider, Historical       Family History:  Family History   Problem Relation Name Age of Onset    Cancer Father      Diabetes Father      Lung cancer Father      Diabetes Sister      COPD Mother      Drug abuse Mother      Bipolar disorder Brother      Heart disease Maternal Uncle      Hypothyroidism Maternal Grandmother         Social History:  Social History     Tobacco Use    Smoking status: Former     Current packs/day: 0.00     Types: Cigarettes     Quit date: 8/1/2013     Years since quitting: 10.7    Smokeless tobacco: Never   Substance Use Topics    Alcohol use: No    Drug use: Yes     Types: Benzodiazepines, Marijuana       Review of Systems:  Pertinent items are noted in HPI. All other systems are reviewed and are negative.    Health Maintaince :   Primary Care Physician:  "N/A    Immunizations:   No vaccine data available    Cancer Screening:  Unclear     Objective:   Last 24 Hour Vital Signs:  BP  Min: 131/69  Max: 168/96  Temp  Av.6 °F (37 °C)  Min: 98.6 °F (37 °C)  Max: 98.6 °F (37 °C)  Pulse  Av.5  Min: 102  Max: 146  Resp  Av  Min: 18  Max: 22  SpO2  Av %  Min: 100 %  Max: 100 %  Weight  Av.9 kg (132 lb)  Min: 59.9 kg (132 lb)  Max: 59.9 kg (132 lb)  Body mass index is 21.97 kg/m².  No intake/output data recorded.    Physical Examination:  Physical Exam:  BP (!) 150/77 (Patient Position: Lying)   Pulse 107   Temp 98.5 °F (36.9 °C) (Axillary)   Resp 19   Ht 5' 5" (1.651 m)   Wt 57.4 kg (126 lb 8.7 oz)   SpO2 97%   BMI 21.06 kg/m²    General: No acute distress, resting comfortably, not interacting  HEENT: Atraumatic, normocephalic, moist mucous membranes  Cardiovascular: Regular rate and rhythm, normal S1 and S2, no extra heart sounds or murmurs appreciated   Chest wall: Non-tender to palpation, no gross deformities noted   Back: No abnormal curvature noted, symmetric rise with respiration   Respiratory: Stable on room air, normal work of breathing  Abdominal: Non-distended, soft, normoactive bowel sounds, non-tender to palpation, no guarding  Extremities: Atraumatic, non-edematous, moving all four extremities  Pulses: 2+ and symmetric radial artery, dorsalis pedis artery  Skin: Non-diaphoretic, warm, dry  Neurologic: Unclear. Patient eyes closed. Moving all extremities    Laboratory:  Most Recent Data:    Trended Lab Data:  Recent Labs   Lab 24  1017 24  1050   WBC  --  10.77   HGB  --  15.1   HCT  --  44.0   PLT  --  253   MCV  --  88   RDW  --  13.0     --    K 3.5  --      --    CO2 16*  --    BUN 14  --    CREATININE 0.63  --    *  --    PROT 8.9*  --    ALBUMIN 4.8  --    BILITOT 1.9*  --    AST 27  --    ALKPHOS 93  --    ALT 31  --        Trended Cardiac Data:  Recent Labs   Lab 24  1016   TROPONINI " <0.012       Microbiology Data:  Bcx-pending     Other Results:  EKG (my interpretation): ordered and pending     Radiology:  Imaging Results              CT Head Without Contrast (Final result)  Result time 05/14/24 10:42:42      Final result by Jonathon Denney MD (05/14/24 10:42:42)                   Impression:      No acute findings.      Electronically signed by: Jonathon Denney MD  Date:    05/14/2024  Time:    10:42               Narrative:    EXAMINATION:  CT HEAD WITHOUT CONTRAST    CLINICAL HISTORY:  Nonresponsive.  Altered mental status.  Visual disturbance.    TECHNIQUE:  Standard noncontrast CT of the brain.    All CT scans at this facility are performed  using dose modulation techniques as appropriate to performed exam including the following:  automated exposure control; adjustment of mA and/or kV according to the patients size (this includes techniques or standardized protocols for targeted exams where dose is matched to indication/reason for exam: i.e. extremities or head);  iterative reconstruction technique.    COMPARISON:  MRI 04/23/2024    FINDINGS:  Brain: The ventricles are minimally enlarged.  Relatively mild white matter hypodensity is noted consistent with small vessel ischemic degeneration.    Similar findings were present on the recent MRI.    No acute abnormality.    Skull: The skull is grossly normal.                                       X-Ray Chest AP Portable (Final result)  Result time 05/14/24 10:25:54      Final result by Cristo Martinez MD (05/14/24 10:25:54)                   Impression:      1.  Vascular congestion versus reticular interstitial changes throughout the lungs.  Interstitial pulmonary edema versus interstitial infectious process must be considered.    2.  Stable findings as noted above.      Electronically signed by: Cristo Martinez MD  Date:    05/14/2024  Time:    10:25               Narrative:    EXAMINATION:  XR CHEST AP PORTABLE    CLINICAL  HISTORY:  Sepsis;    COMPARISON:  Studies dating back to March 11, 2022    FINDINGS:  EKG leads overlie the chest, which is lordotic in position and rotated to the left.  The lungs are free of alveolar opacities.  The cardiac silhouette size is normal. The trachea is midline and the mediastinal width is normal. Negative for focal infiltrate, effusion or pneumothorax. Pulmonary vasculature is congested.  Negative for osseous abnormalities. Stable convex-right curvature of the thoracic spine, marginal spondylosis, tortuous aorta with aortic arch calcifications and postoperative changes involve the cervical spine.                                       Assessment:     Reza Morrow is a 59 y.o. female with as mentioned above presenting with acute encephalopathy. At this time differential including but not limited to meningitis, acute HSV-encephalitis, or drug over dose. Started on CTX,Vanc, and Acyclovir and admitted to [facility] internal medicine for further work-up.    Plan:     Acute Encephalopathy  -unclear when last normal  -UDS THC (+)  -UA non-infectious appearing  -CT Head without acute stroke  Plan:  -started on Vanc/CTX/Acyclovir due to concern for meningitis  -IR consulted for LP  -holding all opioids and sedating medications  -if no improvement will consider neurology consult at that time  -treponemal antibody pending  -Bcx pending    Anion Gap Metabolic Acidosis  Lactic Acidosis  -likely in there setting of decreased PO intake, (+) ketones in urine  -improved with fluids  -will repeat CMP in AM; expect to have improved    Hypomagnesemia  -magnesium 1.5  -will replete as needed    Multiple Sclerosis  -taking Glatiramer Acetate daily outpt but not available inpt  -also on MS Cotin, Roxicodone, and Nucynta; holding at this time     Hypercholesteremia  -no lipid panel on record; ordered  -on rosuvastatin outpt; will hold while admitted    Diet: NPO pending speech eval  DVT Ppx: lov   Dispo: 72 hours pending  improvement in mental status     Code Status:     Full    Phillip Hickey MD  [facility] -II    [facility] Medicine Hospitalist Pager numbers:   [facility] Hospitalist Medicine Team A (Alexey/Arnulfo): [phone]  [facility] Hospitalist Medicine Team B (Michael/Samantha):  [phone]

## 2024-05-14 NOTE — NURSING
Received report from [facility] Ed. Pt arrived on unit. Pt alert but non verbal. Pulling medical equipment off. Pt will have moments of calm then restlessness. Pt removed IV from right forearm. MD made aware. VN notified, VS taken. Will continue to monitor pt.

## 2024-05-14 NOTE — PHARMACY MED REC
"Admission Medication History     The home medication history was taken by Felecia Yanez CPhT.    Medication history obtained from, Patient's Sister and [facility] Verified    You may go to "Admission" then "Reconcile Home Medications" tabs to review and/or act upon these items.     The home medication list has been updated by the Pharmacy department.   Please read ALL comments highlighted in yellow.   Please address this information as you see fit.    Feel free to contact us if you have any questions or require assistance.      The medications listed below were removed from the home medication list.  Please reorder if appropriate:  Patient reports no longer taking the following medication(s):  Cyclobenzaprine 10 mg  Metronidazole 1% gel  Omeprazole 20 mg  Rosuvastatin 20 mg  Sterile Bottle Irrigation  Vitamin D 50,000 unit        Felecia Yanez CPhT.  Ext [phone]               .          "

## 2024-05-14 NOTE — PLAN OF CARE
05/14/24 1520   Patient Request   Patient Requested AI Alert   Nurse Notification   Charge Nurse Notified? Yes   Name of Charge Nurse Brandon   Bedside Nurse Notified? Yes   Name of Bedside Nurse Keshav   Nurse Notfication Method Secure Chat   Nurse Notified Of Other  (AI Alert)   Provider Notification   Provider Notified? Yes   Name of Provider Dr Reji Yanez   Provider Notification Method Secure Chat   Provider Notified Of AI Deterioration Alert

## 2024-05-14 NOTE — ED PROVIDER NOTES
Encounter Date: 2024       History     Chief Complaint   Patient presents with    Altered Mental Status     Pt brought in by EMS for altered mental status. Pt not answering questions. Pt with left side gaze. Room mate states last known well time was sometime yesterday. Pt awake and alert.      59-year-old female brought in by EMS for altered mental status.   Last normal is unknown.  Called today for patient being altered with a leftward gaze.  Roommate had a seizure yesterday so unclear last known normal.      Review of patient's allergies indicates:   Allergen Reactions    Adhesive Hives    Neosporin [neomycin-bacitracin-polymyxin] Hives    Neomycin     Neomycin-polymyxin Hives    Neosporin g.u. irrigant     Penicillins Other (See Comments)     Unknown  reaction    Latex Rash     Past Medical History:   Diagnosis Date    Behavioral problem     Bulging lumbar disc     Bursitis     bilateral hip    Degenerative cervical disc     Hx of psychiatric care     Hypercholesteremia     Labral tear of hip, degenerative bilateral    MS (multiple sclerosis)     Paresthesia of both lower extremities 3/13/2022    Pneumonia     Spinal cord compression      Past Surgical History:   Procedure Laterality Date    ANGIOGRAM, CORONARY, WITH LEFT HEART CATHETERIZATION Left 3/11/2022    Procedure: Angiogram, Coronary, with Left Heart Cath;  Surgeon: Elie Matamoros MD;  Location: [facility] CATH LAB;  Service: Cardiology;  Laterality: Left;    BREAST SURGERY      augmentation     SECTION, CLASSIC      HAND SURGERY      HYSTEROSCOPY      NECK SURGERY      cervical disc removeal    TUBAL LIGATION      X-STOP IMPLANTATION       Family History   Problem Relation Name Age of Onset    Cancer Father      Diabetes Father      Lung cancer Father      Diabetes Sister      COPD Mother      Drug abuse Mother      Bipolar disorder Brother      Heart disease Maternal Uncle      Hypothyroidism Maternal Grandmother       Social History     Tobacco  Use    Smoking status: Former     Current packs/day: 0.00     Types: Cigarettes     Quit date: 8/1/2013     Years since quitting: 10.7    Smokeless tobacco: Never   Substance Use Topics    Alcohol use: No    Drug use: Yes     Types: Benzodiazepines, Marijuana     Review of Systems   Unable to perform ROS: Mental status change       Physical Exam     Initial Vitals [05/14/24 1004]   BP Pulse Resp Temp SpO2   131/69 102 18 98.6 °F (37 °C) 100 %      MAP       --         Physical Exam    Nursing note and vitals reviewed.  HENT:   Head: Atraumatic.   Eyes: Conjunctivae and EOM are normal.   Neck:   Normal range of motion.  Cardiovascular:      Exam reveals no gallop and no friction rub.       No murmur heard.   tachycardic   Pulmonary/Chest: Breath sounds normal. No respiratory distress.   Abdominal: Abdomen is soft. There is no abdominal tenderness.   Musculoskeletal:      Cervical back: Normal range of motion.     Neurological:     Awake, leftward gaze, not following commands   Psychiatric: She has a normal mood and affect.         ED Course   Critical Care    Date/Time: 5/14/2024 11:17 AM    Performed by: Refugio Gomes MD  Authorized by: Lamin Singh MD  Direct patient critical care time: 31 minutes  Ordering / reviewing critical care time: 10 minutes  Documentation critical care time: 10 minutes  Consulting other physicians critical care time: 5 minutes  Total critical care time (exclusive of procedural time) : 56 minutes  Critical care was necessary to treat or prevent imminent or life-threatening deterioration of the following conditions: CNS failure or compromise, shock, sepsis and respiratory failure.  Critical care was time spent personally by me on the following activities: discussions with consultants, evaluation of patient's response to treatment, examination of patient, obtaining history from patient or surrogate, ordering and performing treatments and interventions, ordering and review of  laboratory studies, ordering and review of radiographic studies, pulse oximetry, re-evaluation of patient's condition and review of old charts.      Dictation #1  MRN:[id number]  CSN:[id number]   Labs Reviewed   CBC W/ AUTO DIFFERENTIAL - Abnormal; Notable for the following components:       Result Value    Gran # (ANC) 10.1 (*)     Lymph # 0.4 (*)     Mono # 0.2 (*)     Gran % 93.8 (*)     Lymph % 3.9 (*)     Mono % 1.9 (*)     All other components within normal limits   COMPREHENSIVE METABOLIC PANEL - Abnormal; Notable for the following components:    CO2 16 (*)     Glucose 221 (*)     Total Protein 8.9 (*)     Total Bilirubin 1.9 (*)     Anion Gap 21 (*)     All other components within normal limits   LACTIC ACID, PLASMA - Abnormal; Notable for the following components:    Lactate (Lactic Acid) 3.4 (*)     All other components within normal limits   URINALYSIS, REFLEX TO URINE CULTURE - Abnormal; Notable for the following components:    Specific Gravity, UA >=1.030 (*)     Protein, UA Trace (*)     Glucose, UA 1+ (*)     Ketones, UA 3+ (*)     Bilirubin (UA) 1+ (*)     Occult Blood UA Trace (*)     All other components within normal limits    Narrative:     Preferred Collection Type->Urine, Catheterized  Specimen Source->Urine   POCT GLUCOSE - Abnormal; Notable for the following components:    POCT Glucose 207 (*)     All other components within normal limits   CULTURE, BLOOD   CULTURE, BLOOD   TROPONIN I   DRUG SCREEN PANEL, URINE EMERGENCY    Narrative:     Preferred Collection Type->Urine, Catheterized  Specimen Source->Urine   ALCOHOL,MEDICAL (ETHANOL)   ALCOHOL,MEDICAL (ETHANOL)   LACTIC ACID, PLASMA     EKG Readings: (Independently Interpreted)   Initial Reading: No STEMI. Rhythm: Sinus Tachycardia. Heart Rate: 107. Ectopy: No Ectopy. Conduction: Normal.       Imaging Results              CT Head Without Contrast (Final result)  Result time 05/14/24 10:42:42      Final result by Jonathon Denney MD  (05/14/24 10:42:42)                   Impression:      No acute findings.      Electronically signed by: Jonathon Denney MD  Date:    05/14/2024  Time:    10:42               Narrative:    EXAMINATION:  CT HEAD WITHOUT CONTRAST    CLINICAL HISTORY:  Nonresponsive.  Altered mental status.  Visual disturbance.    TECHNIQUE:  Standard noncontrast CT of the brain.    All CT scans at this facility are performed  using dose modulation techniques as appropriate to performed exam including the following:  automated exposure control; adjustment of mA and/or kV according to the patients size (this includes techniques or standardized protocols for targeted exams where dose is matched to indication/reason for exam: i.e. extremities or head);  iterative reconstruction technique.    COMPARISON:  MRI 04/23/2024    FINDINGS:  Brain: The ventricles are minimally enlarged.  Relatively mild white matter hypodensity is noted consistent with small vessel ischemic degeneration.    Similar findings were present on the recent MRI.    No acute abnormality.    Skull: The skull is grossly normal.                                       X-Ray Chest AP Portable (Final result)  Result time 05/14/24 10:25:54      Final result by Cristo Martinez MD (05/14/24 10:25:54)                   Impression:      1.  Vascular congestion versus reticular interstitial changes throughout the lungs.  Interstitial pulmonary edema versus interstitial infectious process must be considered.    2.  Stable findings as noted above.      Electronically signed by: Cristo Martinez MD  Date:    05/14/2024  Time:    10:25               Narrative:    EXAMINATION:  XR CHEST AP PORTABLE    CLINICAL HISTORY:  Sepsis;    COMPARISON:  Studies dating back to March 11, 2022    FINDINGS:  EKG leads overlie the chest, which is lordotic in position and rotated to the left.  The lungs are free of alveolar opacities.  The cardiac silhouette size is normal. The trachea is midline and the  mediastinal width is normal. Negative for focal infiltrate, effusion or pneumothorax. Pulmonary vasculature is congested.  Negative for osseous abnormalities. Stable convex-right curvature of the thoracic spine, marginal spondylosis, tortuous aorta with aortic arch calcifications and postoperative changes involve the cervical spine.                                       Medications   ceFEPIme (MAXIPIME) 2 g in dextrose 5 % in water (D5W) 100 mL IVPB (MB+) (2 g Intravenous New Bag 5/14/24 1117)   vancomycin (VANCOCIN) 1,500 mg in dextrose 5 % (D5W) 250 mL IVPB (has no administration in time range)   ondansetron injection 4 mg (4 mg Intravenous Given 5/14/24 1022)   LORazepam injection 1 mg (1 mg Intravenous Given 5/14/24 1022)   levETIRAcetam injection 1,000 mg (1,000 mg Intravenous Given 5/14/24 1021)   0.9%  NaCl infusion (1,000 mLs Intravenous New Bag 5/14/24 1115)     Medical Decision Making   59-year-old female brought in from our altered mental status.  Initially patient with leftward gaze  however while IV and labs were being obtained, patient began looking to the right.  Patient also noted to have what appeared to be about 30 seconds of seizure activity.   After cessation, patient commonly curled and laid on her left side.  She has still not following commands.  On review of records, patient has had multiple ER visits and/or admissions for altered mental status and confusion where patient is minimally responsive or non talkative.  Unclear baseline mental status.  Remains with patent airway and 100% on room air.  Will continue workup and closely monitor for evidence of need for airway    Amount and/or Complexity of Data Reviewed  Labs: ordered. Decision-making details documented in ED Course.  Radiology: ordered.    Risk  Prescription drug management.               ED Course as of 05/14/24 1133   Tue May 14, 2024   1042 Comprehensive metabolic panel(!) [SN]   1042 Lactic Acid Level(!): 3.4 [SN]   1042 POCT  Glucose(!): 207 [SN]   1042   Lactic acid elevated maybe secondary to seizure activity.  Will give empiric antibiotics in the event of sepsis. [SN]   1045   CT brain without acute findings.  Chest x-ray with possible interstitial edema versus infection. [SN]   1045   Patient given a dose of Ativan as well as loaded with Keppra.   Patient was recently admitted  for similar presentation for altered mental status and mental status improved over the course of a few days.  Unclear if symptoms are related to polysubstance abuse or other toxidrome.  On re-evaluation, when calling name, patient moans and seems slightly more responsive.  She is resting on her side. [SN]   1116  On re-evaluation, patient now saying hi.  Etiology of her altered mental status is not exactly clear.   With lactic acidosis and altered mental status, given a dose of antibiotics given abnormal chest x-ray in the event of sepsis.  Discussed case with Internal Medicine resident on-call for admission for observation for altered mental status. [SN]      ED Course User Index  [SN] Refugio Gomes MD                           Clinical Impression:  Final diagnoses:  [R41.82] Altered mental status (Primary)  [E87.20] Lactic acidosis  [R56.9] Seizure          ED Disposition Condition    Observation Stable                Refugio Gomes MD  05/14/24 1117       Refugio Gomes MD  05/14/24 1133

## 2024-05-14 NOTE — PROGRESS NOTES
Pharmacist Dose Adjustment Note    Reza Morrow is a 59 y.o. female being treated with the medication Vancomycin    Patient Data:    Vital Signs (Most Recent):  Temp: 98.6 °F (37 °C) (05/14/24 1004)  Pulse: (!) 112 (05/14/24 1008)  Resp: (!) 22 (05/14/24 1008)  BP: 131/69 (05/14/24 1004)  SpO2: 100 % (05/14/24 1008) Vital Signs (72h Range):  Temp:  [98.6 °F (37 °C)]   Pulse:  [102-112]   Resp:  [18-22]   BP: (131)/(69)   SpO2:  [100 %]      Recent Labs   Lab 05/14/24  1017   CREATININE 0.63     Serum creatinine: 0.63 mg/dL 05/14/24 1017  Estimated creatinine clearance: 87.6 mL/min    Medication:Vancomycin dose: 1000 mg frequency once will be changed to medication:Vancomycin dose:1500 mg frequency:once    Pharmacist's Name: Tolu Herr  Pharmacist's Extension: [phone]

## 2024-05-14 NOTE — PROGRESS NOTES
Pharmacist Renal Dose Adjustment Note    Reza Morrow is a 59 y.o. female being treated with the medication Cefempime    Patient Data:    Vital Signs (Most Recent):  Temp: 98.6 °F (37 °C) (05/14/24 1004)  Pulse: (!) 112 (05/14/24 1008)  Resp: (!) 22 (05/14/24 1008)  BP: 131/69 (05/14/24 1004)  SpO2: 100 % (05/14/24 1008) Vital Signs (72h Range):  Temp:  [98.6 °F (37 °C)]   Pulse:  [102-112]   Resp:  [18-22]   BP: (131)/(69)   SpO2:  [100 %]      Recent Labs   Lab 05/14/24  1017   CREATININE 0.63     Serum creatinine: 0.63 mg/dL 05/14/24 1017  Estimated creatinine clearance: 87.6 mL/min    Medication:Cefepime dose: 1 g frequency ED 1 time will be changed to medication:Cefepime dose: 2 g frequency:ED 1 time    Pharmacist's Name: Tolu Herr  Pharmacist's Extension: [phone]

## 2024-05-14 NOTE — PLAN OF CARE
05/14/24 1709   Admission   Initial VN Admission Questions Complete   Communication Issues?   (Patient non verbal, severely confused)

## 2024-05-14 NOTE — AI DETERIORATION ALERT
Artificial Intelligence Notification  Faheem      Admit Date: 2024  LOS: 0  Code Status: Prior   Date of Consult: 2024  : 1965  Age: 59 y.o.  Weight:   Wt Readings from Last 1 Encounters:   24 59.9 kg (132 lb)     Sex: female  Bed: [place] [address] A:   MRN: [id number]  Attending Physician: Lamin Singh MD  Primary Service: Networked reference to record PCT   Time AI Alert Received: ***  Time at Bedside: ***           ***      Vital Signs (Most Recent):  Temp: 98.6 °F (37 °C) (24 1004)  Pulse: (!) 135 (24 1400)  Resp: 20 (24 1400)  BP: (!) 149/85 (24 1400)  SpO2: 98 % (24 1400) Vital Signs (24h Range):  Temp:  [98.6 °F (37 °C)] 98.6 °F (37 °C)  Pulse:  [102-146] 135  Resp:  [18-22] 20  SpO2:  [98 %-100 %] 98 %  BP: (131-168)/(69-96) 149/85         This encounter was triggered by an Artificial Intelligence Notification.     Artificial Intelligence alert discussed with Primary team:  Name ***      Evaluation: ***    Disposition: ***

## 2024-05-15 LAB
ALBUMIN SERPL BCP-MCNC: 3.4 G/DL (ref 3.5–5.2)
ALBUMIN SERPL BCP-MCNC: 3.5 G/DL (ref 3.5–5.2)
ALP SERPL-CCNC: 63 U/L (ref 55–135)
ALT SERPL W/O P-5'-P-CCNC: 14 U/L (ref 10–44)
ANION GAP SERPL CALC-SCNC: 10 MMOL/L (ref 8–16)
ANION GAP SERPL CALC-SCNC: 10 MMOL/L (ref 8–16)
AST SERPL-CCNC: 17 U/L (ref 10–40)
BILIRUB SERPL-MCNC: 1 MG/DL (ref 0.1–1)
BUN SERPL-MCNC: 8 MG/DL (ref 6–20)
BUN SERPL-MCNC: 9 MG/DL (ref 6–20)
CALCIUM SERPL-MCNC: 9 MG/DL (ref 8.7–10.5)
CALCIUM SERPL-MCNC: 9 MG/DL (ref 8.7–10.5)
CHLORIDE SERPL-SCNC: 103 MMOL/L (ref 95–110)
CHLORIDE SERPL-SCNC: 105 MMOL/L (ref 95–110)
CO2 SERPL-SCNC: 23 MMOL/L (ref 23–29)
CO2 SERPL-SCNC: 24 MMOL/L (ref 23–29)
CREAT SERPL-MCNC: 0.7 MG/DL (ref 0.5–1.4)
CREAT SERPL-MCNC: 0.8 MG/DL (ref 0.5–1.4)
EST. GFR  (NO RACE VARIABLE): >60 ML/MIN/1.73 M^2
EST. GFR  (NO RACE VARIABLE): >60 ML/MIN/1.73 M^2
GLUCOSE SERPL-MCNC: 122 MG/DL (ref 70–110)
GLUCOSE SERPL-MCNC: 89 MG/DL (ref 70–110)
PHOSPHATE SERPL-MCNC: 2.1 MG/DL (ref 2.7–4.5)
POTASSIUM SERPL-SCNC: 2.9 MMOL/L (ref 3.5–5.1)
POTASSIUM SERPL-SCNC: 3.5 MMOL/L (ref 3.5–5.1)
PROT SERPL-MCNC: 7.4 G/DL (ref 6–8.4)
SODIUM SERPL-SCNC: 137 MMOL/L (ref 136–145)
SODIUM SERPL-SCNC: 138 MMOL/L (ref 136–145)

## 2024-05-15 PROCEDURE — 51702 INSERT TEMP BLADDER CATH: CPT

## 2024-05-15 PROCEDURE — 25000003 PHARM REV CODE 250

## 2024-05-15 PROCEDURE — 36415 COLL VENOUS BLD VENIPUNCTURE: CPT | Mod: XB

## 2024-05-15 PROCEDURE — 36415 COLL VENOUS BLD VENIPUNCTURE: CPT

## 2024-05-15 PROCEDURE — 97535 SELF CARE MNGMENT TRAINING: CPT

## 2024-05-15 PROCEDURE — 11000001 HC ACUTE MED/SURG PRIVATE ROOM

## 2024-05-15 PROCEDURE — 96366 THER/PROPH/DIAG IV INF ADDON: CPT

## 2024-05-15 PROCEDURE — 25000003 PHARM REV CODE 250: Performed by: INTERNAL MEDICINE

## 2024-05-15 PROCEDURE — 80053 COMPREHEN METABOLIC PANEL: CPT

## 2024-05-15 PROCEDURE — 99222 1ST HOSP IP/OBS MODERATE 55: CPT | Mod: ,,, | Performed by: UROLOGY

## 2024-05-15 PROCEDURE — 96375 TX/PRO/DX INJ NEW DRUG ADDON: CPT

## 2024-05-15 PROCEDURE — 80069 RENAL FUNCTION PANEL: CPT

## 2024-05-15 PROCEDURE — 36415 COLL VENOUS BLD VENIPUNCTURE: CPT | Performed by: INTERNAL MEDICINE

## 2024-05-15 PROCEDURE — 80202 ASSAY OF VANCOMYCIN: CPT | Performed by: INTERNAL MEDICINE

## 2024-05-15 PROCEDURE — 99223 1ST HOSP IP/OBS HIGH 75: CPT | Mod: ,,, | Performed by: PSYCHIATRY & NEUROLOGY

## 2024-05-15 PROCEDURE — 63600175 PHARM REV CODE 636 W HCPCS: Performed by: INTERNAL MEDICINE

## 2024-05-15 PROCEDURE — 51701 INSERT BLADDER CATHETER: CPT

## 2024-05-15 PROCEDURE — 92610 EVALUATE SWALLOWING FUNCTION: CPT

## 2024-05-15 PROCEDURE — 63600175 PHARM REV CODE 636 W HCPCS

## 2024-05-15 PROCEDURE — 51798 US URINE CAPACITY MEASURE: CPT

## 2024-05-15 RX ORDER — OXYCODONE HYDROCHLORIDE 5 MG/1
5 TABLET ORAL EVERY 6 HOURS PRN
Status: DISCONTINUED | OUTPATIENT
Start: 2024-05-15 | End: 2024-05-16 | Stop reason: HOSPADM

## 2024-05-15 RX ORDER — POTASSIUM CHLORIDE 7.45 MG/ML
10 INJECTION INTRAVENOUS EVERY 4 HOURS
Status: DISCONTINUED | OUTPATIENT
Start: 2024-05-15 | End: 2024-05-15

## 2024-05-15 RX ORDER — LORAZEPAM 0.5 MG/1
0.5 TABLET ORAL EVERY 6 HOURS PRN
Status: DISCONTINUED | OUTPATIENT
Start: 2024-05-15 | End: 2024-05-16 | Stop reason: HOSPADM

## 2024-05-15 RX ORDER — POTASSIUM CHLORIDE 7.45 MG/ML
10 INJECTION INTRAVENOUS
Status: DISPENSED | OUTPATIENT
Start: 2024-05-15 | End: 2024-05-15

## 2024-05-15 RX ORDER — HYDROMORPHONE HYDROCHLORIDE 1 MG/ML
0.5 INJECTION, SOLUTION INTRAMUSCULAR; INTRAVENOUS; SUBCUTANEOUS ONCE
Status: COMPLETED | OUTPATIENT
Start: 2024-05-15 | End: 2024-05-15

## 2024-05-15 RX ORDER — PREGABALIN 75 MG/1
150 CAPSULE ORAL 2 TIMES DAILY
Status: DISCONTINUED | OUTPATIENT
Start: 2024-05-15 | End: 2024-05-16 | Stop reason: HOSPADM

## 2024-05-15 RX ADMIN — OXYCODONE 5 MG: 5 TABLET ORAL at 01:05

## 2024-05-15 RX ADMIN — HYDROMORPHONE HYDROCHLORIDE 0.5 MG: 1 INJECTION, SOLUTION INTRAMUSCULAR; INTRAVENOUS; SUBCUTANEOUS at 08:05

## 2024-05-15 RX ADMIN — POTASSIUM CHLORIDE 10 MEQ: 7.46 INJECTION, SOLUTION INTRAVENOUS at 12:05

## 2024-05-15 RX ADMIN — ACYCLOVIR SODIUM 570 MG: 50 INJECTION, SOLUTION INTRAVENOUS at 03:05

## 2024-05-15 RX ADMIN — VANCOMYCIN HYDROCHLORIDE 750 MG: 750 INJECTION, POWDER, LYOPHILIZED, FOR SOLUTION INTRAVENOUS at 12:05

## 2024-05-15 RX ADMIN — LORAZEPAM 0.5 MG: 0.5 TABLET ORAL at 09:05

## 2024-05-15 RX ADMIN — HYPROMELLOSE 2910 1 DROP: 5 SOLUTION/ DROPS OPHTHALMIC at 10:05

## 2024-05-15 RX ADMIN — POTASSIUM CHLORIDE 10 MEQ: 7.46 INJECTION, SOLUTION INTRAVENOUS at 10:05

## 2024-05-15 RX ADMIN — ENOXAPARIN SODIUM 40 MG: 40 INJECTION SUBCUTANEOUS at 04:05

## 2024-05-15 RX ADMIN — VANCOMYCIN HYDROCHLORIDE 750 MG: 750 INJECTION, POWDER, LYOPHILIZED, FOR SOLUTION INTRAVENOUS at 01:05

## 2024-05-15 RX ADMIN — POTASSIUM CHLORIDE 10 MEQ: 7.46 INJECTION, SOLUTION INTRAVENOUS at 03:05

## 2024-05-15 RX ADMIN — ACYCLOVIR SODIUM 570 MG: 50 INJECTION, SOLUTION INTRAVENOUS at 10:05

## 2024-05-15 RX ADMIN — POTASSIUM CHLORIDE 10 MEQ: 7.46 INJECTION, SOLUTION INTRAVENOUS at 04:05

## 2024-05-15 RX ADMIN — CEFTRIAXONE SODIUM 2 G: 2 INJECTION, POWDER, FOR SOLUTION INTRAMUSCULAR; INTRAVENOUS at 06:05

## 2024-05-15 RX ADMIN — POTASSIUM CHLORIDE 10 MEQ: 7.46 INJECTION, SOLUTION INTRAVENOUS at 05:05

## 2024-05-15 RX ADMIN — OXYCODONE 5 MG: 5 TABLET ORAL at 07:05

## 2024-05-15 RX ADMIN — PREGABALIN 150 MG: 75 CAPSULE ORAL at 10:05

## 2024-05-15 RX ADMIN — PREGABALIN 150 MG: 75 CAPSULE ORAL at 09:05

## 2024-05-15 NOTE — PT/OT/SLP EVAL
Speech Language Pathology Evaluation  Bedside Swallow    Patient Name:  Reza Morrow   MRN:  [id number]  Admitting Diagnosis: Acute encephalopathy    Recommendations:                 General Recommendations:  ongoing swallow eval   Diet recommendations:  NPO, Full liquids   Aspiration Precautions: oral care, whole meds, assist with feeding    General Precautions: Standard, fall  Communication strategies:  redirect and reorient     Assessment:     Reza Morrow is a 59 y.o. female admitted Acute encephalopathy who presents with an SLP diagnosis of dysphagia.    History per MD      Date of Admit: 5/14/2024      Acute encephalopathy x1 day     Subjective:      History of Present Illness:  Reza Morrow is a 59 y.o. with a PMHx of polysubstance abuse, Multiple Sclerosis, Hypercholesteremia, [place] Cardiomyopathy, and hypertension who is presenting to [facility] ED for acutely altered mental status x1 day.     Pt was seen at bedside and unable to provide any useful history. Patient withdraws from pain but otherwise not making any meaningful movements. Unable to answer questions appropriately, simply moaning and making meaningless sounds.      Had extended discussion however with sister who was able to provide further history. Explained that she last saw her sister on Saturday. At that time patient was in her normal state of health and doing well. She helped her get her house cleaned up and patient was doing ok. On Mother's Day patient again spoke with her sister, at which time she still felt well besides having some nausea. This was the last time her sister heard from Ms. Cobb before her current presentation for acute encephalopathy. Patient does take multiple opiods at home for her MS inclusing MS contin 30mg , Roxicodne PRN, and Nucynta.     Past Medical History:   Diagnosis Date    Behavioral problem     Bulging lumbar disc     Bursitis     bilateral hip    Degenerative cervical disc     Hx of psychiatric care      "Hypercholesteremia     Labral tear of hip, degenerative bilateral    MS (multiple sclerosis)     Paresthesia of both lower extremities 3/13/2022    Pneumonia     Spinal cord compression        Past Surgical History:   Procedure Laterality Date    ANGIOGRAM, CORONARY, WITH LEFT HEART CATHETERIZATION Left 3/11/2022    Procedure: Angiogram, Coronary, with Left Heart Cath;  Surgeon: Elie Matamoros MD;  Location: [facility] CATH LAB;  Service: Cardiology;  Laterality: Left;    BREAST SURGERY      augmentation     SECTION, CLASSIC      HAND SURGERY      HYSTEROSCOPY      NECK SURGERY      cervical disc removeal    TUBAL LIGATION      X-STOP IMPLANTATION       Social History: unknown    Prior Intubation HX:  not on admit     Modified Barium Swallow: none on admit     Chest X-Rays: 1.  Vascular congestion versus reticular interstitial changes throughout the lungs.  Interstitial pulmonary edema versus interstitial infectious process must be considered.   2.  Stable findings as noted above.    CT of the head: No acute abnormality.     Prior diet: unknown     Subjective     Consult received for clinical swallow eval this date, SLP did communicate with RN prior to eval/treat.    Patient goals: "everything hurts."    Pain/Comfort:  Pain Rating 1:  (pain in legs and back)  Pain Addressed 1: Nurse notified    Respiratory Status: room air     Objective:     Pt seen in room, pt moaning and moving around the bed.   Pt did agree to small trials of PO despite pain.  Pt able to state name only.     Oral Musculature Evaluation  Oral Musculature: general weakness  Dentition: present and adequate  Secretion Management: adequate  Mucosal Quality: adequate  Volitional Cough: elicited  Volitional Swallow: timely swallow  Voice Prior to PO Intake: clear voice    Bedside Swallow Eval:   Consistencies Assessed:  Thin liquids water by cup and straw   Puree 1 bite of applesauce       Oral Phase:   WFL    Pharyngeal Phase:   no overt clinical " signs/symptoms of aspiration  no overt clinical signs/symptoms of pharyngeal dysphagia    Compensatory Strategies  Oral care   Whole meds    Treatment: Educated pt on role of SLP in the acute care setting, ongoing diet recs and precautions. Pt will need further reinforcement given current agitated state. Secure chat sent to team and MD with diet recs.     Goals:   Multidisciplinary Problems       SLP Goals          Problem: SLP    Goal Priority Disciplines Outcome   SLP Goal     SLP Progressing   Description: Short Term Goals:  1.  Pt will participate in ongoing swallow assessment to determine least restrictive diet.   2. Pt will tolerate FULL liquid diet  with no audible s/s of dysphagia and good oral clearance.                            Plan:     Patient to be seen:  3 x/week   Plan of Care expires:  06/13/24  Plan of Care reviewed with:  patient   SLP Follow-Up:  Yes       Discharge recommendations:   (TBD)   Barriers to Discharge:  none    Time Tracking:     SLP Treatment Date:   05/15/24  Speech Start Time:  0920  Speech Stop Time:  0941     Speech Total Time (min):  21 min    Billable Minutes: Eval Swallow and Oral Function 11 and Self Care/Home Management Training 9    05/15/2024

## 2024-05-15 NOTE — AI DETERIORATION ALERT
Artificial Intelligence Notification  Faheem      Admit Date: 2024  LOS: 0  Code Status: Full Code   Date of Consult: 05/15/2024  : 1965  Age: 59 y.o.  Weight:   Wt Readings from Last 1 Encounters:   24 57.4 kg (126 lb 8.7 oz)     Sex: female  Bed: [place] [address] A:   MRN: [id number]  Attending Physician: Lamin Singh MD  Primary Service: Networked reference to record PCT   Time AI Alert Received: ***  Time at Bedside: ***           ***      Vital Signs (Most Recent):  Temp: 98.4 °F (36.9 °C) (24)  Pulse: 109 (05/15/24 0035)  Resp: 19 (24)  BP: (!) 143/88 (24)  SpO2: (!) 94 % (24) Vital Signs (24h Range):  Temp:  [98.4 °F (36.9 °C)-100.1 °F (37.8 °C)] 98.4 °F (36.9 °C)  Pulse:  [102-146] 109  Resp:  [18-22] 19  SpO2:  [94 %-100 %] 94 %  BP: (131-168)/(69-96) 143/88         This encounter was triggered by an Artificial Intelligence Notification.     Artificial Intelligence alert discussed with Primary team:  Name ***      Evaluation: ***    Disposition: ***

## 2024-05-15 NOTE — NURSING
Rapid Response Nurse Note     USG PIV inserted to right forearm. No acute issues at this time. Reviewed plan of care with Bedside RN, Keshav .   Please call Rapid Response RN, Shelia Claros RN with any questions or concerns at [phone].

## 2024-05-15 NOTE — CONSULTS
[facility] - Telemetry  Urology  Consult Note    Patient Name: Reza Morrow  MRN: [id number]  Admission Date: 2024  Attending Provider: Lamin Singh MD   Consulting Provider: Arpan Mueller MD  Principal Problem:Acute encephalopathy    Inpatient consult to Urology  Consult performed by: Arpan Mueller MD  Consult ordered by: Phillip Hickey MD          Subjective:     HPI:   Reza Morrow is a 59 y.o. female here with encephalopathy, urology consulted for acute urinary retention.      Patient admitted, subsequently was found to have experienced urinary retention. Bladder scan and post void residual volumes were elevated. A matias catheter was not inserted. CIC was performed. Urology was consulted for evaluation.    Past Medical History:   Diagnosis Date    Behavioral problem     Bulging lumbar disc     Bursitis     bilateral hip    Degenerative cervical disc     Hx of psychiatric care     Hypercholesteremia     Labral tear of hip, degenerative bilateral    MS (multiple sclerosis)     Paresthesia of both lower extremities 3/13/2022    Pneumonia     Spinal cord compression        Past Surgical History:   Procedure Laterality Date    ANGIOGRAM, CORONARY, WITH LEFT HEART CATHETERIZATION Left 3/11/2022    Procedure: Angiogram, Coronary, with Left Heart Cath;  Surgeon: Elie Matamoros MD;  Location: [facility] CATH LAB;  Service: Cardiology;  Laterality: Left;    BREAST SURGERY      augmentation     SECTION, CLASSIC      HAND SURGERY      HYSTEROSCOPY      NECK SURGERY      cervical disc removeal    TUBAL LIGATION      X-STOP IMPLANTATION         Family History   Problem Relation Name Age of Onset    Cancer Father      Diabetes Father      Lung cancer Father      Diabetes Sister      COPD Mother      Drug abuse Mother      Bipolar disorder Brother      Heart disease Maternal Uncle      Hypothyroidism Maternal Grandmother         Social History     Tobacco Use    Smoking status: Former     Current packs/day: 0.00      Types: Cigarettes     Quit date: 8/1/2013     Years since quitting: 10.7    Smokeless tobacco: Never   Substance Use Topics    Alcohol use: No    Drug use: Yes     Types: Benzodiazepines, Marijuana       No current facility-administered medications on file prior to encounter.     Current Outpatient Medications on File Prior to Encounter   Medication Sig Dispense Refill    COVID-19 AT-HOME TEST Kit TEST AS DIRECTED      furosemide (LASIX) 40 MG tablet Take 1 tablet (40 mg total) by mouth daily as needed (for worsenign shortnes of breath, swelling, or 3lb weight gain in 1 day/5lb weight gain in 1 week). 30 tablet 11    gabapentin (NEURONTIN) 300 MG capsule Take 1 capsule (300 mg total) by mouth 3 (three) times daily.      glatiramer (COPAXONE, GLATOPA) 40 mg/mL injection Inject 40 mg into the skin 3 (three) times a week.      naloxone (NARCAN) 4 mg/actuation Spry 1 spray by Nasal route once as needed.      NUCYNTA  mg Tb12 Take 100 mg by mouth 2 (two) times a day.      oxyCODONE (ROXICODONE) 30 MG Tab Take 5 mg by mouth every 6 (six) hours as needed.      pregabalin (LYRICA) 100 MG capsule Take 100 mg by mouth 3 (three) times daily.      triamcinolone acetonide 0.1% (KENALOG) 0.1 % cream Apply 1 applicator topically 4 (four) times daily as needed.      venlafaxine (EFFEXOR-XR) 37.5 MG 24 hr capsule Take 1 capsule (37.5 mg total) by mouth once daily. 30 capsule 3    [DISCONTINUED] atorvastatin (LIPITOR) 20 MG tablet Take 20 mg by mouth once daily.      [DISCONTINUED] EScitalopram oxalate (LEXAPRO) 10 MG tablet Take 10 mg by mouth once daily.         Review of patient's allergies indicates:   Allergen Reactions    Adhesive Hives    Neosporin [neomycin-bacitracin-polymyxin] Hives    Neomycin     Neomycin-polymyxin Hives    Neosporin g.u. irrigant     Penicillins Other (See Comments)     Unknown  reaction    Latex Rash       Review of Systems:  A review of 10+ systems was conducted with pertinent positive and  "negative findings documented in HPI with all other systems reviewed and negative.    Objective:     Vitals:   Temp:  [98.1 °F (36.7 °C)-100.1 °F (37.8 °C)] 98.7 °F (37.1 °C)  Pulse:  [] 92  Resp:  [18-22] 20  SpO2:  [94 %-100 %] 99 %  BP: (136-168)/(70-96) 156/92       I/O:   Intake/Output Summary (Last 24 hours) at 5/15/2024 1122  Last data filed at 5/15/2024 0943  Gross per 24 hour   Intake --   Output 1478 ml   Net -1478 ml       Physical Exam:  GENERAL: patient sitting comfortably  HEENT: normocephalic  NECK: supple, no JVD  PULM: normal chest rise, no increased WOB  HEART: non-diaphoretic  ABDO: soft, non-distended, non-tender  : deferred   BACK: no CVA tenderness bilaterally  SKIN: warm, dry, well perfused  EXT: no bruising or edema  NEURO: grossly normal with no focal deficits  PSYCH: appropriate mood and affect    Significant Labs:  CBC:  Recent Labs   Lab 05/14/24  1050 05/14/24  1709   WBC 10.77  --    HGB 15.1  --    HCT 44.0 44.7     --        BMP:  Recent Labs   Lab 05/14/24  1017 05/15/24  0430    137   K 3.5 2.9*    103   CO2 16* 24   BUN 14 9   CREATININE 0.63 0.7   CALCIUM 10.0 9.0       Urinalysis  No results for input(s): "COLORU", "CLARITYU", "SPECGRAV", "PHUR", "PROTEINUA", "GLUCOSEU", "BILIRUBINCON", "BLOODU", "WBCU", "RBCU", "BACTERIA", "MUCUS", "NITRITE", "LEUKOCYTESUR", "UROBILINOGEN", "HYALINECASTS" in the last 24 hours.    Imaging:  All pertinent imaging results/findings from the past 24 hours have been reviewed.    Results for orders placed or performed during the hospital encounter of 05/14/24 (from the past 2160 hour(s))   CT Head Without Contrast    Narrative    EXAMINATION:  CT HEAD WITHOUT CONTRAST    CLINICAL HISTORY:  Nonresponsive.  Altered mental status.  Visual disturbance.    TECHNIQUE:  Standard noncontrast CT of the brain.    All CT scans at this facility are performed  using dose modulation techniques as appropriate to performed exam including the " following:  automated exposure control; adjustment of mA and/or kV according to the patients size (this includes techniques or standardized protocols for targeted exams where dose is matched to indication/reason for exam: i.e. extremities or head);  iterative reconstruction technique.    COMPARISON:  MRI 04/23/2024    FINDINGS:  Brain: The ventricles are minimally enlarged.  Relatively mild white matter hypodensity is noted consistent with small vessel ischemic degeneration.    Similar findings were present on the recent MRI.    No acute abnormality.    Skull: The skull is grossly normal.      Impression    No acute findings.      Electronically signed by: Jonathon Denney MD  Date:    05/14/2024  Time:    10:42   Results for orders placed or performed during the hospital encounter of 04/18/24 (from the past 2160 hour(s))   CT Abdomen Pelvis  Without Contrast    Narrative    EXAMINATION:  CT ABDOMEN PELVIS WITHOUT CONTRAST    CLINICAL HISTORY:  Epigastric pain;    TECHNIQUE:  Multiplanar images were obtained of the abdomen and pelvis from the hemidiaphragms through the symphysis pubis without intravenous contrast.  Examination was repeated due to motion artifact.    COMPARISON:  CT chest from 06/27/2023.    FINDINGS:  There is motion artifact.  There is also artifact from the patient's arms which are within the field of view, resulting in streak artifact.  Examination is also limited due to abnormal patient positioning.    Lung Bases: Clear.    Heart: Heart size is normal.  No pericardial effusion.    Liver: The liver is enlarged.  There are no focal hepatic lesions.    Biliary tract: No intrahepatic or extrahepatic biliary ductal dilatation.    Gallbladder: No radiodense gallstone. No wall thickening or pericholecystic fluid.    Pancreas: Normal. No pancreatic ductal dilatation.    Spleen: Normal size without focal lesion.    Adrenals: Normal.    Kidneys and urinary collecting systems: Normal.  No hydronephrosis  or urolithiasis.    Lymph nodes: None enlarged.    Stomach and bowel: The stomach is normal.  Loops of small and large bowel are normal in caliber without evidence for inflammation or obstruction.  The appendix is normal.    Peritoneum and mesentery: No ascites or free intraperitoneal air. No abdominal fluid collection.    Vasculature: There is mild atherosclerosis.    Urinary bladder: Normal.    Reproductive organs: The uterus is absent.    Body wall: Partially imaged bilateral breast prostheses.    Musculoskeletal: No aggressive osseous lesion.  There is grade 1 anterolisthesis of L4 on L5.      Impression    No acute abnormality of the abdomen or pelvis.      Electronically signed by: Noah Meléndez  Date:    04/19/2024  Time:    02:01     Results for orders placed or performed during the hospital encounter of 04/18/24 (from the past 2160 hour(s))   MRI Brain Demyelinating W W/O Contrast    Impression    Examination mildly degraded by patient motion artifact.    Moderate supratentorial leukoencephalopathy, similar to prior exam.  This is nonspecific but could be in keeping with the clinically reported history multiple sclerosis.  No new or enhancing lesions to indicate ongoing or active demyelination.    No evidence of acute hemorrhage or infarction.      Electronically signed by: Joseph Krishna MD  Date:    04/23/2024  Time:    15:41       Urology Specific Assessment:     Urinary Retention    Plan:      No acute surgical intervention planned from a urologic standpoint  Leave matias to gravity. Can attempt voiding trial at time of discharge.  Patient can follow up with my KYLAH Maria C Cardoza for voiding trial 1-2 weeks after discharge if discharged with matias. Discharge navigators can arrange.    Thank you for your consult. Urology will sign off.    Arpan Mueller MD  Urology  [facility]    Disclaimer: This note has been generated using voice-recognition software. There may be typographical  errors that have been missed during proof-reading.

## 2024-05-15 NOTE — CODE/ RAPID DOCUMENTATION
Rapid Response Nurse Follow-up Note     Followed up with patient for proactive rounding.   Notified by bedside RN that pt was agitated and restless.   Bladder scanned again, and showing >400 cc on scan.   Assisted bedside RN with In/Out Straight cath. UOP~400 cc.    Total UOP 1 L this shift.    Pt lying in bed, and calm after straight cath. Reviewed plan of care with bedside RNLele .     Team will continue to follow.  Please call Rapid Response RN, Amy Alfaro RN with any questions or concerns at [phone]

## 2024-05-15 NOTE — NURSING
Pt's niece at the bedside stating pt is becoming more agitated and harder to console. Nurse assessed pt and pt is just moaning and gagging not answering questions clearly. Dr. Zuniga made aware and he came to the bedside. Pt nodded yes when asked if her stomach hurt. KUB and bladder scan was ordered. Bladder scan showed 660. So, straight cath was ordered and completed. Pt seemed to feel better after this.

## 2024-05-15 NOTE — PLAN OF CARE
05/15/24 0038   Nurse Notification   Charge Nurse Notified? Yes   Name of Charge Nurse LETTY Rodríguez   Bedside Nurse Notified? Yes   Name of Bedside Nurse LETTY Dubon   Nurse Notfication Method Secure Chat   Nurse Notified Of Other   Provider Notification   Provider Notified? Yes   Name of Provider MD Summer   Provider Notification Method Telephone;Secure Chat   Provider Notified Of AI Deterioration Alert     AI alert received. Charge nurse, floor nurse, and MD notified. MD Kenroy to address.

## 2024-05-15 NOTE — PLAN OF CARE
Problem: Adult Inpatient Plan of Care  Goal: Plan of Care Review  Outcome: Progressing  Flowsheets (Taken 5/15/2024 0250)  Plan of Care Reviewed With: patient     Problem: Confusion Acute  Goal: Optimal Cognitive Function  Outcome: Progressing  Intervention: Minimize Contributing Factors  Flowsheets (Taken 5/15/2024 0250)  Reorientation Measures: reorientation provided  Sensory Stimulation Regulation:   care clustered   lighting decreased   quiet environment promoted  Communication Support Strategies: one-step directions provided     Problem: Fall Injury Risk  Goal: Absence of Fall and Fall-Related Injury  Outcome: Progressing  Intervention: Identify and Manage Contributors  Flowsheets (Taken 5/15/2024 0250)  Self-Care Promotion:   independence encouraged   BADL personal objects within reach   BADL personal routines maintained  Medication Review/Management:   medications reviewed   high-risk medications identified     Problem: Restraint, Nonviolent  Goal: Absence of Harm or Injury  Outcome: Progressing     Problem: Skin Injury Risk Increased  Goal: Skin Health and Integrity  Outcome: Progressing  Intervention: Optimize Skin Protection  Flowsheets (Taken 5/15/2024 0250)  Pressure Reduction Techniques: weight shift assistance provided  Activity Management: Rolling - L1  Head of Bed (HOB) Positioning: HOB elevated     Problem: Comorbidity Management  Goal: Maintenance of Heart Failure Symptom Control  Outcome: Progressing  Intervention: Maintain Heart Failure Management  Flowsheets (Taken 5/15/2024 0250)  Medication Review/Management:   medications reviewed   high-risk medications identified     Problem: Infection  Goal: Absence of Infection Signs and Symptoms  Outcome: Progressing  Intervention: Prevent or Manage Infection  Flowsheets (Taken 5/15/2024 0250)  Fever Reduction/Comfort Measures:   lightweight clothing   lightweight bedding  Infection Management: aseptic technique maintained   Plan of care progressing.

## 2024-05-15 NOTE — CONSULTS
[facility] NEUROLOGY CONSULT  EVALUATION    Reason for consult: Acute encephalopathy concerning for possible meningitis.   Informant: Patient     Other sources of information : chart review and primary team.     CC:  Altered Mental Status (Pt brought in by EMS for altered mental status. Pt not answering questions. Pt with left side gaze. Room mate states last known well time was sometime yesterday. Pt awake and alert.)       HPI: Reza Morrow is a 59 y.o. with a PMHx of polysubstance abuse, Multiple Sclerosis ( 8/31/15, who is currently taking Copaxone injections three times weekly), Hypercholesteremia, Takutsubo Cardiomyopathy, HFrEF (20% on echo from 3/12/22however repeat echo months later with resolution of EF at 60%. ), stress cardiomyopathy hypertension, Behavioral problem, Bulging lumbar disc, Bursitis, Degenerative cervical disc,  Labral tear of hip, degenerative (bilateral), MS (multiple sclerosis), Paresthesia of both lower extremities , Pneumonia, and Spinal cord compression and opioid dependence who is presenting to [facility] ED for acutely altered mental status x1 day.     Per chart Check   Pt was seen at bedside and unable to provide any useful history. Patient withdraws from pain but otherwise not making any meaningful movements. Unable to answer questions appropriately, simply moaning and making meaningless sounds.      Had extended discussion however with sister who was able to provide further history. Explained that she last saw her sister on Saturday. At that time patient was in her normal state of health and doing well. She helped her get her house cleaned up and patient was doing ok. On Mother's Day patient again spoke with her sister, at which time she still felt well besides having some nausea. This was the last time her sister heard from Ms. Cobb before her current presentation for acute encephalopathy. Patient does take multiple opiods at home for her MS inclusing Copaxone 40 mg SC 3 times a week, MS  contin 30mg , Roxicodne PRN, and Nucynta. Per chart check sees Dr. Meneses for pain management.     Also noted recent admission for the same reason on 4/20/2024  Hospital Course:   4/20/24 Alert but not responding to questions, off precedex  4/21 talking, step down from ICU, suboxone  4/22 Much more alert, awaiting transfer to floor  4/23: MRI brain pending. Psych evaulated pt and added effexor  4/24: MRI brain negative for acute abnormalities. Pt back to baseline. Deemed stable to be d/c.       Per last neurology note 4/18/22: 4/18/2022, She was recently admitted to [facility] for acute change in mental status. Per chart review, possibly due to polypharmacy or overdose from home medications. Pt does have suicidal thoughts and suicide history in the past. Psychiatry service consulted. Neurology consulted, no concern for MS flair, recommended EEG. Pt showing much clinical improvement today with regards to her mental status. More alert and aware of herself and time. Showing signs of catatonia so started on scheduled ativan per psych recs.     Per Dr. Vincent last visit:  Regarding her Multiple Sclerosis I explained that her recent symptoms and possible confusion after the fall are not related to Multiple Sclerosis and that she had no new lesions. She was evaluated at [facility] and had a second opinion regarding her diagnosis and was told she has small vessel disease based on her MRI findings and negative CSF. Reviewing her case again she is very certain that she was diagnosed with optic neuritis several years before we initially worked her up. I explained that we could not prove she had optic neuritis because her evoked potentials were normal but this does not mean she did not have it. Her lesions are small but she has extensive lesions with some periventricular and some juxtacortical lesions. Ms. Morrow states that she has no side effects from the injections and she does not want to risk having new lesions. Considering  this she should continue on the Glatiramer.     Real criteria:  Attacks:  1) 2005 - optic neuritis with RUE weakness  2) 2015 - RUE weakness with L foot drop  MRI lesions:  1) juxtacortical no  2) periventricular yes  3) infratentorial no  4) spinal cord no     Current disease modifying therapy: Copaxone  Compliance: yes  Side effects: none reported  Premedication: none    Autoimmune:   MOG-IgG1 Negative  No CSF OCBs     RACHEAL Panel :  3/23 RACHEAL positive     Previous disease modifying therapies: Rebif     Outpatient Neurologist: Dr. Vincent     , last visit : 4/11/2022 , orders renewed on 4/12/2023    Upon evaluation :  Patient was alert and awake seating comfortably at her bed, she answered all my questions and followed all my commands, She can not remember what had happened yesterday other than she vomited several times. She denies any acute changes in vision, hearing, speech, swallowing, balance, headaches, weakness or changes in sensations. Denies any other acute neurological complains or concerns at the time of evaluation. Patient denies any side effects of the medications and want to stay on the current medical regimen.       ROS:   Multiple Sclerosis Review of Systems/ Quality Measures -    Per HPI     Histories:     Allergies:  Adhesive, Neosporin [neomycin-bacitracin-polymyxin], Neomycin, Neomycin-polymyxin, Neosporin g.u. irrigant, Penicillins, and Latex    Current Medications:  Current Outpatient Medications   Medication Instructions    COVID-19 AT-HOME TEST Kit TEST AS DIRECTED    furosemide (LASIX) 40 mg, Oral, Daily PRN    gabapentin (NEURONTIN) 300 mg, Oral, 3 times daily    glatiramer (COPAXONE, GLATOPA) 40 mg, Subcutaneous, Three times weekly    naloxone (NARCAN) 4 mg/actuation Spry 1 spray, Nasal, Once as needed    NUCYNTA  mg, Oral, 2 times daily    oxyCODONE (ROXICODONE) 5 mg, Oral, Every 6 hours PRN    pregabalin (LYRICA) 100 mg, Oral, 3 times daily    triamcinolone acetonide 0.1%  (KENALOG) 0.1 % cream 1 applicator, Topical, 4 times daily PRN    venlafaxine (EFFEXOR-XR) 37.5 mg, Oral, Daily         acetaminophen  1,000 mg Oral Once    enoxparin  40 mg Subcutaneous Q24H (prophylaxis, 1700)    potassium chloride  10 mEq Intravenous Q2H    pregabalin  150 mg Oral BID    scopolamine  1 patch Transdermal Once             Past Medical/Surgical/Family/Social History:  PMHx:   Past Medical History:   Diagnosis Date    Behavioral problem     Bulging lumbar disc     Bursitis     bilateral hip    Degenerative cervical disc     Hx of psychiatric care     Hypercholesteremia     Labral tear of hip, degenerative bilateral    MS (multiple sclerosis)     Paresthesia of both lower extremities 3/13/2022    Pneumonia     Spinal cord compression       Surgeries:   Past Surgical History:   Procedure Laterality Date    ANGIOGRAM, CORONARY, WITH LEFT HEART CATHETERIZATION Left 3/11/2022    Procedure: Angiogram, Coronary, with Left Heart Cath;  Surgeon: Elie Matamoros MD;  Location: [facility] CATH LAB;  Service: Cardiology;  Laterality: Left;    BREAST SURGERY      augmentation     SECTION, CLASSIC      HAND SURGERY      HYSTEROSCOPY      NECK SURGERY      cervical disc removeal    TUBAL LIGATION      X-STOP IMPLANTATION        Family  Hx:   Family History   Problem Relation Name Age of Onset    Cancer Father      Diabetes Father      Lung cancer Father      Diabetes Sister      COPD Mother      Drug abuse Mother      Bipolar disorder Brother      Heart disease Maternal Uncle      Hypothyroidism Maternal Grandmother        Social Hx:   Social History     Tobacco Use    Smoking status: Former     Current packs/day: 0.00     Types: Cigarettes     Quit date: 2013     Years since quitting: 10.7    Smokeless tobacco: Never   Substance Use Topics    Alcohol use: No    Drug use: Yes     Types: Benzodiazepines, Marijuana         Current Evaluation:     Vital Signs:   Vitals:    05/15/24 1308   BP:    Pulse:    Resp:  "20   Temp:         General Exam  No apparent distress    Orientation  Alert, awake, oriented to self, place, year , and identify the president as "sleepy genesis".    Memory  Recent and remote memory Not formulary tested     Language  Fluent speech. No dysarthria, was able to identify my pain , watch and badge      Cranial Nerves  PERRL, VF intact, EOMI, V1-V3 intact, symmetric facial expression, hearing grossly intact, SCM & TPZ 5/5, tongue midline, symmetric palate elevation.    Motor  Normal Bulk, normal Tone  Full Strength throughout     DTR    +2/4, symmetric  Sensory  Intact light touch throughout    Cerebellar/Gait  Normal finger to nose and heel to shin.   gait and stance deferred        LABORATORY STUDIES:  Labs:  LABS  Recent Labs   Lab 05/14/24  1016 05/14/24  1017 05/14/24  1050 05/14/24  1317 05/14/24  1640 05/15/24  0430   WBC  --   --  10.77  --   --   --    HGB  --   --  15.1  --   --   --    PLT  --   --  253  --   --   --    MCV  --   --  88  --   --   --    NA  --  139  --   --   --  137   K  --  3.5  --   --   --  2.9*   CL  --  102  --   --   --  103   CO2  --  16*  --   --   --  24   BUN  --  14  --   --   --  9   CREATININE  --  0.63  --   --   --  0.7   GLU  --  221*  --   --   --  122*   CALCIUM  --  10.0  --   --   --  9.0   PROT  --  8.9*  --   --   --  7.4   ALBUMIN  --  4.8  --   --   --  3.5   ALT  --  31  --   --   --  14   AST  --  27  --   --   --  17   ALKPHOS  --  93  --   --   --  63   MG  --   --   --   --  1.5*  --    PHOS  --   --   --   --  2.7  --    LACTATE 3.4*  --   --  1.5  --   --      Data    Cardiac Data  Recent Labs   Lab 05/14/24  1016   TROPONINI <0.012     CBC:   Lab Results   Component Value Date    WBC 10.77 05/14/2024    HGB 15.1 05/14/2024     05/14/2024     BMP:   Lab Results   Component Value Date     05/15/2024    K 2.9 (L) 05/15/2024     (H) 05/15/2024    CREATININE 0.7 05/15/2024    MG 1.5 (L) 05/14/2024    PHOS 2.7 05/14/2024     LFTs: " "  Lab Results   Component Value Date    AST 17 05/15/2024    ALKPHOS 63 05/15/2024    ALT 14 05/15/2024     DM:   Lab Results   Component Value Date    HGBA1C 5.2 05/14/2024     Thyroid:   Lab Results   Component Value Date    TSH 0.266 (L) 04/19/2024    FREET4 1.09 04/19/2024     FLP: No results found for: "CHOL", "HDL", "LDLCALC"  Anemia:   Lab Results   Component Value Date    DUOMEKSF10 191 03/23/2022    FOLATE 7.6 03/23/2022     Urinalysis:   Recent Labs     05/14/24  1108   COLORU Yellow   SPECGRAV >=1.030*   NITRITE Negative   PROTEINUA Trace*   KETONESU 3+*     RADIOLOGY STUDIES:    Imaging:   X-Ray KUB   Final Result      Nonobstructive bowel gas pattern.         Electronically signed by: Noah Meléndez   Date:    05/14/2024   Time:    22:04      CT Head Without Contrast   Final Result      No acute findings.         Electronically signed by: Jonathon Denney MD   Date:    05/14/2024   Time:    10:42      X-Ray Chest AP Portable   Final Result      1.  Vascular congestion versus reticular interstitial changes throughout the lungs.  Interstitial pulmonary edema versus interstitial infectious process must be considered.      2.  Stable findings as noted above.         Electronically signed by: Cristo Martinez MD   Date:    05/14/2024   Time:    10:25         MRI brain w/wo 4/23/2024:    Impression:     Examination mildly degraded by patient motion artifact.     Moderate supratentorial leukoencephalopathy, similar to prior exam.  This is nonspecific but could be in keeping with the clinically reported history multiple sclerosis.  No new or enhancing lesions to indicate ongoing or active demyelination.     No evidence of acute hemorrhage or infarction.    EEG   @EEG@     EKG :  Results for orders placed or performed during the hospital encounter of 05/14/24   EKG 12-lead    Collection Time: 05/14/24 10:03 AM   Result Value Ref Range    QRS Duration 74 ms    [facility] QTC Calculation 517 ms    Narrative    Test Reason : " R41.82,    Vent. Rate : 107 BPM     Atrial Rate : 107 BPM     P-R Int : 144 ms          QRS Dur : 074 ms      QT Int : 388 ms       P-R-T Axes : 075 077 065 degrees     QTc Int : 517 ms    Sinus tachycardia  Biatrial enlargement  Abnormal ECG  When compared with ECG of 23-APR-2024 16:17,  T wave inversion no longer evident in Inferior leads  T wave inversion no longer evident in Anterior-lateral leads  QT has lengthened  Confirmed by Luan SPANGLER, Chase HENRY ([id number]) on 5/14/2024 3:47:02 PM    Referred By: SHEKHAR   SELF           Confirmed By:Chase Roland MD          OTHER STUDIES/PROCEDURES:    EEG 4/2022  EEG FINDINGS  The record shows a good organization at rest, consisting of a 11Hz posterior dominant rhythm with good reactivity. There is marked bilateral beta activity.  Drowsiness is characterized by attenuation of the background, vertex waves, and bilateral theta slowing.   Provocative maneuvers including hyperventilation and photic stimulation were performed.   EKG recording shows a sinus rhythm.  There is no push button or clinical event.     IMPRESSION:  Abnormal study due to the presence of excessive beta activity such as may be seen in the setting of benzodiazepine or barbiturate use.      Assessment/Plan:  CHLOE Morrow is a 59 y.o. with a PMHx of polysubstance abuse, Multiple Sclerosis ( 8/31/15, who is currently taking Copaxone injections three times weekly), Hypercholesteremia, Takutsubo Cardiomyopathy, HFrEF (20% on echo from 3/12/22however repeat echo months later with resolution of EF at 60%. ), stress cardiomyopathy hypertension, Behavioral problem, Bulging lumbar disc, Bursitis, Degenerative cervical disc,  Labral tear of hip, degenerative (bilateral), MS (multiple sclerosis), Paresthesia of both lower extremities , Pneumonia, and Spinal cord compression and opioid dependence who is presenting to [facility] ED for acutely altered mental status x1 day. Per chart check patient had few presentations  similar to this one with extensive work up as mentioned in detailed on the HPI where she returns to baseline in few days. Upon evaluation patient was alert and awake, answered most of my questions and followed all my commands. As the point patient is improvoing with Low concern for seizure or meningitis, giving the clinical picture it fits more with opoid withdrawal.     # Acute encephalopathy   -unclear when last normal  -UDS THC (+)  -UA non-infectious appearing  -Was Started by the primary team on Vanc/CTX/Acyclovir due to concern for meningitis however no fever or increase in WBCs. Giving the improvement in mentation, we can stop the Abx.  -IR was consulted for LP, Can hold in it now giving the improvement in mentation Can re-consider LP if patient not improving to rule out meningitis ot any infectious cause.  -Can re-consider MRI brain if patient not improving   -Low Concern for active seizure, so need sEEG as of the moment   -Continue Copaxone injections and Continue vit D   -Will appreciate psych input. holding all sedating medications   -Continue f/u with Dr. Meneses for pain management of chronic neck, back, extremity pain   - Please ensure close follow up with Dr. Vincent upon discharge     Differential diagnosis was explained to the patient. All questions were answered. Patient understood and agreed to adhere to plan. Plan was communicated to the primary team.    No further acute intervention from inpatient neurology team at this time. Please call the neurology on call with any questions regarding the recommendations made for the care of this patient. Please feel free to reconsult with any worsening symptoms or new neurologic concerns. Thank you for allowing us to participate in this patient's care.     Case to be discussed with Dr. Ward      Thank you for your consult.     Keny Gavin MD   [facility]       Electronically signed by: Keny Gavin MD 5/15/2024 9:37 AM

## 2024-05-15 NOTE — AI DETERIORATION ALERT
Artificial Intelligence Notification  Faheem      Admit Date: 2024  LOS: 0  Code Status: Full Code   Date of Consult: 2024  : 1965  Age: 59 y.o.  Weight:   Wt Readings from Last 1 Encounters:   24 57.4 kg (126 lb 8.7 oz)     Sex: female  Bed: [facility] A:   MRN: [id number]  Attending Physician: Lamin Singh MD  Primary Service: Networked reference to record PCT   Time AI Alert Received: 3:20 PM  Time at Bedside: 3:30 PM             Vital Signs (Most Recent):  Temp: 98.5 °F (36.9 °C) (24)  Pulse: 107 (24)  Resp: 19 (24)  BP: (!) 150/77 (24)  SpO2: 97 % (24) Vital Signs (24h Range):  Temp:  [98.5 °F (36.9 °C)-100.1 °F (37.8 °C)] 98.5 °F (36.9 °C)  Pulse:  [102-146] 107  Resp:  [18-22] 19  SpO2:  [94 %-100 %] 97 %  BP: (131-168)/(69-96) 150/77         This encounter was triggered by an Artificial Intelligence Notification.     Artificial Intelligence alert discussed with Primary team:  Name Phillip Hickey      Evaluation: Pt acutely altered but no signs of deterioration at this time. Pt stable.     Disposition: Continue care on the floor.

## 2024-05-15 NOTE — NURSING
Rapid Response Nurse Follow-up Note     Followed up with patient for proactive rounding. Patient in bed crying, complaining of pain. Restraints in place and SAM camera. Patient is restless but responsive. Denies bladder pain or fullness. Complaining of pain to back. Recommendation monitoring voiding status, bladder scan/ in and out if retaining. May need matias placed for retention issues, have already been intermid cath X 2 overnight. Chart and labs reviewed. VSS. No acute issues at this time. Reviewed plan of care with charge Gerri SAENZ . Team will continue to follow. Please call Rapid Response RN, Dulce Mera RN with any questions or concerns at [phone]

## 2024-05-15 NOTE — PLAN OF CARE
SW met with Pt at bedside. Pt demographics confirmed. Pt independent with ADL's. Pt reports no need for HHS or DME. Pt not a dialysis or Coumadin Pt. Pt lives alone but son Jono [phone] stays with Pt on and off. Pt sister Reza [phone] will transport Pt home at D/C. No other needs identified. SW to request PCP f/u. SW to assist with any future needs.     Future Appointments   Date Time Provider Department Center   5/22/2024  3:00 PM Diana Lara MD [facility]  [facility] - Telemetry  Discharge Assessment    Primary Care Provider: Kip Jimenez MD     Discharge Assessment (most recent)       BRIEF DISCHARGE ASSESSMENT - 05/15/24 1055          Discharge Planning    Assessment Type Discharge Planning Brief Assessment (P)      Resource/Environmental Concerns none (P)      Support Systems Family members;Children (P)      Equipment Currently Used at Home cane, quad (P)      Current Living Arrangements home (P)      Patient/Family Anticipates Transition to home;home with family (P)      Patient/Family Anticipated Services at Transition none (P)      DME Needed Upon Discharge  none (P)      Discharge Plan A Home (P)      Discharge Plan B Home with family (P)                          Yanet Welch LMSW  Phone: [phone]  [facility]

## 2024-05-15 NOTE — CODE/ RAPID DOCUMENTATION
RAPID RESPONSE NURSE PROACTIVE ROUNDING NOTE       Time of Visit:     Admit Date: 2024  LOS: 0  Code Status: Full Code   Date of Visit: 2024  : 1965  Age: 59 y.o.  Sex: female  Race: White  Bed: K421/K421 A:   MRN: [id number]  Was the patient discharged from an ICU this admission? No   Was the patient discharged from a PACU within last 24 hours? No   Did the patient receive conscious sedation/general anesthesia in last 24 hours? No   Was the patient in the ED within the past 24 hours? No   Was the patient on NIPPV within the past 24 hours? No   Attending Physician: Lamin Singh MD  Primary Service: Internal Medicine   Time spent at the bedside: 30 - 45 min    SITUATION    Notified by previous RRN during handoff  Reason for alert: Altered Mental [place]of    Diagnosis: Acute encephalopathy   has a past medical history of Behavioral problem, Bulging lumbar disc, Bursitis, Degenerative cervical disc, psychiatric care, Hypercholesteremia, Labral tear of hip, degenerative, MS (multiple sclerosis), Paresthesia of both lower extremities, Pneumonia, and Spinal cord compression.    Last Vitals:  Temp: 99.9 °F (37.7 °C) (2125)  Pulse: 106 (2125)  Resp: 19 (2004)  BP: 136/75 (2125)  SpO2: (P) 100 % (2125)    24 Hour Vitals Range:  Temp:  [98.5 °F (36.9 °C)-100.1 °F (37.8 °C)]   Pulse:  [102-146]   Resp:  [18-22]   BP: (131-168)/(69-96)   SpO2:  [94 %-100 %]     Clinical Issues:     ASSESSMENT/INTERVENTIONS    -Pt appears agitated, and restless, attempting to get out of bed and crying.   -Attempts to redirect and reorient unsuccessful  -Pt in restraints, family aware and at bedside  -MD called to bedside  -ICU charge notified and came to bedside    RECOMMENDATIONS    -Team notified and called to bedside  -KUB ordered  -Bladder scan  -IN/OUT straight cath if indicated  -Lorazepam ordered 1x dose for agitation    Discussed plan of care with Bedside RN, Nela    PROVIDER  ESCALATION    Physician escalation: Yes    Orders received and case discussed with Dr. Fam Jasmine .    Disposition:Remain in room 421    FOLLOW UP    Call back the Rapid Response NurseAmy at [phone] for additional questions or concerns.

## 2024-05-15 NOTE — NURSING
Rapid Response Nurse Follow-up Note     20g PIV Placed to left forearm via ultrasound guidance. Good blood return, flushes well.

## 2024-05-15 NOTE — PLAN OF CARE
Artificial Intelligence Notification  Faheem       Admit Date: 2024  LOS: 0  Code Status: Full Code   Date of Consult: 05/15/2024  : 1965  Age: 59 y.o.  Weight:       Wt Readings from Last 1 Encounters:   24 57.4 kg (126 lb 8.7 oz)      Sex: female  Bed: [facility] A:   MRN: [id number]  Attending Physician: Lamin Singh MD  Primary Service: Networked reference to record PCT   Time AI Alert Received: 1:10am  Time at Bedside: 1:13am               Vital Signs (Most Recent):  Temp: 98.4 °F (36.9 °C) (24)  Pulse: 109 (05/15/24 0035)  Resp: 19 (24)  BP: (!) 143/88 (24)  SpO2: (!) 94 % (24) Vital Signs (24h Range):  Temp:  [98.4 °F (36.9 °C)-100.1 °F (37.8 °C)] 98.4 °F (36.9 °C)  Pulse:  [102-146] 109  Resp:  [18-22] 19  SpO2:  [94 %-100 %] 94 %  BP: (131-168)/(69-96) 143/88            This encounter was triggered by an Artificial Intelligence Notification.      Artificial Intelligence alert discussed with Primary team:  Name Nadia Jasmine        Evaluation: Patient at same acutely encephalopathic state since admit, HDS, repeat pulse improved.     Disposition: Continued Care on the floor.

## 2024-05-15 NOTE — CODE/ RAPID DOCUMENTATION
Rapid Response Nurse Follow-up Note     Followed up with patient for proactive rounding.     Pt bladder scanned. Scan shows >600 cc of urine in bladder. Team notified. OK to straight cath.  ICU Charge, and AR at bedside with primary nurse. ~600 cc of UOP from straight cath.  No acute issues at this time. Reviewed plan of care with bedside RNLele .   Team will continue to follow.  Please call Rapid Response RN, Amy Alfaro RN with any questions or concerns at [phone].

## 2024-05-15 NOTE — PLAN OF CARE
Problem: SLP  Goal: SLP Goal  Description: Short Term Goals:  1.  Pt will participate in ongoing swallow assessment to determine least restrictive diet.   2. Pt will tolerate FULL liquid diet  with no audible s/s of dysphagia and good oral clearance.       Outcome: Progressing   Patient may advance to FULL Liquids for now, remaining consistencies not tested due to pt's refusal.

## 2024-05-15 NOTE — PLAN OF CARE
Virtual Nurse note: Patient chart reviewed. Care plan and goals updated as needed.       Problem: Adult Inpatient Plan of Care  Goal: Plan of Care Review  Outcome: Progressing

## 2024-05-15 NOTE — PROGRESS NOTES
"[facility] Medicine Progress Note    Primary Team: [facility] Hospitalist Team B  Attending Physician: Lamin Singh MD  Resident: Ruperto  Intern: Johanna    Subjective:      Patient seen at bedside today. Mental status improving. Now able to explain where she is and repeat her own name. Still unclear what pain regimen she is on at home. Plan for neurology consult today.      Objective:     Physical Exam:  BP (!) 168/84 (BP Location: Right arm, Patient Position: Lying)   Pulse 99   Temp 98.1 °F (36.7 °C) (Axillary)   Resp 18   Ht 5' 5" (1.651 m)   Wt 57.4 kg (126 lb 8.7 oz)   SpO2 100%   BMI 21.06 kg/m²    General: Patient crying in bed, unclear why she is in pain. Restarted home pain, likely neuropathic in setting of   HEENT: Atraumatic, normocephalic, moist mucous membranes  Cardiovascular: Regular rate and rhythm, normal S1 and S2, no extra heart sounds or murmurs appreciated   Chest wall: Non-tender to palpation, no gross deformities noted   Back: No abnormal curvature noted, symmetric rise with respiration   Respiratory: Stable on room air, normal work of breathing  Abdominal: Non-distended, soft, normoactive bowel sounds, non-tender to palpation, no guarding  Extremities: Atraumatic, non-edematous, moving all four extremities  Pulses: 2+ and symmetric radial artery, dorsalis pedis artery  Skin: Non-diaphoretic, warm, dry  Neurologic: Unclear. Patient eyes closed. Moving all extremities      Laboratory:  Laboratory Data Reviewed: yes  Pertinent Findings:      Microbiology Data Reviewed: yes  Pertinent Findings:      Other Results:  EKG (my interpretation): sinus tachycardia    Radiology Data Reviewed: yes  Pertinent Findings:      Current Medications:     Infusions:       Scheduled:   acetaminophen  1,000 mg Oral Once    acyclovir  10 mg/kg (Ideal) Intravenous Q8H    cefTRIAXone (Rocephin) IV (PEDS and ADULTS)  2 g Intravenous Q12H    enoxparin  40 mg Subcutaneous Q24H (prophylaxis, 1700)    potassium " chloride  10 mEq Intravenous Q2H    pregabalin  150 mg Oral BID    scopolamine  1 patch Transdermal Once    vancomycin (VANCOCIN) IV (PEDS and ADULTS)  750 mg Intravenous Q12H        PRN:    Current Facility-Administered Medications:     dextrose 10%, 12.5 g, Intravenous, PRN    dextrose 10%, 25 g, Intravenous, PRN    glucagon (human recombinant), 1 mg, Intramuscular, PRN    glucose, 16 g, Oral, PRN    glucose, 24 g, Oral, PRN    naloxone, 0.02 mg, Intravenous, PRN    sodium chloride 0.9%, 10 mL, Intravenous, Q12H PRN    Pharmacy to dose Vancomycin consult, , , Once **AND** vancomycin - pharmacy to dose, , Intravenous, pharmacy to manage frequency    Antibiotics and Day Number of Therapy: Vanc/CTX/Acyclovir day #2    Assessment:     Reza Morrow is a 59 y.o. female with as mentioned above presenting with acute encephalopathy. At this time differential including but not limited to meningitis, acute HSV-encephalitis, or drug over dose. Started on CTX,Vanc, and Acyclovir and admitted to [facility] internal medicine for further work-up.      Plan:     Acute Encephalopathy  -unclear when last normal  -UDS THC (+)  -UA non-infectious appearing  -CT Head without acute stroke  Plan:  -started on Vanc/CTX/Acyclovir due to concern for meningitis; neurology consulted    -restarting low-dose roxicodne and home Lyrica 150 BID   -treponemal antibody  negative  -Bcx pending     Anion Gap Metabolic Acidosis  Lactic Acidosis  -likely in there setting of decreased PO intake, (+) ketones in urine  -improved with fluids  -repeat improved bicarb now 24     Hypomagnesemia  -magnesium 1.5  -repleted and repeat Mag pending      Multiple Sclerosis  -taking Glatiramer Acetate 40mg x3 weekly outpt but not available inpt  -also on MS Cotin, Roxicodone, and Nucynta outpt  -continuing home Lyrica 150mg BID and PRN Roxicodone 5mg q6 for break through pain.  -neurology to evaluate; appreciate further reccs     Hypercholesteremia  -no lipid panel on  record; ordered  -on rosuvastatin outpt; will hold while admitted     Diet: Full liquid diet  DVT Ppx: lov   Dispo: 72 hours pending improvement in mental status     Phillip Hickey MD  [facility] Internal Medicine -II    [facility] Medicine Hospitalist Pager numbers:   [facility] Hospitalist Medicine Team A (Alexey/Arnulfo): [phone]  [facility] Hospitalist Medicine Team B (Michael/Samantha):  [phone]

## 2024-05-16 VITALS
RESPIRATION RATE: 18 BRPM | BODY MASS INDEX: 21.09 KG/M2 | WEIGHT: 126.56 LBS | DIASTOLIC BLOOD PRESSURE: 72 MMHG | HEIGHT: 65 IN | OXYGEN SATURATION: 98 % | TEMPERATURE: 99 F | SYSTOLIC BLOOD PRESSURE: 141 MMHG | HEART RATE: 85 BPM

## 2024-05-16 PROBLEM — E87.20 ACIDOSIS: Status: ACTIVE | Noted: 2024-05-16

## 2024-05-16 PROBLEM — R33.9 URINARY RETENTION: Status: ACTIVE | Noted: 2024-05-16

## 2024-05-16 LAB
ALBUMIN SERPL BCP-MCNC: 3.5 G/DL (ref 3.5–5.2)
ALP SERPL-CCNC: 56 U/L (ref 55–135)
ALT SERPL W/O P-5'-P-CCNC: 13 U/L (ref 10–44)
ANION GAP SERPL CALC-SCNC: 10 MMOL/L (ref 8–16)
AST SERPL-CCNC: 14 U/L (ref 10–40)
BASOPHILS # BLD AUTO: 0.05 K/UL (ref 0–0.2)
BASOPHILS NFR BLD: 0.7 % (ref 0–1.9)
BILIRUB SERPL-MCNC: 1.1 MG/DL (ref 0.1–1)
BUN SERPL-MCNC: 8 MG/DL (ref 6–20)
CALCIUM SERPL-MCNC: 9.1 MG/DL (ref 8.7–10.5)
CHLORIDE SERPL-SCNC: 107 MMOL/L (ref 95–110)
CO2 SERPL-SCNC: 22 MMOL/L (ref 23–29)
CREAT SERPL-MCNC: 0.8 MG/DL (ref 0.5–1.4)
DIFFERENTIAL METHOD BLD: NORMAL
EOSINOPHIL # BLD AUTO: 0 K/UL (ref 0–0.5)
EOSINOPHIL NFR BLD: 0.3 % (ref 0–8)
ERYTHROCYTE [DISTWIDTH] IN BLOOD BY AUTOMATED COUNT: 13.6 % (ref 11.5–14.5)
EST. GFR  (NO RACE VARIABLE): >60 ML/MIN/1.73 M^2
GLUCOSE SERPL-MCNC: 93 MG/DL (ref 70–110)
HCT VFR BLD AUTO: 39 % (ref 37–48.5)
HGB BLD-MCNC: 13.5 G/DL (ref 12–16)
IMM GRANULOCYTES # BLD AUTO: 0.01 K/UL (ref 0–0.04)
IMM GRANULOCYTES NFR BLD AUTO: 0.1 % (ref 0–0.5)
LYMPHOCYTES # BLD AUTO: 1.7 K/UL (ref 1–4.8)
LYMPHOCYTES NFR BLD: 23.3 % (ref 18–48)
MAGNESIUM SERPL-MCNC: 2 MG/DL (ref 1.6–2.6)
MCH RBC QN AUTO: 30 PG (ref 27–31)
MCHC RBC AUTO-ENTMCNC: 34.6 G/DL (ref 32–36)
MCV RBC AUTO: 87 FL (ref 82–98)
MONOCYTES # BLD AUTO: 0.6 K/UL (ref 0.3–1)
MONOCYTES NFR BLD: 7.6 % (ref 4–15)
NEUTROPHILS # BLD AUTO: 4.9 K/UL (ref 1.8–7.7)
NEUTROPHILS NFR BLD: 68 % (ref 38–73)
NRBC BLD-RTO: 0 /100 WBC
PLATELET # BLD AUTO: 200 K/UL (ref 150–450)
PMV BLD AUTO: 9.9 FL (ref 9.2–12.9)
POTASSIUM SERPL-SCNC: 3.9 MMOL/L (ref 3.5–5.1)
PROT SERPL-MCNC: 7.1 G/DL (ref 6–8.4)
RBC # BLD AUTO: 4.5 M/UL (ref 4–5.4)
SODIUM SERPL-SCNC: 139 MMOL/L (ref 136–145)
T4 FREE SERPL-MCNC: 1.26 NG/DL (ref 0.71–1.51)
TSH SERPL DL<=0.005 MIU/L-ACNC: 0.35 UIU/ML (ref 0.4–4)
VANCOMYCIN TROUGH SERPL-MCNC: 15 UG/ML (ref 10–22)
WBC # BLD AUTO: 7.2 K/UL (ref 3.9–12.7)

## 2024-05-16 PROCEDURE — 97535 SELF CARE MNGMENT TRAINING: CPT

## 2024-05-16 PROCEDURE — 99223 1ST HOSP IP/OBS HIGH 75: CPT | Mod: ,,, | Performed by: PSYCHIATRY & NEUROLOGY

## 2024-05-16 PROCEDURE — 84439 ASSAY OF FREE THYROXINE: CPT

## 2024-05-16 PROCEDURE — 85025 COMPLETE CBC W/AUTO DIFF WBC: CPT

## 2024-05-16 PROCEDURE — 84443 ASSAY THYROID STIM HORMONE: CPT

## 2024-05-16 PROCEDURE — 90833 PSYTX W PT W E/M 30 MIN: CPT | Mod: ,,, | Performed by: PSYCHIATRY & NEUROLOGY

## 2024-05-16 PROCEDURE — 80053 COMPREHEN METABOLIC PANEL: CPT

## 2024-05-16 PROCEDURE — 51798 US URINE CAPACITY MEASURE: CPT

## 2024-05-16 PROCEDURE — 25000003 PHARM REV CODE 250

## 2024-05-16 PROCEDURE — 36415 COLL VENOUS BLD VENIPUNCTURE: CPT

## 2024-05-16 PROCEDURE — 92526 ORAL FUNCTION THERAPY: CPT

## 2024-05-16 PROCEDURE — 83735 ASSAY OF MAGNESIUM: CPT

## 2024-05-16 PROCEDURE — 97530 THERAPEUTIC ACTIVITIES: CPT

## 2024-05-16 PROCEDURE — 97161 PT EVAL LOW COMPLEX 20 MIN: CPT

## 2024-05-16 PROCEDURE — 97165 OT EVAL LOW COMPLEX 30 MIN: CPT

## 2024-05-16 RX ORDER — VENLAFAXINE HYDROCHLORIDE 75 MG/1
75 CAPSULE, EXTENDED RELEASE ORAL DAILY
Status: DISCONTINUED | OUTPATIENT
Start: 2024-05-16 | End: 2024-05-16 | Stop reason: HOSPADM

## 2024-05-16 RX ORDER — VENLAFAXINE HYDROCHLORIDE 75 MG/1
75 CAPSULE, EXTENDED RELEASE ORAL DAILY
Qty: 30 CAPSULE | Refills: 11 | Status: SHIPPED | OUTPATIENT
Start: 2024-05-16 | End: 2025-05-16

## 2024-05-16 RX ADMIN — VENLAFAXINE HYDROCHLORIDE 75 MG: 75 CAPSULE, EXTENDED RELEASE ORAL at 02:05

## 2024-05-16 RX ADMIN — PREGABALIN 150 MG: 75 CAPSULE ORAL at 08:05

## 2024-05-16 RX ADMIN — OXYCODONE 5 MG: 5 TABLET ORAL at 08:05

## 2024-05-16 RX ADMIN — LORAZEPAM 0.5 MG: 0.5 TABLET ORAL at 10:05

## 2024-05-16 RX ADMIN — OXYCODONE 5 MG: 5 TABLET ORAL at 02:05

## 2024-05-16 RX ADMIN — OXYCODONE 5 MG: 5 TABLET ORAL at 01:05

## 2024-05-16 NOTE — PLAN OF CARE
Problem: SLP  Goal: SLP Goal  Description: Short Term Goals:  1.  Pt will participate in ongoing swallow assessment to determine least restrictive diet.   2. Pt will tolerate FULL liquid diet  with no audible s/s of dysphagia and good oral clearance.       Outcome: Met   Diet to be advanced to regular and thin liquids. Orders updated in epic.

## 2024-05-16 NOTE — PLAN OF CARE
Problem: Occupational Therapy  Goal: Occupational Therapy Goal  Outcome: Met   Pt found in longsitting & agreeable to OT eval/tx this date.   Pt AO4 & perf the following:   -don socks via hip hike tech in longsitting w/ MI  -sup-->EOB w/ MI  -standing & fxnl mobility w/o DME around room w/ MI  -returned to longsitting w/ MI  Edu/tx re: rec shower chair, general safety techs & HEP. Pt verbalized understanding.    Per OT eval, pt w/o signif change in fxnl status from baseline & no further acute OT svcs indicated at this time. DC OT.

## 2024-05-16 NOTE — PLAN OF CARE
D/c orders noted.     Set up completed for pt to have HH services with [facility] upon discharge.     SW met with pt to discuss d/c plans. Pt is agreeable to discharge home with [facility]. Pt stated she does not want shower chair. Pt stated her sister or friend will transport her home at the time of discharge. Pt stated she is calling them now to see who can come get her.     Pt is cleared to go from CM standpoint.     Go to [facility]  Tuesday May 21, 2024  1:00pm    NOTE: Patient will need to bring ID/Insurance card, Hospital Discharge papers, Medications  PH: [phone]  This is Wallace's number.  [facility] never answers the phone.  Brian can schedule /cancel etc.  PH: [phone] [facility] [address]  [address]  [place] [address]  [phone]       Future Appointments   Date Time Provider Department Center   5/22/2024  3:00 PM Diana Lara MD [facility]  [facility]         05/16/24 8458   Final Note   Anticipated Discharge Disposition Home-Health  ([facility])   Post-Acute Status   Post-Acute Authorization Home Health;HME   HME Status Patient declined/refused   Home Health Status Set-up Complete/Auth obtained   Discharge Delays None known at this time

## 2024-05-16 NOTE — NURSING
Matias cath removed per order. Instructed to call nurse after first void post matias removal. Verbalized understanding.

## 2024-05-16 NOTE — PLAN OF CARE
Problem: Adult Inpatient Plan of Care  Goal: Plan of Care Review  Outcome: Progressing  Goal: Patient-Specific Goal (Individualized)  Outcome: Progressing  Goal: Optimal Comfort and Wellbeing  Outcome: Progressing  Goal: Readiness for Transition of Care  Outcome: Progressing     Problem: Sepsis/Septic Shock  Goal: Absence of Infection Signs and Symptoms  Outcome: Progressing     Problem: Fall Injury Risk  Goal: Absence of Fall and Fall-Related Injury  Outcome: Progressing     Problem: Comorbidity Management  Goal: Maintenance of Heart Failure Symptom Control  Outcome: Progressing  Goal: Blood Pressure in Desired Range  Outcome: Progressing

## 2024-05-16 NOTE — PT/OT/SLP EVAL
Occupational Therapy   Evaluation and Discharge Note    Name: Reza Morrow  MRN: [id number]  Admitting Diagnosis: Acute encephalopathy  Recent Surgery: * No surgery found *      Recommendations:     Discharge Recommendations: No Therapy Indicated  Discharge Equipment Recommendations: shower chair  Barriers to discharge:  None    Assessment:      Per OT eval, pt w/o signif change in fxnl status from baseline & no further acute OT svcs indicated at this time. DC OT.    Reza Morrow is a 59 y.o. female with a medical diagnosis of Acute encephalopathy. At this time, patient is functioning at their prior level of function and does not require further acute OT services.     Plan:     During this hospitalization, patient does not require further acute OT services.  Please re-consult if situation changes.    Plan of Care Reviewed with: patient    Subjective     Chief Complaint: AMS  Patient/Family Comments/goals: return home    Occupational Profile:  Living Environment: w/ 2 roommates & LALY in H w/ 0STE; t/s  Previous level of function: MI w/ PRN SPC use  Roles and Routines: standing tub showers; IADLs; cares for 2 dogs  Equipment Used at home: cane, straight  Assistance upon Discharge: LALY    Pain/Comfort:  Pain Rating 1: 0/10  Pain Rating Post-Intervention 1: 0/10    Patients cultural, spiritual, Advent conflicts given the current situation:      Objective:     Communicated with: nsg prior to session.  Patient found  longsitting  with bed alarm, peripheral IV, matias catheter upon OT entry to room.    General Precautions: Standard, fall  Orthopedic Precautions: N/A  Braces: N/A  Respiratory Status: Room air     Occupational Performance:    Bed Mobility:    Patient completed Supine to Sit with modified independence  Patient completed Sit to Supine with modified independence    Functional Mobility/Transfers:  Patient completed Sit <> Stand Transfer with modified independence  with  no assistive device   Functional  Mobility: w/o DME around room for ADLs w/ MI    Activities of Daily Living:  Lower Body Dressing: modified independence don socks    Cognitive/Visual Perceptual:  AO4    hyperverbal    Physical Exam:  BUEs WFL at 4+/5    Sit balance: G  Stand balance: G-    AMPAC 6 Click ADL:  AMPAC Total Score: 24    Treatment & Education:   Pt found in longsitting & agreeable to OT eval/tx this date.   Pt AO4 & perf the following:   -don socks via hip hike tech in longsitting w/ MI  -sup-->EOB w/ MI  -standing & fxnl mobility w/o DME around room w/ MI  -returned to longsitting w/ MI  Edu/tx re: rec shower chair, general safety techs & HEP. Pt verbalized understanding.      Patient left  longsitting  with all lines intact, call button in reach, bed alarm on, and nsg notified    GOALS:   Multidisciplinary Problems       Occupational Therapy Goals       Not on file              Multidisciplinary Problems (Resolved)          Problem: Occupational Therapy    Goal Priority Disciplines Outcome Interventions   Occupational Therapy Goal   (Resolved)     OT, PT/OT Met                        History:     Past Medical History:   Diagnosis Date    Behavioral problem     Bulging lumbar disc     Bursitis     bilateral hip    Degenerative cervical disc     Hx of psychiatric care     Hypercholesteremia     Labral tear of hip, degenerative bilateral    MS (multiple sclerosis)     Paresthesia of both lower extremities 3/13/2022    Pneumonia     Spinal cord compression          Past Surgical History:   Procedure Laterality Date    ANGIOGRAM, CORONARY, WITH LEFT HEART CATHETERIZATION Left 3/11/2022    Procedure: Angiogram, Coronary, with Left Heart Cath;  Surgeon: Elie Matamoros MD;  Location: [facility] CATH LAB;  Service: Cardiology;  Laterality: Left;    BREAST SURGERY      augmentation     SECTION, CLASSIC      HAND SURGERY      HYSTEROSCOPY      NECK SURGERY      cervical disc removeal    TUBAL LIGATION      X-STOP IMPLANTATION         Time  Tracking:     OT Date of Treatment: 05/16/24  OT Start Time: 0928  OT Stop Time: 0952  OT Total Time (min): 24 min    Billable Minutes:Evaluation 8  Self Care/Home Management 8  Therapeutic Activity 8  Total Time 24    5/16/2024

## 2024-05-16 NOTE — PT/OT/SLP PROGRESS
"Speech Language Pathology Treatment    Patient Name:  Reza Morrow   MRN:  [id number]  Admitting Diagnosis: Acute encephalopathy    Recommendations:                 General Recommendations:  Follow-up not indicated  Diet recommendations:  Regular Diet - IDDSI Level 7, Liquid Diet Level: Thin liquids - IDDSI Level 0   Aspiration Precautions:  standard precautions, whole meds    General Precautions: Standard, fall  Communication strategies:  none    Assessment:     Reza Morrow is a 59 y.o. female admitted with Acute encephalopathy who presents with timely and functional swallow.     Subjective     Pt seen in room, she is asking for something to eat.   Patient goals: "I need something to eat, I have not eaten anything.     Pain/Comfort:  Pain Rating 1: 0/10    Respiratory Status: room air     Objective:     Has the patient been evaluated by SLP for swallowing?   Yes  Keep patient NPO? No       Swallowing: Pt found in room, she is awake, alert and cooperative. Pt asking about what she can start eating.   Pt consumed water by straw, whole pudding cup and victor manuel cracker. She was able to feed herself with no issues. Pt may advance to regular diet and thin liquids. Updated team.       Goals:   Multidisciplinary Problems       SLP Goals       Not on file              Multidisciplinary Problems (Resolved)          Problem: SLP    Goal Priority Disciplines Outcome   SLP Goal   (Resolved)     SLP Met   Description: Short Term Goals:  1.  Pt will participate in ongoing swallow assessment to determine least restrictive diet.   2. Pt will tolerate FULL liquid diet  with no audible s/s of dysphagia and good oral clearance.                            Plan:     Patient to be seen:  3 x/week   Plan of Care expires:  06/13/24  Plan of Care reviewed with:  patient   SLP Follow-Up:  No       Discharge recommendations:   (home)   Barriers to Discharge:  none    Time Tracking:     SLP Treatment Date:   05/16/24  Speech Start Time:  " 0941  Speech Stop Time:  0957     Speech Total Time (min):  16 min    Billable Minutes: Treatment Swallowing Dysfunction 16    05/16/2024

## 2024-05-16 NOTE — PLAN OF CARE
[facility] - Telemetry      HOME HEALTH ORDERS  FACE TO FACE ENCOUNTER    Patient Name: Reza Morrow  YOB: 1965    PCP: Kip Jimenez MD   PCP Address: [address] [place] [address]  PCP Phone Number: [phone]  PCP Fax: [phone]    Encounter Date: 5/14/24    Admit to Home Health    Diagnoses:  Active Hospital Problems    Diagnosis  POA    *Acute encephalopathy [G93.40]  Yes    Acidosis [E87.20]  Yes    Urinary retention [R33.9]  Yes    Confusion [R41.0]  Yes    Opioid dependence [F11.20]  Yes     Chronic    Chronic pain syndrome [G89.4]  Yes     Chronic    Substance induced mood disorder [F19.94]  Yes    Arthropathy of sacroiliac joint [M47.818]  Yes    MS (multiple sclerosis) [G35]  Yes     Chronic      Resolved Hospital Problems   No resolved problems to display.       Follow Up Appointments:  Future Appointments   Date Time Provider Department Center   5/22/2024  3:00 PM Diana Lara MD [facility]  [facility]       Allergies:  Review of patient's allergies indicates:   Allergen Reactions    Adhesive Hives    Neosporin [neomycin-bacitracin-polymyxin] Hives    Neomycin     Neomycin-polymyxin Hives    Neosporin g.u. irrigant     Penicillins Other (See Comments)     Unknown  reaction    Latex Rash       Medications: Review discharge medications with patient and family and provide education.    Current Facility-Administered Medications   Medication Dose Route Frequency Provider Last Rate Last Admin    acetaminophen tablet 1,000 mg  1,000 mg Oral Once Nadia Jasmine MD        artificial tears 0.5 % ophthalmic solution 1 drop  1 drop Both Eyes TID PRN Darline Finn MD   1 drop at 05/15/24 0463    dextrose 10% bolus 125 mL 125 mL  12.5 g Intravenous PRN Phillip Hickey MD        dextrose 10% bolus 250 mL 250 mL  25 g Intravenous PRN Phillip Hickey MD        enoxaparin injection 40 mg  40 mg Subcutaneous Q24H (prophylaxis, 1700) Phillip Hickey MD   40 mg at 05/15/24  1632    glucagon (human recombinant) injection 1 mg  1 mg Intramuscular PRN Phillip Hickey MD        glucose chewable tablet 16 g  16 g Oral PRN Phillip Hickey MD        glucose chewable tablet 24 g  24 g Oral PRN Phillip Hickey MD        LORazepam tablet 0.5 mg  0.5 mg Oral Q6H PRN Kenroy Spencer MD   0.5 mg at 05/16/24 1027    naloxone 0.4 mg/mL injection 0.02 mg  0.02 mg Intravenous PRN Phillip Hickey MD        oxyCODONE immediate release tablet 5 mg  5 mg Oral Q6H PRN Phillip Hickey MD   5 mg at 05/16/24 0837    pregabalin capsule 150 mg  150 mg Oral BID Phillip Hickey MD   150 mg at 05/16/24 0837    scopolamine 1.3-1.5 mg (1 mg over 3 days) 1 patch  1 patch Transdermal Once Nadia Jasmine MD   1 patch at 05/14/24 2245    sodium chloride 0.9% flush 10 mL  10 mL Intravenous Q12H PRN Phillip Hickey MD        venlafaxine 24 hr capsule 75 mg  75 mg Oral Daily Phillip Hickey MD         Current Discharge Medication List        CONTINUE these medications which have CHANGED    Details   venlafaxine (EFFEXOR-XR) 75 MG 24 hr capsule Take 1 capsule (75 mg total) by mouth once daily.  Qty: 30 capsule, Refills: 11           CONTINUE these medications which have NOT CHANGED    Details   COVID-19 AT-HOME TEST Kit TEST AS DIRECTED      furosemide (LASIX) 40 MG tablet Take 1 tablet (40 mg total) by mouth daily as needed (for worsenign shortnes of breath, swelling, or 3lb weight gain in 1 day/5lb weight gain in 1 week).  Qty: 30 tablet, Refills: 11      glatiramer (COPAXONE, GLATOPA) 40 mg/mL injection Inject 40 mg into the skin 3 (three) times a week.      naloxone (NARCAN) 4 mg/actuation Spry 1 spray by Nasal route once as needed.      NUCYNTA  mg Tb12 Take 100 mg by mouth 2 (two) times a day.      oxyCODONE (ROXICODONE) 30 MG Tab Take 5 mg by mouth every 6 (six) hours as needed.      pregabalin (LYRICA) 100 MG capsule Take 100 mg by mouth 3 (three) times daily.      triamcinolone acetonide 0.1% (KENALOG) 0.1 % cream Apply 1  applicator topically 4 (four) times daily as needed.           STOP taking these medications       gabapentin (NEURONTIN) 300 MG capsule Comments:   Reason for Stopping:                 I have seen and examined this patient within the last 30 days. My clinical findings that support the need for the home health skilled services and home bound status are the following:no   Patient with medication mismanagement issues requiring home bound status as evidenced by  Poor understanding of medication regimen/dosage and heavy pain management medication dosages.     Diet:   cardiac diet and fluid consistency diet  thin    Labs:  Report Lab results to PCP.    Referrals/ Consults  None    Activities:   activity as tolerated    Nursing:   Agency to admit patient within 24 hours of hospital discharge unless specified on physician order or at patient request    SN to complete comprehensive assessment including routine vital signs. Instruct on disease process and s/s of complications to report to MD. Review/verify medication list sent home with the patient at time of discharge  and instruct patient/caregiver as needed. Frequency may be adjusted depending on start of care date.     Skilled nurse to perform up to 3 visits PRN for symptoms related to diagnosis    Notify MD if SBP > 160 or < 90; DBP > 90 or < 50; HR > 120 or < 50; Temp > 101; O2 < 88%; Other:   Altered mental status or excessive somnolence    Ok to schedule additional visits based on staff availability and patient request on consecutive days within the home health episode.    When multiple disciplines ordered:    Start of Care occurs on Sunday - Wednesday schedule remaining discipline evaluations as ordered on separate consecutive days following the start of care.    Thursday SOC -schedule subsequent evaluations Friday and Monday the following week.     Friday - Saturday SOC - schedule subsequent discipline evaluations on consecutive days starting Monday of the  following week.    For all post-discharge communication and subsequent orders please contact patient's primary care physician.    Miscellaneous   None    Home Health Aide:  Nursing Weekly    Wound Care Orders  no    I certify that this patient is confined to her home and needs intermittent skilled nursing care.    Shana Reid M.D.

## 2024-05-16 NOTE — PROGRESS NOTES
RAPID RESPONSE NURSE PROACTIVE ROUNDING NOTE       LUCIANO 20g peripheral IV placed via ultrasound. Blood returned and flushed without difficulty.    FOLLOW UP    Call back the Rapid Response NurseNeil at [phone] for additional questions or concerns.

## 2024-05-16 NOTE — CONSULTS
PSYCHIATRY INPATIENT CONSULT NOTE      5/16/2024 9:41 AM   Reza Morrow   1965   [id number]           DATE OF ADMISSION: 5/14/2024 10:02 AM    SITE: [facility]    CURRENT LEGAL STATUS: Patient does not currently meet PEC criteria due to not currently being an imminent threat to self/others and not being gravely disabled 2/2 mental illness at this time.     HISTORY    Per Initial History from Primary Team:  Reza Morrow is a 59 y.o. with a PMHx of polysubstance abuse, Multiple Sclerosis, Hypercholesteremia, Takutsubo Cardiomyopathy, and hypertension who is presenting to [facility] ED for acutely altered mental status x1 day.  Pt was seen at bedside and unable to provide any useful history. Patient withdraws from pain but otherwise not making any meaningful movements. Unable to answer questions appropriately, simply moaning and making meaningless sounds.   Had extended discussion however with sister who was able to provide further history. Explained that she last saw her sister on Saturday. At that time patient was in her normal state of health and doing well. She helped her get her house cleaned up and patient was doing ok. On Mother's Day patient again spoke with her sister, at which time she still felt well besides having some nausea. This was the last time her sister heard from Ms. Cobb before her current presentation for acute encephalopathy. Patient does take multiple opiods at home for her MS inclusing MS contin 30mg , Roxicodne PRN, and Nucynta.   Interval History from Primary Team on 05/15/2024:  Patient seen at bedside today. Mental status improving. Now able to explain where she is and repeat her own name. Still unclear what pain regimen she is on at home. Plan for neurology consult today.   Per Neurology Team on 05/15/2024:  Reza Morrow is a 59 y.o. with a PMHx of polysubstance abuse, Multiple Sclerosis ( 8/31/15, who is currently taking Copaxone injections three times weekly), Hypercholesteremia,  Takutsubo Cardiomyopathy, HFrEF (20% on echo from 3/12/22however repeat echo months later with resolution of EF at 60%. ), stress cardiomyopathy hypertension, Behavioral problem, Bulging lumbar disc, Bursitis, Degenerative cervical disc,  Labral tear of hip, degenerative (bilateral), MS (multiple sclerosis), Paresthesia of both lower extremities , Pneumonia, and Spinal cord compression and opioid dependence who is presenting to [facility] ED for acutely altered mental status x1 day. Per chart check patient had few presentations similar to this one with extensive work up as mentioned in detailed on the HPI where she returns to baseline in few days. Upon evaluation patient was alert and awake, answered most of my questions and followed all my commands. As the point patient is improvoing with Low concern for seizure or meningitis, giving the clinical picture it fits more with opoid withdrawal.   # Acute encephalopathy   -unclear when last normal  -UDS THC (+)  -UA non-infectious appearing  -Was Started by the primary team on Vanc/CTX/Acyclovir due to concern for meningitis however no fever or increase in WBCs. Giving the improvement in mentation, we can stop the Abx.  -IR was consulted for LP, Can hold in it now giving the improvement in mentation Can re-consider LP if patient not improving to rule out meningitis ot any infectious cause.  -Can re-consider MRI brain if patient not improving   -Low Concern for active seizure, so need sEEG as of the moment   -Continue Copaxone injections and Continue vit D   -Will appreciate psych input. holding all sedating medications   -Continue f/u with Dr. Meneses for pain management of chronic neck, back, extremity pain   - Please ensure close follow up with Dr. Vincent upon discharge       Chief Complaint / Reason for Psychiatry Consult: Visual Hallucinations       HPI:   Reza Morrow is a 59 y.o. female with a past medical history as noted above/below and a past psychiatric history of  ANYA, Depression, Insomnia, Opiate Dependence, Polypharmacy, and Delirium, currently being treated by her inpatient primary team for a principle problem of acute encephalopathy.  Psychiatry was originally consulted as noted above.  The patient was seen and examined.  The chart was reviewed.  On examination today, the patient was alert and oriented to person, place, city, state, month, year, and situation.  She was CAM-ICU negative for delirium.  She appears back near her neurocognitive baseline.  This patient is known to me from prior admission(s).  She endorses a multi-year history of opiate dependence on Oxycodone (30 mg TID to QID per [place] review ; patient states that she only takes 2-3 pills daily ; UDS negative for opiates ; possible diversion?) as well as use of flexeril, baclofen, tizanidine, robaxin, hydroxyzine, gabapentin vs lyrica, and a special pain cream that contains Ketamine and Elavil (patient encouraged to discontinue the use of this cream and reduce polypharmacy of deliriogenic meds).  She endorses chronic B hip and back pain as well as pain issues related to her MS diagnosis.  She endorses prior failed trials of Suboxone for pain.  Despite counseling, education, and motivational interviewing, she denies any desire for buprenorphine pain management.  She does endorses a multi-year hx of symptoms consistent with ANYA as well as intermittent depression symptoms (see detailed psych ROS below for current / recent symptomatology).  She endorses getting about 6 hours of sleep per night on average with intermittent insomnia.  She endorses a fair appetite.  She denies any current or recent s/s consistent with lucita, psychosis, panic, OCD, or PTSD.  She denies AH, VH, TH, delusions, paranoia, or DIGFAST (lucita) s/s.  She denies any current/recent passive/active SI/HI.  She denies any adverse effects to her current medication regimen.  Regarding current medical/physical complaints, she endorses fatigue and  chronic B hip and back pain.  She denies any other medical complaints at this time.  NAD was observed during the examination.  Psychotherapy was implemented as noted below with a focus on improving anxiety, depression, sleep, and opiate cessation / total sobriety / polypharmacy reduction.  See detailed psych ROS below.  See A/P below.       Psychiatric Review Of Systems - Currently, the patient is endorsing and/or denying the following:  (patient's endorsements are BOLDED below; if not BOLDED, then patient denied):     Endorses intermittent Symptoms of Depression: diminished mood, low motivation, loss of interest/anhedonia, irritability, diminished energy, change in sleep, change in appetite, diminished concentration or cognition or indecisiveness, PMA/R, excessive guilt or hopelessness or worthlessness, suicidal ideations     Endorses intermittent issues with Sleep: initiation, maintenance, early morning awakening with inability to return to sleep     Denies Suicidal/Homicidal ideations: active/passive ideations, organized plans, future intentions     Denies Symptoms of psychosis: hallucinations, delusions, disorganized thinking, disorganized behavior or abnormal motor behavior, or negative symptoms (diminshed emotional expression, avolition, anhedonia, alogia, asociality)      Denies Symptoms of lucita or hypomania: elevated, expansive, or irritable mood with increased energy or activity; with inflated self-esteem or grandiosity, decreased need for sleep, increased rate of speech, FOI or racing thoughts, distractibility, increased goal directed activity or PMA, risky/disinhibited behavior     Endorses Symptoms of Anxiety / ANYA: excessive anxiety/worry/fear, more days than not, about numerous issues, difficult to control, with restlessness, fatigue, poor concentration, irritability, muscle tension, sleep disturbance; causes functionally impairing distress      Denies Symptoms of Panic Disorder: recurrent panic  attacks, precipitated or un-precipitated, source of worry and/or behavioral changes secondary; with or without agoraphobia     Denies Symptoms of PTSD: h/o trauma; re-experiencing/intrusive symptoms, avoidant behavior, negative alterations in cognition or mood, or hyperarousal symptoms; with or without dissociative symptoms      Denies Symptoms of OCD: obsessions or compulsions      Denies Symptoms of Eating Disorders: anorexia, bulimia or binging     Denies Substance Use: intoxication, withdrawal, tolerance, used in larger amounts or duration than intended, unsuccessful attempts to limit or quit, increased time engaging in or seeking out, cravings or strong desire to use, failure to fulfill obligations, negative consequences in social/interpersonal/occupational,/recreational areas, use in dangerous situations, medical or psychological consequences         [facility] Toolkit ASQ Suicide Screening Tool:  In the past few weeks, have you wished you were dead? Denies   In the past few weeks, have you felt that you or your family would be better off if you were dead? Denies  In the past week, have you been having thoughts about killing yourself? Denies  Have you ever tried to kill yourself? Denies  Are you having thoughts of killing yourself right now? Denies         PSYCHOTHERAPY ADD-ON +[id number]   30 (16-37*) minutes     Time: 21 minutes  Participants: Met with patient     Therapeutic Intervention Type: behavior modifying psychotherapy, supportive psychotherapy  Why chosen therapy is appropriate versus another modality: relevant to diagnosis, patient responds to this modality, evidence based practice     Target symptoms: anxiety, depression, insomnia, and opiate dependence / polypharmacy   Primary focus: improving anxiety, depression, sleep, and opiate cessation / total sobriety / polypharmacy reduction   Psychotherapeutic techniques: supportive and psychodynamic techniques; psycho-education; deep breathing exercises;  motivational interviewing; reality / insight orientation; CBT; problem solving techniques and managing life & medical stressors     Outcome monitoring methods: self-report, observation     Patient's response to intervention:  The patient's response to intervention is accepting.     Progress toward goals:  The patient's progress toward goals is fair.        ROS:  General ROS: negative for - chills, fever or night sweats; positive for fatigue  Ophthalmic ROS: negative for - blurry vision, double vision or eye pain  ENT ROS: negative for - sinus pain, headaches, sore throat or visual changes  Allergy and Immunology ROS: negative for - hives, itchy/watery eyes or nasal congestion  Hematological and Lymphatic ROS: negative for - bleeding problems, bruising, jaundice or pallor  Endocrine ROS: negative for - galactorrhea, hot flashes, mood swings, palpitations or temperature intolerance  Respiratory ROS: negative for - cough, hemoptysis, shortness of breath, tachypnea or wheezing  Cardiovascular ROS: negative for - chest pain, dyspnea on exertion, loss of consciousness, palpitations, rapid heart rate or shortness of breath  Gastrointestinal ROS: negative for - appetite loss, nausea, abdominal pain, blood in stools, change in bowel habits, constipation or diarrhea  Genito-Urinary ROS: negative for - incontinence, nocturia or pelvic pain  Musculoskeletal ROS: negative for - joint stiffness, joint swelling, or muscle pain ; positive for chronic B hip / back pain   Neurological ROS: negative for - behavioral changes, confusion, dizziness, memory loss, numbness/tingling or seizures  Dermatological ROS: negative for dry skin, hair changes, pruritus or rash  Psychiatric ROS: see detailed psychiatric ROS above in history section         Past Psychiatric History:  Previous Diagnoses: ANYA, Depression, Insomnia, Opiate Dependence, and Delirium   Previous Medication Trials: Lexapro, Buspar, and Effexor XR   Previous Psychiatric  Hospitalizations: once around  for prolonged delirium with behavioral disturbances / psychosis     Previous Suicide Attempts: denies  History of Violence: denies  Outpatient Psychiatrist: denies      Social History:  Marital Status: single  Children: 2 (one  son in 2019 and one current living adult son)  Employment Status/Info: on disability for MS  Education: college  Special Ed: denies  : denies  Mu-ism: Sikhism   Housing Status: lives with her sister in law in [place], [place]   Hobbies/Leisure time: watching TV and making jewelry   History of phys/sexual abuse: denies  Access to gun: denies      Family Psychiatric History: both sons with opiate addiction      Substance Abuse History (with focus the past 12 months):  Recreational Drugs: takes medical THC daily ; has been rx opiate dependent for several years ; denies other   Use of Alcohol: denies  Rehab History: denies    Tobacco Use: see below (counseled on continued cessation)   Tobacco Use History   Social History           Tobacco Use   Smoking Status Former    Current packs/day: 0.00    Types: Cigarettes    Quit date: 2013    Years since quitting: 10.7   Smokeless Tobacco Never      Use of Caffeine: denies  Use of OTC: denies  Legal consequences of chemical use: prior DWI     Legal History:  Past Charges/Incarcerations: as noted above  Pending charges: denies       Psychosocial Stressors: health, death of family member, and polypharmacy  Functioning Relationships: good support system in sister in law   Strengths AND Liabilities: Strength: Patient accepts guidance/feedback, Strength: Patient is expressive/articulate., Strength: Patient is motivated for change., Liability: Patient has poor health., Liability: Patient lacks coping skills.      PAST MEDICAL & SURGICAL HISTORY   Past Medical History:   Diagnosis Date    Behavioral problem     Bulging lumbar disc     Bursitis     bilateral hip    Degenerative cervical disc     Hx of  psychiatric care     Hypercholesteremia     Labral tear of hip, degenerative bilateral    MS (multiple sclerosis)     Paresthesia of both lower extremities 3/13/2022    Pneumonia     Spinal cord compression      Past Surgical History:   Procedure Laterality Date    ANGIOGRAM, CORONARY, WITH LEFT HEART CATHETERIZATION Left 3/11/2022    Procedure: Angiogram, Coronary, with Left Heart Cath;  Surgeon: Elie Matamoros MD;  Location: [facility] CATH LAB;  Service: Cardiology;  Laterality: Left;    BREAST SURGERY      augmentation     SECTION, CLASSIC      HAND SURGERY      HYSTEROSCOPY      NECK SURGERY      cervical disc removeal    TUBAL LIGATION      X-STOP IMPLANTATION       NEUROLOGIC HISTORY  Seizures: denies   Head trauma with LOC: denies    CVA: denies   MS: yes     FAMILY HISTORY   Family History   Problem Relation Name Age of Onset    Cancer Father      Diabetes Father      Lung cancer Father      Diabetes Sister      COPD Mother      Drug abuse Mother      Bipolar disorder Brother      Heart disease Maternal Uncle      Hypothyroidism Maternal Grandmother         ALLERGIES   Review of patient's allergies indicates:   Allergen Reactions    Adhesive Hives    Neosporin [neomycin-bacitracin-polymyxin] Hives    Neomycin     Neomycin-polymyxin Hives    Neosporin g.u. irrigant     Penicillins Other (See Comments)     Unknown  reaction    Latex Rash       CURRENT MEDICATION REGIMEN   Home Meds:   Prior to Admission medications    Medication Sig Start Date End Date Taking? Authorizing Provider   COVID-19 AT-HOME TEST Kit TEST AS DIRECTED 3/5/24  Yes Provider, Historical   furosemide (LASIX) 40 MG tablet Take 1 tablet (40 mg total) by mouth daily as needed (for worsenign shortnes of breath, swelling, or 3lb weight gain in 1 day/5lb weight gain in 1 week). 23  Christine Skaggs, TWAN   gabapentin (NEURONTIN) 300 MG capsule Take 1 capsule (300 mg total) by mouth 3 (three) times daily. 23   Neel Gr  MD Jose   glatiramer (COPAXONE, GLATOPA) 40 mg/mL injection Inject 40 mg into the skin 3 (three) times a week.    Provider, Historical   naloxone (NARCAN) 4 mg/actuation Spry 1 spray by Nasal route once as needed. 11/28/23   Provider, Historical   NUCYNTA  mg Tb12 Take 100 mg by mouth 2 (two) times a day. 7/3/23   Provider, Historical   oxyCODONE (ROXICODONE) 30 MG Tab Take 5 mg by mouth every 6 (six) hours as needed.    Provider, Historical   pregabalin (LYRICA) 100 MG capsule Take 100 mg by mouth 3 (three) times daily. 5/7/24   Provider, Historical   triamcinolone acetonide 0.1% (KENALOG) 0.1 % cream Apply 1 applicator topically 4 (four) times daily as needed.    Provider, Historical   venlafaxine (EFFEXOR-XR) 37.5 MG 24 hr capsule Take 1 capsule (37.5 mg total) by mouth once daily. 4/25/24 4/25/25  Sheila Burnett MD   atorvastatin (LIPITOR) 20 MG tablet Take 20 mg by mouth once daily.  1/15/16  Provider, Historical   EScitalopram oxalate (LEXAPRO) 10 MG tablet Take 10 mg by mouth once daily. 2/15/22 4/13/22  Provider, Historical       OTC Meds: denies     Scheduled Meds:    acetaminophen  1,000 mg Oral Once    enoxparin  40 mg Subcutaneous Q24H (prophylaxis, 1700)    pregabalin  150 mg Oral BID    scopolamine  1 patch Transdermal Once      PRN Meds:   Current Facility-Administered Medications:     artificial tears, 1 drop, Both Eyes, TID PRN    dextrose 10%, 12.5 g, Intravenous, PRN    dextrose 10%, 25 g, Intravenous, PRN    glucagon (human recombinant), 1 mg, Intramuscular, PRN    glucose, 16 g, Oral, PRN    glucose, 24 g, Oral, PRN    LORazepam, 0.5 mg, Oral, Q6H PRN    naloxone, 0.02 mg, Intravenous, PRN    oxyCODONE, 5 mg, Oral, Q6H PRN    sodium chloride 0.9%, 10 mL, Intravenous, Q12H PRN   Psychotherapeutics (From admission, onward)      Start     Stop Route Frequency Ordered    05/15/24 2031  LORazepam tablet 0.5 mg         -- Oral Every 6 hours PRN 05/15/24 1932            LABORATORY DATA    Recent Results (from the past 72 hour(s))   POCT glucose    Collection Time: 05/14/24 10:00 AM   Result Value Ref Range    POCT Glucose 207 (H) 70 - 110 mg/dL   EKG 12-lead    Collection Time: 05/14/24 10:03 AM   Result Value Ref Range    QRS Duration 74 ms    [facility] QTC Calculation 517 ms   Blood culture x two cultures. Draw prior to antibiotics.    Collection Time: 05/14/24 10:16 AM    Specimen: Peripheral, Antecubital, Right; Blood   Result Value Ref Range    Blood Culture, Routine No Growth to date     Blood Culture, Routine No Growth to date    Lactic acid, plasma #1    Collection Time: 05/14/24 10:16 AM   Result Value Ref Range    Lactate (Lactic Acid) 3.4 (H) 0.5 - 2.2 mmol/L   Troponin I    Collection Time: 05/14/24 10:16 AM   Result Value Ref Range    Troponin I <0.012 0.012 - 0.034 ng/mL   Blood culture x two cultures. Draw prior to antibiotics.    Collection Time: 05/14/24 10:17 AM    Specimen: Peripheral, Antecubital, Right; Blood   Result Value Ref Range    Blood Culture, Routine No Growth to date     Blood Culture, Routine No Growth to date    Comprehensive metabolic panel    Collection Time: 05/14/24 10:17 AM   Result Value Ref Range    Sodium 139 136 - 145 mmol/L    Potassium 3.5 3.5 - 5.1 mmol/L    Chloride 102 95 - 110 mmol/L    CO2 16 (L) 23 - 29 mmol/L    Glucose 221 (H) 70 - 110 mg/dL    BUN 14 7 - 17 mg/dL    Creatinine 0.63 0.50 - 1.40 mg/dL    Calcium 10.0 8.7 - 10.5 mg/dL    Total Protein 8.9 (H) 6.0 - 8.4 g/dL    Albumin 4.8 3.5 - 5.2 g/dL    Total Bilirubin 1.9 (H) 0.1 - 1.0 mg/dL    Alkaline Phosphatase 93 38 - 126 U/L    AST 27 15 - 46 U/L    ALT 31 10 - 44 U/L    Anion Gap 21 (H) 8 - 16 mmol/L    eGFR >60.0 >60 mL/min/1.73 m^2   Ethanol    Collection Time: 05/14/24 10:48 AM   Result Value Ref Range    Alcohol, Serum <10 <10 mg/dL   CBC auto differential    Collection Time: 05/14/24 10:50 AM   Result Value Ref Range    WBC 10.77 3.90 - 12.70 K/uL    RBC 5.02 4.00 - 5.40 M/uL    Hemoglobin  15.1 12.0 - 16.0 g/dL    Hematocrit 44.0 37.0 - 48.5 %    MCV 88 82 - 98 fL    MCH 30.1 27.0 - 31.0 pg    MCHC 34.3 32.0 - 36.0 g/dL    RDW 13.0 11.5 - 14.5 %    Platelets 253 150 - 450 K/uL    MPV 9.9 9.2 - 12.9 fL    Immature Granulocytes 0.2 0.0 - 0.5 %    Gran # (ANC) 10.1 (H) 1.8 - 7.7 K/uL    Immature Grans (Abs) 0.02 0.00 - 0.04 K/uL    Lymph # 0.4 (L) 1.0 - 4.8 K/uL    Mono # 0.2 (L) 0.3 - 1.0 K/uL    Eos # 0.0 0.0 - 0.5 K/uL    Baso # 0.01 0.00 - 0.20 K/uL    nRBC 0 0 /100 WBC    Gran % 93.8 (H) 38.0 - 73.0 %    Lymph % 3.9 (L) 18.0 - 48.0 %    Mono % 1.9 (L) 4.0 - 15.0 %    Eosinophil % 0.1 0.0 - 8.0 %    Basophil % 0.1 0.0 - 1.9 %    Differential Method Automated    Urinalysis, Reflex to Urine Culture Urine, Catheterized    Collection Time: 05/14/24 11:08 AM    Specimen: Urine   Result Value Ref Range    Specimen UA Urine, Clean Catch     Color, UA Yellow Yellow, Straw, Tianna    Appearance, UA Clear Clear    pH, UA 6.0 5.0 - 8.0    Specific Gravity, UA >=1.030 (A) 1.005 - 1.030    Protein, UA Trace (A) Negative    Glucose, UA 1+ (A) Negative    Ketones, UA 3+ (A) Negative    Bilirubin (UA) 1+ (A) Negative    Occult Blood UA Trace (A) Negative    Nitrite, UA Negative Negative    Urobilinogen, UA Negative <2.0 EU/dL    Leukocytes, UA Negative Negative   Drug screen panel, emergency    Collection Time: 05/14/24 11:08 AM   Result Value Ref Range    Benzodiazepines Negative Negative    Methadone metabolites Negative Negative    Cocaine (Metab.) Negative Negative    Opiate Scrn, Ur Negative Negative    Barbiturate Screen, Ur Negative Negative    Amphetamine Screen, Ur Negative Negative    THC Presumptive Positive (A) Negative    Phencyclidine Negative Negative    Creatinine, Urine 71.3 15.0 - 325.0 mg/dL    Toxicology Information SEE COMMENT    Lactic acid, plasma #2    Collection Time: 05/14/24  1:17 PM   Result Value Ref Range    Lactate (Lactic Acid) 1.5 0.5 - 2.2 mmol/L   Magnesium    Collection Time:  05/14/24  4:40 PM   Result Value Ref Range    Magnesium 1.5 (L) 1.6 - 2.6 mg/dL   Phosphorus    Collection Time: 05/14/24  4:40 PM   Result Value Ref Range    Phosphorus 2.7 2.7 - 4.5 mg/dL   Treponema Pallidium Antibodies IgG, IgM    Collection Time: 05/14/24  4:40 PM   Result Value Ref Range    Treponema Pallidum Antibodies (IgG, IgM) Nonreactive Nonreactive   Hemoglobin A1c    Collection Time: 05/14/24  4:40 PM   Result Value Ref Range    Hemoglobin A1C 5.2 4.0 - 5.6 %    Estimated Avg Glucose 103 68 - 131 mg/dL   POCT Venous Blood Gas    Collection Time: 05/14/24  5:09 PM   Result Value Ref Range    POC PH 7.422 7.350 - 7.450    POC PCO2 39.9 35.0 - 45.0 mmHg    POC PO2 20.7 (LL) 40.0 - 60.0 mmHg    POC HEMOGLOBIN 14.6 9.0 - 18.0 g/dL    POC SATURATED O2 37.8 (LL) 95.0 - 100.0 %    POC Potassium 5.5 (H) 3.5 - 5.1 mmol/L    POC Sodium 141 136 - 145 mmol/L    POC Ionized Calcium 1.03 (L) 1.06 - 1.42 mmol/L    POC Lactate 1.5 0.5 - 2.2 mmol/L    POC Hematocrit 44.7 36.0 - 54.0 %    POC HCO3 26.0 24.0 - 28.0 mmol/l    Base Deficit 1.5 -2.0 - 2.0 mmol/l    Specimen source Venous     Performed By: [email or web address]     Allens Test NA     FiO2 21.0 %   Comprehensive Metabolic Panel (CMP)    Collection Time: 05/15/24  4:30 AM   Result Value Ref Range    Sodium 137 136 - 145 mmol/L    Potassium 2.9 (L) 3.5 - 5.1 mmol/L    Chloride 103 95 - 110 mmol/L    CO2 24 23 - 29 mmol/L    Glucose 122 (H) 70 - 110 mg/dL    BUN 9 6 - 20 mg/dL    Creatinine 0.7 0.5 - 1.4 mg/dL    Calcium 9.0 8.7 - 10.5 mg/dL    Total Protein 7.4 6.0 - 8.4 g/dL    Albumin 3.5 3.5 - 5.2 g/dL    Total Bilirubin 1.0 0.1 - 1.0 mg/dL    Alkaline Phosphatase 63 55 - 135 U/L    AST 17 10 - 40 U/L    ALT 14 10 - 44 U/L    eGFR >60 >60 mL/min/1.73 m^2    Anion Gap 10 8 - 16 mmol/L   Renal function panel    Collection Time: 05/15/24  8:49 PM   Result Value Ref Range    Glucose 89 70 - 110 mg/dL    Sodium 138 136 - 145 mmol/L    Potassium 3.5 3.5 - 5.1 mmol/L    Chloride 105  95 - 110 mmol/L    CO2 23 23 - 29 mmol/L    BUN 8 6 - 20 mg/dL    Calcium 9.0 8.7 - 10.5 mg/dL    Creatinine 0.8 0.5 - 1.4 mg/dL    Albumin 3.4 (L) 3.5 - 5.2 g/dL    Phosphorus 2.1 (L) 2.7 - 4.5 mg/dL    eGFR >60 >60 mL/min/1.73 m^2    Anion Gap 10 8 - 16 mmol/L   VANCOMYCIN, TROUGH    Collection Time: 05/15/24 11:37 PM   Result Value Ref Range    Vancomycin-Trough 15.0 10.0 - 22.0 ug/mL   CBC auto differential    Collection Time: 05/16/24  2:45 AM   Result Value Ref Range    WBC 7.20 3.90 - 12.70 K/uL    RBC 4.50 4.00 - 5.40 M/uL    Hemoglobin 13.5 12.0 - 16.0 g/dL    Hematocrit 39.0 37.0 - 48.5 %    MCV 87 82 - 98 fL    MCH 30.0 27.0 - 31.0 pg    MCHC 34.6 32.0 - 36.0 g/dL    RDW 13.6 11.5 - 14.5 %    Platelets 200 150 - 450 K/uL    MPV 9.9 9.2 - 12.9 fL    Immature Granulocytes 0.1 0.0 - 0.5 %    Gran # (ANC) 4.9 1.8 - 7.7 K/uL    Immature Grans (Abs) 0.01 0.00 - 0.04 K/uL    Lymph # 1.7 1.0 - 4.8 K/uL    Mono # 0.6 0.3 - 1.0 K/uL    Eos # 0.0 0.0 - 0.5 K/uL    Baso # 0.05 0.00 - 0.20 K/uL    nRBC 0 0 /100 WBC    Gran % 68.0 38.0 - 73.0 %    Lymph % 23.3 18.0 - 48.0 %    Mono % 7.6 4.0 - 15.0 %    Eosinophil % 0.3 0.0 - 8.0 %    Basophil % 0.7 0.0 - 1.9 %    Differential Method Automated    Magnesium    Collection Time: 05/16/24  2:45 AM   Result Value Ref Range    Magnesium 2.0 1.6 - 2.6 mg/dL   Comprehensive Metabolic Panel (CMP)    Collection Time: 05/16/24  5:30 AM   Result Value Ref Range    Sodium 139 136 - 145 mmol/L    Potassium 3.9 3.5 - 5.1 mmol/L    Chloride 107 95 - 110 mmol/L    CO2 22 (L) 23 - 29 mmol/L    Glucose 93 70 - 110 mg/dL    BUN 8 6 - 20 mg/dL    Creatinine 0.8 0.5 - 1.4 mg/dL    Calcium 9.1 8.7 - 10.5 mg/dL    Total Protein 7.1 6.0 - 8.4 g/dL    Albumin 3.5 3.5 - 5.2 g/dL    Total Bilirubin 1.1 (H) 0.1 - 1.0 mg/dL    Alkaline Phosphatase 56 55 - 135 U/L    AST 14 10 - 40 U/L    ALT 13 10 - 44 U/L    eGFR >60 >60 mL/min/1.73 m^2    Anion Gap 10 8 - 16 mmol/L   TSH    Collection Time:  "05/16/24  5:30 AM   Result Value Ref Range    TSH 0.354 (L) 0.400 - 4.000 uIU/mL   T4, Free    Collection Time: 05/16/24  5:30 AM   Result Value Ref Range    Free T4 1.26 0.71 - 1.51 ng/dL      No results found for: "PHENYTOIN", "PHENOBARB", "VALPROATE", "CBMZ"      EXAMINATION    VITALS   Vitals:    05/16/24 0331 05/16/24 0432 05/16/24 0801 05/16/24 0837   BP:  127/67 (!) 144/88    BP Location:   Left arm    Patient Position:  Lying Sitting    Pulse: 72 74 90    Resp:  18 18 18   Temp:  99.2 °F (37.3 °C) 99.4 °F (37.4 °C)    TempSrc:  Oral Oral    SpO2:  97% 98%    Weight:       Height:          CONSTITUTIONAL  General Appearance: NAD, unremarkable, age appropriate, normal weight, sitting up in bed, calm     MUSCULOSKELETAL  Muscle Strength and Tone: WNL    Abnormal Involuntary Movements: none observed   Gait and Station: Attempted but unable to assess due to medical acuity      PSYCHIATRIC   Behavior/Cooperation:  friendly and cooperative, eye contact normal, calm  Speech:  normal tone, increased rate, normal pitch, normal volume  Language: grossly intact, able to name, able to repeat with spontaneous speech  Mood: "doing OK"  Affect:  congruent with mood ; full / reactive   Associations: intact; no ANDREE  Thought Process: Linear / Circumferential   Thought Content: denies SI, HI, AH, VH, TH, delusions, or paranoia (no RIS observed)   Sensorium: Awake  Alert and Oriented: to person, place, city, state, month, year, and situation   Memory: 3/3 immediate, 3/3 at 5 minutes               Recent: Intact; able to report recent events              Remote: Intact; Named 4/4 past presidents   Attention/concentration: Intact. Appropriate for age/education. Able to spell w-o-r-l-d & d-l-r-o-w.   Similarities: Intact (difference between apple and orange?)  Abstract reasoning: Intact  Fund of Knowledge: Named 4/4 past presidents   Insight: Intact  Judgment: Intact     CAM ICU Delirium Assessment - NEGATIVE     Is the patient " aware of the biomedical complications associated with substance abuse and mental illness? yes        MEDICAL DECISION MAKING     ASSESSMENT         Delirium due to Medical Condition (acute metabolic encephalopathy & polypharmacy) with Behavioral Disturbance (improving / resolving)   Opiate Dependence   Generalized Anxiety Disorder   Unspecified Depressive Disorder   Unspecified Insomnia         RECOMMENDATIONS       - With reasonable medical certainty, based on a present-state examination, the patient does not currently meet PEC criteria due to not being an imminent threat to self/others and not being gravely disabled 2/2 mental illness at this time.       - Begin / Restart Effexor XR 75 mg PO daily thereafter for anxiety / depression / pain (discussed risks/benefits/alt vs no treatment with patient).     - Can use Trazodone 50 mg PO QHS PRN for insomnia (discussed risks/benefits/alt vs no treatment with patient).     - Encouraged reduction of polypharmacy of multiple potential deliriogenic medications (patient plans to work on this with her outpatient pain management doctor and outpatient neurologist) (discussed risks/benefits/alt vs no treatment with patient).     - Informed the patient of the risks of continuous Benzodiazepine use including but not limited to falls, CNS depression, respiratory depression, reduced cognition, tolerance, dependence and withdrawals that may be life threatening upon abrupt cessation. Also advised not to take Benzodiazepines with Opiates or other sedatives and also not to drive or operate heavy machinery while using Benzodiazepines or Opiates.   Advised patient not to take Opiates with other sedating medications such as antihistamines, anticholinergics, hypnotics, THC, muscle relaxants, etc.     Implement / Continue the below DELIRIUM BEHAVIOR MANAGEMENT:  - Minimize use of restraints; if physical restraints necessary, please utilize medical/chemical prns for agitation.  - Keep shades  "open and room lit during day and room dim at night in order to promote healthy circadian rhythms.  - Encourage family at bedside.  - Keep whiteboard in patient's room current with the date and name of the members of patient's team for easy patient self re-orientation.  - Avoid benzodiazepines, antihistamines, anticholinergics, hypnotics, and minimize opiates while controlling for pain as these medications may exacerbate delirium.      - Psychotherapy was performed with patient as noted above with a focus on improving anxiety, depression, sleep, and opiate cessation / total sobriety / polypharmacy reduction.     - Patient's most recent resulted labs, imaging, and EKG were reviewed today ; UDS positive for THC (unclear why she is opiate negative ; [place] review is positive for recent Oxycodone refill ; possible diversion?) ; Ethanol < 10 ; Free T4 wnl ; CT Head with "No acute findings."      - Please have CM/SW assist patient with asap outpatient mental health f/u for s/p discharge from this facility (patient's preference is [facility] in [place], [place]).     - Patient was instructed to call 911 and/or [phone] and return to the nearest ED if she begins feeling suicidal, homicidal, or gravely disabled (for s/p discharge from this facility).     - Thank you for this consult ; will continue to follow patient while at [facility]         Total time spent with patient and/or managing/coordinating patient's care today (excluding the time spent on psychotherapy): 78 minutes   Time spent on psychotherapy today (as noted above): 21 minutes   Total time for encounter today including psychotherapy: 99 minutes      More than 50% of the time was spent counseling/coordinating care.     Consulting clinician was informed of the encounter and consult note.       STAFF:  Hunter Nguyen MD  [facility]  5/16/2024  "

## 2024-05-16 NOTE — PLAN OF CARE
Problem: Physical Therapy  Goal: Physical Therapy Goal  Outcome: Adequate for Care Transition   Patient evaluated by PT; pt A&Ox 4; pt exhibits Luh bed mobility, transition to stand and amb without AD; patient owns SPC which she uses as needed; no significant change from baseline of function; pt to continue with activity as tolerated; no further acute PT services needed; pt would benefit from use of shower chair.

## 2024-05-16 NOTE — PROGRESS NOTES
RAPID RESPONSE NURSE PROACTIVE ROUNDING NOTE       Previous IV placed out. Inserted another LUCIANO 22g via ultrasound. Blood returned and flushed without difficulty.    FOLLOW UP    Call back the Rapid Response NurseNeil at [phone] for additional questions or concerns.

## 2024-05-16 NOTE — PROGRESS NOTES
"[facility] Medicine Progress Note    Primary Team: \[facility]\"" Hospitalist Team B  Attending Physician: Lamin Singh MD  Resident: Ruperto  Intern: Johanna    Subjective:      Patient seen at bedside today. Mental status now at baseline. Pt met with psychiatry and urology. Current presentation likely due to polypharmacy. Had discussion with patient and she understands need to decrease her current pain regimen.      Objective:     Physical Exam:  /86   Pulse 84   Temp 99.7 °F (37.6 °C) (Oral)   Resp 18   Ht 5' 5" (1.651 m)   Wt 57.4 kg (126 lb 8.7 oz)   SpO2 96%   BMI 21.06 kg/m²    General: Sitting comfortably in bed  HEENT: Atraumatic, normocephalic, moist mucous membranes  Cardiovascular: Regular rate and rhythm, normal S1 and S2, no extra heart sounds or murmurs appreciated   Chest wall: Non-tender to palpation, no gross deformities noted   Back: No abnormal curvature noted, symmetric rise with respiration   Respiratory: Stable on room air, normal work of breathing  Abdominal: Non-distended, soft, normoactive bowel sounds, non-tender to palpation, no guarding  Extremities: Atraumatic, non-edematous, moving all four extremities  Pulses: 2+ and symmetric radial artery, dorsalis pedis artery  Skin: Non-diaphoretic, warm, dry  Neurologic: Unclear. Patient eyes closed. Moving all extremities    Laboratory:  Laboratory Data Reviewed: yes  Pertinent Findings:    Microbiology Data Reviewed: yes  Pertinent Findings:    Other Results:  EKG (my interpretation): sinus tachycardia    Radiology Data Reviewed: yes  Pertinent Findings:    Current Medications:     Infusions:       Scheduled:   acetaminophen  1,000 mg Oral Once    enoxparin  40 mg Subcutaneous Q24H (prophylaxis, 1700)    pregabalin  150 mg Oral BID    scopolamine  1 patch Transdermal Once        PRN:    Current Facility-Administered Medications:     artificial tears, 1 drop, Both Eyes, TID PRN    dextrose 10%, 12.5 g, Intravenous, PRN    dextrose 10%, " 25 g, Intravenous, PRN    glucagon (human recombinant), 1 mg, Intramuscular, PRN    glucose, 16 g, Oral, PRN    glucose, 24 g, Oral, PRN    LORazepam, 0.5 mg, Oral, Q6H PRN    naloxone, 0.02 mg, Intravenous, PRN    oxyCODONE, 5 mg, Oral, Q6H PRN    sodium chloride 0.9%, 10 mL, Intravenous, Q12H PRN    Antibiotics and Day Number of Therapy: Vanc/CTX/Acyclovir day #2    Assessment:     Reza Morrow is a 59 y.o. female with as mentioned above presenting with acute encephalopathy. At this time differential including but not limited to meningitis, acute HSV-encephalitis, or drug over dose. Started on CTX,Vanc, and Acyclovir and admitted to [facility] internal medicine for further work-up.      Plan:     Acute Encephalopathy 2/2 polypharmacy  -unclear when last normal  -UDS THC (+)  -UA non-infectious appearing  -CT Head without acute stroke  -meningitis coverage discontinues  Plan:  -restarting low-dose roxicodne 5mg q6 PRN and home Lyrica 150 BID   -treponemal antibody  negative  -had extended discussion with patient. Do not agree with current outpt pain regimen and recommend decreasing on discharge      Acute Urinary Retention  -urology consulted for urinary retention. Now attempting void trial. If patient fails today and requires in/out cath x3 will replace matias and discharge today vs tomorrow     Anion Gap Metabolic Acidosis  Lactic Acidosis  -likely in there setting of decreased PO intake, (+) ketones in urine  -improved with fluids     Hypomagnesemia  -magnesium 1.5  -repleted and repeat Mag pending      Multiple Sclerosis  -taking Glatiramer Acetate 40mg x3 weekly outpt but not available inpt  -also on MS Cotin, Roxicodone, and Nucynta outpt  -continuing home Lyrica 150mg BID and PRN Roxicodone 5mg q6 for break through pain.  -neurology to evaluate; recommend continuing current Glatimer 3x weekly and outpt follow-up with Dr. Vincent     Hypercholesteremia  -no lipid panel on record; ordered  -on rosuvastatin outpt;  will hold while admitted     Diet: Full liquid diet  DVT Ppx: lov   Dispo: 72 hours pending improvement in mental status     Phillip Hickey MD  [facility] Internal Medicine HO-II    [facility] Medicine Hospitalist Pager numbers:   [facility] Hospitalist Medicine Team A (Alexey/Arnulfo): [phone]  [facility] Hospitalist Medicine Team B (Michael/Samantha):  [phone]

## 2024-05-16 NOTE — PROGRESS NOTES
Virtual Nurse:Discharge orders noted; additional clinical references attached.  and pharmacy tech notified.  Patient's discharge instruction packet given by bedside RN.    Cued into patient's room.  Permission received per patient to turn camera to view patient.  Introduced as VN that will be instructing on discharge instructions.  Educated patient on reason for admission; medications to hold, continue, and start, appointment to follow-up with doctor, and when to return to ED. Teach back method used. Verbalized understanding  Number given for 24/7 Nurse Line. Opportunity given for questions and questions answered.  Bedside nurse updated.        05/16/24 1651   Shift   Virtual Nurse - Patient Verbalized Approval Of VN Rounding;Camera Use   Type of Frequent Check   Type Patient Rounds   Safety/Activity   Patient Rounds visualized patient

## 2024-05-16 NOTE — PT/OT/SLP EVAL
Physical Therapy Evaluation and Discharge    Patient Name:  Reza Morrow   MRN:  [id number]    Recommendations:     Discharge Recommendations: No Therapy Indicated   Discharge Equipment Recommendations: shower chair   Barriers to discharge: None    Assessment:     Reza Morrow is a 59 y.o. female admitted with a medical diagnosis of Acute encephalopathy. Patient evaluated by PT; pt A&Ox 4; pt exhibits Luh bed mobility, transition to stand and amb without AD; patient owns SPC which she uses as needed; no significant change from baseline of function; pt to continue with activity as tolerated; no further acute PT services needed; pt would benefit from use of shower chair.     Recent Surgery: * No surgery found *      Plan:     During this hospitalization, patient to be seen   to address the identified rehab impairments via   and progress toward the following goals:    Plan of Care Expires:  05/16/24    Subjective     Chief Complaint: Feeling fine  Patient/Family Comments/goals: return home  Pain/Comfort:  Pain Rating 1: 0/10  Pain Rating Post-Intervention 1: 0/10    Patients cultural, spiritual, Adventist conflicts given the current situation: no    Living Environment:  w/ 2 roommates & [facility] in [facility] w/ 0STE; t/s  Previous level of function: MI w/ PRN SPC use  Roles and Routines: standing tub showers; IADLs; cares for 2 dogs  Equipment Used at home: cane, straight  Assistance upon Discharge: LALY    Objective:     Communicated with nurse prior to session.  Patient found HOB elevated with bed alarm, peripheral IV, matias catheter  upon PT entry to room.    General Precautions: Standard, fall  Orthopedic Precautions:N/A   Braces: N/A  Respiratory Status: Room air    Exams:  Cognitive Exam:  Patient is oriented to Person, Place, Time, Situation, and follows one and two step commands  Fine Motor Coordination:    -       Intact  RLE heel shin, LLE heel shin, and Rapid alternating ankle DF/PF; decreased excursion with LLE 2/2  hip fl weakness (chronic)  Gross Motor Coordination:  WFL  Postural Exam:  Patient presented with the following abnormalities:    -       Rounded shoulders  -       Forward head  Sensation: Pt endorses chronic numbness B feet  RLE ROM: WFL  RLE Strength: WFL~4 to 4+  LLE ROM: WFL~4 to 4+  LLE Strength: WFL except hip fl~3+    Functional Mobility:  Bed Mobility:     Supine to Sit: modified independence  Sit to Supine: modified independence  Transfers:     Sit to Stand:  modified independence with no AD  Gait: Patient amb Luh 200ft in calderón without AD and no LOB; no LOB with back stepping, side stepping 180* turns, head turns, quick stops  Balance: sit~good, stand/amb~good- to good      AM-PAC 6 CLICK MOBILITY  Total Score:24       Treatment & Education:  Pt educated in purpose of PT/POC; encouraged to keep mobile while in hospital.    Patient left  HOB elevated and pt sitting with legs crossed   with all lines intact, call button in reach, bed alarm on, and nurse notified.    GOALS:   Multidisciplinary Problems       Physical Therapy Goals          Problem: Physical Therapy    Goal Priority Disciplines Outcome Goal Variances Interventions   Physical Therapy Goal     PT, PT/OT Adequate for Care Transition                         History:     Past Medical History:   Diagnosis Date    Behavioral problem     Bulging lumbar disc     Bursitis     bilateral hip    Degenerative cervical disc     Hx of psychiatric care     Hypercholesteremia     Labral tear of hip, degenerative bilateral    MS (multiple sclerosis)     Paresthesia of both lower extremities 3/13/2022    Pneumonia     Spinal cord compression        Past Surgical History:   Procedure Laterality Date    ANGIOGRAM, CORONARY, WITH LEFT HEART CATHETERIZATION Left 3/11/2022    Procedure: Angiogram, Coronary, with Left Heart Cath;  Surgeon: Elie Matamoros MD;  Location: [facility] CATH LAB;  Service: Cardiology;  Laterality: Left;    BREAST SURGERY      augmentation      SECTION, CLASSIC      HAND SURGERY      HYSTEROSCOPY      NECK SURGERY      cervical disc removeal    TUBAL LIGATION      X-STOP IMPLANTATION         Time Tracking:     PT Received On: 24  PT Start Time: 1059     PT Stop Time: 1117  PT Total Time (min): 18 min     Billable Minutes: Evaluation 18      2024

## 2024-05-17 NOTE — DISCHARGE SUMMARY
[facility] Hospital Medicine Discharge Summary    Primary Team: [facility] Hospitalist Team B  Attending Physician: Dr. Singh  Resident: Dr. Hickey  Intern: Dr. Spencer    Date of Admit: 5/14/2024  Date of Discharge: 5/17/2024    Discharge to: Home/Self-Care  Condition: Stable    Discharge Diagnoses     Patient Active Problem List   Diagnosis    Arthropathy of sacroiliac joint    Substance induced mood disorder    Opioid dependence    Benzodiazepine dependence    Open scalp wound    NSTEMI (non-ST elevated myocardial infarction)    Demand ischemia    Confusion    Acute systolic heart failure    Paresthesia of both lower extremities    Carpal tunnel syndrome, bilateral    Chronic pain syndrome    MS (multiple sclerosis)    Postmenopausal bleeding    Vaginal atrophy    Unspecified inflammatory spondylopathy, sacral and sacrococcygeal region    Psychosis    Hypertension    Catatonia    Acute encephalopathy    HFrEF (heart failure with reduced ejection fraction)    Hypotension    Takotsubo cardiomyopathy    Raynaud's phenomenon    Wheeze    SOB (shortness of breath)    Anxiety    Abnormal CT scan, lung    Acute metabolic encephalopathy    Hypomagnesemia    Hypokalemia    Tachycardia    Accidental overdose    Opioid overdose    Acidosis    Urinary retention       Consultants and Procedures     Consultants:  Psychiatry  Nuerology    Procedures:   None    Brief History of Present Illness      Reza Morrow is a 59 y.o. with a PMHx of polysubstance abuse, Multiple Sclerosis, Hypercholesteremia, Takutsubo Cardiomyopathy, and hypertension who is presenting to [facility] ED for acutely altered mental status x1 day.     Pt was seen at bedside and unable to provide any useful history. Patient withdraws from pain but otherwise not making any meaningful movements. Unable to answer questions appropriately, simply moaning and making meaningless sounds.      Had extended discussion however with sister who was able to provide further history.  Explained that she last saw her sister on Saturday. At that time patient was in her normal state of health and doing well. She helped her get her house cleaned up and patient was doing ok. On Mother's Day patient again spoke with her sister, at which time she still felt well besides having some nausea. This was the last time her sister heard from Ms. Cobb before her current presentation for acute encephalopathy. Patient does take multiple opiods at home for her MS inclusing MS contin 30mg , Roxicodne PRN, and Nucynta.     For the full HPI please refer to the History & Physical from this admission.    Hospital Course By Problem with Pertinent Findings     Acute Encephalopathy 2/2 polypharmacy  -unclear when last normal  -UDS THC (+)  -UA non-infectious appearing  -CT Head without acute stroke  -meningitis coverage discontinues  Plan:  -restarting low-dose roxicodne 5mg q6 PRN and home Lyrica 150 BID and recommend this regimen for discharge as patient tolerated this level of pain medication well  -treponemal antibody  negative  -had extended discussion with patient. Do not agree with current outpt pain regimen and recommend decreasing on discharge. Will defer adjusting pain regimen to outpt Neurologist and pain medicine doctor      Acute Urinary Retention  -urology consulted for urinary retention. Now attempting void trial. If patient fails today and requires in/out cath x3 will replace matias and discharge today vs tomorrow   -improved prior to discharge      Anion Gap Metabolic Acidosis  Lactic Acidosis  -likely in there setting of decreased PO intake, (+) ketones in urine  -improved with fluids     Hypomagnesemia  -magnesium 1.5  -repleted     Multiple Sclerosis  -taking Glatiramer Acetate 40mg x3 weekly outpt but not available inpt  -also on MS Cotin, Roxicodone, and Nucynta outpt  -continuing home Lyrica 150mg BID and PRN Roxicodone 5mg q6 for break through pain.  -neurology to evaluate; recommend continuing  current Glatimer 3x weekly and outpt follow-up with Dr. Vincent     Hypercholesteremia  -no lipid panel on record; ordered  -on rosuvastatin outpt; will hold while admitted      Discharge Medications        Medication List        CHANGE how you take these medications      venlafaxine 75 MG 24 hr capsule  Commonly known as: EFFEXOR-XR  Take 1 capsule (75 mg total) by mouth once daily.  What changed:   medication strength  how much to take            CONTINUE taking these medications      COVID-19 AT-HOME TEST Kit  Generic drug: COVID-19 antigen test     furosemide 40 MG tablet  Commonly known as: LASIX  Take 1 tablet (40 mg total) by mouth daily as needed (for worsenign shortnes of breath, swelling, or 3lb weight gain in 1 day/5lb weight gain in 1 week).     glatiramer 40 mg/mL injection  Commonly known as: COPAXONE, GLATOPA     naloxone 4 mg/actuation Spry  Commonly known as: NARCAN     NUCYNTA  mg Tb12  Generic drug: tapentadoL     oxyCODONE 30 MG Tab  Commonly known as: ROXICODONE     pregabalin 100 MG capsule  Commonly known as: LYRICA     triamcinolone acetonide 0.1% 0.1 % cream  Commonly known as: KENALOG            STOP taking these medications      gabapentin 300 MG capsule  Commonly known as: NEURONTIN               Where to Get Your Medications        These medications were sent to [facility]  [address], [place] [address]      Hours: Mon-Fri, 8a-5:30p Phone: [phone]   venlafaxine 75 MG 24 hr capsule          Discharge Information:   Diet:  Regular    Physical Activity:  As tolerated             Instructions:  1. Take all medications as prescribed  2. Keep all follow-up appointments  3. Return to the hospital or call your primary care physicians if any worsening symptoms such as fever, chest pain, shortness of breath, return of symptoms, or any other concerns.    Follow-Up Appointments:  Dr. Vincent  PCP    Phillip Hickey MD  [facility] Internal Medicine -II  05/17/2024 7:58 AM

## 2024-05-19 LAB
BACTERIA BLD CULT: NORMAL
BACTERIA BLD CULT: NORMAL

## 2024-05-20 ENCOUNTER — TELEPHONE (OUTPATIENT)
Dept: CARDIOLOGY | Facility: CLINIC | Age: 59
End: 2024-05-20
Payer: COMMERCIAL

## 2024-05-20 NOTE — TELEPHONE ENCOUNTER
I called patient x3 to inform her about her May 22nd appointment. Lvm letting the patient know she was out of network and will be charge for her visit. Patient need to contact her insurance provider for coverage.

## 2024-05-22 ENCOUNTER — HOSPITAL ENCOUNTER (INPATIENT)
Facility: HOSPITAL | Age: 59
LOS: 3 days | Discharge: HOME | DRG: 896 | End: 2024-05-26
Attending: EMERGENCY MEDICINE | Admitting: STUDENT IN AN ORGANIZED HEALTH CARE EDUCATION/TRAINING PROGRAM
Payer: COMMERCIAL

## 2024-05-22 DIAGNOSIS — T40.2X1D OPIOID OVERDOSE, ACCIDENTAL OR UNINTENTIONAL, SUBSEQUENT ENCOUNTER: ICD-10-CM

## 2024-05-22 DIAGNOSIS — G35 MS (MULTIPLE SCLEROSIS): Chronic | ICD-10-CM

## 2024-05-22 DIAGNOSIS — G89.4 CHRONIC PAIN SYNDROME: Chronic | ICD-10-CM

## 2024-05-22 DIAGNOSIS — R07.9 CHEST PAIN: ICD-10-CM

## 2024-05-22 DIAGNOSIS — R41.82 ALTERED MENTAL STATUS: ICD-10-CM

## 2024-05-22 DIAGNOSIS — G93.40 ACUTE ENCEPHALOPATHY: Primary | ICD-10-CM

## 2024-05-22 LAB
ALBUMIN SERPL BCP-MCNC: 3.9 G/DL (ref 3.5–5.2)
ALP SERPL-CCNC: 76 U/L (ref 55–135)
ALT SERPL W/O P-5'-P-CCNC: 12 U/L (ref 10–44)
AMMONIA PLAS-SCNC: 39 UMOL/L (ref 10–50)
ANION GAP SERPL CALC-SCNC: 13 MMOL/L (ref 8–16)
AST SERPL-CCNC: 16 U/L (ref 10–40)
BASOPHILS # BLD AUTO: 0.01 K/UL (ref 0–0.2)
BASOPHILS NFR BLD: 0.1 % (ref 0–1.9)
BILIRUB SERPL-MCNC: 1.2 MG/DL (ref 0.1–1)
BNP SERPL-MCNC: 272 PG/ML (ref 0–99)
BUN SERPL-MCNC: 16 MG/DL (ref 6–20)
CALCIUM SERPL-MCNC: 10 MG/DL (ref 8.7–10.5)
CHLORIDE SERPL-SCNC: 108 MMOL/L (ref 95–110)
CK SERPL-CCNC: 109 U/L (ref 20–180)
CO2 SERPL-SCNC: 19 MMOL/L (ref 23–29)
CREAT SERPL-MCNC: 1.1 MG/DL (ref 0.5–1.4)
DIFFERENTIAL METHOD BLD: ABNORMAL
EOSINOPHIL # BLD AUTO: 0 K/UL (ref 0–0.5)
EOSINOPHIL NFR BLD: 0.2 % (ref 0–8)
ERYTHROCYTE [DISTWIDTH] IN BLOOD BY AUTOMATED COUNT: 13.6 % (ref 11.5–14.5)
EST. GFR  (NO RACE VARIABLE): 58.2 ML/MIN/1.73 M^2
ETHANOL SERPL-MCNC: <10 MG/DL
GLUCOSE SERPL-MCNC: 150 MG/DL (ref 70–110)
HCT VFR BLD AUTO: 47.6 % (ref 37–48.5)
HGB BLD-MCNC: 16.5 G/DL (ref 12–16)
IMM GRANULOCYTES # BLD AUTO: 0.11 K/UL (ref 0–0.04)
IMM GRANULOCYTES NFR BLD AUTO: 0.8 % (ref 0–0.5)
LYMPHOCYTES # BLD AUTO: 1.1 K/UL (ref 1–4.8)
LYMPHOCYTES NFR BLD: 7.6 % (ref 18–48)
MCH RBC QN AUTO: 30.2 PG (ref 27–31)
MCHC RBC AUTO-ENTMCNC: 34.7 G/DL (ref 32–36)
MCV RBC AUTO: 87 FL (ref 82–98)
MONOCYTES # BLD AUTO: 1.3 K/UL (ref 0.3–1)
MONOCYTES NFR BLD: 8.9 % (ref 4–15)
NEUTROPHILS # BLD AUTO: 11.6 K/UL (ref 1.8–7.7)
NEUTROPHILS NFR BLD: 82.4 % (ref 38–73)
NRBC BLD-RTO: 0 /100 WBC
PLATELET # BLD AUTO: 339 K/UL (ref 150–450)
PMV BLD AUTO: 10.6 FL (ref 9.2–12.9)
POTASSIUM SERPL-SCNC: 3.4 MMOL/L (ref 3.5–5.1)
PROT SERPL-MCNC: 8.4 G/DL (ref 6–8.4)
RBC # BLD AUTO: 5.46 M/UL (ref 4–5.4)
SODIUM SERPL-SCNC: 140 MMOL/L (ref 136–145)
TROPONIN I SERPL DL<=0.01 NG/ML-MCNC: 0.03 NG/ML (ref 0–0.03)
WBC # BLD AUTO: 14.13 K/UL (ref 3.9–12.7)

## 2024-05-22 PROCEDURE — 93010 ELECTROCARDIOGRAM REPORT: CPT | Mod: ,,, | Performed by: INTERNAL MEDICINE

## 2024-05-22 PROCEDURE — 85025 COMPLETE CBC W/AUTO DIFF WBC: CPT

## 2024-05-22 PROCEDURE — 82140 ASSAY OF AMMONIA: CPT

## 2024-05-22 PROCEDURE — 84484 ASSAY OF TROPONIN QUANT: CPT

## 2024-05-22 PROCEDURE — 87040 BLOOD CULTURE FOR BACTERIA: CPT | Mod: 59

## 2024-05-22 PROCEDURE — 96374 THER/PROPH/DIAG INJ IV PUSH: CPT

## 2024-05-22 PROCEDURE — 63600175 PHARM REV CODE 636 W HCPCS

## 2024-05-22 PROCEDURE — 25000003 PHARM REV CODE 250

## 2024-05-22 PROCEDURE — 93005 ELECTROCARDIOGRAM TRACING: CPT

## 2024-05-22 PROCEDURE — 82077 ASSAY SPEC XCP UR&BREATH IA: CPT

## 2024-05-22 PROCEDURE — 83880 ASSAY OF NATRIURETIC PEPTIDE: CPT

## 2024-05-22 PROCEDURE — 80053 COMPREHEN METABOLIC PANEL: CPT

## 2024-05-22 PROCEDURE — 82550 ASSAY OF CK (CPK): CPT

## 2024-05-22 PROCEDURE — 99285 EMERGENCY DEPT VISIT HI MDM: CPT | Mod: 25

## 2024-05-22 PROCEDURE — 96361 HYDRATE IV INFUSION ADD-ON: CPT

## 2024-05-22 RX ORDER — LORAZEPAM 2 MG/ML
1 INJECTION INTRAMUSCULAR
Status: COMPLETED | OUTPATIENT
Start: 2024-05-22 | End: 2024-05-22

## 2024-05-22 RX ADMIN — LORAZEPAM 1 MG: 2 INJECTION INTRAMUSCULAR; INTRAVENOUS at 09:05

## 2024-05-22 RX ADMIN — SODIUM CHLORIDE 1000 ML: 9 INJECTION, SOLUTION INTRAVENOUS at 09:05

## 2024-05-22 NOTE — Clinical Note
Diagnosis: Altered mental status [780.97.ICD-9-CM]   Future Attending Provider: DO GIL III [[id number]]   Is the patient being sent to ED Observation?: No   Special Needs:: No Special Needs [1]

## 2024-05-23 PROBLEM — R41.82 AMS (ALTERED MENTAL STATUS): Status: ACTIVE | Noted: 2024-05-23

## 2024-05-23 PROBLEM — R45.1 AGITATION: Status: ACTIVE | Noted: 2024-05-23

## 2024-05-23 PROBLEM — F19.939 DRUG WITHDRAWAL: Status: ACTIVE | Noted: 2024-05-23

## 2024-05-23 LAB
ALBUMIN SERPL BCP-MCNC: 2.7 G/DL (ref 3.5–5.2)
ALBUMIN SERPL BCP-MCNC: 3.6 G/DL (ref 3.5–5.2)
ALLENS TEST: ABNORMAL
ALLENS TEST: NORMAL
ALP SERPL-CCNC: 48 U/L (ref 55–135)
ALP SERPL-CCNC: 68 U/L (ref 55–135)
ALT SERPL W/O P-5'-P-CCNC: 10 U/L (ref 10–44)
ALT SERPL W/O P-5'-P-CCNC: 12 U/L (ref 10–44)
AMPHET+METHAMPHET UR QL: NEGATIVE
ANION GAP SERPL CALC-SCNC: 7 MMOL/L (ref 8–16)
ANION GAP SERPL CALC-SCNC: 7 MMOL/L (ref 8–16)
ANION GAP SERPL CALC-SCNC: 8 MMOL/L (ref 8–16)
ANION GAP SERPL CALC-SCNC: 9 MMOL/L (ref 8–16)
AST SERPL-CCNC: 12 U/L (ref 10–40)
AST SERPL-CCNC: 15 U/L (ref 10–40)
BACTERIA #/AREA URNS AUTO: NORMAL /HPF
BARBITURATES UR QL SCN>200 NG/ML: NEGATIVE
BASOPHILS # BLD AUTO: 0.03 K/UL (ref 0–0.2)
BASOPHILS NFR BLD: 0.2 % (ref 0–1.9)
BENZODIAZ UR QL SCN>200 NG/ML: NEGATIVE
BILIRUB SERPL-MCNC: 0.9 MG/DL (ref 0.1–1)
BILIRUB SERPL-MCNC: 1.1 MG/DL (ref 0.1–1)
BILIRUB UR QL STRIP: NEGATIVE
BUN SERPL-MCNC: 19 MG/DL (ref 6–20)
BUN SERPL-MCNC: 19 MG/DL (ref 6–20)
BUN SERPL-MCNC: 20 MG/DL (ref 6–20)
BUN SERPL-MCNC: 21 MG/DL (ref 6–20)
BZE UR QL SCN: NEGATIVE
CALCIUM SERPL-MCNC: 7.2 MG/DL (ref 8.7–10.5)
CALCIUM SERPL-MCNC: 7.3 MG/DL (ref 8.7–10.5)
CALCIUM SERPL-MCNC: 8.8 MG/DL (ref 8.7–10.5)
CALCIUM SERPL-MCNC: 9.4 MG/DL (ref 8.7–10.5)
CANNABINOIDS UR QL SCN: ABNORMAL
CHLORIDE SERPL-SCNC: 107 MMOL/L (ref 95–110)
CHLORIDE SERPL-SCNC: 110 MMOL/L (ref 95–110)
CHLORIDE SERPL-SCNC: 116 MMOL/L (ref 95–110)
CHLORIDE SERPL-SCNC: 116 MMOL/L (ref 95–110)
CLARITY UR REFRACT.AUTO: CLEAR
CO2 SERPL-SCNC: 20 MMOL/L (ref 23–29)
CO2 SERPL-SCNC: 21 MMOL/L (ref 23–29)
CO2 SERPL-SCNC: 21 MMOL/L (ref 23–29)
CO2 SERPL-SCNC: 25 MMOL/L (ref 23–29)
COLOR UR AUTO: YELLOW
CREAT SERPL-MCNC: 0.6 MG/DL (ref 0.5–1.4)
CREAT SERPL-MCNC: 0.6 MG/DL (ref 0.5–1.4)
CREAT SERPL-MCNC: 0.9 MG/DL (ref 0.5–1.4)
CREAT SERPL-MCNC: 1 MG/DL (ref 0.5–1.4)
CREAT UR-MCNC: 182 MG/DL (ref 15–325)
CREAT UR-MCNC: 182 MG/DL (ref 15–325)
DIFFERENTIAL METHOD BLD: ABNORMAL
EOSINOPHIL # BLD AUTO: 0 K/UL (ref 0–0.5)
EOSINOPHIL NFR BLD: 0 % (ref 0–8)
ERYTHROCYTE [DISTWIDTH] IN BLOOD BY AUTOMATED COUNT: 14.1 % (ref 11.5–14.5)
EST. GFR  (NO RACE VARIABLE): >60 ML/MIN/1.73 M^2
FENTANYL UR QL SCN: NEGATIVE
GLUCOSE SERPL-MCNC: 105 MG/DL (ref 70–110)
GLUCOSE SERPL-MCNC: 122 MG/DL (ref 70–110)
GLUCOSE SERPL-MCNC: 82 MG/DL (ref 70–110)
GLUCOSE SERPL-MCNC: 83 MG/DL (ref 70–110)
GLUCOSE UR QL STRIP: ABNORMAL
HCO3 UR-SCNC: 18.7 MMOL/L (ref 24–28)
HCT VFR BLD AUTO: 43.7 % (ref 37–48.5)
HGB BLD-MCNC: 14.6 G/DL (ref 12–16)
HGB UR QL STRIP: ABNORMAL
HYALINE CASTS UR QL AUTO: 0 /LPF
IMM GRANULOCYTES # BLD AUTO: 0.06 K/UL (ref 0–0.04)
IMM GRANULOCYTES NFR BLD AUTO: 0.5 % (ref 0–0.5)
KETONES UR QL STRIP: ABNORMAL
LDH SERPL L TO P-CCNC: 1.36 MMOL/L (ref 0.5–2.2)
LEUKOCYTE ESTERASE UR QL STRIP: NEGATIVE
LYMPHOCYTES # BLD AUTO: 1.4 K/UL (ref 1–4.8)
LYMPHOCYTES NFR BLD: 11.7 % (ref 18–48)
MAGNESIUM SERPL-MCNC: 1.8 MG/DL (ref 1.6–2.6)
MCH RBC QN AUTO: 29.4 PG (ref 27–31)
MCHC RBC AUTO-ENTMCNC: 33.4 G/DL (ref 32–36)
MCV RBC AUTO: 88 FL (ref 82–98)
METHADONE UR QL SCN>300 NG/ML: NEGATIVE
MICROSCOPIC COMMENT: NORMAL
MONOCYTES # BLD AUTO: 1.2 K/UL (ref 0.3–1)
MONOCYTES NFR BLD: 10 % (ref 4–15)
NEUTROPHILS # BLD AUTO: 9.6 K/UL (ref 1.8–7.7)
NEUTROPHILS NFR BLD: 77.6 % (ref 38–73)
NITRITE UR QL STRIP: NEGATIVE
NRBC BLD-RTO: 0 /100 WBC
OHS QRS DURATION: 64 MS
OHS QRS DURATION: 66 MS
OHS QRS DURATION: 70 MS
OHS QTC CALCULATION: 459 MS
OHS QTC CALCULATION: 506 MS
OHS QTC CALCULATION: 510 MS
OPIATES UR QL SCN: NEGATIVE
PCO2 BLDA: 20.3 MMHG (ref 35–45)
PCP UR QL SCN>25 NG/ML: NEGATIVE
PH SMN: 7.57 [PH] (ref 7.35–7.45)
PH UR STRIP: 6 [PH] (ref 5–8)
PHOSPHATE SERPL-MCNC: 2.5 MG/DL (ref 2.7–4.5)
PLATELET # BLD AUTO: 370 K/UL (ref 150–450)
PMV BLD AUTO: 10 FL (ref 9.2–12.9)
PO2 BLDA: 55 MMHG (ref 40–60)
POC BE: -3 MMOL/L
POC SATURATED O2: 93 % (ref 95–100)
POC TCO2: 19 MMOL/L (ref 24–29)
POTASSIUM SERPL-SCNC: 2.7 MMOL/L (ref 3.5–5.1)
POTASSIUM SERPL-SCNC: 3 MMOL/L (ref 3.5–5.1)
POTASSIUM SERPL-SCNC: 3.2 MMOL/L (ref 3.5–5.1)
POTASSIUM SERPL-SCNC: 4.2 MMOL/L (ref 3.5–5.1)
PROT SERPL-MCNC: 5.6 G/DL (ref 6–8.4)
PROT SERPL-MCNC: 7.5 G/DL (ref 6–8.4)
PROT UR QL STRIP: ABNORMAL
RBC # BLD AUTO: 4.97 M/UL (ref 4–5.4)
RBC #/AREA URNS AUTO: 1 /HPF (ref 0–4)
SAMPLE: ABNORMAL
SAMPLE: NORMAL
SITE: ABNORMAL
SITE: NORMAL
SODIUM SERPL-SCNC: 140 MMOL/L (ref 136–145)
SODIUM SERPL-SCNC: 140 MMOL/L (ref 136–145)
SODIUM SERPL-SCNC: 143 MMOL/L (ref 136–145)
SODIUM SERPL-SCNC: 144 MMOL/L (ref 136–145)
SP GR UR STRIP: 1.02 (ref 1–1.03)
T3 SERPL-MCNC: 83 NG/DL (ref 60–180)
TOXICOLOGY INFORMATION: ABNORMAL
TROPONIN I SERPL DL<=0.01 NG/ML-MCNC: 0.02 NG/ML (ref 0–0.03)
URN SPEC COLLECT METH UR: ABNORMAL
WBC # BLD AUTO: 12.35 K/UL (ref 3.9–12.7)
WBC #/AREA URNS AUTO: 1 /HPF (ref 0–5)
YEAST UR QL AUTO: NORMAL

## 2024-05-23 PROCEDURE — 83605 ASSAY OF LACTIC ACID: CPT

## 2024-05-23 PROCEDURE — 80053 COMPREHEN METABOLIC PANEL: CPT | Mod: 91

## 2024-05-23 PROCEDURE — 83735 ASSAY OF MAGNESIUM: CPT

## 2024-05-23 PROCEDURE — 80307 DRUG TEST PRSMV CHEM ANLYZR: CPT

## 2024-05-23 PROCEDURE — 85025 COMPLETE CBC W/AUTO DIFF WBC: CPT

## 2024-05-23 PROCEDURE — 99900035 HC TECH TIME PER 15 MIN (STAT)

## 2024-05-23 PROCEDURE — 80354 DRUG SCREENING FENTANYL: CPT

## 2024-05-23 PROCEDURE — 36415 COLL VENOUS BLD VENIPUNCTURE: CPT | Performed by: STUDENT IN AN ORGANIZED HEALTH CARE EDUCATION/TRAINING PROGRAM

## 2024-05-23 PROCEDURE — 80053 COMPREHEN METABOLIC PANEL: CPT

## 2024-05-23 PROCEDURE — 84100 ASSAY OF PHOSPHORUS: CPT

## 2024-05-23 PROCEDURE — 84480 ASSAY TRIIODOTHYRONINE (T3): CPT

## 2024-05-23 PROCEDURE — 25000003 PHARM REV CODE 250

## 2024-05-23 PROCEDURE — 96376 TX/PRO/DX INJ SAME DRUG ADON: CPT

## 2024-05-23 PROCEDURE — 63600175 PHARM REV CODE 636 W HCPCS

## 2024-05-23 PROCEDURE — 94761 N-INVAS EAR/PLS OXIMETRY MLT: CPT | Mod: XB

## 2024-05-23 PROCEDURE — 80048 BASIC METABOLIC PNL TOTAL CA: CPT | Mod: XB | Performed by: INTERNAL MEDICINE

## 2024-05-23 PROCEDURE — 84484 ASSAY OF TROPONIN QUANT: CPT

## 2024-05-23 PROCEDURE — 81001 URINALYSIS AUTO W/SCOPE: CPT | Mod: XB

## 2024-05-23 PROCEDURE — 82803 BLOOD GASES ANY COMBINATION: CPT

## 2024-05-23 PROCEDURE — 20600001 HC STEP DOWN PRIVATE ROOM

## 2024-05-23 PROCEDURE — 80048 BASIC METABOLIC PNL TOTAL CA: CPT | Mod: 91,XB | Performed by: STUDENT IN AN ORGANIZED HEALTH CARE EDUCATION/TRAINING PROGRAM

## 2024-05-23 RX ORDER — IBUPROFEN 200 MG
24 TABLET ORAL
Status: DISCONTINUED | OUTPATIENT
Start: 2024-05-23 | End: 2024-05-26 | Stop reason: HOSPADM

## 2024-05-23 RX ORDER — ACETAMINOPHEN 650 MG/1
650 SUPPOSITORY RECTAL ONCE
Status: COMPLETED | OUTPATIENT
Start: 2024-05-23 | End: 2024-05-23

## 2024-05-23 RX ORDER — SODIUM CHLORIDE, SODIUM LACTATE, POTASSIUM CHLORIDE, CALCIUM CHLORIDE 600; 310; 30; 20 MG/100ML; MG/100ML; MG/100ML; MG/100ML
INJECTION, SOLUTION INTRAVENOUS CONTINUOUS
Status: ACTIVE | OUTPATIENT
Start: 2024-05-23 | End: 2024-05-23

## 2024-05-23 RX ORDER — ACETAMINOPHEN 325 MG/1
650 TABLET ORAL EVERY 8 HOURS PRN
Status: DISCONTINUED | OUTPATIENT
Start: 2024-05-23 | End: 2024-05-26 | Stop reason: HOSPADM

## 2024-05-23 RX ORDER — IBUPROFEN 200 MG
400 TABLET ORAL DAILY PRN
COMMUNITY

## 2024-05-23 RX ORDER — ONDANSETRON HYDROCHLORIDE 2 MG/ML
4 INJECTION, SOLUTION INTRAVENOUS EVERY 6 HOURS PRN
Status: DISCONTINUED | OUTPATIENT
Start: 2024-05-23 | End: 2024-05-26 | Stop reason: HOSPADM

## 2024-05-23 RX ORDER — POTASSIUM CHLORIDE 7.45 MG/ML
10 INJECTION INTRAVENOUS
Status: COMPLETED | OUTPATIENT
Start: 2024-05-23 | End: 2024-05-23

## 2024-05-23 RX ORDER — SODIUM CHLORIDE, SODIUM LACTATE, POTASSIUM CHLORIDE, CALCIUM CHLORIDE 600; 310; 30; 20 MG/100ML; MG/100ML; MG/100ML; MG/100ML
INJECTION, SOLUTION INTRAVENOUS CONTINUOUS
Status: DISCONTINUED | OUTPATIENT
Start: 2024-05-23 | End: 2024-05-23

## 2024-05-23 RX ORDER — HYDROXYZINE HYDROCHLORIDE 25 MG/1
50 TABLET, FILM COATED ORAL EVERY 6 HOURS PRN
Status: DISCONTINUED | OUTPATIENT
Start: 2024-05-23 | End: 2024-05-23

## 2024-05-23 RX ORDER — IBUPROFEN 200 MG
16 TABLET ORAL
Status: DISCONTINUED | OUTPATIENT
Start: 2024-05-23 | End: 2024-05-26 | Stop reason: HOSPADM

## 2024-05-23 RX ORDER — ACETAMINOPHEN 325 MG/1
650 TABLET ORAL EVERY 8 HOURS PRN
Status: DISCONTINUED | OUTPATIENT
Start: 2024-05-23 | End: 2024-05-23

## 2024-05-23 RX ORDER — LOPERAMIDE HYDROCHLORIDE 2 MG/1
4 CAPSULE ORAL ONCE
Status: DISCONTINUED | OUTPATIENT
Start: 2024-05-23 | End: 2024-05-23

## 2024-05-23 RX ORDER — NALOXONE HCL 0.4 MG/ML
0.02 VIAL (ML) INJECTION
Status: DISCONTINUED | OUTPATIENT
Start: 2024-05-23 | End: 2024-05-26 | Stop reason: HOSPADM

## 2024-05-23 RX ORDER — SODIUM,POTASSIUM PHOSPHATES 280-250MG
2 POWDER IN PACKET (EA) ORAL ONCE
Status: COMPLETED | OUTPATIENT
Start: 2024-05-23 | End: 2024-05-23

## 2024-05-23 RX ORDER — POTASSIUM CHLORIDE 7.45 MG/ML
10 INJECTION INTRAVENOUS
Status: DISCONTINUED | OUTPATIENT
Start: 2024-05-23 | End: 2024-05-23

## 2024-05-23 RX ORDER — LOPERAMIDE HYDROCHLORIDE 2 MG/1
2 CAPSULE ORAL
Status: DISCONTINUED | OUTPATIENT
Start: 2024-05-23 | End: 2024-05-26 | Stop reason: HOSPADM

## 2024-05-23 RX ORDER — CLONIDINE HYDROCHLORIDE 0.1 MG/1
0.1 TABLET ORAL EVERY 8 HOURS PRN
Status: DISCONTINUED | OUTPATIENT
Start: 2024-05-23 | End: 2024-05-23

## 2024-05-23 RX ORDER — OXYCODONE HYDROCHLORIDE 5 MG/1
5 TABLET ORAL EVERY 6 HOURS PRN
Status: DISCONTINUED | OUTPATIENT
Start: 2024-05-23 | End: 2024-05-26 | Stop reason: HOSPADM

## 2024-05-23 RX ORDER — IBUPROFEN 600 MG/1
600 TABLET ORAL EVERY 6 HOURS PRN
Status: DISCONTINUED | OUTPATIENT
Start: 2024-05-23 | End: 2024-05-26 | Stop reason: HOSPADM

## 2024-05-23 RX ORDER — GABAPENTIN 300 MG/1
300 CAPSULE ORAL DAILY PRN
COMMUNITY

## 2024-05-23 RX ORDER — DIPHENHYDRAMINE HCL 50 MG
50 CAPSULE ORAL NIGHTLY PRN
Status: DISCONTINUED | OUTPATIENT
Start: 2024-05-23 | End: 2024-05-23

## 2024-05-23 RX ORDER — LORAZEPAM 2 MG/ML
1 INJECTION INTRAMUSCULAR EVERY 4 HOURS PRN
Status: DISCONTINUED | OUTPATIENT
Start: 2024-05-23 | End: 2024-05-23

## 2024-05-23 RX ORDER — SODIUM CHLORIDE 0.9 % (FLUSH) 0.9 %
10 SYRINGE (ML) INJECTION EVERY 12 HOURS PRN
Status: DISCONTINUED | OUTPATIENT
Start: 2024-05-23 | End: 2024-05-26 | Stop reason: HOSPADM

## 2024-05-23 RX ORDER — OXYCODONE HYDROCHLORIDE 10 MG/1
10 TABLET ORAL EVERY 6 HOURS PRN
Status: DISCONTINUED | OUTPATIENT
Start: 2024-05-23 | End: 2024-05-26 | Stop reason: HOSPADM

## 2024-05-23 RX ORDER — PROMETHAZINE HYDROCHLORIDE 25 MG/1
25 TABLET ORAL EVERY 6 HOURS PRN
Status: DISCONTINUED | OUTPATIENT
Start: 2024-05-23 | End: 2024-05-23

## 2024-05-23 RX ORDER — GLUCAGON 1 MG
1 KIT INJECTION
Status: DISCONTINUED | OUTPATIENT
Start: 2024-05-23 | End: 2024-05-26 | Stop reason: HOSPADM

## 2024-05-23 RX ORDER — OLANZAPINE 2.5 MG/1
5 TABLET ORAL EVERY 6 HOURS PRN
Status: DISCONTINUED | OUTPATIENT
Start: 2024-05-23 | End: 2024-05-26 | Stop reason: HOSPADM

## 2024-05-23 RX ORDER — CLONIDINE HYDROCHLORIDE 0.1 MG/1
0.1 TABLET ORAL EVERY 8 HOURS PRN
Status: DISCONTINUED | OUTPATIENT
Start: 2024-05-23 | End: 2024-05-26 | Stop reason: HOSPADM

## 2024-05-23 RX ORDER — POTASSIUM CHLORIDE 7.45 MG/ML
10 INJECTION INTRAVENOUS EVERY 4 HOURS
Status: DISCONTINUED | OUTPATIENT
Start: 2024-05-23 | End: 2024-05-23

## 2024-05-23 RX ORDER — CLONIDINE HYDROCHLORIDE 0.1 MG/1
0.1 TABLET ORAL EVERY 8 HOURS
Status: DISCONTINUED | OUTPATIENT
Start: 2024-05-23 | End: 2024-05-23

## 2024-05-23 RX ADMIN — SODIUM CHLORIDE, POTASSIUM CHLORIDE, SODIUM LACTATE AND CALCIUM CHLORIDE: 600; 310; 30; 20 INJECTION, SOLUTION INTRAVENOUS at 04:05

## 2024-05-23 RX ADMIN — LORAZEPAM 1 MG: 2 INJECTION INTRAMUSCULAR; INTRAVENOUS at 09:05

## 2024-05-23 RX ADMIN — POTASSIUM CHLORIDE 10 MEQ: 7.46 INJECTION, SOLUTION INTRAVENOUS at 04:05

## 2024-05-23 RX ADMIN — OLANZAPINE 5 MG: 2.5 TABLET, FILM COATED ORAL at 09:05

## 2024-05-23 RX ADMIN — OLANZAPINE 5 MG: 2.5 TABLET, FILM COATED ORAL at 03:05

## 2024-05-23 RX ADMIN — POTASSIUM CHLORIDE 10 MEQ: 7.46 INJECTION, SOLUTION INTRAVENOUS at 06:05

## 2024-05-23 RX ADMIN — POTASSIUM CHLORIDE 10 MEQ: 7.46 INJECTION, SOLUTION INTRAVENOUS at 08:05

## 2024-05-23 RX ADMIN — POTASSIUM CHLORIDE 10 MEQ: 7.46 INJECTION, SOLUTION INTRAVENOUS at 07:05

## 2024-05-23 RX ADMIN — SODIUM CHLORIDE, POTASSIUM CHLORIDE, SODIUM LACTATE AND CALCIUM CHLORIDE: 600; 310; 30; 20 INJECTION, SOLUTION INTRAVENOUS at 05:05

## 2024-05-23 RX ADMIN — OXYCODONE 10 MG: 5 TABLET ORAL at 03:05

## 2024-05-23 RX ADMIN — LORAZEPAM 1 MG: 2 INJECTION INTRAMUSCULAR; INTRAVENOUS at 01:05

## 2024-05-23 RX ADMIN — POTASSIUM BICARBONATE 40 MEQ: 391 TABLET, EFFERVESCENT ORAL at 04:05

## 2024-05-23 RX ADMIN — ACETAMINOPHEN 650 MG: 650 SUPPOSITORY RECTAL at 04:05

## 2024-05-23 RX ADMIN — POTASSIUM BICARBONATE 40 MEQ: 391 TABLET, EFFERVESCENT ORAL at 09:05

## 2024-05-23 RX ADMIN — IBUPROFEN 600 MG: 600 TABLET, FILM COATED ORAL at 06:05

## 2024-05-23 RX ADMIN — POTASSIUM & SODIUM PHOSPHATES POWDER PACK 280-160-250 MG 2 PACKET: 280-160-250 PACK at 09:05

## 2024-05-23 RX ADMIN — ACETAMINOPHEN 650 MG: 325 TABLET ORAL at 07:05

## 2024-05-23 RX ADMIN — OXYCODONE 10 MG: 5 TABLET ORAL at 09:05

## 2024-05-23 RX ADMIN — LORAZEPAM 1 MG: 2 INJECTION INTRAMUSCULAR; INTRAVENOUS at 04:05

## 2024-05-23 RX ADMIN — IBUPROFEN 600 MG: 600 TABLET, FILM COATED ORAL at 12:05

## 2024-05-23 NOTE — H&P
[facility] - Emergency Dept  [facility]  History & Physical    Patient Name: Reza Morrow  MRN: [id number]  Patient Class: IP- Inpatient  Admission Date: 5/22/2024  Attending Physician: Shubham Ahuja MD   Primary Care Provider: Kip Jimenez MD         Patient information was obtained from relative(s), past medical records, and ER records.     Subjective:     Principal Problem:<principal problem not specified>    Chief Complaint:   Chief Complaint   Patient presents with    Altered Mental Status     Pt comes from home, patient is not alert or oriented, presents with dry blood on lips and bruises on knees. Pt was combative when EMS picked her up, she was given Droperidol 5mg and 700ml NS.        HPI: Reza Mrorow is 59 y.o. F with Mhx of Reza Morrow is a 59 y.o. with a PMHx of polysubstance abuse, Multiple Sclerosis, Hypercholesteremia, Takutsubo Cardiomyopathy, and hypertension who is presenting to the ED for altered mental status via EMS. Patient unwilling to participate in evaluation due to severe agitation and AMS. Spoke to sister on the phone, who provided collateral. Said that this all started last year in June when she was admitted for the cardiomyopathy. At that time she presented to the hospital with restlessness, agitation and AMS. Found to have Takutsubo cardiomyopathy on their evaluation. They sent her home once she was stabilized. For her MS she on many pain medications, which the sister reports that patient has been self dosing. Sister also mentions that patient has started taking ketamine powder and cream as well. Reports that she has times where is erratic and full of energy, and then is followed by crash. Sister reports that patient had previously admitted with mood disorder due to substance use; but sister is unclear exactly what she is using. It is noted that patient had a fall this Sunday and hit her head. Sister does not live with patient; pt lives with a roommate.     PER ED  NOTE:  Patient was picked up by EMS after roommate called for altered mental status and unresponsiveness. Upon arrival patient was extremely agitated EMS had to give patient 5 droperidol to get her calmed down. Throughout transport she received 500 cc IV fluids and remained stable throughout transport with no increasing oxygen requirement. Upon arrival here patient unable to take part in history as she is altered only opening eyes and refusing to answer any questions. Contacted sister who stated that patient has been in and out of hospitals recently for similar episodes. She reports that sister has these continued episodes where she becomes extremely elated active followed by crushing and becoming altered. She believes that patient is on several medications and not taking it as prescribed but as patient feels appropriate increasing doses as she feels. She also believes that patient is abusing substances but is unsure of what exactly. She also notes That patient had a fall 2 days ago in which she had a small bleed she attempted to convince patient's roommates to call the ambulance and bring her to the emergency department but both patient and remained refused.     Past Medical History:   Diagnosis Date    Behavioral problem     Bulging lumbar disc     Bursitis     bilateral hip    Degenerative cervical disc     Hx of psychiatric care     Hypercholesteremia     Labral tear of hip, degenerative bilateral    MS (multiple sclerosis)     Paresthesia of both lower extremities 3/13/2022    Pneumonia     Spinal cord compression        Past Surgical History:   Procedure Laterality Date    ANGIOGRAM, CORONARY, WITH LEFT HEART CATHETERIZATION Left 3/11/2022    Procedure: Angiogram, Coronary, with Left Heart Cath;  Surgeon: Elie Matamoros MD;  Location: [facility] CATH LAB;  Service: Cardiology;  Laterality: Left;    BREAST SURGERY      augmentation     SECTION, CLASSIC      HAND SURGERY      HYSTEROSCOPY      NECK SURGERY       cervical disc removeal    TUBAL LIGATION      X-STOP IMPLANTATION         Review of patient's allergies indicates:   Allergen Reactions    Adhesive Hives    Neosporin [neomycin-bacitracin-polymyxin] Hives    Neomycin     Neomycin-polymyxin Hives    Neosporin g.u. irrigant     Penicillins Other (See Comments)     Unknown  reaction    Latex Rash       No current facility-administered medications on file prior to encounter.     Current Outpatient Medications on File Prior to Encounter   Medication Sig    COVID-19 AT-HOME TEST Kit TEST AS DIRECTED    furosemide (LASIX) 40 MG tablet Take 1 tablet (40 mg total) by mouth daily as needed (for worsenign shortnes of breath, swelling, or 3lb weight gain in 1 day/5lb weight gain in 1 week).    glatiramer (COPAXONE, GLATOPA) 40 mg/mL injection Inject 40 mg into the skin 3 (three) times a week.    naloxone (NARCAN) 4 mg/actuation Spry 1 spray by Nasal route once as needed.    NUCYNTA  mg Tb12 Take 100 mg by mouth 2 (two) times a day.    oxyCODONE (ROXICODONE) 30 MG Tab Take 5 mg by mouth every 6 (six) hours as needed.    pregabalin (LYRICA) 100 MG capsule Take 100 mg by mouth 3 (three) times daily.    triamcinolone acetonide 0.1% (KENALOG) 0.1 % cream Apply 1 applicator topically 4 (four) times daily as needed.    venlafaxine (EFFEXOR-XR) 75 MG 24 hr capsule Take 1 capsule (75 mg total) by mouth once daily.    [DISCONTINUED] atorvastatin (LIPITOR) 20 MG tablet Take 20 mg by mouth once daily.    [DISCONTINUED] EScitalopram oxalate (LEXAPRO) 10 MG tablet Take 10 mg by mouth once daily.     Family History       Problem Relation (Age of Onset)    Bipolar disorder Brother    COPD Mother    Cancer Father    Diabetes Father, Sister    Drug abuse Mother    Heart disease Maternal Uncle    Hypothyroidism Maternal Grandmother    Lung cancer Father          Tobacco Use    Smoking status: Former     Current packs/day: 0.00     Types: Cigarettes     Quit date: 8/1/2013     Years since  "quitting: 10.8    Smokeless tobacco: Never   Substance and Sexual Activity    Alcohol use: No    Drug use: Yes     Types: Benzodiazepines, Marijuana    Sexual activity: Not Currently     Partners: Male     Review of Systems  Objective:     Vital Signs (Most Recent):  Temp: (!) 100.6 °F (38.1 °C) (05/23/24 0340)  Pulse: (!) 111 (05/23/24 0550)  Resp: (!) 26 (05/23/24 0550)  BP: (!) 174/98 (05/23/24 0550)  SpO2: 99 % (05/23/24 0550) Vital Signs (24h Range):  Temp:  [98.2 °F (36.8 °C)-100.6 °F (38.1 °C)] 100.6 °F (38.1 °C)  Pulse:  [106-129] 111  Resp:  [12-26] 26  SpO2:  [96 %-100 %] 99 %  BP: (125-174)/() 174/98     Weight: (S)  (unable to asses.)  There is no height or weight on file to calculate BMI.     Physical Exam  Vitals reviewed.   Constitutional:       General: She is in acute distress.      Appearance: She is ill-appearing.      Comments: Unable to do complete physical exam due to patient's agitation   Psychiatric:         Attention and Perception: She is inattentive.         Mood and Affect: Mood is anxious.         Behavior: Behavior is uncooperative and agitated.      Comments: Perseverating on "okay"; rarely answers questions                Significant Labs: All pertinent labs within the past 24 hours have been reviewed.    Significant Imaging: I have reviewed all pertinent imaging results/findings within the past 24 hours.  Assessment/Plan:     AMS (altered mental status)  Patient altered on admission, and unable to answer orientation questions. Sister on the phone reports recent head trauma, and presented with dry bloody lips. VBG c/f respiratory alkalosis and CMP with evidence of metabolic acidosis. Clinical picturing pointing towards drug withdrawal. No seizure-like activity. No significant electrolyte derangement. Recent thyroid panel with evidence of subclinical hypothyroidism. High suspicion that AMS is related to polypharmacy and substance use.     Recent Labs     05/23/24  0205   PH " 7.573*   PCO2 20.3*   PO2 55   HCO3 18.7*   POCSATURATED 93   BE -3*       Plan:   - f/u CT head w/o contrast   - NPO given AMS; resume when clinically indicated      Drug withdrawal  Opioid dependence    UDS is positive only for THC. However, symptoms are more indicative of opioid withdrawal. Known persistent use of opioids for pain. Per EMR she is on oxy 30mg q6 PRN and Nucynta, and family-reported Ketamine use. This appears to align with her previous presentation at [facility], as per the sister. EKG sinus tach, Trop 0.031,  (not the highest on record), CPK wnl. Lactate wnl, alcohol wnl.     Plan:  - treat opioid withdrawal with symptomatic management  - telemetry   - replenish lytes as needed  - [facility] protocol  - COWS score  - require straight cath in the ED, can consider matias if persistently retaining    Takotsubo cardiomyopathy  Diagnosed in 2023.     Echo Result Date: 4/24/2024    Left Ventricle: The left ventricle is normal in size. There is   concentric remodeling. Mild global hypokinesis present. There is mildly   reduced systolic function with a visually estimated ejection fraction of   45 - 50%.    Right Ventricle: Normal right ventricular cavity size. Wall thickness   is normal. Right ventricle wall motion  is normal. Systolic function is   normal.    IVC/SVC: Normal venous pressure at 3 mmHg.      MS (multiple sclerosis)  Not currently in flare. Holding all home pain meds due to AMS and c/f drug withdrawal. Recent MRI 04/2024 with evidence of Moderate supratentorial leukoencephalopathy       VTE Risk Mitigation (From admission, onward)           Ordered     IP VTE LOW RISK PATIENT  Once         05/23/24 0301     Place sequential compression device  Until discontinued         05/23/24 0301                         April Bass MD  Department of Hospital Medicine  [facility] - Emergency Dept

## 2024-05-23 NOTE — AI DETERIORATION ALERT
RAPID RESPONSE NURSE AI ALERT       AI alert received.    Chart Reviewed: 05/23/2024, 4:34 PM    MRN: [id number]  Bed: ED 05/05    Dx: Acute encephalopathy    Reza Morrow has a past medical history of Behavioral problem, Bulging lumbar disc, Bursitis, Degenerative cervical disc, psychiatric care, Hypercholesteremia, Labral tear of hip, degenerative, MS (multiple sclerosis), Paresthesia of both lower extremities, Pneumonia, and Spinal cord compression.    Last VS: BP (!) 141/91   Pulse 103   Temp 98.7 °F (37.1 °C) (Oral)   Resp 20   SpO2 96%     24H Vital Sign Range:  Temp:  [98.2 °F (36.8 °C)-100.6 °F (38.1 °C)]   Pulse:  []   Resp:  [12-26]   BP: (116-174)/()   SpO2:  [95 %-100 %]     Level of Consciousness (AVPU): responds to voice    Recent Labs     05/22/24 2132 05/23/24  0616   WBC 14.13* 12.35   HGB 16.5* 14.6   HCT 47.6 43.7    370       Recent Labs     05/22/24 2132 05/23/24  0616 05/23/24  1533    140 143   K 3.4* 3.2* 2.7*    110 116*   CO2 19* 21* 20*   BUN 16 20 19   CREATININE 1.1 1.0 0.6   * 122* 82   PHOS  --  2.5*  --    MG  --  1.8  --         Recent Labs     05/23/24  0205   PH 7.573*   PCO2 20.3*   PO2 55   HCO3 18.7*   POCSATURATED 93   BE -3*        OXYGEN:     MEWS score: 2    Bedside RN, Chase, contacted  RN states he is in room assessing pt. No distress noted. All vital signs stable. Potassium replacement in progress. No additional concerns verbalized at this time. Instructed to call [phone] for further concerns or assistance.    Rena Oquendo RN

## 2024-05-23 NOTE — PROVIDER PROGRESS NOTES - EMERGENCY DEPT.
ED Resident HAND-OFF NOTE:  5/23/2024 Received Sign Out    Chief Complaint   Patient presents with    Altered Mental Status     Pt comes from home, patient is not alert or oriented, presents with dry blood on lips and bruises on knees. Pt was combative when EMS picked her up, she was given Droperidol 5mg and 700ml NS.     Reza Morrow is a 59 y.o. female who presented to the ED on 5/23/2024 with chief complaint of  AMS. I assumed care of patient from off-going ED physician team pending  labs and imaging.    On my evaluation, Reza Morrow appears well, hemodynamically stable and in NAD. Thus far, Reza Morrow has received:  Medications   amiodarone in dextrose 150 mg/100 mL (1.5 mg/mL) loading dose (has no administration in time range)   sodium chloride 0.9% flush 10 mL (has no administration in time range)   naloxone 0.4 mg/mL injection 0.02 mg (has no administration in time range)   glucose chewable tablet 16 g (has no administration in time range)   glucose chewable tablet 24 g (has no administration in time range)   dextrose 10% bolus 125 mL 125 mL (has no administration in time range)   dextrose 10% bolus 250 mL 250 mL (has no administration in time range)   glucagon (human recombinant) injection 1 mg (has no administration in time range)   promethazine tablet 25 mg (has no administration in time range)   diphenhydrAMINE capsule 50 mg (has no administration in time range)   acetaminophen tablet 650 mg (has no administration in time range)   hydrOXYzine HCL tablet 50 mg (has no administration in time range)   cloNIDine tablet 0.1 mg (has no administration in time range)   loperamide capsule 4 mg (has no administration in time range)   loperamide capsule 2 mg (has no administration in time range)   ibuprofen tablet 600 mg (has no administration in time range)   sodium chloride 0.9% bolus 1,000 mL 1,000 mL (0 mLs Intravenous Stopped 5/22/24 2290)   LORazepam injection 1 mg (1 mg Intravenous Given 5/22/24  2146)     Vital Signs:  BP (!) 159/75   Pulse (!) 125   Temp (!) 100.6 °F (38.1 °C) (Rectal)   Resp (!) 22   SpO2 100%     Laboratory Studies:  Labs Reviewed   CBC W/ AUTO DIFFERENTIAL - Abnormal; Notable for the following components:       Result Value    WBC 14.13 (*)     RBC 5.46 (*)     Hemoglobin 16.5 (*)     Immature Granulocytes 0.8 (*)     Gran # (ANC) 11.6 (*)     Immature Grans (Abs) 0.11 (*)     Mono # 1.3 (*)     Gran % 82.4 (*)     Lymph % 7.6 (*)     All other components within normal limits   COMPREHENSIVE METABOLIC PANEL - Abnormal; Notable for the following components:    Potassium 3.4 (*)     CO2 19 (*)     Glucose 150 (*)     Total Bilirubin 1.2 (*)     eGFR 58.2 (*)     All other components within normal limits   TROPONIN I - Abnormal; Notable for the following components:    Troponin I 0.031 (*)     All other components within normal limits   B-TYPE NATRIURETIC PEPTIDE - Abnormal; Notable for the following components:     (*)     All other components within normal limits   ISTAT PROCEDURE - Abnormal; Notable for the following components:    POC PH 7.573 (*)     POC PCO2 20.3 (*)     POC HCO3 18.7 (*)     POC BE -3 (*)     POC TCO2 19 (*)     All other components within normal limits   CULTURE, BLOOD   CULTURE, BLOOD   ALCOHOL,MEDICAL (ETHANOL)   AMMONIA   CK   DRUG SCREEN PANEL, URINE EMERGENCY   URINALYSIS, REFLEX TO URINE CULTURE   T3   CBC W/ AUTO DIFFERENTIAL   COMPREHENSIVE METABOLIC PANEL   MAGNESIUM   PHOSPHORUS   ISTAT LACTATE     MDM:   Imaging negative.  CBC demonstrating mild leukocytosis.  Labs without obvious cause for patient's altered mental status.  Will admit patient to hospital medicine for further workup and treatment of her altered mentation likely secondary to polypharmacy    Discussed With: Hospital Medicine    Diagnostic Impression:    Final diagnoses:  [R41.82] Altered mental status     ED Disposition Condition    Observation            ______________________  Yrn Lora MD  Emergency Medicine Resident  5/23/2024

## 2024-05-23 NOTE — ASSESSMENT & PLAN NOTE
Patient altered on admission, and unable to answer orientation questions. Sister on the phone reports recent head trauma, and presented with dry bloody lips. VBG c/f respiratory alkalosis and CMP with evidence of metabolic acidosis. Clinical picturing pointing towards drug withdrawal. No seizure-like activity. No significant electrolyte derangement. Recent thyroid panel with evidence of subclinical hypothyroidism. High suspicion that AMS is related to polypharmacy and substance use.     Recent Labs     05/23/24  0205   PH 7.573*   PCO2 20.3*   PO2 55   HCO3 18.7*   POCSATURATED 93   BE -3*       Plan:   - f/u CT head w/o contrast   - NPO given AMS; resume when clinically indicated

## 2024-05-23 NOTE — ASSESSMENT & PLAN NOTE
Patient altered on admission, and unable to answer orientation questions. Sister on the phone reports recent head trauma, and presented with dry bloody lips. VBG c/f respiratory alkalosis and CMP with evidence of metabolic acidosis. Clinical picturing pointing towards drug withdrawal/intoxication. No seizure-like activity. No significant electrolyte derangement. Recent thyroid panel with evidence of subclinical hypothyroidism. High suspicion that AMS is related to polypharmacy and substance use.     Recent Labs     05/23/24  0205   PH 7.573*   PCO2 20.3*   PO2 55   HCO3 18.7*   POCSATURATED 93   BE -3*       CT head without evidence of acute bleed. MRI brain concerning for PRES vs encephalitis and global hypoxic event. Mental status improved and patient is Aox4.  -neuro consulted, appreciate assistance

## 2024-05-23 NOTE — ED NOTES
Telemetry Verification   Patient placed on Telemetry Box  Verified with War Room  Box # [id number]   Monitor Tech Inan   Rate 104   Rhythm Sinus Tachycardia

## 2024-05-23 NOTE — HPI
Reza Morrow is 59 y.o. F with Mhx of polysubstance abuse, Multiple Sclerosis, Hypercholesteremia, Takutsubo Cardiomyopathy, and hypertension who is presenting to the ED for altered mental status via EMS. Patient unwilling to participate in evaluation due to severe agitation and AMS. Spoke to sister on the phone, who provided collateral. Said that this all started last year in June when she was admitted for the cardiomyopathy. At that time she presented to the hospital with restlessness, agitation and AMS. Found to have Takutsubo cardiomyopathy on their evaluation. They sent her home once she was stabilized. For her MS she on many pain medications, which the sister reports that patient has been self dosing. Sister also mentions that patient has started taking ketamine powder and cream as well. Reports that she has times where is erratic and full of energy, and then is followed by crash. Sister reports that patient had previously admitted with mood disorder due to substance use; but sister is unclear exactly what she is using. It is noted that patient had a fall this Sunday and hit her head. Sister does not live with patient; pt lives with a roommate.     PER ED NOTE:  Patient was picked up by EMS after roommate called for altered mental status and unresponsiveness. Upon arrival patient was extremely agitated EMS had to give patient 5 droperidol to get her calmed down. Throughout transport she received 500 cc IV fluids and remained stable throughout transport with no increasing oxygen requirement. Upon arrival here patient unable to take part in history as she is altered only opening eyes and refusing to answer any questions. Contacted sister who stated that patient has been in and out of hospitals recently for similar episodes. She reports that sister has these continued episodes where she becomes extremely elated active followed by crushing and becoming altered. She believes that patient is on several medications  and not taking it as prescribed but as patient feels appropriate increasing doses as she feels. She also believes that patient is abusing substances but is unsure of what exactly. She also notes That patient had a fall 2 days ago in which she had a small bleed she attempted to convince patient's roommates to call the ambulance and bring her to the emergency department but both patient and remained refused.

## 2024-05-23 NOTE — ED PROVIDER NOTES
Encounter Date: 5/22/2024       History     Chief Complaint   Patient presents with    Altered Mental Status     Pt comes from home, patient is not alert or oriented, presents with dry blood on lips and bruises on knees. Pt was combative when EMS picked her up, she was given Droperidol 5mg and 700ml NS.     Reza Morrow is a 59 y.o. with a PMHx of polysubstance abuse, Multiple Sclerosis, Hypercholesteremia, Takutsubo Cardiomyopathy, and hypertension who is presenting to the ED for altered mental status via EMS.    Patient was picked up by EMS after roommate called for altered mental status and unresponsiveness.  Upon arrival patient was extremely agitated EMS had to give patient 5 droperidol to get her calmed down.  Throughout transport she received 500 cc IV fluids and remained stable throughout transport with no increasing oxygen requirement.  Upon arrival here patient unable to take part in history as she is altered only opening eyes and refusing to answer any questions.  Contacted sister who stated that patient has been in and out of hospitals recently for similar episodes.  She reports that sister has these continued episodes where she becomes extremely elated active followed by crushing and becoming altered.  She believes that patient  is on several medications and not taking it as prescribed but as patient feels appropriate increasing doses as she feels.  She also believes that patient is abusing substances but is unsure of what exactly.  She also notes That patient had a fall 2 days ago in which she had a small bleed she attempted to convince patient's roommates to call the ambulance and bring her to the emergency department but both patient and remained refused.    The history is provided by the EMS personnel and a caregiver.     Review of patient's allergies indicates:   Allergen Reactions    Adhesive Hives    Neosporin [neomycin-bacitracin-polymyxin] Hives    Neomycin     Neomycin-polymyxin Hives     Neosporin g.u. irrigant     Penicillins Other (See Comments)     Unknown  reaction    Latex Rash     Past Medical History:   Diagnosis Date    Behavioral problem     Bulging lumbar disc     Bursitis     bilateral hip    Degenerative cervical disc     Hx of psychiatric care     Hypercholesteremia     Labral tear of hip, degenerative bilateral    MS (multiple sclerosis)     Paresthesia of both lower extremities 3/13/2022    Pneumonia     Spinal cord compression      Past Surgical History:   Procedure Laterality Date    ANGIOGRAM, CORONARY, WITH LEFT HEART CATHETERIZATION Left 3/11/2022    Procedure: Angiogram, Coronary, with Left Heart Cath;  Surgeon: Elie Matamoros MD;  Location: [facility] CATH LAB;  Service: Cardiology;  Laterality: Left;    BREAST SURGERY      augmentation     SECTION, CLASSIC      HAND SURGERY      HYSTEROSCOPY      NECK SURGERY      cervical disc removeal    TUBAL LIGATION      X-STOP IMPLANTATION       Family History   Problem Relation Name Age of Onset    Cancer Father      Diabetes Father      Lung cancer Father      Diabetes Sister      COPD Mother      Drug abuse Mother      Bipolar disorder Brother      Heart disease Maternal Uncle      Hypothyroidism Maternal Grandmother       Social History     Tobacco Use    Smoking status: Former     Current packs/day: 0.00     Types: Cigarettes     Quit date: 2013     Years since quitting: 10.8    Smokeless tobacco: Never   Substance Use Topics    Alcohol use: No    Drug use: Yes     Types: Benzodiazepines, Marijuana       Physical Exam     Initial Vitals [24]   BP Pulse Resp Temp SpO2   (!) 156/95 (!) 129 17 98.2 °F (36.8 °C) 99 %      MAP       --         Physical Exam    Nursing note and vitals reviewed.  Constitutional: She appears well-developed and well-nourished.   HENT:   Head: Normocephalic and atraumatic.   Eyes: EOM are normal. Pupils are equal, round, and reactive to light.   Cardiovascular:    Tachycardia present.          Pulmonary/Chest: Breath sounds normal. No respiratory distress.   Abdominal: Abdomen is soft. She exhibits no distension. There is no abdominal tenderness.   Musculoskeletal:      Comments:  Contusions noted to the bilateral knees     Neurological: She is alert. No cranial nerve deficit or sensory deficit.     Patient is moving all  limb sporadically   Patient withdrawing to pain in the bilateral upper and lower extremities   Patient opening eyes to name   Intermittently following commands   Skin:     A scab over the right frontal scalp not actively bleeding         ED Course   Procedures  Labs Reviewed   CBC W/ AUTO DIFFERENTIAL - Abnormal; Notable for the following components:       Result Value    WBC 14.13 (*)     RBC 5.46 (*)     Hemoglobin 16.5 (*)     Immature Granulocytes 0.8 (*)     Gran # (ANC) 11.6 (*)     Immature Grans (Abs) 0.11 (*)     Mono # 1.3 (*)     Gran % 82.4 (*)     Lymph % 7.6 (*)     All other components within normal limits   CULTURE, BLOOD   CULTURE, BLOOD   COMPREHENSIVE METABOLIC PANEL   DRUG SCREEN PANEL, URINE EMERGENCY   ALCOHOL,MEDICAL (ETHANOL)   URINALYSIS, REFLEX TO URINE CULTURE   LACTIC ACID, PLASMA   TROPONIN I   B-TYPE NATRIURETIC PEPTIDE   AMMONIA   CK     EKG Readings: (Independently Interpreted)   Initial Reading: No STEMI. Previous EKG: Compared with most recent EKG Rhythm: Sinus Tachycardia.       Imaging Results              X-Ray Chest AP Portable (In process)                      Medications   sodium chloride 0.9% bolus 1,000 mL 1,000 mL (1,000 mLs Intravenous New Bag 5/22/24 2149)   LORazepam injection 1 mg (1 mg Intravenous Given 5/22/24 2146)     Medical Decision Making   Patient is a 59-year-old female presenting to the emergency department for evaluation of altered mental status via EMS.  At the time assessment patient is sleeping arousable to name intermittently following commands with no obvious focal deficits.  Patient is tachypneic tachycardic and becoming  more agitated as workup and blood work drawn.  Patient was given additional Ativan 5.  At this time workup for altered mental status initiated due to concern of recent fall polypharmacy and poor p.o. intake and erratic behavior per sister.  At this time  CT head ordered to evaluate for acute intracranial processes as patient is unable to provide history CT cervical spine obtained given fall and mental status changes.      Labs obtained to evaluate for leukocytosis anemia metabolic derangements renal impairment hepatic dysfunction intoxication, signs of infection.  At this time patient care handed over to oncoming care team pending repeat evaluation, all labs and imaging.  See progress note for final plan and disposition.    Amount and/or Complexity of Data Reviewed  Labs: ordered.  Radiology: ordered.    Risk  Prescription drug management.  Decision regarding hospitalization.              Attending Attestation:   Physician Attestation Statement for Resident:  As the supervising MD   Physician Attestation Statement: I have personally seen and examined this patient.   I agree with the above history.  -:   As the supervising MD I agree with the above PE.     As the supervising MD I agree with the above treatment, course, plan, and disposition.                                           Clinical Impression:  Final diagnoses:  [R41.82] Altered mental status                     Monique Torres MD  Resident  05/22/24 [id number]       Bhavin Hewitt MD  05/23/24 [id number]

## 2024-05-23 NOTE — ASSESSMENT & PLAN NOTE
Opioid dependence    UDS is positive only for THC. However, symptoms are more indicative of opioid withdrawal. Known persistent use of opioids for pain. Per EMR she is on oxy 30mg q6 PRN and Nucynta, and family-reported Ketamine use. This appears to align with her previous presentation at [facility], as per the sister. EKG sinus tach, Trop 0.031,  (not the highest on record), CPK wnl. Lactate wnl, alcohol wnl.     Plan:  - treat opioid withdrawal with symptomatic management  - telemetry   - replenish lytes as needed  - CIWA protocol  - COWS score  - require straight cath in the ED, can consider matias if persistently retaining

## 2024-05-23 NOTE — CARE UPDATE
I have reviewed the chart of Reza Morrow who is hospitalized for the following:    Active Hospital Problems    Diagnosis    *Acute encephalopathy     Ruling out Toxic vs. Metabolic      Drug withdrawal    Agitation    Hypokalemia     POA  Daily labs      Takotsubo cardiomyopathy    Opioid dependence    MS (multiple sclerosis)        Ashley Alejandro NP  Unit Based [facility]

## 2024-05-23 NOTE — AI DETERIORATION ALERT
RAPID RESPONSE NURSE AI ALERT       AI alert received.    Chart Reviewed: 05/23/2024, 3:42 AM    MRN: [id number]  Bed: ED 05/05    Dx: TJ    Reza Morrow has a past medical history of Behavioral problem, Bulging lumbar disc, Bursitis, Degenerative cervical disc, psychiatric care, Hypercholesteremia, Labral tear of hip, degenerative, MS (multiple sclerosis), Paresthesia of both lower extremities, Pneumonia, and Spinal cord compression.    Last VS: BP (!) 159/75   Pulse (!) 125   Temp 98.2 °F (36.8 °C) (Oral)   Resp (!) 22   SpO2 100%     24H Vital Sign Range:  Temp:  [98.2 °F (36.8 °C)]   Pulse:  [106-129]   Resp:  [12-25]   BP: (125-159)/()   SpO2:  [96 %-100 %]     Level of Consciousness (AVPU): responds to pain    Recent Labs     05/22/24  2132   WBC 14.13*   HGB 16.5*   HCT 47.6          Recent Labs     05/22/24  2132      K 3.4*      CO2 19*   BUN 16   CREATININE 1.1   *        Recent Labs     05/23/24  0205   PH 7.573*   PCO2 20.3*   PO2 55   HCO3 18.7*   POCSATURATED 93   BE -3*        OXYGEN: Room Air             MEWS score:      Bedside RN, Krish  Pt stable at this time. No additional concerns verbalized at this time. Instructed to call [phone] for further concerns or assistance.    Darline Thomas RN

## 2024-05-23 NOTE — ASSESSMENT & PLAN NOTE
Not currently in flare. Holding all home pain meds due to AMS and c/f drug withdrawal. Recent MRI 04/2024 with evidence of Moderate supratentorial leukoencephalopathy

## 2024-05-23 NOTE — ED TRIAGE NOTES
Reza Morrow, a 59 y.o. female presents to the ED w/ complaint of AMS.  Pt comes via EMS due to AMS.  Pt recently hospitalized for AMS at [facility].  PT with multiple bruises and dry blood on lips.      Triage note:  Chief Complaint   Patient presents with    Altered Mental Status     Pt comes from home, patient is not alert or oriented, presents with dry blood on lips and bruises on knees. Pt was combative when EMS picked her up, she was given Droperidol 5mg and 700ml NS.     Review of patient's allergies indicates:   Allergen Reactions    Adhesive Hives    Neosporin [neomycin-bacitracin-polymyxin] Hives    Neomycin     Neomycin-polymyxin Hives    Neosporin g.u. irrigant     Penicillins Other (See Comments)     Unknown  reaction    Latex Rash     Past Medical History:   Diagnosis Date    Behavioral problem     Bulging lumbar disc     Bursitis     bilateral hip    Degenerative cervical disc     Hx of psychiatric care     Hypercholesteremia     Labral tear of hip, degenerative bilateral    MS (multiple sclerosis)     Paresthesia of both lower extremities 3/13/2022    Pneumonia     Spinal cord compression

## 2024-05-23 NOTE — ASSESSMENT & PLAN NOTE
>>ASSESSMENT AND PLAN FOR AMS (ALTERED MENTAL STATUS) WRITTEN ON 5/23/2024  7:35 AM BY HOMER NGUYEN MD    Patient altered on admission, and unable to answer orientation questions. Sister on the phone reports recent head trauma, and presented with dry bloody lips. VBG c/f respiratory alkalosis and CMP with evidence of metabolic acidosis. Clinical picturing pointing towards drug withdrawal. No seizure-like activity. No significant electrolyte derangement. Recent thyroid panel with evidence of subclinical hypothyroidism. High suspicion that AMS is related to polypharmacy and substance use.     Recent Labs     05/23/24  0205   PH 7.573*   PCO2 20.3*   PO2 55   HCO3 18.7*   POCSATURATED 93   BE -3*       Plan:   - f/u CT head w/o contrast   - NPO given AMS; resume when clinically indicated

## 2024-05-23 NOTE — ASSESSMENT & PLAN NOTE
Diagnosed in 2023.     Echo Result Date: 4/24/2024    Left Ventricle: The left ventricle is normal in size. There is   concentric remodeling. Mild global hypokinesis present. There is mildly   reduced systolic function with a visually estimated ejection fraction of   45 - 50%.    Right Ventricle: Normal right ventricular cavity size. Wall thickness   is normal. Right ventricle wall motion  is normal. Systolic function is   normal.    IVC/SVC: Normal venous pressure at 3 mmHg.

## 2024-05-23 NOTE — PLAN OF CARE
SW attempted to complete assessment; pt would not wake up      Christine Owen CD, MSW, LMSW, RSW   Case Management  [facility]  Email: [email or web address]

## 2024-05-23 NOTE — PHARMACY MED REC
"Admission Medication History     The home medication history was taken by aCrmenza Quinonez.    You may go to "Admission" then "Reconcile Home Medications" tabs to review and/or act upon these items.     The home medication list has been updated by the Pharmacy department.   Please read ALL comments highlighted in yellow.   Please address this information as you see fit.    Feel free to contact us if you have any questions or require assistance.      Medications listed below were obtained from: Celeste - sister and [facility]  Current Outpatient Medications on File Prior to Encounter   Medication Sig    furosemide (LASIX) 40 MG tablet Take 1 tablet (40 mg total) by mouth daily as needed (for worsenign shortnes of breath, swelling, or 3lb weight gain in 1 day/5lb weight gain in 1 week).    gabapentin (NEURONTIN) 300 MG capsule Take 300 mg by mouth daily as needed (Pain). Sister not 100 % sure how patient takes this medication.    glatiramer (COPAXONE, GLATOPA) 40 mg/mL injection Inject 40 mg into the skin 3 (three) times a week.    ibuprofen (ADVIL,MOTRIN) 200 MG tablet Take 400 mg by mouth daily as needed for Pain.    NUCYNTA  mg Tb12 Take 100 mg by mouth 2 (two) times a day.    oxyCODONE (ROXICODONE) 30 MG Tab Take 5 mg by mouth every 6 (six) hours as needed (Pain).    pregabalin (LYRICA) 100 MG capsule Take 100 mg by mouth 3 (three) times daily.    UNABLE TO FIND Ketamine 10 % Lipomax - Apply 1 gm 3 to 4 times daily for treatment of Pain.    UNABLE TO FIND Piroxicam/gabapentin/lidocaine/prilocaine - Apply 4 gm topically 3 to 4 times for treatment of Pain.    venlafaxine (EFFEXOR-XR) 75 MG 24 hr capsule Take 1 capsule (75 mg total) by mouth once daily.    COVID-19 AT-HOME TEST Kit TEST AS DIRECTED    naloxone (NARCAN) 4 mg/actuation Spry 1 spray by Nasal route once as needed.    triamcinolone acetonide 0.1% (KENALOG) 0.1 % cream Apply 1 applicator topically 4 (four) times daily as needed. "         Potential issues to be addressed PRIOR TO DISCHARGE  Patient requires education regarding drug therapies           Carmenza Quinonez  EXT [id number]                  .

## 2024-05-23 NOTE — SUBJECTIVE & OBJECTIVE
Past Medical History:   Diagnosis Date    Behavioral problem     Bulging lumbar disc     Bursitis     bilateral hip    Degenerative cervical disc     Hx of psychiatric care     Hypercholesteremia     Labral tear of hip, degenerative bilateral    MS (multiple sclerosis)     Paresthesia of both lower extremities 3/13/2022    Pneumonia     Spinal cord compression        Past Surgical History:   Procedure Laterality Date    ANGIOGRAM, CORONARY, WITH LEFT HEART CATHETERIZATION Left 3/11/2022    Procedure: Angiogram, Coronary, with Left Heart Cath;  Surgeon: Elie Matamoros MD;  Location: [facility] CATH LAB;  Service: Cardiology;  Laterality: Left;    BREAST SURGERY      augmentation     SECTION, CLASSIC      HAND SURGERY      HYSTEROSCOPY      NECK SURGERY      cervical disc removeal    TUBAL LIGATION      X-STOP IMPLANTATION         Review of patient's allergies indicates:   Allergen Reactions    Adhesive Hives    Neosporin [neomycin-bacitracin-polymyxin] Hives    Neomycin     Neomycin-polymyxin Hives    Neosporin g.u. irrigant     Penicillins Other (See Comments)     Unknown  reaction    Latex Rash       No current facility-administered medications on file prior to encounter.     Current Outpatient Medications on File Prior to Encounter   Medication Sig    COVID-19 AT-HOME TEST Kit TEST AS DIRECTED    furosemide (LASIX) 40 MG tablet Take 1 tablet (40 mg total) by mouth daily as needed (for worsenign shortnes of breath, swelling, or 3lb weight gain in 1 day/5lb weight gain in 1 week).    glatiramer (COPAXONE, GLATOPA) 40 mg/mL injection Inject 40 mg into the skin 3 (three) times a week.    naloxone (NARCAN) 4 mg/actuation Spry 1 spray by Nasal route once as needed.    NUCYNTA  mg Tb12 Take 100 mg by mouth 2 (two) times a day.    oxyCODONE (ROXICODONE) 30 MG Tab Take 5 mg by mouth every 6 (six) hours as needed.    pregabalin (LYRICA) 100 MG capsule Take 100 mg by mouth 3 (three) times daily.    triamcinolone  "acetonide 0.1% (KENALOG) 0.1 % cream Apply 1 applicator topically 4 (four) times daily as needed.    venlafaxine (EFFEXOR-XR) 75 MG 24 hr capsule Take 1 capsule (75 mg total) by mouth once daily.    [DISCONTINUED] atorvastatin (LIPITOR) 20 MG tablet Take 20 mg by mouth once daily.    [DISCONTINUED] EScitalopram oxalate (LEXAPRO) 10 MG tablet Take 10 mg by mouth once daily.     Family History       Problem Relation (Age of Onset)    Bipolar disorder Brother    COPD Mother    Cancer Father    Diabetes Father, Sister    Drug abuse Mother    Heart disease Maternal Uncle    Hypothyroidism Maternal Grandmother    Lung cancer Father          Tobacco Use    Smoking status: Former     Current packs/day: 0.00     Types: Cigarettes     Quit date: 8/1/2013     Years since quitting: 10.8    Smokeless tobacco: Never   Substance and Sexual Activity    Alcohol use: No    Drug use: Yes     Types: Benzodiazepines, Marijuana    Sexual activity: Not Currently     Partners: Male     Review of Systems  Objective:     Vital Signs (Most Recent):  Temp: (!) 100.6 °F (38.1 °C) (05/23/24 0340)  Pulse: (!) 111 (05/23/24 0550)  Resp: (!) 26 (05/23/24 0550)  BP: (!) 174/98 (05/23/24 0550)  SpO2: 99 % (05/23/24 0550) Vital Signs (24h Range):  Temp:  [98.2 °F (36.8 °C)-100.6 °F (38.1 °C)] 100.6 °F (38.1 °C)  Pulse:  [106-129] 111  Resp:  [12-26] 26  SpO2:  [96 %-100 %] 99 %  BP: (125-174)/() 174/98     Weight: (S)  (unable to asses.)  There is no height or weight on file to calculate BMI.     Physical Exam  Vitals reviewed.   Constitutional:       General: She is in acute distress.      Appearance: She is ill-appearing.      Comments: Unable to do complete physical exam due to patient's agitation   Psychiatric:         Attention and Perception: She is inattentive.         Mood and Affect: Mood is anxious.         Behavior: Behavior is uncooperative and agitated.      Comments: Perseverating on "okay"; rarely answers questions              "   Significant Labs: All pertinent labs within the past 24 hours have been reviewed.    Significant Imaging: I have reviewed all pertinent imaging results/findings within the past 24 hours.

## 2024-05-24 LAB
AMPHET+METHAMPHET UR QL: NEGATIVE
ANION GAP SERPL CALC-SCNC: 9 MMOL/L (ref 8–16)
BARBITURATES UR QL SCN>200 NG/ML: NEGATIVE
BASOPHILS # BLD AUTO: 0.03 K/UL (ref 0–0.2)
BASOPHILS NFR BLD: 0.4 % (ref 0–1.9)
BENZODIAZ UR QL SCN>200 NG/ML: NEGATIVE
BUN SERPL-MCNC: 19 MG/DL (ref 6–20)
BZE UR QL SCN: NEGATIVE
CALCIUM SERPL-MCNC: 9.2 MG/DL (ref 8.7–10.5)
CANNABINOIDS UR QL SCN: ABNORMAL
CHLORIDE SERPL-SCNC: 103 MMOL/L (ref 95–110)
CO2 SERPL-SCNC: 25 MMOL/L (ref 23–29)
CREAT SERPL-MCNC: 0.9 MG/DL (ref 0.5–1.4)
CREAT UR-MCNC: 52 MG/DL (ref 15–325)
DIFFERENTIAL METHOD BLD: NORMAL
EOSINOPHIL # BLD AUTO: 0 K/UL (ref 0–0.5)
EOSINOPHIL NFR BLD: 0.2 % (ref 0–8)
ERYTHROCYTE [DISTWIDTH] IN BLOOD BY AUTOMATED COUNT: 14.2 % (ref 11.5–14.5)
EST. GFR  (NO RACE VARIABLE): >60 ML/MIN/1.73 M^2
ETHANOL UR-MCNC: <10 MG/DL
GLUCOSE SERPL-MCNC: 90 MG/DL (ref 70–110)
HCT VFR BLD AUTO: 42.9 % (ref 37–48.5)
HGB BLD-MCNC: 13.9 G/DL (ref 12–16)
IMM GRANULOCYTES # BLD AUTO: 0.02 K/UL (ref 0–0.04)
IMM GRANULOCYTES NFR BLD AUTO: 0.2 % (ref 0–0.5)
LYMPHOCYTES # BLD AUTO: 2.3 K/UL (ref 1–4.8)
LYMPHOCYTES NFR BLD: 27 % (ref 18–48)
MAGNESIUM SERPL-MCNC: 1.7 MG/DL (ref 1.6–2.6)
MCH RBC QN AUTO: 29.5 PG (ref 27–31)
MCHC RBC AUTO-ENTMCNC: 32.4 G/DL (ref 32–36)
MCV RBC AUTO: 91 FL (ref 82–98)
METHADONE UR QL SCN>300 NG/ML: NEGATIVE
MONOCYTES # BLD AUTO: 0.8 K/UL (ref 0.3–1)
MONOCYTES NFR BLD: 9.1 % (ref 4–15)
NEUTROPHILS # BLD AUTO: 5.4 K/UL (ref 1.8–7.7)
NEUTROPHILS NFR BLD: 63.1 % (ref 38–73)
NRBC BLD-RTO: 0 /100 WBC
OPIATES UR QL SCN: NEGATIVE
PCP UR QL SCN>25 NG/ML: NEGATIVE
PHOSPHATE SERPL-MCNC: 2.6 MG/DL (ref 2.7–4.5)
PLATELET # BLD AUTO: 303 K/UL (ref 150–450)
PMV BLD AUTO: 10.8 FL (ref 9.2–12.9)
POTASSIUM SERPL-SCNC: 4.4 MMOL/L (ref 3.5–5.1)
RBC # BLD AUTO: 4.71 M/UL (ref 4–5.4)
SODIUM SERPL-SCNC: 137 MMOL/L (ref 136–145)
TOXICOLOGY INFORMATION: ABNORMAL
WBC # BLD AUTO: 8.53 K/UL (ref 3.9–12.7)

## 2024-05-24 PROCEDURE — 83735 ASSAY OF MAGNESIUM: CPT

## 2024-05-24 PROCEDURE — A9585 GADOBUTROL INJECTION: HCPCS | Performed by: STUDENT IN AN ORGANIZED HEALTH CARE EDUCATION/TRAINING PROGRAM

## 2024-05-24 PROCEDURE — 25500020 PHARM REV CODE 255: Performed by: STUDENT IN AN ORGANIZED HEALTH CARE EDUCATION/TRAINING PROGRAM

## 2024-05-24 PROCEDURE — 85025 COMPLETE CBC W/AUTO DIFF WBC: CPT

## 2024-05-24 PROCEDURE — 63600175 PHARM REV CODE 636 W HCPCS: Performed by: STUDENT IN AN ORGANIZED HEALTH CARE EDUCATION/TRAINING PROGRAM

## 2024-05-24 PROCEDURE — 25000003 PHARM REV CODE 250

## 2024-05-24 PROCEDURE — 99223 1ST HOSP IP/OBS HIGH 75: CPT | Mod: ,,, | Performed by: STUDENT IN AN ORGANIZED HEALTH CARE EDUCATION/TRAINING PROGRAM

## 2024-05-24 PROCEDURE — 20600001 HC STEP DOWN PRIVATE ROOM

## 2024-05-24 PROCEDURE — 36415 COLL VENOUS BLD VENIPUNCTURE: CPT

## 2024-05-24 PROCEDURE — 80307 DRUG TEST PRSMV CHEM ANLYZR: CPT

## 2024-05-24 PROCEDURE — 63600175 PHARM REV CODE 636 W HCPCS

## 2024-05-24 PROCEDURE — 84100 ASSAY OF PHOSPHORUS: CPT

## 2024-05-24 PROCEDURE — 90792 PSYCH DIAG EVAL W/MED SRVCS: CPT | Mod: ,,, | Performed by: PSYCHIATRY & NEUROLOGY

## 2024-05-24 PROCEDURE — 80048 BASIC METABOLIC PNL TOTAL CA: CPT | Performed by: INTERNAL MEDICINE

## 2024-05-24 RX ORDER — MAGNESIUM SULFATE HEPTAHYDRATE 40 MG/ML
2 INJECTION, SOLUTION INTRAVENOUS ONCE
Status: COMPLETED | OUTPATIENT
Start: 2024-05-24 | End: 2024-05-24

## 2024-05-24 RX ORDER — ENOXAPARIN SODIUM 100 MG/ML
30 INJECTION SUBCUTANEOUS EVERY 24 HOURS
Status: DISCONTINUED | OUTPATIENT
Start: 2024-05-24 | End: 2024-05-24

## 2024-05-24 RX ORDER — ENOXAPARIN SODIUM 100 MG/ML
40 INJECTION SUBCUTANEOUS EVERY 24 HOURS
Status: DISCONTINUED | OUTPATIENT
Start: 2024-05-24 | End: 2024-05-26 | Stop reason: HOSPADM

## 2024-05-24 RX ORDER — GADOBUTROL 604.72 MG/ML
6 INJECTION INTRAVENOUS
Status: COMPLETED | OUTPATIENT
Start: 2024-05-24 | End: 2024-05-24

## 2024-05-24 RX ORDER — SODIUM,POTASSIUM PHOSPHATES 280-250MG
2 POWDER IN PACKET (EA) ORAL EVERY 4 HOURS
Status: COMPLETED | OUTPATIENT
Start: 2024-05-24 | End: 2024-05-24

## 2024-05-24 RX ADMIN — ENOXAPARIN SODIUM 40 MG: 40 INJECTION SUBCUTANEOUS at 04:05

## 2024-05-24 RX ADMIN — GADOBUTROL 6 ML: 604.72 INJECTION INTRAVENOUS at 03:05

## 2024-05-24 RX ADMIN — POTASSIUM & SODIUM PHOSPHATES POWDER PACK 280-160-250 MG 2 PACKET: 280-160-250 PACK at 06:05

## 2024-05-24 RX ADMIN — OLANZAPINE 5 MG: 2.5 TABLET, FILM COATED ORAL at 10:05

## 2024-05-24 RX ADMIN — POTASSIUM & SODIUM PHOSPHATES POWDER PACK 280-160-250 MG 2 PACKET: 280-160-250 PACK at 11:05

## 2024-05-24 RX ADMIN — OXYCODONE 10 MG: 5 TABLET ORAL at 10:05

## 2024-05-24 RX ADMIN — MAGNESIUM SULFATE HEPTAHYDRATE 2 G: 40 INJECTION, SOLUTION INTRAVENOUS at 06:05

## 2024-05-24 NOTE — ASSESSMENT & PLAN NOTE
Patient has hypokalemia which is Acute and currently controlled. Most recent potassium levels reviewed-   Lab Results   Component Value Date    K 4.4 05/24/2024   . Will continue potassium replacement per protocol and recheck repeat levels after replacement completed.

## 2024-05-24 NOTE — HOSPITAL COURSE
Patient admitted to hospital medicine for workup of AMS. Patient alert and oriented 2nd day of admission but with pressured speech and tangential. Concern for substance induced vs underlying mood disorder, psychiatry consulted with recs for rehab for opioid use but patient declined. Neuro consulted given findings concerning for PRES and global hypoxic event. EEG obtained to r/o seizure which was normal. Discussed at length with patient regarding outpatient pain regimen likely contributing to MRI brain findings and overall presentation. Encouraged patient to talk with outpatient pain management provider to adjust regimen as she is on high doses of opioid medications in addition to ketamine powder. Narcan delivered to bedside prior to d/c and discussed appropriate use with patient and family. Rehab information provided in d/c paperwork.

## 2024-05-24 NOTE — PROGRESS NOTES
[facility] - Cardiology [facility] Medicine  Progress Note    Patient Name: Reza Morrow  MRN: [id number]  Patient Class: IP- Inpatient   Admission Date: 5/22/2024  Length of Stay: 1 days  Attending Physician: Shubham Ahuja MD  Primary Care Provider: Kip Jimenez MD        Subjective:     Principal Problem:Acute encephalopathy        HPI:  Reza Morrow is 59 y.o. F with Mhx of polysubstance abuse, Multiple Sclerosis, Hypercholesteremia, Takutsubo Cardiomyopathy, and hypertension who is presenting to the ED for altered mental status via EMS. Patient unwilling to participate in evaluation due to severe agitation and AMS. Spoke to sister on the phone, who provided collateral. Said that this all started last year in June when she was admitted for the cardiomyopathy. At that time she presented to the hospital with restlessness, agitation and AMS. Found to have Takutsubo cardiomyopathy on their evaluation. They sent her home once she was stabilized. For her MS she on many pain medications, which the sister reports that patient has been self dosing. Sister also mentions that patient has started taking ketamine powder and cream as well. Reports that she has times where is erratic and full of energy, and then is followed by crash. Sister reports that patient had previously admitted with mood disorder due to substance use; but sister is unclear exactly what she is using. It is noted that patient had a fall this Sunday and hit her head. Sister does not live with patient; pt lives with a roommate.     PER ED NOTE:  Patient was picked up by EMS after roommate called for altered mental status and unresponsiveness. Upon arrival patient was extremely agitated EMS had to give patient 5 droperidol to get her calmed down. Throughout transport she received 500 cc IV fluids and remained stable throughout transport with no increasing oxygen requirement. Upon arrival here patient unable to take part in history as she is  altered only opening eyes and refusing to answer any questions. Contacted sister who stated that patient has been in and out of hospitals recently for similar episodes. She reports that sister has these continued episodes where she becomes extremely elated active followed by crushing and becoming altered. She believes that patient is on several medications and not taking it as prescribed but as patient feels appropriate increasing doses as she feels. She also believes that patient is abusing substances but is unsure of what exactly. She also notes That patient had a fall 2 days ago in which she had a small bleed she attempted to convince patient's roommates to call the ambulance and bring her to the emergency department but both patient and remained refused.     Overview/Hospital Course:  Patient admitted to hospital medicine for workup of AMS. Patient alert and oriented 2nd day of admission but with pressured speech and tangential. Concern for substance induced vs underlying mood disorder, psychiatry consulted. Neuro consulted given findings concerning for PRESS and global hypoxic event. Pending recs.    Interval History: Patient alert and oriented x4 this morning however with pressured and tangential speech. Psychiatry consulted given history of grief in which she found her  son and heavy pain medication prescription use outpatient. Neuro consulted given MRI findings concerning for PRES and a global hypoxic event.    Review of Systems  Objective:     Vital Signs (Most Recent):  Temp: 98.9 °F (37.2 °C) (24 0754)  Pulse: 80 (24 1133)  Resp: 19 (24 0754)  BP: 125/82 (24 0754)  SpO2: 98 % (24 0754) Vital Signs (24h Range):  Temp:  [98.5 °F (36.9 °C)-99 °F (37.2 °C)] 98.9 °F (37.2 °C)  Pulse:  [] 80  Resp:  [16-20] 19  SpO2:  [96 %-99 %] 98 %  BP: (125-144)/(71-91) 125/82     Weight: 54.2 kg (119 lb 7.8 oz)  Body mass index is 19.88 kg/m².    Intake/Output Summary (Last 24  hours) at 5/24/2024 1516  Last data filed at 5/24/2024 0652  Gross per 24 hour   Intake 97.64 ml   Output 300 ml   Net -202.36 ml         Physical Exam  Vitals and nursing note reviewed.   Constitutional:       Appearance: She is normal weight. She is not toxic-appearing.   HENT:      Head: Normocephalic.      Right Ear: External ear normal.      Left Ear: External ear normal.      Mouth/Throat:      Mouth: Mucous membranes are moist.   Eyes:      General: No scleral icterus.  Cardiovascular:      Rate and Rhythm: Normal rate and regular rhythm.   Pulmonary:      Effort: Pulmonary effort is normal. No respiratory distress.   Abdominal:      General: Abdomen is flat.   Musculoskeletal:      Cervical back: Normal range of motion. No rigidity.      Right lower leg: No edema.      Left lower leg: No edema.   Skin:     General: Skin is warm.   Neurological:      Mental Status: She is alert and oriented to person, place, and time.   Psychiatric:         Speech: Speech is rapid and pressured and tangential.             Significant Labs: All pertinent labs within the past 24 hours have been reviewed.  CBC:   Recent Labs   Lab 05/22/24  2132 05/23/24  0616 05/24/24  0451   WBC 14.13* 12.35 8.53   HGB 16.5* 14.6 13.9   HCT 47.6 43.7 42.9    370 303     CMP:   Recent Labs   Lab 05/22/24  2132 05/23/24  0616 05/23/24  1533 05/23/24  1641 05/23/24  2132 05/24/24  0451    140   < > 144 140 137   K 3.4* 3.2*   < > 3.0* 4.2 4.4    110   < > 116* 107 103   CO2 19* 21*   < > 21* 25 25   * 122*   < > 83 105 90   BUN 16 20   < > 19 21* 19   CREATININE 1.1 1.0   < > 0.6 0.9 0.9   CALCIUM 10.0 9.4   < > 7.2* 8.8 9.2   PROT 8.4 7.5  --  5.6*  --   --    ALBUMIN 3.9 3.6  --  2.7*  --   --    BILITOT 1.2* 1.1*  --  0.9  --   --    ALKPHOS 76 68  --  48*  --   --    AST 16 15  --  12  --   --    ALT 12 12  --  10  --   --    ANIONGAP 13 9   < > 7* 8 9    < > = values in this interval not displayed.       Significant  Imaging: I have reviewed all pertinent imaging results/findings within the past 24 hours.    Assessment/Plan:      * Acute encephalopathy  Patient altered on admission, and unable to answer orientation questions. Sister on the phone reports recent head trauma, and presented with dry bloody lips. VBG c/f respiratory alkalosis and CMP with evidence of metabolic acidosis. Clinical picturing pointing towards drug withdrawal/intoxication. No seizure-like activity. No significant electrolyte derangement. Recent thyroid panel with evidence of subclinical hypothyroidism. High suspicion that AMS is related to polypharmacy and substance use.     Recent Labs     05/23/24  0205   PH 7.573*   PCO2 20.3*   PO2 55   HCO3 18.7*   POCSATURATED 93   BE -3*       CT head without evidence of acute bleed. MRI brain concerning for PRES vs encephalitis and global hypoxic event. Mental status improved and patient is Aox4.  -neuro consulted, appreciate assistance      Agitation  Improved however still feeling anxious.  - on zyprexa PRN for anxiety    Drug withdrawal  Opioid dependence    UDS is positive only for THC. However, symptoms are more indicative of opioid withdrawal. Known persistent use of opioids for pain. Per EMR she is on oxy 30mg q4 PRN and Nucynta, and family-reported Ketamine use. This appears to align with her previous presentation at [facility], as per the sister. EKG sinus tach, Trop 0.031,  (not the highest on record), CPK wnl. Lactate wnl, alcohol wnl.     Plan:  - telemetry   - replenish lytes as needed  - CIWA protocol, ethanol negative on admit  - require straight cath in the ED, no longer retaining  - resuming some opioids given long standing history of use and improvement in mental status    Hypokalemia  Patient has hypokalemia which is Acute and currently controlled. Most recent potassium levels reviewed-   Lab Results   Component Value Date    K 4.4 05/24/2024   . Will continue potassium replacement  per protocol and recheck repeat levels after replacement completed.     Takotsubo cardiomyopathy  Diagnosed in 2023.     Echo Result Date: 4/24/2024    Left Ventricle: The left ventricle is normal in size. There is   concentric remodeling. Mild global hypokinesis present. There is mildly   reduced systolic function with a visually estimated ejection fraction of   45 - 50%.    Right Ventricle: Normal right ventricular cavity size. Wall thickness   is normal. Right ventricle wall motion  is normal. Systolic function is   normal.    IVC/SVC: Normal venous pressure at 3 mmHg.      MS (multiple sclerosis)  Recent MRI 04/2024 with evidence of Moderate supratentorial leukoencephalopathy. Patient describes flares as AMS and lethargy.  -resuming some home pain meds as needed    Opioid dependence  Large amounts of opioids prescribed outpatient however UDS negative for opioids on admission. Psychiatry consulted. Pending recommendations.  -f/u repeat UDS        VTE Risk Mitigation (From admission, onward)           Ordered     enoxaparin injection 40 mg  Every 24 hours         05/24/24 0906     IP VTE LOW RISK PATIENT  Once         05/23/24 0301     Place sequential compression device  Until discontinued         05/23/24 0301                    Discharge Planning   JOANNE: 5/25/2024     Code Status: Full Code   Is the patient medically ready for discharge?:     Reason for patient still in hospital (select all that apply): Consult recommendations                     Carrie Isbell MD  Department of Hospital Medicine   [facility] - Cardiology Step[facility]

## 2024-05-24 NOTE — SUBJECTIVE & OBJECTIVE
Interval History: Patient alert and oriented x4 this morning however with pressured and tangential speech. Psychiatry consulted given history of grief in which she found her  son and heavy pain medication prescription use outpatient. Neuro consulted given MRI findings concerning for PRES and a global hypoxic event.    Review of Systems  Objective:     Vital Signs (Most Recent):  Temp: 98.9 °F (37.2 °C) (24 0754)  Pulse: 80 (24 1133)  Resp: 19 (24 0754)  BP: 125/82 (24 0754)  SpO2: 98 % (24 0754) Vital Signs (24h Range):  Temp:  [98.5 °F (36.9 °C)-99 °F (37.2 °C)] 98.9 °F (37.2 °C)  Pulse:  [] 80  Resp:  [16-20] 19  SpO2:  [96 %-99 %] 98 %  BP: (125-144)/(71-91) 125/82     Weight: 54.2 kg (119 lb 7.8 oz)  Body mass index is 19.88 kg/m².    Intake/Output Summary (Last 24 hours) at 2024 1516  Last data filed at 2024 0652  Gross per 24 hour   Intake 97.64 ml   Output 300 ml   Net -202.36 ml         Physical Exam  Vitals and nursing note reviewed.   Constitutional:       Appearance: She is normal weight. She is not toxic-appearing.   HENT:      Head: Normocephalic.      Right Ear: External ear normal.      Left Ear: External ear normal.      Mouth/Throat:      Mouth: Mucous membranes are moist.   Eyes:      General: No scleral icterus.  Cardiovascular:      Rate and Rhythm: Normal rate and regular rhythm.   Pulmonary:      Effort: Pulmonary effort is normal. No respiratory distress.   Abdominal:      General: Abdomen is flat.   Musculoskeletal:      Cervical back: Normal range of motion. No rigidity.      Right lower leg: No edema.      Left lower leg: No edema.   Skin:     General: Skin is warm.   Neurological:      Mental Status: She is alert and oriented to person, place, and time.   Psychiatric:         Speech: Speech is rapid and pressured and tangential.             Significant Labs: All pertinent labs within the past 24 hours have been reviewed.  CBC:   Recent  Labs   Lab 05/22/24 2132 05/23/24  0616 05/24/24  0451   WBC 14.13* 12.35 8.53   HGB 16.5* 14.6 13.9   HCT 47.6 43.7 42.9    370 303     CMP:   Recent Labs   Lab 05/22/24 2132 05/23/24  0616 05/23/24  1533 05/23/24  1641 05/23/24 2132 05/24/24  0451    140   < > 144 140 137   K 3.4* 3.2*   < > 3.0* 4.2 4.4    110   < > 116* 107 103   CO2 19* 21*   < > 21* 25 25   * 122*   < > 83 105 90   BUN 16 20   < > 19 21* 19   CREATININE 1.1 1.0   < > 0.6 0.9 0.9   CALCIUM 10.0 9.4   < > 7.2* 8.8 9.2   PROT 8.4 7.5  --  5.6*  --   --    ALBUMIN 3.9 3.6  --  2.7*  --   --    BILITOT 1.2* 1.1*  --  0.9  --   --    ALKPHOS 76 68  --  48*  --   --    AST 16 15  --  12  --   --    ALT 12 12  --  10  --   --    ANIONGAP 13 9   < > 7* 8 9    < > = values in this interval not displayed.       Significant Imaging: I have reviewed all pertinent imaging results/findings within the past 24 hours.

## 2024-05-24 NOTE — ASSESSMENT & PLAN NOTE
Diagnosed in 2023.     Echo Result Date: 4/24/2024    Left Ventricle: The left ventricle is normal in size. There is   concentric remodeling. Mild global hypokinesis present. There is mildly   reduced systolic function with a visually estimated ejection fraction of   45 - 50%.    Right Ventricle: Normal right ventricular cavity size. Wall thickness   is normal. Right ventricle wall motion  is normal. Systolic function is   normal.    IVC/SVC: Normal venous pressure at 3 mmHg.

## 2024-05-24 NOTE — ASSESSMENT & PLAN NOTE
Large amounts of opioids prescribed outpatient however UDS negative for opioids on admission. Psychiatry consulted. Pending recommendations.  -f/u repeat UDS

## 2024-05-24 NOTE — ASSESSMENT & PLAN NOTE
59-year-old female with a significant history of polysubstance abuse, reported MS, and other medical conditions, presenting with AMS. Her condition has been deteriorating over the past year, marked by episodes of extreme activity followed by periods of severe lethargy and altered mental states. MRI findings are concerning for PRES  Her presentation includes both acute changes and long-standing issues, complicating the diagnosis and management.    From a neurological perspective, the patient's AMS could be attributed to several factors, including PRES, seizure, and potential substance-induced encephalopathy in the setting of baseline psychiatric history.  Encephalopathy / CNS infections less likely given patient appears to be at baseline. The patient's history of MS and chronic opioid use, combined with recent ketamine use, further complicates the clinical picture, suggesting multiple potential etiologies. The timeline indicates an acute exacerbation of underlying chronic conditions, with recent substance use vs a traumatic event potentially triggering the current episode.    Recommendations:  -- CTA head and neck for possible RCVS and general vascular evaluation to complete work-up  -- Routine EEG ; if negative, little concern for seizures  -- Consider possibility of infections (though less likely in setting of only one febrile reading and transient leukocytosis, which could be explained by possible seizure event)   -- Neurology will continue to follow, please call with questions or concerns

## 2024-05-24 NOTE — CONSULTS
[facility] - Card[facility] Stepdown  Neurology  Consult Note    Patient Name: Reza Morrow  MRN: [id number]  Admission Date: 5/22/2024  Hospital Length of Stay: 1 days  Code Status: Full Code   Attending Provider: Shubham Ahuja MD   Consulting Provider: Porter Martínez MD  Primary Care Physician: Kip Jimenez MD  Principal Problem:Acute encephalopathy    Inpatient consult to Neurology  Consult performed by: Porter Martínez MD  Consult ordered by: Carrie Isbell MD         Subjective:     Chief Complaint:  Encephalopathy     HPI:   Neurology consulted for AMS, lethargy, and abnormal MRI brain findings in Ms Reza Morrow, a 59-year-old female with a medical history of polysubstance abuse, MS, hypercholesterolemia, Takotsubo cardiomyopathy, and hypertension presented to the ED with AMS. EMS brought the patient in due to unresponsiveness and severe agitation. On arrival, she was uncooperative and could only open her eyes, refusing to answer any questions. Her sister, reported that the patients issues began last June when she was admitted for Takotsubo cardiomyopathy, presenting with restlessness, agitation, and AMS. Following stabilization, she was discharged. The patient, on chronic pain medications for MS, has been self-dosing and using ketamine powder and cream. The sister described episodes of erratic energy followed by crashes. 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 The patient had a fall on Sunday, hitting her head, but refused medical assistance. The sister noted frequent hospital visits for similar episodes, where the patient would become highly active and then suddenly altered. The sister believes the patient abuses substances but is unsure of specifics. The roommate, who lives with the patient, called EMS for her AMS and unresponsiveness. EMS administered droperidol for agitation during transport, and she received 500 cc IV fluids, remaining stable with no increasing  oxygen requirements.    Upon admission, the patient exhibited pressured and tangential speech by the second day. Psychiatry was consulted due to concerns about a substance-induced mood disorder versus an underlying mood disorder. Neuro consultation was sought due to MRI findings suggestive of PRES and a global hypoxic event. MRI revealed new areas of cortical/subcortical T2/FLAIR signal hyperintensity in the fontal, parietal, and occipital lobes and the left cerebellar hemisphere, which could indicate PRES or other encephalitis processes. Additionally, there were subtle symmetric diffusion signal abnormalities about the basal ganglia, possibly indicating a global hypoxic ischemic event. Chronic microvascular ischemic changes were also noted.     Past Medical History:   Diagnosis Date    Behavioral problem     Bulging lumbar disc     Bursitis     bilateral hip    Degenerative cervical disc     Hx of psychiatric care     Hypercholesteremia     Labral tear of hip, degenerative bilateral    MS (multiple sclerosis)     Paresthesia of both lower extremities 3/13/2022    Pneumonia     Spinal cord compression        Past Surgical History:   Procedure Laterality Date    ANGIOGRAM, CORONARY, WITH LEFT HEART CATHETERIZATION Left 3/11/2022    Procedure: Angiogram, Coronary, with Left Heart Cath;  Surgeon: Elie Matamoros MD;  Location: [facility] CATH LAB;  Service: Cardiology;  Laterality: Left;    BREAST SURGERY      augmentation     SECTION, CLASSIC      HAND SURGERY      HYSTEROSCOPY      NECK SURGERY      cervical disc removeal    TUBAL LIGATION      X-STOP IMPLANTATION         Review of patient's allergies indicates:   Allergen Reactions    Adhesive Hives    Neosporin [neomycin-bacitracin-polymyxin] Hives    Neomycin     Neomycin-polymyxin Hives    Neosporin g.u. irrigant     Penicillins Other (See Comments)     Unknown  reaction    Latex Rash       No current facility-administered medications on file prior to encounter.      Current Outpatient Medications on File Prior to Encounter   Medication Sig    furosemide (LASIX) 40 MG tablet Take 1 tablet (40 mg total) by mouth daily as needed (for worsenign shortnes of breath, swelling, or 3lb weight gain in 1 day/5lb weight gain in 1 week).    gabapentin (NEURONTIN) 300 MG capsule Take 300 mg by mouth daily as needed (Pain).    glatiramer (COPAXONE, GLATOPA) 40 mg/mL injection Inject 40 mg into the skin 3 (three) times a week.    ibuprofen (ADVIL,MOTRIN) 200 MG tablet Take 400 mg by mouth daily as needed for Pain.    NUCYNTA  mg Tb12 Take 100 mg by mouth 2 (two) times a day.    oxyCODONE (ROXICODONE) 30 MG Tab Take 5 mg by mouth every 6 (six) hours as needed (Pain).    pregabalin (LYRICA) 100 MG capsule Take 100 mg by mouth 3 (three) times daily.    UNABLE TO FIND Ketamine 10 % Lipomax - Apply 1 gm 3 to 4 times daily for treatment of Pain.    UNABLE TO FIND Piroxicam/gabapentin/lidocaine/prilocaine - Apply 4 gm topically 3 to 4 times for treatment of Pain.    venlafaxine (EFFEXOR-XR) 75 MG 24 hr capsule Take 1 capsule (75 mg total) by mouth once daily.    COVID-19 AT-HOME TEST Kit TEST AS DIRECTED    naloxone (NARCAN) 4 mg/actuation Spry 1 spray by Nasal route once as needed.    triamcinolone acetonide 0.1% (KENALOG) 0.1 % cream Apply 1 applicator topically 4 (four) times daily as needed.    [DISCONTINUED] atorvastatin (LIPITOR) 20 MG tablet Take 20 mg by mouth once daily.    [DISCONTINUED] EScitalopram oxalate (LEXAPRO) 10 MG tablet Take 10 mg by mouth once daily.     Family History       Problem Relation (Age of Onset)    Bipolar disorder Brother    COPD Mother    Cancer Father    Diabetes Father, Sister    Drug abuse Mother    Heart disease Maternal Uncle    Hypothyroidism Maternal Grandmother    Lung cancer Father          Tobacco Use    Smoking status: Former     Current packs/day: 0.00     Types: Cigarettes     Quit date: 8/1/2013     Years since quitting: 10.8    Smokeless  tobacco: Never   Substance and Sexual Activity    Alcohol use: No    Drug use: Yes     Types: Benzodiazepines, Marijuana    Sexual activity: Not Currently     Partners: Male     Review of Systems   Constitutional:  Positive for activity change and fever.   HENT:  Negative for sore throat.    Eyes:  Negative for pain.   Respiratory:  Negative for chest tightness and shortness of breath.    Cardiovascular:  Negative for chest pain.   Gastrointestinal:  Negative for abdominal distention.   Genitourinary:  Negative for difficulty urinating.   Musculoskeletal:  Negative for myalgias.   Neurological:  Negative for facial asymmetry and speech difficulty.   Psychiatric/Behavioral:  Positive for behavioral problems, confusion and decreased concentration.      Objective:     Vital Signs (Most Recent):  Temp: 98.9 °F (37.2 °C) (05/24/24 0754)  Pulse: 105 (05/24/24 1518)  Resp: 19 (05/24/24 0754)  BP: 125/82 (05/24/24 0754)  SpO2: 98 % (05/24/24 0754) Vital Signs (24h Range):  Temp:  [98.5 °F (36.9 °C)-99 °F (37.2 °C)] 98.9 °F (37.2 °C)  Pulse:  [] 105  Resp:  [16-20] 19  SpO2:  [96 %-99 %] 98 %  BP: (125-144)/(71-91) 125/82     Weight: 54.2 kg (119 lb 7.8 oz)  Body mass index is 19.88 kg/m².     Physical Exam  Vitals and nursing note reviewed.   Constitutional:       Appearance: She is normal weight. She is not toxic-appearing.   HENT:      Head: Normocephalic.      Right Ear: External ear normal.      Left Ear: External ear normal.      Mouth/Throat:      Mouth: Mucous membranes are moist.   Eyes:      General: No scleral icterus.  Cardiovascular:      Rate and Rhythm: Normal rate.   Pulmonary:      Effort: Pulmonary effort is normal. No respiratory distress.   Abdominal:      General: Abdomen is flat.   Musculoskeletal:      Cervical back: Normal range of motion. No rigidity.      Right lower leg: No edema.      Left lower leg: No edema.   Skin:     General: Skin is warm.   Neurological:      Mental Status: She is  alert.   Psychiatric:         Speech: Speech is rapid and pressured and tangential.            LOC: alert  Attention Span: poor  Language: No aphasia  Articulation: No dysarthria  Orientation: Person, Place, Time   Visual Fields: Full  EOM (CN III, IV, VI): Full/intact  Pupils (CN II, III): PERRL  Facial Sensation (CN V): Normal  Facial Movement (CN VII): Symmetric facial expression    Reflexes: 2+ in BUE and 1+ in BLE; downgoing toes  Motor: Arm left  4/5  Leg left  4/5  Arm right  4/5  Leg right 4/5  Cerebellum: No evidence of appendicular or axial ataxia  Sensation: Intact to light touch, temperature and vibration  Tone: Normal tone throughout      Significant Labs: All pertinent lab results from the past 24 hours have been reviewed.    Significant Imaging: I have reviewed all pertinent imaging results/findings within the past 24 hours.  Assessment and Plan:     * Acute encephalopathy  59-year-old female with a significant history of polysubstance abuse, reported MS, and other medical conditions, presenting with AMS. Her condition has been deteriorating over the past year, marked by episodes of extreme activity followed by periods of severe lethargy and altered mental states. MRI findings are concerning for PRES  Her presentation includes both acute changes and long-standing issues, complicating the diagnosis and management.    From a neurological perspective, the patient's AMS could be attributed to several factors, including PRES, seizure, and potential substance-induced encephalopathy in the setting of baseline psychiatric history.  Encephalopathy / CNS infections less likely given patient appears to be at baseline. The patient's history of MS and chronic opioid use, combined with recent ketamine use, further complicates the clinical picture, suggesting multiple potential etiologies. The timeline indicates an acute exacerbation of underlying chronic conditions, with recent substance use vs a traumatic event  potentially triggering the current episode.    Recommendations:  -- CTA head and neck for possible RCVS and general vascular evaluation to complete work-up  -- Routine EEG ; if negative, little concern for seizures  -- Consider possibility of infections (though less likely in setting of only one febrile reading and transient leukocytosis, which could be explained by possible seizure event)   -- Neurology will continue to follow, please call with questions or concerns    MS (multiple sclerosis)  Unclear history of MS without confirmation of enhancing lesions in the past, though she has reportedly had spinal lesions per chart review.        VTE Risk Mitigation (From admission, onward)           Ordered     enoxaparin injection 40 mg  Every 24 hours         05/24/24 0906     IP VTE LOW RISK PATIENT  Once         05/23/24 0301     Place sequential compression device  Until discontinued         05/23/24 0301                    Thank you for your consult. I will follow-up with patient. Please contact us if you have any additional questions.    Porter Martínez MD  Neurology  [facility] - Cardiology Stepdown

## 2024-05-24 NOTE — CARE UPDATE
"RAPID RESPONSE NURSE CHART REVIEW        Chart Reviewed: 05/24/2024, 1:24 AM    MRN: [id number]  Bed: 354/354 A    Dx: Acute encephalopathy    Reza Morrow has a past medical history of Behavioral problem, Bulging lumbar disc, Bursitis, Degenerative cervical disc, psychiatric care, Hypercholesteremia, Labral tear of hip, degenerative, MS (multiple sclerosis), Paresthesia of both lower extremities, Pneumonia, and Spinal cord compression.    Last VS: BP (!) 144/71 (BP Location: Left arm, Patient Position: Lying)   Pulse 102   Temp 98.5 °F (36.9 °C) (Oral)   Resp 18   Ht 5' 5" (1.651 m)   Wt 54.2 kg (119 lb 7.8 oz)   SpO2 99%   BMI 19.88 kg/m²     24H Vital Sign Range:  Temp:  [98.2 °F (36.8 °C)-100.6 °F (38.1 °C)]   Pulse:  []   Resp:  [12-26]   BP: (116-174)/(71-98)   SpO2:  [95 %-100 %]     Level of Consciousness (AVPU): alert    Recent Labs     05/22/24  2132 05/23/24  0616   WBC 14.13* 12.35   HGB 16.5* 14.6   HCT 47.6 43.7    370       Recent Labs     05/23/24  0616 05/23/24  1533 05/23/24  1641 05/23/24  2132    143 144 140   K 3.2* 2.7* 3.0* 4.2    116* 116* 107   CO2 21* 20* 21* 25   BUN 20 19 19 21*   CREATININE 1.0 0.6 0.6 0.9   * 82 83 105   PHOS 2.5*  --   --   --    MG 1.8  --   --   --         Recent Labs     05/23/24  0205   PH 7.573*   PCO2 20.3*   PO2 55   HCO3 18.7*   POCSATURATED 93   BE -3*          MEWS score: 2    Charge Kevon SAENZ  No additional concerns verbalized at this time. Instructed to call [phone] for further concerns or assistance.    Darline Thomas RN        "

## 2024-05-24 NOTE — PLAN OF CARE
SW spoke to Pt at bedside to discuss discharge planning and complete initial discharge planning assessment.   Pt complained that many people had come to her room to discuss her drug use which is limited to legal marijuana and her prescribed medications so she felt it was excessive. SW empathized and did not ask about drug use.     SW confirmed demographics and emergency contacts.  Pt lives with a roommate named Benton and has support from he son and sister, one of whom will transport Pt and assist after D/C if needed. Discharge Plan A and Plan B have been determined by review of patient's clinical status, future medical and therapeutic needs, and discussion with multidisciplinary team members as well as coverage/benefits for post-acute care.   Discharge Planning booklet with CM/SW phone numbers provided. SW is following for post-acute planning needs.    Sophie Mancini [facility]  Case Management Department  [email or web address]      [facility] - Cardiology Stepdown  Initial Discharge Assessment       Primary Care Provider: Kip Jimenez MD    Admission Diagnosis: Altered mental status [R41.82]  Chest pain [R07.9]    Admission Date: 5/22/2024  Expected Discharge Date: 5/26/2024    Transition of Care Barriers: None    Payor: [facility] [place] CONNECTIONS / Plan: [facility] MARKETPLACE / Product Type: Commercial /     Extended Emergency Contact Information  Primary Emergency Contact: Celeste Olivarez   [facility]  Home Phone: [phone]  Relation: Sister  Secondary Emergency Contact: Jono Ceballos   [place]  Mobile Phone: [phone]  Relation: Son   needed? No    Discharge Plan A: Home with family  Discharge Plan B: Home with family, Home Health      [facility]  [address]  [facility] [address]  Phone: [phone] Fax: [phone]    [place] [id number] [facility] [place] - [address] [facility] [address] [facility] [address]  [facility] [address]  Phone: [phone] Fax: [phone]    [id number] - [place] - [address] AT [facility] OF [address]  [address] [place] [address]  Phone: [phone] Fax: [phone]      Initial Assessment (most recent)       Adult Discharge Assessment - 05/24/24 1626          Discharge Assessment    Assessment Type Discharge Planning Assessment     Confirmed/corrected address, phone number and insurance Yes     Confirmed Demographics Correct on Facesheet     Source of Information patient     Communicated JOANNE with patient/caregiver No     People in Home friend(s)     Do you expect to return to your current living situation? Yes     Do you have help at home or someone to help you manage your care at home? Yes     Who are your caregiver(s) and their phone number(s)? Roommate, Benton. Celeste Ye [phone]     Prior to hospitilization cognitive status: Alert/Oriented;Unable to Assess     Current cognitive status: Alert/Oriented     Walking or Climbing Stairs Difficulty no     Dressing/Bathing Difficulty no     Home Accessibility wheelchair accessible     Equipment Currently Used at Home none     Readmission within 30 days? Yes     Patient currently being followed by outpatient case management? No     Do you currently have service(s) that help you manage your care at home? No     Do you take prescription medications? Yes     Do you have prescription coverage? Yes     Do you have any problems affording any of your prescribed medications? No     Is the patient taking medications as prescribed? yes     Who is going to help you get home at discharge? Son, or roommate     How do you get to doctors appointments? family or friend will provide     Are you on dialysis? No     Do you take coumadin? No     Discharge Plan A Home with family     Discharge Plan B Home with family;Home Health     DME Needed Upon Discharge   none     Discharge Plan discussed with: Patient     Transition of Care Barriers None        Housing Stability    In the last 12 months, was there a time when you were not able to pay the mortgage or rent on time? No     At any time in the past 12 months, were you homeless or living in a shelter (including now)? No        Transportation Needs    Has the lack of transportation kept you from medical appointments, meetings, work or from getting things needed for daily living? No        Food Insecurity    Within the past 12 months, you worried that your food would run out before you got the money to buy more. Never true     Within the past 12 months, the food you bought just didn't last and you didn't have money to get more. Never true        Stress    Do you feel stress - tense, restless, nervous, or anxious, or unable to sleep at night because your mind is troubled all the time - these days? Only a little        Social Isolation    How often do you feel lonely or isolated from those around you?  Sometimes        Alcohol Use    Q1: How often do you have a drink containing alcohol? Monthly or less     Q2: How many drinks containing alcohol do you have on a typical day when you are drinking? 1 or 2     Q3: How often do you have six or more drinks on one occasion? Never        Utilities    In the past 12 months has the electric, gas, oil, or water company threatened to shut off services in your home? No        Health Literacy    How often do you need to have someone help you when you read instructions, pamphlets, or other written material from your doctor or pharmacy? Rarely

## 2024-05-24 NOTE — HPI
Neurology consulted for AMS, lethargy, and abnormal MRI brain findings in Ms Reza Morrow, a 59-year-old female with a medical history of polysubstance abuse, MS, hypercholesterolemia, Takotsubo cardiomyopathy, and hypertension presented to the ED with AMS. EMS brought the patient in due to unresponsiveness and severe agitation. On arrival, she was uncooperative and could only open her eyes, refusing to answer any questions. Her sister, reported that the patients issues began last June when she was admitted for Takotsubo cardiomyopathy, presenting with restlessness, agitation, and AMS. Following stabilization, she was discharged. The patient, on chronic pain medications for MS, has been self-dosing and using ketamine powder and cream. The sister described episodes of erratic energy followed by crashes. 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 The patient had a fall on Sunday, hitting her head, but refused medical assistance. The sister noted frequent hospital visits for similar episodes, where the patient would become highly active and then suddenly altered. The sister believes the patient abuses substances but is unsure of specifics. The roommate, who lives with the patient, called EMS for her AMS and unresponsiveness. EMS administered droperidol for agitation during transport, and she received 500 cc IV fluids, remaining stable with no increasing oxygen requirements.    Upon admission, the patient exhibited pressured and tangential speech by the second day. Psychiatry was consulted due to concerns about a substance-induced mood disorder versus an underlying mood disorder. Neuro consultation was sought due to MRI findings suggestive of PRES and a global hypoxic event. MRI revealed new areas of cortical/subcortical T2/FLAIR signal hyperintensity in the fontal, parietal, and occipital lobes and the left cerebellar hemisphere, which could indicate PRES or other encephalitis processes.  Additionally, there were subtle symmetric diffusion signal abnormalities about the basal ganglia, possibly indicating a global hypoxic ischemic event. Chronic microvascular ischemic changes were also noted.

## 2024-05-24 NOTE — PROGRESS NOTES
Pharmacist Renal Dose Adjustment Note    Reza Morrow is a 59 y.o. female being treated with the medication enoxaparin    Patient Data:    Vital Signs (Most Recent):  Temp: 98.9 °F (37.2 °C) (05/24/24 0754)  Pulse: 86 (05/24/24 0754)  Resp: 19 (05/24/24 0754)  BP: 125/82 (05/24/24 0754)  SpO2: 98 % (05/24/24 0754) Vital Signs (72h Range):  Temp:  [98.2 °F (36.8 °C)-100.6 °F (38.1 °C)]   Pulse:  []   Resp:  [12-26]   BP: (116-174)/()   SpO2:  [95 %-100 %]      Recent Labs   Lab 05/23/24  1641 05/23/24  2132 05/24/24  0451   CREATININE 0.6 0.9 0.9     Serum creatinine: 0.9 mg/dL 05/24/24 0451  Estimated creatinine clearance: 58.3 mL/min    Medication:enoxaparin 30mg qd will be adjusted to enoxaparin 40mg qd as pt is >50kg    Pharmacist's Name: Danielle Vang  Pharmacist's Extension: [phone]

## 2024-05-24 NOTE — ASSESSMENT & PLAN NOTE
Recent MRI 04/2024 with evidence of Moderate supratentorial leukoencephalopathy. Patient describes flares as AMS and lethargy.  -resuming some home pain meds as needed

## 2024-05-24 NOTE — ASSESSMENT & PLAN NOTE
Opioid dependence    UDS is positive only for THC. However, symptoms are more indicative of opioid withdrawal. Known persistent use of opioids for pain. Per EMR she is on oxy 30mg q4 PRN and Nucynta, and family-reported Ketamine use. This appears to align with her previous presentation at [facility], as per the sister. EKG sinus tach, Trop 0.031,  (not the highest on record), CPK wnl. Lactate wnl, alcohol wnl.     Plan:  - telemetry   - replenish lytes as needed  - CIWA protocol, ethanol negative on admit  - require straight cath in the ED, no longer retaining  - resuming some opioids given long standing history of use and improvement in mental status

## 2024-05-25 LAB
ANION GAP SERPL CALC-SCNC: 10 MMOL/L (ref 8–16)
BASOPHILS # BLD AUTO: 0.03 K/UL (ref 0–0.2)
BASOPHILS NFR BLD: 0.6 % (ref 0–1.9)
BUN SERPL-MCNC: 12 MG/DL (ref 6–20)
CALCIUM SERPL-MCNC: 9.4 MG/DL (ref 8.7–10.5)
CHLORIDE SERPL-SCNC: 100 MMOL/L (ref 95–110)
CO2 SERPL-SCNC: 27 MMOL/L (ref 23–29)
CREAT SERPL-MCNC: 0.8 MG/DL (ref 0.5–1.4)
DIFFERENTIAL METHOD BLD: NORMAL
EOSINOPHIL # BLD AUTO: 0 K/UL (ref 0–0.5)
EOSINOPHIL NFR BLD: 0.6 % (ref 0–8)
ERYTHROCYTE [DISTWIDTH] IN BLOOD BY AUTOMATED COUNT: 14 % (ref 11.5–14.5)
EST. GFR  (NO RACE VARIABLE): >60 ML/MIN/1.73 M^2
GLUCOSE SERPL-MCNC: 89 MG/DL (ref 70–110)
HCT VFR BLD AUTO: 42.4 % (ref 37–48.5)
HGB BLD-MCNC: 14 G/DL (ref 12–16)
IMM GRANULOCYTES # BLD AUTO: 0.02 K/UL (ref 0–0.04)
IMM GRANULOCYTES NFR BLD AUTO: 0.4 % (ref 0–0.5)
LYMPHOCYTES # BLD AUTO: 1.6 K/UL (ref 1–4.8)
LYMPHOCYTES NFR BLD: 34.2 % (ref 18–48)
MAGNESIUM SERPL-MCNC: 2 MG/DL (ref 1.6–2.6)
MCH RBC QN AUTO: 29.7 PG (ref 27–31)
MCHC RBC AUTO-ENTMCNC: 33 G/DL (ref 32–36)
MCV RBC AUTO: 90 FL (ref 82–98)
MONOCYTES # BLD AUTO: 0.3 K/UL (ref 0.3–1)
MONOCYTES NFR BLD: 7.1 % (ref 4–15)
NEUTROPHILS # BLD AUTO: 2.7 K/UL (ref 1.8–7.7)
NEUTROPHILS NFR BLD: 57.1 % (ref 38–73)
NRBC BLD-RTO: 0 /100 WBC
PHOSPHATE SERPL-MCNC: 4 MG/DL (ref 2.7–4.5)
PLATELET # BLD AUTO: 280 K/UL (ref 150–450)
PMV BLD AUTO: 10.6 FL (ref 9.2–12.9)
POTASSIUM SERPL-SCNC: 4.1 MMOL/L (ref 3.5–5.1)
RBC # BLD AUTO: 4.72 M/UL (ref 4–5.4)
SODIUM SERPL-SCNC: 137 MMOL/L (ref 136–145)
WBC # BLD AUTO: 4.77 K/UL (ref 3.9–12.7)

## 2024-05-25 PROCEDURE — 20600001 HC STEP DOWN PRIVATE ROOM

## 2024-05-25 PROCEDURE — 25500020 PHARM REV CODE 255: Performed by: STUDENT IN AN ORGANIZED HEALTH CARE EDUCATION/TRAINING PROGRAM

## 2024-05-25 PROCEDURE — 25000003 PHARM REV CODE 250

## 2024-05-25 PROCEDURE — 83735 ASSAY OF MAGNESIUM: CPT

## 2024-05-25 PROCEDURE — 36415 COLL VENOUS BLD VENIPUNCTURE: CPT

## 2024-05-25 PROCEDURE — 63600175 PHARM REV CODE 636 W HCPCS: Performed by: STUDENT IN AN ORGANIZED HEALTH CARE EDUCATION/TRAINING PROGRAM

## 2024-05-25 PROCEDURE — 84100 ASSAY OF PHOSPHORUS: CPT

## 2024-05-25 PROCEDURE — 99233 SBSQ HOSP IP/OBS HIGH 50: CPT | Mod: ,,, | Performed by: STUDENT IN AN ORGANIZED HEALTH CARE EDUCATION/TRAINING PROGRAM

## 2024-05-25 PROCEDURE — 95816 EEG AWAKE AND DROWSY: CPT

## 2024-05-25 PROCEDURE — 85025 COMPLETE CBC W/AUTO DIFF WBC: CPT

## 2024-05-25 PROCEDURE — 80048 BASIC METABOLIC PNL TOTAL CA: CPT | Performed by: INTERNAL MEDICINE

## 2024-05-25 PROCEDURE — 95816 EEG AWAKE AND DROWSY: CPT | Mod: 26,,, | Performed by: PSYCHIATRY & NEUROLOGY

## 2024-05-25 RX ADMIN — OXYCODONE 10 MG: 5 TABLET ORAL at 05:05

## 2024-05-25 RX ADMIN — IOHEXOL 75 ML: 350 INJECTION, SOLUTION INTRAVENOUS at 04:05

## 2024-05-25 RX ADMIN — OXYCODONE 10 MG: 5 TABLET ORAL at 11:05

## 2024-05-25 RX ADMIN — ENOXAPARIN SODIUM 40 MG: 40 INJECTION SUBCUTANEOUS at 05:05

## 2024-05-25 RX ADMIN — OXYCODONE 10 MG: 5 TABLET ORAL at 09:05

## 2024-05-25 RX ADMIN — OLANZAPINE 5 MG: 2.5 TABLET, FILM COATED ORAL at 09:05
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
## 2024-05-25 NOTE — PLAN OF CARE
Problem: Adult Inpatient Plan of Care  Goal: Plan of Care Review  Outcome: Progressing  Goal: Patient-Specific Goal (Individualized)  Outcome: Progressing  Goal: Absence of Hospital-Acquired Illness or Injury  Outcome: Progressing  Goal: Optimal Comfort and Wellbeing  Outcome: Progressing  Goal: Readiness for Transition of Care  Outcome: Progressing     Problem: Fall Injury Risk  Goal: Absence of Fall and Fall-Related Injury  Outcome: Progressing   Plan of care discussed with patient. Patient is free of fall/trauma/injury. Denies CP, SOB, or pain/discomfort. All questions addressed. Will continue to monitor. AAOx4 and VSS. Bed alarm set and tele sitter at bedside.

## 2024-05-25 NOTE — PROGRESS NOTES
[facility]  Neurology  Progress Note    Patient Name: Rzea Morrow  MRN: [id number]  Admission Date: 5/22/2024  Hospital Length of Stay: 2 days  Code Status: Full Code   Attending Provider: Shubham Ahuja MD  Primary Care Physician: Kip Jimenez MD   Principal Problem:Acute encephalopathy    HPI:   Neurology consulted for AMS, lethargy, and abnormal MRI brain findings in Ms Reza Morrow, a 59-year-old female with a medical history of polysubstance abuse, MS, hypercholesterolemia, Takotsubo cardiomyopathy, and hypertension presented to the ED with AMS. EMS brought the patient in due to unresponsiveness and severe agitation. On arrival, she was uncooperative and could only open her eyes, refusing to answer any questions. Her sister, reported that the patients issues began last June when she was admitted for Takotsubo cardiomyopathy, presenting with restlessness, agitation, and AMS. Following stabilization, she was discharged. The patient, on chronic pain medications for MS, has been self-dosing and using ketamine powder and cream. The sister described episodes of erratic energy followed by crashes. 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 The patient had a fall on Sunday, hitting her head, but refused medical assistance. The sister noted frequent hospital visits for similar episodes, where the patient would become highly active and then suddenly altered. The sister believes the patient abuses substances but is unsure of specifics. The roommate, who lives with the patient, called EMS for her AMS and unresponsiveness. EMS administered droperidol for agitation during transport, and she received 500 cc IV fluids, remaining stable with no increasing oxygen requirements.    Upon admission, the patient exhibited pressured and tangential speech by the second day. Psychiatry was consulted due to concerns about a substance-induced mood disorder versus an underlying mood  disorder. Neuro consultation was sought due to MRI findings suggestive of PRES and a global hypoxic event. MRI revealed new areas of cortical/subcortical T2/FLAIR signal hyperintensity in the fontal, parietal, and occipital lobes and the left cerebellar hemisphere, which could indicate PRES or other encephalitis processes. Additionally, there were subtle symmetric diffusion signal abnormalities about the basal ganglia, possibly indicating a global hypoxic ischemic event. Chronic microvascular ischemic changes were also noted.    Overview/Hospital Course:  No notes on file        Subjective:     Interval History: No overnight events. CTA without LVO. Pending EEG as part of complete neuro work up.     Current Neurological Medications: see below    Current Facility-Administered Medications   Medication Dose Route Frequency Provider Last Rate Last Admin    acetaminophen tablet 650 mg  650 mg Oral Q8H PRN Carrie Isbell MD   650 mg at 05/23/24 1925    cloNIDine tablet 0.1 mg  0.1 mg Oral Q8H PRN April Bass MD        dextrose 10% bolus 125 mL 125 mL  12.5 g Intravenous PRApril Perez MD        dextrose 10% bolus 250 mL 250 mL  25 g Intravenous PRN April Bass MD        enoxaparin injection 40 mg  40 mg Subcutaneous Q24H (prophylaxis, 1700) Shubham Ahuja MD   40 mg at 05/24/24 1650    glucagon (human recombinant) injection 1 mg  1 mg Intramuscular PRApril Perez MD        glucose chewable tablet 16 g  16 g Oral PRApril Perez MD        glucose chewable tablet 24 g  24 g Oral PRApril ePrez MD        ibuprofen tablet 600 mg  600 mg Oral Q6H PRN April Bass MD   600 mg at 05/23/24 1828    loperamide capsule 2 mg  2 mg Oral PRN April Bass MD        naloxone 0.4 mg/mL injection 0.02 mg  0.02 mg Intravenous PRApril Perez MD        OLANZapine tablet 5 mg  5 mg Oral Q6H PRN Sukhi  MD Carrie   5 mg at 05/24/24 2200    ondansetron injection 4 mg  4 mg Intravenous Q6H PRN April Bass MD        oxyCODONE immediate release tablet 5 mg  5 mg Oral Q6H PRN Carrie Isbell MD        oxyCODONE immediate release tablet Tab 10 mg  10 mg Oral Q6H PRN Carrie Isbell MD   10 mg at 05/25/24 0950    sodium chloride 0.9% flush 10 mL  10 mL Intravenous Q12H PRN April Bass MD           Review of Systems   Constitutional:  Positive for activity change and fever.   HENT:  Negative for sore throat.    Eyes:  Negative for pain.   Respiratory:  Negative for chest tightness and shortness of breath.    Cardiovascular:  Negative for chest pain.   Gastrointestinal:  Negative for abdominal distention.   Genitourinary:  Negative for difficulty urinating.   Musculoskeletal:  Negative for myalgias.   Neurological:  Negative for facial asymmetry and speech difficulty.   Psychiatric/Behavioral:  Positive for behavioral problems, confusion and decreased concentration.      Objective:     Vital Signs (Most Recent):  Temp: 98.6 °F (37 °C) (05/25/24 1249)  Pulse: 83 (05/25/24 1249)  Resp: 17 (05/25/24 1249)  BP: 131/74 (05/25/24 1249)  SpO2: 98 % (05/25/24 1249) Vital Signs (24h Range):  Temp:  [97.8 °F (36.6 °C)-99.1 °F (37.3 °C)] 98.6 °F (37 °C)  Pulse:  [] 83  Resp:  [16-18] 17  SpO2:  [96 %-100 %] 98 %  BP: (118-154)/(63-86) 131/74     Weight: 54.2 kg (119 lb 7.8 oz)  Body mass index is 19.88 kg/m².     Physical Exam  Vitals and nursing note reviewed.   Constitutional:       Appearance: She is normal weight. She is not toxic-appearing.   HENT:      Head: Normocephalic.      Right Ear: External ear normal.      Left Ear: External ear normal.      Mouth/Throat:      Mouth: Mucous membranes are moist.   Eyes:      General: No scleral icterus.  Cardiovascular:      Rate and Rhythm: Normal rate.   Pulmonary:      Effort: Pulmonary effort is normal. No respiratory distress.   Abdominal:       General: Abdomen is flat.   Musculoskeletal:      Cervical back: Normal range of motion. No rigidity.      Right lower leg: No edema.      Left lower leg: No edema.   Skin:     General: Skin is warm.   Neurological:      Mental Status: She is alert.   Psychiatric:         Speech: Speech is rapid and pressured and tangential.               Significant Labs: CBC:   Recent Labs   Lab 05/24/24  0451 05/25/24  0510   WBC 8.53 4.77   HGB 13.9 14.0   HCT 42.9 42.4    280     CMP:   Recent Labs   Lab 05/23/24  1641 05/23/24  2132 05/24/24  0451 05/25/24  0510   GLU 83 105 90 89    140 137 137   K 3.0* 4.2 4.4 4.1   * 107 103 100   CO2 21* 25 25 27   BUN 19 21* 19 12   CREATININE 0.6 0.9 0.9 0.8   CALCIUM 7.2* 8.8 9.2 9.4   MG  --   --  1.7 2.0   PROT 5.6*  --   --   --    ALBUMIN 2.7*  --   --   --    BILITOT 0.9  --   --   --    ALKPHOS 48*  --   --   --    AST 12  --   --   --    ALT 10  --   --   --    ANIONGAP 7* 8 9 10       Significant Imaging: I have reviewed all pertinent imaging results/findings within the past 24 hours.  Assessment and Plan:     * Acute encephalopathy  59-year-old female with a significant history of polysubstance abuse, reported MS, and other medical conditions, presenting with AMS. Her condition has been deteriorating over the past year, marked by episodes of extreme activity followed by periods of severe lethargy and altered mental states. MRI findings are concerning for PRES  Her presentation includes both acute changes and long-standing issues, complicating the diagnosis and management.    From a neurological perspective, the patient's AMS could be attributed to several factors, including PRES, seizure, and potential substance-induced encephalopathy in the setting of baseline psychiatric history.  Encephalopathy / CNS infections less likely given patient appears to be at baseline. The patient's history of MS and chronic opioid use, combined with recent ketamine use, further  complicates the clinical picture, suggesting multiple potential etiologies. The timeline indicates an acute exacerbation of underlying chronic conditions, with recent substance use vs a traumatic event potentially triggering the current episode.    Recommendations:  - Pending EEG  - Check serum thiamine and folate   - Continue CIWA  - Will review EEG when available    MS (multiple sclerosis)  Unclear history of MS without confirmation of enhancing lesions in the past, though she has reportedly had spinal lesions per chart review.        VTE Risk Mitigation (From admission, onward)           Ordered     enoxaparin injection 40 mg  Every 24 hours         05/24/24 0906     IP VTE LOW RISK PATIENT  Once         05/23/24 0301     Place sequential compression device  Until discontinued         05/23/24 0301                    Marquise Muller MD  Neurology  [facility]

## 2024-05-25 NOTE — ASSESSMENT & PLAN NOTE
59-year-old female with a significant history of polysubstance abuse, reported MS, and other medical conditions, presenting with AMS. Her condition has been deteriorating over the past year, marked by episodes of extreme activity followed by periods of severe lethargy and altered mental states. MRI findings are concerning for PRES  Her presentation includes both acute changes and long-standing issues, complicating the diagnosis and management.    From a neurological perspective, the patient's AMS could be attributed to several factors, including PRES, seizure, and potential substance-induced encephalopathy in the setting of baseline psychiatric history.  Encephalopathy / CNS infections less likely given patient appears to be at baseline. The patient's history of MS and chronic opioid use, combined with recent ketamine use, further complicates the clinical picture, suggesting multiple potential etiologies. The timeline indicates an acute exacerbation of underlying chronic conditions, with recent substance use vs a traumatic event potentially triggering the current episode.    Recommendations:  - Pending EEG  - Check serum thiamine and folate   - Continue CIWA  - Will review EEG when available

## 2024-05-25 NOTE — SUBJECTIVE & OBJECTIVE
Subjective:     Interval History: No overnight events. CTA without LVO. Pending EEG as part of complete neuro work up.     Current Neurological Medications: see below    Current Facility-Administered Medications   Medication Dose Route Frequency Provider Last Rate Last Admin    acetaminophen tablet 650 mg  650 mg Oral Q8H PRN Carrie Isbell MD   650 mg at 05/23/24 1925    cloNIDine tablet 0.1 mg  0.1 mg Oral Q8H PRN April Bass MD        dextrose 10% bolus 125 mL 125 mL  12.5 g Intravenous PRN April Bass MD        dextrose 10% bolus 250 mL 250 mL  25 g Intravenous PRN April Bass MD        enoxaparin injection 40 mg  40 mg Subcutaneous Q24H (prophylaxis, 1700) Shubham Ahuja MD   40 mg at 05/24/24 1650    glucagon (human recombinant) injection 1 mg  1 mg Intramuscular PRN April Bass MD        glucose chewable tablet 16 g  16 g Oral April Bolden MD        glucose chewable tablet 24 g  24 g Oral PRN April Bass MD        ibuprofen tablet 600 mg  600 mg Oral Q6H PRN April Bass MD   600 mg at 05/23/24 1828    loperamide capsule 2 mg  2 mg Oral PRN April Bass MD        naloxone 0.4 mg/mL injection 0.02 mg  0.02 mg Intravenous PRApril Perez MD        OLANZapine tablet 5 mg  5 mg Oral Q6H PRN Carrie Isbell MD   5 mg at 05/24/24 2200    ondansetron injection 4 mg  4 mg Intravenous Q6H PRN April Bass MD        oxyCODONE immediate release tablet 5 mg  5 mg Oral Q6H PRN Carrie Isbell MD        oxyCODONE immediate release tablet Tab 10 mg  10 mg Oral Q6H PRN Carrie Isbell MD   10 mg at 05/25/24 0950    sodium chloride 0.9% flush 10 mL  10 mL Intravenous Q12H PRN April Bass MD           Review of Systems   Constitutional:  Positive for activity change and fever.   HENT:  Negative for sore throat.    Eyes:  Negative for pain.   Respiratory:   Negative for chest tightness and shortness of breath.    Cardiovascular:  Negative for chest pain.   Gastrointestinal:  Negative for abdominal distention.   Genitourinary:  Negative for difficulty urinating.   Musculoskeletal:  Negative for myalgias.   Neurological:  Negative for facial asymmetry and speech difficulty.   Psychiatric/Behavioral:  Positive for behavioral problems, confusion and decreased concentration.      Objective:     Vital Signs (Most Recent):  Temp: 98.6 °F (37 °C) (05/25/24 1249)  Pulse: 83 (05/25/24 1249)  Resp: 17 (05/25/24 1249)  BP: 131/74 (05/25/24 1249)  SpO2: 98 % (05/25/24 1249) Vital Signs (24h Range):  Temp:  [97.8 °F (36.6 °C)-99.1 °F (37.3 °C)] 98.6 °F (37 °C)  Pulse:  [] 83  Resp:  [16-18] 17  SpO2:  [96 %-100 %] 98 %  BP: (118-154)/(63-86) 131/74     Weight: 54.2 kg (119 lb 7.8 oz)  Body mass index is 19.88 kg/m².     Physical Exam  Vitals and nursing note reviewed.   Constitutional:       Appearance: She is normal weight. She is not toxic-appearing.   HENT:      Head: Normocephalic.      Right Ear: External ear normal.      Left Ear: External ear normal.      Mouth/Throat:      Mouth: Mucous membranes are moist.   Eyes:      General: No scleral icterus.  Cardiovascular:      Rate and Rhythm: Normal rate.   Pulmonary:      Effort: Pulmonary effort is normal. No respiratory distress.   Abdominal:      General: Abdomen is flat.   Musculoskeletal:      Cervical back: Normal range of motion. No rigidity.      Right lower leg: No edema.      Left lower leg: No edema.   Skin:     General: Skin is warm.   Neurological:      Mental Status: She is alert.   Psychiatric:         Speech: Speech is rapid and pressured and tangential.               Significant Labs: CBC:   Recent Labs   Lab 05/24/24  0451 05/25/24  0510   WBC 8.53 4.77   HGB 13.9 14.0   HCT 42.9 42.4    280     CMP:   Recent Labs   Lab 05/23/24  1641 05/23/24  2132 05/24/24  0451 05/25/24  0510   GLU 83 105 90 89     140 137 137   K 3.0* 4.2 4.4 4.1   * 107 103 100   CO2 21* 25 25 27   BUN 19 21* 19 12   CREATININE 0.6 0.9 0.9 0.8   CALCIUM 7.2* 8.8 9.2 9.4   MG  --   --  1.7 2.0   PROT 5.6*  --   --   --    ALBUMIN 2.7*  --   --   --    BILITOT 0.9  --   --   --    ALKPHOS 48*  --   --   --    AST 12  --   --   --    ALT 10  --   --   --    ANIONGAP 7* 8 9 10       Significant Imaging: I have reviewed all pertinent imaging results/findings within the past 24 hours.

## 2024-05-26 VITALS
WEIGHT: 119.5 LBS | OXYGEN SATURATION: 96 % | RESPIRATION RATE: 17 BRPM | HEART RATE: 87 BPM | HEIGHT: 65 IN | SYSTOLIC BLOOD PRESSURE: 161 MMHG | TEMPERATURE: 97 F | DIASTOLIC BLOOD PRESSURE: 77 MMHG | BODY MASS INDEX: 19.91 KG/M2

## 2024-05-26 LAB
ANION GAP SERPL CALC-SCNC: 10 MMOL/L (ref 8–16)
BASOPHILS # BLD AUTO: 0.04 K/UL (ref 0–0.2)
BASOPHILS NFR BLD: 0.8 % (ref 0–1.9)
BUN SERPL-MCNC: 17 MG/DL (ref 6–20)
CALCIUM SERPL-MCNC: 9.8 MG/DL (ref 8.7–10.5)
CHLORIDE SERPL-SCNC: 102 MMOL/L (ref 95–110)
CO2 SERPL-SCNC: 27 MMOL/L (ref 23–29)
CREAT SERPL-MCNC: 1 MG/DL (ref 0.5–1.4)
DIFFERENTIAL METHOD BLD: NORMAL
EOSINOPHIL # BLD AUTO: 0.1 K/UL (ref 0–0.5)
EOSINOPHIL NFR BLD: 1.2 % (ref 0–8)
ERYTHROCYTE [DISTWIDTH] IN BLOOD BY AUTOMATED COUNT: 13.7 % (ref 11.5–14.5)
EST. GFR  (NO RACE VARIABLE): >60 ML/MIN/1.73 M^2
FOLATE SERPL-MCNC: 10.1 NG/ML (ref 4–24)
GLUCOSE SERPL-MCNC: 82 MG/DL (ref 70–110)
HCT VFR BLD AUTO: 43.8 % (ref 37–48.5)
HGB BLD-MCNC: 14.4 G/DL (ref 12–16)
IMM GRANULOCYTES # BLD AUTO: 0 K/UL (ref 0–0.04)
IMM GRANULOCYTES NFR BLD AUTO: 0 % (ref 0–0.5)
LYMPHOCYTES # BLD AUTO: 1.9 K/UL (ref 1–4.8)
LYMPHOCYTES NFR BLD: 36.4 % (ref 18–48)
MAGNESIUM SERPL-MCNC: 2 MG/DL (ref 1.6–2.6)
MCH RBC QN AUTO: 30.1 PG (ref 27–31)
MCHC RBC AUTO-ENTMCNC: 32.9 G/DL (ref 32–36)
MCV RBC AUTO: 91 FL (ref 82–98)
MONOCYTES # BLD AUTO: 0.3 K/UL (ref 0.3–1)
MONOCYTES NFR BLD: 5.8 % (ref 4–15)
NEUTROPHILS # BLD AUTO: 2.9 K/UL (ref 1.8–7.7)
NEUTROPHILS NFR BLD: 55.8 % (ref 38–73)
NRBC BLD-RTO: 0 /100 WBC
PHOSPHATE SERPL-MCNC: 4.8 MG/DL (ref 2.7–4.5)
PLATELET # BLD AUTO: 302 K/UL (ref 150–450)
PMV BLD AUTO: 9.7 FL (ref 9.2–12.9)
POTASSIUM SERPL-SCNC: 4.3 MMOL/L (ref 3.5–5.1)
RBC # BLD AUTO: 4.79 M/UL (ref 4–5.4)
SODIUM SERPL-SCNC: 139 MMOL/L (ref 136–145)
WBC # BLD AUTO: 5.14 K/UL (ref 3.9–12.7)

## 2024-05-26 PROCEDURE — 25000003 PHARM REV CODE 250

## 2024-05-26 PROCEDURE — 85025 COMPLETE CBC W/AUTO DIFF WBC: CPT

## 2024-05-26 PROCEDURE — 36415 COLL VENOUS BLD VENIPUNCTURE: CPT

## 2024-05-26 PROCEDURE — 80048 BASIC METABOLIC PNL TOTAL CA: CPT | Performed by: INTERNAL MEDICINE

## 2024-05-26 PROCEDURE — 63600175 PHARM REV CODE 636 W HCPCS

## 2024-05-26 PROCEDURE — 83735 ASSAY OF MAGNESIUM: CPT

## 2024-05-26 PROCEDURE — 82746 ASSAY OF FOLIC ACID SERUM: CPT

## 2024-05-26 PROCEDURE — 84425 ASSAY OF VITAMIN B-1: CPT

## 2024-05-26 PROCEDURE — 84100 ASSAY OF PHOSPHORUS: CPT

## 2024-05-26 RX ORDER — SEVELAMER CARBONATE 800 MG/1
800 TABLET, FILM COATED ORAL
Status: COMPLETED | OUTPATIENT
Start: 2024-05-26 | End: 2024-05-26

## 2024-05-26 RX ORDER — NALOXONE HYDROCHLORIDE 4 MG/.1ML
1 SPRAY NASAL ONCE
Qty: 2 EACH | Refills: 0 | Status: SHIPPED | OUTPATIENT
Start: 2024-05-26 | End: 2024-05-28

## 2024-05-26 RX ADMIN — SODIUM CHLORIDE, POTASSIUM CHLORIDE, SODIUM LACTATE AND CALCIUM CHLORIDE 500 ML: 600; 310; 30; 20 INJECTION, SOLUTION INTRAVENOUS at 06:05

## 2024-05-26 RX ADMIN — OXYCODONE 10 MG: 5 TABLET ORAL at 11:05

## 2024-05-26 RX ADMIN — SEVELAMER CARBONATE 800 MG: 800 TABLET, FILM COATED ORAL at 08:05

## 2024-05-26 NOTE — PROGRESS NOTES
[facility] Medicine  Progress Note    Patient Name: Reza Morrow  MRN: [id number]  Patient Class: IP- Inpatient   Admission Date: 5/22/2024  Length of Stay: 3 days  Attending Physician: Shubham Ahuja MD  Primary Care Provider: Kip Jimenez MD        Subjective:     Principal Problem:Acute encephalopathy        HPI:  Reza Morrow is 59 y.o. F with Mhx of polysubstance abuse, Multiple Sclerosis, Hypercholesteremia, Takutsubo Cardiomyopathy, and hypertension who is presenting to the ED for altered mental status via EMS. Patient unwilling to participate in evaluation due to severe agitation and AMS. Spoke to sister on the phone, who provided collateral. Said that this all started last year in June when she was admitted for the cardiomyopathy. At that time she presented to the hospital with restlessness, agitation and AMS. Found to have Takutsubo cardiomyopathy on their evaluation. They sent her home once she was stabilized. For her MS she on many pain medications, which the sister reports that patient has been self dosing. Sister also mentions that patient has started taking ketamine powder and cream as well. Reports that she has times where is erratic and full of energy, and then is followed by crash. Sister reports that patient had previously admitted with mood disorder due to substance use; but sister is unclear exactly what she is using. It is noted that patient had a fall this Sunday and hit her head. Sister does not live with patient; pt lives with a roommate.     PER ED NOTE:  Patient was picked up by EMS after roommate called for altered mental status and unresponsiveness. Upon arrival patient was extremely agitated EMS had to give patient 5 droperidol to get her calmed down. Throughout transport she received 500 cc IV fluids and remained stable throughout transport with no increasing oxygen requirement. Upon arrival here patient unable to take part in history as she is  altered only opening eyes and refusing to answer any questions. Contacted sister who stated that patient has been in and out of hospitals recently for similar episodes. She reports that sister has these continued episodes where she becomes extremely elated active followed by crushing and becoming altered. She believes that patient is on several medications and not taking it as prescribed but as patient feels appropriate increasing doses as she feels. She also believes that patient is abusing substances but is unsure of what exactly. She also notes That patient had a fall 2 days ago in which she had a small bleed she attempted to convince patient's roommates to call the ambulance and bring her to the emergency department but both patient and remained refused.     Overview/Hospital Course:  Patient admitted to hospital medicine for workup of AMS. Patient alert and oriented 2nd day of admission but with pressured speech and tangential. Concern for substance induced vs underlying mood disorder, psychiatry consulted with recs for rehab for opioid use but patient declined. Neuro consulted given findings concerning for PRES and global hypoxic event. EEG obtained to r/o seizure which was normal. Discussed at length with patient regarding outpatient pain regimen likely contributing to MRI brain findings and overall presentation. Encouraged patient to talk with outpatient pain management provider to adjust regimen as she is on high doses of opioid medications in addition to ketamine powder. Narcan delivered to bedside prior to d/c and discussed appropriate use with patient and family. Rehab information provided in d/c paperwork.     Interval History: NAEON. A/Ox3, still w/ mildly tangential speech. Expressed no interest in stopping home opioid use. No overt sxs of opioid withdrawal.    Review of Systems  Objective:     Vital Signs (Most Recent):  Temp: 98.5 °F (36.9 °C) (05/26/24 1146)  Pulse: 75 (05/26/24 1146)  Resp: 16  (05/26/24 1146)  BP: 125/65 (05/26/24 1146)  SpO2: 100 % (05/26/24 1146) Vital Signs (24h Range):  Temp:  [98.2 °F (36.8 °C)-99.1 °F (37.3 °C)] 98.5 °F (36.9 °C)  Pulse:  [58-96] 75  Resp:  [16-18] 16  SpO2:  [96 %-100 %] 100 %  BP: (117-146)/(61-86) 125/65     Weight: 54.2 kg (119 lb 7.8 oz)  Body mass index is 19.88 kg/m².    Intake/Output Summary (Last 24 hours) at 5/26/2024 1215  Last data filed at 5/26/2024 1053  Gross per 24 hour   Intake 1480 ml   Output 1600 ml   Net -120 ml         Physical Exam  Vitals and nursing note reviewed.   Constitutional:       Appearance: She is normal weight. She is not toxic-appearing.   HENT:      Head: Normocephalic.      Right Ear: External ear normal.      Left Ear: External ear normal.      Mouth/Throat:      Mouth: Mucous membranes are moist.   Eyes:      General: No scleral icterus.  Cardiovascular:      Rate and Rhythm: Normal rate and regular rhythm.   Pulmonary:      Effort: Pulmonary effort is normal. No respiratory distress.   Abdominal:      General: Abdomen is flat.   Musculoskeletal:      Cervical back: Normal range of motion. No rigidity.      Right lower leg: No edema.      Left lower leg: No edema.   Skin:     General: Skin is warm.   Neurological:      Mental Status: She is alert and oriented to person, place, and time.   Psychiatric:         Speech: Speech is rapid and pressured and tangential.             Significant Labs: All pertinent labs within the past 24 hours have been reviewed.    Significant Imaging: I have reviewed all pertinent imaging results/findings within the past 24 hours.    Assessment/Plan:      * Acute encephalopathy  Patient altered on admission, and unable to answer orientation questions. Sister on the phone reports recent head trauma, and presented with dry bloody lips. VBG c/f respiratory alkalosis and CMP with evidence of metabolic acidosis. Clinical picturing pointing towards drug withdrawal/intoxication. No seizure-like activity. No  "significant electrolyte derangement. Recent thyroid panel with evidence of subclinical hypothyroidism. High suspicion that AMS is related to polypharmacy and substance use.     No results for input(s): "PH", "PCO2", "PO2", "HCO3", "POCSATURATED", "BE" in the last 72 hours.    CT head without evidence of acute bleed. MRI brain concerning for PRES vs encephalitis and global hypoxic event. Mental status improved and patient is Aox4.  -neuro consulted, appreciate assistance    Agitation  Improved however still feeling anxious.  - on zyprexa PRN for anxiety    Drug withdrawal  Opioid dependence    UDS is positive only for THC. However, symptoms are more indicative of opioid withdrawal. Known persistent use of opioids for pain. Per EMR she is on oxy 30mg q4 PRN and Nucynta, and family-reported Ketamine use. This appears to align with her previous presentation at [facility], as per the sister. EKG sinus tach, Trop 0.031,  (not the highest on record), CPK wnl. Lactate wnl, alcohol wnl.     Plan:  - telemetry   - replenish lytes as needed  - CIWA protocol, ethanol negative on admit  - require straight cath in the ED, no longer retaining  - resuming some opioids given long standing history of use and improvement in mental status    Hypokalemia  Patient has hypokalemia which is Acute and currently controlled. Most recent potassium levels reviewed-   Lab Results   Component Value Date    K 4.3 05/26/2024   . Will continue potassium replacement per protocol and recheck repeat levels after replacement completed.     Takotsubo cardiomyopathy  Diagnosed in 2023.     Echo Result Date: 4/24/2024    Left Ventricle: The left ventricle is normal in size. There is   concentric remodeling. Mild global hypokinesis present. There is mildly   reduced systolic function with a visually estimated ejection fraction of   45 - 50%.    Right Ventricle: Normal right ventricular cavity size. Wall thickness   is normal. Right ventricle wall " motion  is normal. Systolic function is   normal.    IVC/SVC: Normal venous pressure at 3 mmHg.    MS (multiple sclerosis)  Recent MRI 04/2024 with evidence of Moderate supratentorial leukoencephalopathy. Patient describes flares as AMS and lethargy.  -resuming some home pain meds as needed    Opioid dependence  Large amounts of opioids prescribed outpatient however UDS negative for opioids on admission. Psychiatry consulted. Pending recommendations.  - f/u repeat UDS  - expressed no interest in stopping use  - CTM for withdrawal sxs  - counseled pt on risks of opioid use including overdose      VTE Risk Mitigation (From admission, onward)           Ordered     enoxaparin injection 40 mg  Every 24 hours         05/24/24 0906     IP VTE LOW RISK PATIENT  Once         05/23/24 0301     Place sequential compression device  Until discontinued         05/23/24 0301                    Discharge Planning   JOANNE: 5/26/2024     Code Status: Full Code   Is the patient medically ready for discharge?:     Reason for patient still in hospital (select all that apply): Treatment  Discharge Plan A: Home with family                  Jonathon Mcgowan MD  Department of Hospital Medicine   [facility]

## 2024-05-26 NOTE — ASSESSMENT & PLAN NOTE
Patient has hypokalemia which is Acute and currently controlled. Most recent potassium levels reviewed-   Lab Results   Component Value Date    K 4.3 05/26/2024   . Will continue potassium replacement per protocol and recheck repeat levels after replacement completed.

## 2024-05-26 NOTE — PLAN OF CARE
[facility] - Cardiology Stepdown  Discharge Final Note    Primary Care Provider: Kip Jimenez MD    Expected Discharge Date: 5/26/2024    Final Discharge Note (most recent)       Final Note - 05/26/24 1541          Final Note    Assessment Type Final Discharge Note (P)      Anticipated Discharge Disposition Home or Self Care (P)      What phone number can be called within the next 1-3 days to see how you are doing after discharge? [phone] (P)      Hospital Resources/Appts/Education Provided Provided patient/caregiver with written discharge plan information;Provided education on problems/symptoms using teachback;Appointments scheduled and added to AVS (P)         Post-Acute Status    Post-Acute Authorization Other (P)      Other Status Awaiting f/u Appts (P)    Awaiting follow up appts.    Discharge Delays None known at this time (P)                      Important Message from Medicare             Contact Info       [address]  [address]  [place], [id number]       Next Steps: Go on 5/30/2024    Instructions: Hospital follow-up appt scheduled with Dr Gao  Thursday, May 30, 2024, 1:00pm    Kip Jimenez MD   Specialty: Family Medicine   Relationship: PCP - General    [address] [place] [id number]   Phone: [phone]       Next Steps: Schedule an appointment as soon as possible for a visit in 1 week(s)          SW noting pt's discharge orders. After review of pt's medical record, no discharge needs identified. Pt. awaiting scheduling of follow-up appt's appointments.     Phyllis Diaz LMSW

## 2024-05-26 NOTE — DISCHARGE INSTRUCTIONS
REFERRAL RECOMMENDATIONS FOR SUBSTANCE ABUSE & MENTAL HEALTH      IN CASE OF SUICIDAL THINKING, call the National Suicide Hotline Number: 988    988 Suicide & Crisis Lifeline: 985 , 5-075-119-YYST (4059)  https://988Capsule Tech.SingOn       SUBSTANCE ABUSE:     [facility] REC[facility]ERY [facility] (formerly known as the ABU)  [x] [phone], Option 2  [x] [address] [facility] [facility] 4th Floor, [place] [address]  [x] [email or web address]/services/[email or web address]-program  [x] The [facility] delivers comprehensive and collaborative treatment for alcohol and substance use disorders.  Excellent program for working professionals or anyone else seeking recovery.  [x] Requires insurance approval prior to starting program, call number above for more information.  [x] Intensive Outpatient Rehabilitation Program - M-F 9am-3pm - daily groups with psychologists and social workers, sessions with MDs 3x per week   [x] Ambulatory detox and dual diagnosis available      SUBOXONE:     NOTE: some Suboxone clinics require their clients to participate in a structured program (such as an IOP) in order to be prescribed Suboxone.  Some clinics have a long waiting list.  Most of these clinics do not accept walk-in clients, so call first to to learn what must be done to get started on Suboxone.    [facility] - [phone] (can do Sublocade)  [address] [facility], [place] [address]    [address]  [phone]    [facility] - [phone] (can do Sublocade)  [address] [place] [address]    [facility]  [address] [place]  [phone]     [facility] (very short waiting list, may accept some walk-in's but call first if possible)  [address], [place] [address]  [phone]; [phone]    [facility]   [address] [place]    [phone]    [facility] (can usually be seen within a week but is cash only  for appointment)  [address] [place], [place]    [facility] ([facility])  [address], [address] [place] [id number] [phone]    [facility] ([facility])  [address], [place]  [phone]    [facility] ([facility])  [address] [facility] [address]  [phone]    [place], [place]:    [facility] - [address] [place] [address] - Tel: [phone]    Nicko Rodríguez - [address] [place] [address] - Tel: [phone]    Jaime Fountain - [address] [place] [place] [address] - Tel: [phone]    Jono Ibarra - [address] [place] [address] - Tel: [phone]    Maximiliano Alexis - [address] [place] [address] - Tel: [phone]    [place], [place]:     Dr. Eligio Grullon and Dr. Valente Laguerre - [address], [place] - Tel: [phone]    Dr. Ashley Lopez - [address] [place], [place] - Tel: [phone]    Dr. Robin Cuevas - Tel: [phone]    Dr. Neo Paulson - [facility] - [phone]      METHADONE:     [facility] (the only methadone clinic in the [facility], has two locations)  [x] [place] - [facility] [place] [address], [phone]  [x] [facility] [place] [address], [phone]    12 STEP PROGRAMS (and similar):     Alcoholics Anonymous (local)  [x] [phone]  [x] www.[place].org for schedules for in-person and online meetings  [x] There are AA meetings throughout the day all over town  [x] AA costs nothing to attend; they pass a basket for donations but this is not required    Narcotics Anonymous  [x] [phone]  [x] www.noana.org  [x] There are NA meetings throughout the day all over town  [x] NA costs nothing to attend; they pass a basket for donations but this is not required    Alcoholics Anonymous Online Intergroup (national)  [x] www.aa-intergroup.org  [x] Good resource for large, nation-wide meetings  [x] Can also attend smaller, local meetings in other cities  [x] Countless meetings  all day and all night  [x] AA costs nothing to attend; they pass a basket for donations but this is not required    Flying Sober - 24/7 zoom meetings for women and coed - sign on anytime, anywhere!  [email or web address] [email or web address]-zbojvlig/    Online Intergroup of AA - 121 Open AA Terrell Meeting - 24/7 zoom meetings  https://aa-intergroup.org/meetings/    LOOKING FOR AN ALTERNATIVE TO 12 STEP PROGRAMS - check out:  SMART Recovery: https://www.smartrecovery.org/about-us  [facility]: https://recoverydharma.org      DETOX UNITS (USUALLY 5-7 DAYS):     [facility]: [address], [place]  [phone], call first to ensure bed availability    [facility]: [address] [place]  [phone], Option 1, call first to ensure bed availability    [facility]: [facility] Garett Sanon, [facility]  [phone] (intake by appointment only)    [facility]: [address], [place]  [phone]      INTENSIVE OUTPATIENT PROGRAMS:     [facility] (formerly known as the ABU)  [x] [phone], Option 2  [x] [address] [facility] 4th Floor, [facility] [address]  [x] [email or web address]://www.[facility].org/services/[email or web address]-recovery-program  [x] [facility] Recovery Program delivers comprehensive and collaborative treatment for alcohol and substance use disorders.  Excellent program for working professionals or anyone else seeking recovery.  [x] Requires insurance approval prior to starting program, call number above for more information.  [x] Intensive Outpatient Rehabilitation Program - M-F 9am-3pm - daily groups with psychologists and social workers, sessions with MDs 3x per week   [x] Ambulatory detox and dual diagnosis available    [facility] Intensive Outpatient Program  [x] [phone]  [x] [address] [facility] (the clinic not on [facility] main [facility])  [x] Call number above for more info and to check insurance requirements    [address] [place] [address]  [phone]    [facility]:  [address] [facility], [address]?, [place], [place] [address]?, [phone]  [address] [facility]?, [place], [place] [address]?, [phone]    RESIDENTIAL REHABS (USUALLY 28 DAYS):     [facility]: [address], [phone]    [place] [facility]: [address] [place]  [phone] (intake by appointment only)    [facility] (men only) [address], [phone]    [facility] (Female only) [address] [place], [phone]    [facility]: [address], [place], men's program [phone], women's program [phone]    [facility]: [address] [place], [phone]    [facility]: [address], [place], [place], [phone]    [facility]: Men only, [phone], [address] [facility] Jose [facility]: [address], [place], [place], [phone]    [facility]: [address] [place] [address],  [phone]  New Location: [address] [place] [address] [address], [place] [address], [phone]    [facility]:   ?[address]?[place], [place] [address]?[phone]    [place]: [address], [place] [address], [phone]    [place]: [place], [phone]     [facility]: [place], [place], [phone]    [facility]: [place], [phone]    [facility]: [place], [place], [phone]    [place]: [place], [phone]    [facility]: [address], [place] [address], [phone]    COMMUNITY ADDICTION CLINICS:     [facility]: [address] [facility], [address] [phone] -or- [address] [address]    [facility]: [address] [facility], [place]  [address]     [facility]: Clinics [phone]; Crisis [phone]    [facility]: [address] [place] [address]    [facility]: [address] [place] [address]    [facility]: [address] [place] [address],    [facility] Center: 2nd Floor [address] [place] [address]    [facility]: [address], [facility] [place] [address]    [facility], [address] [place] [address]    [facility]: [address] [facility], [place] [phone]  (serves youth 16-23 years old)    [facility]: [facility]/[place]/[facility] [phone]    [facility]: [address] [facility], [place] [phone]    [facility]: [address] [facility] [phone]    [facility]: [address] [facility] [facility], [address], [phone]     [facility]: [address] [facility], [phone], [phone]    RESOURCES IN OTHER [facility]:     [facility]: [address] [facility], [phone], [phone]    [facility]: [address] [facility], [address], [phone]    [facility], [address] [place] [address], [phone]    [facility]: [address] [facility], [phone]    [facility] Health, [address] [facility], [phone] ([facility])    [facility], [address] [facility], [phone] ([facility])    [facility], [address], [address], [place], [phone] ([facility], and [facility])    [facility], [address] [place], [phone] ([facility])    [facility] Hotline [phone], [phone]    [facility], [address] [facility], [facility], [address] [place] [address]  "Blvd[address], [facility] Behavioral Health Center, [address] [facility] [address], [facility], [address] [facility] [address]., [place] [facility], [address], [place]    [facility], [address], [place] [phone]    [facility], [address] [facility] [phone]    [facility] Recovery, [address] [facility] [phone]    [facility], [address] [facility], [phone]      Monegasque SPEAKING (en español):     Información de la reunión de Alcohólicos Anónimos  [facility], 10:00 am  Habla [facility]  Esta reunión está abierta y cualquiera puede asistir.    Tuvaluan speaking Alcoholics anonymous meetings:  El "Isidro [place] AA Skype" es un isidro on line de Alcohólicos Anónimos en [facility]. El isidro es sue, gratuito y virtual a través de Skype Audio. El isidro funciona mediante tamanna llamada grupal de voz, por lo que no se utiliza la videollamada, ni se pueden santiago las imágenes o rostros de los participantes. Hace jovi años y medio abrimos el primer Isidro de AA por Skype en [facility], angeline actualmente asisten personas desde [place], [place], [place], [place], [place], [place], [place], [place], [place], [place], [place] y [place], entre otros.    El isidro es muy útil para los alcohólicos que residen en lugares donde no se celebran reuniones de AA, o residen en lugares donde las reuniones de AA son un número limitado de días a la semana, o para aquellos compañeros que se hayan de viaje o que, por cualquier motivo, se hayan convalecientes y no pueden desplazarse. Todos los días nos reuniones a las 21:00 (hora española)    Podéis obtener más información sobre el isidro y jessica sesiones en la página web [email or web address]      MENTAL HEALTH:     [facility]  Department of Psychiatry - " Outpatient Clinic  [phone]    [facility] of Psychiatry - General Psychiatry Intensive Outpatient Program  [facility] (formerly known as the BMU)  [phone], option 3    [facility] Psychiat[facility] - Dual Diagnosis Intensive Outpatient Program  [facility] (formerly known as the ABU)  [phone], option 2      [facility] MENTAL HEALTH CENTERS:     [facility]  (aka [facility])  Serves [facility].  Serves uninsured patients & those with Medicaid.  Main location: [address] [place] [address] [phone]  Walk-in's available during regular business hours.  24/7 Crisis Line: [phone]    [facility]  (aka [facility], [facility])  Serves [facility].  Serves uninsured patients, those with Medicaid and some private plans.  Walk-in's available during regular business hours.  Primary care services available as well.  [facility]: [address] [facility] [place] [address];  [phone]  [place]: [address] [place] [address];  [phone]  24/7 Crisis Line: [phone]    [facility]  Serves uninsured patients & those with Medicaid, call for more info.  Primary care, pediatrics, HIV treatment, and dentistry services available as well.  Three locations.  [phone]    [facility] [place]?Corporate Office  Serves patients with Medicaid, Medicare, and private insurance  [address]  [place],?[place] [address]  [phone]    [facility]  Serves uninsured on a sliding scale, as well as Medicaid, Medicare, and private plans.  Eight locations around the [facility] area.  [phone]    [facility]  Serves uninsured patients & those with Medicaid, private insurances.  Primary care  available as well.  [phone]  [address] [place] [address]    [facility] Outpatient Psychiatry  Serves veterans who were honorably discharged.  [address] [place] [address]  [phone]  24/7 Veterans Crisis Line: 1-729.279.4144 (Press 1)    If you have private insurance and need to find a specialist, please contact your insurance network to request a list of providers covered by your benefits.      MENTAL HEALTH/ADDICTIVE DISORDERS:     AA ([phone]), NA ([phone])   National Suicide Prevention Lifeline- Call 1-255.229.6743 Available 24 hours everyday  [facility] [phone]; Crisis Line [phone] - Call for options A-F:  Intensive Outpatient Treatment/ Day programs   [facility], please contact   [facility], please contact [phone] or [phone] to speak with an admissions counselor.  [facility] (Methadone Maintenance)   [address] [place] [address], [phone]  [address] [place] [address] [phone]  [facility], [address] [place] [address], [phone]  [place] [facility] [phone]  [facility] please contact [phone]  [facility], [address] [facility], 4th floor [place] [address] Phone: [phone]   [place] Office: [address] [address], [phone]  [place] Office: [address] [facility], [address], [place] [address], [phone]  [place] Office: [address] [place] [address] [phone]    Outpatient Substance Abuse Treatment   [facility] (Methadone Maintenance)   [address] [place] [address], [phone]  [address] [place] [address] [phone]  [facility], [address] [place] [address], [phone]  [place]  [place] Office: [address], [place]uj [place] [address], [phone]  [place]  Office: [address] [facility], [address], [place] [address], [phone]  [place] Office: [address] [facility], [place] [address] [phone]  [facility], [address] [place] [address] [phone]   [facility], [address] [facility], [address], [phone]  [facility], [address] [place], [place] [phone]    Residential Substance Abuse Treatment   [facility] [address] [facility], [phone]  [facility], [address] [facility], [phone]  [facility] (men only) [address] [place] [address], [phone]  Women at the [facility] (women only) [address] [place] [address] [phone]  [facility], [address] [place] [address] [phone]  Located within [facility] (women only), intakes at [address] [facility], [phone]  [facility] (7-day program, $100, [address], [phone], [phone], [phone])  [facility] (Men only, [phone]), [address], [place] (Vets*/Non-Vets)  [facility] (Men only, $400/month program fee) [address] [facility], [phone]  [facility] [place] (Women over age 39 only), [address] [facility], [phone]    Out of Area:    [place], [address] [facility], [place], [place] ([phone])  [facility] Recovery Program (men only), [address] [facility] [place] [address], [phone]  [facility], [address] [place], [place] ([phone])  [place], [address] [place] [address] Dr. Ramos, [place] ([phone])  [facility] Addiction [facility], [address] [place] [address], [phone]  [facility] (Women only, has to have mental illness, can be homeless or substance abuser), [phone]        DOMESTIC VIOLENCE RESOURCES:     Advocacy  [facility] (NOFJC)  [address] [address]    [facility] ? [phone]  Services  provided: emergency shelter, individual advocacy, information and referrals, group support, children's program, medical advocacy, forensic medical exams, primary care, legal assistance, counseling, safety planning, and caregiver support    [facility] HEALING AND EMPOWERMENT [place]  Confidential location  [facility] ? [phone]  Services provided: short term emergency shelter, all services provided are free of charge    [facility] FOR COMMUNITY ADVOCACY  Multiple locations in [facility], [facility], [place], and [facility] ([place], [place], and [place])    [facility] ? [phone]  Services provided: emergency shelter, individual advocacy, information and referrals, group support, children's program, medical advocacy, legal assistance in obtaining restraining orders, counseling, safety planning, and caregiver support    [facility]   Emergency Shelter   [phone]  Emergency Services ,Legal and Financial Assistance Services ,Housing Services ,Support Services     [facility]ial   [phone]  Emergency Services ,Counseling Services , Housing Services ,Support Services ,Children's Services     WOMEN WITH A VISION  [address] [place] [address]  WWAV ? [phone]  Services provided: advocacy, health education and supportive services, specializing in free healing services for marginalized groups, including LGBTQ individuals and sex workers    SEXUAL TRAUMA AWARENESS AND RESPONSE (STAR)  [address] [place] [address]    STAR ? (930) 564-KZKF  Services provided: individual advocacy, information and referrals, group support, medical advocacy, legal assistance, counseling, and safety planning for survivors of sexual assault    [facility] ([facility])  [address] [place] [address]  [facility] Forensic Program ? [phone]  Services provided: free forensic medical exams for sexual assault and  domestic violence, which can be performed up to 5 days after an incident. It is not necessary to make a police report to receive a forensic medical exam    Legal  [facility]  [address], [place] [address]  [facility] ? [phone]  Services provided: free emergency legal representation for survivors of doemstic violence residing in [facility]. Legal services may include temporary restraining orders, temporary child support, custody, and use of property    [facility] LEGAL SERVICES (SLLS)  [address], [place] [address]  SLLS ? [phone]  Services provided: free legal representation for survivors of domestic violence residing in [facility]. Legal services may include temporary child support, custody, and divorce      HOTLINES:     [facility] DOMESTIC VIOLENCE HOTLINE  [phone]    Services provided: free and confidential hotline for victims and survivors of domestic violence. All calls will be routed to a domestic violence service provided in the victim or survivor's area    NATIONAL HUMAN TRAFFICKING HOTLINE  (136) 866-2040    Services provided: national anti-trafficking hotline serving victims and survivors of human trafficking. Provides information about local resources, and access to safe space to report tips, seek services, and ask for help    VIA LINK  211 or (037) 526-5379    Service provided: counselors can provide crisis counseling. Counselors can also provide information and referrals to programs which can help with needs such as food, shelter, medical care, financial assistance, mental health services, substance abuse treatment, senior services, childcare, and more      HOMELESS SHELTERS:      Homeless shelters  [facility]  Emergency shelter for individuals and families  [address]  [phone]  [facility]  Emergency shelter for men only  Meals daily 6am, 2pm, & 6pm  Clothing, case management M-F by appointment  (ID/job/housing/legal assistance), mail  [address] [facility]  [phone]  [place]  Emergency shelter for men  [address] [facility]  [phone]  Emergency shelter for women  [facility] [facility]  [phone]  Breakfast & lunch daily, dinner M-F  Case management, job counseling services   [facility]  Emergency shelter for teens and young adults up to 20yo  [address]  [phone]  [facility] [facility]  Emergency shelter for women over 19yo and their kids  [address] [place] [address]  [phone]  [facility] [place]  Day program, meals M-F 1PM (arrive early)  Showers, laundry, hygiene kits, showers, phones, , notary services, case management, ID assistance  [address] [facility]  [phone] M-F 8am-2:30pm  [facility]  Day program  [facility] 7:30am-3:30pm,  8:30am-3:30pm  Crisis intervention, employment assistance, food/clothing, hygiene kits, bus tokens, mail  [address] [facility] [address]  [phone]  [facility]  Mobile outreach for homeless persons in [facility]  [phone]  [facility]  Primary healthcare, case management, dental services, TB placement  Call ahead  [address] [facility] 2nd [address]  [phone]  [facility]  Connects homeless people with their loved ones in other cities by providing transportation costs   [phone]      [place] RESOURCES:     [place] [facility] Crisis Response Team:    Region 12 ([place], [place], [place], and [facility]) ([facility] and [facility])  [phone]      Outpatient Mental Health & Addiction Clinic Resources for both [facility] and [facility]:    [facility] Mental Healthcare Resources  Website: www.pbmhr.org  Main Number: 784-616-6395    [facility] ([facility] Area)  [address] ([address] [facility]) [place] [address]  [phone]    [facility] ([place]  Area)  [address] ([address] [facility]) [place] [address]  [phone]    [facility]  [address] ([address]) [place] [address]  [phone]    [facility]  [address] ([address] [facility]) [place] [address]  [phone]      Addiction Treatment Resources for both [facility] and [facility]:    [facility] (Detox, Residential, PHP, IOP, and Aftercare Programs)  [address] [place] [address]  [phone]    [facility] (Residential, IOP, Transitional Living, and Aftercare Programs)  [address] [place] [address]  [phone]    [facility] (Inpatient, Residential, Detox, IOP, Outpatient, and Aftercare Programs)  [address] [place] [address]  [phone] or toll free at [phone]      Outpatient Mental Health Psychotherapy Resources for both [facility] and [place]:    Suyapa Hough, [facility]  [address]  [place] [address]  [phone]  Specialties: Depression, Anxiety, and Life Transitions    Robyn De, PhD  [address]  [place] [address]  [phone]  Specialties: Testing and Evaluation, Education and Learning Disabilities, and ADHD    Kim Murrell, [facility] [address] [place] [address]  [phone]  Specialties: Obsessive-Compulsive (OCD), Depression, and Relationship Issues    Leanne Levy LPC [address] [place] [address]  [phone]  Specialties: Trauma & PTSD, Mood Disorders, and Anxiety    Leanne Shaikh, PhD, [facility] [address] [place] [address]  [phone]  Specialties: Family Conflict, Child, and Relationship Issues    Fadumo Almeida LPC [facility] [place] [address] [phone]  Specialties: Anxiety, Depression, and  Anger Management        IN CASE OF SUICIDAL THINKING, call the National Suicide Hotline Number: 988    988 Suicide & Crisis Lifeline: 988 , 8-2362-373-928-TALK (2063)  Provides 24/7, free and confidential support for people in distress, prevention and crisis resources for you or your loved ones, and best practices for professionals.    Call, text or chat.  https://988[employer].org

## 2024-05-26 NOTE — ASSESSMENT & PLAN NOTE
Large amounts of opioids prescribed outpatient however UDS negative for opioids on admission. Psychiatry consulted. Pending recommendations.  - f/u repeat UDS  - expressed no interest in stopping use  - CTM for withdrawal sxs  - counseled pt on risks of opioid use including overdose

## 2024-05-26 NOTE — DISCHARGE SUMMARY
[facility] - Cardiology [facility] Medicine  Discharge Summary      Patient Name: Reza Morrow  MRN: [id number]  MCKAYLA: [id number]  Patient Class: IP- Inpatient  Admission Date: 5/22/2024  Hospital Length of Stay: 3 days  Discharge Date and Time:  05/26/2024 12:07 PM  Attending Physician: Shubham Ahuja MD   Discharging Provider: Carrie Isbell MD  Primary Care Provider: Kip Jimenez MD  [facility] Medicine Team: [facility] MED 2 Carrie Isbell MD  Primary Care Team: [facility] MED 2    HPI:   Reza Morrow is 59 y.o. F with Mhx of polysubstance abuse, Multiple Sclerosis, Hypercholesteremia, Takutsubo Cardiomyopathy, and hypertension who is presenting to the ED for altered mental status via EMS. Patient unwilling to participate in evaluation due to severe agitation and AMS. Spoke to sister on the phone, who provided collateral. Said that this all started last year in June when she was admitted for the cardiomyopathy. At that time she presented to the hospital with restlessness, agitation and AMS. Found to have Takutsubo cardiomyopathy on their evaluation. They sent her home once she was stabilized. For her MS she on many pain medications, which the sister reports that patient has been self dosing. Sister also mentions that patient has started taking ketamine powder and cream as well. Reports that she has times where is erratic and full of energy, and then is followed by crash. Sister reports that patient had previously admitted with mood disorder due to substance use; but sister is unclear exactly what she is using. It is noted that patient had a fall this Sunday and hit her head. Sister does not live with patient; pt lives with a roommate.     PER ED NOTE:  Patient was picked up by EMS after roommate called for altered mental status and unresponsiveness. Upon arrival patient was extremely agitated EMS had to give patient 5 droperidol to get her calmed down. Throughout transport she received 500 cc IV fluids  and remained stable throughout transport with no increasing oxygen requirement. Upon arrival here patient unable to take part in history as she is altered only opening eyes and refusing to answer any questions. Contacted sister who stated that patient has been in and out of hospitals recently for similar episodes. She reports that sister has these continued episodes where she becomes extremely elated active followed by crushing and becoming altered. She believes that patient is on several medications and not taking it as prescribed but as patient feels appropriate increasing doses as she feels. She also believes that patient is abusing substances but is unsure of what exactly. She also notes That patient had a fall 2 days ago in which she had a small bleed she attempted to convince patient's roommates to call the ambulance and bring her to the emergency department but both patient and remained refused.     * No surgery found *      Hospital Course:   Patient admitted to hospital medicine for workup of AMS. Patient alert and oriented 2nd day of admission but with pressured speech and tangential. Concern for substance induced vs underlying mood disorder, psychiatry consulted with recs for rehab for opioid use but patient declined. Neuro consulted given findings concerning for PRES and global hypoxic event. EEG obtained to r/o seizure which was normal. Discussed at length with patient regarding outpatient pain regimen likely contributing to MRI brain findings and overall presentation. Encouraged patient to talk with outpatient pain management provider to adjust regimen as she is on high doses of opioid medications in addition to ketamine powder. Narcan delivered to bedside prior to d/c and discussed appropriate use with patient and family. Rehab information provided in d/c paperwork.      Goals of Care Treatment Preferences:  Code Status: Full Code      Consults:   Consults (From admission, onward)          Status  Ordering Provider     Inpatient consult to Neurology  Once        Provider:  (Not yet assigned)    Completed ANN LEE     Inpatient consult to Psychiatry  Once        Provider:  (Not yet assigned)    Completed ANN LEE            No new Assessment & Plan notes have been filed under this hospital service since the last note was generated.  Service: Hospital Medicine    Final Active Diagnoses:    Diagnosis Date Noted POA    PRINCIPAL PROBLEM:  Acute encephalopathy [G93.40] 05/24/2023 Yes    Drug withdrawal [F19.939] 05/23/2024 Yes    Agitation [R45.1] 05/23/2024 Yes    Hypokalemia [E87.6] 04/19/2024 Yes    Takotsubo cardiomyopathy [I51.81] 06/10/2023 Yes    Opioid dependence [F11.20] 06/23/2016 Yes     Chronic    MS (multiple sclerosis) [G35] 12/07/2015 Yes     Chronic      Problems Resolved During this Admission:       Discharged Condition: stable    Disposition: home    Follow Up:   Follow-up Information       [facility]. Go on 5/30/2024.    Why: Hospital follow-up appt scheduled with Dr Gao  Thursday, May 30, 2024, 1:00pm  Contact information:  [address] [facility]  [address]  [place], [address]                         Patient Instructions:      Ambulatory referral/consult to Psychiatry   Standing Status: Future   Referral Priority: Routine Referral Type: Psychiatric   Referral Reason: Specialty Services Required   Requested Specialty: Psychiatry   Number of Visits Requested: 1     Ambulatory referral/consult to Neurology   Standing Status: Future   Referral Priority: Routine Referral Type: Consultation   Referral Reason: Specialty Services Required   Requested Specialty: Neurology   Number of Visits Requested: 1       Significant Diagnostic Studies: Radiology:     MRI Brain:   New areas of cortical/subcortical T2/FLAIR signal hyperintensity along the frontal, parietal, and occipital lobes and the left cerebellar hemisphere.  No associated diffusion signal abnormality, susceptibility  artifact, or significant surrounding mass effect or midline shift.  Findings remain nonspecific, but can be seen in setting of PRES or other encephalitis process.     Subtle symmetric diffusion signal abnormality about the basal ganglia, without significant FLAIR signal hyperintensity or mass effect.  Findings can be seen in the setting global hypoxic ischemic event.  Correlation is advised.     No abnormal postcontrast enhancement.     Additional scattered subcortical and periventricular FLAIR signal hyperintensities, likely representing sequela of chronic microvascular ischemic change or other remote insult.    Pending Diagnostic Studies:       Procedure Component Value Units Date/Time    Vitamin B1 [[id number]] Collected: 05/26/24 0619    Order Status: Sent Lab Status: In process Updated: 05/26/24 0640    Specimen: Blood            Medications:  Reconciled Home Medications:      Medication List        CHANGE how you take these medications      * naloxone 4 mg/actuation Spry  Commonly known as: NARCAN  1 spray by Nasal route once as needed.  What changed: Another medication with the same name was added. Make sure you understand how and when to take each.     * naloxone 4 mg/actuation Spry  Commonly known as: NARCAN  1 spray (4 mg total) by Nasal route once. for 1 dose  What changed: You were already taking a medication with the same name, and this prescription was added. Make sure you understand how and when to take each.           * This list has 2 medication(s) that are the same as other medications prescribed for you. Read the directions carefully, and ask your doctor or other care provider to review them with you.                CONTINUE taking these medications      COVID-19 AT-HOME TEST Kit  Generic drug: COVID-19 antigen test  TEST AS DIRECTED     furosemide 40 MG tablet  Commonly known as: LASIX  Take 1 tablet (40 mg total) by mouth daily as needed (for worsenign shortnes of breath, swelling, or 3lb weight  gain in 1 day/5lb weight gain in 1 week).     gabapentin 300 MG capsule  Commonly known as: NEURONTIN  Take 300 mg by mouth daily as needed (Pain).     glatiramer 40 mg/mL injection  Commonly known as: COPAXONE, GLATOPA  Inject 40 mg into the skin 3 (three) times a week.     ibuprofen 200 MG tablet  Commonly known as: ADVIL,MOTRIN  Take 400 mg by mouth daily as needed for Pain.     pregabalin 100 MG capsule  Commonly known as: LYRICA  Take 100 mg by mouth 3 (three) times daily.     triamcinolone acetonide 0.1% 0.1 % cream  Commonly known as: KENALOG  Apply 1 applicator topically 4 (four) times daily as needed.     venlafaxine 75 MG 24 hr capsule  Commonly known as: EFFEXOR-XR  Take 1 capsule (75 mg total) by mouth once daily.            STOP taking these medications      NUCYNTA  mg Tb12  Generic drug: tapentadoL     oxyCODONE 30 MG Tab  Commonly known as: ROXICODONE     UNABLE TO FIND     UNABLE TO FIND              Indwelling Lines/Drains at time of discharge:   Lines/Drains/Airways       None                   Time spent on the discharge of patient: 45 minutes         Carrie sIbell MD  Department of Hospital Medicine  [facility] - Cardiology Stepdown

## 2024-05-26 NOTE — SUBJECTIVE & OBJECTIVE
Interval History: NAEON. A/Ox3, still w/ mildly tangential speech. Expressed no interest in stopping home opioid use. No overt sxs of opioid withdrawal.    Review of Systems  Objective:     Vital Signs (Most Recent):  Temp: 98.5 °F (36.9 °C) (05/26/24 1146)  Pulse: 75 (05/26/24 1146)  Resp: 16 (05/26/24 1146)  BP: 125/65 (05/26/24 1146)  SpO2: 100 % (05/26/24 1146) Vital Signs (24h Range):  Temp:  [98.2 °F (36.8 °C)-99.1 °F (37.3 °C)] 98.5 °F (36.9 °C)  Pulse:  [58-96] 75  Resp:  [16-18] 16  SpO2:  [96 %-100 %] 100 %  BP: (117-146)/(61-86) 125/65     Weight: 54.2 kg (119 lb 7.8 oz)  Body mass index is 19.88 kg/m².    Intake/Output Summary (Last 24 hours) at 5/26/2024 1215  Last data filed at 5/26/2024 1053  Gross per 24 hour   Intake 1480 ml   Output 1600 ml   Net -120 ml         Physical Exam  Vitals and nursing note reviewed.   Constitutional:       Appearance: She is normal weight. She is not toxic-appearing.   HENT:      Head: Normocephalic.      Right Ear: External ear normal.      Left Ear: External ear normal.      Mouth/Throat:      Mouth: Mucous membranes are moist.   Eyes:      General: No scleral icterus.  Cardiovascular:      Rate and Rhythm: Normal rate and regular rhythm.   Pulmonary:      Effort: Pulmonary effort is normal. No respiratory distress.   Abdominal:      General: Abdomen is flat.   Musculoskeletal:      Cervical back: Normal range of motion. No rigidity.      Right lower leg: No edema.      Left lower leg: No edema.   Skin:     General: Skin is warm.   Neurological:      Mental Status: She is alert and oriented to person, place, and time.   Psychiatric:         Speech: Speech is rapid and pressured and tangential.             Significant Labs: All pertinent labs within the past 24 hours have been reviewed.    Significant Imaging: I have reviewed all pertinent imaging results/findings within the past 24 hours.

## 2024-05-26 NOTE — ASSESSMENT & PLAN NOTE
"Patient altered on admission, and unable to answer orientation questions. Sister on the phone reports recent head trauma, and presented with dry bloody lips. VBG c/f respiratory alkalosis and CMP with evidence of metabolic acidosis. Clinical picturing pointing towards drug withdrawal/intoxication. No seizure-like activity. No significant electrolyte derangement. Recent thyroid panel with evidence of subclinical hypothyroidism. High suspicion that AMS is related to polypharmacy and substance use.     No results for input(s): "PH", "PCO2", "PO2", "HCO3", "POCSATURATED", "BE" in the last 72 hours.    CT head without evidence of acute bleed. MRI brain concerning for PRES vs encephalitis and global hypoxic event. Mental status improved and patient is Aox4.  -neuro consulted, appreciate assistance  "

## 2024-05-26 NOTE — PLAN OF CARE
Plan of care:    59-year-old female with a significant history of polysubstance abuse, reported MS, and other medical conditions, presenting with AMS. Her condition has been deteriorating over the past year, marked by episodes of extreme activity followed by periods of severe lethargy and altered mental states. MRI findings are concerning for PRES  Her presentation includes both acute changes and long-standing issues, complicating the diagnosis and management.     From a neurological perspective, the patient's AMS could be attributed to several factors, including PRES, seizure, and potential substance-induced encephalopathy in the setting of baseline psychiatric history.  Encephalopathy / CNS infections less likely given patient appears to be at baseline. The patient's history of MS and chronic opioid use, combined with recent ketamine use, further complicates the clinical picture, suggesting multiple potential etiologies. The timeline indicates an acute exacerbation of underlying chronic conditions, with recent substance use vs a traumatic event potentially triggering the current episode.     --------  CTA with bilateral fetal PCA  EEG 5/26: no epileptiform discharges or sign of seizures.   Folate wnl and pending thiamine     Recommendations:  - Patient can follow up with neurology outpatient  - Signing off; reach out if additional questions

## 2024-05-26 NOTE — PROCEDURES
EEG REPORT      Reza Morrow  [id number]  1965    DATE OF SERVICE: 5/25/2024         METHODOLOGY      Extended electroencephalographic recording is made while the patient is ambulatory and continuing normal daily activities.  Electrodes are placed according to the International 10-20 placement system and included T1 and T2 electrode placement.  Twenty four (24) channels of digital signal (sampling rate of 512/sec) was simultaneously recorded from the scalp including EKG and eye monitors.  Recording band pass was 0.1 to 100 hz and all data was stored digitally on the recorder.  The patient is instructed to press an event button when clinical symptoms occur and write the symptoms into a diary. Activation procedures which include photic stimulation, hyperventilation and instructing patients to perform simple task are done in selected patients.        The EEG is displayed on a monitor screen and can be reformatted into different montages for evaluation.  The entire recoding is submitted for computer assisted analysis to detect spike and electrographic seizure activity.  The entire recording is visually reviewed and the times identified by computer analysis as being spikes or seizures are reviewed again.  Compresses spectral analysis (CSA) is also performed on the activity recorded from each individual channel.  This is displayed as a power display of frequencies from 0 to 30 Hz over time.   The CSA analysis is done and displayed continuously.  This is reviewed for asymmetries in power between homologous areas of the scalp and for presence of changes in power which canbe seen when seizures occur.  Sections of suspected abnormalities on the CSA is then compared with the original EEG recording.  .     Rebtel software was also utilized in the review of this study.  This software suite analyzes the EEG recording in multiple domains.  Coherence and rhythmicity is computed to identify EEG sections which may contain  organized seizures.  Each channel undergoes analysis to detect presence of spike and sharp waves which have special and morphological characteristic of epileptic activity.  The routine EEG recording is converted from spacial into frequency domain.  This is then displayed comparing homologous areas to identify areas of significant asymmetry.  Algorithm to identify non-cortically generated artifact is used to separate eye movement, EMG and other artifact from the EEG     Recording Times    A total of 00:30:06 hours of EEG was recorded.      EEG FINDINGS:  Background activity:   The background rhythm was characterized by alpha and anterior dominant beta activity with a 10Hz posterior dominant alpha rhythm at 30-70 microvolts.   Symmetry and continuity: the background was continuous and symmetric     Sleep:   Normal sleep transients including vertex waves were seen.    Activation procedures:   Answers questions correctly    Abnormal activity:   No epileptiform discharges, periodic discharges, lateralized rhythmic delta activity or electrographic seizures were seen.    IMPRESSION:   Normal EEG of light sleep and the waking state.      Chaitanya Velasquez MD  Neurology-Epilepsy.  [facility].

## 2024-05-27 LAB
BACTERIA BLD CULT: NORMAL
BACTERIA BLD CULT: NORMAL

## 2024-05-31 LAB — VIT B1 BLD-MCNC: 60 UG/L (ref 38–122)

## 2024-07-20 ENCOUNTER — HOSPITAL ENCOUNTER (INPATIENT)
Facility: HOSPITAL | Age: 59
LOS: 2 days | Discharge: HOME | DRG: 071 | End: 2024-07-24
Attending: EMERGENCY MEDICINE | Admitting: INTERNAL MEDICINE
Payer: COMMERCIAL

## 2024-07-20 DIAGNOSIS — R10.9 ABDOMINAL PAIN: ICD-10-CM

## 2024-07-20 DIAGNOSIS — R41.82 AMS (ALTERED MENTAL STATUS): ICD-10-CM

## 2024-07-20 LAB
ALLENS TEST: ABNORMAL
ALLENS TEST: NORMAL
HCO3 UR-SCNC: 24.7 MMOL/L (ref 24–28)
LDH SERPL L TO P-CCNC: 1.26 MMOL/L (ref 0.5–2.2)
PCO2 BLDA: 31 MMHG (ref 35–45)
PH SMN: 7.51 [PH] (ref 7.35–7.45)
PO2 BLDA: 32 MMHG (ref 40–60)
POC BE: 2 MMOL/L
POC SATURATED O2: 70 % (ref 95–100)
POC TCO2: 26 MMOL/L (ref 24–29)
SAMPLE: ABNORMAL
SAMPLE: NORMAL
SITE: ABNORMAL
SITE: NORMAL

## 2024-07-20 PROCEDURE — 83690 ASSAY OF LIPASE: CPT | Performed by: EMERGENCY MEDICINE

## 2024-07-20 PROCEDURE — 25000003 PHARM REV CODE 250

## 2024-07-20 PROCEDURE — 82077 ASSAY SPEC XCP UR&BREATH IA: CPT

## 2024-07-20 PROCEDURE — 86803 HEPATITIS C AB TEST: CPT | Performed by: PHYSICIAN ASSISTANT

## 2024-07-20 PROCEDURE — 80053 COMPREHEN METABOLIC PANEL: CPT | Performed by: EMERGENCY MEDICINE

## 2024-07-20 PROCEDURE — 84439 ASSAY OF FREE THYROXINE: CPT

## 2024-07-20 PROCEDURE — 99900035 HC TECH TIME PER 15 MIN (STAT)

## 2024-07-20 PROCEDURE — 99285 EMERGENCY DEPT VISIT HI MDM: CPT | Mod: 25

## 2024-07-20 PROCEDURE — 93010 ELECTROCARDIOGRAM REPORT: CPT | Mod: ,,, | Performed by: INTERNAL MEDICINE

## 2024-07-20 PROCEDURE — 93005 ELECTROCARDIOGRAM TRACING: CPT

## 2024-07-20 PROCEDURE — 85025 COMPLETE CBC W/AUTO DIFF WBC: CPT | Performed by: EMERGENCY MEDICINE

## 2024-07-20 PROCEDURE — 82803 BLOOD GASES ANY COMBINATION: CPT

## 2024-07-20 PROCEDURE — 87389 HIV-1 AG W/HIV-1&-2 AB AG IA: CPT | Performed by: PHYSICIAN ASSISTANT

## 2024-07-20 PROCEDURE — 84443 ASSAY THYROID STIM HORMONE: CPT

## 2024-07-20 PROCEDURE — 83605 ASSAY OF LACTIC ACID: CPT

## 2024-07-20 RX ORDER — ACETAMINOPHEN 500 MG
1000 TABLET ORAL
Status: ACTIVE | OUTPATIENT
Start: 2024-07-20 | End: 2024-07-21

## 2024-07-21 LAB
ALBUMIN SERPL BCP-MCNC: 4.2 G/DL (ref 3.5–5.2)
ALP SERPL-CCNC: 83 U/L (ref 55–135)
ALT SERPL W/O P-5'-P-CCNC: 12 U/L (ref 10–44)
AMPHET+METHAMPHET UR QL: NEGATIVE
ANION GAP SERPL CALC-SCNC: 15 MMOL/L (ref 8–16)
AST SERPL-CCNC: 24 U/L (ref 10–40)
BACTERIA #/AREA URNS AUTO: ABNORMAL /HPF
BARBITURATES UR QL SCN>200 NG/ML: NEGATIVE
BASOPHILS # BLD AUTO: 0.04 K/UL (ref 0–0.2)
BASOPHILS NFR BLD: 0.5 % (ref 0–1.9)
BENZODIAZ UR QL SCN>200 NG/ML: ABNORMAL
BILIRUB SERPL-MCNC: 2.9 MG/DL (ref 0.1–1)
BILIRUB UR QL STRIP: NEGATIVE
BUN SERPL-MCNC: 9 MG/DL (ref 6–20)
BUN SERPL-MCNC: 9 MG/DL (ref 6–30)
BZE UR QL SCN: NEGATIVE
CALCIUM SERPL-MCNC: 10.4 MG/DL (ref 8.7–10.5)
CANNABINOIDS UR QL SCN: ABNORMAL
CHLORIDE SERPL-SCNC: 105 MMOL/L (ref 95–110)
CHLORIDE SERPL-SCNC: 105 MMOL/L (ref 95–110)
CLARITY UR REFRACT.AUTO: CLEAR
CO2 SERPL-SCNC: 19 MMOL/L (ref 23–29)
COLOR UR AUTO: YELLOW
CREAT SERPL-MCNC: 0.6 MG/DL (ref 0.5–1.4)
CREAT SERPL-MCNC: 0.7 MG/DL (ref 0.5–1.4)
CREAT UR-MCNC: 88 MG/DL (ref 15–325)
CREAT UR-MCNC: 88 MG/DL (ref 15–325)
DIFFERENTIAL METHOD BLD: ABNORMAL
EOSINOPHIL # BLD AUTO: 0 K/UL (ref 0–0.5)
EOSINOPHIL NFR BLD: 0.1 % (ref 0–8)
ERYTHROCYTE [DISTWIDTH] IN BLOOD BY AUTOMATED COUNT: 13.1 % (ref 11.5–14.5)
EST. GFR  (NO RACE VARIABLE): >60 ML/MIN/1.73 M^2
ETHANOL SERPL-MCNC: <10 MG/DL
FENTANYL UR QL SCN: NEGATIVE
GLUCOSE SERPL-MCNC: 118 MG/DL (ref 70–110)
GLUCOSE SERPL-MCNC: 120 MG/DL (ref 70–110)
GLUCOSE UR QL STRIP: ABNORMAL
HCT VFR BLD AUTO: 47 % (ref 37–48.5)
HCT VFR BLD CALC: 46 %PCV (ref 36–54)
HCV AB SERPL QL IA: NORMAL
HGB BLD-MCNC: 16.3 G/DL (ref 12–16)
HGB UR QL STRIP: NEGATIVE
HIV 1+2 AB+HIV1 P24 AG SERPL QL IA: NORMAL
HYALINE CASTS UR QL AUTO: 0 /LPF
IMM GRANULOCYTES # BLD AUTO: 0.01 K/UL (ref 0–0.04)
IMM GRANULOCYTES NFR BLD AUTO: 0.1 % (ref 0–0.5)
KETONES UR QL STRIP: ABNORMAL
LEUKOCYTE ESTERASE UR QL STRIP: NEGATIVE
LIPASE SERPL-CCNC: 16 U/L (ref 4–60)
LYMPHOCYTES # BLD AUTO: 0.8 K/UL (ref 1–4.8)
LYMPHOCYTES NFR BLD: 10.4 % (ref 18–48)
MCH RBC QN AUTO: 30.5 PG (ref 27–31)
MCHC RBC AUTO-ENTMCNC: 34.7 G/DL (ref 32–36)
MCV RBC AUTO: 88 FL (ref 82–98)
METHADONE UR QL SCN>300 NG/ML: NEGATIVE
MICROSCOPIC COMMENT: ABNORMAL
MONOCYTES # BLD AUTO: 0.5 K/UL (ref 0.3–1)
MONOCYTES NFR BLD: 6.7 % (ref 4–15)
NEUTROPHILS # BLD AUTO: 6 K/UL (ref 1.8–7.7)
NEUTROPHILS NFR BLD: 82.2 % (ref 38–73)
NITRITE UR QL STRIP: NEGATIVE
NRBC BLD-RTO: 0 /100 WBC
OHS QRS DURATION: 72 MS
OHS QTC CALCULATION: 458 MS
OPIATES UR QL SCN: ABNORMAL
PCP UR QL SCN>25 NG/ML: NEGATIVE
PH UR STRIP: 8 [PH] (ref 5–8)
PLATELET # BLD AUTO: 236 K/UL (ref 150–450)
PMV BLD AUTO: 10.8 FL (ref 9.2–12.9)
POC IONIZED CALCIUM: 1.14 MMOL/L (ref 1.06–1.42)
POC TCO2 (MEASURED): 23 MMOL/L (ref 23–29)
POTASSIUM BLD-SCNC: 3.5 MMOL/L (ref 3.5–5.1)
POTASSIUM SERPL-SCNC: 4 MMOL/L (ref 3.5–5.1)
PROT SERPL-MCNC: 9.3 G/DL (ref 6–8.4)
PROT UR QL STRIP: ABNORMAL
RBC # BLD AUTO: 5.35 M/UL (ref 4–5.4)
RBC #/AREA URNS AUTO: 7 /HPF (ref 0–4)
SAMPLE: ABNORMAL
SODIUM BLD-SCNC: 141 MMOL/L (ref 136–145)
SODIUM SERPL-SCNC: 139 MMOL/L (ref 136–145)
SP GR UR STRIP: >1.03 (ref 1–1.03)
SQUAMOUS #/AREA URNS AUTO: 0 /HPF
T4 FREE SERPL-MCNC: 1.22 NG/DL (ref 0.71–1.51)
TOXICOLOGY INFORMATION: ABNORMAL
TSH SERPL DL<=0.005 MIU/L-ACNC: 0.19 UIU/ML (ref 0.4–4)
URN SPEC COLLECT METH UR: ABNORMAL
WBC # BLD AUTO: 7.31 K/UL (ref 3.9–12.7)
WBC #/AREA URNS AUTO: 1 /HPF (ref 0–5)

## 2024-07-21 PROCEDURE — 80354 DRUG SCREENING FENTANYL: CPT

## 2024-07-21 PROCEDURE — 63600175 PHARM REV CODE 636 W HCPCS

## 2024-07-21 PROCEDURE — G0378 HOSPITAL OBSERVATION PER HR: HCPCS

## 2024-07-21 PROCEDURE — 25000003 PHARM REV CODE 250

## 2024-07-21 PROCEDURE — 25000003 PHARM REV CODE 250: Performed by: EMERGENCY MEDICINE

## 2024-07-21 PROCEDURE — 96376 TX/PRO/DX INJ SAME DRUG ADON: CPT

## 2024-07-21 PROCEDURE — 80307 DRUG TEST PRSMV CHEM ANLYZR: CPT

## 2024-07-21 PROCEDURE — 96375 TX/PRO/DX INJ NEW DRUG ADDON: CPT

## 2024-07-21 PROCEDURE — 96366 THER/PROPH/DIAG IV INF ADDON: CPT

## 2024-07-21 PROCEDURE — 25500020 PHARM REV CODE 255: Performed by: EMERGENCY MEDICINE

## 2024-07-21 PROCEDURE — 96365 THER/PROPH/DIAG IV INF INIT: CPT

## 2024-07-21 PROCEDURE — 81001 URINALYSIS AUTO W/SCOPE: CPT | Performed by: EMERGENCY MEDICINE

## 2024-07-21 PROCEDURE — 63600175 PHARM REV CODE 636 W HCPCS: Performed by: INTERNAL MEDICINE

## 2024-07-21 PROCEDURE — 96374 THER/PROPH/DIAG INJ IV PUSH: CPT | Mod: 59

## 2024-07-21 RX ORDER — ONDANSETRON HYDROCHLORIDE 2 MG/ML
4 INJECTION, SOLUTION INTRAVENOUS
Status: COMPLETED | OUTPATIENT
Start: 2024-07-21 | End: 2024-07-21

## 2024-07-21 RX ORDER — LORAZEPAM 2 MG/ML
INJECTION INTRAMUSCULAR
Status: DISCONTINUED
Start: 2024-07-21 | End: 2024-07-21 | Stop reason: WASHOUT

## 2024-07-21 RX ORDER — LORAZEPAM 2 MG/ML
1 INJECTION INTRAMUSCULAR EVERY 4 HOURS PRN
Status: DISCONTINUED | OUTPATIENT
Start: 2024-07-21 | End: 2024-07-24 | Stop reason: HOSPADM

## 2024-07-21 RX ORDER — DROPERIDOL 2.5 MG/ML
1.25 INJECTION, SOLUTION INTRAMUSCULAR; INTRAVENOUS
Status: COMPLETED | OUTPATIENT
Start: 2024-07-21 | End: 2024-07-21

## 2024-07-21 RX ORDER — ONDANSETRON HYDROCHLORIDE 2 MG/ML
INJECTION, SOLUTION INTRAVENOUS
Status: COMPLETED
Start: 2024-07-21 | End: 2024-07-21

## 2024-07-21 RX ORDER — ACETAMINOPHEN 325 MG/1
650 TABLET ORAL EVERY 6 HOURS PRN
Status: DISCONTINUED | OUTPATIENT
Start: 2024-07-21 | End: 2024-07-24 | Stop reason: HOSPADM

## 2024-07-21 RX ORDER — DEXMEDETOMIDINE HYDROCHLORIDE 4 UG/ML
0-1.4 INJECTION, SOLUTION INTRAVENOUS CONTINUOUS
Status: DISCONTINUED | OUTPATIENT
Start: 2024-07-21 | End: 2024-07-21

## 2024-07-21 RX ORDER — MIDAZOLAM HYDROCHLORIDE 1 MG/ML
1 INJECTION, SOLUTION INTRAMUSCULAR; INTRAVENOUS EVERY 5 MIN PRN
Status: DISCONTINUED | OUTPATIENT
Start: 2024-07-21 | End: 2024-07-21

## 2024-07-21 RX ORDER — SODIUM CHLORIDE 9 MG/ML
1000 INJECTION, SOLUTION INTRAVENOUS
Status: COMPLETED | OUTPATIENT
Start: 2024-07-21 | End: 2024-07-21

## 2024-07-21 RX ADMIN — LORAZEPAM 1 MG: 2 INJECTION INTRAMUSCULAR; INTRAVENOUS at 10:07

## 2024-07-21 RX ADMIN — IOHEXOL 75 ML: 350 INJECTION, SOLUTION INTRAVENOUS at 02:07

## 2024-07-21 RX ADMIN — ONDANSETRON 4 MG: 2 INJECTION INTRAMUSCULAR; INTRAVENOUS at 01:07

## 2024-07-21 RX ADMIN — DROPERIDOL 1.25 MG: 2.5 INJECTION, SOLUTION INTRAMUSCULAR; INTRAVENOUS at 03:07

## 2024-07-21 RX ADMIN — SODIUM CHLORIDE 1000 ML: 9 INJECTION, SOLUTION INTRAVENOUS at 07:07

## 2024-07-21 RX ADMIN — MIDAZOLAM 1 MG: 1 INJECTION INTRAMUSCULAR; INTRAVENOUS at 05:07

## 2024-07-21 RX ADMIN — DROPERIDOL 1.25 MG: 2.5 INJECTION, SOLUTION INTRAMUSCULAR; INTRAVENOUS at 01:07

## 2024-07-21 RX ADMIN — LORAZEPAM 1 MG: 2 INJECTION INTRAMUSCULAR; INTRAVENOUS at 03:07

## 2024-07-21 RX ADMIN — DEXMEDETOMIDINE HYDROCHLORIDE 0.2 MCG/KG/HR: 4 INJECTION INTRAVENOUS at 07:07

## 2024-07-21 NOTE — ED PROVIDER NOTES
Encounter Date: 7/20/2024       History     Chief Complaint   Patient presents with    Abdominal Pain     Pt arrives c/o chronic abdominal pain.      HPI    Reza Morrow is a 59 y.o. female with PMH of substance induced mood disorder, multiple sclerosis, [place] [facility].  Patient was brought in by EMS but was unable to obtain any history prior to their departure.  Per triage, patient was brought in for abdominal pain which is reported chronic.  Upon my interview, patient unable to answer any questions initially.  When stimulated, patient able to sit up in bed and rule away to face the other side of the room.  Patient able to sit up without any truncal ataxia.  Appears to move all 4 extremity with equal strength and will withdraw to painful stimuli in all 4 extremities.  Upon chart review, patient had recent admission on 05/22/2024 for similar symptoms where patient is able to move all extremities and unable to answer questions.  Initial MRI during that admission was concerning for PRES versus global hypoxic ischemic event due to areas of cortical/subcortical hyperintensity in the frontal/parietal/occipital lobes and left cerebral hemisphere.  During this admission patient's symptoms spontaneously resolved and returned to her baseline on the 2nd day of admission.  Received EEG which was normal.  Per Neurology during that admission, lower suspicion for PRES due to benign exam, did not suspect CNS infection at that time.  Concern for component of polypharmacy due to chronic opioid use and concomitant ketamine use.  Psychology note elicited history of patient having erratic behavior with high energy/little sleep followed by crash as well as significant history of polysubstance use.  And diagnosed patient with delirium due to medical condition.  Chart review shows the patient's sister previously reported that she has had multiple episodes of similar behavior which began over a year ago when she  was initially admitted for takotsubo cardiomyopathy and cardiogenic shock.      Review of patient's allergies indicates:   Allergen Reactions    Adhesive Hives    Neosporin [neomycin-bacitracin-polymyxin] Hives    Neomycin     Neomycin-polymyxin Hives    Neosporin g.u. irrigant     Penicillins Other (See Comments)     Unknown  reaction    Latex Rash     Past Medical History:   Diagnosis Date    Behavioral problem     Bulging lumbar disc     Bursitis     bilateral hip    Degenerative cervical disc     Hx of psychiatric care     Hypercholesteremia     Labral tear of hip, degenerative bilateral    MS (multiple sclerosis)     Paresthesia of both lower extremities 3/13/2022    Pneumonia     Spinal cord compression      Past Surgical History:   Procedure Laterality Date    ANGIOGRAM, CORONARY, WITH LEFT HEART CATHETERIZATION Left 3/11/2022    Procedure: Angiogram, Coronary, with Left Heart Cath;  Surgeon: Elie Matamoros MD;  Location: [facility] CATH LAB;  Service: Cardiology;  Laterality: Left;    BREAST SURGERY      augmentation     SECTION, CLASSIC      HAND SURGERY      HYSTEROSCOPY      NECK SURGERY      cervical disc removeal    TUBAL LIGATION      X-STOP IMPLANTATION       Family History   Problem Relation Name Age of Onset    Cancer Father      Diabetes Father      Lung cancer Father      Diabetes Sister      COPD Mother      Drug abuse Mother      Bipolar disorder Brother      Heart disease Maternal Uncle      Hypothyroidism Maternal Grandmother       Social History     Tobacco Use    Smoking status: Former     Current packs/day: 0.00     Types: Cigarettes     Quit date: 2013     Years since quitting: 10.9    Smokeless tobacco: Never   Substance Use Topics    Alcohol use: No    Drug use: Yes     Types: Benzodiazepines, Marijuana     Review of Systems   Unable to perform ROS: Mental status change       Physical Exam     Initial Vitals   BP Pulse Resp Temp SpO2   24 2154 24 2154 24  07/20/24 2155 07/20/24 2154   (!) 147/87 93 20 99.8 °F (37.7 °C) 99 %      MAP       --                Physical Exam    Nursing note and vitals reviewed.  Constitutional: She is uncooperative. She has a sickly appearance. She appears ill (Chronically).   HENT:   Head: Normocephalic and atraumatic.   Cardiovascular:  Normal rate and regular rhythm.     Exam reveals no gallop and no friction rub.       No murmur heard.  Pulmonary/Chest: Breath sounds normal. Tachypnea noted. No respiratory distress. She has no wheezes. She has no rhonchi. She has no rales.   Abdominal: Abdomen is soft. She exhibits no distension. There is generalized abdominal tenderness.     Neurological: GCS eye subscore is 2. GCS verbal subscore is 1. GCS motor subscore is 5.   Pupils approximately 4-5 mm bilaterally and equally responsive to light.    Extraocular movements grossly intact but unable to follow commands.    Patient able to move 4 extremities with equal strength.    Patient will withdraw to painful stimuli in all 4 extremities.    Patient observed sitting up in bed and turning around without any significant ataxia.   Skin: Skin is warm and dry. Capillary refill takes less than 2 seconds.         ED Course   Procedures  Labs Reviewed   CBC W/ AUTO DIFFERENTIAL - Abnormal       Result Value    WBC 7.31      RBC 5.35      Hemoglobin 16.3 (*)     Hematocrit 47.0      MCV 88      MCH 30.5      MCHC 34.7      RDW 13.1      Platelets 236      MPV 10.8      Immature Granulocytes 0.1      Gran # (ANC) 6.0      Immature Grans (Abs) 0.01      Lymph # 0.8 (*)     Mono # 0.5      Eos # 0.0      Baso # 0.04      nRBC 0      Gran % 82.2 (*)     Lymph % 10.4 (*)     Mono % 6.7      Eosinophil % 0.1      Basophil % 0.5      Differential Method Automated     COMPREHENSIVE METABOLIC PANEL - Abnormal    Sodium 139      Potassium 4.0      Chloride 105      CO2 19 (*)     Glucose 118 (*)     BUN 9      Creatinine 0.7      Calcium 10.4      Total Protein  9.3 (*)     Albumin 4.2      Total Bilirubin 2.9 (*)     Alkaline Phosphatase 83      AST 24      ALT 12      eGFR >60.0      Anion Gap 15     URINALYSIS, REFLEX TO URINE CULTURE - Abnormal    Specimen UA Urine, Clean Catch      Color, UA Yellow      Appearance, UA Clear      pH, UA 8.0      Specific Gravity, UA >1.030 (*)     Protein, UA 1+ (*)     Glucose, UA Trace (*)     Ketones, UA 3+ (*)     Bilirubin (UA) Negative      Occult Blood UA Negative      Nitrite, UA Negative      Leukocytes, UA Negative      Narrative:     Specimen Source->Urine   TSH - Abnormal    TSH 0.191 (*)    DRUG SCREEN PANEL, URINE EMERGENCY - Abnormal    Benzodiazepines Presumptive Positive (*)     Methadone metabolites Negative      Cocaine (Metab.) Negative      Opiate Scrn, Ur Presumptive Positive (*)     Barbiturate Screen, Ur Negative      Amphetamine Screen, Ur Negative      THC Presumptive Positive (*)     Phencyclidine Negative      Creatinine, Urine 88.0      Toxicology Information SEE COMMENT      Narrative:     Specimen Source->Urine   URINALYSIS MICROSCOPIC - Abnormal    RBC, UA 7 (*)     WBC, UA 1      Bacteria None      Squam Epithel, UA 0      Hyaline Casts, UA 0      Microscopic Comment SEE COMMENT      Narrative:     Specimen Source->Urine   ISTAT PROCEDURE - Abnormal    POC PH 7.509 (*)     POC PCO2 31.0 (*)     POC PO2 32 (*)     POC HCO3 24.7      POC BE 2      POC SATURATED O2 70      POC TCO2 26      Sample VENOUS      Site Other      Allens Test N/A     ISTAT PROCEDURE - Abnormal    POC Glucose 120 (*)     POC BUN 9      POC Creatinine 0.6      POC Sodium 141      POC Potassium 3.5      POC Chloride 105      POC TCO2 (MEASURED) 23      POC Ionized Calcium 1.14      POC Hematocrit 46      Sample RABIA     LIPASE    Lipase 16     HIV 1 / 2 ANTIBODY    HIV 1/2 Ag/Ab Non-reactive      Narrative:     Release to patient->Immediate   HEPATITIS C ANTIBODY    Hepatitis C Ab Non-reactive      Narrative:     Release to  patient->Immediate   ALCOHOL,MEDICAL (ETHANOL)    Alcohol, Serum <10     FENTANYL, URINE    Fentanyl, Urine Negative      Creatinine, Urine 88.0      Narrative:     Specimen Source->Urine   T4, FREE    Free T4 1.22     ACETAMINOPHEN LEVEL   SALICYLATE LEVEL   ISTAT LACTATE    POC Lactate 1.26      Sample VENOUS      Site Other      Allens Test N/A     ISTAT CHEM8     EKG Readings: (Independently Interpreted)   Initial Reading: No STEMI. Previous EKG: Compared with most recent EKG Rhythm: Sinus Tachycardia. Heart Rate: 108. Ectopy: No Ectopy. Conduction: Normal. ST Segments: Normal ST Segments. T Waves Flipped: AVR and V1. Clinical Impression: Sinus Tachycardia     ECG Results              EKG 12-lead (Final result)        Collection Time Result Time QRS Duration OHS QTC Calculation    07/20/24 22:07:31 07/21/24 09:21:25 72 458                     Final result by Interface, Lab In [facility] (07/21/24 09:21:30)                   Narrative:    Test Reason : R10.9,    Vent. Rate : 108 BPM     Atrial Rate : 108 BPM     P-R Int : 138 ms          QRS Dur : 072 ms      QT Int : 342 ms       P-R-T Axes : 079 083 064 degrees     QTc Int : 458 ms    Sinus tachycardia  Otherwise normal ECG  When compared with ECG of 22-MAY-2024 20:47,  No significant change was found  Confirmed by Dg SHORT MD ([id number]) on 7/21/2024 9:21:23 AM    Referred By: [email or web address]   SELF           Confirmed By:Dg SHORT MD                                  Imaging Results              MRI Brain Without Contrast (Final result)  Result time 07/21/24 06:26:50      Final result by Sim Appiah MD (07/21/24 06:26:50)                   Impression:      As above.    Electronically signed by resident: Leonora Landry  Date:    07/21/2024  Time:    06:11    Electronically signed by: Sim Appiah MD  Date:    07/21/2024  Time:    06:26               Narrative:    EXAMINATION:  MRI BRAIN WITHOUT CONTRAST    CLINICAL HISTORY:  Mental status change, unknown  cause;    TECHNIQUE:  Early termination of the exam due to patient's declination/refusal to continue with examination.  Limited T1 coronal image obtained.    COMPARISON:  CT head 07/21/2024    FINDINGS:  Nondiagnostic images due to early exam termination by patient.                                       CT Chest Abdomen Pelvis With IV Contrast (XPD) NO Oral Contrast (Final result)  Result time 07/21/24 03:02:07   Procedure changed from CT Abdomen Pelvis With IV Contrast NO Oral Contrast     Final result by Chase Jacobs MD (07/21/24 03:02:07)                   Impression:      Evaluation is limited due to significant motion artifact, poor patient positioning, and suboptimal bolus timing.  Clinical correlation and further evaluation/follow-up as warranted clinically in this patient with acute nonlocalized abdominal pain.    Multiple right middle lobe nodular opacities likely representing infectious airway disease.  Aspiration not excluded as airway evaluation is limited by motion.  Consider follow-up in 3 months to ensure resolution/recharacterization.    High-density fluid in the bladder lumen, presumably early excretion of IV contrast versus artifact, blood products, or other debris.  Recommend correlation with symptoms and urinalysis.    Additional findings as above.    Electronically signed by resident: Leonora Landry  Date:    07/21/2024  Time:    02:07    Electronically signed by: Chase Jacobs MD  Date:    07/21/2024  Time:    03:02               Narrative:    EXAMINATION:  CT CHEST ABDOMEN PELVIS WITH IV CONTRAST (XPD)    CLINICAL HISTORY:  Abdominal pain, acute, nonlocalized;    TECHNIQUE:  Using 75 cc of  Omnipaque 350 IV contrast, and multi-detector helical CT technique, axial CT images of the chest, abdomen and pelvis were obtained. 2D post-processing coronal and sagittal reconstructions was performed.  Oral contrast not administered.    COMPARISON:  CT abdomen pelvis 04/19/2024    CT chest  06/27/2023    FINDINGS:  Evaluation is limited by extensive streak artifact due to the patient's arms overlying the field of view.  Evaluation limited by severe motion artifact.    Thoracic soft tissues: Limited evaluation due to motion artifact.  No acute abnormality identified.  Bilateral breast implant with possible internal capsule rupture.    Heart: No cardiomegaly or pericardial effusion. Thoracic aorta is unremarkable.    Pulmonary vasculature: Unremarkable.  No central pulmonary embolus.    Mediastinum/hilum: Unremarkable.    Airway/esophagus: Significantly limited by motion.  Large airways are grossly patent.  Esophagus is unremarkable.    Lungs: Evaluation limited due to motion artifact.  Multiple right middle lobe nodular opacities measuring up to 1.2 cm.  No pleural effusion or pneumothorax.    Abdominal evaluation limited by poor patient positioning, motion, and suboptimal bolus timing.    Liver: Upper limit of normal in size.  Questionable hepatic steatosis.  Punctate calcified granuloma.    Gallbladder: Unremarkable.    Bile Ducts: No intrahepatic biliary ductal dilation.    Spleen: Unremarkable.    Pancreas: Unremarkable.    Adrenals: Unremarkable.    Genitourinary: Bilateral kidneys are normal in size and location.  Contrast is present in the collecting system limiting evaluation for small renal stones.  Nephrolithiasis not excluded, though there were no stones identified on prior noncontrast CT.  Bilateral ureters are normal in course and caliber.  No hydroureteronephrosis.  Small volume hyperdense fluid noted in the urinary bladder, potentially early excretion of IV contrast.  Other debris or blood products not excluded.    Reproductive organs: Unremarkable.    GI tract/Mesentery: Stomach is not overly distended.  Gastritis would be difficult to exclude on this motion limited study.  No small bowel obstruction.  Liquid stool in the colon.  No overt pericolonic edema.  Appendix is  normal.    Peritoneal Space/pelvis: Trace pelvic free fluid.  No pneumoperitoneum.    Retroperitoneum: No significant adenopathy.    Vasculature: Normal caliber of abdominal aorta with mild atherosclerosis.  Portal vein is patent.  No portal venous gas.    Abdominal wall: Tiny fat containing umbilical hernia.    Bones: C5-C7 ACDF.  Degenerative changes with no acute osseous abnormality.  Anterolisthesis of L4 with respect to L5 likely related to facet arthropathy.  Transitional lumbosacral vertebral body at L5 with pseudoarticulation of the left transverse process with the sacrum.                                       CT Head Without Contrast (Final result)  Result time 07/21/24 02:12:45      Final result by Chase Jacobs MD (07/21/24 02:12:45)                   Impression:      No acute intracranial abnormality.    Similar findings when compared with recent prior studies.    Electronically signed by resident: Leonora Landry  Date:    07/21/2024  Time:    01:59    Electronically signed by: Chase Jacobs MD  Date:    07/21/2024  Time:    02:12               Narrative:    EXAMINATION:  CT HEAD WITHOUT CONTRAST    CLINICAL HISTORY:  Mental status change, unknown cause;    TECHNIQUE:  Low dose axial CT images obtained throughout the head without intravenous contrast. Sagittal and coronal reconstructions were performed.    COMPARISON:  CTA head and neck 05/25/2024    MRI brain 05/24/2024    FINDINGS:  The ventricles, cisterns and sulci are stable in size without evidence of hydrocephalus. No extra-axial blood or fluid collections.    The brain parenchyma demonstrates no parenchymal mass effect, edema, acute hemorrhage or acute major vascular distribution infarct.  Multifocal hypodensities within supratentorial juxtacortical region, periventricular area and centrum semiovale likely representing remote microvascular insults.    Skull/extracranial contents (limited evaluation): No displaced calvarial fracture. Mastoid air  cells and paranasal sinuses are essentially clear.No significant abnormality of extracranial soft tissues.  Partially imaged dental caries.                                       Medications   acetaminophen tablet 1,000 mg (1,000 mg Oral Not Given 7/20/24 6643)   midazolam injection 1 mg (1 mg Intravenous Given 7/21/24 0545)   dexmedetomidine (PRECEDEX) 400mcg/100mL 0.9% NaCL infusion (0 mcg/kg/hr × 54.2 kg Intravenous Paused 7/21/24 0917)   droPERidol injection 1.25 mg (1.25 mg Intravenous Given 7/21/24 0104)   ondansetron injection 4 mg (4 mg Intravenous Given 7/21/24 0105)   ondansetron injection 4 mg ( Intravenous Canceled Entry 7/21/24 0115)   iohexoL (OMNIPAQUE 350) injection 75 mL (75 mLs Intravenous Given 7/21/24 0207)   droPERidol injection 1.25 mg (1.25 mg Intravenous Given 7/21/24 0335)   0.9%  NaCl infusion (1,000 mLs Intravenous New Bag 7/21/24 0728)     Medical Decision Making  59-year-old female with history of encephalopathy, cardiomyopathy, MS presenting for abdominal pain.  Initially, patient is mildly hypertensive but overall hemodynamically stable.  Patient exhibits altered mental status but appears to be able to complete purposeful movements life turning around in bed and will sometimes nod yes or no to questions.  Patient does not have any focal deficits and unclear timeline for patient's last known normal so no code stroke called.  Will obtain i-STAT and prioritize CT head/abdomen to ensure no acute bleed.      Patient unable to tolerate CT scan, episode of emesis prior to obtaining CT.  Patient given Zofran and droperidol.  Wet read of CTA showing old hypodensities but no acute bleed identified.  Obtain collateral information from patient's sister.  States that she has not seen her sister in the last week but sometimes talks to her via text on the phone.  Patient's sister spoke with the patient's roommate recently who reports that she has been having abdominal pain and has been worsening over  the last few days.  Last evening patient's sister instructed patient's roommate to call EMS to take her to the hospital if she does not improve.  Unable to contact patient's roommate to establish better timeline.      Will assess for electrolyte disturbance, evidence of thyroid disease, evidence of hypercarbia.  Will obtain UDS and ethanol.  Considered PRES due to previous clinical concern.  Patient unlikely to tolerate MRI due to altered mental status.  Also considered encephalopathy secondary to polypharmacy due to previous clinical concern.  Considered opioid toxicity due to chronic opioid use but unlikely since patient's pupils are dilated and patient is tachypneic.  Also considered alcohol or benzodiazepine withdrawal the patient is not significantly tachycardic, does not exhibit tremulousness.    VBG showing respiratory alkalosis, no evidence of hypercarbia.  TSH is initially elevated but free T4 is within normal limits, low suspicion for thyroid disease.  HIV/hepatitis screening negative.  UDS positive for benzos, opiates, marijuana.  Read by radiology for CT head showing no acute intracranial pathology does show evidence of previous insult.  Patient does not have an elevated lactic acid.  CT abdomen is limited but no acute surgical intra-abdominal pathology noted.  Attempted to obtain MRI, patient given Versed x2 and is unable to tolerate scan.    Notified by RN of facial asymmetry.  Patient appears to be grimacing, possibly biting the inside of her cheek but due to concern for new facial droop, will place patient on Precedex drip and obtain MRI to evaluate for evidence of intracranial pathology.  Discussed patient's case with oncoming team patient pending MRI.  Disposition will be admission but MRI will help determine what service.    Amount and/or Complexity of Data Reviewed  Labs: ordered. Decision-making details documented in ED Course.  Radiology: ordered. Decision-making details documented in ED  Course.    Risk  OTC drugs.  Prescription drug management.               ED Course as of 07/21/24 0924   Sat Jul 20, 2024   2356 POC PH(!): 7.509 [ES]   Sun Jul 21, 2024   0039 CO2(!): 19 [ES]   0059 TSH(!): 0.191 [ES]   0421 Fentanyl, Urine: Negative [ES]   0421 Benzodiazepines(!): Presumptive Positive [ES]   0421 Opiates, Urine(!): Presumptive Positive [ES]   0421 Marijuana (THC) Metabolite(!): Presumptive Positive [ES]   0421 Alcohol, Serum: <10 [ES]   0421 CO2(!): 19 [ES]   0652 Notified by RN the patient was facial asymmetry.  Unclear patient is biting right cheek versus right-sided facial droop.  Will place patient on Precedex drip and get emergent MRI.   [ES]   0832 CBC W/ AUTO DIFFERENTIAL(!)  No leukocytosis or anemia [ES]   0833 Comp. Metabolic Panel(!)  No significant electrolyte disturbance [ES]   0833 Lipase: 16 [ES]   0833 Hepatitis C Ab: Non-reactive [ES]   0833 HIV 1/2 Ag/Ab: Non-reactive [ES]   0833 Free T4: 1.22 [ES]   0833 CT Head Without Contrast [ES]   0833 CT Head Without Contrast  Per radiology:The brain parenchyma demonstrates no parenchymal mass effect, edema, acute hemorrhage or acute major vascular distribution infarct.  Multifocal hypodensities within supratentorial juxtacortical region, periventricular area and centrum semiovale likely representing remote microvascular insults. [ES]   0834 CT Chest Abdomen Pelvis With IV Contrast (XPD) NO Oral Contrast  Per radiology:Evaluation is limited due to significant motion artifact, poor patient positioning, and suboptimal bolus timing.  Clinical correlation and further evaluation/follow-up as warranted clinically in this patient with acute nonlocalized abdominal pain.     Multiple right middle lobe nodular opacities likely representing infectious airway disease.  Aspiration not excluded as airway evaluation is limited by motion.    [ES]   0911 POC Lactate: 1.26 [ES]      ED Course User Index  [ES] Dannielle Chaudhry MD                            Clinical Impression:  Final diagnoses:  [R10.9] Abdominal pain  [R41.82] AMS (altered mental status)                     Dannielle Chaudhry MD  Resident  07/21/24 2606

## 2024-07-21 NOTE — SUBJECTIVE & OBJECTIVE
Past Medical History:   Diagnosis Date    Behavioral problem     Bulging lumbar disc     Bursitis     bilateral hip    Degenerative cervical disc     Hx of psychiatric care     Hypercholesteremia     Labral tear of hip, degenerative bilateral    MS (multiple sclerosis)     Paresthesia of both lower extremities 3/13/2022    Pneumonia     Spinal cord compression        Past Surgical History:   Procedure Laterality Date    ANGIOGRAM, CORONARY, WITH LEFT HEART CATHETERIZATION Left 3/11/2022    Procedure: Angiogram, Coronary, with Left Heart Cath;  Surgeon: Elie Matamoros MD;  Location: [facility] CATH LAB;  Service: Cardiology;  Laterality: Left;    BREAST SURGERY      augmentation     SECTION, CLASSIC      HAND SURGERY      HYSTEROSCOPY      NECK SURGERY      cervical disc removeal    TUBAL LIGATION      X-STOP IMPLANTATION         Review of patient's allergies indicates:   Allergen Reactions    Adhesive Hives    Neosporin [neomycin-bacitracin-polymyxin] Hives    Neomycin     Neomycin-polymyxin Hives    Neosporin g.u. irrigant     Penicillins Other (See Comments)     Unknown  reaction    Latex Rash       No current facility-administered medications on file prior to encounter.     Current Outpatient Medications on File Prior to Encounter   Medication Sig    COVID-19 AT-HOME TEST Kit TEST AS DIRECTED    furosemide (LASIX) 40 MG tablet Take 1 tablet (40 mg total) by mouth daily as needed (for worsenign shortnes of breath, swelling, or 3lb weight gain in 1 day/5lb weight gain in 1 week).    gabapentin (NEURONTIN) 300 MG capsule Take 300 mg by mouth daily as needed (Pain).    glatiramer (COPAXONE, GLATOPA) 40 mg/mL injection Inject 40 mg into the skin 3 (three) times a week.    ibuprofen (ADVIL,MOTRIN) 200 MG tablet Take 400 mg by mouth daily as needed for Pain.    naloxone (NARCAN) 4 mg/actuation Spry 1 spray by Nasal route once as needed.    pregabalin (LYRICA) 100 MG capsule Take 100 mg by mouth 3 (three) times daily.     triamcinolone acetonide 0.1% (KENALOG) 0.1 % cream Apply 1 applicator topically 4 (four) times daily as needed.    venlafaxine (EFFEXOR-XR) 75 MG 24 hr capsule Take 1 capsule (75 mg total) by mouth once daily.    [DISCONTINUED] atorvastatin (LIPITOR) 20 MG tablet Take 20 mg by mouth once daily.    [DISCONTINUED] EScitalopram oxalate (LEXAPRO) 10 MG tablet Take 10 mg by mouth once daily.     Family History       Problem Relation (Age of Onset)    Bipolar disorder Brother    COPD Mother    Cancer Father    Diabetes Father, Sister    Drug abuse Mother    Heart disease Maternal Uncle    Hypothyroidism Maternal Grandmother    Lung cancer Father          Tobacco Use    Smoking status: Former     Current packs/day: 0.00     Types: Cigarettes     Quit date: 8/1/2013     Years since quitting: 10.9    Smokeless tobacco: Never   Substance and Sexual Activity    Alcohol use: No    Drug use: Yes     Types: Benzodiazepines, Marijuana    Sexual activity: Not Currently     Partners: Male     Review of Systems   Constitutional:  Positive for activity change.   Respiratory: Negative.     Cardiovascular: Negative.    Endocrine: Negative.    Genitourinary: Negative.    Allergic/Immunologic: Negative.      Objective:     Vital Signs (Most Recent):  Temp: 98.6 °F (37 °C) (07/21/24 0730)  Pulse: 106 (07/21/24 0920)  Resp: (!) 24 (07/21/24 0900)  BP: (!) 157/105 (07/21/24 0920)  SpO2: 100 % (07/21/24 0920) Vital Signs (24h Range):  Temp:  [98.6 °F (37 °C)-99.8 °F (37.7 °C)] 98.6 °F (37 °C)  Pulse:  [] 106  Resp:  [14-24] 24  SpO2:  [95 %-100 %] 100 %  BP: (121-184)/() 157/105     Weight: 54.2 kg (119 lb 7.8 oz)  Body mass index is 19.88 kg/m².     Physical Exam  Constitutional:       Appearance: She is ill-appearing.   HENT:      Nose: Nose normal.      Mouth/Throat:      Mouth: Mucous membranes are moist.   Cardiovascular:      Rate and Rhythm: Normal rate and regular rhythm.      Pulses: Normal pulses.   Pulmonary:       Effort: Pulmonary effort is normal.   Musculoskeletal:         General: Normal range of motion.      Cervical back: Normal range of motion.   Skin:     General: Skin is warm.   Neurological:      Mental Status: She is disoriented.   Psychiatric:      Comments: Pupils approximately 4-5 mm bilaterally and equally responsive to light.    Extraocular movements grossly intact but unable to follow commands.    Patient able to move 4 extremities with equal strength.    Patient will withdraw to painful stimuli in all 4 extremities.    Patient observed sitting up in bed and turning around without any significant ataxia.                   Significant Labs: All pertinent labs within the past 24 hours have been reviewed.  BMP:   Recent Labs   Lab 07/20/24  2346   *      K 4.0      CO2 19*   BUN 9   CREATININE 0.7   CALCIUM 10.4       Significant Imaging: I have reviewed all pertinent imaging results/findings within the past 24 hours.

## 2024-07-21 NOTE — ED NOTES
Pt had episode of incontinence. Pt cleaned with sanitary wipes. New hospital gown and bed sheets applied. Call bell within reach. No new needs expressed at this time.

## 2024-07-21 NOTE — H&P
[facility] - Emergency Dept  [facility]  History & Physical    Patient Name: Reza Morrow  MRN: [id number]  Patient Class: OP- Observation  Admission Date: 7/20/2024  Attending Physician: Pavel Adorno MD  Primary Care Provider: Kip Jimenez MD         Patient information was obtained from EMS personnel and ER records.     Subjective:     Principal Problem:Acute encephalopathy    Chief Complaint:   Chief Complaint   Patient presents with    Abdominal Pain     Pt arrives c/o chronic abdominal pain.         HPI: Reza Morrow is a 59 y.o. female with PMH of substance induced mood disorder, multiple sclerosis, presenting to [facility] ED for abdominal pain.  Patient was brought in by EMS but was unable to obtain any history prior to their departure.  Per triage, patient was brought in for abdominal pain which is reported chronic.  Upon my interview, patient unable to answer any questions initially.  When stimulated, patient able to sit up in bed and rule away to face the other side of the room.  Patient able to sit up without any truncal ataxia.  Appears to move all 4 extremity with equal strength and will withdraw to painful stimuli in all 4 extremities.  Upon chart review, patient had recent admission on 05/22/2024 for similar symptoms where patient is able to move all extremities and unable to answer questions.  Initial MRI during that admission was concerning for PRES versus global hypoxic ischemic event due to areas of cortical/subcortical hyperintensity in the frontal/parietal/occipital lobes and left cerebral hemisphere.  During this admission patient's symptoms spontaneously resolved and returned to her baseline on the 2nd day of admission.  Received EEG which was normal.  Per Neurology during that admission, lower suspicion for PRES due to benign exam, did not suspect CNS infection at that time.  Concern for component of polypharmacy due to chronic opioid use and concomitant ketamine use.  Psychology note  elicited history of patient having erratic behavior with high energy/little sleep followed by crash as well as significant history of polysubstance use.  And diagnosed patient with delirium due to medical condition.  Chart review shows the patient's sister previously reported that she has had multiple episodes of similar behavior which began over a year ago when she was initially admitted for takotsubo cardiomyopathy and cardiogenic shock.     Past Medical History:   Diagnosis Date    Behavioral problem     Bulging lumbar disc     Bursitis     bilateral hip    Degenerative cervical disc     Hx of psychiatric care     Hypercholesteremia     Labral tear of hip, degenerative bilateral    MS (multiple sclerosis)     Paresthesia of both lower extremities 3/13/2022    Pneumonia     Spinal cord compression        Past Surgical History:   Procedure Laterality Date    ANGIOGRAM, CORONARY, WITH LEFT HEART CATHETERIZATION Left 3/11/2022    Procedure: Angiogram, Coronary, with Left Heart Cath;  Surgeon: Elie Matamoros MD;  Location: [facility] CATH LAB;  Service: Cardiology;  Laterality: Left;    BREAST SURGERY      augmentation     SECTION, CLASSIC      HAND SURGERY      HYSTEROSCOPY      NECK SURGERY      cervical disc removeal    TUBAL LIGATION      X-STOP IMPLANTATION         Review of patient's allergies indicates:   Allergen Reactions    Adhesive Hives    Neosporin [neomycin-bacitracin-polymyxin] Hives    Neomycin     Neomycin-polymyxin Hives    Neosporin g.u. irrigant     Penicillins Other (See Comments)     Unknown  reaction    Latex Rash       No current facility-administered medications on file prior to encounter.     Current Outpatient Medications on File Prior to Encounter   Medication Sig    COVID-19 AT-HOME TEST Kit TEST AS DIRECTED    furosemide (LASIX) 40 MG tablet Take 1 tablet (40 mg total) by mouth daily as needed (for worsenign shortnes of breath, swelling, or 3lb weight gain in 1 day/5lb weight gain in 1  week).    gabapentin (NEURONTIN) 300 MG capsule Take 300 mg by mouth daily as needed (Pain).    glatiramer (COPAXONE, GLATOPA) 40 mg/mL injection Inject 40 mg into the skin 3 (three) times a week.    ibuprofen (ADVIL,MOTRIN) 200 MG tablet Take 400 mg by mouth daily as needed for Pain.    naloxone (NARCAN) 4 mg/actuation Spry 1 spray by Nasal route once as needed.    pregabalin (LYRICA) 100 MG capsule Take 100 mg by mouth 3 (three) times daily.    triamcinolone acetonide 0.1% (KENALOG) 0.1 % cream Apply 1 applicator topically 4 (four) times daily as needed.    venlafaxine (EFFEXOR-XR) 75 MG 24 hr capsule Take 1 capsule (75 mg total) by mouth once daily.    [DISCONTINUED] atorvastatin (LIPITOR) 20 MG tablet Take 20 mg by mouth once daily.    [DISCONTINUED] EScitalopram oxalate (LEXAPRO) 10 MG tablet Take 10 mg by mouth once daily.     Family History       Problem Relation (Age of Onset)    Bipolar disorder Brother    COPD Mother    Cancer Father    Diabetes Father, Sister    Drug abuse Mother    Heart disease Maternal Uncle    Hypothyroidism Maternal Grandmother    Lung cancer Father          Tobacco Use    Smoking status: Former     Current packs/day: 0.00     Types: Cigarettes     Quit date: 8/1/2013     Years since quitting: 10.9    Smokeless tobacco: Never   Substance and Sexual Activity    Alcohol use: No    Drug use: Yes     Types: Benzodiazepines, Marijuana    Sexual activity: Not Currently     Partners: Male     Review of Systems   Constitutional:  Positive for activity change.   Respiratory: Negative.     Cardiovascular: Negative.    Endocrine: Negative.    Genitourinary: Negative.    Allergic/Immunologic: Negative.      Objective:     Vital Signs (Most Recent):  Temp: 98.6 °F (37 °C) (07/21/24 0730)  Pulse: 106 (07/21/24 0920)  Resp: (!) 24 (07/21/24 0900)  BP: (!) 157/105 (07/21/24 0920)  SpO2: 100 % (07/21/24 0920) Vital Signs (24h Range):  Temp:  [98.6 °F (37 °C)-99.8 °F (37.7 °C)] 98.6 °F (37 °C)  Pulse:   [] 106  Resp:  [14-24] 24  SpO2:  [95 %-100 %] 100 %  BP: (121-184)/() 157/105     Weight: 54.2 kg (119 lb 7.8 oz)  Body mass index is 19.88 kg/m².     Physical Exam  Constitutional:       Appearance: She is ill-appearing.   HENT:      Nose: Nose normal.      Mouth/Throat:      Mouth: Mucous membranes are moist.   Cardiovascular:      Rate and Rhythm: Normal rate and regular rhythm.      Pulses: Normal pulses.   Pulmonary:      Effort: Pulmonary effort is normal.   Musculoskeletal:         General: Normal range of motion.      Cervical back: Normal range of motion.   Skin:     General: Skin is warm.   Neurological:      Mental Status: She is disoriented.   Psychiatric:      Comments: Pupils approximately 4-5 mm bilaterally and equally responsive to light.    Extraocular movements grossly intact but unable to follow commands.    Patient able to move 4 extremities with equal strength.    Patient will withdraw to painful stimuli in all 4 extremities.    Patient observed sitting up in bed and turning around without any significant ataxia.                   Significant Labs: All pertinent labs within the past 24 hours have been reviewed.  BMP:   Recent Labs   Lab 07/20/24  2346   *      K 4.0      CO2 19*   BUN 9   CREATININE 0.7   CALCIUM 10.4       Significant Imaging: I have reviewed all pertinent imaging results/findings within the past 24 hours.  Assessment/Plan:     * Acute encephalopathy  Uncertain etiology  C/W previous recent admit  Tox screen positive for opioids , THC, benzodiazepines  Unable to obtain MRI of head, will need anesthesia  Consult neurology      HFrEF (heart failure with reduced ejection fraction)  Stable      Psychosis  C/W previous admit      MS (multiple sclerosis)  Will review old records  MRI pending      Opioid dependence  Tox screen positive        VTE Risk Mitigation (From admission, onward)      None               On 07/21/2024, patient should be placed in  hospital observation services under my care.             Pavel Adorno MD  Department of Hospital Medicine  [facility] - Emergency Dept

## 2024-07-21 NOTE — ED NOTES
Pt refusing to keep any monitoring equipment on at this time. Pt refusing to put hospital gown on.

## 2024-07-21 NOTE — ED NOTES
Pt remains restless, continuously sitting up in bed and pulling at monitoring wires. Verbally redirected at this time.

## 2024-07-21 NOTE — ED NOTES
The patient was incontinent of urine, linens changed and perineal care given. Patient repositioned for comfort, will continue to monitor.

## 2024-07-21 NOTE — ED NOTES
Pt continues to pull off arm bands, attempting to pull out IV, sitting up pulling off hospital gown. MD notified

## 2024-07-21 NOTE — ED NOTES
Assumed care for pt after recieving report from Dejon SAENZ. Pt. resting in bed in NAD, RR e/u. Pt. offered bathroom assistance and denies need at this time. Explanation of care/wait provided. Bed in low, locked position with rails up and call bell in reach. Pt's white board updated with today's care team and plan. Pt changed into gown, fall risk band and nonskid socks placed. Pt remains restless pulling at monitors.

## 2024-07-21 NOTE — ED NOTES
I-STAT Chem-8+ Results:   Value Reference Range   Sodium 141 136-145 mmol/L   Potassium  3.5 3.5-5.1 mmol/L   Chloride 105  mmol/L   Ionized Calcium 1.14 1.06-1.42 mmol/L   CO2 (measured) 23 23-29 mmol/L   Glucose 120  mg/dL   BUN 9 6-30 mg/dL   Creatinine 0.6 0.5-1.4 mg/dL   Hematocrit 46 36-54%

## 2024-07-21 NOTE — ED NOTES
"Pt grimacing and nodding "yes" when asked if she is in pain. Unable to locate pain per patient; pt still not speaking. Care team notified via secure chat  "

## 2024-07-21 NOTE — PROVIDER PROGRESS NOTES - EMERGENCY DEPT.
Encounter Date: 7/20/2024    ED Physician Progress Notes            ED Resident HAND-OFF NOTE:  7:10 AM 7/21/2024  Reza Morrow is a 59 y.o. female who presented to the ED on 7/21/2024 and has been managed by , who reports patient C/O AMS. I assumed care of patient from off-going ED physician team at 7:10 AM pending MRI and admit .    MRI, 59 yr old F with multiple AMS episodes, last admission MRI was concerning for MS and woke up the next day, there were concerns for severe polypharmacy.   Been agitated, required PRNs, CTH with artifact, has facial asymmetry.      On my reevaluation, she was hypertensive with SBP>200 and DBP>100, tachycardic.  No truncal ataxia, no hyperreflexia, not following commands, opening eyes spontaneously, moaning without comprehensible verbal response.  Giving 1L IVF to evaluate response to tachycardia.    [ -] MRI   [ x] Admit    Despite increasing Precedex doses, patient unable to remain compliant for MRI.  She was ultimately admitted to Hospital Medicine for sedated MRI and further workup of AMS.       On my evaluation, Reza Morrow appears well, hemodynamically stable and in NAD. Thus far, Reza Morrow has received:  Medications   acetaminophen tablet 1,000 mg (1,000 mg Oral Not Given 7/20/24 3183)   LORazepam injection 1 mg (1 mg Intravenous Given 7/21/24 1533)   droPERidol injection 1.25 mg (1.25 mg Intravenous Given 7/21/24 0104)   ondansetron injection 4 mg (4 mg Intravenous Given 7/21/24 0105)   ondansetron injection 4 mg ( Intravenous Canceled Entry 7/21/24 0115)   iohexoL (OMNIPAQUE 350) injection 75 mL (75 mLs Intravenous Given 7/21/24 0207)   droPERidol injection 1.25 mg (1.25 mg Intravenous Given 7/21/24 0335)   0.9%  NaCl infusion (0 mLs Intravenous Stopped 7/21/24 0830)       On my exam, I appreciate:  BP (!) 172/110   Pulse (!) 119   Temp 98.9 °F (37.2 °C) (Oral)   Resp 16   Wt 54.2 kg (119 lb 7.8 oz)   SpO2 100%   BMI 19.88 kg/m²   ED Course as of  07/21/24 1718   Sat Jul 20, 2024   2356 POC PH(!): 7.509 [ES]   Sun Jul 21, 2024   0039 CO2(!): 19 [ES]   0059 TSH(!): 0.191 [ES]   0421 Fentanyl, Urine: Negative [ES]   0421 Benzodiazepines(!): Presumptive Positive [ES]   0421 Opiates, Urine(!): Presumptive Positive [ES]   0421 Marijuana (THC) Metabolite(!): Presumptive Positive [ES]   0421 Alcohol, Serum: <10 [ES]   0421 CO2(!): 19 [ES]   0652 Notified by RN the patient was facial asymmetry.  Unclear patient is biting right cheek versus right-sided facial droop.  Will place patient on Precedex drip and get emergent MRI.   [ES]   0832 CBC W/ AUTO DIFFERENTIAL(!)  No leukocytosis or anemia [ES]   0833 Comp. Metabolic Panel(!)  No significant electrolyte disturbance [ES]   0833 Lipase: 16 [ES]   0833 Hepatitis C Ab: Non-reactive [ES]   0833 HIV 1/2 Ag/Ab: Non-reactive [ES]   0833 Free T4: 1.22 [ES]   0833 CT Head Without Contrast [ES]   0833 CT Head Without Contrast  Per radiology:The brain parenchyma demonstrates no parenchymal mass effect, edema, acute hemorrhage or acute major vascular distribution infarct.  Multifocal hypodensities within supratentorial juxtacortical region, periventricular area and centrum semiovale likely representing remote microvascular insults. [ES]   0834 CT Chest Abdomen Pelvis With IV Contrast (XPD) NO Oral Contrast  Per radiology:Evaluation is limited due to significant motion artifact, poor patient positioning, and suboptimal bolus timing.  Clinical correlation and further evaluation/follow-up as warranted clinically in this patient with acute nonlocalized abdominal pain.     Multiple right middle lobe nodular opacities likely representing infectious airway disease.  Aspiration not excluded as airway evaluation is limited by motion.    [ES]   0911 POC Lactate: 1.26 [ES]      ED Course User Index  [ES] Dannielle Chaudhry MD         Disposition: Admit  I have discussed and counseled Reza Gonzalezn regarding exam, results, diagnosis,  treatment, and plan.  ______________________  Apryl Quiroga DO  Emergency Medicine Resident  7:10 AM 7/21/2024

## 2024-07-21 NOTE — ED TRIAGE NOTES
Reza Morrow, a 59 y.o. female presents to the ED w/ complaint of abdominal pain. Per chart review, pt has chronic abdominal pain. Pt is not allowing RN or MD to assess them and is not participating in the assessment at this time.    Triage note:  Chief Complaint   Patient presents with    Abdominal Pain     Pt arrives c/o chronic abdominal pain.      Review of patient's allergies indicates:   Allergen Reactions    Adhesive Hives    Neosporin [neomycin-bacitracin-polymyxin] Hives    Neomycin     Neomycin-polymyxin Hives    Neosporin g.u. irrigant     Penicillins Other (See Comments)     Unknown  reaction    Latex Rash     Past Medical History:   Diagnosis Date    Behavioral problem     Bulging lumbar disc     Bursitis     bilateral hip    Degenerative cervical disc     Hx of psychiatric care     Hypercholesteremia     Labral tear of hip, degenerative bilateral    MS (multiple sclerosis)     Paresthesia of both lower extremities 3/13/2022    Pneumonia     Spinal cord compression

## 2024-07-21 NOTE — HPI
Reza Morrow is a 59 y.o. female with PMH of substance induced mood disorder, multiple sclerosis, presenting to [facility] ED for abdominal pain.  Patient was brought in by EMS but was unable to obtain any history prior to their departure.  Per triage, patient was brought in for abdominal pain which is reported chronic.  Upon my interview, patient unable to answer any questions initially.  When stimulated, patient able to sit up in bed and rule away to face the other side of the room.  Patient able to sit up without any truncal ataxia.  Appears to move all 4 extremity with equal strength and will withdraw to painful stimuli in all 4 extremities.  Upon chart review, patient had recent admission on 05/22/2024 for similar symptoms where patient is able to move all extremities and unable to answer questions.  Initial MRI during that admission was concerning for PRES versus global hypoxic ischemic event due to areas of cortical/subcortical hyperintensity in the frontal/parietal/occipital lobes and left cerebral hemisphere.  During this admission patient's symptoms spontaneously resolved and returned to her baseline on the 2nd day of admission.  Received EEG which was normal.  Per Neurology during that admission, lower suspicion for PRES due to benign exam, did not suspect CNS infection at that time.  Concern for component of polypharmacy due to chronic opioid use and concomitant ketamine use.  Psychology note elicited history of patient having erratic behavior with high energy/little sleep followed by crash as well as significant history of polysubstance use.  And diagnosed patient with delirium due to medical condition.  Chart review shows the patient's sister previously reported that she has had multiple episodes of similar behavior which began over a year ago when she was initially admitted for takotsubo cardiomyopathy and cardiogenic shock.

## 2024-07-22 LAB
BACTERIA #/AREA URNS AUTO: ABNORMAL /HPF
BILIRUB UR QL STRIP: NEGATIVE
CLARITY UR REFRACT.AUTO: ABNORMAL
COLOR UR AUTO: YELLOW
GLUCOSE UR QL STRIP: NEGATIVE
HGB UR QL STRIP: ABNORMAL
HYALINE CASTS UR QL AUTO: 0 /LPF
KETONES UR QL STRIP: ABNORMAL
LEUKOCYTE ESTERASE UR QL STRIP: ABNORMAL
MICROSCOPIC COMMENT: ABNORMAL
NITRITE UR QL STRIP: NEGATIVE
PH UR STRIP: 7 [PH] (ref 5–8)
PROT UR QL STRIP: ABNORMAL
RBC #/AREA URNS AUTO: 66 /HPF (ref 0–4)
SP GR UR STRIP: 1.02 (ref 1–1.03)
SQUAMOUS #/AREA URNS AUTO: 5 /HPF
URN SPEC COLLECT METH UR: ABNORMAL
WBC #/AREA URNS AUTO: >100 /HPF (ref 0–5)
WBC CLUMPS UR QL AUTO: ABNORMAL
YEAST UR QL AUTO: ABNORMAL

## 2024-07-22 PROCEDURE — 81001 URINALYSIS AUTO W/SCOPE: CPT | Performed by: INTERNAL MEDICINE

## 2024-07-22 PROCEDURE — 87186 SC STD MICRODIL/AGAR DIL: CPT | Performed by: INTERNAL MEDICINE

## 2024-07-22 PROCEDURE — 11000001 HC ACUTE MED/SURG PRIVATE ROOM

## 2024-07-22 PROCEDURE — 63600175 PHARM REV CODE 636 W HCPCS: Performed by: INTERNAL MEDICINE

## 2024-07-22 PROCEDURE — 87088 URINE BACTERIA CULTURE: CPT | Performed by: INTERNAL MEDICINE

## 2024-07-22 PROCEDURE — 87086 URINE CULTURE/COLONY COUNT: CPT | Performed by: INTERNAL MEDICINE

## 2024-07-22 RX ADMIN — LORAZEPAM 1 MG: 2 INJECTION INTRAMUSCULAR; INTRAVENOUS at 12:07

## 2024-07-22 NOTE — NURSING
Nurses Note -- 4 Eyes      7/22/2024   1:07 PM      Skin assessed during: Admit      [x] No Altered Skin Integrity Present    []Prevention Measures Documented      [] Yes- Altered Skin Integrity Present or Discovered   [] LDA Added if Not in Epic (Describe Wound)   [] New Altered Skin Integrity was Present on Admit and Documented in LDA   [] Wound Image Taken    Wound Care Consulted? No    Attending Nurse:  Bailey Luna RN/Staff Member:   Lisa

## 2024-07-22 NOTE — CARE UPDATE
RAPID RESPONSE NURSE CHART REVIEW        Chart Reviewed: 07/22/2024, 5:11 AM    MRN: [id number]  Bed: 609/609 A    Dx: Acute encephalopathy    Reza Morrow has a past medical history of Behavioral problem, Bulging lumbar disc, Bursitis, Degenerative cervical disc, psychiatric care, Hypercholesteremia, Labral tear of hip, degenerative, MS (multiple sclerosis), Paresthesia of both lower extremities, Pneumonia, and Spinal cord compression.    Last VS: BP (!) 181/104   Pulse (!) 113   Temp 98.9 °F (37.2 °C) (Oral)   Resp 18   Wt 54.2 kg (119 lb 7.8 oz)   SpO2 98%   BMI 19.88 kg/m²     24H Vital Sign Range:  Temp:  [98.6 °F (37 °C)-98.9 °F (37.2 °C)]   Pulse:  []   Resp:  [14-24]   BP: (121-184)/()   SpO2:  [95 %-100 %]     Level of Consciousness (AVPU): responds to voice    Recent Labs     07/20/24  2346 07/21/24  0012   WBC 7.31  --    HGB 16.3*  --    HCT 47.0 46     --        Recent Labs     07/20/24  2346      K 4.0      CO2 19*   BUN 9   CREATININE 0.7   *        Recent Labs     07/20/24  2351   PH 7.509*   PCO2 31.0*   PO2 32*   HCO3 24.7   POCSATURATED 70   BE 2        OXYGEN: room air             MEWS score: 4    Charge Socorro SAENZ contacted for tachycardia, hypertension, no temp charted since 1400 yesterday reports will check with bedside RN, follow up. No additional concerns verbalized at this time. Instructed to call [phone] for further concerns or assistance.    ELAINE Thornton RN

## 2024-07-22 NOTE — NURSING
Nurses Note -- 4 Eyes      7/22/2024   12:52 AM      Skin assessed during: Admit      [x] No Altered Skin Integrity Present    []Prevention Measures Documented      [] Yes- Altered Skin Integrity Present or Discovered   [] LDA Added if Not in Epic (Describe Wound)   [] New Altered Skin Integrity was Present on Admit and Documented in LDA   [] Wound Image Taken    Wound Care Consulted? No    Attending Nurse:  Ewa Luna RN/Staff Member:   Amado

## 2024-07-22 NOTE — PROGRESS NOTES
[facility] Medicine  Progress Note    Patient Name: Reza Morrow  MRN: [id number]  Patient Class: OP- Observation   Admission Date: 7/20/2024  Length of Stay: 0 days  Attending Physician: Pavel Adorno MD  Primary Care Provider: Kip Jimenez MD        Subjective:     Principal Problem:Acute encephalopathy        HPI:  Reza Morrow is a 59 y.o. female with PMH of substance induced mood disorder, multiple sclerosis, presenting to [facility] ED for abdominal pain.  Patient was brought in by EMS but was unable to obtain any history prior to their departure.  Per triage, patient was brought in for abdominal pain which is reported chronic.  Upon my interview, patient unable to answer any questions initially.  When stimulated, patient able to sit up in bed and rule away to face the other side of the room.  Patient able to sit up without any truncal ataxia.  Appears to move all 4 extremity with equal strength and will withdraw to painful stimuli in all 4 extremities.  Upon chart review, patient had recent admission on 05/22/2024 for similar symptoms where patient is able to move all extremities and unable to answer questions.  Initial MRI during that admission was concerning for PRES versus global hypoxic ischemic event due to areas of cortical/subcortical hyperintensity in the frontal/parietal/occipital lobes and left cerebral hemisphere.  During this admission patient's symptoms spontaneously resolved and returned to her baseline on the 2nd day of admission.  Received EEG which was normal.  Per Neurology during that admission, lower suspicion for PRES due to benign exam, did not suspect CNS infection at that time.  Concern for component of polypharmacy due to chronic opioid use and concomitant ketamine use.  Psychology note elicited history of patient having erratic behavior with high energy/little sleep followed by crash as well as significant history of polysubstance use.  And diagnosed patient with  delirium due to medical condition.  Chart review shows the patient's sister previously reported that she has had multiple episodes of similar behavior which began over a year ago when she was initially admitted for takotsubo cardiomyopathy and cardiogenic shock.     Overview/Hospital Course:  No notes on file    Interval History: More alert today, OX3, possible dc home tomorrow. This event looks like probable polypharmacy as was suspected in pervious recent admit.    Review of Systems  Objective:     Vital Signs (Most Recent):  Temp: 97.4 °F (36.3 °C) (07/22/24 0354)  Pulse: (!) 113 (07/22/24 0354)  Resp: 18 (07/22/24 0351)  BP: (!) 181/104 (07/22/24 0354)  SpO2: 98 % (07/22/24 0354) Vital Signs (24h Range):  Temp:  [97.4 °F (36.3 °C)-98.9 °F (37.2 °C)] 97.4 °F (36.3 °C)  Pulse:  [100-122] 113  Resp:  [14-18] 18  SpO2:  [95 %-100 %] 98 %  BP: (159-181)/() 181/104     Weight: 54.2 kg (119 lb 7.8 oz)  Body mass index is 19.88 kg/m².  No intake or output data in the 24 hours ending 07/22/24 0946      Physical Exam  Constitutional:       Appearance: She is ill-appearing.   HENT:      Nose: Nose normal.      Mouth/Throat:      Mouth: Mucous membranes are moist.   Cardiovascular:      Rate and Rhythm: Normal rate and regular rhythm.      Pulses: Normal pulses.   Pulmonary:      Effort: Pulmonary effort is normal.   Musculoskeletal:         General: Normal range of motion.      Cervical back: Normal range of motion.   Skin:     General: Skin is warm.   Neurological:      Mental Status: She is disoriented.   Psychiatric:      Comments: Pupils approximately 4-5 mm bilaterally and equally responsive to light.    Extraocular movements grossly intact but unable to follow commands.    Patient able to move 4 extremities with equal strength.    Patient will withdraw to painful stimuli in all 4 extremities.    Patient observed sitting up in bed and turning around without any significant ataxia.                Significant  Labs: All pertinent labs within the past 24 hours have been reviewed.  BMP:   Recent Labs   Lab 07/20/24  2346   *      K 4.0      CO2 19*   BUN 9   CREATININE 0.7   CALCIUM 10.4       Significant Imaging: I have reviewed all pertinent imaging results/findings within the past 24 hours.    Assessment/Plan:      * Acute encephalopathy  Uncertain etiology  C/W previous recent admit  Tox screen positive for opioids , THC, benzodiazepines  Unable to obtain MRI of head, will need anesthesia  Consult neurology      HFrEF (heart failure with reduced ejection fraction)  Stable      Psychosis  C/W previous admit      MS (multiple sclerosis)  Will review old records  MRI pending      Opioid dependence  Tox screen positive        VTE Risk Mitigation (From admission, onward)      None            Discharge Planning   JOANNE: 7/24/2024     Code Status: Full Code   Is the patient medically ready for discharge?:     Reason for patient still in hospital (select all that apply): Patient unstable                     Pavel Adorno MD  Department of Hospital Medicine   [facility]

## 2024-07-22 NOTE — NURSING
Messaged on call MD regarding patient's high bp 181/104 and her HR of 113 no new orders at this time

## 2024-07-22 NOTE — SUBJECTIVE & OBJECTIVE
Interval History: More alert today, OX3, possible dc home tomorrow.     Review of Systems  Objective:     Vital Signs (Most Recent):  Temp: 97.4 °F (36.3 °C) (07/22/24 0354)  Pulse: (!) 113 (07/22/24 0354)  Resp: 18 (07/22/24 0351)  BP: (!) 181/104 (07/22/24 0354)  SpO2: 98 % (07/22/24 0354) Vital Signs (24h Range):  Temp:  [97.4 °F (36.3 °C)-98.9 °F (37.2 °C)] 97.4 °F (36.3 °C)  Pulse:  [100-122] 113  Resp:  [14-18] 18  SpO2:  [95 %-100 %] 98 %  BP: (159-181)/() 181/104     Weight: 54.2 kg (119 lb 7.8 oz)  Body mass index is 19.88 kg/m².  No intake or output data in the 24 hours ending 07/22/24 0946      Physical Exam  Constitutional:       Appearance: She is ill-appearing.   HENT:      Nose: Nose normal.      Mouth/Throat:      Mouth: Mucous membranes are moist.   Cardiovascular:      Rate and Rhythm: Normal rate and regular rhythm.      Pulses: Normal pulses.   Pulmonary:      Effort: Pulmonary effort is normal.   Musculoskeletal:         General: Normal range of motion.      Cervical back: Normal range of motion.   Skin:     General: Skin is warm.   Neurological:      Mental Status: She is disoriented.   Psychiatric:      Comments: Pupils approximately 4-5 mm bilaterally and equally responsive to light.    Extraocular movements grossly intact but unable to follow commands.    Patient able to move 4 extremities with equal strength.    Patient will withdraw to painful stimuli in all 4 extremities.    Patient observed sitting up in bed and turning around without any significant ataxia.                Significant Labs: All pertinent labs within the past 24 hours have been reviewed.  BMP:   Recent Labs   Lab 07/20/24  2346   *      K 4.0      CO2 19*   BUN 9   CREATININE 0.7   CALCIUM 10.4       Significant Imaging: I have reviewed all pertinent imaging results/findings within the past 24 hours.

## 2024-07-23 PROCEDURE — 25000003 PHARM REV CODE 250: Performed by: INTERNAL MEDICINE

## 2024-07-23 PROCEDURE — 11000001 HC ACUTE MED/SURG PRIVATE ROOM

## 2024-07-23 RX ADMIN — ACETAMINOPHEN 650 MG: 325 TABLET ORAL at 09:07

## 2024-07-23 NOTE — PLAN OF CARE
[facility] - Med S[facility]  Initial Discharge Assessment    SW spoke w/patient's sister, Celeste Olivarez ([phone]), 2/2 patient's AMS. Per sister, patient lives with a roommate, Benton, in a SSH with 0 [address]. Per sister, patient was independent w/ADLs and used no DME for ambulation. Per sister, patient may not be taking her Rx's as prescribed. MD team notified. Patient will have assistance from her sister upon discharge and she will transport patient home. All questions addressed. SW will follow for needs.     Primary Care Provider: Kip Jimenez MD    Admission Diagnosis: Abdominal pain [R10.9]  AMS (altered mental status) [R41.82]    Admission Date: 7/20/2024  Expected Discharge Date: 7/24/2024    Transition of Care Barriers: Underinsured    Payor: [employer] / Plan: [facility] MARK[employer]PLACE / Product Type: Commercial /     Extended Emergency Contact Information  Primary Emergency Contact: Celeste Olivarez   [facility]  Home Phone: [phone]  Relation: Sister  Secondary Emergency Contact: Jono Ceballos   [place]  Mobile Phone: [phone]  Relation: Son   needed? No    Discharge Plan A: Home with family  Discharge Plan B: Home with family, Home Health      [facility]  [address]  [facility] [address]  Phone: [phone] Fax: [phone]    [facility] [address] [facility], [place] - [address] AT [facility] [address]  [address]  [address]  [place] [address]  Phone: [phone] Fax: [phone]    [id number] - [place] - [address] AT [address]  [address]  [facility] [place] [address]  Phone: [phone] Fax: [phone]      Initial Assessment (most recent)       Adult Discharge Assessment - 07/23/24 0953          Discharge Assessment    Assessment Type Discharge Planning Assessment     Confirmed/corrected address, phone  number and insurance Yes     Confirmed Demographics Correct on Facesheet     Source of Information family     If unable to respond/provide information was family/caregiver contacted? Yes     Contact Name/Number Celeste Olivarez (Sister) [phone]     Communicated JOANNE with patient/caregiver Yes     Reason For Admission Abdominal pain     People in Home friend(s)   Roommate named Benton    Facility Arrived From: Home     Do you expect to return to your current living situation? Yes     Do you have help at home or someone to help you manage your care at home? Yes     Who are your caregiver(s) and their phone number(s)? Celeste Olivarez (Sister) [phone]     Prior to hospitilization cognitive status: Unable to Assess     Current cognitive status: Unable to Assess     Walking or Climbing Stairs Difficulty no     Dressing/Bathing Difficulty no     Do you have any problems with: --   Independent    Home Accessibility stairs to enter home     Number of Stairs, Main Entrance none     Home Layout Able to live on 1st floor     Equipment Currently Used at Home none     Readmission within 30 days? No     Do you currently have service(s) that help you manage your care at home? No     Do you take prescription medications? Yes     Do you have prescription coverage? Yes     Coverage [facility] [employer] -     Do you have any problems affording any of your prescribed medications? TBD     Is the patient taking medications as prescribed? no   Per sister, she believes that patient may be taking more than directed.    Who is going to help you get home at discharge? Celeste Olivarez (Sister) [phone]     How do you get to doctors appointments? car, drives self;family or friend will provide     Are you on dialysis? No     Do you take coumadin? No     Discharge Plan A Home with family     Discharge Plan B Home with family;Home Health     DME Needed Upon Discharge  other (see  comments)   TBD    Discharge Plan discussed with: Sibling     Name(s) and Number(s) Celeste Olivarez (Sister) [phone]     Transition of Care Barriers Underinsured        Physical Activity    On average, how many days per week do you engage in moderate to strenuous exercise (like a brisk walk)? -- (P)    SDOH completed within 6 months.       OTHER    Name(s) of People in Home Roommate, Benton (P)                    Discharge Plan A and Plan B have been determined by review of patient's clinical status, future medical and therapeutic needs, and coverage/benefits for post-acute care in coordination with multidisciplinary team members.    CECI Seymuor, LMSW    Case Management Department  [facility]

## 2024-07-23 NOTE — PLAN OF CARE
Problem: Violence Risk or Actual  Goal: Anger and Impulse Control  Outcome: Progressing     Problem: Fall Injury Risk  Goal: Absence of Fall and Fall-Related Injury  Outcome: Progressing     Problem: Restraint, Nonviolent  Goal: Absence of Harm or Injury  Outcome: Progressing     Problem: Adult Inpatient Plan of Care  Goal: Plan of Care Review  Outcome: Progressing  Goal: Patient-Specific Goal (Individualized)  Outcome: Progressing  Goal: Absence of Hospital-Acquired Illness or Injury  Outcome: Progressing  Goal: Optimal Comfort and Wellbeing  Outcome: Progressing  Goal: Readiness for Transition of Care  Outcome: Progressing     Problem: Skin Injury Risk Increased  Goal: Skin Health and Integrity  Outcome: Progressing

## 2024-07-23 NOTE — SUBJECTIVE & OBJECTIVE
"Interval History: Remains confused, but is hungry    Review of Systems  Objective:     Vital Signs (Most Recent):  Temp: 97.2 °F (36.2 °C) (07/23/24 0905)  Pulse: 99 (07/23/24 0905)  Resp: 18 (07/23/24 0905)  BP: (!) 144/91 (07/23/24 0905)  SpO2: 100 % (07/23/24 0905) Vital Signs (24h Range):  Temp:  [97.2 °F (36.2 °C)-98.4 °F (36.9 °C)] 97.2 °F (36.2 °C)  Pulse:  [] 99  Resp:  [16-19] 18  SpO2:  [96 %-100 %] 100 %  BP: (137-173)/() 144/91     Weight: 54.2 kg (119 lb 7.8 oz)  Body mass index is 19.88 kg/m².  No intake or output data in the 24 hours ending 07/23/24 1054      Physical Exam  Constitutional:       Appearance: She is ill-appearing.   HENT:      Nose: Nose normal.      Mouth/Throat:      Mouth: Mucous membranes are moist.   Cardiovascular:      Rate and Rhythm: Normal rate and regular rhythm.      Pulses: Normal pulses.   Pulmonary:      Effort: Pulmonary effort is normal.   Musculoskeletal:         General: Normal range of motion.      Cervical back: Normal range of motion.   Skin:     General: Skin is warm.   Neurological:      Mental Status: She is disoriented.   Psychiatric:      Comments: Pupils approximately 4-5 mm bilaterally and equally responsive to light.    Extraocular movements grossly intact but unable to follow commands.    Patient able to move 4 extremities with equal strength.    Patient will withdraw to painful stimuli in all 4 extremities.    Patient observed sitting up in bed and turning around without any significant ataxia.                Significant Labs: All pertinent labs within the past 24 hours have been reviewed.  BMP: No results for input(s): "GLU", "NA", "K", "CL", "CO2", "BUN", "CREATININE", "CALCIUM", "MG" in the last 48 hours.    Significant Imaging: I have reviewed all pertinent imaging results/findings within the past 24 hours.  " Dr Fabian

## 2024-07-23 NOTE — CARE UPDATE
RAPID RESPONSE NURSE CHART REVIEW        Chart Reviewed: 07/23/2024, 5:53 AM    MRN: [id number]  Bed: 609/609 A    Dx: Acute encephalopathy    Reza Morrow has a past medical history of Behavioral problem, Bulging lumbar disc, Bursitis, Degenerative cervical disc, psychiatric care, Hypercholesteremia, Labral tear of hip, degenerative, MS (multiple sclerosis), Paresthesia of both lower extremities, Pneumonia, and Spinal cord compression.    Last VS: BP (!) 173/104 (Patient Position: Lying)   Pulse 93   Temp 98.1 °F (36.7 °C) (Axillary)   Resp 18   Wt 54.2 kg (119 lb 7.8 oz)   SpO2 96%   BMI 19.88 kg/m²     24H Vital Sign Range:  Temp:  [97.5 °F (36.4 °C)-98.4 °F (36.9 °C)]   Pulse:  []   Resp:  [16-19]   BP: (137-173)/()   SpO2:  [96 %-100 %]     Level of Consciousness (AVPU): alert    Recent Labs     07/20/24  2346 07/21/24  0012   WBC 7.31  --    HGB 16.3*  --    HCT 47.0 46     --        Recent Labs     07/20/24  2346      K 4.0      CO2 19*   BUN 9   CREATININE 0.7   *        Recent Labs     07/20/24  2351   PH 7.509*   PCO2 31.0*   PO2 32*   HCO3 24.7   POCSATURATED 70   BE 2        OXYGEN:             MEWS score: 1    Rounding completed with charge LETTY Wu reports no issues. No additional concerns verbalized at this time. Instructed to call [phone] for further concerns or assistance.    Yokasta Wyman RN

## 2024-07-24 VITALS
BODY MASS INDEX: 19.88 KG/M2 | HEART RATE: 90 BPM | WEIGHT: 119.5 LBS | DIASTOLIC BLOOD PRESSURE: 76 MMHG | SYSTOLIC BLOOD PRESSURE: 149 MMHG | TEMPERATURE: 99 F | RESPIRATION RATE: 18 BRPM | OXYGEN SATURATION: 96 %

## 2024-07-24 LAB — BACTERIA UR CULT: ABNORMAL

## 2024-07-24 PROCEDURE — 25000003 PHARM REV CODE 250: Performed by: INTERNAL MEDICINE

## 2024-07-24 RX ORDER — OXYCODONE HYDROCHLORIDE 30 MG/1
30 TABLET ORAL
COMMUNITY
Start: 2024-07-02

## 2024-07-24 RX ORDER — TAPENTADOL HYDROCHLORIDE 100 MG/1
1 TABLET, FILM COATED, EXTENDED RELEASE ORAL 2 TIMES DAILY
COMMUNITY
Start: 2024-06-30

## 2024-07-24 RX ORDER — BENZONATATE 100 MG/1
100 CAPSULE ORAL 3 TIMES DAILY PRN
Qty: 20 CAPSULE | Refills: 0 | Status: SHIPPED | OUTPATIENT
Start: 2024-07-24 | End: 2024-08-03

## 2024-07-24 RX ADMIN — ACETAMINOPHEN 650 MG: 325 TABLET ORAL at 10:07

## 2024-07-24 NOTE — PLAN OF CARE
[facility] - Med Surg  Discharge Final Note    Primary Care Provider: Kip Jimenez MD    Expected Discharge Date: 7/24/2024    Final Discharge Note (most recent)       Final Note - 07/24/24 0929          Final Note    Assessment Type Final Discharge Note     Anticipated Discharge Disposition Home or Self Care     Hospital Resources/Appts/Education Provided Appointments scheduled and added to AVS        Post-Acute Status    Post-Acute Authorization Other     Other Status No Post-Acute Service Needs     Discharge Delays None known at this time                     Important Message from Medicare

## 2024-07-24 NOTE — NURSING
Pt's IV removed. Pt decided to pick medications on her way at Pharmacy. Discharge papers were discussed and handed to Pt. Transport wheeled Pt. to [employer] to meet sister-in-law for .

## 2024-07-24 NOTE — PLAN OF CARE
Problem: Violence Risk or Actual  Goal: Anger and Impulse Control  Outcome: Progressing  Intervention: Minimize Safety Risk  Flowsheets (Taken 7/24/2024 1200)  Sensory Stimulation Regulation:   care clustered   quiet environment promoted  Enhanced Safety Measures:   bed alarm set   monitored by video  Intervention: Promote Self-Control  Flowsheets (Taken 7/24/2024 1200)  Environmental Support: calm environment promoted     Problem: Adult Inpatient Plan of Care  Goal: Plan of Care Review  Outcome: Progressing  Flowsheets (Taken 7/24/2024 1130)  Plan of Care Reviewed With: patient

## 2024-07-25 ENCOUNTER — PATIENT MESSAGE (OUTPATIENT)
Dept: PSYCHIATRY | Facility: CLINIC | Age: 59
End: 2024-07-25
Payer: COMMERCIAL

## 2024-07-25 ENCOUNTER — PATIENT OUTREACH (OUTPATIENT)
Dept: ADMINISTRATIVE | Facility: CLINIC | Age: 59
End: 2024-07-25
Payer: COMMERCIAL

## 2024-07-25 NOTE — PROGRESS NOTES
C3 nurse attempted to contact Reza Morrow, patient's sister, Celeste and patient's son, Jono, for a TCC post hospital discharge follow up call. No answer. Voicemail left with callback information.  The patient does not have a scheduled HOSFU appointment, and the pt [facility].

## 2024-07-25 NOTE — PROGRESS NOTES
2nd attempt made to reach patient for TCC call. Left voicemail please call [phone] leave first name, last name, and  and I will return your call.

## 2024-07-26 NOTE — PROGRESS NOTES
3rd attempt made to reach patient for TCC call. Left voicemail please call [phone] leave first name, last name, and  and I will return your call.

## 2024-07-28 ENCOUNTER — TELEPHONE (OUTPATIENT)
Dept: ADMINISTRATIVE | Facility: CLINIC | Age: 59
End: 2024-07-28
Payer: COMMERCIAL

## 2024-07-28 NOTE — TELEPHONE ENCOUNTER
Day 2 Post-Discharge SMS Tracker     Pt contacted in response to her text.  She indicated that she did not get her med's from the pharmacy when she was discharged because her friend was sick and had to leave.  She will call the [facility] in the am and ask them to send to a local Rx or to see if they can mail it to her.  No other questions/concerns voiced.

## 2024-07-30 NOTE — HOSPITAL COURSE
59 y.o. female with PMH of substance induced mood disorder, multiple sclerosis, presenting to [facility] ED for abdominal pain. Patient was brought in by EMS but was unable to obtain any history prior to their departure.     Spoke with daughter has has similar previous admits. She believes it may be due to ketamine cream.    MS returned to baseline with conservative treatment.

## 2024-07-30 NOTE — DISCHARGE SUMMARY
[facility] Medicine  Discharge Summary      Patient Name: Reza Morrow  MRN: [id number]  MCKAYLA: [id number]  Patient Class: IP- Inpatient  Admission Date: 7/20/2024  Hospital Length of Stay: 2 days  Discharge Date and Time: 7/24/2024 11:46 AM  Attending Physician: No att. providers found   Discharging Provider: Pavel Adorno MD  Primary Care Provider: Kip Jimenez MD  [facility] Medicine Team: [facility] HOSP MED W Pavel Adorno MD  Primary Care Team: [facility] MED     HPI:   Reza Morrow is a 59 y.o. female with PMH of substance induced mood disorder, multiple sclerosis, presenting to [facility] ED for abdominal pain.  Patient was brought in by EMS but was unable to obtain any history prior to their departure.  Per triage, patient was brought in for abdominal pain which is reported chronic.  Upon my interview, patient unable to answer any questions initially.  When stimulated, patient able to sit up in bed and rule away to face the other side of the room.  Patient able to sit up without any truncal ataxia.  Appears to move all 4 extremity with equal strength and will withdraw to painful stimuli in all 4 extremities.  Upon chart review, patient had recent admission on 05/22/2024 for similar symptoms where patient is able to move all extremities and unable to answer questions.  Initial MRI during that admission was concerning for PRES versus global hypoxic ischemic event due to areas of cortical/subcortical hyperintensity in the frontal/parietal/occipital lobes and left cerebral hemisphere.  During this admission patient's symptoms spontaneously resolved and returned to her baseline on the 2nd day of admission.  Received EEG which was normal.  Per Neurology during that admission, lower suspicion for PRES due to benign exam, did not suspect CNS infection at that time.  Concern for component of polypharmacy due to chronic opioid use and concomitant ketamine use.  Psychology note elicited history of patient  having erratic behavior with high energy/little sleep followed by crash as well as significant history of polysubstance use.  And diagnosed patient with delirium due to medical condition.  Chart review shows the patient's sister previously reported that she has had multiple episodes of similar behavior which began over a year ago when she was initially admitted for takotsubo cardiomyopathy and cardiogenic shock.     * No surgery found *      Hospital Course:   59 y.o. female with PMH of substance induced mood disorder, multiple sclerosis, presenting to [facility] ED for abdominal pain. Patient was brought in by EMS but was unable to obtain any history prior to their departure.     Spoke with daughter has has similar previous admits. She believes it may be due to ketamine cream.    MS returned to baseline with conservative treatment.     Goals of Care Treatment Preferences:  Code Status: Full Code      Consults:     No new Assessment & Plan notes have been filed under this hospital service since the last note was generated.  Service: Hospital Medicine    Final Active Diagnoses:    Diagnosis Date Noted POA    PRINCIPAL PROBLEM:  Acute encephalopathy [G93.40] 05/24/2023 Yes    HFrEF (heart failure with reduced ejection fraction) [I50.20] 05/27/2023 Yes    Psychosis [F29] 04/10/2022 Yes    Opioid dependence [F11.20] 06/23/2016 Yes     Chronic    MS (multiple sclerosis) [G35] 12/07/2015 Yes     Chronic      Problems Resolved During this Admission:       Discharged Condition: good    Disposition: Home or Self Care    Follow Up:   Follow-up Information       Post, Kip HENRY MD Follow up on 8/6/2024.    Specialty: Family Medicine  Why: Established pt's hospital f/u visit @ 2:00pm. Please bring discharge summary, ID, insurance card, and medication list.  Contact information:  [address] [place] [id number] [phone]                           Patient Instructions:   No discharge procedures on  "file.    Significant Diagnostic Studies: Labs: BMP: No results for input(s): "GLU", "NA", "K", "CL", "CO2", "BUN", "CREATININE", "CALCIUM", "MG" in the last 48 hours.    Pending Diagnostic Studies:       None           Medications:  Reconciled Home Medications:      Medication List        START taking these medications      benzonatate 100 MG capsule  Commonly known as: TESSALON  Take 1 capsule (100 mg total) by mouth 3 (three) times daily as needed for Cough.            CONTINUE taking these medications      COVID-19 AT-HOME TEST Kit  Generic drug: COVID-19 antigen test  TEST AS DIRECTED     furosemide 40 MG tablet  Commonly known as: LASIX  Take 1 tablet (40 mg total) by mouth daily as needed (for worsenign shortnes of breath, swelling, or 3lb weight gain in 1 day/5lb weight gain in 1 week).     gabapentin 300 MG capsule  Commonly known as: NEURONTIN  Take 300 mg by mouth daily as needed (Pain).     glatiramer 40 mg/mL injection  Commonly known as: COPAXONE, GLATOPA  Inject 40 mg into the skin 3 (three) times a week.     ibuprofen 200 MG tablet  Commonly known as: ADVIL,MOTRIN  Take 400 mg by mouth daily as needed for Pain.     naloxone 4 mg/actuation Spry  Commonly known as: NARCAN  1 spray by Nasal route once as needed.     NUCYNTA  mg Tb12  Generic drug: tapentadoL  Take 1 tablet by mouth 2 (two) times daily.     oxyCODONE 30 MG Tab  Commonly known as: ROXICODONE  Take 30 mg by mouth every 4 to 6 hours as needed (pain).     pregabalin 100 MG capsule  Commonly known as: LYRICA  Take 100 mg by mouth 3 (three) times daily.     triamcinolone acetonide 0.1% 0.1 % cream  Commonly known as: KENALOG  Apply 1 applicator topically 4 (four) times daily as needed.     venlafaxine 75 MG 24 hr capsule  Commonly known as: EFFEXOR-XR  Take 1 capsule (75 mg total) by mouth once daily.              Indwelling Lines/Drains at time of discharge:   Lines/Drains/Airways       None                   Time spent on the discharge " of patient: 15 minutes         Pavel Adorno MD  Department of Hospital Medicine  [facility]

## 2024-07-31 NOTE — PHYSICIAN QUERY
"Provider, after study, please further specify the diagnosis of "Acute encephalopathy.":    Encephalopathy, unspecified      "

## 2024-08-05 ENCOUNTER — PATIENT MESSAGE (OUTPATIENT)
Dept: PSYCHIATRY | Facility: CLINIC | Age: 59
End: 2024-08-05
Payer: COMMERCIAL

## 2024-08-05 ENCOUNTER — PATIENT OUTREACH (OUTPATIENT)
Dept: ADMINISTRATIVE | Facility: OTHER | Age: 59
End: 2024-08-05
Payer: COMMERCIAL

## 2024-08-05 ENCOUNTER — TELEPHONE (OUTPATIENT)
Dept: ADMINISTRATIVE | Facility: CLINIC | Age: 59
End: 2024-08-05
Payer: COMMERCIAL

## 2024-08-15 ENCOUNTER — PATIENT OUTREACH (OUTPATIENT)
Dept: ADMINISTRATIVE | Facility: OTHER | Age: 59
End: 2024-08-15
Payer: MEDICAID

## 2024-09-05 NOTE — PROGRESS NOTES
CHW - Follow Up    This Community Health Worker completed a follow up visit with patient via telephone today.  Pt/Caregiver reported: Pt is having to move into a garage apartment and has received confirmation from [facility] stating she is on several waiting list.   Community Health Worker provided: Pt is requesting follow up calls to continue look for affordable housing.  Follow up required: yes  Follow-up Outreach - Due: 10/3/2024

## 2024-09-27 ENCOUNTER — PATIENT OUTREACH (OUTPATIENT)
Dept: ADMINISTRATIVE | Facility: OTHER | Age: 59
End: 2024-09-27
Payer: MEDICAID

## 2024-10-04 NOTE — PROGRESS NOTES
CHW - Follow Up    This Community Health Worker completed a follow up visit with patient via telephone today.  Pt/Caregiver reported: Pt states she has moved to a temporary setting awaiting affordable housing.   Community Health Worker provided: Sent pt sources for free phone and [employer] internet on [employer]. Pt is thankful for help.   Follow up required: yes  Follow-up Outreach - Due: 10/24/2024

## 2024-10-15 NOTE — PATIENT INSTRUCTIONS
Jose is here with her father for follow-up regarding her diagnosis of ADHD.  Grandmother states that the child is doing well except that she frequently argues with her 8-year-old brother who also has ADHD and is on ADHD medication.  She is taking Adderall 15 mg XR in the morning and 5 mg in the afternoon.  Grandmother has not received any complaints from her school teachers as she is receiving counseling at school.  Grandmother is in the process of obtaining appointment for her granddaughter to be evaluated at [facility] as well.  She has contacted them but has not heard back and states that she will call them again today.  She is able to sleep from 8:30 PM to 7 AM and falls asleep if she takes 2 mg of melatonin at night.  Grandmother has completed the [employer] form and is waiting for her teachers to complete their forms.  And she will bring both to our office when they are complete.  At this office visit Jose was able to sit still and was cooperative with the exam and was answering questions appropriately.  The current plan is to continue current medications and we will reevaluate when grandmother brings us the completed [employer] papers.  Pablo is agreeable with the above plan.        Okay to start PT  Neuropsychology testing and consult  Neuro-ophthalmology  Orthopedic referral for hip pain   Labwork   6 weekly follow up

## 2024-11-14 ENCOUNTER — HOSPITAL ENCOUNTER (INPATIENT)
Facility: HOSPITAL | Age: 59
LOS: 3 days | Discharge: HOME | DRG: 896 | End: 2024-11-17
Attending: EMERGENCY MEDICINE | Admitting: HOSPITALIST
Payer: MEDICAID

## 2024-11-14 DIAGNOSIS — G93.40 ENCEPHALOPATHY, UNSPECIFIED TYPE: Primary | ICD-10-CM

## 2024-11-14 DIAGNOSIS — F11.221 OPIOID DEPENDENCE WITH INTOXICATION DELIRIUM: Chronic | ICD-10-CM

## 2024-11-14 DIAGNOSIS — E87.29 INCREASED ANION GAP METABOLIC ACIDOSIS: ICD-10-CM

## 2024-11-14 DIAGNOSIS — A41.9 SEPSIS: ICD-10-CM

## 2024-11-14 DIAGNOSIS — R07.9 CHEST PAIN: ICD-10-CM

## 2024-11-14 DIAGNOSIS — R79.89 ELEVATED TROPONIN: ICD-10-CM

## 2024-11-14 PROBLEM — R41.82 ALTERED MENTAL STATUS: Status: ACTIVE | Noted: 2024-11-14

## 2024-11-14 PROBLEM — Z79.899 POLYPHARMACY: Status: ACTIVE | Noted: 2024-11-14

## 2024-11-14 PROBLEM — R74.8 ELEVATED CK: Status: ACTIVE | Noted: 2024-11-14

## 2024-11-14 PROBLEM — F11.93 OPIOID WITHDRAWAL: Status: ACTIVE | Noted: 2024-05-23

## 2024-11-14 LAB
ALBUMIN SERPL BCP-MCNC: 4.4 G/DL (ref 3.5–5.2)
ALLENS TEST: ABNORMAL
ALLENS TEST: ABNORMAL
ALP SERPL-CCNC: 96 U/L (ref 40–150)
ALT SERPL W/O P-5'-P-CCNC: 12 U/L (ref 10–44)
AMPHET+METHAMPHET UR QL: NEGATIVE
ANION GAP SERPL CALC-SCNC: 18 MMOL/L (ref 8–16)
APAP SERPL-MCNC: <3 UG/ML (ref 10–20)
AST SERPL-CCNC: 28 U/L (ref 10–40)
BACTERIA #/AREA URNS AUTO: ABNORMAL /HPF
BARBITURATES UR QL SCN>200 NG/ML: NEGATIVE
BASOPHILS # BLD AUTO: 0.05 K/UL (ref 0–0.2)
BASOPHILS NFR BLD: 0.3 % (ref 0–1.9)
BENZODIAZ UR QL SCN>200 NG/ML: NEGATIVE
BILIRUB SERPL-MCNC: 2.4 MG/DL (ref 0.1–1)
BILIRUB UR QL STRIP: NEGATIVE
BUN SERPL-MCNC: 39 MG/DL (ref 6–20)
BUPRENORPHINE UR QL SCN: NEGATIVE
BZE UR QL SCN: NEGATIVE
CALCIUM SERPL-MCNC: 10.1 MG/DL (ref 8.7–10.5)
CANNABINOIDS UR QL SCN: ABNORMAL
CHLORIDE SERPL-SCNC: 105 MMOL/L (ref 95–110)
CK SERPL-CCNC: 337 U/L (ref 20–180)
CLARITY UR REFRACT.AUTO: CLEAR
CO2 SERPL-SCNC: 16 MMOL/L (ref 23–29)
COLOR UR AUTO: YELLOW
CREAT SERPL-MCNC: 1 MG/DL (ref 0.5–1.4)
CREAT UR-MCNC: 156 MG/DL (ref 15–325)
DIFFERENTIAL METHOD BLD: ABNORMAL
EOSINOPHIL # BLD AUTO: 0 K/UL (ref 0–0.5)
EOSINOPHIL NFR BLD: 0.1 % (ref 0–8)
ERYTHROCYTE [DISTWIDTH] IN BLOOD BY AUTOMATED COUNT: 14.4 % (ref 11.5–14.5)
EST. GFR  (NO RACE VARIABLE): >60 ML/MIN/1.73 M^2
ETHANOL SERPL-MCNC: <10 MG/DL
ETHANOL UR-MCNC: <10 MG/DL
FENTANYL UR QL SCN: NEGATIVE
GLUCOSE SERPL-MCNC: 141 MG/DL (ref 70–110)
GLUCOSE UR QL STRIP: ABNORMAL
HCT VFR BLD AUTO: 54.7 % (ref 37–48.5)
HGB BLD-MCNC: 18.9 G/DL (ref 12–16)
HGB UR QL STRIP: ABNORMAL
HYALINE CASTS UR QL AUTO: 4 /LPF
IMM GRANULOCYTES # BLD AUTO: 0.08 K/UL (ref 0–0.04)
IMM GRANULOCYTES NFR BLD AUTO: 0.5 % (ref 0–0.5)
KETONES UR QL STRIP: ABNORMAL
LDH SERPL L TO P-CCNC: 0.31 MMOL/L (ref 0.5–2.2)
LDH SERPL L TO P-CCNC: 3.36 MMOL/L (ref 0.5–2.2)
LEUKOCYTE ESTERASE UR QL STRIP: NEGATIVE
LYMPHOCYTES # BLD AUTO: 1.5 K/UL (ref 1–4.8)
LYMPHOCYTES NFR BLD: 8.3 % (ref 18–48)
MAGNESIUM SERPL-MCNC: 2 MG/DL (ref 1.6–2.6)
MCH RBC QN AUTO: 29.2 PG (ref 27–31)
MCHC RBC AUTO-ENTMCNC: 34.6 G/DL (ref 32–36)
MCV RBC AUTO: 85 FL (ref 82–98)
METHADONE UR QL SCN>300 NG/ML: NEGATIVE
MICROSCOPIC COMMENT: ABNORMAL
MONOCYTES # BLD AUTO: 1.2 K/UL (ref 0.3–1)
MONOCYTES NFR BLD: 6.7 % (ref 4–15)
NEUTROPHILS # BLD AUTO: 14.8 K/UL (ref 1.8–7.7)
NEUTROPHILS NFR BLD: 84.1 % (ref 38–73)
NITRITE UR QL STRIP: NEGATIVE
NRBC BLD-RTO: 0 /100 WBC
OHS QRS DURATION: 74 MS
OHS QTC CALCULATION: 492 MS
OPIATES UR QL SCN: NEGATIVE
OXYCODONE UR QL SCN: ABNORMAL
PCP UR QL SCN>25 NG/ML: NEGATIVE
PH UR STRIP: 6 [PH] (ref 5–8)
PHOSPHATE SERPL-MCNC: 3.3 MG/DL (ref 2.7–4.5)
PLATELET # BLD AUTO: 296 K/UL (ref 150–450)
PMV BLD AUTO: 10.9 FL (ref 9.2–12.9)
POTASSIUM SERPL-SCNC: 4.6 MMOL/L (ref 3.5–5.1)
PROT SERPL-MCNC: 9.9 G/DL (ref 6–8.4)
PROT UR QL STRIP: ABNORMAL
PROVIDER CREDENTIALS: ABNORMAL
PROVIDER NOTIFIED: ABNORMAL
RBC # BLD AUTO: 6.47 M/UL (ref 4–5.4)
RBC #/AREA URNS AUTO: 2 /HPF (ref 0–4)
SALICYLATES SERPL-MCNC: <5 MG/DL (ref 15–30)
SAMPLE: ABNORMAL
SAMPLE: ABNORMAL
SITE: ABNORMAL
SITE: ABNORMAL
SODIUM SERPL-SCNC: 139 MMOL/L (ref 136–145)
SP GR UR STRIP: 1.03 (ref 1–1.03)
TIME NOTIFIED: 1158
TOXICOLOGY INFORMATION: ABNORMAL
TRAMADOL UR QL SCN: NEGATIVE
TROPONIN I SERPL DL<=0.01 NG/ML-MCNC: 0.03 NG/ML (ref 0–0.03)
TROPONIN I SERPL DL<=0.01 NG/ML-MCNC: 0.16 NG/ML (ref 0–0.03)
URN SPEC COLLECT METH UR: ABNORMAL
VERBAL RESULT READBACK PERFORMED: YES
WBC # BLD AUTO: 17.53 K/UL (ref 3.9–12.7)
WBC #/AREA URNS AUTO: 3 /HPF (ref 0–5)

## 2024-11-14 PROCEDURE — 63600175 PHARM REV CODE 636 W HCPCS: Performed by: EMERGENCY MEDICINE

## 2024-11-14 PROCEDURE — 63600175 PHARM REV CODE 636 W HCPCS

## 2024-11-14 PROCEDURE — 87040 BLOOD CULTURE FOR BACTERIA: CPT

## 2024-11-14 PROCEDURE — 96366 THER/PROPH/DIAG IV INF ADDON: CPT

## 2024-11-14 PROCEDURE — 84484 ASSAY OF TROPONIN QUANT: CPT

## 2024-11-14 PROCEDURE — 84100 ASSAY OF PHOSPHORUS: CPT

## 2024-11-14 PROCEDURE — 83735 ASSAY OF MAGNESIUM: CPT

## 2024-11-14 PROCEDURE — 85025 COMPLETE CBC W/AUTO DIFF WBC: CPT

## 2024-11-14 PROCEDURE — 80179 DRUG ASSAY SALICYLATE: CPT | Performed by: EMERGENCY MEDICINE

## 2024-11-14 PROCEDURE — 99900035 HC TECH TIME PER 15 MIN (STAT)

## 2024-11-14 PROCEDURE — 81001 URINALYSIS AUTO W/SCOPE: CPT | Mod: XB

## 2024-11-14 PROCEDURE — 83605 ASSAY OF LACTIC ACID: CPT

## 2024-11-14 PROCEDURE — 82077 ASSAY SPEC XCP UR&BREATH IA: CPT

## 2024-11-14 PROCEDURE — 80053 COMPREHEN METABOLIC PANEL: CPT

## 2024-11-14 PROCEDURE — 96374 THER/PROPH/DIAG INJ IV PUSH: CPT | Mod: 59

## 2024-11-14 PROCEDURE — 96365 THER/PROPH/DIAG IV INF INIT: CPT

## 2024-11-14 PROCEDURE — 80307 DRUG TEST PRSMV CHEM ANLYZR: CPT | Performed by: EMERGENCY MEDICINE

## 2024-11-14 PROCEDURE — 80143 DRUG ASSAY ACETAMINOPHEN: CPT | Performed by: EMERGENCY MEDICINE

## 2024-11-14 PROCEDURE — 20600001 HC STEP DOWN PRIVATE ROOM

## 2024-11-14 PROCEDURE — 80307 DRUG TEST PRSMV CHEM ANLYZR: CPT | Mod: 91

## 2024-11-14 PROCEDURE — 25000003 PHARM REV CODE 250

## 2024-11-14 PROCEDURE — 96361 HYDRATE IV INFUSION ADD-ON: CPT

## 2024-11-14 PROCEDURE — 93010 ELECTROCARDIOGRAM REPORT: CPT | Mod: ,,, | Performed by: INTERNAL MEDICINE

## 2024-11-14 PROCEDURE — 99285 EMERGENCY DEPT VISIT HI MDM: CPT | Mod: 25

## 2024-11-14 PROCEDURE — 93005 ELECTROCARDIOGRAM TRACING: CPT

## 2024-11-14 PROCEDURE — 82550 ASSAY OF CK (CPK): CPT

## 2024-11-14 PROCEDURE — 80307 DRUG TEST PRSMV CHEM ANLYZR: CPT | Mod: 91 | Performed by: EMERGENCY MEDICINE

## 2024-11-14 PROCEDURE — P9612 CATHETERIZE FOR URINE SPEC: HCPCS

## 2024-11-14 PROCEDURE — 96375 TX/PRO/DX INJ NEW DRUG ADDON: CPT

## 2024-11-14 RX ORDER — LORAZEPAM 2 MG/ML
1 INJECTION INTRAMUSCULAR
Status: DISCONTINUED | OUTPATIENT
Start: 2024-11-14 | End: 2024-11-14

## 2024-11-14 RX ORDER — LORAZEPAM 2 MG/ML
2 INJECTION INTRAMUSCULAR
Status: COMPLETED | OUTPATIENT
Start: 2024-11-14 | End: 2024-11-14

## 2024-11-14 RX ORDER — GLUCAGON 1 MG
1 KIT INJECTION
Status: DISCONTINUED | OUTPATIENT
Start: 2024-11-14 | End: 2024-11-17 | Stop reason: HOSPADM

## 2024-11-14 RX ORDER — NALOXONE HCL 0.4 MG/ML
0.4 VIAL (ML) INJECTION
Status: DISCONTINUED | OUTPATIENT
Start: 2024-11-14 | End: 2024-11-17 | Stop reason: HOSPADM

## 2024-11-14 RX ORDER — IBUPROFEN 200 MG
16 TABLET ORAL
Status: DISCONTINUED | OUTPATIENT
Start: 2024-11-14 | End: 2024-11-17 | Stop reason: HOSPADM

## 2024-11-14 RX ORDER — SODIUM CHLORIDE 0.9 % (FLUSH) 0.9 %
10 SYRINGE (ML) INJECTION EVERY 12 HOURS PRN
Status: DISCONTINUED | OUTPATIENT
Start: 2024-11-14 | End: 2024-11-17 | Stop reason: HOSPADM

## 2024-11-14 RX ORDER — IBUPROFEN 200 MG
24 TABLET ORAL
Status: DISCONTINUED | OUTPATIENT
Start: 2024-11-14 | End: 2024-11-17 | Stop reason: HOSPADM

## 2024-11-14 RX ORDER — CEFTRIAXONE 2 G/1
2 INJECTION, POWDER, FOR SOLUTION INTRAMUSCULAR; INTRAVENOUS
Status: COMPLETED | OUTPATIENT
Start: 2024-11-14 | End: 2024-11-14

## 2024-11-14 RX ORDER — HYDROMORPHONE HYDROCHLORIDE 1 MG/ML
1 INJECTION, SOLUTION INTRAMUSCULAR; INTRAVENOUS; SUBCUTANEOUS ONCE
Status: COMPLETED | OUTPATIENT
Start: 2024-11-14 | End: 2024-11-14

## 2024-11-14 RX ORDER — ONDANSETRON HYDROCHLORIDE 2 MG/ML
4 INJECTION, SOLUTION INTRAVENOUS EVERY 6 HOURS PRN
Status: DISCONTINUED | OUTPATIENT
Start: 2024-11-14 | End: 2024-11-15

## 2024-11-14 RX ADMIN — SODIUM CHLORIDE 1000 ML: 9 INJECTION, SOLUTION INTRAVENOUS at 01:11

## 2024-11-14 RX ADMIN — SODIUM CHLORIDE 1000 ML: 9 INJECTION, SOLUTION INTRAVENOUS at 12:11

## 2024-11-14 RX ADMIN — LORAZEPAM 2 MG: 2 INJECTION INTRAMUSCULAR; INTRAVENOUS at 11:11

## 2024-11-14 RX ADMIN — VANCOMYCIN HYDROCHLORIDE 1000 MG: 1 INJECTION, POWDER, LYOPHILIZED, FOR SOLUTION INTRAVENOUS at 12:11

## 2024-11-14 RX ADMIN — ONDANSETRON 4 MG: 2 INJECTION INTRAMUSCULAR; INTRAVENOUS at 06:11

## 2024-11-14 RX ADMIN — CEFTRIAXONE 2 G: 2 INJECTION, POWDER, FOR SOLUTION INTRAMUSCULAR; INTRAVENOUS at 12:11

## 2024-11-14 RX ADMIN — HYDROMORPHONE HYDROCHLORIDE 1 MG: 1 INJECTION, SOLUTION INTRAMUSCULAR; INTRAVENOUS; SUBCUTANEOUS at 04:11

## 2024-11-14 NOTE — SUBJECTIVE & OBJECTIVE
Past Medical History:   Diagnosis Date    Behavioral problem     Bulging lumbar disc     Bursitis     bilateral hip    Degenerative cervical disc     Hx of psychiatric care     Hypercholesteremia     Labral tear of hip, degenerative bilateral    MS (multiple sclerosis)     Paresthesia of both lower extremities 3/13/2022    Pneumonia     Spinal cord compression        Past Surgical History:   Procedure Laterality Date    ANGIOGRAM, CORONARY, WITH LEFT HEART CATHETERIZATION Left 3/11/2022    Procedure: Angiogram, Coronary, with Left Heart Cath;  Surgeon: Elie Matamoros MD;  Location: [facility] CATH LAB;  Service: Cardiology;  Laterality: Left;    BREAST SURGERY      augmentation     SECTION, CLASSIC      HAND SURGERY      HYSTEROSCOPY      NECK SURGERY      cervical disc removeal    TUBAL LIGATION      X-STOP IMPLANTATION         Review of patient's allergies indicates:   Allergen Reactions    Adhesive Hives    Neosporin [neomycin-bacitracin-polymyxin] Hives    Neomycin     Neomycin-polymyxin Hives    Neosporin g.u. irrigant     Penicillins Other (See Comments)     Unknown  reaction    Latex Rash       No current facility-administered medications on file prior to encounter.     Current Outpatient Medications on File Prior to Encounter   Medication Sig    COVID-19 AT-HOME TEST Kit TEST AS DIRECTED    furosemide (LASIX) 40 MG tablet Take 1 tablet (40 mg total) by mouth daily as needed (for worsenign shortnes of breath, swelling, or 3lb weight gain in 1 day/5lb weight gain in 1 week).    gabapentin (NEURONTIN) 300 MG capsule Take 300 mg by mouth daily as needed (Pain).    glatiramer (COPAXONE, GLATOPA) 40 mg/mL injection Inject 40 mg into the skin 3 (three) times a week.    ibuprofen (ADVIL,MOTRIN) 200 MG tablet Take 400 mg by mouth daily as needed for Pain.    naloxone (NARCAN) 4 mg/actuation Spry 1 spray by Nasal route once as needed.    NUCYNTA  mg Tb12 Take 1 tablet by mouth 2 (two) times daily.    oxyCODONE  (ROXICODONE) 30 MG Tab Take 30 mg by mouth every 4 to 6 hours as needed (pain).    pregabalin (LYRICA) 100 MG capsule Take 100 mg by mouth 3 (three) times daily.    triamcinolone acetonide 0.1% (KENALOG) 0.1 % cream Apply 1 applicator topically 4 (four) times daily as needed.    venlafaxine (EFFEXOR-XR) 75 MG 24 hr capsule Take 1 capsule (75 mg total) by mouth once daily.    [DISCONTINUED] atorvastatin (LIPITOR) 20 MG tablet Take 20 mg by mouth once daily.    [DISCONTINUED] EScitalopram oxalate (LEXAPRO) 10 MG tablet Take 10 mg by mouth once daily.     Family History       Problem Relation (Age of Onset)    Bipolar disorder Brother    COPD Mother    Cancer Father    Diabetes Father, Sister    Drug abuse Mother    Heart disease Maternal Uncle    Hypothyroidism Maternal Grandmother    Lung cancer Father          Tobacco Use    Smoking status: Former     Current packs/day: 0.00     Types: Cigarettes     Quit date: 2013     Years since quittin.2     Passive exposure: Past    Smokeless tobacco: Never   Substance and Sexual Activity    Alcohol use: No    Drug use: Yes     Types: Benzodiazepines, Marijuana    Sexual activity: Not Currently     Partners: Male     Review of Systems   Unable to perform ROS: Mental status change     Objective:     Vital Signs (Most Recent):  Temp: 98.2 °F (36.8 °C) (24 1505)  Pulse: (!) 121 (24 1553)  Resp: 18 (24 1611)  BP: (!) 159/86 (24 1505)  SpO2: 98 % (24 1517) Vital Signs (24h Range):  Temp:  [98.1 °F (36.7 °C)-98.2 °F (36.8 °C)] 98.2 °F (36.8 °C)  Pulse:  [119-138] 121  Resp:  [18-20] 18  SpO2:  [95 %-99 %] 98 %  BP: (158-206)/() 159/86     Weight: 54 kg (119 lb 0.8 oz)  Body mass index is 19.81 kg/m².     Physical Exam  Vitals reviewed.   Constitutional:       General: She is not in acute distress.     Appearance: She is ill-appearing and toxic-appearing.      Comments: Patient    HENT:      Head: Normocephalic and atraumatic.       "Mouth/Throat:      Mouth: Mucous membranes are dry.      Pharynx: Oropharynx is clear.   Eyes:      General: No scleral icterus.     Conjunctiva/sclera: Conjunctivae normal.      Comments: Mydriasis    Cardiovascular:      Rate and Rhythm: Regular rhythm. Tachycardia present.      Pulses: Normal pulses.      Heart sounds: Normal heart sounds. No murmur heard.  Pulmonary:      Effort: Pulmonary effort is normal. No respiratory distress.      Breath sounds: Normal breath sounds. No wheezing.   Abdominal:      General: There is no distension.      Palpations: Abdomen is soft.      Tenderness: There is no abdominal tenderness.   Musculoskeletal:      Right lower leg: No edema.      Left lower leg: No edema.   Skin:     General: Skin is dry.      Comments: Piloerection    Neurological:      Mental Status: She is disoriented.   Psychiatric:         Attention and Perception: She is inattentive.                Significant Labs: All pertinent labs within the past 24 hours have been reviewed.  Blood Culture: No results for input(s): "LABBLOO" in the last 48 hours.  CBC:   Recent Labs   Lab 11/14/24  1146   WBC 17.53*   HGB 18.9*   HCT 54.7*        CMP:   Recent Labs   Lab 11/14/24  1146      K 4.6      CO2 16*   *   BUN 39*   CREATININE 1.0   CALCIUM 10.1   PROT 9.9*   ALBUMIN 4.4   BILITOT 2.4*   ALKPHOS 96   AST 28   ALT 12   ANIONGAP 18*     Lactic Acid: No results for input(s): "LACTATE" in the last 48 hours.  Troponin:   Recent Labs   Lab 11/14/24  1146 11/14/24  1358   TROPONINI 0.163* 0.033*       Urine Studies:   Recent Labs   Lab 11/14/24  1255   COLORU Yellow   APPEARANCEUA Clear   PHUR 6.0   SPECGRAV 1.030   PROTEINUA 3+*   GLUCUA 1+*   KETONESU 1+*   BILIRUBINUA Negative   OCCULTUA 2+*   NITRITE Negative   LEUKOCYTESUR Negative   RBCUA 2   WBCUA 3   BACTERIA Occasional   HYALINECASTS 4*       Significant Imaging: I have reviewed all pertinent imaging results/findings within the past 24 " hours.

## 2024-11-14 NOTE — ED PROVIDER NOTES
Encounter Date: 2024       History     Chief Complaint   Patient presents with    Multiple complaints     Admitted to icu recently for broken heart syndrome,     Altered Mental Status     Not following commands, thrashing about, neighbor here with pt, states this had happened numerous times     HPI  59-year-old female history of broken heart syndrome and toxidrome presents with unexplained altered mental status.  She is not able to answer questions on arrival, she was actively fighting with nurses to place IVs and agitated.  Not able to give any history.  Per friend who is at bedside she had not seen patient was several days and broke in the door and brought her to the hospital.  She believes she may have taken ketamine.  This is happened before.      Review of patient's allergies indicates:   Allergen Reactions    Adhesive Hives    Neosporin [neomycin-bacitracin-polymyxin] Hives    Neomycin     Neomycin-polymyxin Hives    Neosporin g.u. irrigant     Penicillins Other (See Comments)     Unknown  reaction    Latex Rash     Past Medical History:   Diagnosis Date    Behavioral problem     Bulging lumbar disc     Bursitis     bilateral hip    Degenerative cervical disc     Hx of psychiatric care     Hypercholesteremia     Labral tear of hip, degenerative bilateral    MS (multiple sclerosis)     Paresthesia of both lower extremities 3/13/2022    Pneumonia     Spinal cord compression      Past Surgical History:   Procedure Laterality Date    ANGIOGRAM, CORONARY, WITH LEFT HEART CATHETERIZATION Left 3/11/2022    Procedure: Angiogram, Coronary, with Left Heart Cath;  Surgeon: Elie Matamoros MD;  Location: [facility] CATH LAB;  Service: Cardiology;  Laterality: Left;    BREAST SURGERY      augmentation     SECTION, CLASSIC      HAND SURGERY      HYSTEROSCOPY      NECK SURGERY      cervical disc removeal    TUBAL LIGATION      X-STOP IMPLANTATION       Family History   Problem Relation Name Age of Onset    Cancer  Father      Diabetes Father      Lung cancer Father      Diabetes Sister      COPD Mother      Drug abuse Mother      Bipolar disorder Brother      Heart disease Maternal Uncle      Hypothyroidism Maternal Grandmother       Social History     Tobacco Use    Smoking status: Former     Current packs/day: 0.00     Types: Cigarettes     Quit date: 2013     Years since quittin.2     Passive exposure: Past    Smokeless tobacco: Never   Substance Use Topics    Alcohol use: No    Drug use: Yes     Types: Benzodiazepines, Marijuana     Review of Systems    Physical Exam     Initial Vitals   BP Pulse Resp Temp SpO2   24 1115 24 1115 24 1115 24 1115 24 1150   (!) 206/90 (!) 137 20 98.1 °F (36.7 °C) 99 %      MAP       --                Physical Exam    Nursing note and vitals reviewed.  Constitutional: She appears well-developed and well-nourished. She appears distressed.   HENT:   Head: Normocephalic and atraumatic.   Neck: Neck supple. No tracheal deviation present. No JVD present.   Cardiovascular:  Regular rhythm, normal heart sounds and intact distal pulses.   Tachycardia present.   Exam reveals no gallop and no friction rub.       No murmur heard.  Pulmonary/Chest: Breath sounds normal. No stridor. No respiratory distress. She has no wheezes. She has no rhonchi. She has no rales. She exhibits no tenderness.   Abdominal: Abdomen is soft. She exhibits no distension and no mass. There is no abdominal tenderness. There is no rebound and no guarding.   Musculoskeletal:         General: No tenderness or edema. Normal range of motion.      Cervical back: Neck supple.     Lymphadenopathy:     She has no cervical adenopathy.   Neurological: She is alert. She is disoriented.   Possible nystagmus noted   Skin: Skin is warm and dry. Capillary refill takes less than 2 seconds.         ED Course   Procedures  Labs Reviewed   CBC W/ AUTO DIFFERENTIAL - Abnormal       Result Value    WBC 17.53 (*)      RBC 6.47 (*)     Hemoglobin 18.9 (*)     Hematocrit 54.7 (*)     MCV 85      MCH 29.2      MCHC 34.6      RDW 14.4      Platelets 296      MPV 10.9      Immature Granulocytes 0.5      Gran # (ANC) 14.8 (*)     Immature Grans (Abs) 0.08 (*)     Lymph # 1.5      Mono # 1.2 (*)     Eos # 0.0      Baso # 0.05      nRBC 0      Gran % 84.1 (*)     Lymph % 8.3 (*)     Mono % 6.7      Eosinophil % 0.1      Basophil % 0.3      Differential Method Automated     COMPREHENSIVE METABOLIC PANEL - Abnormal    Sodium 139      Potassium 4.6      Chloride 105      CO2 16 (*)     Glucose 141 (*)     BUN 39 (*)     Creatinine 1.0      Calcium 10.1      Total Protein 9.9 (*)     Albumin 4.4      Total Bilirubin 2.4 (*)     Alkaline Phosphatase 96      AST 28      ALT 12      eGFR >60.0      Anion Gap 18 (*)    URINALYSIS, REFLEX TO URINE CULTURE - Abnormal    Specimen UA Urine, Catheterized      Color, UA Yellow      Appearance, UA Clear      pH, UA 6.0      Specific Gravity, UA 1.030      Protein, UA 3+ (*)     Glucose, UA 1+ (*)     Ketones, UA 1+ (*)     Bilirubin (UA) Negative      Occult Blood UA 2+ (*)     Nitrite, UA Negative      Leukocytes, UA Negative      Narrative:     Specimen Source->Urine   TROPONIN I - Abnormal    Troponin I 0.163 (*)    TOXICOLOGY SCREEN, URINE, RANDOM (COMPLIANCE) - Abnormal    Alcohol, Urine <10      Benzodiazepines Negative      Methadone metabolites Negative      Cocaine (Metab.) Negative      Opiate Scrn, Ur Negative      Barbiturate Screen, Ur Negative      Amphetamine Screen, Ur Negative      THC Presumptive Positive (*)     Phencyclidine Negative      Creatinine, Urine 156.0      Toxicology Information SEE COMMENT      Narrative:     Specimen Source->Urine   URINE OXYCODONE - Abnormal    OXYCODONE Presumptive Positive (*)     Creatinine, Urine 156.0      Narrative:     Specimen Source->Urine   TROPONIN I - Abnormal    Troponin I 0.033 (*)    URINALYSIS MICROSCOPIC - Abnormal    RBC, UA 2       WBC, UA 3      Bacteria Occasional      Hyaline Casts, UA 4 (*)     Microscopic Comment SEE COMMENT      Narrative:     Specimen Source->Urine   CK - Abnormal     (*)     Narrative:     add on [id number] per Oli Jose MD    11/14/2024  13:44 Sam   SALICYLATE LEVEL - Abnormal    Salicylate Lvl <5.0 (*)    ACETAMINOPHEN LEVEL - Abnormal    Acetaminophen (Tylenol), Serum <3.0 (*)    ISTAT LACTATE - Abnormal    POC Lactate 3.36 (*)     Verbal Result Readback Performed Yes      Provider Credentials: MD      Provider Notified: LAUREN      Time Notifed: 1158      Sample VENOUS      Site Other      Allens Test N/A     ISTAT LACTATE - Abnormal    POC Lactate 0.31 (*)     Sample VENOUS      Site Other      Allens Test N/A     MAGNESIUM    Magnesium 2.0     PHOSPHORUS    Phosphorus 3.3     FENTANYL, URINE    Fentanyl, Urine Negative      Creatinine, Urine 156.0      Narrative:     Specimen Source->Urine   BUPRENORPHINE, URINE    BUPRENORPHINE Negative      Creatinine, Urine 156.0      Narrative:     Specimen Source->Urine   TRAMADOL, URINE    Tramadol, Urine Negative      Creatinine, Urine 156.0      Narrative:     Specimen Source->Urine   CK   ACETAMINOPHEN LEVEL   SALICYLATE LEVEL     EKG Readings: (Independently Interpreted)   Initial Reading: No STEMI. Rhythm: Sinus Tachycardia. Heart Rate: 116. ST Segment Depression: II, III, AVF, V3, V4, V5 and V6. T Waves: Normal. Axis: Normal.     ECG Results              EKG 12-lead (Final result)        Collection Time Result Time QRS Duration OHS QTC Calculation    11/14/24 11:47:07 11/14/24 13:34:09 74 492                     Final result by Interface, Lab In [facility] (11/14/24 13:34:14)                   Narrative:    Test Reason : A41.9,    Vent. Rate : 116 BPM     Atrial Rate : 116 BPM     P-R Int : 142 ms          QRS Dur :  74 ms      QT Int : 354 ms       P-R-T Axes :  81  96  57 degrees    QTcB Int : 492 ms    Sinus tachycardia  Right atrial  enlargement  Rightward axis Now present  Nonspecific ST abnormality Now present  Abnormal ECG  When compared with ECG of 20-Jul-2024 22:07,    Confirmed by Marquise Yang ([id number]) on 11/14/2024 1:34:07 PM    Referred By:            Confirmed By: Marquise Yang                                  Imaging Results              CT Head Without Contrast (Final result)  Result time 11/14/24 13:46:30      Final result by Joseph Krishna MD (11/14/24 13:46:30)                   Impression:      No evidence of acute hemorrhage or major vascular distribution infarct.      Electronically signed by: Joseph Krishna MD  Date:    11/14/2024  Time:    13:46               Narrative:    EXAMINATION:  CT HEAD WITHOUT CONTRAST    CLINICAL HISTORY:  Mental status change, unknown cause;    TECHNIQUE:  Low dose axial CT images obtained throughout the head without the use of intravenous contrast.  Axial, sagittal and coronal reconstructions were performed.    COMPARISON:  07/21/2024    FINDINGS:  Intracranial compartment:    Ventricles are stable in size, without evidence of hydrocephalus.    Mild patchy hypoattenuation in the supratentorial white matter, nonspecific but most likely reflecting chronic small vessel ischemic changes. No recent or remote major vascular distribution infarct. No acute hemorrhage.  No mass effect or midline shift.    No new extra-axial blood or fluid collections.    Skull/extracranial contents (limited evaluation):    No displaced calvarial fracture.    The mastoid air cells and visualized paranasal sinuses are essentially clear.                                       X-Ray Chest AP Portable (Final result)  Result time 11/14/24 12:52:54      Final result by Daljit Shankar MD (11/14/24 12:52:54)                   Impression:      No acute abnormality.      Electronically signed by: Daljit Shankar MD  Date:    11/14/2024  Time:    12:52               Narrative:    EXAMINATION:  XR CHEST AP PORTABLE    CLINICAL  HISTORY:  Sepsis;    TECHNIQUE:  Single views of the chest were performed.    COMPARISON:  Chest radiograph dated May 22, 2024    FINDINGS:  The trachea and cardiomediastinal silhouette remains stable.  There is no evidence of pleural effusions, pneumothoraces or consolidations.  Lungs are clear.  Osseous structures demonstrate no evidence for acute fractures or dislocations.                                       Medications   naloxone 0.4 mg/mL injection 0.4 mg (has no administration in time range)   ondansetron injection 4 mg (4 mg Intravenous Given 11/15/24 1221)   sodium chloride 0.9% flush 10 mL (has no administration in time range)   glucose chewable tablet 16 g (has no administration in time range)   glucose chewable tablet 24 g (has no administration in time range)   glucagon (human recombinant) injection 1 mg (has no administration in time range)   dextrose 10% bolus 125 mL 125 mL (has no administration in time range)   dextrose 10% bolus 250 mL 250 mL (has no administration in time range)   acetaminophen tablet 1,000 mg (has no administration in time range)   potassium bicarbonate disintegrating tablet 50 mEq (50 mEq Oral Not Given 11/15/24 1045)   buprenorphine HCL SL tablet 2 mg (2 mg Sublingual Given 11/15/24 1210)   LORazepam injection 2 mg (2 mg Intravenous Given 11/14/24 1147)   cefTRIAXone injection 2 g (2 g Intravenous Given 11/14/24 1237)   vancomycin (VANCOCIN) 1,000 mg in D5W 250 mL IVPB (admixture device) (0 mg Intravenous Stopped 11/14/24 1441)   sodium chloride 0.9% bolus 1,000 mL 1,000 mL (0 mLs Intravenous Stopped 11/14/24 1352)   sodium chloride 0.9% bolus 1,000 mL 1,000 mL (0 mLs Intravenous Stopped 11/14/24 1505)   HYDROmorphone injection 1 mg (1 mg Intravenous Given 11/14/24 1611)     Medical Decision Making  59-year-old female presents with altered mental status.    Differential diagnosis for this patient includes but isn't limited to toxidrome, meningitis, sepsis, uremia, electrolyte  derangement, head bleed.    Head CT ordered to rule out head bleed.    Given the patient's altered mental status as undifferentiated as well as her tachycardia, patient was covered for meningitis.  Patient also covered for sepsis.    Labs notable for elevated troponin which seemed to improve after heart rate came down.  Likely type 2 NSTEMI demand ischemia.  Patient had a leukocytosis which could be explained by an elevated lactate secondary to sepsis versus meningitis, the lactate has improved after fluids.  Tylenol level and salicylate level negative.  Fluids resulted in decreased heart rate.    I discussed case with hospital medicine who agreed to admit the patient.  At time of handoff, patient was pending 2nd lactate and 2nd troponin.               Attending Attestation:   Physician Attestation Statement for Resident:  As the supervising MD   Physician Attestation Statement: I have personally seen and examined this patient.   I agree with the above history.  -: 59-year-old woman with a history of substance induced mood disorder, MS presents with altered mental status and agitation.  Neighbors did not see her getting out of her house for the past few days and heard the dog barking.  On their arrival she was agitated.  She was accompanied by a friend who notes that she was had previous episodes similar to this.  She notes that patient uses opioids, medical marijuana, and topical ketamine that patient has to compound herself.  She notes that in the past patient's symptoms has been attributed to polypharmacy.  On arrival she had nystagmus bilaterally, piloerection, tachycardia.  She had a recent discharge for the same.  Given severe agitation, she received Ativan with some improvement in symptoms.  Her labs revealed a lactic acidemia of 3.36 and a leukocytosis of 17.53.  Given multiple sirs criteria, broad-spectrum antibiotics and fluids were ordered.  CMP revealed an elevated BUN of 39.  Elevated total bilirubin of  2.4.  Elevated troponin of 0.163.  Do not suspect acute thrombotic ACS but will trend.  She will require admission.       As the supervising MD I agree with the above PE.     As the supervising MD I agree with the above treatment, course, plan, and disposition.            Attending Critical Care:   Critical Care Times:   ==============================================================  Total Critical Care Time - exclusive of procedural time: 30 minutes.  ==============================================================  Critical care reasons: Encephalopathy.           ED Course as of 11/15/24 1322   Thu Nov 14, 2024   1311 Chest x-ray independently interpreted no acute findings [KW]      ED Course User Index  [KW] Oli Jose MD                           Clinical Impression:  Final diagnoses:  [A41.9] Sepsis  [G93.40] Encephalopathy, unspecified type (Primary)  [E87.29] Increased anion gap metabolic acidosis  [R79.89] Elevated troponin          ED Disposition Condition    Admit Stable                Oli Jose MD  Resident  11/14/24 1540       Oli Jose MD  Resident  11/15/24 1057       Arian Muse MD  11/15/24 1322

## 2024-11-14 NOTE — ASSESSMENT & PLAN NOTE
Patient CK elevated on presentation to 337. No evidence of rhabdomyolysis as not CK is not 5x the upper limit of normal. Likely due to prolonged period patient was down in her home. Given 2 L IV fluids in the ED.     Plan:   - Follow up repeat CK

## 2024-11-14 NOTE — AI DETERIORATION ALERT
"RAPID RESPONSE NURSE AI ALERT       AI alert received.    Chart Reviewed: 11/14/2024, 3:25 PM    MRN: [id number]  Bed: ED 08/08    Dx: <principal problem not specified>    Reza Morrow has a past medical history of Behavioral problem, Bulging lumbar disc, Bursitis, Degenerative cervical disc, psychiatric care, Hypercholesteremia, Labral tear of hip, degenerative, MS (multiple sclerosis), Paresthesia of both lower extremities, Pneumonia, and Spinal cord compression.    Last VS: BP (!) 159/86   Pulse (!) 119   Temp 98.2 °F (36.8 °C) (Oral)   Resp 18   Ht 5' 5" (1.651 m)   Wt 54 kg (119 lb 0.8 oz)   SpO2 98%   BMI 19.81 kg/m²     24H Vital Sign Range:  Temp:  [98.1 °F (36.7 °C)-98.2 °F (36.8 °C)]   Pulse:  [119-138]   Resp:  [18-20]   BP: (158-206)/()   SpO2:  [95 %-99 %]     Level of Consciousness (AVPU): alert    Recent Labs     11/14/24  1146   WBC 17.53*   HGB 18.9*   HCT 54.7*          Recent Labs     11/14/24  1146      K 4.6      CO2 16*   BUN 39*   CREATININE 1.0   *   PHOS 3.3   MG 2.0        No results for input(s): "PH", "PCO2", "PO2", "HCO3", "POCSATURATED", "BE" in the last 72 hours.     OXYGEN:             MEWS score:      IRENE Owens  No additional concerns verbalized at this time. Instructed to call [phone] for further concerns or assistance.    Jayda Haney RN        "

## 2024-11-14 NOTE — PROVIDER PROGRESS NOTES - EMERGENCY DEPT.
"Encounter Date: 11/14/2024    ED Physician Progress Notes        Physician Note:   ED [place] of Care Note  2:31 PM 11/14/2024  Reza Morrow is a 59 y.o. female who presented to the ED on 11/14/2024 and has been managed by Dr. Muse, who reports patient C/O altered mental status. I assumed care of patient from off-going ED physician team at 2:31 PM pending admission to medicine.    On my evaluation, Reza Morrow appears well and is hemodynamically stable. Thus far, Reza Morrow has received:  Medications  sodium chloride 0.9% bolus 1,000 mL 1,000 mL (1,000 mLs Intravenous New Bag 11/14/24 1355)  LORazepam injection 2 mg (2 mg Intravenous Given 11/14/24 1147)  cefTRIAXone injection 2 g (2 g Intravenous Given 11/14/24 1237)  vancomycin (VANCOCIN) 1,000 mg in D5W 250 mL IVPB (admixture device) (1,000 mg Intravenous New Bag 11/14/24 1241)  sodium chloride 0.9% bolus 1,000 mL 1,000 mL (0 mLs Intravenous Stopped 11/14/24 1352)    On my exam, I appreciate:  BP (!) 158/99 (Patient Position: Lying)   Pulse (!) 138   Temp 98.1 °F (36.7 °C) (Axillary)   Resp 18   Ht 5' 5" (1.651 m)   Wt 54 kg (119 lb 0.8 oz)   SpO2 98%   BMI 19.81 kg/m²     ED Course as of 11/14/24 1431  ------------------------------------------------------------  Time: 11/14 1311  Comment: Chest x-ray independently interpreted no acute findings  By: Oli Jose MD        Additional ED course:  I received sign-out from the morning team.  And introduced myself to the patient.    Disposition:  Admission to medicine  I have discussed and counseled Reza Morrow regarding exam, results, diagnosis, treatment, and plan.  ______________________  Marlo Alfaro MD   Emergency Medicine Physician  11/14/2024            "

## 2024-11-14 NOTE — ASSESSMENT & PLAN NOTE
Patient with troponin elevation to 0.163 on admission in the setting of hemoconcentration as evidenced by hemoglobin of 18.9. Troponin decreased to 0.31. Troponin elevation likely with a component of supply-demand mismatch given her tachycardia as well as a hemoconcentration component. Low concern for ACS. Will continue to monitor.

## 2024-11-14 NOTE — ASSESSMENT & PLAN NOTE
Patient with unclear history of MS. No clear evidence of firm diagnosis and past documentation of MS vs MS mimic.     - Patient on home Glatiramer 40 mg injected 3x per week, not currently ordered while inpatient

## 2024-11-14 NOTE — ED NOTES
"Pt presents to ED via personal transport w/ friend w/ c/o altered mental status. Pt uncooperative, unable to follow commands, disoriented x4, and is non-verbal at this time. All history provided by friend (ex-neighbor) who is at bedside. Friend reports not hearing from patient for a couple of days + states this morning they "drilled a hole into her door to get inside of her house" and pt was found down; down for unknown period of time. Friend reports patient was covered in urine and vomit + reports similar episodes in the past + most recent in July where she was admitted to the ICU. Friend reports chronic opioid use and concomitant use of "Ketamine cream" for multiple chronic areas of pain all over her body. Friend states, "I don't think she's doing this on purpose, she is just on a lot of medication."    Patient identifiers for Reza Morrow 59 y.o. female checked and correct.  Chief Complaint   Patient presents with    Multiple complaints     Admitted to icu recently for broken heart syndrome,     Altered Mental Status     Not following commands, thrashing about, neighbor here with pt, states this had happened numerous times     Past Medical History:   Diagnosis Date    Behavioral problem     Bulging lumbar disc     Bursitis     bilateral hip    Degenerative cervical disc     Hx of psychiatric care     Hypercholesteremia     Labral tear of hip, degenerative bilateral    MS (multiple sclerosis)     Paresthesia of both lower extremities 3/13/2022    Pneumonia     Spinal cord compression      "

## 2024-11-14 NOTE — ASSESSMENT & PLAN NOTE
Patient with 30 mg oxycodone q4h and tapentadol 100 mg BID prescriptions per chart review. According to patient's sister, she believes opioid prescriptions began for pain related to her possible MS. She also mentions that the patient may have hip pain for which she uses the pain medications.     Plan:   - If patient's mental status improves, will plan to resume home opioid regimen and speak with patient regarding opioid use   - Addiction psychiatry consulted, appreciate recs

## 2024-11-14 NOTE — ASSESSMENT & PLAN NOTE
Patient with altered mental status likely due to polypharmacy and/or opioid withdrawal. Per chart review, patient with prescription for oxycodone 30 mg q4h, tapentadol 100 mg BID, Ketamine topical gel, and medical marijuana. Patient found down in home by neighbor after unknown amount of time. Polypharmacy could be contributing to the patient's mental status as well as opioid withdrawal given the unknown amount of time she was down and not taking her opioids. CT head unremarkable for acute intracranial process contributing to her AMS.     Plan:   - Supportive care   - Toxicology consulted who recommended one time dose of Dilaudid 1 mg, if patient mental status improves then her AMS likely due to opioid withdrawal   - Will continue to monitor

## 2024-11-14 NOTE — H&P
"  [facility] [facility]  History & Physical    Patient Name: Reza Morrow  MRN: [id number]  Patient Class: IP- Inpatient  Admission Date: 11/14/2024  Attending Physician: Madison Ring MD   Primary Care Provider: Kip Jimenez MD         Patient information was obtained from relative(s), caregiver / friend, past medical records, and ER records.     Subjective:     Principal Problem:Altered mental status    Chief Complaint:   Chief Complaint   Patient presents with    Multiple complaints     Admitted to icu recently for broken heart syndrome,     Altered Mental Status     Not following commands, thrashing about, neighbor here with pt, states this had happened numerous times        HPI: 59-year-old female history substance induced mood disorder, broken heart syndrome, HFrEF (45-50% in April 2024) who was brought to the ED with altered mental status. Of note, patient does have reported history of possible multiple sclerosis but it is unclear if the patient has MS vs. MS mimic. Initial history provided by friend (ex-neighbor) who reports not hearing from patient for a couple of days and states this morning they "drilled a hole into her door to get inside of her house" and patient was found down; down for unknown period of time. Friend reports patient was covered in urine and vomit. Reports similar episodes in the past, most recent in July where she was admitted. Friend reports chronic opioid use and concomitant use of "Ketamine cream" for multiple chronic areas of pain all over her body. Friend states, "I don't think she's doing this on purpose, she is just on a lot of medication."     In the ED, patient tachycardic and hypertensive on arrival. Lactate elevated to 3.3. Given 2 L of fluid, ceftriaxone, and vancomycin. Lactate improved to 0.31. CT head and CxR negative. Patient remains tachycardic after 2 L of fluids.     On my exam, patient remains altered and unable to follow " commands. Patient tachycardic on telemetry, dilated pupils, and dry appearing mucous membranes; consistent with opioid withdrawal. Patient not able to verbalize if she is in any pain.     Collateral obtained from patient's sister, Celeste Olivarez. She states that she believes this is due to the patient's use of topical ketamine. She says there have been 4-5 similar incidents in the past year. She states the patient becomes altered and then 1-2 days later she is back to her normal self without any recollection of the events of her presentation. She believes that her sister's pain medications are for treatment of her reported MS.     Past Medical History:   Diagnosis Date    Behavioral problem     Bulging lumbar disc     Bursitis     bilateral hip    Degenerative cervical disc     Hx of psychiatric care     Hypercholesteremia     Labral tear of hip, degenerative bilateral    MS (multiple sclerosis)     Paresthesia of both lower extremities 3/13/2022    Pneumonia     Spinal cord compression        Past Surgical History:   Procedure Laterality Date    ANGIOGRAM, CORONARY, WITH LEFT HEART CATHETERIZATION Left 3/11/2022    Procedure: Angiogram, Coronary, with Left Heart Cath;  Surgeon: Elie Matamoros MD;  Location: [facility] CATH LAB;  Service: Cardiology;  Laterality: Left;    BREAST SURGERY      augmentation     SECTION, CLASSIC      HAND SURGERY      HYSTEROSCOPY      NECK SURGERY      cervical disc removeal    TUBAL LIGATION      X-STOP IMPLANTATION         Review of patient's allergies indicates:   Allergen Reactions    Adhesive Hives    Neosporin [neomycin-bacitracin-polymyxin] Hives    Neomycin     Neomycin-polymyxin Hives    Neosporin g.u. irrigant     Penicillins Other (See Comments)     Unknown  reaction    Latex Rash       No current facility-administered medications on file prior to encounter.     Current Outpatient Medications on File Prior to Encounter   Medication Sig    COVID-19 AT-HOME TEST Kit TEST  AS DIRECTED    furosemide (LASIX) 40 MG tablet Take 1 tablet (40 mg total) by mouth daily as needed (for worsenign shortnes of breath, swelling, or 3lb weight gain in 1 day/5lb weight gain in 1 week).    gabapentin (NEURONTIN) 300 MG capsule Take 300 mg by mouth daily as needed (Pain).    glatiramer (COPAXONE, GLATOPA) 40 mg/mL injection Inject 40 mg into the skin 3 (three) times a week.    ibuprofen (ADVIL,MOTRIN) 200 MG tablet Take 400 mg by mouth daily as needed for Pain.    naloxone (NARCAN) 4 mg/actuation Spry 1 spray by Nasal route once as needed.    NUCYNTA  mg Tb12 Take 1 tablet by mouth 2 (two) times daily.    oxyCODONE (ROXICODONE) 30 MG Tab Take 30 mg by mouth every 4 to 6 hours as needed (pain).    pregabalin (LYRICA) 100 MG capsule Take 100 mg by mouth 3 (three) times daily.    triamcinolone acetonide 0.1% (KENALOG) 0.1 % cream Apply 1 applicator topically 4 (four) times daily as needed.    venlafaxine (EFFEXOR-XR) 75 MG 24 hr capsule Take 1 capsule (75 mg total) by mouth once daily.    [DISCONTINUED] atorvastatin (LIPITOR) 20 MG tablet Take 20 mg by mouth once daily.    [DISCONTINUED] EScitalopram oxalate (LEXAPRO) 10 MG tablet Take 10 mg by mouth once daily.     Family History       Problem Relation (Age of Onset)    Bipolar disorder Brother    COPD Mother    Cancer Father    Diabetes Father, Sister    Drug abuse Mother    Heart disease Maternal Uncle    Hypothyroidism Maternal Grandmother    Lung cancer Father          Tobacco Use    Smoking status: Former     Current packs/day: 0.00     Types: Cigarettes     Quit date: 2013     Years since quittin.2     Passive exposure: Past    Smokeless tobacco: Never   Substance and Sexual Activity    Alcohol use: No    Drug use: Yes     Types: Benzodiazepines, Marijuana    Sexual activity: Not Currently     Partners: Male     Review of Systems   Unable to perform ROS: Mental status change     Objective:     Vital Signs (Most Recent):  Temp: 98.2  "°F (36.8 °C) (11/14/24 1505)  Pulse: (!) 121 (11/14/24 1553)  Resp: 18 (11/14/24 1611)  BP: (!) 159/86 (11/14/24 1505)  SpO2: 98 % (11/14/24 1517) Vital Signs (24h Range):  Temp:  [98.1 °F (36.7 °C)-98.2 °F (36.8 °C)] 98.2 °F (36.8 °C)  Pulse:  [119-138] 121  Resp:  [18-20] 18  SpO2:  [95 %-99 %] 98 %  BP: (158-206)/() 159/86     Weight: 54 kg (119 lb 0.8 oz)  Body mass index is 19.81 kg/m².     Physical Exam  Vitals reviewed.   Constitutional:       General: She is not in acute distress.     Appearance: She is ill-appearing and toxic-appearing.      Comments: Patient    HENT:      Head: Normocephalic and atraumatic.      Mouth/Throat:      Mouth: Mucous membranes are dry.      Pharynx: Oropharynx is clear.   Eyes:      General: No scleral icterus.     Conjunctiva/sclera: Conjunctivae normal.      Comments: Mydriasis    Cardiovascular:      Rate and Rhythm: Regular rhythm. Tachycardia present.      Pulses: Normal pulses.      Heart sounds: Normal heart sounds. No murmur heard.  Pulmonary:      Effort: Pulmonary effort is normal. No respiratory distress.      Breath sounds: Normal breath sounds. No wheezing.   Abdominal:      General: There is no distension.      Palpations: Abdomen is soft.      Tenderness: There is no abdominal tenderness.   Musculoskeletal:      Right lower leg: No edema.      Left lower leg: No edema.   Skin:     General: Skin is dry.      Comments: Piloerection    Neurological:      Mental Status: She is disoriented.   Psychiatric:         Attention and Perception: She is inattentive.                Significant Labs: All pertinent labs within the past 24 hours have been reviewed.  Blood Culture: No results for input(s): "LABBLOO" in the last 48 hours.  CBC:   Recent Labs   Lab 11/14/24  1146   WBC 17.53*   HGB 18.9*   HCT 54.7*        CMP:   Recent Labs   Lab 11/14/24  1146      K 4.6      CO2 16*   *   BUN 39*   CREATININE 1.0   CALCIUM 10.1   PROT 9.9*   ALBUMIN " "4.4   BILITOT 2.4*   ALKPHOS 96   AST 28   ALT 12   ANIONGAP 18*     Lactic Acid: No results for input(s): "LACTATE" in the last 48 hours.  Troponin:   Recent Labs   Lab 11/14/24  1146 11/14/24  1358   TROPONINI 0.163* 0.033*       Urine Studies:   Recent Labs   Lab 11/14/24  1255   COLORU Yellow   APPEARANCEUA Clear   PHUR 6.0   SPECGRAV 1.030   PROTEINUA 3+*   GLUCUA 1+*   KETONESU 1+*   BILIRUBINUA Negative   OCCULTUA 2+*   NITRITE Negative   LEUKOCYTESUR Negative   RBCUA 2   WBCUA 3   BACTERIA Occasional   HYALINECASTS 4*       Significant Imaging: I have reviewed all pertinent imaging results/findings within the past 24 hours.  Assessment/Plan:     * Altered mental status  Patient with altered mental status likely due to polypharmacy and/or opioid withdrawal. Per chart review, patient with prescription for oxycodone 30 mg q4h, tapentadol 100 mg BID, Ketamine topical gel, and medical marijuana. Patient found down in home by neighbor after unknown amount of time. Polypharmacy could be contributing to the patient's mental status as well as opioid withdrawal given the unknown amount of time she was down and not taking her opioids. CT head unremarkable for acute intracranial process contributing to her AMS.     Plan:   - Supportive care   - Toxicology consulted who recommended one time dose of Dilaudid 1 mg, if patient mental status improves then her AMS likely due to opioid withdrawal   - Will continue to monitor      Elevated troponin  Patient with troponin elevation to 0.163 on admission in the setting of hemoconcentration as evidenced by hemoglobin of 18.9. Troponin decreased to 0.31. Troponin elevation likely with a component of supply-demand mismatch given her tachycardia as well as a hemoconcentration component. Low concern for ACS. Will continue to monitor.        Polypharmacy  Patient taking Oxycodone 30 mg every 4 hours, tapentadol 100 mg twice daily, ketamine cream, gabapentin, and Lyrica per chart review " and conversation with patients sister. Patient's sister states she has had multiple similar presentations in the past year, all related to her medications, specifically ketamine.     Plan:   - Toxicology consult placed   - Addiction psych consulted       Elevated CK  Patient CK elevated on presentation to 337. No evidence of rhabdomyolysis as not CK is not 5x the upper limit of normal. Likely due to prolonged period patient was down in her home. Given 2 L IV fluids in the ED.     Plan:   - Follow up repeat CK       Lactate blood increase  Patient with elevated lactic acid of 3.3 on initial presentation in the ED. Patient given a dose of ceftriaxone and vancomycin in the ED due to concern for infection. UA and CxR unremarkable. WBC increased to 17, however in the setting of likely hemoconcentration given hemoglobin of 18.9. Patient is afebrile. Tachycardia likely due to opioid withdrawal. Patient given 2 L IV fluids with improvement in lactate to 0.31. It is most likely that the elevated lactate was due to the indeterminate amount of time the patient was down at her home before being found. An elevated CK of 337 is consistent with a long period of being down in her home contributing to her elevated lactate.     Plan:   - Continue to monitor   - Will recheck lactate in the morning   - Follow up blood cultures   - Low threshold to begin antibiotics if fevers, can also consider CT abdomen/pelvis     Opioid withdrawal  Patient with 30 mg oxycodone q4h and tapentadol 100 mg BID prescriptions per chart review.. According to patient's sister, she believes opioid prescriptions began for pain related to her possible MS. She also mentions that the patient may have hip pain for which she uses the pain medications. Patient with piloerection, mydriasis, and tachycardia indicating opioid withdrawal.     Plan:   - Given one time dose Dilaudid 1 mg   - If patient's mental status improves, will plan to resume home opioid regimen and  speak with patient regarding opioid use   - Toxicology consulted who recommended one time dose of Dilaudid 1 mg, if patient mental status improves then her AMS likely due to opioid withdrawal   - Addiction psychiatry consulted, appreciate recs       HFrEF (heart failure with reduced ejection fraction)  Patient with history of broken heart syndrome requiring intubation and ICU admission. Most recent echo in April 2024 showed EF 45-50%.     Plan:   - Patient does not appear to be on GDMT  - Consider starting GDMT on discharge       Hypertension  Patients blood pressure range in the last 24 hours was: BP  Min: 158/99  Max: 206/90.The patient's inpatient anti-hypertensive regimen is listed below:  Current Antihypertensives       Plan  - BP is controlled, no changes needed to their regimen  - Will continue to monitor, hypertension likely due to her opioid withdrawal     MS (multiple sclerosis)  Patient with unclear history of MS. No clear evidence of firm diagnosis and past documentation of MS vs MS mimic.     - Patient on home Glatiramer 40 mg injected 3x per week, not currently ordered while inpatient       Opioid dependence  Patient with 30 mg oxycodone q4h and tapentadol 100 mg BID prescriptions per chart review. According to patient's sister, she believes opioid prescriptions began for pain related to her possible MS. She also mentions that the patient may have hip pain for which she uses the pain medications.     Plan:   - If patient's mental status improves, will plan to resume home opioid regimen and speak with patient regarding opioid use   - Addiction psychiatry consulted, appreciate recs         VTE Risk Mitigation (From admission, onward)           Ordered     IP VTE LOW RISK PATIENT  Once         11/14/24 1606     Place sequential compression device  Until discontinued         11/14/24 1606                                    Marquise Brown MD  Department of Hospital Medicine  [facility] ([place]  [id number])

## 2024-11-14 NOTE — ASSESSMENT & PLAN NOTE
Patient with 30 mg oxycodone q4h and tapentadol 100 mg BID prescriptions per chart review.. According to patient's sister, she believes opioid prescriptions began for pain related to her possible MS. She also mentions that the patient may have hip pain for which she uses the pain medications. Patient with piloerection, mydriasis, and tachycardia indicating opioid withdrawal.     Plan:   - Given one time dose Dilaudid 1 mg   - If patient's mental status improves, will plan to resume home opioid regimen and speak with patient regarding opioid use   - Toxicology consulted who recommended one time dose of Dilaudid 1 mg, if patient mental status improves then her AMS likely due to opioid withdrawal   - Addiction psychiatry consulted, appreciate recs

## 2024-11-14 NOTE — ASSESSMENT & PLAN NOTE
Patients blood pressure range in the last 24 hours was: BP  Min: 158/99  Max: 206/90.The patient's inpatient anti-hypertensive regimen is listed below:  Current Antihypertensives       Plan  - BP is controlled, no changes needed to their regimen  - Will continue to monitor, hypertension likely due to her opioid withdrawal

## 2024-11-14 NOTE — ASSESSMENT & PLAN NOTE
Patient with history of broken heart syndrome requiring intubation and ICU admission. Most recent echo in April 2024 showed EF 45-50%.     Plan:   - Patient does not appear to be on GDMT  - Consider starting GDMT on discharge

## 2024-11-14 NOTE — ASSESSMENT & PLAN NOTE
Patient with elevated lactic acid of 3.3 on initial presentation in the ED. Patient given a dose of ceftriaxone and vancomycin in the ED due to concern for infection. UA and CxR unremarkable. WBC increased to 17, however in the setting of likely hemoconcentration given hemoglobin of 18.9. Patient is afebrile. Tachycardia likely due to opioid withdrawal. Patient given 2 L IV fluids with improvement in lactate to 0.31. It is most likely that the elevated lactate was due to the indeterminate amount of time the patient was down at her home before being found. An elevated CK of 337 is consistent with a long period of being down in her home contributing to her elevated lactate.     Plan:   - Continue to monitor   - Will recheck lactate in the morning   - Follow up blood cultures   - Low threshold to begin antibiotics if fevers, can also consider CT abdomen/pelvis

## 2024-11-14 NOTE — ASSESSMENT & PLAN NOTE
Patient taking Oxycodone 30 mg every 4 hours, tapentadol 100 mg twice daily, ketamine cream, gabapentin, and Lyrica per chart review and conversation with patients sister. Patient's sister states she has had multiple similar presentations in the past year, all related to her medications, specifically ketamine.     Plan:   - Toxicology consult placed   - Addiction psych consulted

## 2024-11-14 NOTE — HPI
"59-year-old female history substance induced mood disorder, broken heart syndrome, HFrEF (45-50% in April 2024) who was brought to the ED with altered mental status. Of note, patient does have reported history of possible multiple sclerosis but it is unclear if the patient has MS vs. MS mimic. Initial history provided by friend (ex-neighbor) who reports not hearing from patient for a couple of days and states this morning they "drilled a hole into her door to get inside of her house" and patient was found down; down for unknown period of time. Friend reports patient was covered in urine and vomit. Reports similar episodes in the past, most recent in July where she was admitted. Friend reports chronic opioid use and concomitant use of "Ketamine cream" for multiple chronic areas of pain all over her body. Friend states, "I don't think she's doing this on purpose, she is just on a lot of medication."     In the ED, patient tachycardic and hypertensive on arrival. Lactate elevated to 3.3. Given 2 L of fluid, ceftriaxone, and vancomycin. Lactate improved to 0.31. CT head and CxR negative. Patient remains tachycardic after 2 L of fluids.     On my exam, patient remains altered and unable to follow commands. Patient tachycardic on telemetry, dilated pupils, and dry appearing mucous membranes; consistent with opioid withdrawal. Patient not able to verbalize if she is in any pain.     Collateral obtained from patient's sister, Celeste Olivarez. She states that she believes this is due to the patient's use of topical ketamine. She says there have been 4-5 similar incidents in the past year. She states the patient becomes altered and then 1-2 days later she is back to her normal self without any recollection of the events of her presentation. She believes that her sister's pain medications are for treatment of her reported MS.   "

## 2024-11-15 LAB
ALBUMIN SERPL BCP-MCNC: 3.9 G/DL (ref 3.5–5.2)
ALP SERPL-CCNC: 82 U/L (ref 40–150)
ALT SERPL W/O P-5'-P-CCNC: 12 U/L (ref 10–44)
ANION GAP SERPL CALC-SCNC: 12 MMOL/L (ref 8–16)
AST SERPL-CCNC: 22 U/L (ref 10–40)
BILIRUB SERPL-MCNC: 2.1 MG/DL (ref 0.1–1)
BUN SERPL-MCNC: 27 MG/DL (ref 6–20)
CALCIUM SERPL-MCNC: 9.6 MG/DL (ref 8.7–10.5)
CHLORIDE SERPL-SCNC: 109 MMOL/L (ref 95–110)
CO2 SERPL-SCNC: 20 MMOL/L (ref 23–29)
CREAT SERPL-MCNC: 0.8 MG/DL (ref 0.5–1.4)
EST. GFR  (NO RACE VARIABLE): >60 ML/MIN/1.73 M^2
GLUCOSE SERPL-MCNC: 109 MG/DL (ref 70–110)
MAGNESIUM SERPL-MCNC: 2.1 MG/DL (ref 1.6–2.6)
PHOSPHATE SERPL-MCNC: 3 MG/DL (ref 2.7–4.5)
POTASSIUM SERPL-SCNC: 3.3 MMOL/L (ref 3.5–5.1)
PROT SERPL-MCNC: 8.1 G/DL (ref 6–8.4)
SODIUM SERPL-SCNC: 141 MMOL/L (ref 136–145)

## 2024-11-15 PROCEDURE — 99222 1ST HOSP IP/OBS MODERATE 55: CPT | Mod: ,,, | Performed by: PSYCHIATRY & NEUROLOGY

## 2024-11-15 PROCEDURE — 83735 ASSAY OF MAGNESIUM: CPT

## 2024-11-15 PROCEDURE — 25000003 PHARM REV CODE 250

## 2024-11-15 PROCEDURE — 94761 N-INVAS EAR/PLS OXIMETRY MLT: CPT

## 2024-11-15 PROCEDURE — 25000003 PHARM REV CODE 250: Performed by: HOSPITALIST

## 2024-11-15 PROCEDURE — 63600175 PHARM REV CODE 636 W HCPCS

## 2024-11-15 PROCEDURE — 84100 ASSAY OF PHOSPHORUS: CPT

## 2024-11-15 PROCEDURE — 20600001 HC STEP DOWN PRIVATE ROOM

## 2024-11-15 PROCEDURE — 99223 1ST HOSP IP/OBS HIGH 75: CPT | Mod: ,,, | Performed by: EMERGENCY MEDICINE

## 2024-11-15 PROCEDURE — 51798 US URINE CAPACITY MEASURE: CPT

## 2024-11-15 PROCEDURE — 80053 COMPREHEN METABOLIC PANEL: CPT

## 2024-11-15 PROCEDURE — 36415 COLL VENOUS BLD VENIPUNCTURE: CPT

## 2024-11-15 RX ORDER — ONDANSETRON HYDROCHLORIDE 2 MG/ML
4 INJECTION, SOLUTION INTRAVENOUS EVERY 4 HOURS PRN
Status: DISCONTINUED | OUTPATIENT
Start: 2024-11-15 | End: 2024-11-17 | Stop reason: HOSPADM

## 2024-11-15 RX ORDER — BUPRENORPHINE 2 MG/1
2 TABLET SUBLINGUAL DAILY
Status: DISCONTINUED | OUTPATIENT
Start: 2024-11-15 | End: 2024-11-16

## 2024-11-15 RX ORDER — OXYCODONE HYDROCHLORIDE 10 MG/1
10 TABLET ORAL EVERY 6 HOURS PRN
Status: DISCONTINUED | OUTPATIENT
Start: 2024-11-15 | End: 2024-11-15

## 2024-11-15 RX ORDER — ACETAMINOPHEN 500 MG
1000 TABLET ORAL EVERY 6 HOURS PRN
Status: DISCONTINUED | OUTPATIENT
Start: 2024-11-15 | End: 2024-11-17 | Stop reason: HOSPADM

## 2024-11-15 RX ORDER — POTASSIUM CHLORIDE 20 MEQ/1
40 TABLET, EXTENDED RELEASE ORAL
Status: COMPLETED | OUTPATIENT
Start: 2024-11-15 | End: 2024-11-16

## 2024-11-15 RX ADMIN — BUPRENORPHINE 2 MG: 2 TABLET SUBLINGUAL at 12:11

## 2024-11-15 RX ADMIN — POTASSIUM CHLORIDE 40 MEQ: 1500 TABLET, EXTENDED RELEASE ORAL at 11:11

## 2024-11-15 RX ADMIN — ONDANSETRON 4 MG: 2 INJECTION INTRAMUSCULAR; INTRAVENOUS at 12:11

## 2024-11-15 NOTE — ASSESSMENT & PLAN NOTE
Patient with 30 mg oxycodone q4h and tapentadol 100 mg BID prescriptions per chart review.. According to patient's sister, she believes opioid prescriptions began for pain related to her possible MS. She also mentions that the patient may have hip pain for which she uses the pain medications. Patient with piloerection, mydriasis, and tachycardia indicating opioid withdrawal. Toxicology consulted who recommended one time dose of Dilaudid 1 mg, if patient mental status improves then her AMS likely due to opioid withdrawal     Plan:   - Given one time dose Dilaudid 1 mg   - Addiction psychiatry and toxicology consulted, appreciate recs   - Buprenorphine 2 mg daily initiated 11/15   - See Opioid dependence plan

## 2024-11-15 NOTE — ASSESSMENT & PLAN NOTE
Patients blood pressure range in the last 24 hours was: BP  Min: 123/82  Max: 175/95.The patient's inpatient anti-hypertensive regimen is listed below:  Current Antihypertensives       Plan  - BP is controlled, no changes needed to their regimen  - Will continue to monitor, hypertension likely due to her opioid withdrawal

## 2024-11-15 NOTE — ASSESSMENT & PLAN NOTE
Patient taking Oxycodone 30 mg every 4 hours, tapentadol 100 mg twice daily, ketamine cream, gabapentin, and Lyrica per chart review and conversation with patients sister. Patient's sister states she has had multiple similar presentations in the past year, all related to her medications, specifically ketamine.     Plan:   - See opioid dependence plan

## 2024-11-15 NOTE — PLAN OF CARE
Shift Note:     Pt disoriented for majority of shift. Bladder scan @ 7am- 300 mL; straight cath completed. Throughout shift, pt very restless and crying. Repeat bladder scan @ 3pm showed 282- MD aware. Pt began to speak more and mentation improved slightly- MD stated to hold off on matias to see if pt will urinate. CIWA scores Q4- see flowsheets. PRN nausea meds given. Barley any appetite and oral intake today. [employer] camera at bedside and bed alarm set. Bed low and in locked position, call light within reach of pt.       Problem: Infection  Goal: Absence of Infection Signs and Symptoms  Outcome: Progressing     Problem: Skin Injury Risk Increased  Goal: Skin Health and Integrity  Outcome: Progressing     Problem: Adult Inpatient Plan of Care  Goal: Plan of Care Review  Outcome: Progressing  Goal: Patient-Specific Goal (Individualized)  Outcome: Progressing  Goal: Absence of Hospital-Acquired Illness or Injury  Outcome: Progressing  Goal: Optimal Comfort and Wellbeing  Outcome: Progressing  Goal: Readiness for Transition of Care  Outcome: Progressing

## 2024-11-15 NOTE — ASSESSMENT & PLAN NOTE
Patient with 30 mg oxycodone q4h and tapentadol 100 mg BID prescriptions per chart review. According to patient's sister, she believes opioid prescriptions began for pain related to her possible MS. Spoke with patient's pain provider Dr. Hunter Scruggs as well as toxicology physician Dr. Eli Quigley. In collaboration, decision was made to initiate buprenorphine as patient with COWS of at least 9 this morning indicating active withdrawal from opioids.     Plan:   - Toxicology consulted , appreciate rec  - Addiction psychiatry consulted, appreciate recs   - Buprenorphine 2 mg daily initiated 11/15  - Will follow up with Dr. Scruggs on Wednesday 11/20 at 4:30 PM

## 2024-11-15 NOTE — CONSULTS
Medical Toxicology  Consult Note    Patient Name: Reza Morrow  MRN: [id number]  Admission Date: 11/14/2024  Hospital Length of Stay: 1 days  Attending Physician: Madison Ring MD   Primary Care Provider: Kip Jimenez MD     RECOMMENDATIONS   Agree with ongoing supportive care and evaluating for other causes contributing to ongoing delirium.  I appreciate the efforts of the primary team in contact with the patient's pain provider to alert them of the patient's multiple visits as well as the intention to minimize the patient's psychoactive medications.  Regarding opioid withdrawal, if the ultimate therapeutic goal is to the transition the patient off of short-acting opioids as an outpatient to buprenorphine, there are several options available.   Hold all opioids until the patient has objective and subjective evidence of opioid withdrawal in then initiate buprenorphine as an inpatient as outlined here (typically 8-16 mg buprenorphine x 1 followed by an additional dose after 30-60 minutes if withdrawal symptoms are not improved): Buprenorphine Emergency Department Quick Start - Bridge to Treatment   Continue short acting opioids in increased dose of buprenorphine as short-acting opioid use decreases over time (either 2 mg buprenorphine will need to be used or films will need to be split into smaller doses by providers at bedside): Buprenorphine Hospital Start: Low-Dose Bup Initiation with Opioid Continuation - Bridge to Treatment   Should the patient experience precipitated withdrawal if buprenorphine is used, we recommend the 90 minute precipitated withdrawal pathway as outlined here: Enhanced Care Practice: Precipitated Withdrawal 90-Minute Bundle - Bridge to Treatment     Thank you for involving the Medical Toxicology Service in the care of this patient, we will continue to follow along. Please feel free to contact us any time with any questions.        _______________________________________________________________________________    Patient information was obtained from past medical records, ER records, and primary team.     REASON FOR CONSULT:  Possible ketamine overdose  Chief Complaint   Patient presents with    Multiple complaints     Admitted to icu recently for broken heart syndrome,     Altered Mental Status     Not following commands, thrashing about, neighbor here with pt, states this had happened numerous times        Inpatient Consult to Medical Toxicology  Consult performed by: Eli Quigley MD  Consult ordered by: Marquise Brown MD      Subjective:     Principal Problem:Altered mental status      HPI:  Patient is a 53-year-old female with a past medical history remarkable for chronic pain syndrome, multiple sclerosis, Takotsubo's resulting in cardiogenic shock requiring ICU admission in the past, previous admissions for concern for polypharmacy contributing to encephalopathy who was brought in by ambulance yesterday after the patient was found down at home after not being seen for 3 days.  The patient's neighbor and friend apparently broken to the home and found the patient down and were concerned that she may have used ketamine which is prescribed for her for chronic pain.  In the emergency department, the patient was noted to be significantly altered, agitated and not able to provide additional information.            The patient's CT head was unremarkable, laboratory studies were remarkable for evidence of hemo concentration, mild anion gap metabolic acidosis with negative salicylates and acetaminophen, urine drug screen positive for THC consistent with prescribed medical marijuana, urine was noted to be positive for oxycodone and metabolites - oxycodone as prescribed to the patient.  The concern was raised for possible opioid withdrawal, and the patient received 1 mg of hydromorphone and lorazepam in the emergency department which seemed to  improve the patient's agitation somewhat.              Overnight, the patient appears to have been less agitated but is still amnestic to events this morning in his difficulty answering questions.            On my interview with the patient, she states that she does not recall the events of the previous several days and does not recall falling at home.  She denies continued use of ketamine and has not filled a new prescription since July states she only uses it topically.  Patient also states that she has been taking her medications as prescribed including tapentadol, oxycodone, gabapentin, pregabalin, and medical marijuana.  The patient was unable to tell me where she has chronic pain and what she uses these medications for and denies any new acute symptoms.  The patient states that she recalls her visit to the emergency department yesterday but does not recall how she came to the emergency department and does not recall being at home on the ground prior to arrival.    Past Medical History:   Diagnosis Date    Behavioral problem     Bulging lumbar disc     Bursitis     bilateral hip    Degenerative cervical disc     Hx of psychiatric care     Hypercholesteremia     Labral tear of hip, degenerative bilateral    MS (multiple sclerosis)     Paresthesia of both lower extremities 3/13/2022    Pneumonia     Spinal cord compression        Past Surgical History:   Procedure Laterality Date    ANGIOGRAM, CORONARY, WITH LEFT HEART CATHETERIZATION Left 3/11/2022    Procedure: Angiogram, Coronary, with Left Heart Cath;  Surgeon: Elie Matamoros MD;  [facility] CATH LAB;  Service: Cardiology;  Laterality: Left;    BREAST SURGERY      augmentation     SECTION, CLASSIC      HAND SURGERY      HYSTEROSCOPY      NECK SURGERY      cervical disc removeal    TUBAL LIGATION      X-STOP IMPLANTATION         Review of patient's allergies indicates:   Allergen Reactions    Adhesive Hives    Neosporin  [neomycin-bacitracin-polymyxin] Hives    Neomycin     Neomycin-polymyxin Hives    Neosporin g.u. irrigant     Penicillins Other (See Comments)     Unknown  reaction    Latex Rash       No current facility-administered medications on file prior to encounter.     Current Outpatient Medications on File Prior to Encounter   Medication Sig    COVID-19 AT-HOME TEST Kit TEST AS DIRECTED    furosemide (LASIX) 40 MG tablet Take 1 tablet (40 mg total) by mouth daily as needed (for worsenign shortnes of breath, swelling, or 3lb weight gain in 1 day/5lb weight gain in 1 week).    gabapentin (NEURONTIN) 300 MG capsule Take 300 mg by mouth daily as needed (Pain).    glatiramer (COPAXONE, GLATOPA) 40 mg/mL injection Inject 40 mg into the skin 3 (three) times a week.    ibuprofen (ADVIL,MOTRIN) 200 MG tablet Take 400 mg by mouth daily as needed for Pain.    naloxone (NARCAN) 4 mg/actuation Spry 1 spray by Nasal route once as needed.    NUCYNTA  mg Tb12 Take 1 tablet by mouth 2 (two) times daily.    oxyCODONE (ROXICODONE) 30 MG Tab Take 30 mg by mouth every 4 to 6 hours as needed (pain).    pregabalin (LYRICA) 100 MG capsule Take 100 mg by mouth 3 (three) times daily.    triamcinolone acetonide 0.1% (KENALOG) 0.1 % cream Apply 1 applicator topically 4 (four) times daily as needed.    venlafaxine (EFFEXOR-XR) 75 MG 24 hr capsule Take 1 capsule (75 mg total) by mouth once daily.    [DISCONTINUED] atorvastatin (LIPITOR) 20 MG tablet Take 20 mg by mouth once daily.    [DISCONTINUED] EScitalopram oxalate (LEXAPRO) 10 MG tablet Take 10 mg by mouth once daily.     Family History       Problem Relation (Age of Onset)    Bipolar disorder Brother    COPD Mother    Cancer Father    Diabetes Father, Sister    Drug abuse Mother    Heart disease Maternal Uncle    Hypothyroidism Maternal Grandmother    Lung cancer Father          Tobacco Use    Smoking status: Former     Current packs/day: 0.00     Types: Cigarettes     Quit date: 8/1/2013      Years since quittin.2     Passive exposure: Past    Smokeless tobacco: Never   Substance and Sexual Activity    Alcohol use: No    Drug use: Yes     Types: Benzodiazepines, Marijuana    Sexual activity: Not Currently     Partners: Male       Objective:     Vital Signs (Most Recent):  Temp: 98.2 °F (36.8 °C) (11/15/24 113)  Pulse: 89 (11/15/24 113)  Resp: 18 (11/15/24 1131)  BP: (!) 172/95 (11/15/24 1131)  SpO2: 98 % (11/15/24 1131) Vital Signs (24h Range):  Temp:  [97.6 °F (36.4 °C)-98.8 °F (37.1 °C)] 98.2 °F (36.8 °C)  Pulse:  [] 89  Resp:  [18-22] 18  SpO2:  [95 %-100 %] 98 %  BP: (123-175)/() 172/95     Weight: 56.2 kg (123 lb 14.4 oz)  Body mass index is 20.62 kg/m².    Physical Exam     Nursing note and vitals reviewed.  Constitutional: Patient appears anxious, restless in bed.  Patient was found disrobed in bed.   HENT:   Head: Normocephalic and atraumatic.  Poor dentition.   Eyes: Conjunctivae and EOM are normal. Pupils are approximately 4 mm, equal, round, and reactive to light.  No nystagmus, no hippus noted  Neck: Neck supple.   Normal range of motion.  Pulmonary/Chest:  No respiratory distress  Abdominal: Abdomen is soft. Patient exhibits no distension. There is no abdominal tenderness.   Musculoskeletal:      Cervical back: Normal range of motion and neck supple.   Neurological: Patient is anxious and restless, follows commands but does not answer questions appropriately.  When asked where the patient has chronic pain she covers her face shakes her head and initially did not answer at all and then when asked again did the same am either could not or would not answer.  Patient is oriented to self, hospital, knows the recent president but does not recall what month (November) we are in nor the upcoming holiday ().  The patient was told that the day of the week today is Friday and when asked 5 minutes later the patient could not recall the information.  Normal tone, no  tremulousness, no clonus.  Patient does not appear to have any nystagmus, no cerebellar dysfunction or dysmetria.  Did not attempt ambulation trial.   Skin: Skin is warm and dry.  No piloerection, no diaphoresis  Psych:  Anxious, intermittently tearful affect      Significant Labs: All pertinent labs within the past 24 hours have been reviewed.  CBC:   Recent Labs   Lab 11/14/24  1146   WBC 17.53*   HGB 18.9*   HCT 54.7*        CMP:   Recent Labs   Lab 11/14/24  1146 11/15/24  0618    141   K 4.6 3.3*    109   CO2 16* 20*   * 109   BUN 39* 27*   CREATININE 1.0 0.8   CALCIUM 10.1 9.6   PROT 9.9* 8.1   ALBUMIN 4.4 3.9   BILITOT 2.4* 2.1*   ALKPHOS 96 82   AST 28 22   ALT 12 12   ANIONGAP 18* 12   CK: 337   ASA negative  APap neg  Ethanol neg  Urine drug screen positive for THC, oxycodone, negative for fentanyl and tramadol  Troponin 0.16 -> 0.03  Lactate 3.3-> 0.3    EKG from 11/14:  Sinus tachycardia at 116 beats per minute, QRS 74, , no ischemic ST/T-wave changes    Significant Imaging:  CT head unremarkable        Assessment/Plan:     Active Diagnoses:    Diagnosis Date Noted POA    PRINCIPAL PROBLEM:  Altered mental status [R41.82] 11/14/2024 Yes    Lactate blood increase [R79.89] 11/14/2024 Yes    Elevated CK [R74.8] 11/14/2024 Yes    Polypharmacy [Z79.899] 11/14/2024 Not Applicable    Elevated troponin [R79.89] 11/14/2024 Yes    Opioid withdrawal [F11.93] 05/23/2024 Yes    HFrEF (heart failure with reduced ejection fraction) [I50.20] 05/27/2023 Yes    Hypertension [I10] 04/10/2022 Yes    Opioid dependence [F11.20] 06/23/2016 Yes     Chronic    MS (multiple sclerosis) [G35] 12/07/2015 Yes     Chronic      Problems Resolved During this Admission:     Patient is a 51-year-old female with a past medical history remarkable for chronic pain syndrome for which the patient receives multiple medications including tapentadol, oxycodone, gabapentin, pregabalin, medical marijuana, topical  ketamine provided by a [facility] pharmacy.  The patient has had multiple previous admissions for encephalopathy thought to be likely polypharmacy.    The patient arrived yesterday after not being seen for approximately 3 days altered in her home with laboratory studies that are suggestive of hemo concentration, mild starvation ketosis and otherwise unremarkable CT head and laboratory studies outside of oxycodone and THC on urine drug screen which are both prescribed to the patient.    The patient noted to have agitation evidence concerning for possible opioid withdrawal, both of which somewhat improved with short-acting opioids.    The patient's current presentation is not entirely consistent with a single toxidrome or intoxication syndrome, nor is it is suggestive of a clear withdrawal syndrome versus cessation syndrome however the patient's symptoms are difficult to ascertain given her current altered mental status.    It is unclear to me on my interview with the patient how much the patient has odd affect may be attributed to a drug-induced delirium or withdrawal syndrome.     Tapentadol is a unique opioid that is not dissimilar from tramadol in the it has multiple mechanisms of action - it is both a mu-opioid receptor agonist, albeit weak as well as a norepinephrine reuptake inhibitor.  In contrast to tramadol, it has very little serotonin reuptake inhibition and has more opioid effect.  Tapentadol as well as oxycodone in comparison with other opioids that are commonly prescribed have relatively higher rates of delirium associated with them.  Tapentadol use may also result in an opioid withdrawal syndrome upon cessation, similar to any other opioids.  The patient is dependent all use could certainly contribute to delirium and certainly after cessation an opioid withdrawal syndrome may be expected.     Topical ketamine is described for chronic pain syndromes and typically this is supplied by compounding  pharmacies who provide the patient with in inert emollient as well as powdered ketamine that the patient then mixes which appears to be the case for this patient.  This allows a very high potential for abuse of ketamine powder itself.  Ketamine is an NMDA receptor antagonist that has both hallucinogenic and dissociated of properties when use therapeutically and recreationally and symptoms are very tied to dose.  A true physiologic withdrawal syndrome has not been described however patient has who chronically use ketamine have been reported to have depressive type symptoms after cessation.  The patient is physical exam findings are not consistent with an acute ketamine intoxication in a delirium from withdrawal syndrome has not been previously described as less likely in this patient.    Gabapentin and pregabalin are both gabapentinoid type medications that do not have direct ABRIL affects but have similar clinical affects but result in decrease release of several monoamine neurotransmitters.  Cessation may result in a cessation syndrome but a true withdrawal syndrome similar to benzodiazepine withdrawal including seizures has not been previously described.  Psychosomatic complaints appear to predominate for cessation syndrome from these medications.  Both of these medications may accumulate in renal failure and perhaps given the patient appears hemoconcentrated she may have had decreased clearance contributing to her current delirium.    Considering the patient's medications, it is likely that polypharmacy is contributing to the patient's repeated presentations but is unclear on her current presentation which particular medications may be underlying her ongoing delirium.  It is reasonable to taper the patient's medications to simplify her medication regimen to prevent future polypharmacy.    Regarding opioid withdrawal, the patient is medically complex.  There are multiple options to approach transitioning to opioid  replacement therapy if that is the patient's ultimate goal of treatment    Unfortunately, mcg doses of buprenorphine are not currently available to us on formulary to allow micro induction.  Reasonable options to induce the patient on buprenorphine are to hold short-acting and long-acting opioids until the patient exhibits signs and symptoms of opioid withdrawal including a cows greater than equal to 6 + to objective findings and then start a buprenorphine induction with for example 8 mg buprenorphine-2 mg naloxone and adding additional doses as patient requires to treat opioid withdrawal symptoms.    Giving the patient small or even larger doses of buprenorphine after receiving short-acting opioids over the course of the evening and given the patient's history of opioid use may precipitate opioid withdrawal which may be complicated in this patient with a history of takotsubo cardiomyopathy.  Should the patient be given Suboxone and precipitate opioid withdrawal, it would be reasonable to treat the patient with the opioid withdrawal pathway as detailed above in the recommendations which includes 16 mg of buprenorphine in addition to benzodiazepines followed by an additional dose of 16 mg buprenorphine if the patient's withdrawal syndromes are not controlled.  At doses of 32 mg or higher of buprenorphine, the medication is expected to have saturated all available receptors and there is not likely benefit to additional doses and at which case adjuncts including anti dopaminergic agents, benzodiazepines, dexmedetomidine as needed may be required.        Eli Quigley MD  Medical Toxicology  [facility]

## 2024-11-15 NOTE — NURSING
[id number] PHILIP Morrow  :1965     AGE:59 y.o.     SEX:female      STATUS:Full Code      ISOLATION:No active isolations   ALLERGIES:Adhesive, Neosporin [neomycin-bacitracin-polymyxin], Neomycin, Neomycin-polymyxin, Neosporin g.u. irrigant, Penicillins, and Latex   ADMIT DATE:  2024   PHYSICIAN:  Madison Ring MD   DIAGNOSIS: Elevated troponin [R79.89]  Chest pain [R07.9]  Increased anion gap metabolic acidosis [E87.29]  Sepsis [A41.9]  Encephalopathy, unspecified type [G93.40] Altered mental status  CC: Multiple complaints (Admitted to icu recently for broken heart syndrome, ) and Altered Mental Status (Not following commands, thrashing about, neighbor here with pt, states this had happened numerous times)    CONSULTS: Psych, Toxicology  Past Medical History:   Diagnosis Date    Behavioral problem     Bulging lumbar disc     Bursitis     bilateral hip    Degenerative cervical disc     Hx of psychiatric care     Hypercholesteremia     Labral tear of hip, degenerative bilateral    MS (multiple sclerosis)     Paresthesia of both lower extremities 3/13/2022    Pneumonia     Spinal cord compression      Scheduled procedure(s): none  Labs to Monitor (no values):   Recent Vitals:  Temp: 98 °F (36.7 °C)  Pulse: 101  Resp: 18  SpO2: 99 %  BP: (!) 164/96    Respiratory:    Cardiac: Rhythm: normal sinus rhythm      Wt Readings from Last 2 Encounters:   24 56.2 kg (123 lb 14.4 oz)   24 54.2 kg (119 lb 7.8 oz)     GI/: Diet Adult Regular   Last Bowel Movement:  (luz elena patient confused)    Neuro: ex:AMS   Moore catheter: No  Skin:WDL   Brayden Score: 14      Lines/Drains/Airways       Peripheral Intravenous Line  Duration                  Peripheral IV - Single Lumen 24 1139 20 G Right Wrist <1 day         Peripheral IV - Single Lumen 24 1228 20 G Right Antecubital <1 day                  Antibiotics (From admission, onward)      None          VTE Risk Mitigation (From admission,  "onward)           Ordered     IP VTE LOW RISK PATIENT  Once         11/14/24 1606     Place sequential compression device  Until discontinued         11/14/24 1606                  Glycemic control:No results for input(s): "POCTGLUCOSE" in the last 168 hours.  Fall risk: standard interventions, bed alarm camera   Mobility: GEMS ([place] Early Mobility Scale): Level 1-Primary in bed activities    Nursing End of shift notes:  Patient altered mental status,  global aphasia at the time of assessment. Bladder scan done retaining 395 straight cath performed got 525ml urinary output. Safety camera at bedside , safety maintain at all time and care ongoing.   "

## 2024-11-15 NOTE — ASSESSMENT & PLAN NOTE
Patient with urinary retention with 300 mL urine on bladder scan requiring straight cath x2. Purewick placed and will continue to monitor. Urinary retention is likely related to polypharmacy and patient's continued AMS. Will continue to monitor.     Plan:   - Bladder scan, and if >300 cc and patient unable to urinate, place matias catheter   - Consider urology consult vs. outpatient urology follow up if does not resolve

## 2024-11-15 NOTE — ASSESSMENT & PLAN NOTE
Patient with elevated lactic acid of 3.3 on initial presentation in the ED. Patient given a dose of ceftriaxone and vancomycin in the ED due to concern for infection. UA and CxR unremarkable. WBC increased to 17, however in the setting of likely hemoconcentration given hemoglobin of 18.9. Patient is afebrile. Tachycardia likely due to opioid withdrawal. Patient given 2 L IV fluids with improvement in lactate to 0.31. It is most likely that the elevated lactate was due to the indeterminate amount of time the patient was down at her home before being found. An elevated CK of 337 is consistent with a long period of being down in her home contributing to her elevated lactate.     Plan:   - Blood cultures 11/14: NGTD  - Low threshold to begin antibiotics if fevers, can also consider CT abdomen/pelvis

## 2024-11-15 NOTE — PROGRESS NOTES
"[facility] ([address] Medicine  Progress Note    Patient Name: Reza Morrow  MRN: [id number]  Patient Class: IP- Inpatient   Admission Date: 11/14/2024  Length of Stay: 1 days  Attending Physician: Madison Ring MD  Primary Care Provider: Tony, Kip HENRY MD        Subjective:     Principal Problem:Altered mental status        HPI:  59-year-old female history substance induced mood disorder, broken heart syndrome, HFrEF (45-50% in April 2024) who was brought to the ED with altered mental status. Of note, patient does have reported history of possible multiple sclerosis but it is unclear if the patient has MS vs. MS mimic. Initial history provided by friend (ex-neighbor) who reports not hearing from patient for a couple of days and states this morning they "drilled a hole into her door to get inside of her house" and patient was found down; down for unknown period of time. Friend reports patient was covered in urine and vomit. Reports similar episodes in the past, most recent in July where she was admitted. Friend reports chronic opioid use and concomitant use of "Ketamine cream" for multiple chronic areas of pain all over her body. Friend states, "I don't think she's doing this on purpose, she is just on a lot of medication."     In the ED, patient tachycardic and hypertensive on arrival. Lactate elevated to 3.3. Given 2 L of fluid, ceftriaxone, and vancomycin. Lactate improved to 0.31. CT head and CxR negative. Patient remains tachycardic after 2 L of fluids.     On my exam, patient remains altered and unable to follow commands. Patient tachycardic on telemetry, dilated pupils, and dry appearing mucous membranes; consistent with opioid withdrawal. Patient not able to verbalize if she is in any pain.     Collateral obtained from patient's sister, Celeste Olivarez. She states that she believes this is due to the patient's use of topical ketamine. She says there have been 4-5 similar " incidents in the past year. She states the patient becomes altered and then 1-2 days later she is back to her normal self without any recollection of the events of her presentation. She believes that her sister's pain medications are for treatment of her reported MS.     Overview/Hospital Course:  59 year old female with HFrEF, chronic opioid use, and questionable MS who was brought to ED after being found down for unknown time. Patient with AMS, tachycardic, hypertensive. Remained altered and tachy despite fluids. Concern for AMS due to polypharmacy.  Per chart review, patient was found to be taking oxycodone 30 mg every 4 hours, tapentadol 100 mg twice per day, gabapentin 300 mg 3 times per day, Lyrica 100 mg 3 times per day, as well as a ketamine cream.  On 11/15, our team reached out to the patient's pain provider, Dr. Hunter Scruggs, who was unaware of her 7 hospital admissions over the past 2 years for similar incidents.  Dr. Scruggs had previously tried requesting records 4 times, however he did not receive the requested records so he was unaware of this recurrent issue.  He states that the patient has been taking approximately 200 MME since he for saw the patient approximately 4 years ago, and he had not been made aware of any if these issues.  Medical toxicology was consulted for their assistance.  In collaboration with Toxicology, our team, and Dr. Diaz the decision was made to begin the patient on buprenorphine. The patient remains altered but does seem to have improved a little today.  She is able to follow commands and answer yes or no questions, and she even verbalized the correct pronunciation of her last name when prompted. She was unable to have any further participation in conversation.  She also continues to retain urine for which she has been straight cath 2 times, with 300 mL of output each time.     Interval History: NAEON. AF, VSS. Patient with slight improvement in mental status.  She is able to follow commands and answer yes or no questions, and she even verbalized the correct pronunciation of her last name when prompted. She was unable to have any further participation in conversation. She did indicate some abdominal pain, for which she indicated relief following emptying her bladder via straight cath.     Review of Systems   Unable to perform ROS: Mental status change     Objective:     Vital Signs (Most Recent):  Temp: 98.2 °F (36.8 °C) (11/15/24 1131)  Pulse: 105 (11/15/24 1307)  Resp: 19 (11/15/24 1307)  BP: (!) 172/95 (11/15/24 1131)  SpO2: 95 % (11/15/24 1307) Vital Signs (24h Range):  Temp:  [97.6 °F (36.4 °C)-98.8 °F (37.1 °C)] 98.2 °F (36.8 °C)  Pulse:  [] 105  Resp:  [18-22] 19  SpO2:  [95 %-100 %] 95 %  BP: (123-175)/() 172/95     Weight: 56.2 kg (123 lb 14.4 oz)  Body mass index is 20.62 kg/m².    Intake/Output Summary (Last 24 hours) at 11/15/2024 1430  Last data filed at 11/15/2024 0734  Gross per 24 hour   Intake --   Output 825 ml   Net -825 ml         Physical Exam  Vitals reviewed.   Constitutional:       General: She is not in acute distress.     Appearance: She is toxic-appearing. She is not ill-appearing.      Comments: Patient    HENT:      Head: Normocephalic and atraumatic.      Mouth/Throat:      Mouth: Mucous membranes are dry.      Pharynx: Oropharynx is clear.   Eyes:      General: No scleral icterus.     Conjunctiva/sclera: Conjunctivae normal.      Comments: Mydriasis    Cardiovascular:      Rate and Rhythm: Regular rhythm. Tachycardia present.      Pulses: Normal pulses.      Heart sounds: Normal heart sounds. No murmur heard.  Pulmonary:      Effort: Pulmonary effort is normal. No respiratory distress.      Breath sounds: Normal breath sounds. No wheezing.   Abdominal:      General: Bowel sounds are normal. There is no distension.      Palpations: Abdomen is soft.      Tenderness: There is no abdominal tenderness.   Musculoskeletal:      Right  lower leg: No edema.      Left lower leg: No edema.   Skin:     General: Skin is dry.      Comments: Piloerection noted   Neurological:      Mental Status: She is alert. She is disoriented.      Comments: Patient unable to state where she was or her name without prompting.    Psychiatric:         Attention and Perception: She is inattentive.             Significant Labs: All pertinent labs within the past 24 hours have been reviewed.  CBC:   Recent Labs   Lab 11/14/24  1146   WBC 17.53*   HGB 18.9*   HCT 54.7*        CMP:   Recent Labs   Lab 11/14/24  1146 11/15/24  0618    141   K 4.6 3.3*    109   CO2 16* 20*   * 109   BUN 39* 27*   CREATININE 1.0 0.8   CALCIUM 10.1 9.6   PROT 9.9* 8.1   ALBUMIN 4.4 3.9   BILITOT 2.4* 2.1*   ALKPHOS 96 82   AST 28 22   ALT 12 12   ANIONGAP 18* 12     Magnesium:   Recent Labs   Lab 11/14/24  1146 11/15/24  0618   MG 2.0 2.1       Significant Imaging: I have reviewed all pertinent imaging results/findings within the past 24 hours.    Assessment/Plan:      * Altered mental status  Patient with altered mental status likely due to polypharmacy and/or opioid withdrawal. Per chart review, patient with prescription for oxycodone 30 mg q4h, tapentadol 100 mg BID, Ketamine topical gel, and medical marijuana. Patient found down in home by neighbor after unknown amount of time. Polypharmacy could be contributing to the patient's mental status as well as opioid withdrawal given the unknown amount of time she was down and not taking her opioids. CT head unremarkable for acute intracranial process contributing to her AMS. Per patient's sister she has slowly recovered to baseline over 2-3 days after similar presentations. Anticipate similar course.     Plan:   - Supportive care   - Toxicology consulted who thinks AMS is due to polypharmacy   - Will continue to monitor      Elevated troponin  Patient with troponin elevation to 0.163 on admission in the setting of  hemoconcentration as evidenced by hemoglobin of 18.9. Troponin decreased to 0.31. Troponin elevation likely with a component of supply-demand mismatch given her tachycardia as well as a hemoconcentration component. Low concern for ACS. Will continue to monitor.        Polypharmacy  Patient taking Oxycodone 30 mg every 4 hours, tapentadol 100 mg twice daily, ketamine cream, gabapentin, and Lyrica per chart review and conversation with patients sister. Patient's sister states she has had multiple similar presentations in the past year, all related to her medications, specifically ketamine.     Plan:   - See opioid dependence plan       Elevated CK  Patient CK elevated on presentation to 337. No evidence of rhabdomyolysis as not CK is not 5x the upper limit of normal. Likely due to prolonged period patient was down in her home. Given 2 L IV fluids in the ED.         Lactate blood increase  Patient with elevated lactic acid of 3.3 on initial presentation in the ED. Patient given a dose of ceftriaxone and vancomycin in the ED due to concern for infection. UA and CxR unremarkable. WBC increased to 17, however in the setting of likely hemoconcentration given hemoglobin of 18.9. Patient is afebrile. Tachycardia likely due to opioid withdrawal. Patient given 2 L IV fluids with improvement in lactate to 0.31. It is most likely that the elevated lactate was due to the indeterminate amount of time the patient was down at her home before being found. An elevated CK of 337 is consistent with a long period of being down in her home contributing to her elevated lactate.     Plan:   - Blood cultures 11/14: NGTD  - Low threshold to begin antibiotics if fevers, can also consider CT abdomen/pelvis     Opioid withdrawal  Patient with 30 mg oxycodone q4h and tapentadol 100 mg BID prescriptions per chart review.. According to patient's sister, she believes opioid prescriptions began for pain related to her possible MS. She also mentions  that the patient may have hip pain for which she uses the pain medications. Patient with piloerection, mydriasis, and tachycardia indicating opioid withdrawal. Toxicology consulted who recommended one time dose of Dilaudid 1 mg, if patient mental status improves then her AMS likely due to opioid withdrawal     Plan:   - Given one time dose Dilaudid 1 mg   - Addiction psychiatry and toxicology consulted, appreciate recs   - Buprenorphine 2 mg daily initiated 11/15   - See Opioid dependence plan       Urinary retention  Patient with urinary retention with 300 mL urine on bladder scan requiring straight cath x2. Purewick placed and will continue to monitor. Urinary retention is likely related to polypharmacy and patient's continued AMS. Will continue to monitor.     Plan:   - Bladder scan, and if >300 cc and patient unable to urinate, place matias catheter   - Consider urology consult vs. outpatient urology follow up if does not resolve     HFrEF (heart failure with reduced ejection fraction)  Patient with history of broken heart syndrome requiring intubation and ICU admission. Most recent echo in April 2024 showed EF 45-50%.     Plan:   - Patient does not appear to be on GDMT  - Consider starting GDMT on discharge       Hypertension  Patients blood pressure range in the last 24 hours was: BP  Min: 123/82  Max: 175/95.The patient's inpatient anti-hypertensive regimen is listed below:  Current Antihypertensives       Plan  - BP is controlled, no changes needed to their regimen  - Will continue to monitor, hypertension likely due to her opioid withdrawal     MS (multiple sclerosis)  Patient with unclear history of MS. No clear evidence of firm diagnosis and past documentation of MS vs MS mimic.     - Patient on home Glatiramer 40 mg injected 3x per week, not currently ordered while inpatient       Opioid dependence  Patient with 30 mg oxycodone q4h and tapentadol 100 mg BID prescriptions per chart review. According to  patient's sister, she believes opioid prescriptions began for pain related to her possible MS. Spoke with patient's pain provider Dr. Hunter Scruggs as well as toxicology physician Dr. Eli Quigley. In collaboration, decision was made to initiate buprenorphine as patient with COWS of at least 9 this morning indicating active withdrawal from opioids.     Plan:   - Toxicology consulted , appreciate rec  - Addiction psychiatry consulted, appreciate recs   - Buprenorphine 2 mg daily initiated 11/15  - Will follow up with Dr. Scruggs on Wednesday 11/20 at 4:30 PM         VTE Risk Mitigation (From admission, onward)           Ordered     IP VTE LOW RISK PATIENT  Once         11/14/24 1606     Place sequential compression device  Until discontinued         11/14/24 1606                    Discharge Planning   JOANNE: 11/16/2024     Code Status: Full Code   Is the patient medically ready for discharge?:     Reason for patient still in hospital (select all that apply): Patient trending condition and Consult recommendations             Marquise Brown MD  Department of Hospital Medicine   [facility] ([place])

## 2024-11-15 NOTE — ASSESSMENT & PLAN NOTE
Patient with altered mental status likely due to polypharmacy and/or opioid withdrawal. Per chart review, patient with prescription for oxycodone 30 mg q4h, tapentadol 100 mg BID, Ketamine topical gel, and medical marijuana. Patient found down in home by neighbor after unknown amount of time. Polypharmacy could be contributing to the patient's mental status as well as opioid withdrawal given the unknown amount of time she was down and not taking her opioids. CT head unremarkable for acute intracranial process contributing to her AMS. Per patient's sister she has slowly recovered to baseline over 2-3 days after similar presentations. Anticipate similar course.     Plan:   - Supportive care   - Toxicology consulted who thinks AMS is due to polypharmacy   - Will continue to monitor

## 2024-11-15 NOTE — NURSING
Pt aaox1 to self, nonverbal. Sent up from ED at 1700. Pt is moaning continually, restless in the bed. Bed alarm on, fall monitor set up in the room, camera pointed toward pt. Room near nursing station.Tachycardia on tele. Primary rounded at bedside.

## 2024-11-15 NOTE — SUBJECTIVE & OBJECTIVE
Interval History: NAEON. AF, VSS. Patient with slight improvement in mental status. She is able to follow commands and answer yes or no questions, and she even verbalized the correct pronunciation of her last name when prompted. She was unable to have any further participation in conversation. She did indicate some abdominal pain, for which she indicated relief following emptying her bladder via straight cath.     Review of Systems   Unable to perform ROS: Mental status change     Objective:     Vital Signs (Most Recent):  Temp: 98.2 °F (36.8 °C) (11/15/24 1131)  Pulse: 105 (11/15/24 1307)  Resp: 19 (11/15/24 1307)  BP: (!) 172/95 (11/15/24 1131)  SpO2: 95 % (11/15/24 1307) Vital Signs (24h Range):  Temp:  [97.6 °F (36.4 °C)-98.8 °F (37.1 °C)] 98.2 °F (36.8 °C)  Pulse:  [] 105  Resp:  [18-22] 19  SpO2:  [95 %-100 %] 95 %  BP: (123-175)/() 172/95     Weight: 56.2 kg (123 lb 14.4 oz)  Body mass index is 20.62 kg/m².    Intake/Output Summary (Last 24 hours) at 11/15/2024 1430  Last data filed at 11/15/2024 0734  Gross per 24 hour   Intake --   Output 825 ml   Net -825 ml         Physical Exam  Vitals reviewed.   Constitutional:       General: She is not in acute distress.     Appearance: She is toxic-appearing. She is not ill-appearing.      Comments: Patient    HENT:      Head: Normocephalic and atraumatic.      Mouth/Throat:      Mouth: Mucous membranes are dry.      Pharynx: Oropharynx is clear.   Eyes:      General: No scleral icterus.     Conjunctiva/sclera: Conjunctivae normal.      Comments: Mydriasis    Cardiovascular:      Rate and Rhythm: Regular rhythm. Tachycardia present.      Pulses: Normal pulses.      Heart sounds: Normal heart sounds. No murmur heard.  Pulmonary:      Effort: Pulmonary effort is normal. No respiratory distress.      Breath sounds: Normal breath sounds. No wheezing.   Abdominal:      General: Bowel sounds are normal. There is no distension.      Palpations: Abdomen is soft.       Tenderness: There is no abdominal tenderness.   Musculoskeletal:      Right lower leg: No edema.      Left lower leg: No edema.   Skin:     General: Skin is dry.      Comments: Piloerection noted   Neurological:      Mental Status: She is alert. She is disoriented.      Comments: Patient unable to state where she was or her name without prompting.    Psychiatric:         Attention and Perception: She is inattentive.             Significant Labs: All pertinent labs within the past 24 hours have been reviewed.  CBC:   Recent Labs   Lab 11/14/24  1146   WBC 17.53*   HGB 18.9*   HCT 54.7*        CMP:   Recent Labs   Lab 11/14/24  1146 11/15/24  0618    141   K 4.6 3.3*    109   CO2 16* 20*   * 109   BUN 39* 27*   CREATININE 1.0 0.8   CALCIUM 10.1 9.6   PROT 9.9* 8.1   ALBUMIN 4.4 3.9   BILITOT 2.4* 2.1*   ALKPHOS 96 82   AST 28 22   ALT 12 12   ANIONGAP 18* 12     Magnesium:   Recent Labs   Lab 11/14/24  1146 11/15/24  0618   MG 2.0 2.1       Significant Imaging: I have reviewed all pertinent imaging results/findings within the past 24 hours.

## 2024-11-15 NOTE — CONSULTS
"  [facility]   DEPARTMENT OF PSYCHIATRY     IDENTIFIERS & DEMOGRAPHICS:     INTRODUCTION      PRESENTATION:   History of Present Illness   **  OVERVIEW OF THE HPI:    59-year-old female history substance induced mood disorder, broken heart syndrome, HFrEF (45-50% in April 2024) who was brought to the ED with altered mental status. Of note, patient does have reported history of possible multiple sclerosis but it is unclear if the patient has MS vs. MS mimic. Initial history provided by friend (ex-neighbor) who reports not hearing from patient for a couple of days and states this morning they "drilled a hole into her door to get inside of her house" and patient was found down; down for unknown period of time. Friend reports patient was covered in urine and vomit. Reports similar episodes in the past, most recent in July where she was admitted. Friend reports chronic opioid use and concomitant use of "Ketamine cream" for multiple chronic areas of pain all over her body. Friend states, "I don't think she's doing this on purpose, she is just on a lot of medication."     SUBJECTIVE/CURRENT FINDINGS:    During interview patient continues to display significant confusion. She has difficulty answering many questions and becomes tearful at many points, especially when she is unable to answer questions. She reports that the last thing that she remembers is being in the emergency room. She does not have any recollection of the events that precipitated her admission to the hospital nor is she able to clearly recall the last thing she remembers prior to arriving in the ED.     She affirms that she has been taking Gabapentin, Lyrica, Nucynta, and oxycodone. She states that she takes the medicine as prescribed and she denies any recent changes or additions to the regimen. She denies any period when she has run out or forgotten to take the medication and developed withdrawal signs. On initial interview, she reported pain, but " was unable to localize or qualify the pain in any way. At that time she denied any current depression or anxiety. On subsequent rounds, she denied any pain and did report current depression and anxiety. She was unable to recall previous discussions about the concern that her medicine regimen was causing her confusion.     She is currently prescribed medical marijuana by her pain management doctor. She reports that she smokes on a daily basis. She was previously prescribed ketamine cream, but states that she has not been taking that medication recently and doesn't have any more.     REVIEW OF SYSTEMS:    >> SOURCES: patient     N   Sleep Disturbance/Disruption   N   Appetite/Weight Change   N   Alterations in Energy Level   N   Impaired Focus/Concentration   Y   Depressive Symptomatology   Y   Excessive Anxiety/Worry   N   Dysregulated Mood/Behavior   N   Manic Symptomatology   N   Psychosis   N   Trauma-Related    Regarding the current presentation, no other significant issues or complaints are voiced or known at this time.      ADD-ON PSYCHOTHERAPY:     ADD-ON THERAPY     HISTORY:   Patient Information   **  >> SOURCES: patient, chart review       PSYCH  SUBSTANCE  FAMILY  SOCIAL  MEDICAL     Y   Previous/Pre-Existing Psychiatric Diagnoses  ANYA, unspecified depression   Y   Past Psychotropic Trials  Effexor, Buspar, Lexapro   N   Current Psychiatric Provider   N   Hx of Outpatient Psychiatric Treatment  Denies   N   Hx of Psychiatric Hospitalization   N   Hx of Suicidal Ideation/Threats   N   Hx of Suicide Attempts/Gestures   N   Hx of Homicidal Ideation/Threats   N   Hx of Homicidal Behavior   N   Hx of Non-Suicidal Self-Injurious Behavior   N   Hx of Perpetrated Violence   N   Documented Hx of Malingering     N   Hx of Formal KIMBERLY Treatment   N   Recent Alcohol Consumption   Y   Hx of Nicotine Use   N    "Hx of Alcohol Misuse/Abuse   N   Hx of Illicit Drug Misuse/Abuse   N   Hx of Prescription Drug Misuse/Abuse   +   Drug Experimentation/Usage  +cannabis       N   Family Psychiatric History   +   Family Hx of Suicide  no      N   Hx of Trauma   N   Hx of Abuse     N   GED/High School Diploma   N   Post-Secondary Education   N   Currently Employed   Y   Currently on Disability   Y   Functions Independently   Y   Domiciled   Y   Intact Support System   N   Currently in a Relationship   Y   Ever    Y   Ever /   Y   Children/Dependents     N   Ever Charged/Convicted   N   Current Probation/[place]/Diversion   N   Hx of Incarceration     N   Hx of Seizures   N   Hx of Head Trauma      Medical Hx & Diagnoses  Possible MS diagnosis    >> EHR (EPIC): reviewed/reconciled      The patient's past medical history has been reviewed and updated as appropriate within the electronic health record system.  See PROBLEM LIST & HISTORY for details.    Scheduled and PRN Medications: The electronic chart was reviewed and updated as appropriate.  See MEDCARD for details.    Allergies:  Adhesive, Neosporin [neomycin-bacitracin-polymyxin], Neomycin, Neomycin-polymyxin, Neosporin g.u. irrigant, Penicillins, and Latex      EXAMINATION:   Objective   **  VITALS:  BP (!) 172/95 (BP Location: Right arm, Patient Position: Lying)   Pulse 89   Temp 98.2 °F (36.8 °C) (Oral)   Resp 18   Ht 5' 5" (1.651 m)   Wt 56.2 kg (123 lb 14.4 oz)   SpO2 98%   BMI 20.62 kg/m²     MENTAL STATUS EXAMINATION:  Appearance: inadequately groomed, appears stated age, normal weight  appropriately dressed, in no apparent distress, well-appearing    Behavior & Attitude: participative, under good behavioral control, able to redirect, appropriate eye contact  calm, engaged, agreeable, cooperative    Distressed  Movements & Motor Activity: no " psychomotor agitation, no psychomotor retardation, not wheelchair bound (able to ambulate), no weakness, no spasticity, no rigidity, no tics, no tremor, no akathisia, no dyskinesia, no ataxia, no parkinsonism    Speech & Language: decreased rate, decreased volume, decreased quantity, increased latency, reciprocal  non-spontaneous, non-fluent    Mood: Alright  Affect: dysphoric    Thought Process & Associations: organized, poverty of thought  no loosening of associations    Superficially linear, but had difficulty answering questions/ elaborating on short responses  Thought Content & Perceptions: no delusions, no paranoid ideation, no ideas of reference, no grandiosity, no hyperreligiosity, no hallucinations, no responding to internal stimuli    Sensorium: awake, confused    Orientation: oriented to person  not oriented to place, not oriented to time, not oriented to situation    Recent & Remote Memory: register (3/3), recall (0/3)  impaired (recent), impaired (remote)    Attention & Concentration: inattentive to conversation, easily distracted  impaired, unable to spell 5-letter word fowards, unable to spell 5-letter word backwards, unable to perform SAVEAART    Fund of Knowledge: impaired, unable to identify key governmental leaders    Insight: limited  demonstrates sufficient awareness of condition/situation    Judgment: limited  heeds instructions/advice        RISK & REGULATORY:   Impression   **   RISK PARAMETERS:     N   Suicidal Ideation/Threats   N   Suicide Attempts/Gestures   N   Homicidal Ideation/Threats   N   Homicidal Behavior   N   Non-Suicidal Self-Injurious Behavior   N   Perpetrated Violence     NOTE: RISK PARAMETERS are current to the encounter/episode  NOT inclusive of past history.       FIREARMS & WEAPONS:     N   Ready Access to Firearms     MEDICAL DECISION MAKING:     - DIAGNOSTICS: a diagnostic psychiatric evaluation was performed and  responsiveness to treatment was assessed  ability/capacity to respond to treatment: adequate/intact     REGULATORY:    -- : REVIEWED      INFORMED CONSENT & SHARED DECISION MAKING are the hallmark and bedrock of good clinical care, and as such have been employed and obtained, respectively, to the degree possible.  Discussed, to the extent possible, diagnosis, risks and benefits of proposed treatment (e.g., medication, therapy) vs alternative treatments vs no treatment, potential side effects of these treatments, and the inherent unpredictability of treatment.         - ABILITY TO UNDERSTAND, PARTICIPATE & ENGAGE: minimal     - AGREEABLE TO TREATMENT (consent/assent): uknown  unable to assess     - RELIABILITY/ACCURACY: the patient is deemed to be a well-meaning but unreliable and factually inaccurate historian      WARNINGS & PRECAUTIONS:  >> In cases of emergencies (e.g. SI/HI resulting in danger to self or others, functioning deteriorating to the level of grave disability), call 911 or [phone], or present to the emergency department for immediate assistance.    >> Individuals should not operate a motor vehicle or heavy machinery if effects of medications or underlying symptoms/condition impair the ability to do so safely.    >> FULLY comply with ANY/ALL medication as prescribed/instructed and report ANY/ALL suspected adverse effects to appropriate health care providers.      ASSESSMENT & PLAN:   Assessment & Plan   **  DIAGNOSES & PROBLEMS:       1.  Delirium    2.  Opioid dependance, concern for withdrawal    3.  R/O Opioid use disorder    PSYCHOTROPIC REGIMEN:     Patient started on Suboxone 2 mg daily for opioid withdrawal after consultation with pain management doctor and toxicology. Given patient's presentation and risks of continuing full agonist therapy, concur with initiation of suboxone for acute withdrawal and pain management.      -- ASSESSMENT (synthesis  analysis):     MsKiara Reza Gonzalezn is a 59  y.o. female with past psychiatric history of depression and anxiety and past medical history of chronic pain and possible MS. She presented for AMS after being found down by a neighbor. She has had multiple previous presentations for AMS that have been attributed to polypharmacy. Per  patient's medications include gabapentin, pregabalin, tapentadol, oxycodone and medical marijuana. The patients current delirium is likely multifactorial including polypharmacy and opioid withdrawal. The patient's inability to remember the past several days make the timing of the last opioid dose uncertain, but she is displaying symptoms of opioid withdrawal including anxiety, hypertension, and piloerection with COWS > 8. Additionally, given the patient's history of MS, a neurological contribution to the patient's condition cannot be ruled out and warrants consideration should the patient not improve with current management plans.     -- PLAN (goals  recommentations):        - CURRENT CONDITION: exacerbated  as compared to typical baseline  - WITH REASONABLE MEDICAL CERTAINTY, based on history, chart review, available collateral information, and a present-state examination:   -- psychiatric hospitalization is not indicated    * PEC is NOT INDICATED - rescind if in place   >> DEFER management of NON-PSYCHIATRIC medication(s) to primary and/or specialist provider(s)  >> recommended against the use of medical (or recreational) CANNABIS, given the potential for harm in vulnerable populations, the risk for substance-induced disorders (including dependency), and the insufficient scientific evidence base to support its purported beneficial effects  >> continue opioid withdrawal protocol  >> case discussed with staff psychiatrist  >> will continue to follow with you, thank you    DELIRIUM PROTOCOL     > DELIRIUM is a HIGH RISK MEDICAL CONDITION with significantly elevated rates of morbidity and mortality, routinely rendering a person  temporarily incapacitated and interfering with the effective management of underlying medical conditions. As such, proactive and comprehensive management is essential. Implementation of the following recommendations should be considered BEST PRACTICE:  >> if feasible, please utilize non-pharmacologic approaches FIRST for agitation; PRN medications can be considered if this approach is insufficient or ineffective  >> AVOID the use of PHYSICAL RESTRAINTS whenever possible; whenever not, always seek to MINIMIZE their use  >> keep window shades open and room lit during day and room dim at night in order to promote normal sleep-wake cycles  >> reduce unnecessary stimulation/noise; TV screen and sound should be off unless patient is actively engaged with the program  >> minimize disruptions at night  >> correct sensory deficits, when present, with provision of eyeglasses and/or hearing aids  >> optimize nutrition and hydration status  >> continue to manage medical conditions and assess for and treat pain  >> consider PT/OT consult, as early mobility and exercise have been shown to decrease duration of delirium  >> attempt to maintain consistency of key staff who will routinely interact with the patient  >> encourage family at bedside  >> orient patient often to date, location, and situation, including reason for hospitalization and current plan of care  >> keep whiteboard (or similar) in patient's room current with the date and names of key treatment team members  >> utilize 1:1 sitter for monitoring, if warranted  >> LIMIT or DISCONTINUE the use of narcotics, benzodiazepines, anticholinergics, antihistaminergics, steroids, and other known deliriogenic medications  >> continue treatment of the underlying causative etiology of delirium, the correction of which is the ultimate objective in delirium management; if unknown, continue exhaustive medical workup to elucidate the source(s)    OPIOID WITHDRAWAL PROTOCOL    >>  recommendations provided herein should be seen as rough guidelines, as an opioid withdrawal protocol should be individualized and modified routinely, as clinically warranted  >> the absence of a positive serum or urine toxicology for opioids does not exclude recent opioid use, as many agents, including fentanyl and carfentanil, do not register on standard drug screens; a withdrawal protocol may still be warranted, based on the clinical situation  >> enact behavioral protocols per unit policies, to minimize the risk of surreptitious use in the facility setting  >> vitals Q4H while awake  >> frequent administration of COWS  >> prototypical PRNs (adjust as clinically warranted):    > utilize clonidine if no cardiac contraindications exist; consult cardiology if specialist input is needed   > HOLD clonidine if SBP <100, DBP <60, HR <50 (assuming no alternative parameters are in effect)   > clonidine (Catapres) 0.1mg PO TID PRN for COWS >5   > may increase clonidine to 0.2mg PO TID if COWS >12 persistently   > dicyclomine (Bentyl) 10mg PO Q6H PRN for abdominal cramps, GI distress   > methocarbamol (Robaxin) 500mg PO Q8H PRN for muscle cramps, muscle spasms   > ondansetron (Zofran) 4mg PO Q6H PRN for nausea, vomiting   > loperamide (Imodium) 2 mg after each loose stool; not to exceed 16 mg/day   > ibuprofen (Motrin) 400mg PO Q6H PRN for pain    > hydroxyzine (Vistaril) 50mg PO Q6H PRN for anxiety, insomnia   > although short-term benzodiazepines may also be of some utility, caution must be exercised given their addictive potential; seek to limit/minimize/avoid their use   > NALOXONE 0.4MG IV PRN for opioid reversal  >> post-withdrawal treatment is paramount; medically supervised withdrawal manages the patient through the withdrawal symptoms but does not treat opioid use disorder; patients completing withdrawal are at high risk of relapse to resumed opioid use, as well as increased risk for accidental overdose death given  their recent reversal of built-up tolerance  >> if in a facility, patients should be given explicit plans for follow-up care PRIOR to discharge  >> follow-up may involve ongoing treatment through primary care or a specialized addiction treatment program or physician, and is dependent on a number of factors, including availability and patient willingness  >> continuing care, including pharmacotherapy (i.e., MAT - buprenorphine, methadone, naltrexone) and psychosocial intervention for opioid use disorder, are indicated  >> it is vital to ensure access to naloxone upon discharge, as well as instructions for its use  >> review/educate on signs of an overdose and harm reduction techniques (e.g., never use alone, never share or reuse needles, always take turns, always try a small dose first to check the effect, always have naloxone available in plain view, always contact emergency services if naloxone is administered, always monitor until help arrives, always be prepared to give follow up doses of naloxone as needed)         See below for pertinent laboratory and radiographic findings.    Manuel Hansen MD, PhD  Addiction Psychiatry, PGY-2     CHART REVIEW: available documentation has been reviewed, and pertinent elements of the chart have been incorporated into this evaluation where appropriate.       KEY & LINKS:        Y  = yes/endorses     N  = no/denies     U  = unknown/unable to assess    ADHD   AIMS   AUDIT   AUDIT-C   C-SSRS (Screen)   C-SSRS (Short)   C-SSRS (Full)   DAST   DAST-10   ANYA-7   MoCA   PCL-5   PHQ-9   KIMBERLY   YMRS       DIAGNOSTIC TESTING:   Results   **   Glu 109  11/15/2024  Li *   *  TSH 0.191 (L)  7/20/2024    HgA1c 5.2  5/14/2024  VPA *   *   FT4 1.22  7/20/2024    Na 141  11/15/2024  CLZ *   *  WBC 17.53 (H)  11/14/2024    Cr 0.8  11/15/2024  ANC 14.8; 84.1 (H);  (H)  11/14/2024   Hgb 18.9 (H)  11/14/2024     BUN 27 (H)  11/15/2024  Trop I 0.033 (H)  11/14/2024  HCT  54.7 (H)  11/14/2024     GFR >60.0  11/15/2024    (H)  11/14/2024    11/14/2024     Alb 3.9  11/15/2024   PRL *   *  B12 191  3/23/2022     T Bili 2.1 (H)  11/15/2024  Chol *   *  B9 10.1  5/26/2024    ALP 82  11/15/2024  TGs *   *  B1 60  *    AST 22  11/15/2024  HDL *   *  Vit D *   *     ALT 12  11/15/2024  LDL *   *  HIV Non-reactive  7/20/2024     INR 1.0  5/23/2023  Stefani *   *   Hep C Non-reactive  7/20/2024    GGT *   *  Lip 16  7/20/2024  RPR *   *    MCV 85  11/14/2024   NH4 39  5/22/2024  UPT *   *      PETH *   *  THC Presumptive Positive (A)  11/14/2024    ETOH <10  11/14/2024  JOSH Negative  11/14/2024    EtG *   *  AMP Negative  11/14/2024    ALC <10  11/14/2024  OPI Negative  11/14/2024    BZO Negative  11/14/2024  MTD Negative  11/14/2024     BAR Negative  11/14/2024  BUP Negative  11/14/2024    PCP Negative  11/14/2024  FEN Negative  11/14/2024     Results for orders placed or performed during the hospital encounter of 11/14/24   EKG 12-lead    Collection Time: 11/14/24 11:47 AM   Result Value Ref Range    QRS Duration 74 ms    OHS QTC Calculation 492 ms    Narrative    Test Reason : A41.9,    Vent. Rate : 116 BPM     Atrial Rate : 116 BPM     P-R Int : 142 ms          QRS Dur :  74 ms      QT Int : 354 ms       P-R-T Axes :  81  96  57 degrees    QTcB Int : 492 ms    Sinus tachycardia  Right atrial enlargement  Rightward axis Now present  Nonspecific ST abnormality Now present  Abnormal ECG  When compared with ECG of 20-Jul-2024 22:07,    Confirmed by Marquise Yang ([id number]) on 11/14/2024 1:34:07 PM    Referred By:            Confirmed By: Marquise Yang     Results for orders placed or performed during the hospital encounter of 11/14/24   CT Head Without Contrast    Narrative    EXAMINATION:  CT HEAD WITHOUT CONTRAST    CLINICAL HISTORY:  Mental status change, unknown cause;    TECHNIQUE:  Low dose axial CT images obtained throughout the head  without the use of intravenous contrast.  Axial, sagittal and coronal reconstructions were performed.    COMPARISON:  07/21/2024    FINDINGS:  Intracranial compartment:    Ventricles are stable in size, without evidence of hydrocephalus.    Mild patchy hypoattenuation in the supratentorial white matter, nonspecific but most likely reflecting chronic small vessel ischemic changes. No recent or remote major vascular distribution infarct. No acute hemorrhage.  No mass effect or midline shift.    No new extra-axial blood or fluid collections.    Skull/extracranial contents (limited evaluation):    No displaced calvarial fracture.    The mastoid air cells and visualized paranasal sinuses are essentially clear.      Impression    No evidence of acute hemorrhage or major vascular distribution infarct.      Electronically signed by: Joseph Krishna MD  Date:    11/14/2024  Time:    13:46   Results for orders placed or performed during the hospital encounter of 07/20/24   MRI Brain Without Contrast    Narrative    EXAMINATION:  MRI BRAIN WITHOUT CONTRAST    CLINICAL HISTORY:  Mental status change, unknown cause;    TECHNIQUE:  Early termination of the exam due to patient's declination/refusal to continue with examination.  Limited T1 coronal image obtained.    COMPARISON:  CT head 07/21/2024    FINDINGS:  Nondiagnostic images due to early exam termination by patient.      Impression    As above.    Electronically signed by resident: Leonora Landry  Date:    07/21/2024  Time:    06:11    Electronically signed by: Sim Appiah MD  Date:    07/21/2024  Time:    06:26   Results for orders placed or performed during the hospital encounter of 05/22/24   MRI Brain W WO Contrast    Narrative    EXAMINATION:  MRI BRAIN W WO CONTRAST    CLINICAL HISTORY:  Mental status change, unknown cause;    TECHNIQUE:  Multiplanar multisequence MR imaging of the brain was performed before and after the administration of 6 mL Gadavist intravenous  contrast.    COMPARISON:  CT head 05/23/2024.  MRI brain 04/23/2024.    FINDINGS:  Ventricles are normal in size for age.  No hydrocephalus.    Nonspecific patchy areas of T2/FLAIR signal hyperintensity in the supratentorial white matter, similar distribution compared to prior exam.    New areas of subcortical T2/FLAIR signal hyperintensity along the frontoparietal lobes bilaterally at the vertex, with an additional focus in the left cerebellar hemisphere (series 8, image 21).  No associated diffusion signal abnormality, susceptibility artifact, or significant surrounding mass effect or midline shift.  Additional focal areas of mild cortical and subcortical signal parenchymal abnormality and mild gyral expansion about the parietooccipital region (axial series 8, image 18).    Additionally, there is subtle symmetric diffusion signal abnormality about the basal ganglia, without significant FLAIR signal hyperintensity or mass effect.    No parenchymal mass, hemorrhage, or recent or remote major vascular distribution infarct.    No abnormal postcontrast parenchymal or leptomeningeal enhancement.    No extra-axial blood or fluid collections.    The T2 skull base flow voids are preserved.  Bone marrow signal intensity unremarkable.  Paranasal sinuses and mastoid air cells are clear.    Partially visualized fusion hardware about the cervical spine.      Impression    New areas of cortical/subcortical T2/FLAIR signal hyperintensity along the frontal, parietal, and occipital lobes and the left cerebellar hemisphere.  No associated diffusion signal abnormality, susceptibility artifact, or significant surrounding mass effect or midline shift.  Findings remain nonspecific, but can be seen in setting of PRES or other encephalitis process.    Subtle symmetric diffusion signal abnormality about the basal ganglia, without significant FLAIR signal hyperintensity or mass effect.  Findings can be seen in the setting global hypoxic  ischemic event.  Correlation is advised.    No abnormal postcontrast enhancement.    Additional scattered subcortical and periventricular FLAIR signal hyperintensities, likely representing sequela of chronic microvascular ischemic change or other remote insult.    COMMUNICATION  This critical result was discovered/received at 08:00.  The critical information above was relayed directly by me by telephone to Dr. Isbell on 05/24/2024 at 08:40.    This report was flagged in Epic as abnormal.    Electronically signed by resident: Shelton Solomon  Date:    05/24/2024  Time:    03:48    Electronically signed by: Cesar Horan  Date:    05/24/2024  Time:    08:50   Results for orders placed or performed during the hospital encounter of 04/10/22   EEG    Narrative    Reza Vang MD     4/13/2022 10:56 AM  EEG    Date/Time: 4/10/2022 3:16 PM  Performed by: Reza aVng MD  Authorized by: Jacky Vigil MD         Past Medical History:   Diagnosis Date    Behavioral problem     Bulging lumbar disc     Bursitis     bilateral hip    Degenerative cervical disc     Hx of psychiatric care     Hypercholesteremia     Labral tear of hip, degenerative bilateral    MS (multiple sclerosis)     Paresthesia of both lower extremities 3/13/2022    Pneumonia     Spinal cord compression         Inpatient consult to Psychiatry  Consult performed by: Manuel Hansen MD  Consult ordered by: Richard Nguyen DO      [facility] NOM STEPDOWN FLEX [id number]

## 2024-11-15 NOTE — ASSESSMENT & PLAN NOTE
Patient CK elevated on presentation to 337. No evidence of rhabdomyolysis as not CK is not 5x the upper limit of normal. Likely due to prolonged period patient was down in her home. Given 2 L IV fluids in the ED.

## 2024-11-15 NOTE — HOSPITAL COURSE
59 year old female with HFrEF, chronic opioid use, and questionable MS who was brought to ED after being found down for unknown time. Patient with AMS, tachycardic, hypertensive. Remained altered and tachy despite fluids. Concern for AMS due to polypharmacy.  Per chart review, patient was found to be taking oxycodone 30 mg every 4 hours, tapentadol 100 mg twice per day, gabapentin 300 mg 3 times per day, Lyrica 100 mg 3 times per day, as well as a ketamine cream.  On 11/15, our team reached out to the patient's pain provider, Dr. Hunter Scruggs, who was unaware of her 7 hospital admissions over the past 2 years for similar incidents.  Dr. Scruggs had previously tried requesting records 4 times, however he did not receive the requested records so he was unaware of this recurrent issue.  He states that the patient has been taking approximately 200 MME since he for saw the patient approximately 4 years ago, and he had not been made aware of any if these issues.  Medical toxicology was consulted for their assistance.  In collaboration with Toxicology, our team, and Dr. Diaz the decision was made to begin the patient on buprenorphine. The patient remains altered but does seem to have improved a little today.  She is able to follow commands and answer yes or no questions, and she even verbalized the correct pronunciation of her last name when prompted. She was unable to have any further participation in conversation.  She also continues to retain urine for which she has been straight cath 2 times, with 300 mL of output each time. On evening of 11/15, patient with improved mentation and able to participate in conversation and answer questions appropriately. She denies any pain; remains disoriented. 11/16 Patient awake, alert, and talking. Discussed plan with patient regarding buprenorphine and follow up with Dr. Scruggs on Wednesday afternoon. Patient admits to taking non prescribed or older scripts of  "muscle relaxers resulting in her collapse, expresses frustration with discontinuing her other pain options. States that she needs them, is not addicted, and in fact has older scripts from as far back as 2001. Endorses not likely the subutex but does say that she will take them if she feels she needs them. Expressed risk of death with all her current prescriptions, which patient denies concerns for but "understands" that it "looks bad." Attempted to call sister 4x to no success. Reached son, Jono who was in agreement with the plan. D/c home with subutex and narcan.   "

## 2024-11-15 NOTE — CARE UPDATE
"RAPID RESPONSE NURSE CHART REVIEW        Chart Reviewed: 11/14/2024 10:10 PM    MRN: [id number]  Bed: 26680/33695 A    Dx: Altered mental status    Reza Morrow has a past medical history of Behavioral problem, Bulging lumbar disc, Bursitis, Degenerative cervical disc, psychiatric care, Hypercholesteremia, Labral tear of hip, degenerative, MS (multiple sclerosis), Paresthesia of both lower extremities, Pneumonia, and Spinal cord compression.    Last VS: BP (!) 140/83   Pulse 95   Temp 97.7 °F (36.5 °C) (Axillary)   Resp 18   Ht 5' 5" (1.651 m)   Wt 56.2 kg (123 lb 14.4 oz)   SpO2 100%   BMI 20.62 kg/m²     24H Vital Sign Range:  Temp:  [97.6 °F (36.4 °C)-98.4 °F (36.9 °C)]   Pulse:  []   Resp:  [18-20]   BP: (123-206)/()   SpO2:  [95 %-100 %]     Level of Consciousness (AVPU): alert    Recent Labs     11/14/24  1146   WBC 17.53*   HGB 18.9*   HCT 54.7*          Recent Labs     11/14/24  1146      K 4.6      CO2 16*   BUN 39*   CREATININE 1.0   *   PHOS 3.3   MG 2.0            OXYGEN:             MEWS score: 1    Rounding completed with charge LETTY Cardenas reports pt restless, VSS. Safety measures in place. No additional concerns verbalized at this time. Instructed to call [phone] for further concerns or assistance.    Ad Christianson RN      "

## 2024-11-16 PROBLEM — R17 ELEVATED BILIRUBIN: Status: ACTIVE | Noted: 2024-11-16

## 2024-11-16 LAB
ALBUMIN SERPL BCP-MCNC: 3.5 G/DL (ref 3.5–5.2)
ALP SERPL-CCNC: 70 U/L (ref 40–150)
ALT SERPL W/O P-5'-P-CCNC: 14 U/L (ref 10–44)
ANION GAP SERPL CALC-SCNC: 9 MMOL/L (ref 8–16)
AST SERPL-CCNC: 20 U/L (ref 10–40)
BASOPHILS # BLD AUTO: 0.03 K/UL (ref 0–0.2)
BASOPHILS NFR BLD: 0.5 % (ref 0–1.9)
BILIRUB SERPL-MCNC: 2.5 MG/DL (ref 0.1–1)
BUN SERPL-MCNC: 24 MG/DL (ref 6–20)
CALCIUM SERPL-MCNC: 8.8 MG/DL (ref 8.7–10.5)
CHLORIDE SERPL-SCNC: 107 MMOL/L (ref 95–110)
CO2 SERPL-SCNC: 21 MMOL/L (ref 23–29)
CREAT SERPL-MCNC: 0.8 MG/DL (ref 0.5–1.4)
DIFFERENTIAL METHOD BLD: NORMAL
EOSINOPHIL # BLD AUTO: 0 K/UL (ref 0–0.5)
EOSINOPHIL NFR BLD: 0.5 % (ref 0–8)
ERYTHROCYTE [DISTWIDTH] IN BLOOD BY AUTOMATED COUNT: 13.4 % (ref 11.5–14.5)
EST. GFR  (NO RACE VARIABLE): >60 ML/MIN/1.73 M^2
GLUCOSE SERPL-MCNC: 108 MG/DL (ref 70–110)
HCT VFR BLD AUTO: 42.6 % (ref 37–48.5)
HGB BLD-MCNC: 14.8 G/DL (ref 12–16)
IMM GRANULOCYTES # BLD AUTO: 0.02 K/UL (ref 0–0.04)
IMM GRANULOCYTES NFR BLD AUTO: 0.3 % (ref 0–0.5)
LYMPHOCYTES # BLD AUTO: 1.3 K/UL (ref 1–4.8)
LYMPHOCYTES NFR BLD: 22.1 % (ref 18–48)
MAGNESIUM SERPL-MCNC: 2.1 MG/DL (ref 1.6–2.6)
MCH RBC QN AUTO: 29.9 PG (ref 27–31)
MCHC RBC AUTO-ENTMCNC: 34.7 G/DL (ref 32–36)
MCV RBC AUTO: 86 FL (ref 82–98)
MONOCYTES # BLD AUTO: 0.5 K/UL (ref 0.3–1)
MONOCYTES NFR BLD: 7.8 % (ref 4–15)
NEUTROPHILS # BLD AUTO: 4 K/UL (ref 1.8–7.7)
NEUTROPHILS NFR BLD: 68.8 % (ref 38–73)
NRBC BLD-RTO: 0 /100 WBC
PHOSPHATE SERPL-MCNC: 3 MG/DL (ref 2.7–4.5)
PLATELET # BLD AUTO: 165 K/UL (ref 150–450)
PMV BLD AUTO: 10.8 FL (ref 9.2–12.9)
POTASSIUM SERPL-SCNC: 4.5 MMOL/L (ref 3.5–5.1)
PROT SERPL-MCNC: 7.4 G/DL (ref 6–8.4)
RBC # BLD AUTO: 4.95 M/UL (ref 4–5.4)
SODIUM SERPL-SCNC: 137 MMOL/L (ref 136–145)
WBC # BLD AUTO: 5.75 K/UL (ref 3.9–12.7)

## 2024-11-16 PROCEDURE — 25000003 PHARM REV CODE 250: Performed by: HOSPITALIST

## 2024-11-16 PROCEDURE — 25000003 PHARM REV CODE 250

## 2024-11-16 PROCEDURE — 84100 ASSAY OF PHOSPHORUS: CPT

## 2024-11-16 PROCEDURE — 20600001 HC STEP DOWN PRIVATE ROOM

## 2024-11-16 PROCEDURE — 83735 ASSAY OF MAGNESIUM: CPT

## 2024-11-16 PROCEDURE — 85025 COMPLETE CBC W/AUTO DIFF WBC: CPT

## 2024-11-16 PROCEDURE — 80053 COMPREHEN METABOLIC PANEL: CPT

## 2024-11-16 PROCEDURE — 36415 COLL VENOUS BLD VENIPUNCTURE: CPT

## 2024-11-16 RX ORDER — BUPRENORPHINE 2 MG/1
2 TABLET SUBLINGUAL 2 TIMES DAILY
Status: DISCONTINUED | OUTPATIENT
Start: 2024-11-16 | End: 2024-11-17 | Stop reason: HOSPADM

## 2024-11-16 RX ORDER — DIPHENHYDRAMINE HCL 25 MG
25 CAPSULE ORAL ONCE
Status: COMPLETED | OUTPATIENT
Start: 2024-11-16 | End: 2024-11-16

## 2024-11-16 RX ORDER — CALCIUM CARBONATE 200(500)MG
500 TABLET,CHEWABLE ORAL 2 TIMES DAILY PRN
Status: DISCONTINUED | OUTPATIENT
Start: 2024-11-16 | End: 2024-11-17 | Stop reason: HOSPADM

## 2024-11-16 RX ORDER — GABAPENTIN 300 MG/1
300 CAPSULE ORAL 3 TIMES DAILY
Status: DISCONTINUED | OUTPATIENT
Start: 2024-11-16 | End: 2024-11-16

## 2024-11-16 RX ORDER — PREGABALIN 50 MG/1
100 CAPSULE ORAL 3 TIMES DAILY
Status: DISCONTINUED | OUTPATIENT
Start: 2024-11-16 | End: 2024-11-17 | Stop reason: HOSPADM

## 2024-11-16 RX ADMIN — ACETAMINOPHEN 1000 MG: 500 TABLET ORAL at 08:11

## 2024-11-16 RX ADMIN — BUPRENORPHINE 2 MG: 2 TABLET SUBLINGUAL at 08:11

## 2024-11-16 RX ADMIN — CALCIUM CARBONATE (ANTACID) CHEW TAB 500 MG 500 MG: 500 CHEW TAB at 06:11

## 2024-11-16 RX ADMIN — POTASSIUM CHLORIDE 40 MEQ: 1500 TABLET, EXTENDED RELEASE ORAL at 12:11

## 2024-11-16 RX ADMIN — DIPHENHYDRAMINE HYDROCHLORIDE 25 MG: 25 CAPSULE ORAL at 11:11

## 2024-11-16 RX ADMIN — PREGABALIN 100 MG: 50 CAPSULE ORAL at 02:11

## 2024-11-16 RX ADMIN — PREGABALIN 100 MG: 50 CAPSULE ORAL at 08:11

## 2024-11-16 NOTE — ASSESSMENT & PLAN NOTE
Patient with urinary retention with 300 mL urine on bladder scan requiring straight cath x2. Purewick placed and will continue to monitor. Urinary retention is likely related to polypharmacy and patient's continued AMS. Now that she is awake and alert, patient urinating on bedside commode without issue.

## 2024-11-16 NOTE — ASSESSMENT & PLAN NOTE
Patient with elevated total bilirubin on admission. Remains elevated. Liver enzymes within normal limits and patient without any noticeable jaundice on exam. Unclear the etiology.     Plan:   - Direct bilirubin ordered

## 2024-11-16 NOTE — ASSESSMENT & PLAN NOTE
Patient with 30 mg oxycodone q4h and tapentadol 100 mg BID prescriptions per chart review. According to patient's sister, she believes opioid prescriptions began for pain related to her possible MS. Spoke with patient's pain provider Dr. Hunter Scruggs as well as toxicology physician Dr. Eli Quigley. In collaboration, decision was made to initiate buprenorphine as patient with COWS of at least 9 this morning indicating active withdrawal from opioids.     Plan:   - Toxicology consulted , appreciate rec  - Addiction psychiatry consulted, appreciate recs   - Buprenorphine 2 mg daily initiated 11/15, increased to BID   - Will follow up with Dr. Scruggs on Wednesday 11/20 at 4:30 PM

## 2024-11-16 NOTE — PROGRESS NOTES
"[facility] ([address] Medicine  Progress Note    Patient Name: Reza Morrow  MRN: [id number]  Patient Class: IP- Inpatient   Admission Date: 11/14/2024  Length of Stay: 2 days  Attending Physician: Madison Ring MD  Primary Care Provider: Tony, Kip HENRY MD        Subjective:     Principal Problem:Altered mental status        HPI:  59-year-old female history substance induced mood disorder, broken heart syndrome, HFrEF (45-50% in April 2024) who was brought to the ED with altered mental status. Of note, patient does have reported history of possible multiple sclerosis but it is unclear if the patient has MS vs. MS mimic. Initial history provided by friend (ex-neighbor) who reports not hearing from patient for a couple of days and states this morning they "drilled a hole into her door to get inside of her house" and patient was found down; down for unknown period of time. Friend reports patient was covered in urine and vomit. Reports similar episodes in the past, most recent in July where she was admitted. Friend reports chronic opioid use and concomitant use of "Ketamine cream" for multiple chronic areas of pain all over her body. Friend states, "I don't think she's doing this on purpose, she is just on a lot of medication."     In the ED, patient tachycardic and hypertensive on arrival. Lactate elevated to 3.3. Given 2 L of fluid, ceftriaxone, and vancomycin. Lactate improved to 0.31. CT head and CxR negative. Patient remains tachycardic after 2 L of fluids.     On my exam, patient remains altered and unable to follow commands. Patient tachycardic on telemetry, dilated pupils, and dry appearing mucous membranes; consistent with opioid withdrawal. Patient not able to verbalize if she is in any pain.     Collateral obtained from patient's sister, Celeste Olivarez. She states that she believes this is due to the patient's use of topical ketamine. She says there have been 4-5 similar " incidents in the past year. She states the patient becomes altered and then 1-2 days later she is back to her normal self without any recollection of the events of her presentation. She believes that her sister's pain medications are for treatment of her reported MS.     Overview/Hospital Course:  59 year old female with HFrEF, chronic opioid use, and questionable MS who was brought to ED after being found down for unknown time. Patient with AMS, tachycardic, hypertensive. Remained altered and tachy despite fluids. Concern for AMS due to polypharmacy.  Per chart review, patient was found to be taking oxycodone 30 mg every 4 hours, tapentadol 100 mg twice per day, gabapentin 300 mg 3 times per day, Lyrica 100 mg 3 times per day, as well as a ketamine cream.  On 11/15, our team reached out to the patient's pain provider, Dr. Hunter Scruggs, who was unaware of her 7 hospital admissions over the past 2 years for similar incidents.  Dr. Scruggs had previously tried requesting records 4 times, however he did not receive the requested records so he was unaware of this recurrent issue.  He states that the patient has been taking approximately 200 MME since he for saw the patient approximately 4 years ago, and he had not been made aware of any if these issues.  Medical toxicology was consulted for their assistance.  In collaboration with Toxicology, our team, and Dr. Diaz the decision was made to begin the patient on buprenorphine. The patient remains altered but does seem to have improved a little today.  She is able to follow commands and answer yes or no questions, and she even verbalized the correct pronunciation of her last name when prompted. She was unable to have any further participation in conversation.  She also continues to retain urine for which she has been straight cath 2 times, with 300 mL of output each time. On evening of 11/15, patient with improved mentation and able to participate in  conversation and answer questions appropriately. She denies any pain; remains disoriented. 11/16 Patient awake, alert, and talking. Discussed plan with patient regarding buprenorphine and follow up with Dr. Scruggs on Wednesday afternoon.     Interval History: NAEON. AF, VSS. Patient awake, alert, and oriented to person, place, and month. No physical signs or symptoms of opioid withdrawal.     Review of Systems   Constitutional:  Negative for chills and fever.   HENT:  Negative for trouble swallowing.    Respiratory:  Negative for chest tightness and shortness of breath.    Cardiovascular:  Negative for chest pain, palpitations and leg swelling.   Gastrointestinal:  Negative for constipation, diarrhea, nausea and vomiting.   Genitourinary:  Negative for dysuria.   Musculoskeletal:  Negative for arthralgias.   Neurological:  Negative for weakness, numbness and headaches.   Psychiatric/Behavioral:  Negative for agitation, behavioral problems and confusion.      Objective:     Vital Signs (Most Recent):  Temp: 98.5 °F (36.9 °C) (11/16/24 1130)  Pulse: 89 (11/16/24 1130)  Resp: 18 (11/16/24 1130)  BP: 116/75 (11/16/24 1130)  SpO2: 97 % (11/16/24 1130) Vital Signs (24h Range):  Temp:  [97.9 °F (36.6 °C)-98.8 °F (37.1 °C)] 98.5 °F (36.9 °C)  Pulse:  [] 89  Resp:  [16-18] 18  SpO2:  [97 %-99 %] 97 %  BP: (116-162)/(75-95) 116/75     Weight: 54.8 kg (120 lb 13 oz)  Body mass index is 20.1 kg/m².    Intake/Output Summary (Last 24 hours) at 11/16/2024 1358  Last data filed at 11/16/2024 1105  Gross per 24 hour   Intake 1220 ml   Output 550 ml   Net 670 ml         Physical Exam  Vitals reviewed.   Constitutional:       General: She is not in acute distress.     Appearance: She is not ill-appearing, toxic-appearing or diaphoretic.      Comments: Awake, alert. Well appearing    HENT:      Head: Normocephalic and atraumatic.      Right Ear: External ear normal.      Left Ear: External ear normal.      Nose: Nose normal.       Mouth/Throat:      Mouth: Mucous membranes are dry.      Pharynx: Oropharynx is clear.   Eyes:      General: No scleral icterus.     Conjunctiva/sclera: Conjunctivae normal.      Pupils: Pupils are equal, round, and reactive to light.   Cardiovascular:      Rate and Rhythm: Regular rhythm. Tachycardia present.      Pulses: Normal pulses.      Heart sounds: Normal heart sounds. No murmur heard.  Pulmonary:      Effort: Pulmonary effort is normal. No respiratory distress.      Breath sounds: Normal breath sounds. No wheezing.   Abdominal:      General: Bowel sounds are normal. There is no distension.      Palpations: Abdomen is soft.      Tenderness: There is no abdominal tenderness.   Musculoskeletal:      Right lower leg: No edema.      Left lower leg: No edema.   Skin:     General: Skin is dry.      Comments: No piloerection present   Neurological:      Mental Status: She is alert. She is disoriented.      Comments: Patient oriented to person, place, and month. Able to recall conversations with her sister yesterday, who confirmed accuracy.    Psychiatric:         Speech: Speech is tangential. Speech is not rapid and pressured.         Behavior: Behavior is cooperative.             Significant Labs: All pertinent labs within the past 24 hours have been reviewed.  CBC:   Recent Labs   Lab 11/16/24  0937   WBC 5.75   HGB 14.8   HCT 42.6        CMP:   Recent Labs   Lab 11/15/24  0618 11/16/24  0459    137   K 3.3* 4.5    107   CO2 20* 21*    108   BUN 27* 24*   CREATININE 0.8 0.8   CALCIUM 9.6 8.8   PROT 8.1 7.4   ALBUMIN 3.9 3.5   BILITOT 2.1* 2.5*   ALKPHOS 82 70   AST 22 20   ALT 12 14   ANIONGAP 12 9       Significant Imaging: I have reviewed all pertinent imaging results/findings within the past 24 hours.    Assessment/Plan:      * Altered mental status  Patient with altered mental status likely due to polypharmacy and/or opioid withdrawal. Per chart review, patient with prescription  for oxycodone 30 mg q4h, tapentadol 100 mg BID, Ketamine topical gel, and medical marijuana. Patient found down in home by neighbor after unknown amount of time. Polypharmacy could be contributing to the patient's mental status as well as opioid withdrawal given the unknown amount of time she was down and not taking her opioids. CT head unremarkable for acute intracranial process contributing to her AMS. Per patient's sister she has slowly recovered to baseline over 2-3 days after similar presentations. As anticipated, patient mental status improved as she is awake, alert, and oriented x3.     Plan:   - Supportive care   - Toxicology consulted who thinks AMS is due to polypharmacy   - Will continue to monitor      Elevated bilirubin  Patient with elevated total bilirubin on admission. Remains elevated. Liver enzymes within normal limits and patient without any noticeable jaundice on exam. Unclear the etiology.     Plan:   - Direct bilirubin ordered       Elevated troponin  Patient with troponin elevation to 0.163 on admission in the setting of hemoconcentration as evidenced by hemoglobin of 18.9. Troponin decreased to 0.31. Troponin elevation likely with a component of supply-demand mismatch given her tachycardia as well as a hemoconcentration component. Low concern for ACS. Will continue to monitor.        Polypharmacy  Patient taking Oxycodone 30 mg every 4 hours, tapentadol 100 mg twice daily, ketamine cream, gabapentin, and Lyrica per chart review and conversation with patients sister. Patient's sister states she has had multiple similar presentations in the past year, all related to her medications, specifically ketamine.     Plan:   - See opioid dependence plan       Elevated CK  Patient CK elevated on presentation to 337. No evidence of rhabdomyolysis as not CK is not 5x the upper limit of normal. Likely due to prolonged period patient was down in her home. Given 2 L IV fluids in the ED.         Lactate blood  increase  Patient with elevated lactic acid of 3.3 on initial presentation in the ED. Patient given a dose of ceftriaxone and vancomycin in the ED due to concern for infection. UA and CxR unremarkable. WBC increased to 17, however in the setting of likely hemoconcentration given hemoglobin of 18.9. Patient is afebrile. Tachycardia likely due to opioid withdrawal. Patient given 2 L IV fluids with improvement in lactate to 0.31. It is most likely that the elevated lactate was due to the indeterminate amount of time the patient was down at her home before being found. An elevated CK of 337 is consistent with a long period of being down in her home contributing to her elevated lactate.     Plan:   - Blood cultures 11/14: NGTD  - Low threshold to begin antibiotics if fevers, can also consider CT abdomen/pelvis     Opioid withdrawal  Patient with 30 mg oxycodone q4h and tapentadol 100 mg BID prescriptions per chart review.. According to patient's sister, she believes opioid prescriptions began for pain related to her possible MS. She also mentions that the patient may have hip pain for which she uses the pain medications. Patient with piloerection, mydriasis, and tachycardia indicating opioid withdrawal. Toxicology consulted who recommended one time dose of Dilaudid 1 mg, if patient mental status improves then her AMS likely due to opioid withdrawal     Plan:   - Given one time dose Dilaudid 1 mg   - Addiction psychiatry and toxicology consulted, appreciate recs   - Buprenorphine initiated 11/15   - See Opioid dependence plan       Urinary retention  Patient with urinary retention with 300 mL urine on bladder scan requiring straight cath x2. Purewick placed and will continue to monitor. Urinary retention is likely related to polypharmacy and patient's continued AMS. Now that she is awake and alert, patient urinating on bedside commode without issue.         HFrEF (heart failure with reduced ejection fraction)  Patient  with history of broken heart syndrome requiring intubation and ICU admission. Most recent echo in April 2024 showed EF 45-50%.     Plan:   - Patient does not appear to be on GDMT  - Consider starting GDMT on discharge       Hypertension  Patients blood pressure range in the last 24 hours was: BP  Min: 116/75  Max: 162/95.The patient's inpatient anti-hypertensive regimen is listed below:  Current Antihypertensives       Plan  - BP is controlled, no changes needed to their regimen  - Will continue to monitor, hypertension likely due to her opioid withdrawal     MS (multiple sclerosis)  Patient with unclear history of MS. No clear evidence of firm diagnosis and past documentation of MS vs MS mimic.     - Patient on home Glatiramer 40 mg injected 3x per week, not currently ordered while inpatient       Opioid dependence  Patient with 30 mg oxycodone q4h and tapentadol 100 mg BID prescriptions per chart review. According to patient's sister, she believes opioid prescriptions began for pain related to her possible MS. Spoke with patient's pain provider Dr. Hunter Scruggs as well as toxicology physician Dr. Eli Quigley. In collaboration, decision was made to initiate buprenorphine as patient with COWS of at least 9 this morning indicating active withdrawal from opioids.     Plan:   - Toxicology consulted , appreciate rec  - Addiction psychiatry consulted, appreciate recs   - Buprenorphine 2 mg daily initiated 11/15, increased to BID   - Will follow up with Dr. Scruggs on Wednesday 11/20 at 4:30 PM         VTE Risk Mitigation (From admission, onward)           Ordered     IP VTE LOW RISK PATIENT  Once         11/14/24 1606     Place sequential compression device  Until discontinued         11/14/24 1606                    Discharge Planning   JOANNE: 11/16/2024     Code Status: Full Code   Is the patient medically ready for discharge?:     Reason for patient still in hospital (select all that apply): Patient trending  condition  Discharge Plan A: Home Health          Marquise Brown MD  Department of Hospital Medicine   [facility] ([place])

## 2024-11-16 NOTE — NURSING
Patient able to ambulate to bedside commode x2. Mentation improving, still forgetful at times to time. Replaced K PO.

## 2024-11-16 NOTE — ASSESSMENT & PLAN NOTE
Patient with 30 mg oxycodone q4h and tapentadol 100 mg BID prescriptions per chart review.. According to patient's sister, she believes opioid prescriptions began for pain related to her possible MS. She also mentions that the patient may have hip pain for which she uses the pain medications. Patient with piloerection, mydriasis, and tachycardia indicating opioid withdrawal. Toxicology consulted who recommended one time dose of Dilaudid 1 mg, if patient mental status improves then her AMS likely due to opioid withdrawal     Plan:   - Given one time dose Dilaudid 1 mg   - Addiction psychiatry and toxicology consulted, appreciate recs   - Buprenorphine initiated 11/15   - See Opioid dependence plan

## 2024-11-16 NOTE — ASSESSMENT & PLAN NOTE
Patients blood pressure range in the last 24 hours was: BP  Min: 116/75  Max: 162/95.The patient's inpatient anti-hypertensive regimen is listed below:  Current Antihypertensives       Plan  - BP is controlled, no changes needed to their regimen  - Will continue to monitor, hypertension likely due to her opioid withdrawal

## 2024-11-16 NOTE — SUBJECTIVE & OBJECTIVE
Interval History: NAEON. AF, VSS. Patient awake, alert, and oriented to person, place, and month. No physical signs or symptoms of opioid withdrawal.     Review of Systems   Constitutional:  Negative for chills and fever.   HENT:  Negative for trouble swallowing.    Respiratory:  Negative for chest tightness and shortness of breath.    Cardiovascular:  Negative for chest pain, palpitations and leg swelling.   Gastrointestinal:  Negative for constipation, diarrhea, nausea and vomiting.   Genitourinary:  Negative for dysuria.   Musculoskeletal:  Negative for arthralgias.   Neurological:  Negative for weakness, numbness and headaches.   Psychiatric/Behavioral:  Negative for agitation, behavioral problems and confusion.      Objective:     Vital Signs (Most Recent):  Temp: 98.5 °F (36.9 °C) (11/16/24 1130)  Pulse: 89 (11/16/24 1130)  Resp: 18 (11/16/24 1130)  BP: 116/75 (11/16/24 1130)  SpO2: 97 % (11/16/24 1130) Vital Signs (24h Range):  Temp:  [97.9 °F (36.6 °C)-98.8 °F (37.1 °C)] 98.5 °F (36.9 °C)  Pulse:  [] 89  Resp:  [16-18] 18  SpO2:  [97 %-99 %] 97 %  BP: (116-162)/(75-95) 116/75     Weight: 54.8 kg (120 lb 13 oz)  Body mass index is 20.1 kg/m².    Intake/Output Summary (Last 24 hours) at 11/16/2024 1358  Last data filed at 11/16/2024 1105  Gross per 24 hour   Intake 1220 ml   Output 550 ml   Net 670 ml         Physical Exam  Vitals reviewed.   Constitutional:       General: She is not in acute distress.     Appearance: She is not ill-appearing, toxic-appearing or diaphoretic.      Comments: Awake, alert. Well appearing    HENT:      Head: Normocephalic and atraumatic.      Right Ear: External ear normal.      Left Ear: External ear normal.      Nose: Nose normal.      Mouth/Throat:      Mouth: Mucous membranes are dry.      Pharynx: Oropharynx is clear.   Eyes:      General: No scleral icterus.     Conjunctiva/sclera: Conjunctivae normal.      Pupils: Pupils are equal, round, and reactive to light.    Cardiovascular:      Rate and Rhythm: Regular rhythm. Tachycardia present.      Pulses: Normal pulses.      Heart sounds: Normal heart sounds. No murmur heard.  Pulmonary:      Effort: Pulmonary effort is normal. No respiratory distress.      Breath sounds: Normal breath sounds. No wheezing.   Abdominal:      General: Bowel sounds are normal. There is no distension.      Palpations: Abdomen is soft.      Tenderness: There is no abdominal tenderness.   Musculoskeletal:      Right lower leg: No edema.      Left lower leg: No edema.   Skin:     General: Skin is dry.      Comments: No piloerection present   Neurological:      Mental Status: She is alert. She is disoriented.      Comments: Patient oriented to person, place, and month. Able to recall conversations with her sister yesterday, who confirmed accuracy.    Psychiatric:         Speech: Speech is tangential. Speech is not rapid and pressured.         Behavior: Behavior is cooperative.             Significant Labs: All pertinent labs within the past 24 hours have been reviewed.  CBC:   Recent Labs   Lab 11/16/24  0937   WBC 5.75   HGB 14.8   HCT 42.6        CMP:   Recent Labs   Lab 11/15/24  0618 11/16/24  0459    137   K 3.3* 4.5    107   CO2 20* 21*    108   BUN 27* 24*   CREATININE 0.8 0.8   CALCIUM 9.6 8.8   PROT 8.1 7.4   ALBUMIN 3.9 3.5   BILITOT 2.1* 2.5*   ALKPHOS 82 70   AST 22 20   ALT 12 14   ANIONGAP 12 9       Significant Imaging: I have reviewed all pertinent imaging results/findings within the past 24 hours.

## 2024-11-16 NOTE — PLAN OF CARE
Shift Note:     Pt Oriented x3 today- disoriented to time. CIWA Q4. On RA. Regular diet. Avasys camera at bedside and bed alarm set. Pt up to beside commode to void throughout day. No acute changes. Bed low and locked, call light and belongings within reach of pt. No concerns voiced at this time.       Problem: Infection  Goal: Absence of Infection Signs and Symptoms  Outcome: Progressing     Problem: Skin Injury Risk Increased  Goal: Skin Health and Integrity  Outcome: Progressing     Problem: Adult Inpatient Plan of Care  Goal: Plan of Care Review  Outcome: Progressing  Goal: Patient-Specific Goal (Individualized)  Outcome: Progressing  Goal: Absence of Hospital-Acquired Illness or Injury  Outcome: Progressing  Goal: Optimal Comfort and Wellbeing  Outcome: Progressing  Goal: Readiness for Transition of Care  Outcome: Progressing
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
## 2024-11-17 VITALS
HEART RATE: 90 BPM | OXYGEN SATURATION: 96 % | BODY MASS INDEX: 20.13 KG/M2 | WEIGHT: 120.81 LBS | TEMPERATURE: 98 F | RESPIRATION RATE: 20 BRPM | HEIGHT: 65 IN | SYSTOLIC BLOOD PRESSURE: 137 MMHG | DIASTOLIC BLOOD PRESSURE: 84 MMHG

## 2024-11-17 PROCEDURE — 80347 BENZODIAZEPINES 13 OR MORE: CPT | Performed by: HOSPITALIST

## 2024-11-17 PROCEDURE — 25000003 PHARM REV CODE 250

## 2024-11-17 RX ORDER — NALOXONE HYDROCHLORIDE 4 MG/.1ML
1 SPRAY NASAL ONCE
Qty: 2 EACH | Refills: 0 | Status: SHIPPED | OUTPATIENT
Start: 2024-11-17 | End: 2024-11-18

## 2024-11-17 RX ORDER — BUPRENORPHINE 2 MG/1
2 TABLET SUBLINGUAL 2 TIMES DAILY
Qty: 30 TABLET | Refills: 0 | Status: SHIPPED | OUTPATIENT
Start: 2024-11-17

## 2024-11-17 RX ORDER — HYDROXYZINE HYDROCHLORIDE 25 MG/1
50 TABLET, FILM COATED ORAL 3 TIMES DAILY PRN
Status: DISCONTINUED | OUTPATIENT
Start: 2024-11-17 | End: 2024-11-17 | Stop reason: HOSPADM

## 2024-11-17 RX ADMIN — PREGABALIN 100 MG: 50 CAPSULE ORAL at 08:11

## 2024-11-17 RX ADMIN — HYDROXYZINE HYDROCHLORIDE 50 MG: 25 TABLET, FILM COATED ORAL at 01:11

## 2024-11-17 RX ADMIN — CALCIUM CARBONATE (ANTACID) CHEW TAB 500 MG 500 MG: 500 CHEW TAB at 07:11
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
## 2024-11-17 NOTE — NURSING
Urine for toxicity pending. Previous urine specimen diluted with water by patient. Asleep. Not in any acute distress.

## 2024-11-17 NOTE — PLAN OF CARE
Shift Note:     Pt AAOx4, on RA. No acute events during shift. Avasys camera at bedside. Pt d/c home today. Bedside rx delivered. Discharge instructions printed and reviewed with pt. Questions answered and verbalized understanding. Safety measures maintained. No concerns voiced at this time.       Problem: Infection  Goal: Absence of Infection Signs and Symptoms  Outcome: Met     Problem: Skin Injury Risk Increased  Goal: Skin Health and Integrity  Outcome: Met     Problem: Adult Inpatient Plan of Care  Goal: Plan of Care Review  Outcome: Met  Goal: Patient-Specific Goal (Individualized)  Outcome: Met  Goal: Absence of Hospital-Acquired Illness or Injury  Outcome: Met  Goal: Optimal Comfort and Wellbeing  Outcome: Met  Goal: Readiness for Transition of Care  Outcome: Met     Problem: Pain Acute  Goal: Optimal Pain Control and Function  Outcome: Met     Problem: Fall Injury Risk  Goal: Absence of Fall and Fall-Related Injury  Outcome: Met

## 2024-11-17 NOTE — PLAN OF CARE
Oriented x4. Up to BS with standby assist. Video tele sitter in place. Medicated for Rt hip and lower back pain with tylenol with effect. Reported itching at Rt AC PIV site.  MD on call notified. Benadryl ordered and given with moderate effect. Adhesive dressing removed, IV secured with paper tape at this time. Fall precautions reinforced. Pt verbalized understanding.       Problem: Infection  Goal: Absence of Infection Signs and Symptoms  Outcome: Progressing     Problem: Skin Injury Risk Increased  Goal: Skin Health and Integrity  Outcome: Progressing     Problem: Adult Inpatient Plan of Care  Goal: Plan of Care Review  Outcome: Progressing  Goal: Patient-Specific Goal (Individualized)  Outcome: Progressing  Goal: Absence of Hospital-Acquired Illness or Injury  Outcome: Progressing  Goal: Optimal Comfort and Wellbeing  Outcome: Progressing  Goal: Readiness for Transition of Care  Outcome: Progressing     Problem: Pain Acute  Goal: Optimal Pain Control and Function  Outcome: Progressing     Problem: Fall Injury Risk  Goal: Absence of Fall and Fall-Related Injury  Outcome: Progressing

## 2024-11-17 NOTE — PLAN OF CARE
Sw asked to order [employer] transport for Pt who needs assistance home. Nursing updated on make and model of vehicle.
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
## 2024-11-17 NOTE — PLAN OF CARE
[facility] ([place])  Discharge Final Note    Primary Care Provider: Kip Jimenez MD    Expected Discharge Date: 11/17/2024    Final Discharge Note (most recent)       Final Note - 11/17/24 1407          Final Note    Assessment Type Final Discharge Note     Anticipated Discharge Disposition Home or Self Care     Hospital Resources/Appts/Education Provided Appointments scheduled and added to AVS        Post-Acute Status    Discharge Delays None known at this time                     Important Message from Medicare

## 2024-11-19 LAB
BACTERIA BLD CULT: NORMAL
BACTERIA BLD CULT: NORMAL

## 2024-11-21 LAB
1OH-MIDAZOLAM UR QL SCN: NOT DETECTED
6MAM UR QL: NOT DETECTED
7AMINOCLONAZEPAM UR QL: NOT DETECTED
A-OH ALPRAZ UR QL: NOT DETECTED
ALPRAZ UR QL: NOT DETECTED
AMPHET UR QL SCN: NOT DETECTED
ANNOTATION COMMENT IMP: NORMAL
BARBITURATES UR QL: NEGATIVE
BUPRENORPHINE UR QL: PRESENT
BZE UR QL: NEGATIVE
CARBOXYTHC UR QL: NORMAL
CARISOPRODOL UR QL: NEGATIVE
CLONAZEPAM UR QL: NOT DETECTED
CODEINE UR QL: NOT DETECTED
CREAT UR-MCNC: 99.1 MG/DL (ref 20–400)
DIAZEPAM UR QL: NOT DETECTED
ETHYL GLUCURONIDE UR QL: NEGATIVE
FENTANYL UR QL: NOT DETECTED
GABAPENTIN UR QL CFM: NOT DETECTED
HYDROCODONE UR QL: NOT DETECTED
HYDROMORPHONE UR QL: NOT DETECTED
LORAZEPAM UR QL: PRESENT
MDA UR QL: NOT DETECTED
MDEA UR QL: NOT DETECTED
MDMA UR QL: NOT DETECTED
ME-PHENIDATE UR QL: NOT DETECTED
METHADONE UR QL: NEGATIVE
METHAMPHET UR QL: NOT DETECTED
MIDAZOLAM UR QL SCN: NOT DETECTED
MORPHINE UR QL: NOT DETECTED
NALOXONE UR QL CFM: NOT DETECTED
NORBUPRENORPHINE UR QL CFM: PRESENT
NORDIAZEPAM UR QL: NOT DETECTED
NORFENTANYL UR QL: NOT DETECTED
NORHYDROCODONE UR QL CFM: NOT DETECTED
NORMEPERIDINE UR QL CFM: NOT DETECTED
NOROXYCODONE UR QL CFM: NOT DETECTED
NOROXYMORPHONE UR QL SCN: NOT DETECTED
OXAZEPAM UR QL: NOT DETECTED
OXYCODONE UR QL: NOT DETECTED
OXYMORPHONE UR QL: NOT DETECTED
PATHOLOGY STUDY: NORMAL
PCP UR QL: NEGATIVE
PHENTERMINE UR QL: NOT DETECTED
PREGABALIN UR QL CFM: PRESENT
SERVICE CMNT-IMP: NORMAL
TAPENTADOL UR QL SCN: NOT DETECTED
TAPENTADOL UR QL SCN: PRESENT
TEMAZEPAM UR QL: NOT DETECTED
TRAMADOL UR QL: NEGATIVE
ZOLPIDEM PHENYL-4-CARB UR QL SCN: NOT DETECTED
ZOLPIDEM UR QL: NOT DETECTED

## 2024-11-22 NOTE — PHYSICIAN QUERY
Please clarify the nature/etiology of the patient's altered mental status/encephalopathy:  Toxic encephalopathy

## 2024-11-30 ENCOUNTER — HOSPITAL ENCOUNTER (OUTPATIENT)
Facility: HOSPITAL | Age: 59
Discharge: HOME | End: 2024-12-01
Attending: EMERGENCY MEDICINE | Admitting: STUDENT IN AN ORGANIZED HEALTH CARE EDUCATION/TRAINING PROGRAM
Payer: MEDICAID

## 2024-11-30 DIAGNOSIS — G93.40 ENCEPHALOPATHY ACUTE: Primary | ICD-10-CM

## 2024-11-30 DIAGNOSIS — M46.98 UNSPECIFIED INFLAMMATORY SPONDYLOPATHY, SACRAL AND SACROCOCCYGEAL REGION: ICD-10-CM

## 2024-11-30 DIAGNOSIS — R10.816 EPIGASTRIC ABDOMINAL TENDERNESS, REBOUND TENDERNESS PRESENCE NOT SPECIFIED: ICD-10-CM

## 2024-11-30 DIAGNOSIS — R00.0 TACHYCARDIA: ICD-10-CM

## 2024-11-30 DIAGNOSIS — F19.94 SUBSTANCE INDUCED MOOD DISORDER: ICD-10-CM

## 2024-11-30 DIAGNOSIS — R07.9 CHEST PAIN: ICD-10-CM

## 2024-11-30 PROBLEM — E86.1 INTRAVASCULAR VOLUME DEPLETION: Status: ACTIVE | Noted: 2024-11-30

## 2024-11-30 LAB
ALBUMIN SERPL BCP-MCNC: 4.5 G/DL (ref 3.5–5.2)
ALP SERPL-CCNC: 79 U/L (ref 40–150)
ALT SERPL W/O P-5'-P-CCNC: 14 U/L (ref 10–44)
AMPHET+METHAMPHET UR QL: NEGATIVE
ANION GAP SERPL CALC-SCNC: 16 MMOL/L (ref 8–16)
APAP SERPL-MCNC: <3 UG/ML (ref 10–20)
AST SERPL-CCNC: 21 U/L (ref 10–40)
BACTERIA #/AREA URNS AUTO: ABNORMAL /HPF
BARBITURATES UR QL SCN>200 NG/ML: NEGATIVE
BASOPHILS # BLD AUTO: 0.05 K/UL (ref 0–0.2)
BASOPHILS NFR BLD: 0.5 % (ref 0–1.9)
BENZODIAZ UR QL SCN>200 NG/ML: NEGATIVE
BILIRUB SERPL-MCNC: 2.2 MG/DL (ref 0.1–1)
BILIRUB UR QL STRIP: NEGATIVE
BUN SERPL-MCNC: 32 MG/DL (ref 6–20)
BZE UR QL SCN: NEGATIVE
CALCIUM SERPL-MCNC: 10.1 MG/DL (ref 8.7–10.5)
CANNABINOIDS UR QL SCN: ABNORMAL
CHLORIDE SERPL-SCNC: 108 MMOL/L (ref 95–110)
CK SERPL-CCNC: 64 U/L (ref 20–180)
CLARITY UR REFRACT.AUTO: CLEAR
CO2 SERPL-SCNC: 18 MMOL/L (ref 23–29)
COLOR UR AUTO: YELLOW
CREAT SERPL-MCNC: 1 MG/DL (ref 0.5–1.4)
CREAT UR-MCNC: 186 MG/DL (ref 15–325)
CREAT UR-MCNC: 186 MG/DL (ref 15–325)
DIFFERENTIAL METHOD BLD: ABNORMAL
EOSINOPHIL # BLD AUTO: 0.1 K/UL (ref 0–0.5)
EOSINOPHIL NFR BLD: 0.5 % (ref 0–8)
ERYTHROCYTE [DISTWIDTH] IN BLOOD BY AUTOMATED COUNT: 13.7 % (ref 11.5–14.5)
EST. GFR  (NO RACE VARIABLE): >60 ML/MIN/1.73 M^2
ETHANOL UR-MCNC: <10 MG/DL
GLUCOSE SERPL-MCNC: 142 MG/DL (ref 70–110)
GLUCOSE UR QL STRIP: NEGATIVE
HCT VFR BLD AUTO: 48.7 % (ref 37–48.5)
HGB BLD-MCNC: 17 G/DL (ref 12–16)
HGB UR QL STRIP: NEGATIVE
HYALINE CASTS UR QL AUTO: 4 /LPF
IMM GRANULOCYTES # BLD AUTO: 0.03 K/UL (ref 0–0.04)
IMM GRANULOCYTES NFR BLD AUTO: 0.3 % (ref 0–0.5)
KETONES UR QL STRIP: ABNORMAL
LACTATE SERPL-SCNC: 1.9 MMOL/L (ref 0.5–2.2)
LEUKOCYTE ESTERASE UR QL STRIP: NEGATIVE
LYMPHOCYTES # BLD AUTO: 1.6 K/UL (ref 1–4.8)
LYMPHOCYTES NFR BLD: 16.1 % (ref 18–48)
MAGNESIUM SERPL-MCNC: 2.1 MG/DL (ref 1.6–2.6)
MCH RBC QN AUTO: 29.9 PG (ref 27–31)
MCHC RBC AUTO-ENTMCNC: 34.9 G/DL (ref 32–36)
MCV RBC AUTO: 86 FL (ref 82–98)
METHADONE UR QL SCN>300 NG/ML: NEGATIVE
MICROSCOPIC COMMENT: ABNORMAL
MONOCYTES # BLD AUTO: 0.5 K/UL (ref 0.3–1)
MONOCYTES NFR BLD: 4.6 % (ref 4–15)
NEUTROPHILS # BLD AUTO: 7.8 K/UL (ref 1.8–7.7)
NEUTROPHILS NFR BLD: 78 % (ref 38–73)
NITRITE UR QL STRIP: NEGATIVE
NRBC BLD-RTO: 0 /100 WBC
OHS QRS DURATION: 76 MS
OHS QTC CALCULATION: 466 MS
OPIATES UR QL SCN: NEGATIVE
OXYCODONE UR QL SCN: NEGATIVE
PCP UR QL SCN>25 NG/ML: NEGATIVE
PH UR STRIP: 6 [PH] (ref 5–8)
PHOSPHATE SERPL-MCNC: 3.5 MG/DL (ref 2.7–4.5)
PLATELET # BLD AUTO: 322 K/UL (ref 150–450)
PMV BLD AUTO: 10.6 FL (ref 9.2–12.9)
POTASSIUM SERPL-SCNC: 3.9 MMOL/L (ref 3.5–5.1)
PROT SERPL-MCNC: 9.1 G/DL (ref 6–8.4)
PROT UR QL STRIP: ABNORMAL
RBC # BLD AUTO: 5.68 M/UL (ref 4–5.4)
RBC #/AREA URNS AUTO: 2 /HPF (ref 0–4)
SALICYLATES SERPL-MCNC: <5 MG/DL (ref 15–30)
SODIUM SERPL-SCNC: 142 MMOL/L (ref 136–145)
SP GR UR STRIP: >=1.03 (ref 1–1.03)
TOXICOLOGY INFORMATION: ABNORMAL
URN SPEC COLLECT METH UR: ABNORMAL
WBC # BLD AUTO: 10 K/UL (ref 3.9–12.7)
WBC #/AREA URNS AUTO: 5 /HPF (ref 0–5)

## 2024-11-30 PROCEDURE — 83605 ASSAY OF LACTIC ACID: CPT | Performed by: EMERGENCY MEDICINE

## 2024-11-30 PROCEDURE — 80307 DRUG TEST PRSMV CHEM ANLYZR: CPT | Mod: 91 | Performed by: EMERGENCY MEDICINE

## 2024-11-30 PROCEDURE — 93010 ELECTROCARDIOGRAM REPORT: CPT | Mod: ,,, | Performed by: INTERNAL MEDICINE

## 2024-11-30 PROCEDURE — 82550 ASSAY OF CK (CPK): CPT | Performed by: EMERGENCY MEDICINE

## 2024-11-30 PROCEDURE — 80307 DRUG TEST PRSMV CHEM ANLYZR: CPT | Performed by: EMERGENCY MEDICINE

## 2024-11-30 PROCEDURE — 63600175 PHARM REV CODE 636 W HCPCS: Performed by: EMERGENCY MEDICINE

## 2024-11-30 PROCEDURE — 85025 COMPLETE CBC W/AUTO DIFF WBC: CPT | Performed by: EMERGENCY MEDICINE

## 2024-11-30 PROCEDURE — 81001 URINALYSIS AUTO W/SCOPE: CPT | Performed by: EMERGENCY MEDICINE

## 2024-11-30 PROCEDURE — G0378 HOSPITAL OBSERVATION PER HR: HCPCS

## 2024-11-30 PROCEDURE — 84100 ASSAY OF PHOSPHORUS: CPT | Performed by: EMERGENCY MEDICINE

## 2024-11-30 PROCEDURE — 96360 HYDRATION IV INFUSION INIT: CPT | Mod: 59

## 2024-11-30 PROCEDURE — 25000003 PHARM REV CODE 250

## 2024-11-30 PROCEDURE — 87040 BLOOD CULTURE FOR BACTERIA: CPT | Mod: 59 | Performed by: EMERGENCY MEDICINE

## 2024-11-30 PROCEDURE — 80143 DRUG ASSAY ACETAMINOPHEN: CPT | Performed by: EMERGENCY MEDICINE

## 2024-11-30 PROCEDURE — 25500020 PHARM REV CODE 255: Performed by: EMERGENCY MEDICINE

## 2024-11-30 PROCEDURE — 80179 DRUG ASSAY SALICYLATE: CPT | Performed by: EMERGENCY MEDICINE

## 2024-11-30 PROCEDURE — 83735 ASSAY OF MAGNESIUM: CPT | Performed by: EMERGENCY MEDICINE

## 2024-11-30 PROCEDURE — 99285 EMERGENCY DEPT VISIT HI MDM: CPT | Mod: 25

## 2024-11-30 PROCEDURE — 80053 COMPREHEN METABOLIC PANEL: CPT | Performed by: EMERGENCY MEDICINE

## 2024-11-30 PROCEDURE — 93005 ELECTROCARDIOGRAM TRACING: CPT

## 2024-11-30 RX ORDER — HYDROXYZINE HYDROCHLORIDE 25 MG/1
50 TABLET, FILM COATED ORAL 3 TIMES DAILY PRN
Status: DISCONTINUED | OUTPATIENT
Start: 2024-11-30 | End: 2024-12-01 | Stop reason: HOSPADM

## 2024-11-30 RX ORDER — LORAZEPAM 2 MG/ML
0.5 INJECTION INTRAMUSCULAR
Status: DISCONTINUED | OUTPATIENT
Start: 2024-11-30 | End: 2024-11-30

## 2024-11-30 RX ORDER — HEPARIN SODIUM 5000 [USP'U]/ML
5000 INJECTION, SOLUTION INTRAVENOUS; SUBCUTANEOUS EVERY 12 HOURS
Status: DISCONTINUED | OUTPATIENT
Start: 2024-11-30 | End: 2024-12-01

## 2024-11-30 RX ORDER — CYCLOBENZAPRINE HCL 10 MG
10 TABLET ORAL 3 TIMES DAILY PRN
Status: ON HOLD | COMMUNITY
End: 2024-12-01 | Stop reason: HOSPADM

## 2024-11-30 RX ORDER — NALOXONE HCL 0.4 MG/ML
0.02 VIAL (ML) INJECTION
Status: DISCONTINUED | OUTPATIENT
Start: 2024-11-30 | End: 2024-12-01 | Stop reason: HOSPADM

## 2024-11-30 RX ORDER — IBUPROFEN 200 MG
24 TABLET ORAL
Status: DISCONTINUED | OUTPATIENT
Start: 2024-11-30 | End: 2024-12-01 | Stop reason: HOSPADM

## 2024-11-30 RX ORDER — DOCUSATE SODIUM 100 MG
200 CAPSULE ORAL
Status: COMPLETED | OUTPATIENT
Start: 2024-11-30 | End: 2024-12-01

## 2024-11-30 RX ORDER — IBUPROFEN 200 MG
16 TABLET ORAL
Status: DISCONTINUED | OUTPATIENT
Start: 2024-11-30 | End: 2024-12-01 | Stop reason: HOSPADM

## 2024-11-30 RX ORDER — GABAPENTIN 300 MG/1
300 CAPSULE ORAL 3 TIMES DAILY
COMMUNITY

## 2024-11-30 RX ORDER — BUPRENORPHINE 2 MG/1
2 TABLET SUBLINGUAL 2 TIMES DAILY
Status: DISCONTINUED | OUTPATIENT
Start: 2024-11-30 | End: 2024-12-01 | Stop reason: HOSPADM

## 2024-11-30 RX ORDER — GABAPENTIN 300 MG/1
300 CAPSULE ORAL 3 TIMES DAILY
Status: DISCONTINUED | OUTPATIENT
Start: 2024-11-30 | End: 2024-12-01 | Stop reason: HOSPADM

## 2024-11-30 RX ORDER — SODIUM CHLORIDE 0.9 % (FLUSH) 0.9 %
10 SYRINGE (ML) INJECTION EVERY 12 HOURS PRN
Status: DISCONTINUED | OUTPATIENT
Start: 2024-11-30 | End: 2024-12-01 | Stop reason: HOSPADM

## 2024-11-30 RX ORDER — GLUCAGON 1 MG
1 KIT INJECTION
Status: DISCONTINUED | OUTPATIENT
Start: 2024-11-30 | End: 2024-12-01 | Stop reason: HOSPADM

## 2024-11-30 RX ADMIN — Medication 200 ML: at 10:11

## 2024-11-30 RX ADMIN — GABAPENTIN 300 MG: 300 CAPSULE ORAL at 07:11

## 2024-11-30 RX ADMIN — HYDROXYZINE HYDROCHLORIDE 50 MG: 25 TABLET, FILM COATED ORAL at 08:11

## 2024-11-30 RX ADMIN — IOHEXOL 100 ML: 350 INJECTION, SOLUTION INTRAVENOUS at 04:11

## 2024-11-30 RX ADMIN — SODIUM CHLORIDE, POTASSIUM CHLORIDE, SODIUM LACTATE AND CALCIUM CHLORIDE 1000 ML: 600; 310; 30; 20 INJECTION, SOLUTION INTRAVENOUS at 12:11

## 2024-11-30 RX ADMIN — BUPRENORPHINE 2 MG: 2 TABLET SUBLINGUAL at 08:11

## 2024-11-30 NOTE — ED NOTES
Pt identifiers Reza Morrow were checked and are correct  LOC: The patient is awake, alert, aware of environment with an appropriate affect. Oriented x3, pt reoriented to time  speaking appropriately  APPEARANCE: Pt resting comfortably, in no acute distress, pt is clean and well groomed, clothing properly fastened  SKIN: Skin warm, dry and intact, normal skin turgor, moist mucus membranes  RESPIRATORY: Airway is open and patent, respirations are spontaneous, even and unlabored, normal effort and rate  CARDIAC: Normal rate and rhythm, no peripheral edema noted, capillary refill < 3 seconds, bilateral radial pulses 2+  ABDOMEN: Soft, nontender, nondistended. Bowel sounds present   NEUROLOGIC: PERRL, facial expression is symmetrical, patient moving all extremities spontaneously, normal sensation in all extremities when touched with a finger.  Follows all commands appropriately  MUSCULOSKELETAL: No obvious deformities.

## 2024-11-30 NOTE — ED NOTES
Family at bed side  Pt sitting up in bed  Pt is oriented to person, and family Reoriented to time and place

## 2024-11-30 NOTE — ED PROVIDER NOTES
Encounter Date: 2024       History     Chief Complaint   Patient presents with    Altered Mental Status     Hx of MS, patient takes a multitude of medications. Patient arrives with confusion on and off for a few weeks, patient oriented to self per ems. Patient was seen for same conditions weeks ago.      59-year-old woman with comorbidities of personal history of MS, hypercholesterolemia, as well as polypharmacy and recent hospital admission earlier this month for toxic encephalopathy presents by EMS to the emergency department accompanied by her neighbor for evaluation of confusion and altered mental status noted for the past 2 days.  At the time my exam, the patient does answer questions with associated confusion.  Patient is poor historian, and unable to identify her most recent dosing of her chronic medications.    The history is provided by the patient, a friend and medical records. No  was used.     Review of patient's allergies indicates:   Allergen Reactions    Adhesive Hives    Neosporin [neomycin-bacitracin-polymyxin] Hives    Neomycin     Neomycin-polymyxin Hives    Neosporin g.u. irrigant     Penicillins Other (See Comments)     Unknown  reaction    Latex Rash     Past Medical History:   Diagnosis Date    Behavioral problem     Bulging lumbar disc     Bursitis     bilateral hip    Degenerative cervical disc     Hx of psychiatric care     Hypercholesteremia     Labral tear of hip, degenerative bilateral    MS (multiple sclerosis)     Paresthesia of both lower extremities 3/13/2022    Pneumonia     Spinal cord compression      Past Surgical History:   Procedure Laterality Date    ANGIOGRAM, CORONARY, WITH LEFT HEART CATHETERIZATION Left 3/11/2022    Procedure: Angiogram, Coronary, with Left Heart Cath;  Surgeon: Elie Matamoros MD;  Location: [facility] CATH LAB;  Service: Cardiology;  Laterality: Left;    BREAST SURGERY      augmentation     SECTION, CLASSIC      HAND SURGERY       HYSTEROSCOPY      NECK SURGERY      cervical disc removeal    TUBAL LIGATION      X-STOP IMPLANTATION       Family History   Problem Relation Name Age of Onset    Cancer Father      Diabetes Father      Lung cancer Father      Diabetes Sister      COPD Mother      Drug abuse Mother      Bipolar disorder Brother      Heart disease Maternal Uncle      Hypothyroidism Maternal Grandmother       Social History     Tobacco Use    Smoking status: Former     Current packs/day: 0.00     Types: Cigarettes     Quit date: 2013     Years since quittin.3     Passive exposure: Past    Smokeless tobacco: Never   Substance Use Topics    Alcohol use: No    Drug use: Yes     Types: Benzodiazepines, Marijuana     Review of Systems    Physical Exam     Initial Vitals [24 1131]   BP Pulse Resp Temp SpO2   (!) 130/90 100 18 99 °F (37.2 °C) 99 %      MAP       --         Physical Exam    Vitals reviewed.  Constitutional:   59-year-old  woman, acutely confused, no respiratory distress noted   HENT:   Head: Normocephalic and atraumatic.   Mildly desiccated mucous membranes noted   Eyes:   Pupils are 4 mm bilaterally and reactive   Neck: No tracheal deviation present.   Cardiovascular:            Mild tachycardia is noted with intact distal pulses   Pulmonary/Chest: Breath sounds normal. No stridor. No respiratory distress.   Abdominal: Abdomen is soft.   Diffuse abdominal tenderness to palpation without localization, rebound, or mass     Neurological:   Patient is somnolent, though arousable to alert and confused, patient is oriented to self and place only; no shoaib strength deficit is identified on exam   Skin: Skin is warm and dry.   Psychiatric:   Patient is anxious and confused and intermittently redirectable         ED Course   Procedures  Labs Reviewed   CBC W/ AUTO DIFFERENTIAL - Abnormal       Result Value    WBC 10.00      RBC 5.68 (*)     Hemoglobin 17.0 (*)     Hematocrit 48.7 (*)     MCV 86      MCH  29.9      MCHC 34.9      RDW 13.7      Platelets 322      MPV 10.6      Immature Granulocytes 0.3      Gran # (ANC) 7.8 (*)     Immature Grans (Abs) 0.03      Lymph # 1.6      Mono # 0.5      Eos # 0.1      Baso # 0.05      nRBC 0      Gran % 78.0 (*)     Lymph % 16.1 (*)     Mono % 4.6      Eosinophil % 0.5      Basophil % 0.5      Differential Method Automated     COMPREHENSIVE METABOLIC PANEL - Abnormal    Sodium 142      Potassium 3.9      Chloride 108      CO2 18 (*)     Glucose 142 (*)     BUN 32 (*)     Creatinine 1.0      Calcium 10.1      Total Protein 9.1 (*)     Albumin 4.5      Total Bilirubin 2.2 (*)     Alkaline Phosphatase 79      AST 21      ALT 14      eGFR >60.0      Anion Gap 16     URINALYSIS, REFLEX TO URINE CULTURE - Abnormal    Specimen UA Urine, Catheterized      Color, UA Yellow      Appearance, UA Clear      pH, UA 6.0      Specific Gravity, UA >=1.030 (*)     Protein, UA 1+ (*)     Glucose, UA Negative      Ketones, UA 2+ (*)     Bilirubin (UA) Negative      Occult Blood UA Negative      Nitrite, UA Negative      Leukocytes, UA Negative      Narrative:     Specimen Source->Urine   TOXICOLOGY SCREEN, URINE, RANDOM (COMPLIANCE) - Abnormal    Alcohol, Urine <10      Benzodiazepines Negative      Methadone metabolites Negative      Cocaine (Metab.) Negative      Opiate Scrn, Ur Negative      Barbiturate Screen, Ur Negative      Amphetamine Screen, Ur Negative      THC Presumptive Positive (*)     Phencyclidine Negative      Creatinine, Urine 186.0      Toxicology Information SEE COMMENT      Narrative:     Specimen Source->Urine   ACETAMINOPHEN LEVEL - Abnormal    Acetaminophen (Tylenol), Serum <3.0 (*)    SALICYLATE LEVEL - Abnormal    Salicylate Lvl <5.0 (*)    URINALYSIS MICROSCOPIC - Abnormal    RBC, UA 2      WBC, UA 5      Bacteria Occasional      Hyaline Casts, UA 4 (*)     Microscopic Comment SEE COMMENT      Narrative:     Specimen Source->Urine   CULTURE, BLOOD   CULTURE, BLOOD    LACTIC ACID, PLASMA    Lactate (Lactic Acid) 1.9     MAGNESIUM    Magnesium 2.1     PHOSPHORUS    Phosphorus 3.5     URINE OXYCODONE    OXYCODONE Negative      Creatinine, Urine 186.0      Narrative:     Specimen Source->Urine   CK    CPK 64          ECG Results              EKG 12-lead (Final result)        Collection Time Result Time QRS Duration OHS QTC Calculation    11/30/24 12:44:28 11/30/24 17:31:45 76 466                     Final result by Interface, Lab In [facility] (11/30/24 17:31:48)                   Narrative:    Test Reason : R00.0,    Vent. Rate : 102 BPM     Atrial Rate : 102 BPM     P-R Int : 114 ms          QRS Dur :  76 ms      QT Int : 358 ms       P-R-T Axes :  81  77  33 degrees    QTcB Int : 466 ms    Baseline Artifact  Sinus tachycardia  Otherwise normal ECG  No previous ECGs available  Confirmed by Marquise Yang ([id number]) on 11/30/2024 5:31:43 PM    Referred By: AAAREFERRAL SELF           Confirmed By: Marquise Yang                                  Imaging Results              CT Abdomen Pelvis With IV Contrast NO Oral Contrast (Final result)  Result time 11/30/24 16:48:01      Final result by Satya Summers MD (11/30/24 16:48:01)                   Impression:      1. Apparent mild thickening of the distal gastric body/antrum, without adjacent inflammatory change, may be related to underdistention versus nonspecific gastritis.  2. Previous right middle lobe nodular opacities have resolved.  3. Other incidental/nonemergent findings in the body of the report.      Electronically signed by: Satya Summers MD  Date:    11/30/2024  Time:    16:48               Narrative:    EXAMINATION:  CT ABDOMEN PELVIS WITH IV CONTRAST    CLINICAL HISTORY:  Abdominal pain, acute, nonlocalized;    TECHNIQUE:  Low dose axial images, sagittal and coronal reformations were obtained from the lung bases to the pubic symphysis following the IV administration of 75 mL of Omnipaque 350 .  Oral contrast was not  given.    COMPARISON:  CT abdomen and pelvis 07/21/2024, renal ultrasound 06/10/2023, pelvic ultrasound 04/07/2015    FINDINGS:  Respiratory motion with beam hardening and streak artifact from overlying monitoring leads somewhat limits evaluation.    Partially imaged bilateral breast implants in place.  Imaged lung bases are clear, noting resolution of previous right middle lobe nodular opacities.  Base of the heart is normal in size without significant pericardial fluid arterial calcifications.    Portal vasculature is patent.  Liver, gallbladder, pancreas, spleen and bilateral adrenal glands are within normal limits.  No significant biliary ductal dilatation.    Bilateral kidneys are normal in size, shape and location with symmetric normal enhancement.  No hydronephrosis or significant perinephric stranding.  Ureters are normal in course and caliber.  Urinary bladder is well distended without wall thickening.  Uterus and bilateral adnexa are grossly within normal limits.  Pelvic phleboliths noted.  No significant volume free pelvic fluid.    Apparent mild circumferential wall thickening of the distal gastric body/gastric antrum without adjacent inflammatory change.  Duodenum is within normal limits.  Appendix and terminal ileum are within normal limits.  No evidence of bowel obstruction or acute bowel inflammation.  No bowel pneumatosis or portal venous gas.    No ascites, free air or lymphadenopathy by CT criteria.  Minimal scattered calcific atherosclerosis of the distal abdominal aorta extending into its iliac branches.  No aortic aneurysm or dissection.    Extraperitoneal soft tissues and osseous structures appear stable without acute findings.                                       X-Ray Chest AP Portable (Final result)  Result time 11/30/24 12:55:53      Final result by Satya Summers MD (11/30/24 12:55:53)                   Impression:      No acute abnormality.      Electronically signed by: Satya Summers  MD  Date:    11/30/2024  Time:    12:55               Narrative:    EXAMINATION:  XR CHEST AP PORTABLE    CLINICAL HISTORY:  altered mental status;    TECHNIQUE:  Single frontal view of the chest was performed.    COMPARISON:  Chest radiograph 11/14/2024, chest CT 07/21/2024    FINDINGS:  Patient is slightly rotated.  Lower ACDF hardware unchanged.Few scattered linear opacities throughout the lungs consistent with minimal platelike scarring versus atelectasis.  The lungs are otherwise well expanded without consolidation, pleural effusion or pneumothorax.    The cardiac silhouette is normal in size. The hilar and mediastinal contours are within normal limits for age noting calcification at the arch.    Osseous structures appear stable without acute findings.  PA and lateral views can be obtained.                                       Medications   LORazepam injection 0.5 mg (has no administration in time range)   lactated ringers bolus 1,000 mL (0 mLs Intravenous Stopped 11/30/24 1347)   iohexoL (OMNIPAQUE 350) injection 100 mL (100 mLs Intravenous Given 11/30/24 1608)     Medical Decision Making  Differential diagnosis:  Toxic encephalopathy, sepsis, UTI, pneumonia, substance induced mood disorder    Amount and/or Complexity of Data Reviewed  Labs: ordered. Decision-making details documented in ED Course.  Radiology: ordered. Decision-making details documented in ED Course.  ECG/medicine tests: ordered and independent interpretation performed. Decision-making details documented in ED Course.              Attending Attestation:             Attending ED Notes:   Emergency department evaluation today reveals a moderately elevated WBC of 10.0 elevated from baseline without shoaib leukocytosis, there is also evidence of volume contraction with a hemoglobin measured at 17 today.  Metabolic profile reveals a diminished bicarbonate and an elevated BUN without additional evidence of shoaib solid organ dysfunction.  Urinalysis  findings are also consistent with volume contraction with an elevated specific gravity without additional evidence of infectious issues.  Toxicology screening reveals the presence of THC only.  CT scan of the abdomen pelvis obtained due to diffuse abdominal tenderness reveals findings of potential chronic gastritis without additional evidence of acute intra-abdominal injury.  Despite these encouraging findings, the patient continues to exhibit an alert and conversant yet confused mental status consistent with persistent encephalopathy.  The patient is oriented to person, and can recognize her son at bedside, however, she is still unable to identify time or location despite multiple episodes of reinforcement.  In light of these findings, she will be placed in observation with Hospital Medicine in stable condition for further therapy and management.                             Clinical Impression:  Final diagnoses:  [R00.0] Tachycardia  [G93.40] Encephalopathy acute (Primary)  [R10.816] Epigastric abdominal tenderness, rebound tenderness presence not specified          ED Disposition Condition    Observation Stable                Coleman Blevins MD  11/30/24 2003

## 2024-11-30 NOTE — ED TRIAGE NOTES
Pt arrived from her residence via EMS  Friend with pt states last night 09:00 pm pt was incontinent of urine, was oriented to person, place and people, and her sister with her   Today her friend went to check on her and she was oriented to person, place and people  Pt was then reoriented to time

## 2024-11-30 NOTE — Clinical Note
Diagnosis: Encephalopathy acute [143640]   Future Attending Provider: MOOKIE SWEENEY [[id number]]   Is the patient being sent to ED Observation?: No   Requested Bed Type: Standard [1]   Special Needs:: Disoriented [14]   Special Needs:: Sitter Needed [24]

## 2024-12-01 VITALS
TEMPERATURE: 98 F | DIASTOLIC BLOOD PRESSURE: 83 MMHG | SYSTOLIC BLOOD PRESSURE: 146 MMHG | HEART RATE: 85 BPM | WEIGHT: 118.63 LBS | HEIGHT: 62 IN | BODY MASS INDEX: 21.83 KG/M2 | RESPIRATION RATE: 18 BRPM | OXYGEN SATURATION: 98 %

## 2024-12-01 LAB
ALBUMIN SERPL BCP-MCNC: 3.6 G/DL (ref 3.5–5.2)
ALP SERPL-CCNC: 60 U/L (ref 40–150)
ALT SERPL W/O P-5'-P-CCNC: 12 U/L (ref 10–44)
ANION GAP SERPL CALC-SCNC: 10 MMOL/L (ref 8–16)
APTT PPP: 25.9 SEC (ref 21–32)
AST SERPL-CCNC: 15 U/L (ref 10–40)
BASOPHILS # BLD AUTO: 0.04 K/UL (ref 0–0.2)
BASOPHILS NFR BLD: 0.6 % (ref 0–1.9)
BILIRUB SERPL-MCNC: 2.7 MG/DL (ref 0.1–1)
BUN SERPL-MCNC: 23 MG/DL (ref 6–20)
CALCIUM SERPL-MCNC: 8.9 MG/DL (ref 8.7–10.5)
CHLORIDE SERPL-SCNC: 102 MMOL/L (ref 95–110)
CO2 SERPL-SCNC: 24 MMOL/L (ref 23–29)
CREAT SERPL-MCNC: 0.8 MG/DL (ref 0.5–1.4)
DIFFERENTIAL METHOD BLD: NORMAL
EOSINOPHIL # BLD AUTO: 0 K/UL (ref 0–0.5)
EOSINOPHIL NFR BLD: 0.4 % (ref 0–8)
ERYTHROCYTE [DISTWIDTH] IN BLOOD BY AUTOMATED COUNT: 13.6 % (ref 11.5–14.5)
EST. GFR  (NO RACE VARIABLE): >60 ML/MIN/1.73 M^2
GLUCOSE SERPL-MCNC: 77 MG/DL (ref 70–110)
HCT VFR BLD AUTO: 43.4 % (ref 37–48.5)
HGB BLD-MCNC: 14.7 G/DL (ref 12–16)
IMM GRANULOCYTES # BLD AUTO: 0.03 K/UL (ref 0–0.04)
IMM GRANULOCYTES NFR BLD AUTO: 0.4 % (ref 0–0.5)
INR PPP: 1 (ref 0.8–1.2)
LYMPHOCYTES # BLD AUTO: 2.8 K/UL (ref 1–4.8)
LYMPHOCYTES NFR BLD: 39.8 % (ref 18–48)
MAGNESIUM SERPL-MCNC: 2 MG/DL (ref 1.6–2.6)
MCH RBC QN AUTO: 30 PG (ref 27–31)
MCHC RBC AUTO-ENTMCNC: 33.9 G/DL (ref 32–36)
MCV RBC AUTO: 89 FL (ref 82–98)
MONOCYTES # BLD AUTO: 0.6 K/UL (ref 0.3–1)
MONOCYTES NFR BLD: 8.1 % (ref 4–15)
NEUTROPHILS # BLD AUTO: 3.5 K/UL (ref 1.8–7.7)
NEUTROPHILS NFR BLD: 50.7 % (ref 38–73)
NRBC BLD-RTO: 0 /100 WBC
PHOSPHATE SERPL-MCNC: 3 MG/DL (ref 2.7–4.5)
PLATELET # BLD AUTO: 239 K/UL (ref 150–450)
PMV BLD AUTO: 10.8 FL (ref 9.2–12.9)
POTASSIUM SERPL-SCNC: 3.2 MMOL/L (ref 3.5–5.1)
PROT SERPL-MCNC: 7.2 G/DL (ref 6–8.4)
PROTHROMBIN TIME: 11.3 SEC (ref 9–12.5)
RBC # BLD AUTO: 4.9 M/UL (ref 4–5.4)
SODIUM SERPL-SCNC: 136 MMOL/L (ref 136–145)
WBC # BLD AUTO: 6.93 K/UL (ref 3.9–12.7)

## 2024-12-01 PROCEDURE — 25000003 PHARM REV CODE 250

## 2024-12-01 PROCEDURE — A9585 GADOBUTROL INJECTION: HCPCS | Performed by: STUDENT IN AN ORGANIZED HEALTH CARE EDUCATION/TRAINING PROGRAM

## 2024-12-01 PROCEDURE — 85730 THROMBOPLASTIN TIME PARTIAL: CPT

## 2024-12-01 PROCEDURE — 85610 PROTHROMBIN TIME: CPT

## 2024-12-01 PROCEDURE — 83735 ASSAY OF MAGNESIUM: CPT

## 2024-12-01 PROCEDURE — 99214 OFFICE O/P EST MOD 30 MIN: CPT | Mod: ,,, | Performed by: PSYCHIATRY & NEUROLOGY

## 2024-12-01 PROCEDURE — 96372 THER/PROPH/DIAG INJ SC/IM: CPT

## 2024-12-01 PROCEDURE — G0378 HOSPITAL OBSERVATION PER HR: HCPCS

## 2024-12-01 PROCEDURE — 25500020 PHARM REV CODE 255: Performed by: STUDENT IN AN ORGANIZED HEALTH CARE EDUCATION/TRAINING PROGRAM

## 2024-12-01 PROCEDURE — 63600175 PHARM REV CODE 636 W HCPCS

## 2024-12-01 PROCEDURE — 80053 COMPREHEN METABOLIC PANEL: CPT

## 2024-12-01 PROCEDURE — 84100 ASSAY OF PHOSPHORUS: CPT

## 2024-12-01 PROCEDURE — 85025 COMPLETE CBC W/AUTO DIFF WBC: CPT

## 2024-12-01 RX ORDER — POTASSIUM CHLORIDE 20 MEQ/1
40 TABLET, EXTENDED RELEASE ORAL ONCE
Status: COMPLETED | OUTPATIENT
Start: 2024-12-01 | End: 2024-12-01

## 2024-12-01 RX ORDER — HEPARIN SODIUM,PORCINE/D5W 25000/250
0-40 INTRAVENOUS SOLUTION INTRAVENOUS CONTINUOUS
Status: DISCONTINUED | OUTPATIENT
Start: 2024-12-01 | End: 2024-12-01

## 2024-12-01 RX ORDER — GADOBUTROL 604.72 MG/ML
6 INJECTION INTRAVENOUS
Status: COMPLETED | OUTPATIENT
Start: 2024-12-01 | End: 2024-12-01

## 2024-12-01 RX ADMIN — GABAPENTIN 300 MG: 300 CAPSULE ORAL at 08:12

## 2024-12-01 RX ADMIN — Medication 200 ML: at 01:12

## 2024-12-01 RX ADMIN — GABAPENTIN 300 MG: 300 CAPSULE ORAL at 02:12

## 2024-12-01 RX ADMIN — Medication 200 ML: at 08:12

## 2024-12-01 RX ADMIN — POTASSIUM CHLORIDE 40 MEQ: 1500 TABLET, EXTENDED RELEASE ORAL at 01:12

## 2024-12-01 RX ADMIN — POTASSIUM CHLORIDE 40 MEQ: 1500 TABLET, EXTENDED RELEASE ORAL at 08:12

## 2024-12-01 RX ADMIN — HEPARIN SODIUM 5000 UNITS: 5000 INJECTION INTRAVENOUS; SUBCUTANEOUS at 12:12

## 2024-12-01 RX ADMIN — BUPRENORPHINE 2 MG: 2 TABLET SUBLINGUAL at 08:12

## 2024-12-01 RX ADMIN — Medication 200 ML: at 04:12

## 2024-12-01 RX ADMIN — GADOBUTROL 6 ML: 604.72 INJECTION INTRAVENOUS at 02:12

## 2024-12-01 NOTE — PLAN OF CARE
Pt AAOx4 and VSS . Progressing with plan of care. Free of skin breakdown as the pt positioned/repositioned well independently. Incentive spirometer at bedside and pt instructed on its use. No complain of pain at this time. Frequent rounds made to assess pain and safety and no complaints at this time noted. Side rails up x 2. Bed locked. Call light within reach. No falls noted. Will continue to monitor.    Problem: Adult Inpatient Plan of Care  Goal: Plan of Care Review  Outcome: Progressing  Goal: Patient-Specific Goal (Individualized)  Outcome: Progressing  Goal: Absence of Hospital-Acquired Illness or Injury  Outcome: Progressing  Goal: Optimal Comfort and Wellbeing  Outcome: Progressing  Goal: Readiness for Transition of Care  Outcome: Progressing

## 2024-12-01 NOTE — NURSING
"aPTT resulted and tried to initiate Heparin drip. IV not working noted and restarted. Pt refused tape s/p allergic to adhesive. Pt said paper tape is okay. Redressing with transparent dressing and paper tape. Pt said "Paper tape also has glue underneath it. I dont want it. And transparent dressing too. It has glue underneath it." Reeducated with transparent dressing for IV and importance of heparin drip. Pt still refused it and states " I know my body and I dont want hive. Just secure with coband" Charge nurse Rola came to room and reeducated but pt still refused it. Pt states " Then I dont want heparin drips." Paged to on call MD and Dr. Blanco tried to come down to explain to pt. Dr. Blanco called me back and informed that pt does not need heparin drip anymore since there is no evidence of thrombosis. MD discontinue Heparin order.  "

## 2024-12-01 NOTE — SUBJECTIVE & OBJECTIVE
Past Medical History:   Diagnosis Date    Behavioral problem     Bulging lumbar disc     Bursitis     bilateral hip    Degenerative cervical disc     Hx of psychiatric care     Hypercholesteremia     Labral tear of hip, degenerative bilateral    MS (multiple sclerosis)     Paresthesia of both lower extremities 3/13/2022    Pneumonia     Spinal cord compression      Past Surgical History:   Procedure Laterality Date    ANGIOGRAM, CORONARY, WITH LEFT HEART CATHETERIZATION Left 3/11/2022    Procedure: Angiogram, Coronary, with Left Heart Cath;  Surgeon: Elie Matamoros MD;  Location: [facility] CATH LAB;  Service: Cardiology;  Laterality: Left;    BREAST SURGERY      augmentation     SECTION, CLASSIC      HAND SURGERY      HYSTEROSCOPY      NECK SURGERY      cervical disc removeal    TUBAL LIGATION      X-STOP IMPLANTATION       Social History     Tobacco Use    Smoking status: Former     Current packs/day: 0.00     Types: Cigarettes     Quit date: 2013     Years since quittin.3     Passive exposure: Past    Smokeless tobacco: Never   Substance Use Topics    Alcohol use: No    Drug use: Yes     Types: Benzodiazepines, Marijuana     Review of patient's allergies indicates:   Allergen Reactions    Adhesive Hives    Neosporin [neomycin-bacitracin-polymyxin] Hives    Neomycin     Neomycin-polymyxin Hives    Neosporin g.u. irrigant     Penicillins Other (See Comments)     Unknown  reaction    Latex Rash       Medications: I have reviewed the current medication administration record.    (Not in a hospital admission)      Review of Systems   Gastrointestinal:  Positive for nausea.   Neurological:  Positive for headaches.   All other systems reviewed and are negative.    Objective:     Vital Signs (Most Recent):  Temp: 98.2 °F (36.8 °C) (24 1746)  Pulse: 96 (24 2350)  Resp: 16 (24 2350)  BP: 134/83 (24 2350)  SpO2: 100 % (24 2350)    Vital Signs Range (Last 24H):  Temp:  [97.6 °F  "(36.4 °C)-99.1 °F (37.3 °C)]   Pulse:  []   Resp:  [16-20]   BP: (128-167)/(76-98)   SpO2:  [99 %-100 %]        Physical Exam  Vitals and nursing note reviewed.   Constitutional:       General: She is not in acute distress.  HENT:      Head: Normocephalic.      Mouth/Throat:      Mouth: Mucous membranes are moist.   Eyes:      Extraocular Movements: Extraocular movements intact.      Pupils: Pupils are equal, round, and reactive to light.   Cardiovascular:      Rate and Rhythm: Normal rate.   Pulmonary:      Effort: Pulmonary effort is normal. No respiratory distress.   Neurological:      Mental Status: She is alert and oriented to person, place, and time.      Sensory: Sensory deficit present.      Motor: No weakness.   Psychiatric:         Mood and Affect: Mood normal.         Behavior: Behavior normal.              Neurological Exam:   LOC: alert  Attention Span: Good   Language: No aphasia  Articulation: No dysarthria  Orientation: Person, Place, Time   Visual Fields: Full  EOM (CN III, IV, VI): Full/intact  Pupils (CN II, III): PERRL  Facial Sensation (CN V): Normal  Facial Movement (CN VII): Symmetric facial expression    Motor: Arm left  Normal 5/5  Leg left  Normal 5/5  Arm right  Normal 5/5  Leg right Normal 5/5  Cerebellum: No evidence of appendicular or axial ataxia  Sensation: Intact to light touch, temperature and vibration      Laboratory:  CMP:   Recent Labs   Lab 11/30/24  1246   CALCIUM 10.1   ALBUMIN 4.5   PROT 9.1*      K 3.9   CO2 18*      BUN 32*   CREATININE 1.0   ALKPHOS 79   ALT 14   AST 21   BILITOT 2.2*     CBC:   Recent Labs   Lab 11/30/24  1246   WBC 10.00   RBC 5.68*   HGB 17.0*   HCT 48.7*      MCV 86   MCH 29.9   MCHC 34.9     Lipid Panel: No results for input(s): "CHOL", "LDLCALC", "HDL", "TRIG" in the last 168 hours.  Coagulation: No results for input(s): "PT", "INR", "APTT" in the last 168 hours.  Hgb A1C: No results for input(s): "HGBA1C" in the last 168 " "hours.  TSH: No results for input(s): "TSH" in the last 168 hours.    Diagnostic Results:      Brain imaging/Vessel Imaging:    MRI Brain w/wo contrast with MPRage - Pending    MRI Brain WO - 11/30/2024  Impression:     Findings consistent with dural venous thrombosis involving the right transverse sinus, right sigmoid sinus with extension into the right internal jugular vein.  No associated parenchymal abnormality.     Resolution of previous edema within the brain with predominant posterior distribution, in keeping with resolved PRES.    Cardiac Evaluation:     EKG FROM 11/30/2024     Sinus tachycardia, vent rate of 114 bpm     "

## 2024-12-01 NOTE — ASSESSMENT & PLAN NOTE
Patient's history and clinical presentation make polypharmacy the most likely reason for her current encephalopathy. Lower suspicion for seizure, withdrawal at this point. Low suspicion for other acute metabolic or neurologic process at this time.     Plan:   - MRI brain ordered, f/u results   - Ordered at home gabapentin, suboxone.   - delirium precautions   - hydroxyzine prn for anxiety

## 2024-12-01 NOTE — SUBJECTIVE & OBJECTIVE
Past Medical History:   Diagnosis Date    Behavioral problem     Bulging lumbar disc     Bursitis     bilateral hip    Degenerative cervical disc     Hx of psychiatric care     Hypercholesteremia     Labral tear of hip, degenerative bilateral    MS (multiple sclerosis)     Paresthesia of both lower extremities 3/13/2022    Pneumonia     Spinal cord compression        Past Surgical History:   Procedure Laterality Date    ANGIOGRAM, CORONARY, WITH LEFT HEART CATHETERIZATION Left 3/11/2022    Procedure: Angiogram, Coronary, with Left Heart Cath;  Surgeon: Elie Matamoros MD;  Location: [facility] CATH LAB;  Service: Cardiology;  Laterality: Left;    BREAST SURGERY      augmentation     SECTION, CLASSIC      HAND SURGERY      HYSTEROSCOPY      NECK SURGERY      cervical disc removeal    TUBAL LIGATION      X-STOP IMPLANTATION         Review of patient's allergies indicates:   Allergen Reactions    Adhesive Hives    Neosporin [neomycin-bacitracin-polymyxin] Hives    Neomycin     Neomycin-polymyxin Hives    Neosporin g.u. irrigant     Penicillins Other (See Comments)     Unknown  reaction    Latex Rash       No current facility-administered medications on file prior to encounter.     Current Outpatient Medications on File Prior to Encounter   Medication Sig    buprenorphine HCL (SUBUTEX) 2 mg Subl Place 1 tablet (2 mg total) under the tongue 2 (two) times daily.    gabapentin (NEURONTIN) 300 MG capsule Take 300 mg by mouth 3 (three) times daily.    cyclobenzaprine (FLEXERIL) 10 MG tablet Take 10 mg by mouth 3 (three) times daily as needed for Muscle spasms.    glatiramer (COPAXONE, GLATOPA) 40 mg/mL injection Inject 40 mg into the skin 3 (three) times a week.    naloxone (NARCAN) 4 mg/actuation Spry 1 spray by Nasal route once as needed.    pregabalin (LYRICA) 100 MG capsule Take 100 mg by mouth 3 (three) times daily.    [DISCONTINUED] atorvastatin (LIPITOR) 20 MG tablet Take 20 mg by mouth once daily.    [DISCONTINUED]  EScitalopram oxalate (LEXAPRO) 10 MG tablet Take 10 mg by mouth once daily.     Family History       Problem Relation (Age of Onset)    Bipolar disorder Brother    COPD Mother    Cancer Father    Diabetes Father, Sister    Drug abuse Mother    Heart disease Maternal Uncle    Hypothyroidism Maternal Grandmother    Lung cancer Father          Tobacco Use    Smoking status: Former     Current packs/day: 0.00     Types: Cigarettes     Quit date: 2013     Years since quittin.3     Passive exposure: Past    Smokeless tobacco: Never   Substance and Sexual Activity    Alcohol use: No    Drug use: Yes     Types: Benzodiazepines, Marijuana    Sexual activity: Not Currently     Partners: Male     Review of Systems   Unable to perform ROS: Mental status change     Objective:     Vital Signs (Most Recent):  Temp: 98.2 °F (36.8 °C) (24)  Pulse: 89 (24)  Resp: 20 (24)  BP: (!) 163/98 (24)  SpO2: 100 % (24) Vital Signs (24h Range):  Temp:  [97.6 °F (36.4 °C)-99.1 °F (37.3 °C)] 98.2 °F (36.8 °C)  Pulse:  [] 89  Resp:  [18-20] 20  SpO2:  [99 %-100 %] 100 %  BP: (130-167)/(79-98) 163/98     Weight: 52.2 kg (115 lb)  Body mass index is 19.14 kg/m².     Physical Exam  Vitals reviewed.   HENT:      Head: Normocephalic and atraumatic.   Cardiovascular:      Rate and Rhythm: Normal rate and regular rhythm.   Pulmonary:      Effort: Pulmonary effort is normal. No respiratory distress.   Abdominal:      General: Abdomen is flat.      Palpations: Abdomen is soft.   Musculoskeletal:      Right lower leg: No edema.      Left lower leg: No edema.   Skin:     General: Skin is warm and dry.   Neurological:      General: No focal deficit present.      Mental Status: She is alert.      Comments: Patient was alert and oriented to person place and time. Was visibly confused, did not remember how she got to the hospital. Was visibly anxious on mental status examination, some  tangential thinking.                 Significant Labs: All pertinent labs within the past 24 hours have been reviewed.    Significant Imaging: I have reviewed all pertinent imaging results/findings within the past 24 hours.

## 2024-12-01 NOTE — ASSESSMENT & PLAN NOTE
Noted on CT AP with contrast, is hemoconcentrating on CBC.     Plan:   - oral hydration with pedialyte

## 2024-12-01 NOTE — HPI
"Patient is a 59 y.o. F with a pmHx of MS, hypercholesterolemia, and polypharmacy. She presented to the emergency department with altered mental status. Patient was unaware of how she got to the emergency department, was aware that she was brought because she was acting "out of it". Patient thinks that she was brought in by her neighbor, similar to her last hospitalization. Patient has numerous hospitalizations for altered mental status secondary to polypharmacy over the past two years, most recently about 2 weeks ago. Story about how she takes at home meds is unclear, story changes when speaking with her. States she hasn't taken any of her potential muscle relaxers since Wednesday. Her potential at home medications include baclofen, flexeril, robaxin, and tizanidine. Denies chest pain, SOB, fever or chills, N/V, abdominal pain, constipation or diarrhea.     In the ED, the patient's initial vitals were as follows: 99 Fahrenheit, pulse 100, respiratory rate 18, blood pressure 130/90, 99% oxygen sats on room air. UDS presumed positive for marijuana. CXR showed no acute abnormalities. CT AP with IV contrast showed nonspecific signs of gastritis, signs of intravascular depletion, other nonemergent findings. MRI brain without contrast pending. Patient admitted to hospital medicine for encephalopathy.   "

## 2024-12-01 NOTE — SUBJECTIVE & OBJECTIVE
Past Medical History:   Diagnosis Date    Behavioral problem     Bulging lumbar disc     Bursitis     bilateral hip    Degenerative cervical disc     Hx of psychiatric care     Hypercholesteremia     Labral tear of hip, degenerative bilateral    MS (multiple sclerosis)     Paresthesia of both lower extremities 3/13/2022    Pneumonia     Spinal cord compression      Past Surgical History:   Procedure Laterality Date    ANGIOGRAM, CORONARY, WITH LEFT HEART CATHETERIZATION Left 3/11/2022    Procedure: Angiogram, Coronary, with Left Heart Cath;  Surgeon: Elie Matamoros MD;  Location: [facility] CATH LAB;  Service: Cardiology;  Laterality: Left;    BREAST SURGERY      augmentation     SECTION, CLASSIC      HAND SURGERY      HYSTEROSCOPY      NECK SURGERY      cervical disc removeal    TUBAL LIGATION      X-STOP IMPLANTATION       Social History     Tobacco Use    Smoking status: Former     Current packs/day: 0.00     Types: Cigarettes     Quit date: 2013     Years since quittin.3     Passive exposure: Past    Smokeless tobacco: Never   Substance Use Topics    Alcohol use: No    Drug use: Yes     Types: Benzodiazepines, Marijuana     Review of patient's allergies indicates:   Allergen Reactions    Adhesive Hives    Neosporin [neomycin-bacitracin-polymyxin] Hives    Neomycin     Neomycin-polymyxin Hives    Neosporin g.u. irrigant     Penicillins Other (See Comments)     Unknown  reaction    Latex Rash       Medications: I have reviewed the current medication administration record.    (Not in a hospital admission)      Review of Systems   Gastrointestinal:  Positive for nausea.   Neurological:  Positive for headaches.   All other systems reviewed and are negative.    Objective:     Vital Signs (Most Recent):  Temp: 98.2 °F (36.8 °C) (24 1746)  Pulse: 96 (24 2350)  Resp: 16 (24 2350)  BP: 134/83 (24 2350)  SpO2: 100 % (24 2350)    Vital Signs Range (Last 24H):  Temp:  [97.6 °F  (36.4 °C)-99.1 °F (37.3 °C)]   Pulse:  []   Resp:  [16-20]   BP: (128-167)/(76-98)   SpO2:  [99 %-100 %]        Physical Exam  Vitals and nursing note reviewed.   Constitutional:       General: She is not in acute distress.  HENT:      Head: Normocephalic and atraumatic.      Mouth/Throat:      Mouth: Mucous membranes are moist.   Eyes:      Extraocular Movements: Extraocular movements intact.      Pupils: Pupils are equal, round, and reactive to light.   Cardiovascular:      Rate and Rhythm: Normal rate.   Pulmonary:      Effort: Pulmonary effort is normal. No respiratory distress.   Abdominal:      Tenderness: There is no guarding.   Skin:     General: Skin is warm and dry.   Neurological:      General: No focal deficit present.      Mental Status: She is alert and oriented to person, place, and time. Mental status is at baseline.      Cranial Nerves: No cranial nerve deficit.      Sensory: No sensory deficit.      Motor: No weakness.   Psychiatric:         Mood and Affect: Mood normal.         Behavior: Behavior normal.              Neurological Exam:   LOC: alert  Attention Span: Good   Language: No aphasia  Articulation: No dysarthria  Orientation: Person, Place, Time   Visual Fields: Full  EOM (CN III, IV, VI): Full/intact  Pupils (CN II, III): PERRL  Facial Sensation (CN V): Normal  Facial Movement (CN VII): Symmetric facial expression    Motor: Arm left  Normal 5/5  Leg left  Normal 5/5  Arm right  Normal 5/5  Leg right Normal 5/5  Cerebellum: No evidence of appendicular or axial ataxia  Sensation: Intact to light touch, temperature and vibration      Laboratory:  CMP:   Recent Labs   Lab 11/30/24  1246   CALCIUM 10.1   ALBUMIN 4.5   PROT 9.1*      K 3.9   CO2 18*      BUN 32*   CREATININE 1.0   ALKPHOS 79   ALT 14   AST 21   BILITOT 2.2*     CBC:   Recent Labs   Lab 11/30/24  1246   WBC 10.00   RBC 5.68*   HGB 17.0*   HCT 48.7*      MCV 86   MCH 29.9   MCHC 34.9     Lipid Panel: No  "results for input(s): "CHOL", "LDLCALC", "HDL", "TRIG" in the last 168 hours.  Coagulation: No results for input(s): "PT", "INR", "APTT" in the last 168 hours.  Hgb A1C: No results for input(s): "HGBA1C" in the last 168 hours.  TSH: No results for input(s): "TSH" in the last 168 hours.    Diagnostic Results:      Brain imaging/Vessel Imaging:    MRI Brain W WO Contrast with MPRage- Pending     MRI Brain WO - 11/30/2024     Impression:     Findings consistent with dural venous thrombosis involving the right transverse sinus, right sigmoid sinus with extension into the right internal jugular vein.  No associated parenchymal abnormality.     Resolution of previous edema within the brain with predominant posterior distribution, in keeping with resolved PRES.    Cardiac Evaluation:     EKG from 11/30/2024     Sinus Tachycardia, vent rate of 114 bpm.    "

## 2024-12-01 NOTE — H&P
"  [facility]  [facility] & Physical    Patient Name: Reza Morrow  MRN: [id number]  Patient Class: OP- Observation  Admission Date: 11/30/2024  Attending Physician: Elkin Rachel MD   Primary Care Provider: Kip Jimenez MD         Patient information was obtained from patient, relative(s), and ER records.     Subjective:     Principal Problem:<principal problem not specified>    Chief Complaint:   Chief Complaint   Patient presents with    Altered Mental Status     Hx of MS, patient takes a multitude of medications. Patient arrives with confusion on and off for a few weeks, patient oriented to self per ems. Patient was seen for same conditions weeks ago.         HPI: Patient is a 59 y.o. F with a pmHx of MS, hypercholesterolemia, and polypharmacy. She presented to the emergency department with altered mental status. Patient was unaware of how she got to the emergency department, was aware that she was brought because she was acting "out of it". Patient thinks that she was brought in by her neighbor, similar to her last hospitalization. Patient has numerous hospitalizations for altered mental status secondary to polypharmacy over the past two years, most recently about 2 weeks ago. Story about how she takes at home meds is unclear, story changes when speaking with her. States she hasn't taken any of her potential muscle relaxers since Wednesday. Her potential at home medications include baclofen, flexeril, robaxin, and tizanidine. Denies chest pain, SOB, fever or chills, N/V, abdominal pain, constipation or diarrhea.     In the ED, the patient's initial vitals were as follows: 99 Fahrenheit, pulse 100, respiratory rate 18, blood pressure 130/90, 99% oxygen sats on room air. UDS presumed positive for marijuana. CXR showed no acute abnormalities. CT AP with IV contrast showed nonspecific signs of gastritis, signs of intravascular depletion, other nonemergent findings. MRI brain " without contrast pending. Patient admitted to hospital medicine for encephalopathy.     Past Medical History:   Diagnosis Date    Behavioral problem     Bulging lumbar disc     Bursitis     bilateral hip    Degenerative cervical disc     Hx of psychiatric care     Hypercholesteremia     Labral tear of hip, degenerative bilateral    MS (multiple sclerosis)     Paresthesia of both lower extremities 3/13/2022    Pneumonia     Spinal cord compression        Past Surgical History:   Procedure Laterality Date    ANGIOGRAM, CORONARY, WITH LEFT HEART CATHETERIZATION Left 3/11/2022    Procedure: Angiogram, Coronary, with Left Heart Cath;  Surgeon: Elie Matamoros MD;  Location: [facility] CATH LAB;  Service: Cardiology;  Laterality: Left;    BREAST SURGERY      augmentation     SECTION, CLASSIC      HAND SURGERY      HYSTEROSCOPY      NECK SURGERY      cervical disc removeal    TUBAL LIGATION      X-STOP IMPLANTATION         Review of patient's allergies indicates:   Allergen Reactions    Adhesive Hives    Neosporin [neomycin-bacitracin-polymyxin] Hives    Neomycin     Neomycin-polymyxin Hives    Neosporin g.u. irrigant     Penicillins Other (See Comments)     Unknown  reaction    Latex Rash       No current facility-administered medications on file prior to encounter.     Current Outpatient Medications on File Prior to Encounter   Medication Sig    buprenorphine HCL (SUBUTEX) 2 mg Subl Place 1 tablet (2 mg total) under the tongue 2 (two) times daily.    gabapentin (NEURONTIN) 300 MG capsule Take 300 mg by mouth 3 (three) times daily.    cyclobenzaprine (FLEXERIL) 10 MG tablet Take 10 mg by mouth 3 (three) times daily as needed for Muscle spasms.    glatiramer (COPAXONE, GLATOPA) 40 mg/mL injection Inject 40 mg into the skin 3 (three) times a week.    naloxone (NARCAN) 4 mg/actuation Spry 1 spray by Nasal route once as needed.    pregabalin (LYRICA) 100 MG capsule Take 100 mg by mouth 3 (three) times daily.     [DISCONTINUED] atorvastatin (LIPITOR) 20 MG tablet Take 20 mg by mouth once daily.    [DISCONTINUED] EScitalopram oxalate (LEXAPRO) 10 MG tablet Take 10 mg by mouth once daily.     Family History       Problem Relation (Age of Onset)    Bipolar disorder Brother    COPD Mother    Cancer Father    Diabetes Father, Sister    Drug abuse Mother    Heart disease Maternal Uncle    Hypothyroidism Maternal Grandmother    Lung cancer Father          Tobacco Use    Smoking status: Former     Current packs/day: 0.00     Types: Cigarettes     Quit date: 2013     Years since quittin.3     Passive exposure: Past    Smokeless tobacco: Never   Substance and Sexual Activity    Alcohol use: No    Drug use: Yes     Types: Benzodiazepines, Marijuana    Sexual activity: Not Currently     Partners: Male     Review of Systems   Unable to perform ROS: Mental status change     Objective:     Vital Signs (Most Recent):  Temp: 98.2 °F (36.8 °C) (24)  Pulse: 89 (24)  Resp: 20 (24)  BP: (!) 163/98 (24)  SpO2: 100 % (24) Vital Signs (24h Range):  Temp:  [97.6 °F (36.4 °C)-99.1 °F (37.3 °C)] 98.2 °F (36.8 °C)  Pulse:  [] 89  Resp:  [18-20] 20  SpO2:  [99 %-100 %] 100 %  BP: (130-167)/(79-98) 163/98     Weight: 52.2 kg (115 lb)  Body mass index is 19.14 kg/m².     Physical Exam  Vitals reviewed.   HENT:      Head: Normocephalic and atraumatic.   Cardiovascular:      Rate and Rhythm: Normal rate and regular rhythm.   Pulmonary:      Effort: Pulmonary effort is normal. No respiratory distress.   Abdominal:      General: Abdomen is flat.      Palpations: Abdomen is soft.   Musculoskeletal:      Right lower leg: No edema.      Left lower leg: No edema.   Skin:     General: Skin is warm and dry.   Neurological:      General: No focal deficit present.      Mental Status: She is alert.      Comments: Patient was alert and oriented to person place and time. Was visibly confused, did not  remember how she got to the hospital. Was visibly anxious on mental status examination, some tangential thinking.                 Significant Labs: All pertinent labs within the past 24 hours have been reviewed.    Significant Imaging: I have reviewed all pertinent imaging results/findings within the past 24 hours.  Assessment/Plan:     Acute encephalopathy  Patient's history and clinical presentation make polypharmacy the most likely reason for her current encephalopathy. Lower suspicion for seizure, withdrawal at this point. Low suspicion for other acute metabolic or neurologic process at this time.     Plan:   - MRI brain ordered, f/u results   - Ordered at home gabapentin, suboxone.   - delirium precautions   - hydroxyzine prn for anxiety       Intravascular volume depletion  Noted on CT AP with contrast, is hemoconcentrating on CBC.     Plan:   - oral hydration with pedialyte       Polypharmacy  Noted as likely cause of patient's acute encephalopathy.     Plan:   - see acute encephalopathy plan       Anxiety  Plan:   - hydroxyzine prn         MS (multiple sclerosis)  Patient is on glatiramer at home three times a week.     Plan:   - continue to monitor. Can add back at home glatiramer if patient is in the hospital long enough.       Benzodiazepine dependence  See acute encephalopathy plan.       Opioid dependence  See acute encephalopathy plan.         VTE Risk Mitigation (From admission, onward)           Ordered     heparin (porcine) injection 5,000 Units  Every 12 hours         11/30/24 2014     IP VTE LOW RISK PATIENT  Once         11/30/24 1826     Place sequential compression device  Until discontinued         11/30/24 1826                           On 11/30/2024, patient should be placed in hospital observation services under my care in collaboration with Hospital medicine team 4.         Semaj Curry MD  Department of Hospital Medicine  [facility] - Emergency Dept

## 2024-12-01 NOTE — CARE UPDATE
MRI brain w/wo contrast with MPRage negative for VST.     Plan:  -Patient's symptoms not from a vascular neurology origin.  -Ok to discontinue heparin drip.  -Recommend metabolic workup. Further workup per primary team.  -Case discussed with stroke fellow.    Thank you for including us in the care of this patient. Vascular neurology will sign off at this time. Please do not hesitate to contact us at [phone] with any questions or concerns. Re-consult with any new symptoms concerning for acute stroke.

## 2024-12-01 NOTE — PLAN OF CARE
12/01/24 1000   Discharge Assessment   Assessment Type Discharge Planning Assessment   Confirmed/corrected address, phone number and insurance Yes   Confirmed Demographics Correct on Facesheet   Source of Information patient   Does patient/caregiver understand observation status Yes   Communicated JOANNE with patient/caregiver Yes   Reason For Admission Tachycardia   People in Home friend(s)   Facility Arrived From: patient presented  to the ED from home   Do you expect to return to your current living situation? Yes   Do you have help at home or someone to help you manage your care at home? Yes   Who are your caregiver(s) and their phone number(s)? Celeste Olivarez ,sister at [phone]   Prior to hospitilization cognitive status: Alert/Oriented   Current cognitive status: Alert/Oriented   Walking or Climbing Stairs Difficulty no   Dressing/Bathing Difficulty no   Equipment Currently Used at Home none   Readmission within 30 days? No   Patient currently being followed by outpatient case management? No   Do you currently have service(s) that help you manage your care at home? No   Do you take prescription medications? Yes   Do you have prescription coverage? Yes   Cover[place]   Do you have any problems affording any of your prescribed medications? No   Is the patient taking medications as prescribed? yes   Who is going to help you get home at discharge? Celeste Olivarez ,sister at [phone]   How do you get to doctors appointments? family or friend will provide;health plan transportation   Are you on dialysis? No   Do you take coumadin? No   Discharge Plan A Home   Discharge Plan B Home with family   DME Needed Upon Discharge  none   Discharge Plan discussed with: Patient   Transition of Care Barriers None   Physical Activity   On average, how many days per week do you engage in moderate to strenuous exercise (like a brisk walk)? Pt Declined   On average, how many minutes do you engage in exercise at  this level? Pt Declined   Financial Resource Strain   How hard is it for you to pay for the very basics like food, housing, medical care, and heating? Not very   Housing Stability   In the last 12 months, was there a time when you were not able to pay the mortgage or rent on time? N   At any time in the past 12 months, were you homeless or living in a shelter (including now)? N   Transportation Needs   Has the lack of transportation kept you from medical appointments, meetings, work or from getting things needed for daily living? No   Food Insecurity   Within the past 12 months, you worried that your food would run out before you got the money to buy more. Never true   Within the past 12 months, the food you bought just didn't last and you didn't have money to get more. Never true   Stress   Do you feel stress - tense, restless, nervous, or anxious, or unable to sleep at night because your mind is troubled all the time - these days? Not at all   Social Isolation   How often do you feel lonely or isolated from those around you?  Patient declined   Alcohol Use   Q1: How often do you have a drink containing alcohol? Pt Declined   Q2: How many drinks containing alcohol do you have on a typical day when you are drinking? Pt Declined   Q3: How often do you have six or more drinks on one occasion? Pt Declined   Utilities   In the past 12 months has the electric, gas, oil, or water company threatened to shut off services in your home? No   Health Literacy   How often do you need to have someone help you when you read instructions, pamphlets, or other written material from your doctor or pharmacy? Sometimes   OTHER   Name(s) of People in Home Celeste Olivarez ,sister at [phone]      met with patient at bedside to complete discharge planning assessment.  Patient alert and oriented xs 4.  Patient verified all demographic information on facesheet is correct.  Patient verified PCP is Dr Jimenez . Patient verified primary  health [place] [employer] [facility]  .  Patient with NO home health but has no listed DME.  Patient is a full code . Patient not on dialysis or medication coumadin.  Patient with no 30 day admission.  Patient with no financial issues at this time.  Patient family will provide transportation upon discharge from facility.  Patient's has help at home,she live with family .  Patient's family will provide transport at discharge.

## 2024-12-01 NOTE — PROGRESS NOTES
[facility]  Discharge Final Note    Primary Care Provider: Kip Jimenez MD    Expected Discharge Date: 12/1/2024    Final Discharge Note (most recent)       Final Note - 12/01/24 1000          Final Note    Assessment Type Discharge Planning Assessment        Post-Acute Status    Coverage [place] medicaid                     Important Message from Medicare             Contact Info       Kip Jimenez MD   Specialty: Family Medicine   Relationship: PCP - General    [address] [place] [address]   Phone: [phone]       Next Steps: Go in 1 week(s)    Psychiatry        Next Steps: Call    Instructions: OUT PATIENT  SERVICES- you will be contacted directly with a followup appointment / your appointment will also appear in your [email or web address] Portal    Pain Management        Next Steps: Go to    Instructions: OUT PATIENT  SERVICES-you will be contacted directly with a followup appointment / your appointment will also appear in your [facility] Portal          Patient's chart has been reviewed by CM. Patient does not have any additional needs. Patient is cleared to discharge home with family.

## 2024-12-01 NOTE — PROGRESS NOTES
[facility]  Initial Discharge Assessment       Primary Care Provider: Kip Jimenez MD    Admission Diagnosis: Tachycardia [R00.0]  Encephalopathy acute [G93.40]  Chest pain [R07.9]  Epigastric abdominal tenderness, rebound tenderness presence not specified [R10.816]    Admission Date: 11/30/2024  Expected Discharge Date:     Transition of Care Barriers: None    Payor: MEDICAID / Plan: [facility] CONNECT / Product Type: Managed Medicaid /     Extended Emergency Contact Information  Primary Emergency Contact: Celeste Olivarez   [facility]  Home Phone: [phone]  Relation: Sister  Secondary Emergency Contact: Jennifer Gutierrez  Mobile Phone: [phone]  Relation: Friend   needed? No    Discharge Plan A: Home  Discharge Plan B: Home with family      [facility]  [address]  [facility] [address]  Phone: [phone] Fax: [phone]    [id number] @ [place], [place] - [address] AT [facility] [address]  [address]  [facility] [address]  [facility] [address]  Phone: [phone] Fax: [phone]    [id number] - [place] - [address] AT [facility] [address]  [address] [address]  [address] [place] [address]  Phone: [phone] Fax: [phone]      Initial Assessment (most recent)       Adult Discharge Assessment - 12/01/24 1000          Discharge Assessment    Assessment Type Discharge Planning Assessment     Confirmed/corrected address, phone number and insurance Yes     Confirmed Demographics Correct on Facesheet     Source of Information patient     Does patient/caregiver understand observation status Yes     Communicated JOANNE with patient/caregiver Yes     Reason For Admission Tachycardia     People in Home friend(s)     Facility Arrived From: patient presented  to the ED from home     Do you expect to return to your current living situation? Yes     Do you have help at home  or someone to help you manage your care at home? Yes     Who are your caregiver(s) and their phone number(s)? Celeste Olivarez ,sister at [phone]     Prior to hospitilization cognitive status: Alert/Oriented     Current cognitive status: Alert/Oriented     Walking or Climbing Stairs Difficulty no     Dressing/Bathing Difficulty no     Equipment Currently Used at Home none     Readmission within 30 days? No     Patient currently being followed by outpatient case management? No     Do you currently have service(s) that help you manage your care at home? No     Do you take prescription medications? Yes     Do you have prescription coverage? Yes     Coverage [place]     Do you have any problems affording any of your prescribed medications? No     Is the patient taking medications as prescribed? yes     Who is going to help you get home at discharge? Ceelste Olivarez ,sister at [phone]     How do you get to doctors appointments? family or friend will provide;health plan transportation     Are you on dialysis? No     Do you take coumadin? No     Discharge Plan A Home     Discharge Plan B Home with family     DME Needed Upon Discharge  none     Discharge Plan discussed with: Patient     Transition of Care Barriers None        Physical Activity    On average, how many days per week do you engage in moderate to strenuous exercise (like a brisk walk)? Patient declined     On average, how many minutes do you engage in exercise at this level? Patient declined        Financial Resource Strain    How hard is it for you to pay for the very basics like food, housing, medical care, and heating? Not very hard        Housing Stability    In the last 12 months, was there a time when you were not able to pay the mortgage or rent on time? No     At any time in the past 12 months, were you homeless or living in a shelter (including now)? No        Transportation Needs    Has the lack of transportation kept you from medical  appointments, meetings, work or from getting things needed for daily living? No        Food Insecurity    Within the past 12 months, you worried that your food would run out before you got the money to buy more. Never true     Within the past 12 months, the food you bought just didn't last and you didn't have money to get more. Never true        Stress    Do you feel stress - tense, restless, nervous, or anxious, or unable to sleep at night because your mind is troubled all the time - these days? Not at all        Social Isolation    How often do you feel lonely or isolated from those around you?  Patient declined        Alcohol Use    Q1: How often do you have a drink containing alcohol? Patient declined     Q2: How many drinks containing alcohol do you have on a typical day when you are drinking? Patient declined     Q3: How often do you have six or more drinks on one occasion? Patient declined        Utilities    In the past 12 months has the electric, gas, oil, or water company threatened to shut off services in your home? No        Health Literacy    How often do you need to have someone help you when you read instructions, pamphlets, or other written material from your doctor or pharmacy? Sometimes        OTHER    Name(s) of People in Home Celeste Olivarez ,sister at [phone]

## 2024-12-01 NOTE — DISCHARGE INSTRUCTIONS
Schedule an appointment with your PCP for a hospital follow-up appointment within the next 1 to 2 weeks  Go to the ED if you experience any of the following: fever, chills, chest pain, difficulty breathing, abdominal pain, nausea, vomiting, diarrhea, or debilitating pain.  Call your PCP for any other concerns. If your PCP is unavailable, please go to the ED.    Continue previously prescribed methocarbamol and baclofen. Medications were previously filled from another provider. DO NOT take cyclobenzaprine or tizanidine, those medications have been discontinued. Follow up with primary care provider regarding muscle relaxer regimen.

## 2024-12-01 NOTE — ASSESSMENT & PLAN NOTE
58 y/o female with a PMHx of Multiple Sclerosis (MS), Posterior reversible encephalopathy syndrome (PRES), hypercholesterolemia, and polypharmacy presents to the [facility] emergency department on 11/30 with altered mental status. Per patient, she felt confused and had incoherent speech so her neighbors decided she needed to come to be evaluated at the ED. Per chart review, patient has numerous hospitalizations for altered mental status secondary to polypharmacy over the past two years, most recently about 2 weeks ago. UDS presumed positive for marijuana. MRI brain without contrast showed loss of signal on R transverse, R sigmoid and R jugular concerning for Venous Sinus Thrombosis (VST). Patient admitted to hospital medicine for encephalopathy and vascular neurology consulted due to MRI findings.     -NIH 0  -Endorse R sided headache and nausea but denies any vomiting, diplopia, pain with eye movement, pain with leaning forward, or history of blood clots.   -Recommend obtaining MRI brain w/wo contrast with MPRage to evaluate the sinus and rule out VST  -Recommend starting low dose heparin drip with no bolus     Thank you for your consult. Vascular neurology will follow up on imaging if obtained and follow on consulting service. Please do not hesitate to call us on [phone] with any questions or concerns.

## 2024-12-01 NOTE — HPI
60 y/o female with a PMHx of Multiple Sclerosis (MS), Posterior reversible encephalopathy syndrome (PRES), hypercholesterolemia, and polypharmacy presents to the [facility] emergency department on 11/30 with altered mental status. Per patient, she felt confused and had incoherent speech so her neighbors decided she needed to come to be evaluated at the ED. Per chart review, patient has numerous hospitalizations for altered mental status secondary to polypharmacy over the past two years, most recently about 2 weeks ago. UDS presumed positive for marijuana. MRI brain without contrast showed loss of signal on R transverse, R sigmoid and R jugular concerning for Venous Sinus Thrombosis. Patient admitted to hospital medicine for encephalopathy and vascular neurology consulted due to MRI findings.

## 2024-12-01 NOTE — DISCHARGE SUMMARY
"[facility] Medicine  Discharge Summary      Patient Name: Reza Morrow  MRN: [id number]  MCKAYLA: [id number]  Patient Class: OP- Observation  Admission Date: 11/30/2024  Hospital Length of Stay: 0 days  Discharge Date and Time:  12/01/2024 2:59 PM  Attending Physician: Elkin Rachel MD   Discharging Provider: Semaj Curry MD  Primary Care Provider: Kip Jimenez MD  [facility] Medicine Team: [facility] 4 Semaj Curry MD  Primary Care Team: [facility] 4    HPI:   Patient is a 59 y.o. F with a pmHx of MS, hypercholesterolemia, and polypharmacy. She presented to the emergency department with altered mental status. Patient was unaware of how she got to the emergency department, was aware that she was brought because she was acting "out of it". Patient thinks that she was brought in by her neighbor, similar to her last hospitalization. Patient has numerous hospitalizations for altered mental status secondary to polypharmacy over the past two years, most recently about 2 weeks ago. Story about how she takes at home meds is unclear, story changes when speaking with her. States she hasn't taken any of her potential muscle relaxers since Wednesday. Her potential at home medications include baclofen, flexeril, robaxin, and tizanidine. Denies chest pain, SOB, fever or chills, N/V, abdominal pain, constipation or diarrhea.     In the ED, the patient's initial vitals were as follows: 99 Fahrenheit, pulse 100, respiratory rate 18, blood pressure 130/90, 99% oxygen sats on room air. UDS presumed positive for marijuana. CXR showed no acute abnormalities. CT AP with IV contrast showed nonspecific signs of gastritis, signs of intravascular depletion, other nonemergent findings. MRI brain without contrast pending. Patient admitted to hospital medicine for encephalopathy.     * No surgery found *      Hospital Course:   Patient had MRI brain without contrast that was initially concerning for dural venous " thrombosis involving the right transverse sinus, right sigmoid sinus with extension into the right internal jugular vein. Vascular neurology was consulted, recommended repeating an MRI brain but with and without contrast to evaluate the sinus and rule out VST. MRI brain with and without contrast did not show evidence of dural venous sinus thrombosis, vascular neurology felt her symptoms were not of a vascular origin. By the morning of 12/1, the patient's mental status had improved significantly from initial presentation. Patient's change in mental status most likely secondary to polypharmacy. Patient's gabapentin continued, lyrica discontinued. Patient's baclofen and robaxin continued, flexeril and tizanidine discontinued. Some pressured speech was noted on examination 12/1, outpatient psychiatry referral sent. Outpatient chronic pain referral also sent on discharge.      Goals of Care Treatment Preferences:  Code Status: Full Code        Objective:     Vital Signs (Most Recent):  Temp: 98 °F (36.7 °C) (12/01/24 1113)  Pulse: 100 (12/01/24 1113)  Resp: 18 (12/01/24 1113)  BP: (!) 166/93 (12/01/24 1113)  SpO2: 96 % (12/01/24 1113) Vital Signs (24h Range):  Temp:  [97.6 °F (36.4 °C)-98.5 °F (36.9 °C)] 98 °F (36.7 °C)  Pulse:  [] 100  Resp:  [16-20] 18  SpO2:  [96 %-100 %] 96 %  BP: (128-167)/(76-99) 166/93     Weight: 53.8 kg (118 lb 9.7 oz)  Body mass index is 21.69 kg/m².    Intake/Output Summary (Last 24 hours) at 12/1/2024 1442  Last data filed at 12/1/2024 0454  Gross per 24 hour   Intake 200 ml   Output --   Net 200 ml         Physical Exam  Vitals reviewed.   HENT:      Head: Normocephalic and atraumatic.   Cardiovascular:      Rate and Rhythm: Normal rate and regular rhythm.      Pulses: Normal pulses.      Heart sounds: Normal heart sounds. No murmur heard.  Pulmonary:      Effort: Pulmonary effort is normal. No respiratory distress.   Abdominal:      General: Abdomen is flat.      Palpations: Abdomen  is soft.   Musculoskeletal:      Right lower leg: No edema.      Left lower leg: No edema.   Skin:     General: Skin is warm and dry.   Neurological:      General: No focal deficit present.      Mental Status: She is alert.   Psychiatric:      Comments: Some pressured speech noted.              Significant Labs: All pertinent labs within the past 24 hours have been reviewed.    Significant Imaging: I have reviewed all pertinent imaging results/findings within the past 24 hours.      SDOH Screening:  The patient was screened for utility difficulties, food insecurity, transport difficulties, housing insecurity, and interpersonal safety and there were no concerns identified this admission.     Consults:   Consults (From admission, onward)          Status Ordering Provider     Inpatient consult to Vascular (Stroke) Neurology  Once        Provider:  (Not yet assigned)    TRISTAN Osullivan            No new Assessment & Plan notes have been filed under this hospital service since the last note was generated.  Service: Hospital Medicine    Final Active Diagnoses:    Diagnosis Date Noted POA    PRINCIPAL PROBLEM:  Acute encephalopathy [G93.40] 05/24/2023 Yes    Intravascular volume depletion [E86.1] 11/30/2024 Yes    Polypharmacy [Z79.899] 11/14/2024 Not Applicable    Anxiety [F41.9] 06/11/2023 Yes    Opioid dependence [F11.20] 06/23/2016 Yes     Chronic    Benzodiazepine dependence [F13.20] 06/23/2016 Yes     Chronic    MS (multiple sclerosis) [G35] 12/07/2015 Yes     Chronic      Problems Resolved During this Admission:       Discharged Condition: stable    Disposition:   Home    Follow Up:   Follow-up Information       Post, Kip HENRY MD. Go in 1 week(s).    Specialty: Family Medicine  Contact information:  [address] [place] [id number]  [phone]                           Patient Instructions:      Ambulatory referral/consult to Psychiatry   Standing Status: Future   Referral Priority:  Routine Referral Type: Psychiatric   Referral Reason: Specialty Services Required   Requested Specialty: Psychiatry   Number of Visits Requested: 1     Ambulatory referral/consult to Pain Clinic   Standing Status: Future   Referral Priority: Routine Referral Type: Consultation   Referral Reason: Specialty Services Required   Requested Specialty: Pain Medicine   Number of Visits Requested: 1       Significant Diagnostic Studies: N/A    Pending Diagnostic Studies:       Procedure Component Value Units Date/Time    APTT [[id number]] Collected: 12/01/24 0329    Order Status: Sent Lab Status: No result     Specimen: Blood            Medications:  Reconciled Home Medications:      Medication List        CONTINUE taking these medications      buprenorphine HCL 2 mg Subl  Commonly known as: SUBUTEX  Place 1 tablet (2 mg total) under the tongue 2 (two) times daily.     gabapentin 300 MG capsule  Commonly known as: NEURONTIN  Take 300 mg by mouth 3 (three) times daily.     glatiramer 40 mg/mL injection  Commonly known as: COPAXONE, GLATOPA  Inject 40 mg into the skin 3 (three) times a week.     naloxone 4 mg/actuation Spry  Commonly known as: NARCAN  1 spray by Nasal route once as needed.            STOP taking these medications      cyclobenzaprine 10 MG tablet  Commonly known as: FLEXERIL     pregabalin 100 MG capsule  Commonly known as: LYRICA              Indwelling Lines/Drains at time of discharge:   Lines/Drains/Airways       None                   Time spent on the discharge of patient: 55 minutes         Semaj Curry MD  Department of Hospital Medicine  [facility] - Surgery

## 2024-12-01 NOTE — SUBJECTIVE & OBJECTIVE
Interval History: Patient's MRI brain without contrast initially concerning for dural venous sinus thrombosis. Vascular neurology consulted, ordered repeat MRI brain with and without contrast that did not show evidence of dural venous sinus thrombosis. Vascular neurology signed off after this. Patient's mental status improved today on examination. Some pressured speech noted, psychiatry referral placed. Chronic pain referral placed. D/c flexeril, lyrica and tizanidine.     Review of Systems   Constitutional: Negative.    Respiratory: Negative.     Cardiovascular: Negative.    Gastrointestinal: Negative.    Genitourinary: Negative.    Neurological: Negative.    Psychiatric/Behavioral:  Positive for confusion. The patient is nervous/anxious.      Objective:     Vital Signs (Most Recent):  Temp: 98 °F (36.7 °C) (12/01/24 1113)  Pulse: 100 (12/01/24 1113)  Resp: 18 (12/01/24 1113)  BP: (!) 166/93 (12/01/24 1113)  SpO2: 96 % (12/01/24 1113) Vital Signs (24h Range):  Temp:  [97.6 °F (36.4 °C)-98.5 °F (36.9 °C)] 98 °F (36.7 °C)  Pulse:  [] 100  Resp:  [16-20] 18  SpO2:  [96 %-100 %] 96 %  BP: (128-167)/(76-99) 166/93     Weight: 53.8 kg (118 lb 9.7 oz)  Body mass index is 21.69 kg/m².    Intake/Output Summary (Last 24 hours) at 12/1/2024 1442  Last data filed at 12/1/2024 0454  Gross per 24 hour   Intake 200 ml   Output --   Net 200 ml         Physical Exam  Vitals reviewed.   HENT:      Head: Normocephalic and atraumatic.   Cardiovascular:      Rate and Rhythm: Normal rate and regular rhythm.      Pulses: Normal pulses.      Heart sounds: Normal heart sounds. No murmur heard.  Pulmonary:      Effort: Pulmonary effort is normal. No respiratory distress.   Abdominal:      General: Abdomen is flat.      Palpations: Abdomen is soft.   Musculoskeletal:      Right lower leg: No edema.      Left lower leg: No edema.   Skin:     General: Skin is warm and dry.   Neurological:      General: No focal deficit present.       Mental Status: She is alert.   Psychiatric:      Comments: Some pressured speech noted.              Significant Labs: All pertinent labs within the past 24 hours have been reviewed.    Significant Imaging: I have reviewed all pertinent imaging results/findings within the past 24 hours.

## 2024-12-01 NOTE — CONSULTS
[facility] - Emergency Dept  Vascular Neurology  Comprehensive Stroke Center  Consult Note    Inpatient consult to Vascular (Stroke) Neurology  Consult performed by: Pao Mejia DNP  Consult ordered by: Guicho Blanco MD  Reason for consult: Concern for VST on MRI Brain WO        Assessment/Plan:     Patient is a 59 y.o. year old female with:    * Acute encephalopathy  58 y/o female with a PMHx of Multiple Sclerosis (MS), Posterior reversible encephalopathy syndrome (PRES), hypercholesterolemia, and polypharmacy presents to the [facility] emergency department on 11/30 with altered mental status. Per patient, she felt confused and had incoherent speech so her neighbors decided she needed to come to be evaluated at the ED. Per chart review, patient has numerous hospitalizations for altered mental status secondary to polypharmacy over the past two years, most recently about 2 weeks ago. UDS presumed positive for marijuana. MRI brain without contrast showed loss of signal on R transverse, R sigmoid and R jugular concerning for Venous Sinus Thrombosis (VST). Patient admitted to hospital medicine for encephalopathy and vascular neurology consulted due to MRI findings.     -NIH 0. Alert and oriented to time, place, person and situation.   -Endorse R sided headache and nausea but denies any vomiting, diplopia, pain with eye movement, pain with leaning forward, or history of blood clots.   -Recommend obtaining MRI brain w/wo contrast with MPRage to evaluate the sinus and rule out VST  -Recommend starting low dose heparin drip with no bolus     Thank you for your consult. Vascular neurology will follow up on imaging if obtained and follow on consulting service. Please do not hesitate to call us on [phone] with any questions or concerns.             STROKE DOCUMENTATION          NIH Scale:  Interval: baseline  1a. Level of Consciousness: 0-->Alert, keenly responsive  1b. LOC Questions: 0-->Answers both questions  correctly  1c. LOC Commands: 0-->Performs both tasks correctly  2. Best Gaze: 0-->Normal  3. Visual: 0-->No visual loss  4. Facial Palsy: 0-->Normal symmetrical movements  5a. Motor Arm, Left: 0-->No drift, limb holds 90 (or 45) degrees for full 10 secs  5b. Motor Arm, Right: 0-->No drift, limb holds 90 (or 45) degrees for full 10 secs  6a. Motor Leg, Left: 0-->No drift, leg holds 30 degree position for full 5 secs  6b. Motor Leg, Right: 0-->No drift, leg holds 30 degree position for full 5 secs  7. Limb Ataxia: 0-->Absent  8. Sensory: 0-->Normal, no sensory loss  9. Best Language: 0-->No aphasia, normal  10. Dysarthria: 0-->Normal  11. Extinction and Inattention (formerly Neglect): 0-->No abnormality  Total (NIH Stroke Scale): 0    Modified [place] Score: 0  [place] Coma Scale:15   ABCD2 Score:    [id number] Score:   HAS -BLED Score:   ICH Score:   Hunt & Nielsen Classification:       Thrombolysis Candidate? No, Out of window - Symptom onset > 4.5 hours    Delays to Thrombolysis?  Not Applicable    Interventional Revascularization Candidate?   Is the patient eligible for mechanical endovascular reperfusion (FELIX)?  No; at this time symptoms not suggestive of large vessel occlusion    Delays to Thrombectomy? Not Applicable    Hemorrhagic change of an Ischemic Stroke: Does this patient have an ischemic stroke with hemorrhagic changes? No     Subjective:     History of Present Illness:  58 y/o female with a PMHx of Multiple Sclerosis (MS), Posterior reversible encephalopathy syndrome (PRES), hypercholesterolemia, and polypharmacy presents to the [facility] emergency department on 11/30 with altered mental status. Per patient, she felt confused and had incoherent speech so her neighbors decided she needed to come to be evaluated at the ED. Per chart review, patient has numerous hospitalizations for altered mental status secondary to polypharmacy over the past two years, most recently about 2 weeks ago. UDS presumed positive for  marijuana. MRI brain without contrast showed loss of signal on R transverse, R sigmoid and R jugular concerning for Venous Sinus Thrombosis. Patient admitted to hospital medicine for encephalopathy and vascular neurology consulted due to MRI findings.     No new subjective & objective note has been filed under this hospital service since the last note was generated.      Pao Mejia, CHITO  [facility] Stroke Center  Department of Vascular Neurology   [facility] - Emergency Dept

## 2024-12-01 NOTE — HOSPITAL COURSE
Patient had MRI brain without contrast that was initially concerning for dural venous thrombosis involving the right transverse sinus, right sigmoid sinus with extension into the right internal jugular vein. Vascular neurology was consulted, recommended repeating an MRI brain but with and without contrast to evaluate the sinus and rule out VST. MRI brain with and without contrast did not show evidence of dural venous sinus thrombosis, vascular neurology felt her symptoms were not of a vascular origin. By the morning of 12/1, the patient's mental status had improved significantly from initial presentation. Patient's change in mental status most likely secondary to polypharmacy. Patient's gabapentin continued, lyrica discontinued. Patient's baclofen and robaxin continued, flexeril and tizanidine discontinued. Some pressured speech was noted on examination 12/1, outpatient psychiatry referral sent. Outpatient chronic pain referral also sent on discharge.

## 2024-12-01 NOTE — ASSESSMENT & PLAN NOTE
Noted as likely cause of patient's acute encephalopathy.     Plan:   - see acute encephalopathy plan

## 2024-12-01 NOTE — ASSESSMENT & PLAN NOTE
Patient is on glatiramer at home three times a week.     Plan:   - continue to monitor. Can add back at home glatiramer if patient is in the hospital long enough.

## 2024-12-01 NOTE — ASSESSMENT & PLAN NOTE
58 y/o female with a PMHx of Multiple Sclerosis (MS), Posterior reversible encephalopathy syndrome (PRES), hypercholesterolemia, and polypharmacy presents to the [facility] emergency department on 11/30 with altered mental status. Per patient, she felt confused and had incoherent speech so her neighbors decided she needed to come to be evaluated at the ED. Per chart review, patient has numerous hospitalizations for altered mental status secondary to polypharmacy over the past two years, most recently about 2 weeks ago. UDS presumed positive for marijuana. MRI brain without contrast showed loss of signal on R transverse, R sigmoid and R jugular concerning for Venous Sinus Thrombosis (VST). Patient admitted to hospital medicine for encephalopathy and vascular neurology consulted due to MRI findings.     -NIH 0. Alert and oriented to time, place, person and situation.   -Endorse R sided headache and nausea but denies any vomiting, diplopia, pain with eye movement, pain with leaning forward, or history of blood clots.   -Recommend obtaining MRI brain w/wo contrast with MPRage to evaluate the sinus and rule out VST  -Recommend starting low dose heparin drip with no bolus     Thank you for your consult. Vascular neurology will follow up on imaging if obtained and follow on consulting service. Please do not hesitate to call us on [phone] with any questions or concerns.

## 2024-12-02 ENCOUNTER — TELEPHONE (OUTPATIENT)
Dept: PAIN MEDICINE | Facility: CLINIC | Age: 59
End: 2024-12-02
Payer: MEDICAID

## 2024-12-02 ENCOUNTER — PATIENT OUTREACH (OUTPATIENT)
Dept: ADMINISTRATIVE | Facility: OTHER | Age: 59
End: 2024-12-02
Payer: COMMERCIAL

## 2024-12-02 NOTE — TELEPHONE ENCOUNTER
Spoke with patient. Dicussed her current pain issues. Stated she has MS, a hip dysplasia from a fall almost 30 years ago, and cervical/thoracic degradation that required surgery. Said she is in chronic pain to her back and is difficult to lead a quality life. Informed her the insurance coverage she has is accepted in pain management but after the provider's review of the history/imaging. Made aware of the role of IPM and plans of care/treatment. Verbalized understanding. No further issues discussed.

## 2024-12-02 NOTE — PLAN OF CARE
Spoke to  Malka at [facility] to schedule patient an appointment, but the clinic will have to send a message to the patient once appointment has been establish.      Shiv Nunez CHW, RSW  Case Management

## 2024-12-02 NOTE — TELEPHONE ENCOUNTER
Callback message left. Inform patient the provider has reviewed the history and would like to see patient in clinic to discuss treatment options.

## 2024-12-02 NOTE — TELEPHONE ENCOUNTER
----- Message from Malka sent at 12/2/2024  8:22 AM CST -----  Regarding: Appt  Contact: [phone]  Reza Bergeron calling regarding Appointment Access  (message) for [facility]  called to schedule pt , no appt in [facility] pls advise and schedule pt and add to waitlist

## 2024-12-02 NOTE — NURSING
Pt is being discharged from the unit. Pt was read and understood discharge instructions. IV has been taken out. Pt declined wheelchair transportation, will instead walk down with neighbor at her side to take her home.

## 2024-12-02 NOTE — TELEPHONE ENCOUNTER
Spoke with pt stating we received a message to schedule the pt for a consult with Dr. Andersen, however we do not accept new medicaid pt's at this location. Pt stated she received a call from Rory the scheduling nurse in our [place] location and he stated she would look into a possible exception for the pt. I stated I would forward the message to rory to follow up with the pt. Pt verbalized understanding.      ----- Message from Radha sent at 12/2/2024 10:13 AM CST -----  Regarding: call back  Type: Patient Call Back    Who called: Mary, [facility]      What is the request in detail: requesting call to schedule new pt appt, unsure if clinic accepts new Medicaid pts     Can the clinic reply by [facility]?no    Would the patient rather a call back or a response via [facility]? call    Best call back number: [phone]     Additional Information:

## 2024-12-05 LAB
BACTERIA BLD CULT: NORMAL
BACTERIA BLD CULT: NORMAL

## 2024-12-06 ENCOUNTER — TELEPHONE (OUTPATIENT)
Dept: PAIN MEDICINE | Facility: CLINIC | Age: 59
End: 2024-12-06
Payer: MEDICAID

## 2024-12-06 NOTE — TELEPHONE ENCOUNTER
Spoke with patient. Informed the provider would like to see her in clinic. Appointment date/time offered and accepted. No further issues discussed.

## 2024-12-06 NOTE — TELEPHONE ENCOUNTER
----- Message from Roma sent at 12/6/2024  3:40 PM CST -----  Contact: pt @ [phone]  MARC BERGERON calling regarding Patient Advice (message) for #pt returning call from Pavel, asking for call back

## 2024-12-11 ENCOUNTER — HOSPITAL ENCOUNTER (OUTPATIENT)
Facility: HOSPITAL | Age: 59
Discharge: HOME | End: 2024-12-13
Attending: EMERGENCY MEDICINE | Admitting: EMERGENCY MEDICINE
Payer: COMMERCIAL

## 2024-12-11 DIAGNOSIS — R68.89 WITHDRAWAL COMPLAINT: ICD-10-CM

## 2024-12-11 DIAGNOSIS — Z00.8 MEDICAL CLEARANCE FOR PSYCHIATRIC ADMISSION: ICD-10-CM

## 2024-12-11 DIAGNOSIS — R07.9 CHEST PAIN: ICD-10-CM

## 2024-12-11 DIAGNOSIS — R41.82 ALTERED MENTAL STATUS, UNSPECIFIED ALTERED MENTAL STATUS TYPE: Primary | ICD-10-CM

## 2024-12-11 PROBLEM — F12.90 MARIJUANA USE: Status: ACTIVE | Noted: 2024-12-11

## 2024-12-11 LAB
ALBUMIN SERPL BCP-MCNC: 4.2 G/DL (ref 3.5–5.2)
ALLENS TEST: ABNORMAL
ALLENS TEST: NORMAL
ALP SERPL-CCNC: 79 U/L (ref 40–150)
ALT SERPL W/O P-5'-P-CCNC: 15 U/L (ref 10–44)
AMMONIA PLAS-SCNC: 42 UMOL/L (ref 10–50)
AMORPH CRY UR QL COMP ASSIST: ABNORMAL
AMPHET+METHAMPHET UR QL: NEGATIVE
ANION GAP SERPL CALC-SCNC: 15 MMOL/L (ref 8–16)
APAP SERPL-MCNC: <3 UG/ML (ref 10–20)
AST SERPL-CCNC: 18 U/L (ref 10–40)
BACTERIA #/AREA URNS AUTO: ABNORMAL /HPF
BARBITURATES UR QL SCN>200 NG/ML: NEGATIVE
BASOPHILS # BLD AUTO: 0.02 K/UL (ref 0–0.2)
BASOPHILS NFR BLD: 0.2 % (ref 0–1.9)
BENZODIAZ UR QL SCN>200 NG/ML: NEGATIVE
BILIRUB SERPL-MCNC: 1.6 MG/DL (ref 0.1–1)
BILIRUB UR QL STRIP: NEGATIVE
BUN SERPL-MCNC: 24 MG/DL (ref 6–20)
BZE UR QL SCN: NEGATIVE
CALCIUM SERPL-MCNC: 10.1 MG/DL (ref 8.7–10.5)
CANNABINOIDS UR QL SCN: ABNORMAL
CHLORIDE SERPL-SCNC: 106 MMOL/L (ref 95–110)
CK SERPL-CCNC: 72 U/L (ref 20–180)
CLARITY UR REFRACT.AUTO: ABNORMAL
CO2 SERPL-SCNC: 22 MMOL/L (ref 23–29)
COLOR UR AUTO: YELLOW
CREAT SERPL-MCNC: 0.8 MG/DL (ref 0.5–1.4)
CREAT UR-MCNC: 224 MG/DL (ref 15–325)
DIFFERENTIAL METHOD BLD: ABNORMAL
EOSINOPHIL # BLD AUTO: 0 K/UL (ref 0–0.5)
EOSINOPHIL NFR BLD: 0 % (ref 0–8)
ERYTHROCYTE [DISTWIDTH] IN BLOOD BY AUTOMATED COUNT: 14.3 % (ref 11.5–14.5)
EST. GFR  (NO RACE VARIABLE): >60 ML/MIN/1.73 M^2
ETHANOL SERPL-MCNC: <10 MG/DL
GLUCOSE SERPL-MCNC: 112 MG/DL (ref 70–110)
GLUCOSE UR QL STRIP: NEGATIVE
HCO3 UR-SCNC: 19.3 MMOL/L (ref 24–28)
HCT VFR BLD AUTO: 48.8 % (ref 37–48.5)
HGB BLD-MCNC: 17 G/DL (ref 12–16)
HGB UR QL STRIP: NEGATIVE
HYALINE CASTS UR QL AUTO: 0 /LPF
IMM GRANULOCYTES # BLD AUTO: 0.04 K/UL (ref 0–0.04)
IMM GRANULOCYTES NFR BLD AUTO: 0.4 % (ref 0–0.5)
KETONES UR QL STRIP: ABNORMAL
LDH SERPL L TO P-CCNC: 1.82 MMOL/L (ref 0.5–2.2)
LEUKOCYTE ESTERASE UR QL STRIP: NEGATIVE
LYMPHOCYTES # BLD AUTO: 1.7 K/UL (ref 1–4.8)
LYMPHOCYTES NFR BLD: 15.2 % (ref 18–48)
MCH RBC QN AUTO: 29.8 PG (ref 27–31)
MCHC RBC AUTO-ENTMCNC: 34.8 G/DL (ref 32–36)
MCV RBC AUTO: 86 FL (ref 82–98)
METHADONE UR QL SCN>300 NG/ML: NEGATIVE
MICROSCOPIC COMMENT: ABNORMAL
MONOCYTES # BLD AUTO: 0.7 K/UL (ref 0.3–1)
MONOCYTES NFR BLD: 6 % (ref 4–15)
NEUTROPHILS # BLD AUTO: 8.9 K/UL (ref 1.8–7.7)
NEUTROPHILS NFR BLD: 78.2 % (ref 38–73)
NITRITE UR QL STRIP: NEGATIVE
NRBC BLD-RTO: 0 /100 WBC
OPIATES UR QL SCN: NEGATIVE
PCO2 BLDA: 20.9 MMHG (ref 35–45)
PCP UR QL SCN>25 NG/ML: NEGATIVE
PH SMN: 7.57 [PH] (ref 7.35–7.45)
PH UR STRIP: 6 [PH] (ref 5–8)
PLATELET # BLD AUTO: 353 K/UL (ref 150–450)
PMV BLD AUTO: 10 FL (ref 9.2–12.9)
PO2 BLDA: 33 MMHG (ref 40–60)
POC BE: -3 MMOL/L
POC SATURATED O2: 75 % (ref 95–100)
POC TCO2: 20 MMOL/L (ref 24–29)
POTASSIUM SERPL-SCNC: 3.7 MMOL/L (ref 3.5–5.1)
PROT SERPL-MCNC: 8.9 G/DL (ref 6–8.4)
PROT UR QL STRIP: ABNORMAL
RBC # BLD AUTO: 5.7 M/UL (ref 4–5.4)
RBC #/AREA URNS AUTO: 2 /HPF (ref 0–4)
SALICYLATES SERPL-MCNC: <5 MG/DL (ref 15–30)
SAMPLE: ABNORMAL
SAMPLE: NORMAL
SITE: ABNORMAL
SITE: NORMAL
SODIUM SERPL-SCNC: 143 MMOL/L (ref 136–145)
SP GR UR STRIP: >1.03 (ref 1–1.03)
SQUAMOUS #/AREA URNS AUTO: 0 /HPF
T4 FREE SERPL-MCNC: 1.72 NG/DL (ref 0.71–1.51)
TOXICOLOGY INFORMATION: ABNORMAL
TROPONIN I SERPL DL<=0.01 NG/ML-MCNC: 5 NG/L (ref 0–14)
TSH SERPL DL<=0.005 MIU/L-ACNC: 0.38 UIU/ML (ref 0.4–4)
TSH SERPL DL<=0.005 MIU/L-ACNC: 0.43 UIU/ML (ref 0.4–4)
URN SPEC COLLECT METH UR: ABNORMAL
WBC # BLD AUTO: 11.35 K/UL (ref 3.9–12.7)
WBC #/AREA URNS AUTO: 11 /HPF (ref 0–5)

## 2024-12-11 PROCEDURE — 87086 URINE CULTURE/COLONY COUNT: CPT | Performed by: EMERGENCY MEDICINE

## 2024-12-11 PROCEDURE — 82140 ASSAY OF AMMONIA: CPT | Performed by: EMERGENCY MEDICINE

## 2024-12-11 PROCEDURE — 80307 DRUG TEST PRSMV CHEM ANLYZR: CPT | Performed by: EMERGENCY MEDICINE

## 2024-12-11 PROCEDURE — 93005 ELECTROCARDIOGRAM TRACING: CPT

## 2024-12-11 PROCEDURE — 80143 DRUG ASSAY ACETAMINOPHEN: CPT | Performed by: EMERGENCY MEDICINE

## 2024-12-11 PROCEDURE — 80179 DRUG ASSAY SALICYLATE: CPT | Performed by: EMERGENCY MEDICINE

## 2024-12-11 PROCEDURE — 84439 ASSAY OF FREE THYROXINE: CPT | Performed by: EMERGENCY MEDICINE

## 2024-12-11 PROCEDURE — 83605 ASSAY OF LACTIC ACID: CPT

## 2024-12-11 PROCEDURE — G0378 HOSPITAL OBSERVATION PER HR: HCPCS

## 2024-12-11 PROCEDURE — 85025 COMPLETE CBC W/AUTO DIFF WBC: CPT | Performed by: EMERGENCY MEDICINE

## 2024-12-11 PROCEDURE — 80053 COMPREHEN METABOLIC PANEL: CPT | Performed by: EMERGENCY MEDICINE

## 2024-12-11 PROCEDURE — 87088 URINE BACTERIA CULTURE: CPT | Performed by: EMERGENCY MEDICINE

## 2024-12-11 PROCEDURE — 84484 ASSAY OF TROPONIN QUANT: CPT | Performed by: EMERGENCY MEDICINE

## 2024-12-11 PROCEDURE — 93010 ELECTROCARDIOGRAM REPORT: CPT | Mod: ,,, | Performed by: INTERNAL MEDICINE

## 2024-12-11 PROCEDURE — 82077 ASSAY SPEC XCP UR&BREATH IA: CPT | Performed by: EMERGENCY MEDICINE

## 2024-12-11 PROCEDURE — 82803 BLOOD GASES ANY COMBINATION: CPT

## 2024-12-11 PROCEDURE — 84443 ASSAY THYROID STIM HORMONE: CPT | Mod: 91 | Performed by: EMERGENCY MEDICINE

## 2024-12-11 PROCEDURE — 93010 ELECTROCARDIOGRAM REPORT: CPT | Mod: 76,,, | Performed by: INTERNAL MEDICINE

## 2024-12-11 PROCEDURE — 99285 EMERGENCY DEPT VISIT HI MDM: CPT | Mod: 25

## 2024-12-11 PROCEDURE — 82550 ASSAY OF CK (CPK): CPT | Performed by: EMERGENCY MEDICINE

## 2024-12-11 PROCEDURE — 96374 THER/PROPH/DIAG INJ IV PUSH: CPT

## 2024-12-11 PROCEDURE — 63600175 PHARM REV CODE 636 W HCPCS: Performed by: EMERGENCY MEDICINE

## 2024-12-11 PROCEDURE — 99900035 HC TECH TIME PER 15 MIN (STAT)

## 2024-12-11 PROCEDURE — 81001 URINALYSIS AUTO W/SCOPE: CPT | Mod: XB | Performed by: EMERGENCY MEDICINE

## 2024-12-11 RX ORDER — IBUPROFEN 200 MG
16 TABLET ORAL
Status: DISCONTINUED | OUTPATIENT
Start: 2024-12-11 | End: 2024-12-13 | Stop reason: HOSPADM

## 2024-12-11 RX ORDER — LORAZEPAM 2 MG/ML
1.5 INJECTION INTRAMUSCULAR
Status: COMPLETED | OUTPATIENT
Start: 2024-12-11 | End: 2024-12-11

## 2024-12-11 RX ORDER — IBUPROFEN 200 MG
24 TABLET ORAL
Status: DISCONTINUED | OUTPATIENT
Start: 2024-12-11 | End: 2024-12-13 | Stop reason: HOSPADM

## 2024-12-11 RX ORDER — SODIUM CHLORIDE 0.9 % (FLUSH) 0.9 %
10 SYRINGE (ML) INJECTION EVERY 12 HOURS PRN
Status: DISCONTINUED | OUTPATIENT
Start: 2024-12-11 | End: 2024-12-13 | Stop reason: HOSPADM

## 2024-12-11 RX ORDER — NALOXONE HCL 0.4 MG/ML
0.02 VIAL (ML) INJECTION
Status: DISCONTINUED | OUTPATIENT
Start: 2024-12-11 | End: 2024-12-13 | Stop reason: HOSPADM

## 2024-12-11 RX ORDER — GLUCAGON 1 MG
1 KIT INJECTION
Status: DISCONTINUED | OUTPATIENT
Start: 2024-12-11 | End: 2024-12-13 | Stop reason: HOSPADM

## 2024-12-11 RX ADMIN — LORAZEPAM 1.5 MG: 2 INJECTION INTRAMUSCULAR; INTRAVENOUS at 09:12

## 2024-12-11 NOTE — ED TRIAGE NOTES
Reza Morrow, a 59 y.o. female presents to the ED confused w/ complaint of chest pain, nausea, and vomiting. Denies SOB    Triage note:  Chief Complaint   Patient presents with    Withdrawal     Pt. Is in withdrawal from Ketamine PO.  Last use x 2 days.  Presenting as altered with slurring words and shaking.      Review of patient's allergies indicates:   Allergen Reactions    Adhesive Hives    Neosporin [neomycin-bacitracin-polymyxin] Hives    Neomycin     Neomycin-polymyxin Hives    Neosporin g.u. irrigant     Penicillins Other (See Comments)     Unknown  reaction    Latex Rash     Past Medical History:   Diagnosis Date    Behavioral problem     Bulging lumbar disc     Bursitis     bilateral hip    Degenerative cervical disc     Hx of psychiatric care     Hypercholesteremia     Labral tear of hip, degenerative bilateral    MS (multiple sclerosis)     Paresthesia of both lower extremities 3/13/2022    Pneumonia     Spinal cord compression

## 2024-12-12 LAB
ALBUMIN SERPL BCP-MCNC: 3.7 G/DL (ref 3.5–5.2)
ALP SERPL-CCNC: 69 U/L (ref 40–150)
ALT SERPL W/O P-5'-P-CCNC: 9 U/L (ref 10–44)
ANION GAP SERPL CALC-SCNC: 13 MMOL/L (ref 8–16)
AST SERPL-CCNC: 12 U/L (ref 10–40)
BASOPHILS # BLD AUTO: 0.05 K/UL (ref 0–0.2)
BASOPHILS NFR BLD: 0.6 % (ref 0–1.9)
BILIRUB SERPL-MCNC: 1.4 MG/DL (ref 0.1–1)
BUN SERPL-MCNC: 28 MG/DL (ref 6–20)
CALCIUM SERPL-MCNC: 9 MG/DL (ref 8.7–10.5)
CHLORIDE SERPL-SCNC: 108 MMOL/L (ref 95–110)
CO2 SERPL-SCNC: 21 MMOL/L (ref 23–29)
CREAT SERPL-MCNC: 0.8 MG/DL (ref 0.5–1.4)
DIFFERENTIAL METHOD BLD: ABNORMAL
EOSINOPHIL # BLD AUTO: 0 K/UL (ref 0–0.5)
EOSINOPHIL NFR BLD: 0.4 % (ref 0–8)
ERYTHROCYTE [DISTWIDTH] IN BLOOD BY AUTOMATED COUNT: 14.1 % (ref 11.5–14.5)
EST. GFR  (NO RACE VARIABLE): >60 ML/MIN/1.73 M^2
GLUCOSE SERPL-MCNC: 112 MG/DL (ref 70–110)
HCT VFR BLD AUTO: 43.9 % (ref 37–48.5)
HGB BLD-MCNC: 14.8 G/DL (ref 12–16)
IMM GRANULOCYTES # BLD AUTO: 0.03 K/UL (ref 0–0.04)
IMM GRANULOCYTES NFR BLD AUTO: 0.4 % (ref 0–0.5)
LYMPHOCYTES # BLD AUTO: 1.4 K/UL (ref 1–4.8)
LYMPHOCYTES NFR BLD: 16.7 % (ref 18–48)
MCH RBC QN AUTO: 29.5 PG (ref 27–31)
MCHC RBC AUTO-ENTMCNC: 33.7 G/DL (ref 32–36)
MCV RBC AUTO: 88 FL (ref 82–98)
MONOCYTES # BLD AUTO: 0.7 K/UL (ref 0.3–1)
MONOCYTES NFR BLD: 8.5 % (ref 4–15)
NEUTROPHILS # BLD AUTO: 6.2 K/UL (ref 1.8–7.7)
NEUTROPHILS NFR BLD: 73.4 % (ref 38–73)
NRBC BLD-RTO: 0 /100 WBC
OHS QRS DURATION: 72 MS
OHS QRS DURATION: 76 MS
OHS QTC CALCULATION: 451 MS
OHS QTC CALCULATION: 485 MS
PLATELET # BLD AUTO: 295 K/UL (ref 150–450)
PMV BLD AUTO: 9.9 FL (ref 9.2–12.9)
POTASSIUM SERPL-SCNC: 3.2 MMOL/L (ref 3.5–5.1)
PROT SERPL-MCNC: 7.5 G/DL (ref 6–8.4)
RBC # BLD AUTO: 5.02 M/UL (ref 4–5.4)
SODIUM SERPL-SCNC: 142 MMOL/L (ref 136–145)
WBC # BLD AUTO: 8.46 K/UL (ref 3.9–12.7)

## 2024-12-12 PROCEDURE — 80053 COMPREHEN METABOLIC PANEL: CPT

## 2024-12-12 PROCEDURE — 25000003 PHARM REV CODE 250

## 2024-12-12 PROCEDURE — 99215 OFFICE O/P EST HI 40 MIN: CPT | Mod: ,,, | Performed by: PSYCHIATRY & NEUROLOGY

## 2024-12-12 PROCEDURE — G0378 HOSPITAL OBSERVATION PER HR: HCPCS

## 2024-12-12 PROCEDURE — 85025 COMPLETE CBC W/AUTO DIFF WBC: CPT

## 2024-12-12 PROCEDURE — 36415 COLL VENOUS BLD VENIPUNCTURE: CPT

## 2024-12-12 RX ORDER — HYDROXYZINE PAMOATE 25 MG/1
25 CAPSULE ORAL EVERY 8 HOURS PRN
Status: DISCONTINUED | OUTPATIENT
Start: 2024-12-12 | End: 2024-12-13 | Stop reason: HOSPADM

## 2024-12-12 RX ADMIN — HYPROMELLOSE 2910 1 DROP: 5 SOLUTION/ DROPS OPHTHALMIC at 11:12

## 2024-12-12 RX ADMIN — HYPROMELLOSE 2910 1 DROP: 5 SOLUTION/ DROPS OPHTHALMIC at 05:12

## 2024-12-12 NOTE — ED PROVIDER NOTES
Encounter Date: 2024       History     Chief Complaint   Patient presents with    Withdrawal     Pt. Is in withdrawal from Ketamine PO.  Last use x 2 days.  Presenting as altered with slurring words and shaking.      59-year-old female presenting after a period of significant altered mental status.  Patient was found on the ground of her home altered, having urinated on herself.  She was significantly confused and not answering questions.  On arrival, the patient did not know how she got here, done not knows why she was here and was oriented only x1.  Discussed presentation with family.  This has been a repeated occurrence for her over the last year or so.  History limited at this time      Review of patient's allergies indicates:   Allergen Reactions    Adhesive Hives    Neosporin [neomycin-bacitracin-polymyxin] Hives    Neomycin     Neomycin-polymyxin Hives    Neosporin g.u. irrigant     Penicillins Other (See Comments)     Unknown  reaction    Latex Rash     Past Medical History:   Diagnosis Date    Behavioral problem     Bulging lumbar disc     Bursitis     bilateral hip    Degenerative cervical disc     Hx of psychiatric care     Hypercholesteremia     Labral tear of hip, degenerative bilateral    MS (multiple sclerosis)     Paresthesia of both lower extremities 3/13/2022    Pneumonia     Spinal cord compression      Past Surgical History:   Procedure Laterality Date    ANGIOGRAM, CORONARY, WITH LEFT HEART CATHETERIZATION Left 3/11/2022    Procedure: Angiogram, Coronary, with Left Heart Cath;  Surgeon: Elie Matamoros MD;  Location: [facility] CATH LAB;  Service: Cardiology;  Laterality: Left;    BREAST SURGERY      augmentation     SECTION, CLASSIC      HAND SURGERY      HYSTEROSCOPY      NECK SURGERY      cervical disc removeal    TUBAL LIGATION      X-STOP IMPLANTATION       Family History   Problem Relation Name Age of Onset    Cancer Father      Diabetes Father      Lung cancer Father      Diabetes  Sister      COPD Mother      Drug abuse Mother      Bipolar disorder Brother      Heart disease Maternal Uncle      Hypothyroidism Maternal Grandmother       Social History     Tobacco Use    Smoking status: Former     Current packs/day: 0.00     Types: Cigarettes     Quit date: 2013     Years since quittin.3     Passive exposure: Past    Smokeless tobacco: Never   Substance Use Topics    Alcohol use: No    Drug use: Yes     Types: Benzodiazepines, Marijuana     Review of Systems   Unable to perform ROS: Acuity of condition       Physical Exam     Initial Vitals [24 1526]   BP Pulse Resp Temp SpO2   (!) 176/100 (!) 115 (!) 30 98.7 °F (37.1 °C) 100 %      MAP       --         Physical Exam    Nursing note and vitals reviewed.  Constitutional: She appears well-developed and well-nourished. She is not diaphoretic. No distress.   HENT:   Head: Normocephalic and atraumatic.   Nose: Nose normal.   Eyes: EOM are normal. Pupils are equal, round, and reactive to light. No scleral icterus.   Neck: Neck supple.   Normal range of motion.  Cardiovascular:  Normal rate, regular rhythm, normal heart sounds and intact distal pulses.     Exam reveals no gallop and no friction rub.       No murmur heard.  Pulmonary/Chest: Breath sounds normal. No stridor. No respiratory distress. She has no wheezes. She has no rhonchi. She has no rales.   Abdominal: Abdomen is soft. Bowel sounds are normal. She exhibits no distension. There is no abdominal tenderness. There is no rebound and no guarding.   Musculoskeletal:         General: No tenderness or edema. Normal range of motion.      Cervical back: Normal range of motion and neck supple.     Neurological: She is alert and oriented to person, place, and time. No cranial nerve deficit. GCS score is 15. GCS eye subscore is 4. GCS verbal subscore is 5. GCS motor subscore is 6.   Skin: Skin is warm and dry. No rash noted.   Psychiatric: She has a normal mood and affect. Her behavior  is normal.         ED Course   Procedures  Labs Reviewed   CBC W/ AUTO DIFFERENTIAL - Abnormal       Result Value    WBC 11.35      RBC 5.70 (*)     Hemoglobin 17.0 (*)     Hematocrit 48.8 (*)     MCV 86      MCH 29.8      MCHC 34.8      RDW 14.3      Platelets 353      MPV 10.0      Immature Granulocytes 0.4      Gran # (ANC) 8.9 (*)     Immature Grans (Abs) 0.04      Lymph # 1.7      Mono # 0.7      Eos # 0.0      Baso # 0.02      nRBC 0      Gran % 78.2 (*)     Lymph % 15.2 (*)     Mono % 6.0      Eosinophil % 0.0      Basophil % 0.2      Differential Method Automated     COMPREHENSIVE METABOLIC PANEL - Abnormal    Sodium 143      Potassium 3.7      Chloride 106      CO2 22 (*)     Glucose 112 (*)     BUN 24 (*)     Creatinine 0.8      Calcium 10.1      Total Protein 8.9 (*)     Albumin 4.2      Total Bilirubin 1.6 (*)     Alkaline Phosphatase 79      AST 18      ALT 15      eGFR >60.0      Anion Gap 15     URINALYSIS, REFLEX TO URINE CULTURE - Abnormal    Specimen UA Urine, Clean Catch      Color, UA Yellow      Appearance, UA Cloudy (*)     pH, UA 6.0      Specific Gravity, UA >1.030 (*)     Protein, UA 1+ (*)     Glucose, UA Negative      Ketones, UA 1+ (*)     Bilirubin (UA) Negative      Occult Blood UA Negative      Nitrite, UA Negative      Leukocytes, UA Negative      Narrative:     Specimen Source->Urine   DRUG SCREEN PANEL, URINE EMERGENCY - Abnormal    Benzodiazepines Negative      Methadone metabolites Negative      Cocaine (Metab.) Negative      Opiate Scrn, Ur Negative      Barbiturate Screen, Ur Negative      Amphetamine Screen, Ur Negative      THC Presumptive Positive (*)     Phencyclidine Negative      Creatinine, Urine 224.0      Toxicology Information SEE COMMENT      Narrative:     Specimen Source->Urine   ACETAMINOPHEN LEVEL - Abnormal    Acetaminophen (Tylenol), Serum <3.0 (*)    SALICYLATE LEVEL - Abnormal    Salicylate Lvl <5.0 (*)    TSH - Abnormal    TSH 0.377 (*)    T4, FREE - Abnormal     Free T4 1.72 (*)    URINALYSIS MICROSCOPIC - Abnormal    RBC, UA 2      WBC, UA 11 (*)     Bacteria Rare      Squam Epithel, UA 0      Hyaline Casts, UA 0      Amorphous, UA Moderate      Microscopic Comment SEE COMMENT      Narrative:     Specimen Source->Urine   ISTAT PROCEDURE - Abnormal    POC PH 7.572 (*)     POC PCO2 20.9 (*)     POC PO2 33 (*)     POC HCO3 19.3 (*)     POC BE -3 (*)     POC SATURATED O2 75      POC TCO2 20 (*)     Sample VENOUS      Site Other      Allens Test N/A     CULTURE, URINE   TSH    TSH 0.430     ALCOHOL,MEDICAL (ETHANOL)    Alcohol, Serum <10     TROPONIN I HIGH SENSITIVITY    Troponin I High Sensitivity 5     CK    CPK 72     AMMONIA    Ammonia 42     ISTAT LACTATE    POC Lactate 1.82      Sample VENOUS      Site Other      Allens Test N/A       EKG Readings: (Independently Interpreted)   Initial Reading: No STEMI. Rhythm: Sinus Tachycardia. Heart Rate: 106.   No ST segment elevation or depression concerning for acute ischemia.          Imaging Results              CT Head Without Contrast (In process)                      Medications   LORazepam injection 1.5 mg (1.5 mg Intravenous Given 12/11/24 2113)     Medical Decision Making                        Medical Decision Making:   Initial Assessment:   59-year-old female presenting after a period of significant altered mental status.  Patient was found on the ground of her home altered, having urinated on herself.  She was significantly confused and not answering questions.  On arrival, the patient did not know how she got here, done not knows why she was here and was oriented only x1.  Discussed presentation with family.  This has been a repeated occurrence for her over the last year or so.  She has had significant workups in the past including MRI, EEG without definite findings.  Her confusion is significantly improving, talking and answering questions appropriately.  Lab workup is significant only for an elevated T4.   Urinalysis is pending though it looks dirty.  She has no other significant symptoms of sepsis.  There was a significant question of polypharmacy and questionable abuse versus over prescribed medications.  Given the history of being found altered and with urinating on herself in the past, seizure should still be considered.  I would like to place her in observation for further evaluation given her altered mental status.  Her sister is also helpful to give a history of her recent episodes             Clinical Impression:  Final diagnoses:  [R68.89] Withdrawal complaint  [Z00.8] Medical clearance for psychiatric admission  [R41.82] Altered mental status, unspecified altered mental status type (Primary)          ED Disposition Condition    Observation Stable                Reji Ybarra MD  12/11/24 6025

## 2024-12-12 NOTE — HPI
Patient is a 59 y.o. F with altered mental status found at home in urine.  She has past medical history of MS, hypercholesterolemia, and polypharmacy.     Patient is not sure what brought her into the hospital.  She does admit to getting ketamine off the streets, and states that she last took it yesterday.  Talked with her sister Celeste allen who thinks that there is likely some component of drug use contributing to her repeat presentations.  She states that this is the 3rd time she has been in the hospital in 8 weeks for the same problem of altered mental status.  Her sister states that the patient has never admitted to getting drugs off the Street.  She also has never admitted to having any psychiatric conditions, but that on 1 occasion her sister found medications for what she thinks is bipolar disorder.     History is limited by patient's word-finding difficulties, which is consistent with her previous presentations.  Reportedly, in a approximately a day or so she will returned back to baseline.  She was admitted 11/30/2024 and discharged from her last admission on 12/01/2024 for the exact same presentation with altered mental status, she got workup with MRI which showed chronic microvascular ischemic, some areas which could represent remote episodes of press, an area which could represent a dural venous sinus thrombosis, but repeat MRI was not concerning to vascular neurology who felt that her symptoms were not caused by imaging findings.  She has not had an LP.

## 2024-12-12 NOTE — CONSULTS
Please see separate consult note dated 12/12/24 for evaluation and recommendations.    Shubham Burkett MD  [facility] PGY-2   [facility]

## 2024-12-12 NOTE — PLAN OF CARE
Problem: Adult Inpatient Plan of Care  Goal: Plan of Care Review  Outcome: Progressing     Problem: Fall Injury Risk  Goal: Absence of Fall and Fall-Related Injury  Outcome: Progressing     Problem: Confusion Acute  Goal: Optimal Cognitive Function  Outcome: Progressing     Problem: Excessive Substance Use  Goal: Improved Behavioral Control (Excessive Substance Use)  Outcome: Progressing

## 2024-12-12 NOTE — ASSESSMENT & PLAN NOTE
EKG showing sinus tachycardia, we will continue to observe most likely related to potential drug use.  High sensitivity troponin negative at 5 on admission.     Yes

## 2024-12-12 NOTE — CONSULTS
[facility]   DEPARTMENT OF PSYCHIATRY     IDENTIFIERS & DEMOGRAPHICS:     SERVICE: Addiction  ENCOUNTER: initial    -- PATIENT IDENTIFIERS: Reza Morrow  [id number]  1965  59 y.o.  female  -- LOCATION OF PATIENT: unit (med/surg)    -- MODE OF ARRIVAL: self-presented  -- PRESENT WITH PATIENT DURING SESSION: ALONE  -- SOURCES OF INFORMATION: EHR/chart, PATIENT  -- LOCATION OF ENCOUNTER PROVIDER: [place], [place]    -- ENCOUNTER PROVIDER: Shubham Burkett MD        PRESENTATION:   History of Present Illness   **  OVERVIEW OF THE HPI:    59-year-old female history substance induced mood disorder, broken heart syndrome, HFrEF (45-50% in April 2024), and history of MS on glatiramer acetate per chart reveiw who was brought to the ED with altered mental status. Reportedly brought in by family after found down in apartment unconsciousness and covered in her urine. ED and Medicine providers had spoken to her sister Celeste who had concerns pt had allegedly obtained Ketamine or other substances off the streets resulting in this presentation. Sister with concerns for pt never admitting to purchasing substances off the street vs also concerned regarding polypharmacy given her medication regimen. Psychiatry was consulted regarding potential substance intoxication and assessing need for subutex.     Of note, pt with well-documented history including 3x recent admissions within the last 1-2 months for similar presentations of being found down unconsciousness with suspected substance use and polypharmacy. Recently seen by [facility] on 11/15/24 for similar presentation and seen by [facility] 5/24/24 for similar presentation, please review those notes for further information.    SUBJECTIVE/CURRENT FINDINGS:    Attempted interview with resident physician and attending physician at bedside. Pt seemingly more awake and alert compared to initial ED presentation. Pt is however a poor historian at this  "time, she has difficulty answering many questions and becomes tearful at many points, especially when she is unable to answer questions. Initially states not remembering incident at home where she was found unconsciousness and denies recalling ED events, with further prompting then denies being unconsciousness and states she was at home with her sister and her sister's partner doing a "chakra reading" which is why she was on the ground.     Unable to specify, however pt seemingly endorsing since last discharge on 12/1/24 she has been taking medications including prescribed Baclofen and Robaxin, Gabapentin, and Nucynta. Denies taking Subutex since recent admission, and had discussed with surgery team last admission to discontinue Flexeril and Tizanidine as she believes this is what resulted in her unconsciousness last time. States she normally takes this as prescribed, however has been without all these medications for the last few days. On this interview, she reported pain, but was unable to localize or qualify the pain in any way. At that time she denied any current depression or anxiety. She was unable to recall previous discussions about the concern that her medicine regimen was causing her confusion.     Notewriter inquired about concerns for potential ketamine use obtained off the street, pt adamantly denies this and states she longer engages in ketamine use since last being prescribed this, latest 07/2024. Also pt does not comment on THC use despite positive UDS result. In regards to pain management, did not continue with Subutex as this caused her to become sedated, additionally this is unclear but she previously had a lady visit her at home to assist with meds but after a verbal altercation this stopped several days ago. Pt requesting oxycodone at this time if unable to receive her home med Nucynta. Had lengthy discussion regarding risks and benefits involved with opioid use and chronic pain regimen given " her history of MS and chronic pain issues. Was unable to provide further resources including rehab options at this time due to pt cooperation, however expressed to pt would like to reassess and continue to discussions regarding this. Pt also agreeable with notewriter reaching out to sister for collateral information.    Per Chart Review:    Per PDMP, historically receives Nucynta ER 100mg tablets monthly with her outpatient pain provider, last filled 12/4/24. Also previously prescribed medical marijuana, last filled 8/16/24 and previously prescribed ketamine cream last filled 7/1/24. Next appt with outpatient pain provider on 1/2/25.    Per Collateral:    Attempted collateral with sister Celeste ([phone]), no answer and left voicemail and will reassess.    REVIEW OF SYSTEMS:    >> SOURCES: patient, chart     N   Sleep Disturbance/Disruption   N   Appetite/Weight Change   N   Alterations in Energy Level   N   Impaired Focus/Concentration   Y   Depressive Symptomatology   Y   Excessive Anxiety/Worry   N   Dysregulated Mood/Behavior   N   Manic Symptomatology   N   Psychosis   N   Trauma-Related    Regarding the current presentation, no other significant issues or complaints are voiced or known at this time.      ADD-ON PSYCHOTHERAPY:     N/A         HISTORY:   Patient Information   **  >> SOURCES: patient, chart review       PSYCH  SUBSTANCE  FAMILY  SOCIAL  MEDICAL     Y   Previous/Pre-Existing Psychiatric Diagnoses  ANYA, unspecified depression   Y   Past Psychotropic Trials  Effexor, Buspar, Lexapro, Subutex   N   Current Psychiatric Provider   N   Hx of Outpatient Psychiatric Treatment  Denies   N   Hx of Psychiatric Hospitalization   N   Hx of Suicidal Ideation/Threats   N   Hx of Suicide Attempts/Gestures   N   Hx of Homicidal Ideation/Threats   N   Hx of Homicidal Behavior   N   Hx of Non-Suicidal Self-Injurious  "Behavior   N   Hx of Perpetrated Violence   N   Documented Hx of Malingering     N   Hx of Formal KIMBERLY Treatment   N   Recent Alcohol Consumption   Y   Hx of Nicotine Use   N   Hx of Alcohol Misuse/Abuse   N   Hx of Illicit Drug Misuse/Abuse   N   Hx of Prescription Drug Misuse/Abuse   +   Drug Experimentation/Usage  +cannabis       N   Family Psychiatric History   +   Family Hx of Suicide  no      N   Hx of Trauma   N   Hx of Abuse     N   GED/High School Diploma   N   Post-Secondary Education   N   Currently Employed   Y   Currently on Disability   Y   Functions Independently   Y   Domiciled   Y   Intact Support System   N   Currently in a Relationship   Y   Ever    Y   Ever /   Y   Children/Dependents     N   Ever Charged/Convicted   N   Current Probation/[place]/Diversion   N   Hx of Incarceration     N   Hx of Seizures   N   Hx of Head Trauma      Medical Hx & Diagnoses  Possible MS diagnosis    >> EHR (EPIC): reviewed/reconciled      The patient's past medical history has been reviewed and updated as appropriate within the electronic health record system.  See PROBLEM LIST & HISTORY for details.    Scheduled and PRN Medications: The electronic chart was reviewed and updated as appropriate.  See MEDCARD for details.    Allergies:  Adhesive, Neosporin [neomycin-bacitracin-polymyxin], Neomycin, Neomycin-polymyxin, Neosporin g.u. irrigant, Penicillins, and Latex      EXAMINATION:   Objective   **  VITALS:  /82   Pulse 84   Temp 99.4 °F (37.4 °C) (Oral)   Resp 17   Ht 5' 2" (1.575 m)   Wt 50.6 kg (111 lb 8.8 oz)   SpO2 98%   Breastfeeding No   BMI 20.40 kg/m²     MENTAL STATUS EXAMINATION:  Appearance: inadequately groomed, distressed, appears stated age, normal weight  appropriately dressed, well-appearing    Behavior & Attitude: participative, under good " "behavioral control, able to redirect, appropriate eye contact  calm, engaged, agreeable, cooperative    Distressed  Movements & Motor Activity: no psychomotor agitation, no psychomotor retardation, not wheelchair bound (able to ambulate), no weakness, no spasticity, no rigidity, no tics, no tremor, no akathisia, no dyskinesia, no ataxia, no parkinsonism    Speech & Language: decreased rate, decreased volume, decreased quantity, increased latency, reciprocal  non-spontaneous, non-fluent    Mood: "frustrated"  Affect: dysphoric    Thought Process & Associations: organized, tangential, poverty of thought  no loosening of associations    Superficially linear, but had difficulty answering questions/elaborating on short responses  Thought Content & Perceptions: no delusions, no paranoid ideation, no ideas of reference, no grandiosity, no hyperreligiosity, no hallucinations, no responding to internal stimuli    Sensorium: awake, confused    Orientation: oriented to person  not oriented to place, not oriented to time, not oriented to situation    Recent & Remote Memory: register (3/3), recall (0/3)  impaired (recent), impaired (remote)    Attention & Concentration: inattentive to conversation, easily distracted  impaired, unable to spell 5-letter word fowards, unable to spell 5-letter word backwards, unable to perform [facility]    Fund of Knowledge: impaired, unable to identify key governmental leaders    Insight: limited  demonstrates sufficient awareness of condition/situation    Judgment: limited  heeds instructions/advice        RISK & REGULATORY:   Impression   **   RISK PARAMETERS:     N   Suicidal Ideation/Threats   N   Suicide Attempts/Gestures   N   Homicidal Ideation/Threats   N   Homicidal Behavior   N   Non-Suicidal Self-Injurious Behavior   N   Perpetrated Violence     NOTE: RISK PARAMETERS are current to the encounter/episode  NOT inclusive of past " history.       FIREARMS & WEAPONS:     N   Ready Access to Firearms     MEDICAL DECISION MAKING:     - DIAGNOSTICS: a diagnostic psychiatric evaluation was performed and responsiveness to treatment was assessed  ability/capacity to respond to treatment: adequate/intact     REGULATORY:    -- : REVIEWED      INFORMED CONSENT & SHARED DECISION MAKING are the hallmark and bedrock of good clinical care, and as such have been employed and obtained, respectively, to the degree possible.  Discussed, to the extent possible, diagnosis, risks and benefits of proposed treatment (e.g., medication, therapy) vs alternative treatments vs no treatment, potential side effects of these treatments, and the inherent unpredictability of treatment.         - ABILITY TO UNDERSTAND, PARTICIPATE & ENGAGE: minimal     - AGREEABLE TO TREATMENT (consent/assent): uknown  unable to assess     - RELIABILITY/ACCURACY: the patient is deemed to be a well-meaning but unreliable and factually inaccurate historian      WARNINGS & PRECAUTIONS:  >> In cases of emergencies (e.g. SI/HI resulting in danger to self or others, functioning deteriorating to the level of grave disability), call 911 or [phone], or present to the emergency department for immediate assistance.    >> Individuals should not operate a motor vehicle or heavy machinery if effects of medications or underlying symptoms/condition impair the ability to do so safely.    >> FULLY comply with ANY/ALL medication as prescribed/instructed and report ANY/ALL suspected adverse effects to appropriate health care providers.      ASSESSMENT & PLAN:   Assessment & Plan   **  DIAGNOSES & PROBLEMS:       1.  R/O Ketamine intoxication    2.  Opioid dependance, concern for withdrawal    3.  R/O Opioid use disorder    PSYCHOTROPIC REGIMEN:   (C)=Continue as prescribed  (A)=Adjust as noted  (I)=Iniitate  (D)=Discontinue      1.  Allow for washout period for potential recent substance use contributing to  AMS presentation    2.  Monitor for pain and opioid withdrawals, can consider re-initiating Subutex/Suboxone based on pt interest    3.  Plan to obtain collateral and reassess before making finalized recommendations regarding substance    4.  Consider pain management consult for further recommendations. Additionally will discuss with primary team to recommend coordinating with pt's outpatient pain provider for future steps.    -- ASSESSMENT (synthesis  analysis):     Ms. Reza Morrow is a 59 y.o. female with past psychiatric history of depression and anxiety and past medical history of chronic pain and possible MS. She has had multiple previous presentations for AMS that have been attributed to polypharmacy. Per  patient's medications include gabapentin, pregabalin, tapentadol, oxycodone, ketamine cream, and medical marijuana in the past. Most recent  Nucynta ER 100mg last filled 12/4/24 per outpatient pain provider. The patients current presentation is likely multifactorial including polypharmacy and opioid withdrawal. The patient's inability to remember the past several days make the timing of potential substance use uncertain. Additionally, given the patient's history of MS, a neurological contribution to the patient's condition cannot be ruled out and warrants consideration should the patient not improve with current management plans.     At this time, will plan to reassess with patient to consider potential substance use resources and gauge interest in rehab. Unable to reach collateral at this time, however will reassess as this is an unclear picture regarding physical dependence on pain regimen vs potential addiction issues. On reassessment, will plan to administer MoCA and CAM-ICU if pt able and willing to engage to determine level of cognition. Given results of MoCA and CAM-ICU, if pt able to demonstrate reasonable capacity then could possibly pursue rehab options, however with concerns for pt's  ability to care for self given multiple presentations of being found down. With some concerns at this time regarding cognition with potential volitional component regarding her inconsistent history and recent substance use/polypharmacy. Will coordinate with primary team and recommend they coordinate with her outpatient pain provider on steps moving forward.    -- PLAN (goals  recommentations):          - CURRENT CONDITION: exacerbated  as compared to typical baseline  - WITH REASONABLE MEDICAL CERTAINTY, based on history, chart review, available collateral information, and a present-state examination:   -- psychiatric hospitalization is not indicated    * PEC is NOT INDICATED - rescind if in place     >> prescription drug management (i.e., the review, recommendation, or consideration without recommendation of medications) was employed during the encounter  >> discussed the following (to the extent possible) with the patient and/or other interested parties, if not otherwise known or stated prior, providing psychoeducation and/or resources (e.g., references, fact sheets) as applicable, utilizing an appropriate developmental and sociocultural approach:    -- diagnosis, target symptoms, intended outcomes, and possible benefits and risks of medication, as well as alternatives (including no treatment)     -- possible expectable adverse effects and/or drug interactions of any proposed individual psychotropic agents, as well as the inherent unpredictability of treatment     -- the importance of full compliance with any prescribed medication     -- the proper protocol and/or steps to take if questions or problems arise    -- relevant warnings and safety considerations, including, but not limited to, the prohibition against operating a motor vehicle or heavy machinery if effects of medications or underlying symptoms/condition impair the ability to do so competently and safely    -- relevant legal aspects of medication,  including, but not limited to, the prohibition against carrying pills loose outside of the bottle and the sharing of medication with anyone, as well as the safe and secure storage of all pharmaceuticals  >> DEFER management of NON-PSYCHIATRIC medication(s) to primary and/or specialist provider(s)  >> advised to abstain from alcohol and illicit drug use  >> counseled on full abstinence from alcohol and substances of abuse (illicit and prescription), as well as maintenance of a recovery lifestyle  >> harm reduction techniques discussed, as warranted, to mitigate risk from problematic behaviors  >> serial laboratory testing (e.g. PETH, serum ethanol, urine toxicology) recommended and/or planned to provide accountability, as well as guide and refine treatment moving forward  >> importance of lifelong, active engagement in 12 step (or equivalent) mutual self-help program(s) was stressed/reinforced, including regular meeting attendance and acquisition/maintenance of a sponsor  >> education and/or resources discussed and/or provided for various addiction recovery and rehabilitation options  >> motivational interviewing applied, relapse prevention provided  >> case discussed with staff psychiatrist  >> will continue to follow with you, thank you    DELIRIUM PROTOCOL     > DELIRIUM is a HIGH RISK MEDICAL CONDITION with significantly elevated rates of morbidity and mortality, routinely rendering a person temporarily incapacitated and interfering with the effective management of underlying medical conditions. As such, proactive and comprehensive management is essential. Implementation of the following recommendations should be considered BEST PRACTICE:  >> if feasible, please utilize non-pharmacologic approaches FIRST for agitation; PRN medications can be considered if this approach is insufficient or ineffective  >> AVOID the use of PHYSICAL RESTRAINTS whenever possible; whenever not, always seek to MINIMIZE their use  >>  keep window shades open and room lit during day and room dim at night in order to promote normal sleep-wake cycles  >> reduce unnecessary stimulation/noise; TV screen and sound should be off unless patient is actively engaged with the program  >> minimize disruptions at night  >> correct sensory deficits, when present, with provision of eyeglasses and/or hearing aids  >> optimize nutrition and hydration status  >> continue to manage medical conditions and assess for and treat pain  >> consider PT/OT consult, as early mobility and exercise have been shown to decrease duration of delirium  >> attempt to maintain consistency of key staff who will routinely interact with the patient  >> encourage family at bedside  >> orient patient often to date, location, and situation, including reason for hospitalization and current plan of care  >> keep whiteboard (or similar) in patient's room current with the date and names of key treatment team members  >> utilize 1:1 sitter for monitoring, if warranted  >> LIMIT or DISCONTINUE the use of narcotics, benzodiazepines, anticholinergics, antihistaminergics, steroids, and other known deliriogenic medications  >> continue treatment of the underlying causative etiology of delirium, the correction of which is the ultimate objective in delirium management; if unknown, continue exhaustive medical workup to elucidate the source(s)    OPIOID WITHDRAWAL PROTOCOL    >> recommendations provided herein should be seen as rough guidelines, as an opioid withdrawal protocol should be individualized and modified routinely, as clinically warranted  >> the absence of a positive serum or urine toxicology for opioids does not exclude recent opioid use, as many agents, including fentanyl and carfentanil, do not register on standard drug screens; a withdrawal protocol may still be warranted, based on the clinical situation  >> enact behavioral protocols per unit policies, to minimize the risk of  surreptitious use in the facility setting  >> vitals Q4H while awake  >> frequent administration of COWS  >> prototypical PRNs (adjust as clinically warranted):    > utilize clonidine if no cardiac contraindications exist; consult cardiology if specialist input is needed   > HOLD clonidine if SBP <100, DBP <60, HR <50 (assuming no alternative parameters are in effect)   > clonidine (Catapres) 0.1mg PO TID PRN for COWS >5   > may increase clonidine to 0.2mg PO TID if COWS >12 persistently   > dicyclomine (Bentyl) 10mg PO Q6H PRN for abdominal cramps, GI distress   > methocarbamol (Robaxin) 500mg PO Q8H PRN for muscle cramps, muscle spasms   > ondansetron (Zofran) 4mg PO Q6H PRN for nausea, vomiting   > loperamide (Imodium) 2 mg after each loose stool; not to exceed 16 mg/day   > ibuprofen (Motrin) 400mg PO Q6H PRN for pain    > hydroxyzine (Vistaril) 50mg PO Q6H PRN for anxiety, insomnia   > although short-term benzodiazepines may also be of some utility, caution must be exercised given their addictive potential; seek to limit/minimize/avoid their use   > NALOXONE 0.4MG IV PRN for opioid reversal  >> post-withdrawal treatment is paramount; medically supervised withdrawal manages the patient through the withdrawal symptoms but does not treat opioid use disorder; patients completing withdrawal are at high risk of relapse to resumed opioid use, as well as increased risk for accidental overdose death given their recent reversal of built-up tolerance  >> if in a facility, patients should be given explicit plans for follow-up care PRIOR to discharge  >> follow-up may involve ongoing treatment through primary care or a specialized addiction treatment program or physician, and is dependent on a number of factors, including availability and patient willingness  >> continuing care, including pharmacotherapy (i.e., MAT - buprenorphine, methadone, naltrexone) and psychosocial intervention for opioid use disorder, are  indicated  >> it is vital to ensure access to naloxone upon discharge, as well as instructions for its use  >> review/educate on signs of an overdose and harm reduction techniques (e.g., never use alone, never share or reuse needles, always take turns, always try a small dose first to check the effect, always have naloxone available in plain view, always contact emergency services if naloxone is administered, always monitor until help arrives, always be prepared to give follow up doses of naloxone as needed)    See below for pertinent laboratory and radiographic findings.    Shubham Burkett MD  [facility] PGY-2   [facility]    CHART REVIEW: available documentation has been reviewed, and pertinent elements of the chart have been incorporated into this evaluation where appropriate.       KEY & LINKS:        Y  = yes/endorses     N  = no/denies     U  = unknown/unable to assess    ADHD   AIMS   AUDIT   AUDIT-C   C-SSRS (Screen)   C-SSRS (Short)   C-SSRS (Full)   DAST   DAST-10   ANYA-7   MoCA   PCL-5   PHQ-9   KIMBERLY   YMRS       DIAGNOSTIC TESTING:   Results   **   Glu 112 (H)  12/12/2024  Li *   *  TSH 0.377 (L)  12/11/2024    HgA1c 5.2  5/14/2024  VPA *   *   FT4 1.72 (H)  12/11/2024    Na 142  12/12/2024  CLZ *   *  WBC 8.46  12/12/2024    Cr 0.8  12/12/2024  ANC 6.2; 73.4;  (H)  12/12/2024   Hgb 14.8  12/12/2024     BUN 28 (H)  12/12/2024  Trop I 0.033 (H)  11/14/2024  HCT 43.9  12/12/2024     GFR >60.0  12/12/2024   CPK 72  12/11/2024    12/12/2024     Alb 3.7  12/12/2024   PRL *   *  B12 191  3/23/2022     T Bili 1.4 (H)  12/12/2024  Chol *   *  B9 10.1  5/26/2024    ALP 69  12/12/2024  TGs *   *  B1 60  *    AST 12  12/12/2024  HDL *   *  Vit D *   *     ALT 9 (L)  12/12/2024  LDL *   *  HIV Non-reactive  7/20/2024     INR 1.0  12/1/2024  Stefani *   *   Hep C Non-reactive  7/20/2024    GGT *   *  Lip 16  7/20/2024  RPR *   *     MCV 88  12/12/2024   NH4 42  12/11/2024  UPT *   *      PETH *   *  THC Presumptive Positive (A)  12/11/2024    ETOH <10  12/11/2024  JOSH Negative  12/11/2024    EtG *   *  AMP Negative  12/11/2024    ALC <10  11/30/2024  OPI Negative  12/11/2024    BZO Negative  12/11/2024  MTD Negative  12/11/2024     BAR Negative  12/11/2024  BUP Present  11/17/2024    PCP Negative  12/11/2024  FEN Not Detected  11/17/2024     Results for orders placed or performed during the hospital encounter of 12/11/24   EKG 12-lead    Collection Time: 12/11/24  4:49 PM   Result Value Ref Range    QRS Duration 76 ms    [facility] QTC Calculation 451 ms    Narrative    Test Reason : R68.89,    Vent. Rate : 106 BPM     Atrial Rate : 106 BPM     P-R Int : 132 ms          QRS Dur :  76 ms      QT Int : 340 ms       P-R-T Axes :  78  87  14 degrees    QTcB Int : 451 ms    Sinus tachycardia  Right atrial enlargement  T wave abnormality, consider anterior ischemia  Abnormal ECG  When compared with ECG of 11-Dec-2024 16:48,  No significant change was found  Confirmed by Michele Solomon ([id number]) on 12/12/2024 8:20:51 AM    Referred By: AAAREFERRAL SELF           Confirmed By: Michele Solomon     Results for orders placed or performed during the hospital encounter of 11/14/24   CT Head Without Contrast    Narrative    EXAMINATION:  CT HEAD WITHOUT CONTRAST    CLINICAL HISTORY:  Mental status change, unknown cause;    TECHNIQUE:  Low dose axial CT images obtained throughout the head without the use of intravenous contrast.  Axial, sagittal and coronal reconstructions were performed.    COMPARISON:  07/21/2024    FINDINGS:  Intracranial compartment:    Ventricles are stable in size, without evidence of hydrocephalus.    Mild patchy hypoattenuation in the supratentorial white matter, nonspecific but most likely reflecting chronic small vessel ischemic changes. No recent or remote major vascular distribution infarct. No acute hemorrhage.  No mass  effect or midline shift.    No new extra-axial blood or fluid collections.    Skull/extracranial contents (limited evaluation):    No displaced calvarial fracture.    The mastoid air cells and visualized paranasal sinuses are essentially clear.      Impression    No evidence of acute hemorrhage or major vascular distribution infarct.      Electronically signed by: Joseph Krishna MD  Date:    11/14/2024  Time:    13:46   Results for orders placed or performed during the hospital encounter of 07/20/24   MRI Brain Without Contrast    Narrative    EXAMINATION:  MRI BRAIN WITHOUT CONTRAST    CLINICAL HISTORY:  Mental status change, unknown cause;    TECHNIQUE:  Early termination of the exam due to patient's declination/refusal to continue with examination.  Limited T1 coronal image obtained.    COMPARISON:  CT head 07/21/2024    FINDINGS:  Nondiagnostic images due to early exam termination by patient.      Impression    As above.    Electronically signed by resident: Leonora Landry  Date:    07/21/2024  Time:    06:11    Electronically signed by: Sim Appiah MD  Date:    07/21/2024  Time:    06:26   Results for orders placed or performed during the hospital encounter of 05/22/24   MRI Brain W WO Contrast    Narrative    EXAMINATION:  MRI BRAIN W WO CONTRAST    CLINICAL HISTORY:  Mental status change, unknown cause;    TECHNIQUE:  Multiplanar multisequence MR imaging of the brain was performed before and after the administration of 6 mL Gadavist intravenous contrast.    COMPARISON:  CT head 05/23/2024.  MRI brain 04/23/2024.    FINDINGS:  Ventricles are normal in size for age.  No hydrocephalus.    Nonspecific patchy areas of T2/FLAIR signal hyperintensity in the supratentorial white matter, similar distribution compared to prior exam.    New areas of subcortical T2/FLAIR signal hyperintensity along the frontoparietal lobes bilaterally at the vertex, with an additional focus in the left cerebellar hemisphere (series 8, image  21).  No associated diffusion signal abnormality, susceptibility artifact, or significant surrounding mass effect or midline shift.  Additional focal areas of mild cortical and subcortical signal parenchymal abnormality and mild gyral expansion about the parietooccipital region (axial series 8, image 18).    Additionally, there is subtle symmetric diffusion signal abnormality about the basal ganglia, without significant FLAIR signal hyperintensity or mass effect.    No parenchymal mass, hemorrhage, or recent or remote major vascular distribution infarct.    No abnormal postcontrast parenchymal or leptomeningeal enhancement.    No extra-axial blood or fluid collections.    The T2 skull base flow voids are preserved.  Bone marrow signal intensity unremarkable.  Paranasal sinuses and mastoid air cells are clear.    Partially visualized fusion hardware about the cervical spine.      Impression    New areas of cortical/subcortical T2/FLAIR signal hyperintensity along the frontal, parietal, and occipital lobes and the left cerebellar hemisphere.  No associated diffusion signal abnormality, susceptibility artifact, or significant surrounding mass effect or midline shift.  Findings remain nonspecific, but can be seen in setting of PRES or other encephalitis process.    Subtle symmetric diffusion signal abnormality about the basal ganglia, without significant FLAIR signal hyperintensity or mass effect.  Findings can be seen in the setting global hypoxic ischemic event.  Correlation is advised.    No abnormal postcontrast enhancement.    Additional scattered subcortical and periventricular FLAIR signal hyperintensities, likely representing sequela of chronic microvascular ischemic change or other remote insult.    COMMUNICATION  This critical result was discovered/received at 08:00.  The critical information above was relayed directly by me by telephone to Dr. Isbell on 05/24/2024 at 08:40.    This report was flagged in  Epic as abnormal.    Electronically signed by resident: Shelton Solomon  Date:    05/24/2024  Time:    03:48    Electronically signed by: Cesar Horan  Date:    05/24/2024  Time:    08:50   Results for orders placed or performed during the hospital encounter of 04/10/22   EEG    Narrative    Reza Vang MD     4/13/2022 10:56 AM  EEG    Date/Time: 4/10/2022 3:16 PM  Performed by: Reza Vang MD  Authorized by: Jacky Vigil MD         Past Medical History:   Diagnosis Date    Behavioral problem     Bulging lumbar disc     Bursitis     bilateral hip    Degenerative cervical disc     Hx of psychiatric care     Hypercholesteremia     Labral tear of hip, degenerative bilateral    MS (multiple sclerosis)     Paresthesia of both lower extremities 3/13/2022    Pneumonia     Spinal cord compression         Consults  [facility] INTERNAL MEDICINE TEL*

## 2024-12-12 NOTE — CARE UPDATE
RAPID RESPONSE NURSE FOLLOW-UP NOTE       Followed up with patient for an AI alert.  No acute issues at this time. Reviewed plan of care with charge RNMedina .   Team will continue to follow.  Please call Rapid Response RN, Autumn Rangel RN with any questions or concerns at [phone].

## 2024-12-12 NOTE — SUBJECTIVE & OBJECTIVE
Past Medical History:   Diagnosis Date    Behavioral problem     Bulging lumbar disc     Bursitis     bilateral hip    Degenerative cervical disc     Hx of psychiatric care     Hypercholesteremia     Labral tear of hip, degenerative bilateral    MS (multiple sclerosis)     Paresthesia of both lower extremities 3/13/2022    Pneumonia     Spinal cord compression        Past Surgical History:   Procedure Laterality Date    ANGIOGRAM, CORONARY, WITH LEFT HEART CATHETERIZATION Left 3/11/2022    Procedure: Angiogram, Coronary, with Left Heart Cath;  Surgeon: Elie Matamoros MD;  Location: [facility] CATH LAB;  Service: Cardiology;  Laterality: Left;    BREAST SURGERY      augmentation     SECTION, CLASSIC      HAND SURGERY      HYSTEROSCOPY      NECK SURGERY      cervical disc removeal    TUBAL LIGATION      X-STOP IMPLANTATION         Review of patient's allergies indicates:   Allergen Reactions    Adhesive Hives    Neosporin [neomycin-bacitracin-polymyxin] Hives    Neomycin     Neomycin-polymyxin Hives    Neosporin g.u. irrigant     Penicillins Other (See Comments)     Unknown  reaction    Latex Rash       No current facility-administered medications on file prior to encounter.     Current Outpatient Medications on File Prior to Encounter   Medication Sig    buprenorphine HCL (SUBUTEX) 2 mg Subl Place 1 tablet (2 mg total) under the tongue 2 (two) times daily.    gabapentin (NEURONTIN) 300 MG capsule Take 300 mg by mouth 3 (three) times daily.    glatiramer (COPAXONE, GLATOPA) 40 mg/mL injection Inject 40 mg into the skin 3 (three) times a week.    naloxone (NARCAN) 4 mg/actuation Spry 1 spray by Nasal route once as needed.    [DISCONTINUED] atorvastatin (LIPITOR) 20 MG tablet Take 20 mg by mouth once daily.    [DISCONTINUED] EScitalopram oxalate (LEXAPRO) 10 MG tablet Take 10 mg by mouth once daily.     Family History       Problem Relation (Age of Onset)    Bipolar disorder Brother    COPD Mother    Cancer Father     Diabetes Father, Sister    Drug abuse Mother    Heart disease Maternal Uncle    Hypothyroidism Maternal Grandmother    Lung cancer Father          Tobacco Use    Smoking status: Former     Current packs/day: 0.00     Types: Cigarettes     Quit date: 2013     Years since quittin.3     Passive exposure: Past    Smokeless tobacco: Never   Substance and Sexual Activity    Alcohol use: No    Drug use: Yes     Types: Benzodiazepines, Marijuana    Sexual activity: Not Currently     Partners: Male     Review of Systems   Reason unable to perform ROS: Clinical status.     Objective:     Vital Signs (Most Recent):  Temp: 98.7 °F (37.1 °C) (24)  Pulse: (!) 114 (24)  Resp: 16 (24)  BP: (!) 184/91 (24)  SpO2: 98 % (24) Vital Signs (24h Range):  Temp:  [98.6 °F (37 °C)-99.8 °F (37.7 °C)] 98.7 °F (37.1 °C)  Pulse:  [109-116] 114  Resp:  [16-34] 16  SpO2:  [95 %-100 %] 98 %  BP: (131-184)/() 184/91     Weight: 54.4 kg (120 lb)  Body mass index is 21.95 kg/m².     Physical Exam  Constitutional:       General: She is not in acute distress.     Appearance: Normal appearance. She is not ill-appearing or toxic-appearing.      Comments: Sleepy, but arousable   HENT:      Head: Normocephalic and atraumatic.   Eyes:      Extraocular Movements: Extraocular movements intact.   Cardiovascular:      Rate and Rhythm: Tachycardia present.      Heart sounds: No murmur heard.  Pulmonary:      Effort: Pulmonary effort is normal.      Breath sounds: Normal breath sounds. No wheezing, rhonchi or rales.   Abdominal:      General: Abdomen is flat. There is no distension.      Palpations: Abdomen is soft.      Tenderness: There is no abdominal tenderness. There is no guarding.   Skin:     General: Skin is warm and dry.   Neurological:      General: No focal deficit present.      Sensory: No sensory deficit.      Comments: Some word-finding difficulties, slurring of words    A&O x1,  knows name   Psychiatric:      Comments: tangential thought                Significant Labs: All pertinent labs within the past 24 hours have been reviewed.    Significant Imaging: I have reviewed all pertinent imaging results/findings within the past 24 hours.

## 2024-12-12 NOTE — ASSESSMENT & PLAN NOTE
Drug screen positive for marijuana use, unclear she is currently using synthetic cannabinoids as well.

## 2024-12-12 NOTE — ASSESSMENT & PLAN NOTE
Patient's blood pressure range in the last 24 hours was: BP  Min: 131/90  Max: 184/91.The patient's inpatient anti-hypertensive regimen is listed below:  Current Antihypertensives       Plan  - hypertensive on admission, no antihypertensives in home meds.  -will observe to see if treatment is necessary

## 2024-12-12 NOTE — H&P
[facility] - Emergency Dept  [facility]  History & Physical    Patient Name: Reza Morrow  MRN: [id number]  Patient Class: OP- Observation  Admission Date: 12/11/2024  Attending Physician: Tomi Roca, *   Primary Care Provider: Kip Jimenez MD         Patient information was obtained from patient, relative(s), and ER records.     Subjective:     Principal Problem:Altered mental status    Chief Complaint:   Chief Complaint   Patient presents with    Withdrawal     Pt. Is in withdrawal from Ketamine PO.  Last use x 2 days.  Presenting as altered with slurring words and shaking.         HPI: Patient is a 59 y.o. F with altered mental status found at home in urine.  She has past medical history of MS, hypercholesterolemia, and polypharmacy.     Patient is not sure what brought her into the hospital.  She does admit to getting ketamine off the streets, and states that she last took it yesterday.  Talked with her sister Celeste allen who thinks that there is likely some component of drug use contributing to her repeat presentations.  She states that this is the 3rd time she has been in the hospital in 8 weeks for the same problem of altered mental status.  Her sister states that the patient has never admitted to getting drugs off the Street.  She also has never admitted to having any psychiatric conditions, but that on 1 occasion her sister found medications for what she thinks is bipolar disorder.     History is limited by patient's word-finding difficulties, which is consistent with her previous presentations.  Reportedly, in a approximately a day or so she will returned back to baseline.  She was admitted 11/30/2024 and discharged from her last admission on 12/01/2024 for the exact same presentation with altered mental status, she got workup with MRI which showed chronic microvascular ischemic, some areas which could represent remote episodes of press, an area which could represent a dural  venous sinus thrombosis, but repeat MRI was not concerning to vascular neurology who felt that her symptoms were not caused by imaging findings.  She has not had an LP.    Past Medical History:   Diagnosis Date    Behavioral problem     Bulging lumbar disc     Bursitis     bilateral hip    Degenerative cervical disc     Hx of psychiatric care     Hypercholesteremia     Labral tear of hip, degenerative bilateral    MS (multiple sclerosis)     Paresthesia of both lower extremities 3/13/2022    Pneumonia     Spinal cord compression        Past Surgical History:   Procedure Laterality Date    ANGIOGRAM, CORONARY, WITH LEFT HEART CATHETERIZATION Left 3/11/2022    Procedure: Angiogram, Coronary, with Left Heart Cath;  Surgeon: Elie Matamoros MD;  Location: [facility] CATH LAB;  Service: Cardiology;  Laterality: Left;    BREAST SURGERY      augmentation     SECTION, CLASSIC      HAND SURGERY      HYSTEROSCOPY      NECK SURGERY      cervical disc removeal    TUBAL LIGATION      X-STOP IMPLANTATION         Review of patient's allergies indicates:   Allergen Reactions    Adhesive Hives    Neosporin [neomycin-bacitracin-polymyxin] Hives    Neomycin     Neomycin-polymyxin Hives    Neosporin g.u. irrigant     Penicillins Other (See Comments)     Unknown  reaction    Latex Rash       No current facility-administered medications on file prior to encounter.     Current Outpatient Medications on File Prior to Encounter   Medication Sig    buprenorphine HCL (SUBUTEX) 2 mg Subl Place 1 tablet (2 mg total) under the tongue 2 (two) times daily.    gabapentin (NEURONTIN) 300 MG capsule Take 300 mg by mouth 3 (three) times daily.    glatiramer (COPAXONE, GLATOPA) 40 mg/mL injection Inject 40 mg into the skin 3 (three) times a week.    naloxone (NARCAN) 4 mg/actuation Spry 1 spray by Nasal route once as needed.    [DISCONTINUED] atorvastatin (LIPITOR) 20 MG tablet Take 20 mg by mouth once daily.    [DISCONTINUED] EScitalopram oxalate  (LEXAPRO) 10 MG tablet Take 10 mg by mouth once daily.     Family History       Problem Relation (Age of Onset)    Bipolar disorder Brother    COPD Mother    Cancer Father    Diabetes Father, Sister    Drug abuse Mother    Heart disease Maternal Uncle    Hypothyroidism Maternal Grandmother    Lung cancer Father          Tobacco Use    Smoking status: Former     Current packs/day: 0.00     Types: Cigarettes     Quit date: 2013     Years since quittin.3     Passive exposure: Past    Smokeless tobacco: Never   Substance and Sexual Activity    Alcohol use: No    Drug use: Yes     Types: Benzodiazepines, Marijuana    Sexual activity: Not Currently     Partners: Male     Review of Systems   Reason unable to perform ROS: Clinical status.     Objective:     Vital Signs (Most Recent):  Temp: 98.7 °F (37.1 °C) (24)  Pulse: (!) 114 (24)  Resp: 16 (24)  BP: (!) 184/91 (24)  SpO2: 98 % (24) Vital Signs (24h Range):  Temp:  [98.6 °F (37 °C)-99.8 °F (37.7 °C)] 98.7 °F (37.1 °C)  Pulse:  [109-116] 114  Resp:  [16-34] 16  SpO2:  [95 %-100 %] 98 %  BP: (131-184)/() 184/91     Weight: 54.4 kg (120 lb)  Body mass index is 21.95 kg/m².     Physical Exam  Constitutional:       General: She is not in acute distress.     Appearance: Normal appearance. She is not ill-appearing or toxic-appearing.      Comments: Sleepy, but arousable   HENT:      Head: Normocephalic and atraumatic.   Eyes:      Extraocular Movements: Extraocular movements intact.   Cardiovascular:      Rate and Rhythm: Tachycardia present.      Heart sounds: No murmur heard.  Pulmonary:      Effort: Pulmonary effort is normal.      Breath sounds: Normal breath sounds. No wheezing, rhonchi or rales.   Abdominal:      General: Abdomen is flat. There is no distension.      Palpations: Abdomen is soft.      Tenderness: There is no abdominal tenderness. There is no guarding.   Skin:     General: Skin is warm  and dry.   Neurological:      General: No focal deficit present.      Sensory: No sensory deficit.      Comments: Some word-finding difficulties, slurring of words    A&O x1, knows name   Psychiatric:      Comments: tangential thought                Significant Labs: All pertinent labs within the past 24 hours have been reviewed.    Significant Imaging: I have reviewed all pertinent imaging results/findings within the past 24 hours.  Assessment/Plan:     * Altered mental status    59-year-old female presenting with altered mental status.  This is her 3rd presentation in the past 8 weeks.  According to the sister, she will come to the hospital get extensive workup and the workup will be negative and she will be back to her baseline in 1-3 days.  Sister she suspects that she is using drugs off the streets.  Patient admitted to getting ketamine from the streets in last took 12/10/2024.  However, at time of interview patient was altered so not entirely reliable history, but it is consistent with repeated presentations that self resolve shortly after admission.  Toxicology was consulted last admission who felt her problems were related to polypharmacy, and her home med regimen was reduced.    -CT head with chronic microvascular ischemia, but no acute intracranial finding  -consider repeat MRI  -consider LP to rule out autoimmune encephalitis      Marijuana use    Drug screen positive for marijuana use, unclear she is currently using synthetic cannabinoids as well.    Tachycardia  EKG showing sinus tachycardia, we will continue to observe most likely related to potential drug use.  High sensitivity troponin negative at 5 on admission.      HFrEF (heart failure with reduced ejection fraction)  History of with last EF 45-50% on 04/24/2024      Hypertension  Patient's blood pressure range in the last 24 hours was: BP  Min: 131/90  Max: 184/91.The patient's inpatient anti-hypertensive regimen is listed below:  Current  Antihypertensives       Plan  - hypertensive on admission, no antihypertensives in home meds.  -will observe to see if treatment is necessary    MS (multiple sclerosis)  Reportedly has a history of this and is on glatiramer acetate, however, no suspicious lesion on last MRI.      Opioid dependence    Reportedly has a history of opioid dependence on nucynta, buprenorphine stopped 11/30/2024      VTE Risk Mitigation (From admission, onward)           Ordered     IP VTE LOW RISK PATIENT  Once         12/11/24 2246     Place sequential compression device  Until discontinued         12/11/24 2246                           On 12/12/2024, patient should be placed in hospital observation services in collaboration with Dr. Alfaro.         Jaime Ibarra MD  Department of Hospital Medicine  [facility] - Emergency Dept

## 2024-12-12 NOTE — ASSESSMENT & PLAN NOTE
59-year-old female presenting with altered mental status.  This is her 3rd presentation in the past 8 weeks.  According to the sister, she will come to the hospital get extensive workup and the workup will be negative and she will be back to her baseline in 1-3 days.  Sister she suspects that she is using drugs off the streets.  Patient admitted to getting ketamine from the streets in last took 12/10/2024.  However, at time of interview patient was altered so not entirely reliable history, but it is consistent with repeated presentations that self resolve shortly after admission.  Toxicology was consulted last admission who felt her problems were related to polypharmacy, and her home med regimen was reduced.    -CT head with chronic microvascular ischemia, but no acute intracranial finding  -consider repeat MRI  -consider LP to rule out autoimmune encephalitis

## 2024-12-12 NOTE — ASSESSMENT & PLAN NOTE
Reportedly has a history of this and is on glatiramer acetate, however, no suspicious lesion on last MRI.

## 2024-12-12 NOTE — PROGRESS NOTES
Pt is awakened easily, she is alert but ask for help. She is requesting something for anxiety though she is sleeping each time I enter the room, secure chat sent to the med team with pts request, will visit her at the bedside.

## 2024-12-12 NOTE — CONSULTS
[facility]   DEPARTMENT OF PSYCHIATRY     IDENTIFIERS & DEMOGRAPHICS:     SERVICE: Addiction  ENCOUNTER: initial    -- PATIENT IDENTIFIERS: Reza Morrow  [id number]  1965  59 y.o.  female  -- LOCATION OF PATIENT: unit (med/surg)    -- MODE OF ARRIVAL: self-presented  -- PRESENT WITH PATIENT DURING SESSION: ALONE  -- SOURCES OF INFORMATION: EHR/chart, PATIENT  -- LOCATION OF ENCOUNTER PROVIDER: [place], [place]    -- ENCOUNTER PROVIDER: Shubham Burkett MD      PRESENTATION:   History of Present Illness   **  OVERVIEW OF THE HPI:    59-year-old female history substance induced mood disorder, broken heart syndrome, HFrEF (45-50% in April 2024), and history of MS on glatiramer acetate per chart reveiw who was brought to the ED with altered mental status. Reportedly brought in by family after found down in apartment unconsciousness and covered in her urine. ED and Medicine providers had spoken to her sister Celeste who had concerns pt had allegedly obtained Ketamine or other substances off the streets resulting in this presentation. Sister with concerns for pt never admitting to purchasing substances off the street vs also concerned regarding polypharmacy given her medication regimen. Psychiatry was consulted regarding potential substance intoxication and assessing need for subutex.     Of note, pt with well-documented history including 3x recent admissions within the last 1-2 months for similar presentations of being found down unconsciousness with suspected substance use and polypharmacy. Recently seen by [facility] on 11/15/24 for similar presentation and seen by [facility] 5/24/24 for similar presentation, please review those notes for further information.    SUBJECTIVE/CURRENT FINDINGS:    Attempted interview with resident physician and attending physician at bedside. Pt seemingly more awake and alert compared to initial ED presentation. Pt is however a poor historian at this time,  "she has difficulty answering many questions and becomes tearful at many points, especially when she is unable to answer questions. Initially states not remembering incident at home where she was found unconsciousness and denies recalling ED events, with further prompting then denies being unconsciousness and states she was at home with her sister and her sister's partner doing a "chakra reading" which is why she was on the ground.     Unable to specify, however pt seemingly endorsing since last discharge on 12/1/24 she has been taking medications including prescribed Baclofen and Robaxin, Gabapentin, and Nucynta. Denies taking Subutex since recent admission, and had discussed with surgery team last admission to discontinue Flexeril and Tizanidine as she believes this is what resulted in her unconsciousness last time. States she normally takes this as prescribed, however has been without all these medications for the last few days. On this interview, she reported pain, but was unable to localize or qualify the pain in any way. At that time she denied any current depression or anxiety. She was unable to recall previous discussions about the concern that her medicine regimen was causing her confusion.     Notewriter inquired about concerns for potential ketamine use obtained off the street, pt adamantly denies this and states she longer engages in ketamine use since last being prescribed this, latest 07/2024. Also pt does not comment on THC use despite positive UDS result. In regards to pain management, did not continue with Subutex as this caused her to become sedated, additionally this is unclear but she previously had a lady visit her at home to assist with meds but after a verbal altercation this stopped several days ago. Pt requesting oxycodone at this time if unable to receive her home med Nucynta. Had lengthy discussion regarding risks and benefits involved with opioid use and chronic pain regimen given her " history of MS and chronic pain issues. Was unable to provide further resources including rehab options at this time due to pt cooperation, however expressed to pt would like to reassess and continue to discussions regarding this. Pt also agreeable with notewriter reaching out to sister for collateral information.    Per Chart Review:    Per PDMP, historically receives Nucynta ER 100mg tablets monthly with her outpatient pain provider, last filled 12/4/24. Also previously prescribed medical marijuana, last filled 8/16/24 and previously prescribed ketamine cream last filled 7/1/24. Next appt with outpatient pain provider on 1/2/25.    Per Collateral:    Attempted collateral with sister Celeste ([phone]), no answer and left voicemail and will reassess.    REVIEW OF SYSTEMS:    >> SOURCES: patient, chart     N   Sleep Disturbance/Disruption   N   Appetite/Weight Change   N   Alterations in Energy Level   N   Impaired Focus/Concentration   Y   Depressive Symptomatology   Y   Excessive Anxiety/Worry   N   Dysregulated Mood/Behavior   N   Manic Symptomatology   N   Psychosis   N   Trauma-Related    Regarding the current presentation, no other significant issues or complaints are voiced or known at this time.      ADD-ON PSYCHOTHERAPY:     N/A         HISTORY:   Patient Information   **  >> SOURCES: patient, chart review       PSYCH  SUBSTANCE  FAMILY  SOCIAL  MEDICAL     Y   Previous/Pre-Existing Psychiatric Diagnoses  ANYA, unspecified depression   Y   Past Psychotropic Trials  Effexor, Buspar, Lexapro, Subutex   N   Current Psychiatric Provider   N   Hx of Outpatient Psychiatric Treatment  Denies   N   Hx of Psychiatric Hospitalization   N   Hx of Suicidal Ideation/Threats   N   Hx of Suicide Attempts/Gestures   N   Hx of Homicidal Ideation/Threats   N   Hx of Homicidal Behavior   N   Hx of Non-Suicidal Self-Injurious Behavior    "N   Hx of Perpetrated Violence   N   Documented Hx of Malingering     N   Hx of Formal KIMBERLY Treatment   N   Recent Alcohol Consumption   Y   Hx of Nicotine Use   N   Hx of Alcohol Misuse/Abuse   N   Hx of Illicit Drug Misuse/Abuse   N   Hx of Prescription Drug Misuse/Abuse   +   Drug Experimentation/Usage  +cannabis       N   Family Psychiatric History   +   Family Hx of Suicide  no      N   Hx of Trauma   N   Hx of Abuse     N   GED/High School Diploma   N   Post-Secondary Education   N   Currently Employed   Y   Currently on Disability   Y   Functions Independently   Y   Domiciled   Y   Intact Support System   N   Currently in a Relationship   Y   Ever    Y   Ever /   Y   Children/Dependents     N   Ever Charged/Convicted   N   Current Probation/[place]/Diversion   N   Hx of Incarceration     N   Hx of Seizures   N   Hx of Head Trauma      Medical Hx & Diagnoses  Possible MS diagnosis    >> EHR (EPIC): reviewed/reconciled      The patient's past medical history has been reviewed and updated as appropriate within the electronic health record system.  See PROBLEM LIST & HISTORY for details.    Scheduled and PRN Medications: The electronic chart was reviewed and updated as appropriate.  See MEDCARD for details.    Allergies:  Adhesive, Neosporin [neomycin-bacitracin-polymyxin], Neomycin, Neomycin-polymyxin, Neosporin g.u. irrigant, Penicillins, and Latex      EXAMINATION:   Objective   **  VITALS:  /82   Pulse 100   Temp 99.4 °F (37.4 °C) (Oral)   Resp 17   Ht 5' 2" (1.575 m)   Wt 50.6 kg (111 lb 8.8 oz)   SpO2 98%   Breastfeeding No   BMI 20.40 kg/m²     MENTAL STATUS EXAMINATION:  Appearance: inadequately groomed, distressed, appears stated age, normal weight  appropriately dressed, well-appearing    Behavior & Attitude: participative, under good behavioral " "control, able to redirect, appropriate eye contact  calm, engaged, agreeable, cooperative    Distressed  Movements & Motor Activity: no psychomotor agitation, no psychomotor retardation, not wheelchair bound (able to ambulate), no weakness, no spasticity, no rigidity, no tics, no tremor, no akathisia, no dyskinesia, no ataxia, no parkinsonism    Speech & Language: decreased rate, decreased volume, decreased quantity, increased latency, reciprocal  non-spontaneous, non-fluent    Mood: "frustrated"  Affect: dysphoric    Thought Process & Associations: organized, tangential, poverty of thought  no loosening of associations    Superficially linear, but had difficulty answering questions/elaborating on short responses  Thought Content & Perceptions: no delusions, no paranoid ideation, no ideas of reference, no grandiosity, no hyperreligiosity, no hallucinations, no responding to internal stimuli    Sensorium: awake, confused    Orientation: oriented to person  not oriented to place, not oriented to time, not oriented to situation    Recent & Remote Memory: register (3/3), recall (0/3)  impaired (recent), impaired (remote)    Attention & Concentration: inattentive to conversation, easily distracted  impaired, unable to spell 5-letter word fowards, unable to spell 5-letter word backwards, unable to perform [facility]    Fund of Knowledge: impaired, unable to identify key governmental leaders    Insight: limited  demonstrates sufficient awareness of condition/situation    Judgment: limited  heeds instructions/advice        RISK & REGULATORY:   Impression   **   RISK PARAMETERS:     N   Suicidal Ideation/Threats   N   Suicide Attempts/Gestures   N   Homicidal Ideation/Threats   N   Homicidal Behavior   N   Non-Suicidal Self-Injurious Behavior   N   Perpetrated Violence     NOTE: RISK PARAMETERS are current to the encounter/episode  NOT inclusive of past history.       FIREARMS " & WEAPONS:     N   Ready Access to Firearms     MEDICAL DECISION MAKING:     - DIAGNOSTICS: a diagnostic psychiatric evaluation was performed and responsiveness to treatment was assessed  ability/capacity to respond to treatment: adequate/intact     REGULATORY:    -- : REVIEWED      INFORMED CONSENT & SHARED DECISION MAKING are the hallmark and bedrock of good clinical care, and as such have been employed and obtained, respectively, to the degree possible.  Discussed, to the extent possible, diagnosis, risks and benefits of proposed treatment (e.g., medication, therapy) vs alternative treatments vs no treatment, potential side effects of these treatments, and the inherent unpredictability of treatment.         - ABILITY TO UNDERSTAND, PARTICIPATE & ENGAGE: minimal     - AGREEABLE TO TREATMENT (consent/assent): uknowkendal  unable to assess     - RELIABILITY/ACCURACY: the patient is deemed to be a well-meaning but unreliable and factually inaccurate historian      WARNINGS & PRECAUTIONS:  >> In cases of emergencies (e.g. SI/HI resulting in danger to self or others, functioning deteriorating to the level of grave disability), call 911 or [phone], or present to the emergency department for immediate assistance.    >> Individuals should not operate a motor vehicle or heavy machinery if effects of medications or underlying symptoms/condition impair the ability to do so safely.    >> FULLY comply with ANY/ALL medication as prescribed/instructed and report ANY/ALL suspected adverse effects to appropriate health care providers.      ASSESSMENT & PLAN:   Assessment & Plan   **  DIAGNOSES & PROBLEMS:       1.  R/O Ketamine intoxication    2.  Opioid dependance, concern for withdrawal    3.  R/O Opioid use disorder    PSYCHOTROPIC REGIMEN:   (C)=Continue as prescribed  (A)=Adjust as noted  (I)=Iniitate  (D)=Discontinue      1.  Allow for washout period for potential recent substance use contributing to AMS presentation    2.   Monitor for pain and opioid withdrawals, can consider re-initiating Subutex/Suboxone based on pt interest    3.  Plan to obtain collateral and reassess before making finalized recommendations regarding substance    4.  Consider pain management consult for further recommendations. Additionally will discuss with primary team to recommend coordinating with pt's outpatient pain provider for future steps.    -- ASSESSMENT (synthesis  analysis):     Ms. Reza Morrow is a 59 y.o. female with past psychiatric history of depression and anxiety and past medical history of chronic pain and possible MS. She has had multiple previous presentations for AMS that have been attributed to polypharmacy. Per  patient's medications include gabapentin, pregabalin, tapentadol, oxycodone, ketamine cream, and medical marijuana in the past. Most recent  Nucynta ER 100mg last filled 12/4/24 per outpatient pain provider. The patients current presentation is likely multifactorial including polypharmacy and opioid withdrawal. The patient's inability to remember the past several days make the timing of potential substance use uncertain. Additionally, given the patient's history of MS, a neurological contribution to the patient's condition cannot be ruled out and warrants consideration should the patient not improve with current management plans.     At this time, will plan to reassess with patient to consider potential substance use resources and gauge interest in rehab. Unable to reach collateral at this time, however will reassess as this is an unclear picture regarding physical dependence on pain regimen vs potential addiction issues. On reassessment, will plan to administer MoCA and CAM-ICU if pt able and willing to engage to determine level of cognition. Given results of MoCA and CAM-ICU, if pt able to demonstrate reasonable capacity then could possibly pursue rehab options, however with concerns for pt's ability to care for self  given multiple presentations of being found down. With some concerns at this time regarding cognition with potential volitional component regarding her inconsistent history and recent substance use/polypharmacy. Will coordinate with primary team and recommend they coordinate with her outpatient pain provider on steps moving forward.    -- PLAN (goals  recommentations):          - CURRENT CONDITION: exacerbated  as compared to typical baseline  - WITH REASONABLE MEDICAL CERTAINTY, based on history, chart review, available collateral information, and a present-state examination:   -- psychiatric hospitalization is not indicated    * PEC is NOT INDICATED - rescind if in place     >> prescription drug management (i.e., the review, recommendation, or consideration without recommendation of medications) was employed during the encounter  >> discussed the following (to the extent possible) with the patient and/or other interested parties, if not otherwise known or stated prior, providing psychoeducation and/or resources (e.g., references, fact sheets) as applicable, utilizing an appropriate developmental and sociocultural approach:    -- diagnosis, target symptoms, intended outcomes, and possible benefits and risks of medication, as well as alternatives (including no treatment)     -- possible expectable adverse effects and/or drug interactions of any proposed individual psychotropic agents, as well as the inherent unpredictability of treatment     -- the importance of full compliance with any prescribed medication     -- the proper protocol and/or steps to take if questions or problems arise    -- relevant warnings and safety considerations, including, but not limited to, the prohibition against operating a motor vehicle or heavy machinery if effects of medications or underlying symptoms/condition impair the ability to do so competently and safely    -- relevant legal aspects of medication, including, but not limited  to, the prohibition against carrying pills loose outside of the bottle and the sharing of medication with anyone, as well as the safe and secure storage of all pharmaceuticals  >> DEFER management of NON-PSYCHIATRIC medication(s) to primary and/or specialist provider(s)  >> advised to abstain from alcohol and illicit drug use  >> counseled on full abstinence from alcohol and substances of abuse (illicit and prescription), as well as maintenance of a recovery lifestyle  >> harm reduction techniques discussed, as warranted, to mitigate risk from problematic behaviors  >> serial laboratory testing (e.g. PETH, serum ethanol, urine toxicology) recommended and/or planned to provide accountability, as well as guide and refine treatment moving forward  >> importance of lifelong, active engagement in 12 step (or equivalent) mutual self-help program(s) was stressed/reinforced, including regular meeting attendance and acquisition/maintenance of a sponsor  >> education and/or resources discussed and/or provided for various addiction recovery and rehabilitation options  >> motivational interviewing applied, relapse prevention provided  >> case discussed with staff psychiatrist  >> will continue to follow with you, thank you    DELIRIUM PROTOCOL     > DELIRIUM is a HIGH RISK MEDICAL CONDITION with significantly elevated rates of morbidity and mortality, routinely rendering a person temporarily incapacitated and interfering with the effective management of underlying medical conditions. As such, proactive and comprehensive management is essential. Implementation of the following recommendations should be considered BEST PRACTICE:  >> if feasible, please utilize non-pharmacologic approaches FIRST for agitation; PRN medications can be considered if this approach is insufficient or ineffective  >> AVOID the use of PHYSICAL RESTRAINTS whenever possible; whenever not, always seek to MINIMIZE their use  >> keep window shades open and  room lit during day and room dim at night in order to promote normal sleep-wake cycles  >> reduce unnecessary stimulation/noise; TV screen and sound should be off unless patient is actively engaged with the program  >> minimize disruptions at night  >> correct sensory deficits, when present, with provision of eyeglasses and/or hearing aids  >> optimize nutrition and hydration status  >> continue to manage medical conditions and assess for and treat pain  >> consider PT/OT consult, as early mobility and exercise have been shown to decrease duration of delirium  >> attempt to maintain consistency of key staff who will routinely interact with the patient  >> encourage family at bedside  >> orient patient often to date, location, and situation, including reason for hospitalization and current plan of care  >> keep whiteboard (or similar) in patient's room current with the date and names of key treatment team members  >> utilize 1:1 sitter for monitoring, if warranted  >> LIMIT or DISCONTINUE the use of narcotics, benzodiazepines, anticholinergics, antihistaminergics, steroids, and other known deliriogenic medications  >> continue treatment of the underlying causative etiology of delirium, the correction of which is the ultimate objective in delirium management; if unknown, continue exhaustive medical workup to elucidate the source(s)    OPIOID WITHDRAWAL PROTOCOL    >> recommendations provided herein should be seen as rough guidelines, as an opioid withdrawal protocol should be individualized and modified routinely, as clinically warranted  >> the absence of a positive serum or urine toxicology for opioids does not exclude recent opioid use, as many agents, including fentanyl and carfentanil, do not register on standard drug screens; a withdrawal protocol may still be warranted, based on the clinical situation  >> enact behavioral protocols per unit policies, to minimize the risk of surreptitious use in the facility  setting  >> vitals Q4H while awake  >> frequent administration of COWS  >> prototypical PRNs (adjust as clinically warranted):    > utilize clonidine if no cardiac contraindications exist; consult cardiology if specialist input is needed   > HOLD clonidine if SBP <100, DBP <60, HR <50 (assuming no alternative parameters are in effect)   > clonidine (Catapres) 0.1mg PO TID PRN for COWS >5   > may increase clonidine to 0.2mg PO TID if COWS >12 persistently   > dicyclomine (Bentyl) 10mg PO Q6H PRN for abdominal cramps, GI distress   > methocarbamol (Robaxin) 500mg PO Q8H PRN for muscle cramps, muscle spasms   > ondansetron (Zofran) 4mg PO Q6H PRN for nausea, vomiting   > loperamide (Imodium) 2 mg after each loose stool; not to exceed 16 mg/day   > ibuprofen (Motrin) 400mg PO Q6H PRN for pain    > hydroxyzine (Vistaril) 50mg PO Q6H PRN for anxiety, insomnia   > although short-term benzodiazepines may also be of some utility, caution must be exercised given their addictive potential; seek to limit/minimize/avoid their use   > NALOXONE 0.4MG IV PRN for opioid reversal  >> post-withdrawal treatment is paramount; medically supervised withdrawal manages the patient through the withdrawal symptoms but does not treat opioid use disorder; patients completing withdrawal are at high risk of relapse to resumed opioid use, as well as increased risk for accidental overdose death given their recent reversal of built-up tolerance  >> if in a facility, patients should be given explicit plans for follow-up care PRIOR to discharge  >> follow-up may involve ongoing treatment through primary care or a specialized addiction treatment program or physician, and is dependent on a number of factors, including availability and patient willingness  >> continuing care, including pharmacotherapy (i.e., MAT - buprenorphine, methadone, naltrexone) and psychosocial intervention for opioid use disorder, are indicated  >> it is vital to ensure access to  naloxone upon discharge, as well as instructions for its use  >> review/educate on signs of an overdose and harm reduction techniques (e.g., never use alone, never share or reuse needles, always take turns, always try a small dose first to check the effect, always have naloxone available in plain view, always contact emergency services if naloxone is administered, always monitor until help arrives, always be prepared to give follow up doses of naloxone as needed)    See below for pertinent laboratory and radiographic findings.    Shubham Burkett MD  [facility] PGY-2   [facility]    CHART REVIEW: available documentation has been reviewed, and pertinent elements of the chart have been incorporated into this evaluation where appropriate.       KEY & LINKS:        Y  = yes/endorses     N  = no/denies     U  = unknown/unable to assess    ADHD   AIMS   AUDIT   AUDIT-C   C-SSRS (Screen)   C-SSRS (Short)   C-SSRS (Full)   DAST   DAST-10   ANYA-7   MoCA   PCL-5   PHQ-9   KIMBERLY   YMRS       DIAGNOSTIC TESTING:   Results   **   Glu 112 (H)  12/12/2024  Li *   *  TSH 0.377 (L)  12/11/2024    HgA1c 5.2  5/14/2024  VPA *   *   FT4 1.72 (H)  12/11/2024    Na 142  12/12/2024  CLZ *   *  WBC 8.46  12/12/2024    Cr 0.8  12/12/2024  ANC 6.2; 73.4;  (H)  12/12/2024   Hgb 14.8  12/12/2024     BUN 28 (H)  12/12/2024  Trop I 0.033 (H)  11/14/2024  HCT 43.9  12/12/2024     GFR >60.0  12/12/2024   CPK 72  12/11/2024    12/12/2024     Alb 3.7  12/12/2024   PRL *   *  B12 191  3/23/2022     T Bili 1.4 (H)  12/12/2024  Chol *   *  B9 10.1  5/26/2024    ALP 69  12/12/2024  TGs *   *  B1 60  *    AST 12  12/12/2024  HDL *   *  Vit D *   *     ALT 9 (L)  12/12/2024  LDL *   *  HIV Non-reactive  7/20/2024     INR 1.0  12/1/2024  Stefani *   *   Hep C Non-reactive  7/20/2024    GGT *   *  Lip 16  7/20/2024  RPR *   *    MCV 88  12/12/2024   NH4 42  12/11/2024  UPT  *   *      PETH *   *  THC Presumptive Positive (A)  12/11/2024    ETOH <10  12/11/2024  JOSH Negative  12/11/2024    EtG *   *  AMP Negative  12/11/2024    ALC <10  11/30/2024  OPI Negative  12/11/2024    BZO Negative  12/11/2024  MTD Negative  12/11/2024     BAR Negative  12/11/2024  BUP Present  11/17/2024    PCP Negative  12/11/2024  FEN Not Detected  11/17/2024     Results for orders placed or performed during the hospital encounter of 12/11/24   EKG 12-lead    Collection Time: 12/11/24  4:49 PM   Result Value Ref Range    QRS Duration 76 ms    [facility] QTC Calculation 451 ms    Narrative    Test Reason : R68.89,    Vent. Rate : 106 BPM     Atrial Rate : 106 BPM     P-R Int : 132 ms          QRS Dur :  76 ms      QT Int : 340 ms       P-R-T Axes :  78  87  14 degrees    QTcB Int : 451 ms    Sinus tachycardia  Right atrial enlargement  T wave abnormality, consider anterior ischemia  Abnormal ECG  When compared with ECG of 11-Dec-2024 16:48,  No significant change was found  Confirmed by Michele Solomon ([id number]) on 12/12/2024 8:20:51 AM    Referred By: AAAREFERRAL SELF           Confirmed By: Michele Solomon     Results for orders placed or performed during the hospital encounter of 11/14/24   CT Head Without Contrast    Narrative    EXAMINATION:  CT HEAD WITHOUT CONTRAST    CLINICAL HISTORY:  Mental status change, unknown cause;    TECHNIQUE:  Low dose axial CT images obtained throughout the head without the use of intravenous contrast.  Axial, sagittal and coronal reconstructions were performed.    COMPARISON:  07/21/2024    FINDINGS:  Intracranial compartment:    Ventricles are stable in size, without evidence of hydrocephalus.    Mild patchy hypoattenuation in the supratentorial white matter, nonspecific but most likely reflecting chronic small vessel ischemic changes. No recent or remote major vascular distribution infarct. No acute hemorrhage.  No mass effect or midline shift.    No new extra-axial  blood or fluid collections.    Skull/extracranial contents (limited evaluation):    No displaced calvarial fracture.    The mastoid air cells and visualized paranasal sinuses are essentially clear.      Impression    No evidence of acute hemorrhage or major vascular distribution infarct.      Electronically signed by: Joseph Krishna MD  Date:    11/14/2024  Time:    13:46   Results for orders placed or performed during the hospital encounter of 07/20/24   MRI Brain Without Contrast    Narrative    EXAMINATION:  MRI BRAIN WITHOUT CONTRAST    CLINICAL HISTORY:  Mental status change, unknown cause;    TECHNIQUE:  Early termination of the exam due to patient's declination/refusal to continue with examination.  Limited T1 coronal image obtained.    COMPARISON:  CT head 07/21/2024    FINDINGS:  Nondiagnostic images due to early exam termination by patient.      Impression    As above.    Electronically signed by resident: Leonora Landry  Date:    07/21/2024  Time:    06:11    Electronically signed by: Sim Appiah MD  Date:    07/21/2024  Time:    06:26   Results for orders placed or performed during the hospital encounter of 05/22/24   MRI Brain W WO Contrast    Narrative    EXAMINATION:  MRI BRAIN W WO CONTRAST    CLINICAL HISTORY:  Mental status change, unknown cause;    TECHNIQUE:  Multiplanar multisequence MR imaging of the brain was performed before and after the administration of 6 mL Gadavist intravenous contrast.    COMPARISON:  CT head 05/23/2024.  MRI brain 04/23/2024.    FINDINGS:  Ventricles are normal in size for age.  No hydrocephalus.    Nonspecific patchy areas of T2/FLAIR signal hyperintensity in the supratentorial white matter, similar distribution compared to prior exam.    New areas of subcortical T2/FLAIR signal hyperintensity along the frontoparietal lobes bilaterally at the vertex, with an additional focus in the left cerebellar hemisphere (series 8, image 21).  No associated diffusion signal  abnormality, susceptibility artifact, or significant surrounding mass effect or midline shift.  Additional focal areas of mild cortical and subcortical signal parenchymal abnormality and mild gyral expansion about the parietooccipital region (axial series 8, image 18).    Additionally, there is subtle symmetric diffusion signal abnormality about the basal ganglia, without significant FLAIR signal hyperintensity or mass effect.    No parenchymal mass, hemorrhage, or recent or remote major vascular distribution infarct.    No abnormal postcontrast parenchymal or leptomeningeal enhancement.    No extra-axial blood or fluid collections.    The T2 skull base flow voids are preserved.  Bone marrow signal intensity unremarkable.  Paranasal sinuses and mastoid air cells are clear.    Partially visualized fusion hardware about the cervical spine.      Impression    New areas of cortical/subcortical T2/FLAIR signal hyperintensity along the frontal, parietal, and occipital lobes and the left cerebellar hemisphere.  No associated diffusion signal abnormality, susceptibility artifact, or significant surrounding mass effect or midline shift.  Findings remain nonspecific, but can be seen in setting of PRES or other encephalitis process.    Subtle symmetric diffusion signal abnormality about the basal ganglia, without significant FLAIR signal hyperintensity or mass effect.  Findings can be seen in the setting global hypoxic ischemic event.  Correlation is advised.    No abnormal postcontrast enhancement.    Additional scattered subcortical and periventricular FLAIR signal hyperintensities, likely representing sequela of chronic microvascular ischemic change or other remote insult.    COMMUNICATION  This critical result was discovered/received at 08:00.  The critical information above was relayed directly by me by telephone to Dr. Isbell on 05/24/2024 at 08:40.    This report was flagged in Epic as abnormal.    Electronically  signed by resident: Shelton Solomon  Date:    05/24/2024  Time:    03:48    Electronically signed by: Cesar Horan  Date:    05/24/2024  Time:    08:50   Results for orders placed or performed during the hospital encounter of 04/10/22   EEG    Narrative    Reza Vang MD     4/13/2022 10:56 AM  EEG    Date/Time: 4/10/2022 3:16 PM  Performed by: Reza Vang MD  Authorized by: Jacky Vigil MD         Past Medical History:   Diagnosis Date    Behavioral problem     Bulging lumbar disc     Bursitis     bilateral hip    Degenerative cervical disc     Hx of psychiatric care     Hypercholesteremia     Labral tear of hip, degenerative bilateral    MS (multiple sclerosis)     Paresthesia of both lower extremities 3/13/2022    Pneumonia     Spinal cord compression         Consults  [facility]*

## 2024-12-13 VITALS
RESPIRATION RATE: 17 BRPM | BODY MASS INDEX: 20.53 KG/M2 | HEIGHT: 62 IN | TEMPERATURE: 98 F | DIASTOLIC BLOOD PRESSURE: 78 MMHG | OXYGEN SATURATION: 94 % | WEIGHT: 111.56 LBS | HEART RATE: 98 BPM | SYSTOLIC BLOOD PRESSURE: 114 MMHG

## 2024-12-13 LAB
ALBUMIN SERPL BCP-MCNC: 3.2 G/DL (ref 3.5–5.2)
ALP SERPL-CCNC: 61 U/L (ref 40–150)
ALT SERPL W/O P-5'-P-CCNC: 7 U/L (ref 10–44)
ANION GAP SERPL CALC-SCNC: 11 MMOL/L (ref 8–16)
AST SERPL-CCNC: 10 U/L (ref 10–40)
BACTERIA UR CULT: ABNORMAL
BASOPHILS # BLD AUTO: 0.02 K/UL (ref 0–0.2)
BASOPHILS NFR BLD: 0.4 % (ref 0–1.9)
BILIRUB SERPL-MCNC: 1.8 MG/DL (ref 0.1–1)
BUN SERPL-MCNC: 19 MG/DL (ref 6–20)
CALCIUM SERPL-MCNC: 8.4 MG/DL (ref 8.7–10.5)
CHLORIDE SERPL-SCNC: 103 MMOL/L (ref 95–110)
CO2 SERPL-SCNC: 20 MMOL/L (ref 23–29)
CREAT SERPL-MCNC: 0.8 MG/DL (ref 0.5–1.4)
DIFFERENTIAL METHOD BLD: NORMAL
EOSINOPHIL # BLD AUTO: 0.1 K/UL (ref 0–0.5)
EOSINOPHIL NFR BLD: 1 % (ref 0–8)
ERYTHROCYTE [DISTWIDTH] IN BLOOD BY AUTOMATED COUNT: 13.5 % (ref 11.5–14.5)
EST. GFR  (NO RACE VARIABLE): >60 ML/MIN/1.73 M^2
GLUCOSE SERPL-MCNC: 115 MG/DL (ref 70–110)
HCT VFR BLD AUTO: 40.1 % (ref 37–48.5)
HGB BLD-MCNC: 14.4 G/DL (ref 12–16)
IMM GRANULOCYTES # BLD AUTO: 0.01 K/UL (ref 0–0.04)
IMM GRANULOCYTES NFR BLD AUTO: 0.2 % (ref 0–0.5)
LYMPHOCYTES # BLD AUTO: 1.6 K/UL (ref 1–4.8)
LYMPHOCYTES NFR BLD: 31.5 % (ref 18–48)
MCH RBC QN AUTO: 31 PG (ref 27–31)
MCHC RBC AUTO-ENTMCNC: 35.9 G/DL (ref 32–36)
MCV RBC AUTO: 86 FL (ref 82–98)
MONOCYTES # BLD AUTO: 0.4 K/UL (ref 0.3–1)
MONOCYTES NFR BLD: 8.3 % (ref 4–15)
NEUTROPHILS # BLD AUTO: 3 K/UL (ref 1.8–7.7)
NEUTROPHILS NFR BLD: 58.6 % (ref 38–73)
NRBC BLD-RTO: 0 /100 WBC
PLATELET # BLD AUTO: 250 K/UL (ref 150–450)
PMV BLD AUTO: 10 FL (ref 9.2–12.9)
POTASSIUM SERPL-SCNC: 3.1 MMOL/L (ref 3.5–5.1)
PROT SERPL-MCNC: 6.5 G/DL (ref 6–8.4)
RBC # BLD AUTO: 4.64 M/UL (ref 4–5.4)
SODIUM SERPL-SCNC: 134 MMOL/L (ref 136–145)
WBC # BLD AUTO: 5.05 K/UL (ref 3.9–12.7)

## 2024-12-13 PROCEDURE — 80053 COMPREHEN METABOLIC PANEL: CPT

## 2024-12-13 PROCEDURE — 25000003 PHARM REV CODE 250

## 2024-12-13 PROCEDURE — G0378 HOSPITAL OBSERVATION PER HR: HCPCS

## 2024-12-13 PROCEDURE — 36415 COLL VENOUS BLD VENIPUNCTURE: CPT

## 2024-12-13 PROCEDURE — 85025 COMPLETE CBC W/AUTO DIFF WBC: CPT

## 2024-12-13 PROCEDURE — 99214 OFFICE O/P EST MOD 30 MIN: CPT | Mod: ,,, | Performed by: PSYCHIATRY & NEUROLOGY

## 2024-12-13 RX ORDER — ONDANSETRON 4 MG/1
4 TABLET, ORALLY DISINTEGRATING ORAL EVERY 8 HOURS PRN
Status: DISCONTINUED | OUTPATIENT
Start: 2024-12-13 | End: 2024-12-13 | Stop reason: HOSPADM

## 2024-12-13 RX ADMIN — HYDROXYZINE PAMOATE 25 MG: 25 CAPSULE ORAL at 12:12

## 2024-12-13 RX ADMIN — ONDANSETRON 4 MG: 4 TABLET, ORALLY DISINTEGRATING ORAL at 03:12

## 2024-12-13 RX ADMIN — HYDROXYZINE PAMOATE 25 MG: 25 CAPSULE ORAL at 09:12

## 2024-12-13 NOTE — PLAN OF CARE
SW attempted DPA at the bedside. Patient not seen at that time secondary to receiving nursing care.    SW will re-attempt.               SAWYER Loco, LMSW  [facility]  Case Management  Ext. [phone]

## 2024-12-13 NOTE — DISCHARGE INSTRUCTIONS
REFERRAL RECOMMENDATIONS FOR SUBSTANCE ABUSE & MENTAL HEALTH      IN CASE OF SUICIDAL THINKING, call the National Suicide Hotline Number: 988    988 Suicide & Crisis Lifeline: 986 , 5-548-432-HFIZ (8167)  https://988Dang Le.Jubilater Interactive Media       SUBSTANCE ABUSE:     [facility] (formerly known as the ABU)  [x] [phone], Option 2  [x] [address] [facility] 4th [facility], [place] [address]  [x] [email or web address]://[email or web address]/services/[email or web address]-program  [x] The [facility] delivers comprehensive and collaborative treatment for alcohol and substance use disorders.  Excellent program for working professionals or anyone else seeking recovery.  [x] Requires insurance approval prior to starting program, call number above for more information.  [x] Intensive Outpatient Rehabilitation Program - M-F 9am-3pm - daily groups with psychologists and social workers, sessions with MDs 3x per week   [x] Ambulatory detox and dual diagnosis available      SUBOXONE:     NOTE: some Suboxone clinics require their clients to participate in a structured program (such as an IOP) in order to be prescribed Suboxone.  Some clinics have a long waiting list.  Most of these clinics do not accept walk-in clients, so call first to to learn what must be done to get started on Suboxone.    [facility] - [phone] (can do Sublocade)  [address] [facility], [place] [address]    [address]  [phone]    [facility] - [phone] (can do Sublocade)  [address] [place] [address]    [facility]  [address] [place]  [phone]     [facility] (very short waiting list, may accept some walk-in's but call first if possible)  [address], [place] [address]  [phone]; [phone]    [facility]   [address] [place]    [phone]    [facility] (can usually be seen within a week but is cash only  for appointment)  [address] [facility] ([facility])  [address] [place], [place]  [phone]    [facility])  [address] [facility] [address]  [phone]    [place], [place]:    [facility] - [address] [place] [address] - Tel: [phone]    Nicko Anne - [address] [place] [address] - Tel: [phone]    Jaime Fountain - [address] [place] [address] - Tel: [phone]    Jono Ibarra - [address] [place] [address] - Tel: [phone]    Maximiliano Alexis - [address] [place] [address] - Tel: [phone]    [place], [place]:     Dr. Eligio Grullon and Dr. Valente Laguerre - [address] [place], [place] - Tel: [phone]    Dr. Ashley Lopez - [address] [facility], [place] - Tel: [phone]    Dr. Robin Cuevas - Tel: [phone]    Dr. Neo Paulson - [facility] - [phone]      METHADONE:     [facility] (the only methadone clinic in the [facility], has two locations)  [x] [place] - [address] [place] [address], [phone]  [x] [facility] [place] [address], [phone]      12 STEP PROGRAMS (and similar):     Alcoholics Anonymous (local)  [x] [phone]  [x] www.[place].org for schedules for in-person and online meetings  [x] There are AA meetings throughout the day all over [facility]  [x] AA costs nothing to attend; they pass a basket for donations but this is not required    Narcotics Anonymous  [x] [phone]  [x] www.noana.org  [x] There are NA meetings throughout the day all over town  [x] NA costs nothing to attend; they pass a basket for donations but this is not required    Alcoholics Anonymous Online Intergroup (national)  [x] www.aa-intergroup.org  [x] Good resource for large, nation-wide meetings  [x] Can also attend smaller, local meetings in other cities  [x] Countless meetings all day and all night  [x] AA costs nothing to attend; they pass a basket for donations  but this is not required    Flying Sober - 24/7 zoom meetings for women and coed - sign on anytime, anywhere!  https://[email or web address] Biotechsober.Adduplex/35-9-elwvztnq/    Online Intergroup of AA - 121 Open AA Coal Meeting - 24/7 zoom meetings  https://aa-intergroup.org/meetings/    LOOKING FOR AN ALTERNATIVE TO 12 STEP PROGRAMS - check out:  [facility] Recovery: https://www.smartrecovery.org/about-us  [facility]: https://recoverydharma.org      DETOX UNITS (USUALLY 5-7 DAYS):     [facility]: [address], [place]  [phone], call first to ensure bed availability    [facility]: [address] [facility]  [phone], Option 1, call first to ensure bed availability    [facility]: [facility] Garett Sanon, [facility]  [phone] (intake by appointment only)    [facility]: [address], [facility]  [phone]      INTENSIVE OUTPATIENT PROGRAMS:     [facility] (formerly known as the ABU)  [x] [phone], Option 2  [x] [address] [facility] 4th Floor, [facility] [address]  [x] https://www.[facility].org/services/[facility]-recovery-program  [x] [facility] delivers comprehensive and collaborative treatment for alcohol and substance use disorders.  Excellent program for working professionals or anyone else seeking recovery.  [x] Requires insurance approval prior to starting program, call number above for more information.  [x] Intensive Outpatient Rehabilitation Program - M-F 9am-3pm - daily groups with psychologists and social workers, sessions with MDs 3x per week   [x] Ambulatory detox and dual diagnosis available    [facility] Intensive Outpatient Program  [x] [phone]  [x] [address] [facility] (the clinic not [facility])  [x] Call number above for more info and to check insurance requirements    [facility]  [address] [place] [address] [phone]    [facility]:  [address] [facility]  [address]?, [place], [place] [address]?, [phone]  [address] [facility]?, [place], [place] [address]?, [phone]    RESIDENTIAL REHABS (USUALLY 28 DAYS):     [facility]: [address], [phone]    [facility]: [address] [place] , [facility]  [phone] (intake by appointment only)    [facility] (men only) [address], [place], [phone]    [facility] (Female only) [address] [place], [phone]    [facility]: [address], [place], men's program [phone], women's program [phone]    [facility]: [address], [place], [phone]    [facility]: [address], [place], [place], [phone]    [facility]: Men only, [phone], [address] [facility] [address] [facility]: [address], [place], [place], [phone]    [facility]: [facility] [place], [place],  [phone]  New Location: [address] [place] [address] 100, [place] [address], [phone]    [facility]:   ?[address] [facility]?[place], [place] [address]?[phone]    [place]: [address] [place] [address], [phone]    [place]: [place], [place], [phone]     [facility]: [place], [phone]    [facility]: HELEN Belcher, [phone]    [facility]: [place], [place], [phone]    [place]: [address], [phone]    [facility]: [address] [facility], [place] [address], [phone]    COMMUNITY ADDICTION CLINICS:     [facility]: [address] [facility], [address] [phone] -or- [address] [place] [address]    [facility] [place]: [address] [place]  [address]     [facility]: Clinics [phone]; Crisis [phone]    [facility]: [address] [place] [address]    [facility]: [address] [place] [address]    [facility]: [address] [place] [address],    [facility] Center: 2nd Floor [address] [place] [address]    [facility]: [address], [facility] [place] [address]    [facility], [address] [place], [place] [address]    [facility]: [address] [facility], [place] [phone]  (serves youth 16-23 years old)    [facility]: [facility] [phone]    [facility]: [address] [facility], [place] [phone]    [address] [facility]: [address] [facility] [phone]    [facility]: [address] [place], [place] [address]10 [facility], [address], [phone]     [facility]: [address] [facility], [phone], [phone]    RESOURCES IN OTHER [facility]:     [facility]: [address] Howe[facility] [place], [phone], [phone]    [facility]: [address] [facility], [address], [phone]    [facility] [place], [address] [place] [address], [phone]    [facility]: [address] [facility], [phone]    [facility] Health, [address] [facility], [phone] ([facility])    [facility], [address] [facility], [phone] ([facility])    [facility], [address], [place], [phone] ([facility], and [facility])    [place] [facility], [address] , [place], [phone] ([facility])    [facility] Hotline [phone], [phone]    [facility], [address] [facility], [facility] Assessment [place], [address] [facility], [address], [place] [facility], 18[address] [facility]  "Veronica [address] [facility], 500 [facility]. [address]., [place] [facility], [address], [place]    [facility], 401 [facility], [address]35[facility] [phone]    [facility], [address] [facility] [phone]    [facility], [address] [facility] [phone]    [facility], [address] [facility], [phone]      Romansh SPEAKING (en español):     Información de la reunión de Alcohólicos Anónimos  [facility], 10:00 am  Habla español  Esta reunión está abierta y cualquiera puede asistir.    Portuguese speaking Alcoholics anonymous meetings:  El "Isidro [place] AA Skype" es un isidro on line de Alcohólicos Anónimos en [facility]. El isidro es sue, gratuito y virtual a través de Skype Audio. El isidro funciona mediante tamanna llamada grupal de voz, por lo que no se utiliza la videollamada, ni se pueden santiago las imágenes o rostros de los participantes. Hace jovi años y medio abrimos el primer Isidro de AA por Skype en [place], angeline actualmente asisten personas desde [place], [place], [place], [place], [place], [place], [place], [place], [place], [place], [place] y [place], entre otros.    El isidro es muy útil para los alcohólicos que residen en lugares donde no se celebran reuniones de AA, o residen en lugares donde las reuniones de AA son un número limitado de días a la semana, o para aquellos compañeros que se hayan de viaje o que, por cualquier motivo, se hayan convalecientes y no pueden desplazarse. Todos los días nos reuniones a las 21:00 (hora española)    Podéis obtener más información sobre el isidro y jessica sesiones en la página web [email or web address]      MENTAL HEALTH:     [facility] - Outpatient Clinic  [phone]    [facility] - General " Psychiatry Intensive Outpatient Program  [facility] (formerly known as the BMU)  [phone], option 3    [facility] of Psychiatry - Dual Diagnosis Intensive Outpatient Program  [facility] (formerly known as the ABU)  [phone], option 2      COMMUNITY MENTAL HEALTH CENTERS:     [facility]  (aka [facility], [facility])  Serves [facility].  Serves uninsured patients & those with Medicaid.  Main location: [address] [place] [id number] [phone]  Walk-in's available during regular business hours.  24/7 Crisis Line: [phone]    [facility]  (aka [facility])  Serves [facility].  Serves uninsured patients, those with Medicaid and some private plans.  Walk-in's available during regular business hours.  Primary care services available as well.  [facility]: [address] [facility] [place] [address];  [phone]  [place]: [address] [place] [address];  [phone]  24/7 Crisis Line: [phone]    [facility]  Serves uninsured patients & those with Medicaid, call for more info.  Primary care, pediatrics, HIV treatment, and dentistry services available as well.  Three locations.  [phone]    [facility]?Corporate Office  Serves patients with Medicaid, Medicare, and private insurance  [address]  [place],?[place] [address] [phone]    [facility]  Serves uninsured on a sliding scale, as well as Medicaid, Medicare, and private plans.  Eight locations around the [facility] area.  [phone]    [facility]  Serves uninsured patients & those with Medicaid, private insurances.  Primary care available as well.  [phone]  [address] [facility] [place]  [address]    [facility] Outpatient Psychiatry  Serves veterans who were honorably discharged.  [address] [place] [address]  [phone]  24/7 Veterans Crisis Line: [phone] (Press 1)    If you have private insurance and need to find a specialist, please contact your insurance network to request a list of providers covered by your benefits.      MENTAL HEALTH/ADDICTIVE DISORDERS:     AA ([phone]), NA ([phone])   National Suicide Prevention Lifeline- Call 1-124.243.5122 Available 24 hours everyday  [facility] [phone]; Crisis Line [phone] - Call for options A-F:  Intensive Outpatient Treatment/ Day programs   [facility], please contact   [facility], please contact [phone] or [phone] to speak with an admissions counselor.  [facility] (Methadone Maintenance)   [address] [place] [address], [phone]  [address] [place] [place] [address] [phone]  [facility], [address] [place] [address], [phone]  [facility] [phone]  [facility] please contact [phone]  [facility], [address] [facility], 4th [place] [address] Phone: [phone]   Acer  Anuj Office: Anuj [place] [address], [phone]  [place] Office: [address] [facility], [address], [place] [address], [phone]  [place] Office: [address] [facility] [place] [place] [address] [phone]    Outpatient Substance Abuse Treatment   [facility] (Methadone Maintenance)   [address] [place] [address], [phone]  [address] [place] [address] [phone]  [facility], [address] [place] [address], [phone]  Acer  Anuj Office: [address], [place] [address], [phone]  [place] Office: [address] [facility] [address], [place] [address],  [phone]  [place] Office: [address] [place] [address] [phone]  [place] [facility], [address] [place] [address] [phone]   [facility] Recovery, [address] [facility], [address], [phone]  [facility]over, [address] [place], [place] [phone]    Residential Substance Abuse Treatment   [facility] 1[address] [facility], [phone]  [facility], [address] [facility], [phone]  [facility] (men only) [address] [place] [address], [phone]  [facility] (women only) [address] [place] [address] [phone]  [facility], [address] [place] [address] [phone]  [facility] (women only), intakes at [address] [facility], [phone]  [facility] (7-day program, $100, [address], [phone], [phone], [phone])  [facility] (Men only, [phone]), [address], [place] (Vets*/Non-Vets)  [facility] (Men only, $400/month program fee) [address] [facility] [place], [phone]  [facility] (Women over age 39 only), [address] [facility], [phone]    Out of Area:    [place], [address] [facility] 36, [place], [place] ([phone])  [facility] (men only), [address] [place] [address], [phone]  [facility], [address] [place], [place] ([phone])  [place], [address] [place] [address] Dr. Ramos, [place] ([phone])  [facility], [address] [facility], [phone]  [facility] (Women only, has to have mental illness, can be homeless or substance abuser), [phone]        DOMESTIC VIOLENCE RESOURCES:     Advocacy  [facility] ([facility])  [address] [address]    [facility] ? [phone]  Services provided: emergency shelter, individual advocacy,  information and referrals, group support, children's program, medical advocacy, forensic medical exams, primary care, legal assistance, counseling, safety planning, and caregiver support    [facility] HEALING AND EMPOWERMENT [place]  Confidential location  [facility] ? [phone]  Services provided: short term emergency shelter, all services provided are free of charge    [facility] CENTERS FOR COMMUNITY ADVOCACY  Multiple locations in [facility], [facility], [place], and [facility] ([place], [place], and [place])    [place] ? [phone]  Services provided: emergency shelter, individual advocacy, information and referrals, group support, children's program, medical advocacy, legal assistance in obtaining restraining orders, counseling, safety planning, and caregiver support    [facility]   Emergency Shelter   [phone]  Emergency Services ,Legal and Financial Assistance Services ,Housing Services ,Support Services     [facility]   [phone]  Emergency Services ,Counseling Services , Housing Services ,Support Services ,Children's Services     WOMEN WITH A VISION  [address] [place] [address]  WWAV ? [phone]  Services provided: advocacy, health education and supportive services, specializing in free healing services for marginalized groups, including LGBTQ individuals and sex workers    SEXUAL TRAUMA AWARENESS AND RESPONSE (STAR)  [address] [place] [address]    STAR ? [phone] [phone]  Services provided: individual advocacy, information and referrals, group support, medical advocacy, legal assistance, counseling, and safety planning for survivors of sexual assault    [facility] ([facility])  [address] [place] [address]  [facility] Forensic Program ? [phone]  Services provided: free forensic medical exams for sexual assault and domestic violence, which can be performed up to 5 days  after an incident. It is not necessary to make a police report to receive a forensic medical exam    Legal  [facility]  [address], [place] [address]  [facility] ? [phone]  Services provided: free emergency legal representation for survivors of doemstic violence residing in [facility]. Legal services may include temporary restraining orders, temporary child support, custody, and use of property    [facility] LEGAL SERVICES (SLLS)  [address] [facility], [place] [address]  SLLS ? [phone]  Services provided: free legal representation for survivors of domestic violence residing in [facility]. Legal services may include temporary child support, custody, and divorce      HOTLINES:     [facility] DOMESTIC VIOLENCE HOTLINE  [phone]    Services provided: free and confidential hotline for victims and survivors of domestic violence. All calls will be routed to a domestic violence service provided in the victim or survivor's area    NATIONAL HUMAN TRAFFICKING HOTLINE  [phone]    Services provided: national anti-trafficking hotline serving victims and survivors of human trafficking. Provides information about local resources, and access to safe space to report tips, seek services, and ask for help    VIA LINK  211 or [phone]    Service provided: counselors can provide crisis counseling. Counselors can also provide information and referrals to programs which can help with needs such as food, shelter, medical care, financial assistance, mental health services, substance abuse treatment, senior services, childcare, and more      HOMELESS SHELTERS:      Homeless shelters  [facility]  Emergency shelter for individuals and families  [address]  [phone]  [facility]  Emergency shelter for men only  Meals daily 6am, 2pm, & 6pm  Clothing, case management M-F by appointment (ID/job/housing/legal assistance), mail  [address]    [phone]  [place]  Emergency shelter for men  [address] [facility]  [phone]  Emergency shelter for women  [address]   [phone]  Breakfast & lunch daily, dinner M-F  Case management, job counseling services   [place]  Emergency shelter for teens and young adults up to 20yo  [address]   [phone]  [place] [facility]  Emergency shelter for women over 17yo and their kids  [address] [place] [address]  [phone]  [facility]  Day program, meals M-F 1PM (arrive early)  Showers, laundry, hygiene kits, showers, phones, , notary services, case management, ID assistance  [address]   [phone] M-F 8am-2:30pm  [employer]  Day program  MTW 7:30am-3:30pm,  8:30am-3:30pm  Crisis intervention, employment assistance, food/clothing, hygiene kits, bus tokens, mail  [address] [facility] [address]  [phone]  [facility]  Mobile outreach for homeless persons in [facility]  [phone]  [facility]  Primary healthcare, case management, dental services, TB placement  Call ahead  [address] [facility] [address]  [phone]  [facility]ennifer at the [facility]  Connects homeless people with their loved ones in other cities by providing transportation costs   [phone]      [place] RESOURCES:     [place] [facility] Crisis Response Team:    [place] ([place], [place], [place], and [facility]) ([facility])  [phone]      Outpatient Mental Health & Addiction Clinic Resources for both [facility] and [facility]:    [facility] Mental Healthcare Resources  Website: www.mhr.org  Main Number: 292-355-1940    [facility] ([facility])  [address] ([address] [place] [address]  [phone]    [facility] ([facility])  [address] ([address] [facility]) [facility]  [place] [address]  [phone]    [facility]  [address] ([address]) [place] [address]  [phone]    [facility]  [address] ([address] [place] [address]) [place] [address]  [phone]      Addiction Treatment Resources for both [facility] and [facility]:    [facility] (Detox, Residential, PHP, IOP, and Aftercare Programs)  [address] [place] [address]  [phone]    [facility] (Residential, IOP, Transitional Living, and Aftercare Programs)  [address] [place] [address]  [phone]    [facility] (Inpatient, Residential, Detox, IOP, Outpatient, and Aftercare Programs)  [address] [place] [address] [place] [address]  [phone] or toll free at [phone]      Outpatient Mental Health Psychotherapy Resources for both [facility] and [facility]:    Suyapa Hough, [facility]  [address]  [place] [address]  [phone]  Specialties: Depression, Anxiety, and Life Transitions    Robyn De, PhD  [address] [place] [address] [address]  [address]  [place] [address]  [phone]  Specialties: Testing and Evaluation, Education and Learning Disabilities, and ADHD    Kim Murrell, [facility] [address] [place] [address], [place] [address]  [phone]  Specialties: Obsessive-Compulsive (OCD), Depression, and Relationship Issues    Leanne Levy LPC [address] [place] [address]  [phone]  Specialties: Trauma & PTSD, Mood Disorders, and Anxiety    Leanne Shaikh, PhD, [facility]  [facility] [address] [place] [address]  [phone]  Specialties: Family Conflict, Child, and Relationship Issues    Fadumo Almeida, [facility] [place] [address] [phone]  Specialties: Anxiety, Depression, and Anger Management        IN CASE OF SUICIDAL THINKING,  call the National Suicide Hotline Number: 988    988 Suicide & Crisis Lifeline: 988 , 7-775-939-TALK 8255)  Provides 24/7, free and confidential support for people in distress, prevention and crisis resources for you or your loved ones, and best practices for professionals.    Call, text or chat.  https://[employer].org

## 2024-12-13 NOTE — PROGRESS NOTES
AVS virtually reviewed with patient in its entirety with emphasis on diet, medications, follow-up appointments and reasons to return to the ED or contact the [facility]. Patient also encouraged to utilize their patient portal. Ease and convenience of use reiterated. Education complete and patient voiced understanding. All questions answered. Discharge teaching complete. Encouraged to complete patient survey.  Active with patient portal. Pt. Denied substance abuse and was not interested in resources provided.  Encouraged to read over information on own accord.

## 2024-12-13 NOTE — PROGRESS NOTES
[facility]   DEPARTMENT OF PSYCHIATRY     IDENTIFIERS & DEMOGRAPHICS:     SERVICE: Addiction  ENCOUNTER: initial    -- PATIENT IDENTIFIERS: Reza Morrow  [id number]  1965  59 y.o.  female  -- LOCATION OF PATIENT: unit (med/surg)    -- MODE OF ARRIVAL: self-presented  -- PRESENT WITH PATIENT DURING SESSION: ALONE  -- SOURCES OF INFORMATION: EHR/chart, PATIENT, family/friend(s)  -- LOCATION OF ENCOUNTER PROVIDER: [place], [place]    -- ENCOUNTER PROVIDER: Shubham Burkett MD        PRESENTATION:   History of Present Illness   **  OVERVIEW OF THE HPI:    59-year-old female history substance induced mood disorder, broken heart syndrome, HFrEF (45-50% in April 2024), and history of MS on glatiramer acetate per chart reveiw who was brought to the ED with altered mental status. Reportedly brought in by family after found down in apartment unconsciousness and covered in her urine. ED and Medicine providers had spoken to her sister Celeste who had concerns pt had allegedly obtained Ketamine or other substances off the streets resulting in this presentation. Sister with concerns for pt never admitting to purchasing substances off the street vs also concerned regarding polypharmacy given her medication regimen. Psychiatry was consulted regarding potential substance intoxication and assessing need for subutex.     Of note, pt with well-documented history including 3x recent admissions within the last 1-2 months for similar presentations of being found down unconsciousness with suspected substance use and polypharmacy. Recently seen by [facility] on 11/15/24 for similar presentation and seen by [facility] 5/24/24 for similar presentation, please review those notes for further information.    SUBJECTIVE/CURRENT FINDINGS:    Pt awake at bedside, seemingly with some improvement to mentation after allowing time for substance washout. Able to state name, location, and date. Despite collateral  mentioning pt was discovered at home, she reiterates undergoing a chakra reading. Attempted lengthy discussion with both resident physician and attending physician regarding potential for substance abuse, however although pt fully oriented at this time she seems to deny any remnant of her polypharmacy as a form of physical dependence/substance abuse. Furthermore, does not believe she is interested in substance use resources and is amenable to receiving resource information only. She continues to deny any SI/HI/AVH, states she has no intention of harming herself. Also remains future-oriented as she discusses needing to meet age criteria to receive a hip replacement which she intends to follow through. She then displayed some potential personality traits by then discussing that the hospital needs her and that the employees work for her. Pt was then dismissive towards attending physician despite our concerns and wanting to open discussion regarding her potential substance use. Additionally, with no active complaints of withdrawal symptoms at this time, throughout the interview had to discuss reasons for not prescribing opioids at this time.    Per Chart Review:    Per PDMP, historically receives Nucynta ER 100mg tablets monthly with her outpatient pain provider, last filled 12/4/24. Also previously prescribed medical marijuana, last filled 8/16/24 and previously prescribed ketamine cream last filled 7/1/24. Next appt with outpatient pain provider on 1/2/25.    Per Collateral:    Spoke with sister Celeste ([phone]) this morning regarding events leading to ED presentation. Sister confirms the pt's son had called her stating he was unable to get in touch with the pt, and the son discovered the pt alone in the apartment in her kitchen half-undressed and covered in urine. Celeste endorses strong suspicion for likely recent substance use in combination with pt taking her prescribed medications resulting in this  "presentation, she mentions this is entirely consistent with her multiple presentations over the past year which has often been related to substance use, she states typically after 2 days the pt historically washes out and becomes significantly more oriented and functional. Sister highly suspects recent ketamine use or other potential substance contributing to this presentation. She and pt's former roommate recently visited the pt's apartment around [facility], at the time discovered various pill bottles most still filled with medications including Gabapentin, Lyrica, Baclofen, other muscle relaxers, and Subutex. Sister notably reports discovering a purple tin/container she believes was ketamine cream that appeared to have been mixed with something else, sister states she removed the potential ketamine from the apartment. Highly believes pt obtains ketamine off the street, describes the patient as a "pharmacist" as the pt historically shows little insight into her substance use and is often resistant to giving up her prescribed medications in addition to substance use. She and other family members have attempted multiple times to convince pt to pursue rehab and substance use resources without success. Lastly, sister is requesting if primary team can provide update prior to discharge regarding treatment plan if able. She is also available more collateral if needed.     REVIEW OF SYSTEMS:    >> SOURCES: patient, chart     N   Sleep Disturbance/Disruption   N   Appetite/Weight Change   N   Alterations in Energy Level   N   Impaired Focus/Concentration   Y   Depressive Symptomatology   Y   Excessive Anxiety/Worry   N   Dysregulated Mood/Behavior   N   Manic Symptomatology   N   Psychosis   N   Trauma-Related    Regarding the current presentation, no other significant issues or complaints are voiced or known at this time.      ADD-ON PSYCHOTHERAPY:     N/A         HISTORY: " "  Patient Information   **  HISTORY    The patient's past medical history has been reviewed and updated as appropriate within the electronic health record system.  See PROBLEM LIST & HISTORY for details.    Scheduled and PRN Medications: The electronic chart was reviewed and updated as appropriate.  See MEDCARD for details.    Allergies:  Adhesive, Neosporin [neomycin-bacitracin-polymyxin], Neomycin, Neomycin-polymyxin, Neosporin g.u. irrigant, Penicillins, and Latex      EXAMINATION:   Objective   **  VITALS:  /79   Pulse 84   Temp 98.4 °F (36.9 °C) (Oral)   Resp 17   Ht 5' 2" (1.575 m)   Wt 50.6 kg (111 lb 8.8 oz)   SpO2 95%   Breastfeeding No   BMI 20.40 kg/m²     MENTAL STATUS EXAMINATION:  Appearance: inadequately groomed, appears stated age, normal weight  appropriately dressed, in no apparent distress, well-appearing    Behavior & Attitude: participative, under good behavioral control, able to redirect, appropriate eye contact  calm, engaged, agreeable, cooperative    Distressed  Movements & Motor Activity: no psychomotor agitation, no psychomotor retardation, not wheelchair bound (able to ambulate), no weakness, no spasticity, no rigidity, no tics, no tremor, no akathisia, no dyskinesia, no ataxia, no parkinsonism    Speech & Language: decreased rate, decreased volume, decreased quantity, increased latency, spontaneous, reciprocal, fluent    Mood: "frustrated"  Affect: dysphoric    Thought Process & Associations: organized, tangential, poverty of thought  no loosening of associations    Superficially linear, but had difficulty answering questions/elaborating on short responses  Thought Content & Perceptions: no delusions, no paranoid ideation, no ideas of reference, no grandiosity, no hyperreligiosity, no hallucinations, no responding to internal stimuli    Sensorium: awake, alert  CAM-ICU negative    Orientation: oriented to person  not oriented to place, not oriented to time, " not oriented to situation    Recent & Remote Memory: intact (remote), register (3/3)    Attention & Concentration: able to spell 5-letter word backwards, able to perform SAVEAHAART  attentive to conversation, not easily distracted    Fund of Knowledge:   with thought blocking  Insight: partial  demonstrates sufficient awareness of condition/situation    improving mentation, remains with thought blocking towards substance use  Judgment: partial  heeds instructions/advice    improving mentation, remains with thought blocking towards substance use      RISK & REGULATORY:   Impression   **   RISK PARAMETERS:     N   Suicidal Ideation/Threats   N   Suicide Attempts/Gestures   N   Homicidal Ideation/Threats   N   Homicidal Behavior   N   Non-Suicidal Self-Injurious Behavior   N   Perpetrated Violence     NOTE: RISK PARAMETERS are current to the encounter/episode  NOT inclusive of past history.       FIREARMS & WEAPONS:     N   Ready Access to Firearms     MEDICAL DECISION MAKING:     - DIAGNOSTICS: a diagnostic psychiatric evaluation was performed and responsiveness to treatment was assessed  ability/capacity to respond to treatment: adequate/intact     REGULATORY:    -- : REVIEWED      INFORMED CONSENT & SHARED DECISION MAKING are the hallmark and bedrock of good clinical care, and as such have been employed and obtained, respectively, to the degree possible.  Discussed, to the extent possible, diagnosis, risks and benefits of proposed treatment (e.g., medication, therapy) vs alternative treatments vs no treatment, potential side effects of these treatments, and the inherent unpredictability of treatment.         - ABILITY TO UNDERSTAND, PARTICIPATE & ENGAGE: minimal     - AGREEABLE TO TREATMENT (consent/assent): uknown  unable to assess     - RELIABILITY/ACCURACY: the patient is deemed to be a well-meaning but unreliable and factually inaccurate historian      WARNINGS &  PRECAUTIONS:  >> In cases of emergencies (e.g. SI/HI resulting in danger to self or others, functioning deteriorating to the level of grave disability), call 911 or [phone], or present to the emergency department for immediate assistance.    >> Individuals should not operate a motor vehicle or heavy machinery if effects of medications or underlying symptoms/condition impair the ability to do so safely.    >> FULLY comply with ANY/ALL medication as prescribed/instructed and report ANY/ALL suspected adverse effects to appropriate health care providers.      ASSESSMENT & PLAN:   Assessment & Plan   **  DIAGNOSES & PROBLEMS:       1.  Ketamine intoxication, resolving    2.  Opioid dependance, concern for withdrawal    3.  R/O Opioid use disorder    PSYCHOTROPIC REGIMEN:   (C)=Continue as prescribed  (A)=Adjust as noted  (I)=Iniitate  (D)=Discontinue      1.  With improving mentation, no longer appearing altered    2.  Consider pain management consult for further recommendations. Additionally will discuss with primary team to recommend coordinating with pt's outpatient pain provider for future steps.    3.  Would avoid discharging with Subutex/Suboxone at this time, pt considerably high risk for abuse potential and this would likely further contribute to sedation related to polypharmacy    -- ASSESSMENT (synthesis  analysis):     Ms. Reza Morrow is a 59 y.o. female with past psychiatric history of depression and anxiety and past medical history of chronic pain and possible MS. She has had multiple previous presentations for AMS that have been attributed to polypharmacy. Per  patient's medications include gabapentin, pregabalin, tapentadol, oxycodone, ketamine cream, and medical marijuana in the past. Most recent  Nucynta ER 100mg last filled 12/4/24 per outpatient pain provider. The patients current presentation is likely multifactorial including polypharmacy and opioid withdrawal. The patient's inability to  remember the past several days make the timing of potential substance use uncertain. Additionally, given the patient's history of MS, a neurological contribution to the patient's condition cannot be ruled out and warrants consideration should the patient not improve with current management plans.     Update:  Pt with improved mentation following substance washout. Denies SI/HI/AVH. Seemingly fully oriented, however with significant thought blocking regarding her substance use and with minimal willingness or desire to engage in resources at this time, counseled and discussed risks and benefits regarding this. Would avoid discharging with Subutex/Suboxone at this time, pt considerably high risk for abuse potential and this would likely further contribute to sedation related to polypharmacy addiction issues.     -- PLAN (goals  recommentations):          - CURRENT CONDITION: exacerbated  as compared to typical baseline  - WITH REASONABLE MEDICAL CERTAINTY, based on history, chart review, available collateral information, and a present-state examination:   -- psychiatric hospitalization is not indicated    * PEC is NOT INDICATED - rescind if in place     >> prescription drug management (i.e., the review, recommendation, or consideration without recommendation of medications) was employed during the encounter  >> discussed the following (to the extent possible) with the patient and/or other interested parties, if not otherwise known or stated prior, providing psychoeducation and/or resources (e.g., references, fact sheets) as applicable, utilizing an appropriate developmental and sociocultural approach:    -- diagnosis, target symptoms, intended outcomes, and possible benefits and risks of medication, as well as alternatives (including no treatment)     -- possible expectable adverse effects and/or drug interactions of any proposed individual psychotropic agents, as well as the inherent unpredictability of treatment      -- the importance of full compliance with any prescribed medication     -- the proper protocol and/or steps to take if questions or problems arise    -- relevant warnings and safety considerations, including, but not limited to, the prohibition against operating a motor vehicle or heavy machinery if effects of medications or underlying symptoms/condition impair the ability to do so competently and safely    -- relevant legal aspects of medication, including, but not limited to, the prohibition against carrying pills loose outside of the bottle and the sharing of medication with anyone, as well as the safe and secure storage of all pharmaceuticals  >> DEFER management of NON-PSYCHIATRIC medication(s) to primary and/or specialist provider(s)  >> advised to abstain from alcohol and illicit drug use  >> counseled on full abstinence from alcohol and substances of abuse (illicit and prescription), as well as maintenance of a recovery lifestyle  >> harm reduction techniques discussed, as warranted, to mitigate risk from problematic behaviors  >> serial laboratory testing (e.g. PETH, serum ethanol, urine toxicology) recommended and/or planned to provide accountability, as well as guide and refine treatment moving forward  >> importance of lifelong, active engagement in 12 step (or equivalent) mutual self-help program(s) was stressed/reinforced, including regular meeting attendance and acquisition/maintenance of a sponsor  >> education and/or resources discussed and/or provided for various addiction recovery and rehabilitation options  >> motivational interviewing applied, relapse prevention provided  >> case discussed with staff psychiatrist  >> will sign off at this time, thank you    DELIRIUM PROTOCOL     > DELIRIUM is a HIGH RISK MEDICAL CONDITION with significantly elevated rates of morbidity and mortality, routinely rendering a person temporarily incapacitated and interfering with the effective management of  underlying medical conditions. As such, proactive and comprehensive management is essential. Implementation of the following recommendations should be considered BEST PRACTICE:  >> if feasible, please utilize non-pharmacologic approaches FIRST for agitation; PRN medications can be considered if this approach is insufficient or ineffective  >> AVOID the use of PHYSICAL RESTRAINTS whenever possible; whenever not, always seek to MINIMIZE their use  >> keep window shades open and room lit during day and room dim at night in order to promote normal sleep-wake cycles  >> reduce unnecessary stimulation/noise; TV screen and sound should be off unless patient is actively engaged with the program  >> minimize disruptions at night  >> correct sensory deficits, when present, with provision of eyeglasses and/or hearing aids  >> optimize nutrition and hydration status  >> continue to manage medical conditions and assess for and treat pain  >> consider PT/OT consult, as early mobility and exercise have been shown to decrease duration of delirium  >> attempt to maintain consistency of key staff who will routinely interact with the patient  >> encourage family at bedside  >> orient patient often to date, location, and situation, including reason for hospitalization and current plan of care  >> keep whiteboard (or similar) in patient's room current with the date and names of key treatment team members  >> utilize 1:1 sitter for monitoring, if warranted  >> LIMIT or DISCONTINUE the use of narcotics, benzodiazepines, anticholinergics, antihistaminergics, steroids, and other known deliriogenic medications  >> continue treatment of the underlying causative etiology of delirium, the correction of which is the ultimate objective in delirium management; if unknown, continue exhaustive medical workup to elucidate the source(s)    OPIOID WITHDRAWAL PROTOCOL    >> recommendations provided herein should be seen as rough guidelines, as an opioid  withdrawal protocol should be individualized and modified routinely, as clinically warranted  >> the absence of a positive serum or urine toxicology for opioids does not exclude recent opioid use, as many agents, including fentanyl and carfentanil, do not register on standard drug screens; a withdrawal protocol may still be warranted, based on the clinical situation  >> enact behavioral protocols per unit policies, to minimize the risk of surreptitious use in the facility setting  >> vitals Q4H while awake  >> frequent administration of COWS  >> prototypical PRNs (adjust as clinically warranted):    > utilize clonidine if no cardiac contraindications exist; consult cardiology if specialist input is needed   > HOLD clonidine if SBP <100, DBP <60, HR <50 (assuming no alternative parameters are in effect)   > clonidine (Catapres) 0.1mg PO TID PRN for COWS >5   > may increase clonidine to 0.2mg PO TID if COWS >12 persistently   > dicyclomine (Bentyl) 10mg PO Q6H PRN for abdominal cramps, GI distress   > methocarbamol (Robaxin) 500mg PO Q8H PRN for muscle cramps, muscle spasms   > ondansetron (Zofran) 4mg PO Q6H PRN for nausea, vomiting   > loperamide (Imodium) 2 mg after each loose stool; not to exceed 16 mg/day   > ibuprofen (Motrin) 400mg PO Q6H PRN for pain    > hydroxyzine (Vistaril) 50mg PO Q6H PRN for anxiety, insomnia   > although short-term benzodiazepines may also be of some utility, caution must be exercised given their addictive potential; seek to limit/minimize/avoid their use   > NALOXONE 0.4MG IV PRN for opioid reversal  >> post-withdrawal treatment is paramount; medically supervised withdrawal manages the patient through the withdrawal symptoms but does not treat opioid use disorder; patients completing withdrawal are at high risk of relapse to resumed opioid use, as well as increased risk for accidental overdose death given their recent reversal of built-up tolerance  >> if in a facility, patients should  be given explicit plans for follow-up care PRIOR to discharge  >> follow-up may involve ongoing treatment through primary care or a specialized addiction treatment program or physician, and is dependent on a number of factors, including availability and patient willingness  >> continuing care, including pharmacotherapy (i.e., MAT - buprenorphine, methadone, naltrexone) and psychosocial intervention for opioid use disorder, are indicated  >> it is vital to ensure access to naloxone upon discharge, as well as instructions for its use  >> review/educate on signs of an overdose and harm reduction techniques (e.g., never use alone, never share or reuse needles, always take turns, always try a small dose first to check the effect, always have naloxone available in plain view, always contact emergency services if naloxone is administered, always monitor until help arrives, always be prepared to give follow up doses of naloxone as needed)    See below for pertinent laboratory and radiographic findings.    Shubham Burkett MD  [facility] PGY-2   [facility]    CHART REVIEW: available documentation has been reviewed, and pertinent elements of the chart have been incorporated into this evaluation where appropriate.       KEY & LINKS:        Y  = yes/endorses     N  = no/denies     U  = unknown/unable to assess    ADHD   AIMS   AUDIT   AUDIT-C   C-SSRS (Screen)   C-SSRS (Short)   C-SSRS (Full)   DAST   DAST-10   ANYA-7   MoCA   PCL-5   PHQ-9   KIMBERLY   YMRS       DIAGNOSTIC TESTING:   Results   **   Glu 115 (H)  12/13/2024  Li *   *  TSH 0.377 (L)  12/11/2024    HgA1c 5.2  5/14/2024  VPA *   *   FT4 1.72 (H)  12/11/2024    Na 134 (L)  12/13/2024  CLZ *   *  WBC 5.05  12/13/2024    Cr 0.8  12/13/2024  ANC 3.0; 58.6;   12/13/2024   Hgb 14.4  12/13/2024     BUN 19  12/13/2024  Trop I 0.033 (H)  11/14/2024  HCT 40.1  12/13/2024     GFR >60.0  12/13/2024   CPK 72  12/11/2024     12/13/2024     Alb 3.2 (L)  12/13/2024   PRL *   *  B12 191  3/23/2022     T Bili 1.8 (H)  12/13/2024  Chol *   *  B9 10.1  5/26/2024    ALP 61  12/13/2024  TGs *   *  B1 60  *    AST 10  12/13/2024  HDL *   *  Vit D *   *     ALT 7 (L)  12/13/2024  LDL *   *  HIV Non-reactive  7/20/2024     INR 1.0  12/1/2024  Stefani *   *   Hep C Non-reactive  7/20/2024    GGT *   *  Lip 16  7/20/2024  RPR *   *    MCV 86  12/13/2024   NH4 42  12/11/2024  UPT *   *      PETH *   *  THC Presumptive Positive (A)  12/11/2024    ETOH <10  12/11/2024  JOSH Negative  12/11/2024    EtG *   *  AMP Negative  12/11/2024    ALC <10  11/30/2024  OPI Negative  12/11/2024    BZO Negative  12/11/2024  MTD Negative  12/11/2024     BAR Negative  12/11/2024  BUP Present  11/17/2024    PCP Negative  12/11/2024  FEN Not Detected  11/17/2024     Results for orders placed or performed during the hospital encounter of 12/11/24   EKG 12-lead    Collection Time: 12/11/24  4:49 PM   Result Value Ref Range    QRS Duration 76 ms    [facility] QTC Calculation 451 ms    Narrative    Test Reason : R68.89,    Vent. Rate : 106 BPM     Atrial Rate : 106 BPM     P-R Int : 132 ms          QRS Dur :  76 ms      QT Int : 340 ms       P-R-T Axes :  78  87  14 degrees    QTcB Int : 451 ms    Sinus tachycardia  Right atrial enlargement  T wave abnormality, consider anterior ischemia  Abnormal ECG  When compared with ECG of 11-Dec-2024 16:48,  No significant change was found  Confirmed by Michele Solomon ([id number]) on 12/12/2024 8:20:51 AM    Referred By: AAAREFERRAL SELF           Confirmed By: Michele Solomon     Results for orders placed or performed during the hospital encounter of 11/14/24   CT Head Without Contrast    Narrative    EXAMINATION:  CT HEAD WITHOUT CONTRAST    CLINICAL HISTORY:  Mental status change, unknown cause;    TECHNIQUE:  Low dose axial CT images obtained throughout the head without the use of intravenous contrast.   Axial, sagittal and coronal reconstructions were performed.    COMPARISON:  07/21/2024    FINDINGS:  Intracranial compartment:    Ventricles are stable in size, without evidence of hydrocephalus.    Mild patchy hypoattenuation in the supratentorial white matter, nonspecific but most likely reflecting chronic small vessel ischemic changes. No recent or remote major vascular distribution infarct. No acute hemorrhage.  No mass effect or midline shift.    No new extra-axial blood or fluid collections.    Skull/extracranial contents (limited evaluation):    No displaced calvarial fracture.    The mastoid air cells and visualized paranasal sinuses are essentially clear.      Impression    No evidence of acute hemorrhage or major vascular distribution infarct.      Electronically signed by: Joseph Krishna MD  Date:    11/14/2024  Time:    13:46   Results for orders placed or performed during the hospital encounter of 07/20/24   MRI Brain Without Contrast    Narrative    EXAMINATION:  MRI BRAIN WITHOUT CONTRAST    CLINICAL HISTORY:  Mental status change, unknown cause;    TECHNIQUE:  Early termination of the exam due to patient's declination/refusal to continue with examination.  Limited T1 coronal image obtained.    COMPARISON:  CT head 07/21/2024    FINDINGS:  Nondiagnostic images due to early exam termination by patient.      Impression    As above.    Electronically signed by resident: Leonora Landry  Date:    07/21/2024  Time:    06:11    Electronically signed by: Sim Appiah MD  Date:    07/21/2024  Time:    06:26   Results for orders placed or performed during the hospital encounter of 05/22/24   MRI Brain W WO Contrast    Narrative    EXAMINATION:  MRI BRAIN W WO CONTRAST    CLINICAL HISTORY:  Mental status change, unknown cause;    TECHNIQUE:  Multiplanar multisequence MR imaging of the brain was performed before and after the administration of 6 mL Gadavist intravenous contrast.    COMPARISON:  CT head 05/23/2024.   MRI brain 04/23/2024.    FINDINGS:  Ventricles are normal in size for age.  No hydrocephalus.    Nonspecific patchy areas of T2/FLAIR signal hyperintensity in the supratentorial white matter, similar distribution compared to prior exam.    New areas of subcortical T2/FLAIR signal hyperintensity along the frontoparietal lobes bilaterally at the vertex, with an additional focus in the left cerebellar hemisphere (series 8, image 21).  No associated diffusion signal abnormality, susceptibility artifact, or significant surrounding mass effect or midline shift.  Additional focal areas of mild cortical and subcortical signal parenchymal abnormality and mild gyral expansion about the parietooccipital region (axial series 8, image 18).    Additionally, there is subtle symmetric diffusion signal abnormality about the basal ganglia, without significant FLAIR signal hyperintensity or mass effect.    No parenchymal mass, hemorrhage, or recent or remote major vascular distribution infarct.    No abnormal postcontrast parenchymal or leptomeningeal enhancement.    No extra-axial blood or fluid collections.    The T2 skull base flow voids are preserved.  Bone marrow signal intensity unremarkable.  Paranasal sinuses and mastoid air cells are clear.    Partially visualized fusion hardware about the cervical spine.      Impression    New areas of cortical/subcortical T2/FLAIR signal hyperintensity along the frontal, parietal, and occipital lobes and the left cerebellar hemisphere.  No associated diffusion signal abnormality, susceptibility artifact, or significant surrounding mass effect or midline shift.  Findings remain nonspecific, but can be seen in setting of PRES or other encephalitis process.    Subtle symmetric diffusion signal abnormality about the basal ganglia, without significant FLAIR signal hyperintensity or mass effect.  Findings can be seen in the setting global hypoxic ischemic event.  Correlation is advised.    No  abnormal postcontrast enhancement.    Additional scattered subcortical and periventricular FLAIR signal hyperintensities, likely representing sequela of chronic microvascular ischemic change or other remote insult.    COMMUNICATION  This critical result was discovered/received at 08:00.  The critical information above was relayed directly by me by telephone to Dr. Isbell on 05/24/2024 at 08:40.    This report was flagged in Epic as abnormal.    Electronically signed by resident: Shelton Solomon  Date:    05/24/2024  Time:    03:48    Electronically signed by: Cesar Horan  Date:    05/24/2024  Time:    08:50   Results for orders placed or performed during the hospital encounter of 04/10/22   EEG    Narrative    Reza Vang MD     4/13/2022 10:56 AM  EEG    Date/Time: 4/10/2022 3:16 PM  Performed by: Reza Vang MD  Authorized by: Jacky Vigil MD         Past Medical History:   Diagnosis Date    Behavioral problem     Bulging lumbar disc     Bursitis     bilateral hip    Degenerative cervical disc     Hx of psychiatric care     Hypercholesteremia     Labral tear of hip, degenerative bilateral    MS (multiple sclerosis)     Paresthesia of both lower extremities 3/13/2022    Pneumonia     Spinal cord compression         Consults  [facility] INTERNAL MEDICINE TEL*

## 2024-12-13 NOTE — PLAN OF CARE
[facility] - Internal Medicine Telemetry  Discharge Assessment    Primary Care Provider: Kip Jimenez MD     Discharge Assessment (most recent)       BRIEF DISCHARGE ASSESSMENT - 12/13/24 3028          Discharge Planning    Assessment Type Discharge Planning Brief Assessment     Support Systems Family members     Equipment Currently Used at Home none (P)      Current Living Arrangements apartment (P)      Patient/Family Anticipates Transition to home     Patient/Family Anticipated Services at Transition none (P)      DME Needed Upon Discharge  none (P)      Discharge Plan A Home with family (P)      Discharge Plan B Home (P)         Housing Stability    In the last 12 months, was there a time when you were not able to pay the mortgage or rent on time? No (P)      At any time in the past 12 months, were you homeless or living in a shelter (including now)? No (P)         Transportation Needs    Has the lack of transportation kept you from medical appointments, meetings, work or from getting things needed for daily living? No (P)         Food Insecurity    Within the past 12 months, you worried that your food would run out before you got the money to buy more. Never true (P)      Within the past 12 months, the food you bought just didn't last and you didn't have money to get more. Never true (P)         Stress    Do you feel stress - tense, restless, nervous, or anxious, or unable to sleep at night because your mind is troubled all the time - these days? Patient unable to answer (P)         Social Isolation    How often do you feel lonely or isolated from those around you?  Patient unable to answer (P)         Alcohol Use    Q1: How often do you have a drink containing alcohol? Patient unable to answer (P)      Q2: How many drinks containing alcohol do you have on a typical day when you are drinking? Patient unable to answer (P)      Q3: How often do you have six or more drinks on one occasion? Patient unable to  answer (P)         Utilities    In the past 12 months has the electric, gas, oil, or water company threatened to shut off services in your home? No (P)         Health Literacy    How often do you need to have someone help you when you read instructions, pamphlets, or other written material from your doctor or pharmacy? Rarely (P)                  Discharge Plan A and Plan B have been determined by review of patient's clinical status, future medical and therapeutic needs, and coverage/benefits for post-acute care in coordination with multidisciplinary team members.       CHEN completed DP Reassessment w/ sister Mehrdadt via phone.  confirmed that patient lives in an apartment alone, drives and uses no DME, No O2, no assistance needed w/ ADLs.CHEN advised sister that per psych and attending MD, patient is cleared for dc home today. Sister states that patient may need ride home due to she is not in town to offer patient a ride home. SW to support w/ transport home as needed.                  SAWYER Loco, LMSW  [facility]  Case Management  Ext. [phone]

## 2024-12-13 NOTE — PLAN OF CARE
Problem: Adult Inpatient Plan of Care  Goal: Plan of Care Review  Outcome: Progressing     Problem: Fall Injury Risk  Goal: Absence of Fall and Fall-Related Injury  Outcome: Progressing     Problem: Confusion Acute  Goal: Optimal Cognitive Function  Outcome: Progressing

## 2024-12-13 NOTE — PLAN OF CARE
Problem: Adult Inpatient Plan of Care  Goal: Plan of Care Review  Outcome: Progressing     Problem: Adult Inpatient Plan of Care  Goal: Optimal Comfort and Wellbeing  Outcome: Progressing     Problem: Fall Injury Risk  Goal: Absence of Fall and Fall-Related Injury  Outcome: Progressing     Problem: Confusion Acute  Goal: Optimal Cognitive Function  Outcome: Progressing     Problem: Excessive Substance Use  Goal: Improved Behavioral Control (Excessive Substance Use)  Outcome: Progressing   Pt is more alert this afternoon, Tele sitter remains at the bedside, alerted staff x 2 today when I spoke to her she said it was a test and the staff passed. Pt did pull her PIV out and it was replaced pt tolerated well. Pt has been instructed to call for assistance to get out if the bed she did agree. Eye gtts administered x 1 pt states her eyes feel much better.

## 2024-12-13 NOTE — NURSING
Patient requesting sleep aid; states she usually takes vistaril at home. Notified DAR Mansfield MD. New orders placed.

## 2024-12-13 NOTE — NURSING
Patient's IV taken out, telesitter discontinued, tele box discontinued. Patient belongings packed, discharge instructions given. Patient transported to discharge lounge.

## 2024-12-13 NOTE — PLAN OF CARE
Problem: Adult Inpatient Plan of Care  Goal: Absence of Hospital-Acquired Illness or Injury  Outcome: Adequate for Care Transition     Problem: Adult Inpatient Plan of Care  Goal: Plan of Care Review  Outcome: Adequate for Care Transition     Problem: Adult Inpatient Plan of Care  Goal: Optimal Comfort and Wellbeing  Outcome: Adequate for Care Transition     Problem: Adult Inpatient Plan of Care  Goal: Readiness for Transition of Care  Outcome: Adequate for Care Transition     Problem: Fall Injury Risk  Goal: Absence of Fall and Fall-Related Injury  Outcome: Adequate for Care Transition

## 2024-12-14 NOTE — DISCHARGE SUMMARY
[facility] - Internal Medicine [facility] Medicine  Discharge Summary      Patient Name: Reza Morrow  MRN: [id number]  MCKAYLA: [id number]  Patient Class: OP- Observation  Admission Date: 12/11/2024  Hospital Length of Stay: 0 days  Discharge Date and Time:  12/13/2024 6:14 PM  Attending Physician: Linda att. providers found   Discharging Provider: Joseph Galo MD  Primary Care Provider: Kip Jimenez MD  [facility] Medicine Team: [facility]OS[facility] MED 5 Joesph Galo MD  Primary Care Team: [facility] 5    HPI:   Patient is a 59 y.o. F with altered mental status found at home in urine.  She has past medical history of MS, hypercholesterolemia, and polypharmacy.     Patient is not sure what brought her into the hospital.  She does admit to getting ketamine off the streets, and states that she last took it yesterday.  Talked with her sister Celeste allen who thinks that there is likely some component of drug use contributing to her repeat presentations.  She states that this is the 3rd time she has been in the hospital in 8 weeks for the same problem of altered mental status.  Her sister states that the patient has never admitted to getting drugs off the Street.  She also has never admitted to having any psychiatric conditions, but that on 1 occasion her sister found medications for what she thinks is bipolar disorder.     History is limited by patient's word-finding difficulties, which is consistent with her previous presentations.  Reportedly, in a approximately a day or so she will returned back to baseline.  She was admitted 11/30/2024 and discharged from her last admission on 12/01/2024 for the exact same presentation with altered mental status, she got workup with MRI which showed chronic microvascular ischemic, some areas which could represent remote episodes of press, an area which could represent a dural venous sinus thrombosis, but repeat MRI was not concerning to vascular neurology who felt that her  symptoms were not caused by imaging findings.  She has not had an LP.    * No surgery found *      Hospital Course:   59-year-old female history substance induced mood disorder, broken heart syndrome, HFrEF (45-50% in April 2024), and history of MS on glatiramer acetate, recurrent hospitalizations for  altered mental status and once again admitted to hospital medicine after being found down in apartment unconsciousness and covered in her urine, AMS. Per collateral concerns pt had allegedly obtained Ketamine or other substances off the streets resulting in this presentation. Reportedly has a pain management physician (Dr. Scruggs) and  reviewed. Drug screen positive for marijuana use as  unclear she is currently using synthetic cannabinoids as well. CT head with chronic microvascular ischemia, but no acute intracranial findings. EKG showing sinus tachycardia. Patient monitored and given supportive care while inpatient and returned to baseline. Given concern for polypharmacy vs. Ketamine overuse/abuse addiction psychiatry was consulted. Discussed with patient concern for wellbeing and urged formal recovery services, however patient declined. Recommended against buprenorphine at this time for indication for opioid use disorder as concerns for further abuse and contribution to altered mentation. Offered MAT with naltrexone however patient deferred. Given improvement of symptoms and stable labs patient discharged in stable condition. Addiction psychiatry referral placed and plan to follow up with PCP. Pt with follow up with pain management as well.      Subjective (12/13): NAEON and VSS. Patient feeling well today and improved mental status, requesting to go home. Discussed with patient preferences for OPR however patient deferred at this time. Medically stable at this standpoint.     Physical Exam  Constitutional:       General: She is not in acute distress.     Appearance: Normal appearance. She is not  ill-appearing or toxic-appearing.      HENT:      Head: Normocephalic and atraumatic.   Eyes:      Extraocular Movements: Extraocular movements intact.   Cardiovascular:      Rate and Rhythm: RRR      Heart sounds: No murmur heard.  Pulmonary:      Effort: Pulmonary effort is normal.      Breath sounds: Normal breath sounds. No wheezing, rhonchi or rales.   Abdominal:      General: Abdomen is flat. There is no distension.      Palpations: Abdomen is soft.      Tenderness: There is no abdominal tenderness. There is no guarding.   Skin:     General: Skin is warm and dry.   Neurological:      General: No focal deficit present.      Sensory: No sensory deficit.      A&O x3, knows name, location, month, year  Psychiatric:      Comments: tangential thought noted on conversatio nwith patient    Vitals:    12/13/24 0300 12/13/24 0405 12/13/24 0748 12/13/24 1152   BP:  124/82 108/79 114/78   BP Location:  Left arm     Patient Position:  Lying     Pulse: 88 83 84 98   Resp:  18 17    Temp:  98.4 °F (36.9 °C) 98.4 °F (36.9 °C) 98.1 °F (36.7 °C)   TempSrc:  Oral Oral Oral   SpO2:  97% 95% (!) 94%   Weight:       Height:             Goals of Care Treatment Preferences:  Code Status: Full Code      SDOH Screening:  The patient was screened for utility difficulties, food insecurity, transport difficulties, housing insecurity, and interpersonal safety and there were no concerns identified this admission.     Consults:   Consults (From admission, onward)          Status Ordering Provider     Inpatient consult to Psychiatry  Once        Provider:  (Not yet assigned)    VALENTINA Sagastume            Final Active Diagnoses:    Diagnosis Date Noted POA    PRINCIPAL PROBLEM:  Altered mental status [R41.82] 11/14/2024 Yes    Marijuana use [F12.90] 12/11/2024 Yes    Tachycardia [R00.0] 04/19/2024 Yes    HFrEF (heart failure with reduced ejection fraction) [I50.20] 05/27/2023 Yes    Hypertension [I10] 04/10/2022 Yes    Opioid dependence  [F11.20] 06/23/2016 Yes     Chronic    MS (multiple sclerosis) [G35] 12/07/2015 Yes     Chronic      Problems Resolved During this Admission:       Discharged Condition: stable    Disposition: Home or Self Care    Follow Up:    Patient Instructions:      Ambulatory referral/consult to Addiction Psychiatry   Standing Status: Future   Referral Priority: Routine Referral Type: Psychiatric   Referral Reason: Specialty Services Required   Requested Specialty: Addiction Medicine   Number of Visits Requested: 1     Notify your health care provider if you experience any of the following:  temperature >100.4     Notify your health care provider if you experience any of the following:  persistent dizziness, light-headedness, or visual disturbances     Activity as tolerated       Significant Diagnostic Studies: Labs: CMP   Recent Labs   Lab 12/12/24  0219 12/13/24  0606    134*   K 3.2* 3.1*    103   CO2 21* 20*   * 115*   BUN 28* 19   CREATININE 0.8 0.8   CALCIUM 9.0 8.4*   PROT 7.5 6.5   ALBUMIN 3.7 3.2*   BILITOT 1.4* 1.8*   ALKPHOS 69 61   AST 12 10   ALT 9* 7*   ANIONGAP 13 11    and CBC   Recent Labs   Lab 12/12/24  0219 12/13/24  0606   WBC 8.46 5.05   HGB 14.8 14.4   HCT 43.9 40.1    250       Pending Diagnostic Studies:       None           Medications:  Reconciled Home Medications:      Medication List        CONTINUE taking these medications      gabapentin 300 MG capsule  Commonly known as: NEURONTIN  Take 300 mg by mouth 3 (three) times daily.     glatiramer 40 mg/mL injection  Commonly known as: COPAXONE, GLATOPA  Inject 40 mg into the skin 3 (three) times a week.     naloxone 4 mg/actuation Spry  Commonly known as: NARCAN  1 spray by Nasal route once as needed.            STOP taking these medications      buprenorphine HCL 2 mg Subl  Commonly known as: SUBUTEX              Indwelling Lines/Drains at time of discharge:   Lines/Drains/Airways       None                   Time spent on  the discharge of patient: 32 minutes         Joseph Galo MD  Internal Medicine, PGY-1  Department of [facility] Medicine  [facility] - Internal Medicine Telemetry

## 2024-12-14 NOTE — HOSPITAL COURSE
59-year-old female history substance induced mood disorder, broken heart syndrome, HFrEF (45-50% in April 2024), and history of MS on glatiramer acetate, recurrent hospitalizations for  altered mental status and once again admitted to hospital medicine after being found down in apartment unconsciousness and covered in her urine, AMS. Per collateral concerns pt had allegedly obtained Ketamine or other substances off the streets resulting in this presentation. Reportedly has a pain management physician (Dr. Scruggs) and  reviewed. Drug screen positive for marijuana use as  unclear she is currently using synthetic cannabinoids as well. CT head with chronic microvascular ischemia, but no acute intracranial findings. EKG showing sinus tachycardia. Patient monitored and given supportive care while inpatient and returned to baseline. Given concern for polypharmacy vs. Ketamine overuse/abuse addiction psychiatry was consulted. Discussed with patient concern for wellbeing and urged formal recovery services, however patient declined. Recommended against buprenorphine at this time for indication for opioid use disorder as concerns for further abuse and contribution to altered mentation. Offered MAT with naltrexone however patient deferred. Given improvement of symptoms and stable labs patient discharged in stable condition. Addiction psychiatry referral placed and plan to follow up with PCP. Pt with follow up with pain management as well.

## 2024-12-16 ENCOUNTER — PATIENT MESSAGE (OUTPATIENT)
Dept: PSYCHIATRY | Facility: CLINIC | Age: 59
End: 2024-12-16
Payer: COMMERCIAL

## 2024-12-16 NOTE — PLAN OF CARE
[facility] - Internal Medicine Telemetry  Discharge Final Note    Primary Care Provider: Kip Jimenez MD    Expected Discharge Date: 12/13/2024    Final Discharge Note (most recent)       Final Note - 12/16/24 0923          Final Note    Assessment Type Final Discharge Note (P)      Anticipated Discharge Disposition Home or Self Care (P)      What phone number can be called within the next 1-3 days to see how you are doing after discharge? [phone] (P)         Post-Acute Status    Post-Acute Authorization Other (P)      Other Status Awaiting f/u Appts (P)                    Future Appointments   Date Time Provider Department Center   1/2/2025  9:00 AM Nicko Andersen Jr., MD [facility]        Important Message from Medicare           Patient discharged home via personal vehicle.              SAWYER Loco, SW  [facility]  Case Management  Ext. [phone]

## 2024-12-16 NOTE — PROGRESS NOTES
CHW - Follow Up    This Community Health Worker completed a follow up visit with patient via telephone today.  Pt/Caregiver reported: Pt states she has a temporary place to live above a garage. Pt states her SNAP benefits were stopped.  Community Health Worker provided: Gave [place] information [place] Text: Food [place] [phone] to do a 3 min screener to see if she is eligible for SNAP.   Follow up required: yes  Follow-up Outreach - Due: 1/2/2025

## 2024-12-23 ENCOUNTER — HOSPITAL ENCOUNTER (EMERGENCY)
Facility: HOSPITAL | Age: 59
Discharge: HOME | End: 2024-12-24
Attending: STUDENT IN AN ORGANIZED HEALTH CARE EDUCATION/TRAINING PROGRAM
Payer: COMMERCIAL

## 2024-12-23 DIAGNOSIS — S61.315A LACERATION OF LEFT RING FINGER WITHOUT FOREIGN BODY WITH DAMAGE TO NAIL, INITIAL ENCOUNTER: ICD-10-CM

## 2024-12-23 DIAGNOSIS — S61.313A LACERATION OF LEFT MIDDLE FINGER WITHOUT FOREIGN BODY WITH DAMAGE TO NAIL, INITIAL ENCOUNTER: Primary | ICD-10-CM

## 2024-12-23 DIAGNOSIS — S69.92XA INJURY OF FINGER OF LEFT HAND, INITIAL ENCOUNTER: ICD-10-CM

## 2024-12-23 PROCEDURE — 12001 RPR S/N/AX/GEN/TRNK 2.5CM/<: CPT

## 2024-12-23 PROCEDURE — 90715 TDAP VACCINE 7 YRS/> IM: CPT | Performed by: STUDENT IN AN ORGANIZED HEALTH CARE EDUCATION/TRAINING PROGRAM

## 2024-12-23 PROCEDURE — 96376 TX/PRO/DX INJ SAME DRUG ADON: CPT

## 2024-12-23 PROCEDURE — 96375 TX/PRO/DX INJ NEW DRUG ADDON: CPT

## 2024-12-23 PROCEDURE — 63600175 PHARM REV CODE 636 W HCPCS: Performed by: STUDENT IN AN ORGANIZED HEALTH CARE EDUCATION/TRAINING PROGRAM

## 2024-12-23 PROCEDURE — 99284 EMERGENCY DEPT VISIT MOD MDM: CPT | Mod: 25

## 2024-12-23 PROCEDURE — 96374 THER/PROPH/DIAG INJ IV PUSH: CPT

## 2024-12-23 PROCEDURE — 90471 IMMUNIZATION ADMIN: CPT | Performed by: STUDENT IN AN ORGANIZED HEALTH CARE EDUCATION/TRAINING PROGRAM

## 2024-12-23 RX ORDER — MORPHINE SULFATE 4 MG/ML
4 INJECTION, SOLUTION INTRAMUSCULAR; INTRAVENOUS
Status: COMPLETED | OUTPATIENT
Start: 2024-12-23 | End: 2024-12-23

## 2024-12-23 RX ORDER — MORPHINE SULFATE 4 MG/ML
4 INJECTION, SOLUTION INTRAMUSCULAR; INTRAVENOUS
Status: COMPLETED | OUTPATIENT
Start: 2024-12-24 | End: 2024-12-23

## 2024-12-23 RX ORDER — LIDOCAINE HYDROCHLORIDE 10 MG/ML
20 INJECTION, SOLUTION EPIDURAL; INFILTRATION; INTRACAUDAL; PERINEURAL
Status: COMPLETED | OUTPATIENT
Start: 2024-12-23 | End: 2024-12-23

## 2024-12-23 RX ORDER — CYCLOBENZAPRINE HCL 10 MG
10 TABLET ORAL 3 TIMES DAILY PRN
Qty: 15 TABLET | Refills: 0 | Status: SHIPPED | OUTPATIENT
Start: 2024-12-23 | End: 2024-12-29

## 2024-12-23 RX ORDER — CEFAZOLIN SODIUM 1 G/3ML
1 INJECTION, POWDER, FOR SOLUTION INTRAMUSCULAR; INTRAVENOUS
Status: DISCONTINUED | OUTPATIENT
Start: 2024-12-23 | End: 2024-12-23

## 2024-12-23 RX ORDER — CEFAZOLIN SODIUM 1 G/3ML
1 INJECTION, POWDER, FOR SOLUTION INTRAMUSCULAR; INTRAVENOUS
Status: COMPLETED | OUTPATIENT
Start: 2024-12-23 | End: 2024-12-23

## 2024-12-23 RX ORDER — SULFAMETHOXAZOLE AND TRIMETHOPRIM 800; 160 MG/1; MG/1
1 TABLET ORAL 2 TIMES DAILY
Qty: 20 TABLET | Refills: 0 | Status: SHIPPED | OUTPATIENT
Start: 2024-12-23 | End: 2025-01-03

## 2024-12-23 RX ORDER — LIDOCAINE HYDROCHLORIDE 10 MG/ML
5 INJECTION, SOLUTION EPIDURAL; INFILTRATION; INTRACAUDAL; PERINEURAL
Status: COMPLETED | OUTPATIENT
Start: 2024-12-23 | End: 2024-12-23

## 2024-12-23 RX ADMIN — MORPHINE SULFATE 4 MG: 4 INJECTION INTRAVENOUS at 08:12

## 2024-12-23 RX ADMIN — LIDOCAINE HYDROCHLORIDE 200 MG: 10 SOLUTION INTRAVENOUS at 10:12

## 2024-12-23 RX ADMIN — MORPHINE SULFATE 4 MG: 4 INJECTION INTRAVENOUS at 11:12

## 2024-12-23 RX ADMIN — CLOSTRIDIUM TETANI TOXOID ANTIGEN (FORMALDEHYDE INACTIVATED), CORYNEBACTERIUM DIPHTHERIAE TOXOID ANTIGEN (FORMALDEHYDE INACTIVATED), BORDETELLA PERTUSSIS TOXOID ANTIGEN (GLUTARALDEHYDE INACTIVATED), BORDETELLA PERTUSSIS FILAMENTOUS HEMAGGLUTININ ANTIGEN (FORMALDEHYDE INACTIVATED), BORDETELLA PERTUSSIS PERTACTIN ANTIGEN, AND BORDETELLA PERTUSSIS FIMBRIAE 2/3 ANTIGEN 0.5 ML: 5; 2; 2.5; 5; 3; 5 INJECTION, SUSPENSION INTRAMUSCULAR at 09:12

## 2024-12-23 RX ADMIN — CEFAZOLIN 1 G: 330 INJECTION, POWDER, FOR SOLUTION INTRAMUSCULAR; INTRAVENOUS at 08:12

## 2024-12-23 RX ADMIN — LIDOCAINE HYDROCHLORIDE 50 MG: 10 SOLUTION INTRAVENOUS at 10:12

## 2024-12-24 VITALS
OXYGEN SATURATION: 96 % | WEIGHT: 140 LBS | RESPIRATION RATE: 11 BRPM | BODY MASS INDEX: 25.61 KG/M2 | DIASTOLIC BLOOD PRESSURE: 97 MMHG | TEMPERATURE: 99 F | SYSTOLIC BLOOD PRESSURE: 163 MMHG | HEART RATE: 74 BPM

## 2024-12-24 LAB
OHS QRS DURATION: 78 MS
OHS QTC CALCULATION: 422 MS

## 2024-12-24 PROCEDURE — 63600175 PHARM REV CODE 636 W HCPCS: Performed by: STUDENT IN AN ORGANIZED HEALTH CARE EDUCATION/TRAINING PROGRAM

## 2024-12-24 NOTE — ED NOTES
"Reza Morrow, a 59 y.o. female presents to the ED w/ complaint of finger amputation. 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 Last tetanus shot unknown.     Triage note:  Chief Complaint   Patient presents with    finger amputation      Partial amputation of 3rd and 4th finger. Patient was cutting grass and tried to remove weeds     Review of patient's allergies indicates:   Allergen Reactions    Adhesive Hives    Neosporin [neomycin-bacitracin-polymyxin] Hives    Neomycin     Neomycin-polymyxin Hives    Neosporin g.u. irrigant     Penicillins Other (See Comments)     Unknown  reaction    Latex Rash     Past Medical History:   Diagnosis Date    Behavioral problem     Bulging lumbar disc     Bursitis     bilateral hip    Degenerative cervical disc     Hx of psychiatric care     Hypercholesteremia     Labral tear of hip, degenerative bilateral    MS (multiple sclerosis)     Paresthesia of both lower extremities 3/13/2022    Pneumonia     Spinal cord compression       "

## 2024-12-24 NOTE — DISCHARGE INSTRUCTIONS
You were seen in the ED for finger injury. While you were here we removed your damaged finger nails and repaired the skin. It is possible that your nail may not grow back normally due to the extensive damage to your nail bed. You should follow up with our orthopedic surgeons out patient. You should receive a call to schedule. We are discharging you home on flexeril which is a muscle relaxant and an antibiotic, bactrim, that you should take for 10 days. Given your new prescription from [facility], we recommend that your use that medication for pain control.    Please return to the emergency department if your lose sensation in your hand, your fingers become cold and pale, you notice smelly drainage from your wound, fevers, or any other new concern.

## 2024-12-24 NOTE — CONSULTS
Consult Note  Orthopedic Surgery    CC: left hand injury    SUBJECTIVE:     History of Present Illness:  Reza Morrow is a right hand dominant 59 y.o. female with PMH of CAD, MS, presenting with left middle and ring finger injuries after getting her finger stuck in a  while pulling weeds. Denies other MSK pains. Denies numbness, tingling, or paresthesias.  Does not smoke and is not on any blood thinners at baseline.      Review of patient's allergies indicates:   Allergen Reactions    Adhesive Hives    Neosporin [neomycin-bacitracin-polymyxin] Hives    Neomycin     Neomycin-polymyxin Hives    Neosporin g.u. irrigant     Penicillins Other (See Comments)     Unknown  reaction    Latex Rash       Past Medical History:   Diagnosis Date    Behavioral problem     Bulging lumbar disc     Bursitis     bilateral hip    Degenerative cervical disc     Hx of psychiatric care     Hypercholesteremia     Labral tear of hip, degenerative bilateral    MS (multiple sclerosis)     Paresthesia of both lower extremities 3/13/2022    Pneumonia     Spinal cord compression      Past Surgical History:   Procedure Laterality Date    ANGIOGRAM, CORONARY, WITH LEFT HEART CATHETERIZATION Left 3/11/2022    Procedure: Angiogram, Coronary, with Left Heart Cath;  Surgeon: Elie Matamoros MD;  Location: [facility] CATH LAB;  Service: Cardiology;  Laterality: Left;    BREAST SURGERY      augmentation     SECTION, CLASSIC      HAND SURGERY      HYSTEROSCOPY      NECK SURGERY      cervical disc removeal    TUBAL LIGATION      X-STOP IMPLANTATION       Family History   Problem Relation Name Age of Onset    Cancer Father      Diabetes Father      Lung cancer Father      Diabetes Sister      COPD Mother      Drug abuse Mother      Bipolar disorder Brother      Heart disease Maternal Uncle      Hypothyroidism Maternal Grandmother       Social History     Tobacco Use    Smoking status: Former     Current packs/day: 0.00     Types: Cigarettes     " Quit date: 2013     Years since quittin.4     Passive exposure: Past    Smokeless tobacco: Never   Substance Use Topics    Alcohol use: No    Drug use: Yes     Types: Benzodiazepines, Marijuana        Review of Systems:  Constitutional: Negative for fevers and chills  HENT: Negative for congestion and headaches   Eyes: Negative for blurred vision   Cardiovascular: Negative for chest pain and palpitations  Respiratory: Negative for cough and shortness of breath   Hematologic/Lymphatic: Negative for bleeding problem. Does not bruise/bleed easily  Skin: Negative for dry skin and rash  Musculoskeletal: negative for back pain  Gastrointestinal: Negative for abdominal pain and n/v/d  Neurological: Negative for numbness and paresthesias     OBJECTIVE:     Vital Signs:  Temp:  [98.6 °F (37 °C)-98.7 °F (37.1 °C)] 98.6 °F (37 °C)  Pulse:  [73-98] 92  Resp:  [14-20] 14  SpO2:  [96 %-100 %] 96 %  BP: (142-171)/() 161/92    Physical Exam:  General:  no apparent distress, WDWN  HENT:  NCAT, Bilateral ears/eyes normal  CV:  Normal pulses, color, and cap refill  Lungs:  Normal respiratory effort  Neuro: No FND, awake, alert  Psych:  Oriented to Person, Place, and Time    MSK:      MSK:    Left Upper Extremity  Inspection  - open wounds at the tip of the middle and ring fingers  - moderate swelling present to the fingers  Palpation  - TTP over the dorsal and volar aspects of the RF & MF.  Range of motion  - AROM and PROM of the shoulder, elbow, wrist, and hand intact  Neurovascular  - AIN/PIN/Radial/Median/Ulnar Nerves assessed in isolation without deficit  - Able to give thumbs up, make "OK" sign, cross IF/LF, abduct/adduct fingers, make fist  - Endorsed volar and dorsal paraesthesias to the RF & MD  - Compartments soft  - Radial artery 2+  - Muscle tone normal      Procedure Note: left RF & MF finger nail bed repair  Patient was explained risks, benefits, and alternatives to treatment and verbalized consent to " proceed. Patient and location was confirmed.  3 cc of 1% lidocaine was injected for a digital block after local cleaning with alcohol swabs/betadine/CHG. Distal extremity was cleansed with betadine and draped with blue towels. Nail bed was removed from nail bed using hemostat.  Eval revealed significantly damaged nailbed of both fingers. Wound was irrigated with 2L of betadine solution & normal saline. Xeroform was then placed over the nail bed. Wound was dressed with soft dressing of 4x4's, cast padding, ACE bandage. Intrinsic plus splint was applied to fingers. No complications were noted.      ASSESSMENT/PLAN:     Reza Morrow is a 59 y.o. female with left ring and middle finger nailbed laceration.  Patient was given IV ancef & TDAP upon [place] [facility] [place], and nailbed repair was performed at bedside (see procedure note above).      Plan:  NWB left upper extremity  Keep dressing clean, dry, and intact  Bactrim DS BID x 10 days  Pain control: NSAIDs, Tylenol, elevation     » Dispo: follow up in Hand Surgery clinic (patient will be contacted with scheduling information)      Signed:  Earle Renteria M.D.   Orthopaedic Surgery Resident

## 2024-12-24 NOTE — ED PROVIDER NOTES
Encounter Date: 2024       History     Chief Complaint   Patient presents with    finger amputation      Partial amputation of 3rd and 4th finger. Patient was cutting grass and tried to remove weeds     HPI  Ms. Morrow is a 59 YOF is presenting with finger injury.    Patient reports that she was attempting to mow her lawn when her fingers got sucked under the  and were cut.  States that she did not even realize initially the extent of the damage as she does not have sensation of her left 3rd and 4th fingertips.  She does have sensation proximal aspect of her affected fingers.  She denies any other injuries.  Review of patient's allergies indicates:   Allergen Reactions    Adhesive Hives    Neosporin [neomycin-bacitracin-polymyxin] Hives    Neomycin     Neomycin-polymyxin Hives    Neosporin g.u. irrigant     Penicillins Other (See Comments)     Unknown  reaction    Latex Rash     Past Medical History:   Diagnosis Date    Behavioral problem     Bulging lumbar disc     Bursitis     bilateral hip    Degenerative cervical disc     Hx of psychiatric care     Hypercholesteremia     Labral tear of hip, degenerative bilateral    MS (multiple sclerosis)     Paresthesia of both lower extremities 3/13/2022    Pneumonia     Spinal cord compression      Past Surgical History:   Procedure Laterality Date    ANGIOGRAM, CORONARY, WITH LEFT HEART CATHETERIZATION Left 3/11/2022    Procedure: Angiogram, Coronary, with Left Heart Cath;  Surgeon: Elie Matamoros MD;  Location: [facility] CATH LAB;  Service: Cardiology;  Laterality: Left;    BREAST SURGERY      augmentation     SECTION, CLASSIC      HAND SURGERY      HYSTEROSCOPY      NECK SURGERY      cervical disc removeal    TUBAL LIGATION      X-STOP IMPLANTATION       Family History   Problem Relation Name Age of Onset    Cancer Father      Diabetes Father      Lung cancer Father      Diabetes Sister      COPD Mother      Drug abuse Mother      Bipolar disorder  Brother      Heart disease Maternal Uncle      Hypothyroidism Maternal Grandmother       Social History     Tobacco Use    Smoking status: Former     Current packs/day: 0.00     Types: Cigarettes     Quit date: 2013     Years since quittin.4     Passive exposure: Past    Smokeless tobacco: Never   Substance Use Topics    Alcohol use: No    Drug use: Yes     Types: Benzodiazepines, Marijuana     Review of Systems    Physical Exam     Initial Vitals [24 1719]   BP Pulse Resp Temp SpO2   (!) 142/80 80 18 98.7 °F (37.1 °C) 100 %      MAP       --         Physical Exam    Nursing note and vitals reviewed.  Constitutional: She appears well-developed and well-nourished.   HENT:   Head: Normocephalic and atraumatic.   Eyes: Conjunctivae and EOM are normal. Pupils are equal, round, and reactive to light.   Cardiovascular:  Normal rate and regular rhythm.           No murmur heard.  Pulmonary/Chest: Breath sounds normal. No respiratory distress. She has no wheezes.   Musculoskeletal:      Comments: Decreased sensation of digits 3 and 4 on L hand. Complex laceration of digit 3 and 4 fingertips involving the nail bed. Flexion and extensions preserved on DIP and PIP joints.     Neurological: GCS score is 15. GCS eye subscore is 4. GCS verbal subscore is 5. GCS motor subscore is 6.   Skin: Skin is warm. Capillary refill takes less than 2 seconds.   Psychiatric:   Tearful, anxious         ED Course   Lac Repair    Date/Time: 2024 11:38 PM    Performed by: Susan Peña MD  Authorized by: Jayda Lopez MD    Consent:     Consent obtained:  Verbal    Consent given by:  Patient    Risks, benefits, and alternatives were discussed: yes      Risks discussed:  Infection, pain, need for additional repair and poor cosmetic result    Alternatives discussed:  No treatment  Universal protocol:     Patient identity confirmed:  Verbally with patient  Anesthesia:     Anesthesia method:  Nerve block    Block  location:  Digital block    Block anesthetic:  Lidocaine 1% w/o epi    Block injection procedure:  Negative aspiration for blood, anatomic landmarks identified, introduced needle and incremental injection    Block outcome:  Anesthesia achieved  Laceration details:     Location:  Finger    Finger location: left 3rd and 4th digit.    Length (cm):  1    Depth (mm):  4  Pre-procedure details:     Preparation:  Patient was prepped and draped in usual sterile fashion and imaging obtained to evaluate for foreign bodies  Exploration:     Limited defect created (wound extended): no      Hemostasis achieved with:  Tourniquet    Imaging obtained: x-ray      Wound exploration: wound explored through full range of motion and entire depth of wound visualized      Wound extent: areolar tissue violated, muscle damage and underlying fracture      Wound extent: no foreign bodies/material noted    Treatment:     Area cleansed with:  Povidone-iodine    Amount of cleaning:  Standard    Irrigation solution:  Sterile saline    Irrigation volume:  2L    Debridement:  None    Undermining:  None    Scar revision: no    Skin repair:     Repair method:  Sutures    Suture size:  5-0    Suture material:  Nylon    Suture technique:  Simple interrupted    Number of sutures:  5 (1 suture in 3rd digit, 4 sutures in 4th digit)  Approximation:     Approximation:  Close  Repair type:     Repair type:  Intermediate  Post-procedure details:     Dressing:  Splint for protection    Procedure completion:  Tolerated  Comments:      Patient verbally consents to laceration repair to 3rd and 4th digit and left upper extremity.  Patient was anesthetized using 1% lidocaine for a digital block the base of 3rd and 4th digits.  Wound was cleaned using iodine and sterile saline.  No foreign bodies on exam.  Fingernail removed on both affected digits.  On exam nail bed extensively damaged on 3rd and 4th digit and unsuturable.  5-0 nylon suture material used to close  lacerations.  Third digit with 1 suture.  Fourth digit with 4 sutures.  Single-layer closure with wound edges well approximated. Wound covered with xeroform gauze.    Labs Reviewed - No data to display       Imaging Results              X-Ray Hand 3 View Left (Final result)  Result time 12/24/24 01:53:50      Final result by Marquise Walden MD (12/24/24 01:53:50)                   Impression:      Traumatic injuries to the distal 3rd and 4th digits with comminuted fractures of the distal 3rd and 4th phalanges soft tissue laceration in the nailbed and periungual region..    Electronically signed by resident: Radames Brewer  Date:    12/24/2024  Time:    01:31    Electronically signed by: Marquise Walden  Date:    12/24/2024  Time:    01:53               Narrative:    EXAMINATION:  XR HAND COMPLETE 3 VIEW LEFT    CLINICAL HISTORY:  L hand amputation;    TECHNIQUE:  PA, lateral, and oblique views of the left hand were performed.    COMPARISON:  None    FINDINGS:  Defects within the distal 3rd and 4th digits.  No radiopaque foreign body visualized.  Comminuted fractures of the distal phalanges of the 3rd and 4th digits.                                       Medications   Tdap vaccine injection 0.5 mL (0.5 mLs Intramuscular Given 12/23/24 2125)   morphine injection 4 mg (4 mg Intravenous Given 12/23/24 2038)   ceFAZolin injection 1 g (1 g Intravenous Given 12/23/24 2056)   LIDOcaine (PF) 10 mg/ml (1%) injection 50 mg (50 mg Infiltration Given by Provider 12/23/24 2215)   LIDOcaine (PF) 10 mg/ml (1%) injection 200 mg (200 mg Infiltration Given by Provider 12/23/24 2230)   morphine injection 4 mg (4 mg Intravenous Given 12/23/24 2359)     Medical Decision Making  Amount and/or Complexity of Data Reviewed  Radiology: ordered.    Risk  Prescription drug management.    Ms. Morrow is a 59 YOF is presenting with finger injury.  On arrival patient is hemodynamically stable with an elevated blood pressure of 142/80.  Patient  appears anxious and tearful.  Fingers and left hand are wrapped in gauze.  On exam patient has complex lacerations of the fingertips of her 3rd and 4th digit.  She has decreased sensation of those fingertips.  She has preserved flexion and extension of the IP and PIP joints.  Differentials at this time include complex laceration, partial amputation, fracture, dislocation, nail avulsion.  Plan to treat with morphine, Ancef, and Tdap.  We will obtain x-rays of left hand/fingers.  Orthopedic surgery was consulted.  Independent review of x-ray of left hand is concerning for fracture of distal 3rd and 4th phalanx.  Repaired patient's laceration and removed damaged nails infected fingers with Orthopedic surgery's assistance.  Patient was placed in splint.  Please refer to procedure note for further detail.  Orthopedic surgery recommends follow up outpatient and 10 days of Bactrim.  Plan to discharge home with 10 days of Bactrim and Flexeril for pain.  Patient is already prescribed Nucynta. And recommend that she continue that medication for pain control. She was advised to follow closely with her pain management doctor for further management of her acute pain given recent medication changes. Provided return precautions.             ED Course as of 12/24/24 0554   Mon Dec 23, 2024   2059 Pain control, Tdap and Ancef ordered [NN]   2101 Left 3rd and 4th distal tuft fractures with associated overlying lacerations. [NN]   2102 Will consult Orthopedics. [NN]   2102 Sensation decreased over the distal tips of the fingertips range of motion intact at all joints otherwise [NN]      ED Course User Index  [NN] Jayda Lopez MD                           Clinical Impression:  Final diagnoses:  [S61.313A] Laceration of left middle finger without foreign body with damage to nail, initial encounter (Primary)  [S61.315A] Laceration of left ring finger without foreign body with damage to nail, initial encounter  [S69.92XA] Injury of  finger of left hand, initial encounter          ED Disposition Condition    Discharge Stable          ED Prescriptions       Medication Sig Dispense Start Date End Date Auth. Provider    sulfamethoxazole-trimethoprim 800-160mg (BACTRIM DS) 800-160 mg Tab Take 1 tablet by mouth 2 (two) times daily. for 10 days 20 tablet 12/23/2024 1/3/2025 Susan Peña MD    cyclobenzaprine (FLEXERIL) 10 MG tablet Take 1 tablet (10 mg total) by mouth 3 (three) times daily as needed for Muscle spasms. 15 tablet 12/23/2024 12/29/2024 Susan Peña MD          Follow-up Information       Follow up With Specialties Details Why Contact Info Additional Information    [facility] [address] - Emergency Dept Emergency Medicine  As needed [address] [address] [phone]     [facility] [address] - Orthopedics [facility] Orthopedics   [address], [address]th Floor  [facility] [address] [phone] [facility] - Main Building, 5th Floor Please park in [facility] and take Atrium elevator             Susan Peña MD  Resident  12/24/24 5045

## 2024-12-26 ENCOUNTER — TELEPHONE (OUTPATIENT)
Dept: ORTHOPEDICS | Facility: CLINIC | Age: 59
End: 2024-12-26
Payer: COMMERCIAL

## 2024-12-26 NOTE — TELEPHONE ENCOUNTER
F/U from ED visit - 12/23/24     Patient would be cash pay. LVM to discuss follow up and possible referral to in network provider.     Skyler Guallpa MS, OTC   Sports Medicine Assistant   [facility]  (P) [phone]  (F) [phone]

## 2024-12-26 NOTE — TELEPHONE ENCOUNTER
Spoke with patient today regarding follow up from ER  visit. Patient's insurance is out of network. Advised to find in network provider in case of potential surgery.     Skyler Guallpa MS, OTC   Sports Medicine Assistant   [facility]  (P) [phone]  (F) [phone]

## 2025-01-06 ENCOUNTER — TELEPHONE (OUTPATIENT)
Dept: PAIN MEDICINE | Facility: CLINIC | Age: 60
End: 2025-01-06
Payer: MEDICAID

## 2025-01-06 NOTE — TELEPHONE ENCOUNTER
----- Message from Tiana sent at 1/6/2025  2:45 PM CST -----  Regarding: Pt Advice  Contact: PT  [phone]  Pt is returning call back to discuss plan of care, please call pt @ [phone]

## 2025-01-06 NOTE — TELEPHONE ENCOUNTER
Spoke with patient. Discussed her pain issues and inform her that we are not able to accept [employer] insurance coverage. Made her aware that they are out of network with our organization and doing self pay for some visits could cause issues with her insurance and possibly be dropped from their roll. Instructed her to reach out to her insurance to discuss the providers that are available for her to get a clinic visit with. Verbalized understanding. No further issues discussed.

## 2025-01-22 ENCOUNTER — PATIENT OUTREACH (OUTPATIENT)
Dept: ADMINISTRATIVE | Facility: OTHER | Age: 60
End: 2025-01-22
Payer: MEDICAID

## 2025-01-22 NOTE — PROGRESS NOTES
CHW - Outreach Attempt    Community Health Worker left a voicemail message for 1st attempt to contact patient regarding: Following up affordable housing. Living in a garage appartment at this time.   Community Health Worker to attempt to contact patient on: 2/3/2025

## 2025-02-03 ENCOUNTER — PATIENT OUTREACH (OUTPATIENT)
Dept: ADMINISTRATIVE | Facility: OTHER | Age: 60
End: 2025-02-03
Payer: MEDICAID

## 2025-02-03 NOTE — PROGRESS NOTES
CHW - Outreach Attempt    Community Health Worker left a voicemail message for 2nd attempt to contact patient regarding: Following up affordable housing. Living in a garage apartment at this time.   Community Health Worker to attempt to contact patient on: 2/24/2025

## 2025-02-24 ENCOUNTER — PATIENT OUTREACH (OUTPATIENT)
Dept: ADMINISTRATIVE | Facility: OTHER | Age: 60
End: 2025-02-24
Payer: MEDICAID

## 2025-03-07 ENCOUNTER — PATIENT MESSAGE (OUTPATIENT)
Dept: PSYCHIATRY | Facility: CLINIC | Age: 60
End: 2025-03-07
Payer: MEDICAID

## 2025-04-23 ENCOUNTER — TELEPHONE (OUTPATIENT)
Dept: PAIN MEDICINE | Facility: CLINIC | Age: 60
End: 2025-04-23
Payer: MEDICAID

## 2025-04-23 NOTE — TELEPHONE ENCOUNTER
----- Message from Nurse Pavel sent at 4/22/2025  4:59 PM CDT -----  Regarding: FW: Appt  Contact: Pt [phone]    ----- Message -----  From: Krysta Abdalla  Sent: 4/22/2025  12:54 PM CDT  To: Nathaly Maharaj [facility] [place]  Subject: Appt                                             Pt is returning a call to speak to Pavel about cancelled appt please call

## 2025-05-15 NOTE — ASSESSMENT & PLAN NOTE
Addended by: DARBY MERCDEES on: 5/15/2025 04:15 AM     Modules accepted: Orders     Patient initially presented for confusion but when she is awake and alert denied having any chest pain.  Her EKG was initially concerning for ST segment elevation in 1 and aVL but not more than 1 mm.  However when I was doing her bedside echo she complained of having chest pain and she did had wall motion abnormalities involving the anterior and anterolateral walls.  Case was discussed with interventional staff and decided to take patient to the cath lab.  Patient is loaded with aspirin and Plavix.  On heparin.  Her chest pain improved after getting the 2nd dose of nitro.  Consent signed for left heart catheterization    --LHC +/- PCI, patient is a MIKIE candidate  -- clean coronary arteries, no stents placed  - Anti-platelet Therapy: ASA / Ticagrelor  - Access: Right radial  - Catheters: Arnav  - Creatinine/CrCl: 1.2  - Allergies: No shellfish / Iodine allergy  - Pre-Hydration: NS  - Pre-Op Med: Bendaryl 50mg pO   - All patient's questions were answered.    Plan:      - patient started on GDMT with metoprolol-XL 25 mg due to low normal BP

## 2025-06-22 ENCOUNTER — HOSPITAL ENCOUNTER (EMERGENCY)
Facility: HOSPITAL | Age: 60
Discharge: HOME | End: 2025-06-22
Attending: EMERGENCY MEDICINE
Payer: MEDICAID

## 2025-06-22 VITALS
RESPIRATION RATE: 19 BRPM | DIASTOLIC BLOOD PRESSURE: 82 MMHG | OXYGEN SATURATION: 97 % | HEIGHT: 65 IN | BODY MASS INDEX: 19.99 KG/M2 | WEIGHT: 120 LBS | SYSTOLIC BLOOD PRESSURE: 149 MMHG | HEART RATE: 95 BPM | TEMPERATURE: 98 F

## 2025-06-22 DIAGNOSIS — R04.0 RIGHT-SIDED EPISTAXIS: Primary | ICD-10-CM

## 2025-06-22 PROCEDURE — 99282 EMERGENCY DEPT VISIT SF MDM: CPT | Mod: ER

## 2025-06-22 RX ORDER — OXYMETAZOLINE HCL 0.05 %
1 SPRAY, NON-AEROSOL (ML) NASAL 2 TIMES DAILY
Qty: 15 ML | Refills: 0 | Status: SHIPPED | OUTPATIENT
Start: 2025-06-22 | End: 2025-06-25

## 2025-06-22 NOTE — DISCHARGE INSTRUCTIONS
The most common cause for epistaxis or nosebleed is from irritation of the lining of the nose. This can be caused by allergies, viral or bacterial nasal infection, severe dryness in the house, nasal manipulation (i.e. nose picking), and severe nose blowing.    The best way to manage nosebleeds is to prevent them by keeping the lining of the nose healthy and well moisturized. Some tips for this include:  1. Avoid nose picking, vigorous nose blowing, vigorous nose rubbing  2. Keep the nose moist by using an over-the-counter nasal moisturizer such as susanne gel or ocean gel, or making your own (see instructions blow). You can also use vaseline applied gently to the front of the inside of nose with a fingertip. Use this three times a day, especially during dry months    If you have a nosebleed, do the followin. Sit up in a chair and do not tilt your head back, lean forward slightly; do not place your head downwards or between your legs  2. Use a small pan or bowl to catch any dripping blood.  3. Prior to catching the blood in the bowl, gently blow your nose to free any clots, as clots can sometimes cause more problems.  4. Do not stuff cotton or tissue up the nose, as this will only act as a wick and cause more bleeding.  5. Use some afrin nose spray medication in your nose. Pinch the nose in the front, not high up. Do this for fifteen minutes by a clock or watch. Repeat this 4 or 5 times if there is still bleeding.  6. Ice or heat on the bridge of the nose or on the neck has no effect on the nosebleed (but if it makes you feel good, please do this).  7. After the bleeding has stopped, use the afrin three times a day for three days to prevent any further bleeding (fo not continue daily afrin use any longer than three days)  8. Use a vaporizer or humidifier in your bedroom at nighttime. Warm or cold does not make a difference.  9. Use ointment as prescribed by the doctor.

## 2025-06-22 NOTE — ED PROVIDER NOTES
Encounter Date: 6/22/2025       History     Chief Complaint   Patient presents with    Epistaxis     The patient was doing fence work and chicken wire hit her in the face causing a nose bleed.  No blood thinners.     Reza Morrow is a 60 y.o. female  has a past medical history of Behavioral problem, Bulging lumbar disc, Bursitis, Degenerative cervical disc, psychiatric care, Hypercholesteremia, Labral tear of hip, degenerative, MS (multiple sclerosis), Paresthesia of both lower extremities, Pneumonia, and Spinal cord compression. presenting to the Emergency Department for nosebleed starting a proximally 10 minutes prior to arrival.  Patient states she was attempting to put a chicken wire when it hit her in the nose.  Reports nosebleed from the right nostril since.  Patient was applying pressure to her nose with her head tilted back.  Patient's nosebleed stopped after being in the ER for approximally 5 minutes.  Not on blood thinners.  No other complaints at this time.        The history is provided by the patient.     Review of patient's allergies indicates:   Allergen Reactions    Adhesive Hives    Neosporin [neomycin-bacitracin-polymyxin] Hives    Neomycin     Neomycin-polymyxin Hives    Neosporin g.u. irrigant     Penicillins Other (See Comments)     Unknown  reaction    Latex Rash     Past Medical History:   Diagnosis Date    Behavioral problem     Bulging lumbar disc     Bursitis     bilateral hip    Degenerative cervical disc     Hx of psychiatric care     Hypercholesteremia     Labral tear of hip, degenerative bilateral    MS (multiple sclerosis)     Paresthesia of both lower extremities 3/13/2022    Pneumonia     Spinal cord compression      Past Surgical History:   Procedure Laterality Date    ANGIOGRAM, CORONARY, WITH LEFT HEART CATHETERIZATION Left 3/11/2022    Procedure: Angiogram, Coronary, with Left Heart Cath;  Surgeon: Elie Matamoros MD;  Location: [facility] CATH LAB;  Service: Cardiology;  Laterality:  Left;    BREAST SURGERY      augmentation     SECTION, CLASSIC      HAND SURGERY      HYSTEROSCOPY      NECK SURGERY      cervical disc removeal    TUBAL LIGATION      X-STOP IMPLANTATION       Family History   Problem Relation Name Age of Onset    Cancer Father      Diabetes Father      Lung cancer Father      Diabetes Sister      COPD Mother      Drug abuse Mother      Bipolar disorder Brother      Heart disease Maternal Uncle      Hypothyroidism Maternal Grandmother       Social History[1]  Review of Systems   HENT:  Positive for nosebleeds.    All other systems reviewed and are negative.      Physical Exam     Initial Vitals [25 1616]   BP Pulse Resp Temp SpO2   (!) 157/99 109 20 98.1 °F (36.7 °C) 96 %      MAP       --         Physical Exam    Nursing note and vitals reviewed.  Constitutional: She appears well-developed and well-nourished. She is not diaphoretic.  Non-toxic appearance. No distress.   HENT:   Head: Normocephalic and atraumatic.   Right Ear: Hearing and external ear normal.   Left Ear: Hearing and external ear normal.   Nose: No nasal septal hematoma. Epistaxis (a proximally 3 mm laceration located in the right near on the septum anteriorly.  No active bleeding.) is observed.   Eyes: EOM are normal.   Neck: Neck supple.   Normal range of motion.  Cardiovascular:  Normal rate.           Pulmonary/Chest: No respiratory distress.   Abdominal: She exhibits no distension.   Musculoskeletal:         General: Normal range of motion.      Cervical back: Normal range of motion and neck supple. Normal range of motion.     Neurological: She is alert and oriented to person, place, and time. GCS score is 15. GCS eye subscore is 4. GCS verbal subscore is 5. GCS motor subscore is 6.   Skin: Skin is warm and dry.   Psychiatric: She has a normal mood and affect. Her behavior is normal. Judgment and thought content normal.         ED Course   Procedures  Labs Reviewed - No data to display        Imaging Results    None          Medications - No data to display  Medical Decision Making  This is an emergent evaluation of 60 y.o. female in the ED presenting for nosebleed. Physical exam reveals a non-toxic, afebrile, and well-appearing female in no apparent respiratory distress. Pertinent physical exam findings above. Vital signs stable. If available, previous records reviewed.    My overall impression is :  Right-sided epistaxis (Primary)  . Differential Diagnoses: Nosebleeds, allergies, nasal trauma, foreign body, hypertension    Discharge Meds/Instructions:  Instructed on how to properly stopped nosebleed.  Afrin PRN.    There does not appear to be any indication for further emergent testing, observation, or hospitalization at this time. A mutual shared decision making discussion was had with the patient. Patient appears stable for and is comfortable with discharge home. The diagnosis, treatment plan, instructions for follow-up as well as ED return precautions were discussed. Advised to follow-up with PCP for outpatient follow-up in 2-3 days. Signs and symptoms that would warrant immediate return to ED were reviewed prior to discharge. All questions and concerns were asked, answered, and addressed. Patient expressed understanding and agreement with the plan.     Risk  OTC drugs.                                      Clinical Impression:  Final diagnoses:  [R04.0] Right-sided epistaxis (Primary)          ED Disposition Condition    Discharge Stable          ED Prescriptions       Medication Sig Dispense Start Date End Date Auth. Provider    oxymetazoline (AFRIN) 0.05 % nasal spray 1 spray by Nasal route 2 (two) times daily. for 3 days 15 mL 2025 Elena Chávez PA-C          Follow-up Information    None                [1]   Social History  Tobacco Use    Smoking status: Former     Current packs/day: 0.00     Types: Cigarettes     Quit date: 2013     Years since quittin.8     Passive  exposure: Past    Smokeless tobacco: Never   Substance Use Topics    Alcohol use: No    Drug use: Yes     Types: Benzodiazepines, Marijuana        Elena Chávez PA-C  06/22/25 0251

## 2025-06-25 ENCOUNTER — TELEPHONE (OUTPATIENT)
Dept: PAIN MEDICINE | Facility: CLINIC | Age: 60
End: 2025-06-25
Payer: MEDICAID

## 2025-06-25 NOTE — TELEPHONE ENCOUNTER
Copied from CRM #[id number]. Topic: Appointments - Appointment Rescheduling  >> Jun 24, 2025  4:28 PM Coleman wrote:  Reschedule Existing Appointment     Current appt date: 1/2/2025     Type of appt : neck pain     Physician: Dr. Andersen     Reason for rescheduling: Past insurance issue. But insurance is right now.     Caller: Reza     Contact Preference:  [phone]

## 2025-06-25 NOTE — TELEPHONE ENCOUNTER
Spoke with patient. Stated that she has a history of MS with changes to her spine from the cervical spine to the lumbar. She is seeing a provider that addresses her MS and medications but is looking forward to the possibility of other options to ease pain issues. She was informed of the role of IPM, plans of care and treatment options. She understands and was offered an appointment. Time/date was accepted. No further issues discussed.

## (undated) DEVICE — OMNIPAQUE 350 200ML

## (undated) DEVICE — WIRE GUIDE SAFE-T-J .035 260CM

## (undated) DEVICE — KIT CUSTOM MANIFOLD

## (undated) DEVICE — KIT GLIDESHEATH SLEND 6FR 10CM

## (undated) DEVICE — Device

## (undated) DEVICE — CATH FL 3.5 5FR

## (undated) DEVICE — COVER BAND BAG 40 X 40

## (undated) DEVICE — GUIDE LAUNCHER 6FR EBU 3.5

## (undated) DEVICE — KIT PROBE COVER WITH GEL

## (undated) DEVICE — SEE MEDLINE ITEM 156894

## (undated) DEVICE — HEMOSTAT VASC BAND REG 24CM

## (undated) DEVICE — SPIKE CONTRAST CONTROLLER

## (undated) DEVICE — CATH IMPULSE 5F 100CM FR4